# Patient Record
Sex: FEMALE | Race: WHITE | NOT HISPANIC OR LATINO | Employment: OTHER | URBAN - NONMETROPOLITAN AREA
[De-identification: names, ages, dates, MRNs, and addresses within clinical notes are randomized per-mention and may not be internally consistent; named-entity substitution may affect disease eponyms.]

---

## 2017-01-06 ENCOUNTER — TELEPHONE (OUTPATIENT)
Dept: GASTROENTEROLOGY | Facility: CLINIC | Age: 72
End: 2017-01-06

## 2017-01-06 NOTE — TELEPHONE ENCOUNTER
----- Message from Jose Raul Butt DO sent at 12/29/2016  6:08 PM CST -----  Let pt know bx were ok

## 2017-01-10 ENCOUNTER — OFFICE VISIT (OUTPATIENT)
Dept: NEUROLOGY | Age: 72
End: 2017-01-10
Payer: MEDICARE

## 2017-01-10 VITALS
HEIGHT: 66 IN | DIASTOLIC BLOOD PRESSURE: 69 MMHG | SYSTOLIC BLOOD PRESSURE: 129 MMHG | WEIGHT: 148 LBS | HEART RATE: 81 BPM | RESPIRATION RATE: 16 BRPM | BODY MASS INDEX: 23.78 KG/M2

## 2017-01-10 DIAGNOSIS — G61.81 CIDP (CHRONIC INFLAMMATORY DEMYELINATING POLYNEUROPATHY) (HCC): Primary | ICD-10-CM

## 2017-01-10 DIAGNOSIS — M47.26 OSTEOARTHRITIS OF SPINE WITH RADICULOPATHY, LUMBAR REGION: ICD-10-CM

## 2017-01-10 DIAGNOSIS — G25.81 RESTLESS LEGS SYNDROME (RLS): ICD-10-CM

## 2017-01-10 DIAGNOSIS — M05.79 RHEUMATOID ARTHRITIS INVOLVING MULTIPLE SITES WITH POSITIVE RHEUMATOID FACTOR (HCC): ICD-10-CM

## 2017-01-10 PROCEDURE — 99214 OFFICE O/P EST MOD 30 MIN: CPT | Performed by: PSYCHIATRY & NEUROLOGY

## 2017-01-10 RX ORDER — DIAZEPAM 5 MG/1
5 TABLET ORAL EVERY 8 HOURS PRN
Qty: 3 TABLET | Refills: 0 | Status: SHIPPED | OUTPATIENT
Start: 2017-01-10 | End: 2017-01-11

## 2017-01-10 RX ORDER — HYDROCODONE BITARTRATE AND ACETAMINOPHEN 10; 325 MG/1; MG/1
1 TABLET ORAL EVERY 8 HOURS PRN
Qty: 90 TABLET | Refills: 0 | Status: SHIPPED | OUTPATIENT
Start: 2017-01-10 | End: 2017-02-09

## 2017-01-12 ENCOUNTER — TELEPHONE (OUTPATIENT)
Dept: NEUROLOGY | Age: 72
End: 2017-01-12

## 2017-01-17 ENCOUNTER — TELEPHONE (OUTPATIENT)
Dept: NEUROLOGY | Age: 72
End: 2017-01-17

## 2017-01-19 ENCOUNTER — TELEPHONE (OUTPATIENT)
Dept: NEUROLOGY | Age: 72
End: 2017-01-19

## 2017-01-24 ENCOUNTER — HOSPITAL ENCOUNTER (OUTPATIENT)
Dept: INFUSION THERAPY | Age: 72
Setting detail: INFUSION SERIES
Discharge: HOME | End: 2017-01-24
Payer: MEDICARE

## 2017-01-24 VITALS
HEART RATE: 58 BPM | DIASTOLIC BLOOD PRESSURE: 60 MMHG | TEMPERATURE: 98.2 F | RESPIRATION RATE: 18 BRPM | SYSTOLIC BLOOD PRESSURE: 133 MMHG

## 2017-01-24 PROCEDURE — 6360000002 HC RX W HCPCS: Performed by: PSYCHIATRY & NEUROLOGY

## 2017-01-24 PROCEDURE — 96366 THER/PROPH/DIAG IV INF ADDON: CPT

## 2017-01-24 PROCEDURE — 96365 THER/PROPH/DIAG IV INF INIT: CPT

## 2017-01-24 RX ADMIN — IMMUNE GLOBULIN (HUMAN) 30 G: 10 INJECTION INTRAVENOUS; SUBCUTANEOUS at 09:29

## 2017-01-27 ENCOUNTER — HOSPITAL ENCOUNTER (OUTPATIENT)
Dept: MRI IMAGING | Age: 72
Discharge: HOME | End: 2017-01-27
Payer: MEDICARE

## 2017-01-27 DIAGNOSIS — M47.26 OSTEOARTHRITIS OF SPINE WITH RADICULOPATHY, LUMBAR REGION: ICD-10-CM

## 2017-01-27 LAB
GFR NON-AFRICAN AMERICAN: 44
PERFORMED ON: ABNORMAL
POC CREATININE: 1.2 MG/DL (ref 0.3–1.3)
POC SAMPLE TYPE: ABNORMAL

## 2017-01-27 PROCEDURE — 72148 MRI LUMBAR SPINE W/O DYE: CPT

## 2017-01-27 PROCEDURE — 82565 ASSAY OF CREATININE: CPT

## 2017-01-30 ENCOUNTER — TELEPHONE (OUTPATIENT)
Dept: NEUROLOGY | Age: 72
End: 2017-01-30

## 2017-02-22 ENCOUNTER — HOSPITAL ENCOUNTER (OUTPATIENT)
Dept: INFUSION THERAPY | Age: 72
Setting detail: INFUSION SERIES
Discharge: HOME | End: 2017-02-22
Payer: MEDICARE

## 2017-02-22 VITALS
RESPIRATION RATE: 63 BRPM | TEMPERATURE: 99 F | SYSTOLIC BLOOD PRESSURE: 116 MMHG | DIASTOLIC BLOOD PRESSURE: 62 MMHG | HEART RATE: 18 BPM

## 2017-02-22 PROCEDURE — 6360000002 HC RX W HCPCS: Performed by: PSYCHIATRY & NEUROLOGY

## 2017-02-22 PROCEDURE — 96366 THER/PROPH/DIAG IV INF ADDON: CPT

## 2017-02-22 PROCEDURE — 96365 THER/PROPH/DIAG IV INF INIT: CPT

## 2017-02-22 RX ADMIN — IMMUNE GLOBULIN INFUSION (HUMAN) 30 G: 100 INJECTION, SOLUTION INTRAVENOUS; SUBCUTANEOUS at 09:26

## 2017-02-23 RX ORDER — HYDROCODONE BITARTRATE AND ACETAMINOPHEN 10; 325 MG/1; MG/1
1 TABLET ORAL EVERY 8 HOURS PRN
Qty: 90 TABLET | Refills: 0 | Status: SHIPPED | OUTPATIENT
Start: 2017-02-23 | End: 2017-03-25

## 2017-03-20 ENCOUNTER — OFFICE VISIT (OUTPATIENT)
Dept: UROLOGY | Age: 72
End: 2017-03-20
Payer: MEDICARE

## 2017-03-20 VITALS
TEMPERATURE: 97.4 F | OXYGEN SATURATION: 95 % | HEART RATE: 70 BPM | WEIGHT: 154 LBS | HEIGHT: 66 IN | BODY MASS INDEX: 24.75 KG/M2

## 2017-03-20 DIAGNOSIS — N31.2 ATONIC BLADDER: ICD-10-CM

## 2017-03-20 DIAGNOSIS — N39.0 RECURRENT UTI: Primary | ICD-10-CM

## 2017-03-20 LAB
BACTERIA URINE, POC: ABNORMAL
BILIRUBIN URINE: 0 MG/DL
BLOOD, URINE: NEGATIVE
CASTS URINE, POC: ABNORMAL
CLARITY: ABNORMAL
COLOR: ABNORMAL
CRYSTALS URINE, POC: ABNORMAL
EPI CELLS URINE, POC: 3
GLUCOSE URINE: ABNORMAL
KETONES, URINE: NEGATIVE
LEUKOCYTE EST, POC: ABNORMAL
NITRITE, URINE: NEGATIVE
PH UA: 7 (ref 4.5–8)
PROTEIN UA: NEGATIVE
RBC URINE, POC: ABNORMAL
SPECIFIC GRAVITY UA: 1.01 (ref 1–1.03)
UROBILINOGEN, URINE: NORMAL
WBC URINE, POC: ABNORMAL
YEAST URINE, POC: ABNORMAL

## 2017-03-20 PROCEDURE — 81000 URINALYSIS NONAUTO W/SCOPE: CPT | Performed by: UROLOGY

## 2017-03-20 PROCEDURE — 99213 OFFICE O/P EST LOW 20 MIN: CPT | Performed by: UROLOGY

## 2017-03-20 RX ORDER — FENOFIBRATE 145 MG/1
145 TABLET, COATED ORAL DAILY
COMMUNITY
Start: 2017-02-27

## 2017-03-20 RX ORDER — OXYBUTYNIN CHLORIDE 10 MG/1
10 TABLET, EXTENDED RELEASE ORAL DAILY
Qty: 90 TABLET | Refills: 3 | Status: SHIPPED | OUTPATIENT
Start: 2017-03-20 | End: 2018-03-02 | Stop reason: SDUPTHER

## 2017-03-20 RX ORDER — NITROFURANTOIN MACROCRYSTALS 50 MG/1
50 CAPSULE ORAL NIGHTLY
Status: CANCELLED | OUTPATIENT
Start: 2017-03-20

## 2017-03-20 ASSESSMENT — ENCOUNTER SYMPTOMS
COUGH: 0
DOUBLE VISION: 0
HEMOPTYSIS: 0
NAUSEA: 0
BLURRED VISION: 0
HEARTBURN: 0

## 2017-03-22 ENCOUNTER — HOSPITAL ENCOUNTER (OUTPATIENT)
Dept: INFUSION THERAPY | Age: 72
Discharge: HOME | End: 2017-03-22

## 2017-03-29 ENCOUNTER — HOSPITAL ENCOUNTER (OUTPATIENT)
Dept: INFUSION THERAPY | Age: 72
Setting detail: INFUSION SERIES
Discharge: HOME | End: 2017-03-29
Payer: MEDICARE

## 2017-03-29 VITALS
SYSTOLIC BLOOD PRESSURE: 108 MMHG | HEART RATE: 64 BPM | WEIGHT: 150 LBS | HEIGHT: 66 IN | RESPIRATION RATE: 18 BRPM | BODY MASS INDEX: 24.11 KG/M2 | DIASTOLIC BLOOD PRESSURE: 62 MMHG

## 2017-03-29 PROCEDURE — 96365 THER/PROPH/DIAG IV INF INIT: CPT

## 2017-03-29 PROCEDURE — 96366 THER/PROPH/DIAG IV INF ADDON: CPT

## 2017-03-29 PROCEDURE — 6360000002 HC RX W HCPCS: Performed by: PSYCHIATRY & NEUROLOGY

## 2017-03-29 RX ADMIN — IMMUNE GLOBULIN INFUSION (HUMAN) 30 G: 100 INJECTION, SOLUTION INTRAVENOUS; SUBCUTANEOUS at 09:53

## 2017-04-10 RX ORDER — HYDROCODONE BITARTRATE AND ACETAMINOPHEN 10; 325 MG/1; MG/1
1 TABLET ORAL EVERY 8 HOURS PRN
Qty: 90 TABLET | Refills: 0 | Status: SHIPPED | OUTPATIENT
Start: 2017-04-10 | End: 2017-05-10

## 2017-04-26 ENCOUNTER — HOSPITAL ENCOUNTER (OUTPATIENT)
Dept: INFUSION THERAPY | Age: 72
Setting detail: INFUSION SERIES
Discharge: HOME | End: 2017-04-26
Payer: MEDICARE

## 2017-04-26 VITALS
DIASTOLIC BLOOD PRESSURE: 48 MMHG | BODY MASS INDEX: 24.11 KG/M2 | RESPIRATION RATE: 18 BRPM | HEIGHT: 66 IN | WEIGHT: 150 LBS | HEART RATE: 62 BPM | SYSTOLIC BLOOD PRESSURE: 103 MMHG

## 2017-04-26 PROCEDURE — 6360000002 HC RX W HCPCS: Performed by: PSYCHIATRY & NEUROLOGY

## 2017-04-26 PROCEDURE — 96365 THER/PROPH/DIAG IV INF INIT: CPT

## 2017-04-26 PROCEDURE — 96366 THER/PROPH/DIAG IV INF ADDON: CPT

## 2017-04-26 RX ADMIN — IMMUNE GLOBULIN INFUSION (HUMAN) 30 G: 100 INJECTION, SOLUTION INTRAVENOUS; SUBCUTANEOUS at 09:29

## 2017-05-18 ENCOUNTER — OFFICE VISIT (OUTPATIENT)
Dept: NEUROLOGY | Age: 72
End: 2017-05-18
Payer: MEDICARE

## 2017-05-18 VITALS
HEART RATE: 77 BPM | OXYGEN SATURATION: 91 % | HEIGHT: 62 IN | DIASTOLIC BLOOD PRESSURE: 65 MMHG | SYSTOLIC BLOOD PRESSURE: 131 MMHG | BODY MASS INDEX: 28.34 KG/M2 | WEIGHT: 154 LBS

## 2017-05-18 DIAGNOSIS — M05.79 RHEUMATOID ARTHRITIS INVOLVING MULTIPLE SITES WITH POSITIVE RHEUMATOID FACTOR (HCC): ICD-10-CM

## 2017-05-18 DIAGNOSIS — G61.81 CIDP (CHRONIC INFLAMMATORY DEMYELINATING POLYNEUROPATHY) (HCC): Primary | ICD-10-CM

## 2017-05-18 DIAGNOSIS — G25.81 RESTLESS LEGS SYNDROME (RLS): ICD-10-CM

## 2017-05-18 PROCEDURE — 99214 OFFICE O/P EST MOD 30 MIN: CPT | Performed by: PSYCHIATRY & NEUROLOGY

## 2017-05-22 RX ORDER — HYDROCODONE BITARTRATE AND ACETAMINOPHEN 10; 325 MG/1; MG/1
1 TABLET ORAL EVERY 6 HOURS PRN
Qty: 90 TABLET | Refills: 0 | Status: SHIPPED | OUTPATIENT
Start: 2017-05-22 | End: 2017-06-21

## 2017-05-24 ENCOUNTER — HOSPITAL ENCOUNTER (OUTPATIENT)
Dept: INFUSION THERAPY | Age: 72
Setting detail: INFUSION SERIES
Discharge: HOME | End: 2017-05-24
Payer: MEDICARE

## 2017-05-24 VITALS
DIASTOLIC BLOOD PRESSURE: 61 MMHG | SYSTOLIC BLOOD PRESSURE: 128 MMHG | HEART RATE: 69 BPM | RESPIRATION RATE: 18 BRPM | TEMPERATURE: 98.7 F

## 2017-05-24 PROCEDURE — 6360000002 HC RX W HCPCS: Performed by: PSYCHIATRY & NEUROLOGY

## 2017-05-24 PROCEDURE — 96366 THER/PROPH/DIAG IV INF ADDON: CPT

## 2017-05-24 PROCEDURE — 96365 THER/PROPH/DIAG IV INF INIT: CPT

## 2017-05-24 RX ADMIN — IMMUNE GLOBULIN INTRAVENOUS (HUMAN) 30 G: 5 INJECTION, SOLUTION INTRAVENOUS at 09:17

## 2017-06-01 ENCOUNTER — OFFICE VISIT (OUTPATIENT)
Dept: GASTROENTEROLOGY | Facility: CLINIC | Age: 72
End: 2017-06-01

## 2017-06-01 VITALS
TEMPERATURE: 98.3 F | WEIGHT: 152 LBS | BODY MASS INDEX: 24.43 KG/M2 | SYSTOLIC BLOOD PRESSURE: 120 MMHG | HEART RATE: 74 BPM | DIASTOLIC BLOOD PRESSURE: 64 MMHG | HEIGHT: 66 IN

## 2017-06-01 DIAGNOSIS — K50.80 CROHN'S DISEASE OF BOTH SMALL AND LARGE INTESTINE WITHOUT COMPLICATION (HCC): Primary | ICD-10-CM

## 2017-06-01 PROCEDURE — 99213 OFFICE O/P EST LOW 20 MIN: CPT | Performed by: INTERNAL MEDICINE

## 2017-06-01 RX ORDER — OXYBUTYNIN CHLORIDE 10 MG/1
10 TABLET, EXTENDED RELEASE ORAL DAILY
COMMUNITY
End: 2020-02-14

## 2017-06-01 NOTE — PROGRESS NOTES
Chief Complaint   Patient presents with   • Crohn's Disease     has crohn's was taken off imuran  see how she is doing doing good       Subjective     Crohn's Disease   This is a recurrent problem. The current episode started more than 1 year ago. The problem occurs rarely. The problem has been resolved. Pertinent negatives include no abdominal pain, change in bowel habit, chest pain, coughing, fatigue, fever, joint swelling, myalgias, nausea, rash, sore throat or vomiting. Associated symptoms comments: No bleeding  . Nothing aggravates the symptoms. Treatments tried: only po mesalmine at this time 4X/day. The treatment provided moderate relief.       Past Medical History:   Diagnosis Date   • Arthritis    • Cancer     endometrial cancer   • Crohn's disease    • GERD (gastroesophageal reflux disease)    • Glaucoma    • History of transfusion    • Hypertension    • Macular degeneration    • Osteopenia    • Peripheral neuropathy        Past Surgical History:   Procedure Laterality Date   • BACK SURGERY  2010   • CARPAL TUNNEL RELEASE Bilateral    • CATARACT EXTRACTION Bilateral    • COLONOSCOPY  10/09/2015   • COLONOSCOPY N/A 12/28/2016    Procedure: COLONOSCOPY WITH ANESTHESIA;  Surgeon: Jose Raul Butt DO;  Location: [facility] ENDOSCOPY;  Service:    • CYST REMOVAL      from chest x2   • HYSTERECTOMY     • UPPER GASTROINTESTINAL ENDOSCOPY  08/31/2010       Outpatient Prescriptions Marked as Taking for the 6/1/17 encounter (Office Visit) with Jose Raul Butt DO   Medication Sig Dispense Refill   • allopurinol (ZYLOPRIM) 100 MG tablet Take 100 mg by mouth 2 (Two) Times a Day.     • allopurinol (ZYLOPRIM) 300 MG tablet Take 300 mg by mouth Daily.     • amLODIPine (NORVASC) 10 MG tablet      • calcium carbonate (OS-LUKE) 600 MG tablet Take 600 mg by mouth Daily.     • carvedilol (COREG) 6.25 MG tablet Take 6.25 mg by mouth 2 (Two) Times a Day With Meals.     • estrogens, conjugated, (PREMARIN) 0.3 MG tablet Take 0.3 mg  by mouth 1 (One) Time Per Week. Twice weekly     • fenofibrate (TRICOR) 145 MG tablet      • folic acid (FOLVITE) 1 MG tablet Take 1 mg by mouth Daily.     • gabapentin (NEURONTIN) 300 MG capsule Take 300 mg by mouth 3 (Three) Times a Day. 2 pills tid     • Garlic Oil 1000 MG capsule Take 1,000 mg by mouth Daily.     • HYDROcodone-acetaminophen (NORCO) 7.5-325 MG per tablet      • indapamide (LOZOL) 2.5 MG tablet Take 2.5 mg by mouth Every Morning.     • lisinopril (PRINIVIL,ZESTRIL) 40 MG tablet Take 40 mg by mouth Daily.     • mesalamine (DELZICOL) 400 MG capsule delayed-release delayed release capsule Take 2 capsules by mouth 3 (Three) Times a Day. Two pills tid 180 capsule 11   • Multiple Vitamins-Minerals (MULTIVITAMIN ADULT PO) Take  by mouth.     • Omega-3 Fatty Acids (FISH OIL) 1000 MG capsule capsule Take 1,000 mg by mouth Daily With Breakfast.     • omeprazole (priLOSEC) 40 MG capsule Take 20 mg by mouth 2 (Two) Times a Day.     • oxybutynin XL (DITROPAN-XL) 10 MG 24 hr tablet Take 10 mg by mouth Daily.     • traMADol (ULTRAM) 50 MG tablet Take 50 mg by mouth Every 6 (Six) Hours As Needed for moderate pain (4-6).     • vitamin C (ASCORBIC ACID) 250 MG tablet Take 250 mg by mouth Daily.         No Known Allergies    Social History     Social History   • Marital status:      Spouse name: N/A   • Number of children: N/A   • Years of education: N/A     Occupational History   • Not on file.     Social History Main Topics   • Smoking status: Never Smoker   • Smokeless tobacco: Never Used   • Alcohol use No   • Drug use: No   • Sexual activity: Not on file     Other Topics Concern   • Not on file     Social History Narrative       Family History   Problem Relation Age of Onset   • Colon cancer Neg Hx    • Colon polyps Neg Hx    • Esophageal cancer Neg Hx    • Liver cancer Neg Hx    • Liver disease Neg Hx    • Rectal cancer Neg Hx    • Stomach cancer Neg Hx        Review of Systems   Constitutional:  "Negative for fatigue, fever and unexpected weight change.   HENT: Negative for hearing loss, sore throat and voice change.    Eyes: Negative for visual disturbance.   Respiratory: Negative for cough, shortness of breath and wheezing.    Cardiovascular: Negative for chest pain and palpitations.   Gastrointestinal: Negative for abdominal pain, blood in stool, change in bowel habit, nausea and vomiting.   Endocrine: Negative for polydipsia and polyuria.   Genitourinary: Negative for difficulty urinating, dysuria, hematuria and urgency.   Musculoskeletal: Negative for joint swelling and myalgias.   Skin: Negative for color change, rash and wound.   Neurological: Negative for dizziness, tremors, seizures and syncope.   Hematological: Does not bruise/bleed easily.   Psychiatric/Behavioral: Negative for agitation and confusion. The patient is not nervous/anxious.        Objective     Vitals:    06/01/17 1315   BP: 120/64   Pulse: 74   Temp: 98.3 °F (36.8 °C)   Weight: 152 lb (68.9 kg)   Height: 66\" (167.6 cm)     Body mass index is 24.53 kg/(m^2).    Physical Exam   Constitutional: She is oriented to person, place, and time. She appears well-developed and well-nourished.   HENT:   Head: Normocephalic and atraumatic.   Eyes: Conjunctivae are normal. Pupils are equal, round, and reactive to light. No scleral icterus.   Neck: No JVD present. No thyroid mass and no thyromegaly present.   Cardiovascular: Normal rate, regular rhythm and normal heart sounds.  Exam reveals no gallop and no friction rub.    No murmur heard.  Pulmonary/Chest: Effort normal and breath sounds normal. No accessory muscle usage. No respiratory distress. She has no wheezes. She has no rales.   Abdominal: Soft. Bowel sounds are normal. She exhibits no distension, no ascites and no mass. There is no splenomegaly or hepatomegaly. There is no tenderness. There is no rebound and no guarding.   Genitourinary:   Genitourinary Comments: Rectal-Did not examine "   Musculoskeletal: Normal range of motion. She exhibits no edema.   Neurological: She is alert and oriented to person, place, and time.   Deemed a reliable historian, able to converse without difficulty and able to move all extremities without difficulty   Skin: Skin is warm and dry.   Psychiatric: She has a normal mood and affect. Her behavior is normal.       Imaging Results (most recent)     None          Assessment/Plan     Deisi was seen today for crohn's disease.    Diagnoses and all orders for this visit:    Crohn's disease of both small and large intestine without complication      Continue current rx for now  Ov in 6 months or sooner if needed  * Surgery not found *    There are no Patient Instructions on file for this visit.

## 2017-07-05 ENCOUNTER — HOSPITAL ENCOUNTER (OUTPATIENT)
Dept: INFUSION THERAPY | Age: 72
Setting detail: INFUSION SERIES
Discharge: HOME | End: 2017-07-05
Payer: MEDICARE

## 2017-07-05 VITALS
SYSTOLIC BLOOD PRESSURE: 120 MMHG | HEART RATE: 65 BPM | TEMPERATURE: 98.6 F | DIASTOLIC BLOOD PRESSURE: 58 MMHG | RESPIRATION RATE: 16 BRPM | WEIGHT: 150 LBS | BODY MASS INDEX: 24.11 KG/M2 | OXYGEN SATURATION: 91 % | HEIGHT: 66 IN

## 2017-07-05 PROCEDURE — 96366 THER/PROPH/DIAG IV INF ADDON: CPT

## 2017-07-05 PROCEDURE — 6360000002 HC RX W HCPCS: Performed by: PSYCHIATRY & NEUROLOGY

## 2017-07-05 PROCEDURE — 96365 THER/PROPH/DIAG IV INF INIT: CPT

## 2017-07-05 RX ADMIN — IMMUNE GLOBULIN INTRAVENOUS (HUMAN) 30 G: 5 INJECTION, SOLUTION INTRAVENOUS at 10:01

## 2017-07-12 RX ORDER — HYDROCODONE BITARTRATE AND ACETAMINOPHEN 10; 325 MG/1; MG/1
1 TABLET ORAL EVERY 6 HOURS PRN
Qty: 90 TABLET | Refills: 0 | Status: SHIPPED | OUTPATIENT
Start: 2017-07-12 | End: 2017-08-11

## 2017-08-02 ENCOUNTER — HOSPITAL ENCOUNTER (OUTPATIENT)
Dept: INFUSION THERAPY | Age: 72
Setting detail: INFUSION SERIES
Discharge: HOME | End: 2017-08-02
Payer: MEDICARE

## 2017-08-02 VITALS
WEIGHT: 150 LBS | RESPIRATION RATE: 16 BRPM | SYSTOLIC BLOOD PRESSURE: 122 MMHG | BODY MASS INDEX: 24.21 KG/M2 | DIASTOLIC BLOOD PRESSURE: 64 MMHG | HEART RATE: 64 BPM | TEMPERATURE: 98 F

## 2017-08-02 PROCEDURE — 96365 THER/PROPH/DIAG IV INF INIT: CPT

## 2017-08-02 PROCEDURE — 6360000002 HC RX W HCPCS: Performed by: PSYCHIATRY & NEUROLOGY

## 2017-08-02 PROCEDURE — 96366 THER/PROPH/DIAG IV INF ADDON: CPT

## 2017-08-22 RX ORDER — HYDROCODONE BITARTRATE AND ACETAMINOPHEN 10; 325 MG/1; MG/1
1 TABLET ORAL EVERY 6 HOURS PRN
Qty: 90 TABLET | Refills: 0 | Status: SHIPPED | OUTPATIENT
Start: 2017-08-22 | End: 2017-09-18 | Stop reason: SDUPTHER

## 2017-09-06 ENCOUNTER — HOSPITAL ENCOUNTER (OUTPATIENT)
Dept: INFUSION THERAPY | Age: 72
Setting detail: INFUSION SERIES
Discharge: HOME | End: 2017-09-06
Payer: MEDICARE

## 2017-09-06 ENCOUNTER — TELEPHONE (OUTPATIENT)
Dept: NEUROLOGY | Age: 72
End: 2017-09-06

## 2017-09-12 ENCOUNTER — TELEPHONE (OUTPATIENT)
Dept: NEUROLOGY | Age: 72
End: 2017-09-12

## 2017-09-15 ENCOUNTER — HOSPITAL ENCOUNTER (OUTPATIENT)
Dept: INFUSION THERAPY | Age: 72
Setting detail: INFUSION SERIES
Discharge: HOME | End: 2017-09-15
Payer: MEDICARE

## 2017-09-15 VITALS
DIASTOLIC BLOOD PRESSURE: 53 MMHG | OXYGEN SATURATION: 95 % | TEMPERATURE: 98.3 F | HEART RATE: 66 BPM | SYSTOLIC BLOOD PRESSURE: 101 MMHG | RESPIRATION RATE: 17 BRPM | BODY MASS INDEX: 24.21 KG/M2 | WEIGHT: 150 LBS

## 2017-09-15 DIAGNOSIS — G61.81 CIDP (CHRONIC INFLAMMATORY DEMYELINATING POLYNEUROPATHY) (HCC): ICD-10-CM

## 2017-09-15 PROCEDURE — 6360000002 HC RX W HCPCS: Performed by: PSYCHIATRY & NEUROLOGY

## 2017-09-15 PROCEDURE — 96365 THER/PROPH/DIAG IV INF INIT: CPT

## 2017-09-15 PROCEDURE — 96366 THER/PROPH/DIAG IV INF ADDON: CPT

## 2017-09-15 RX ORDER — SULFAMETHOXAZOLE AND TRIMETHOPRIM 800; 160 MG/1; MG/1
1 TABLET ORAL 2 TIMES DAILY
COMMUNITY
End: 2017-11-17

## 2017-09-15 RX ADMIN — IMMUNE GLOBULIN INTRAVENOUS (HUMAN) 30 G: 5 INJECTION, SOLUTION INTRAVENOUS at 09:46

## 2017-09-18 ENCOUNTER — OFFICE VISIT (OUTPATIENT)
Dept: NEUROLOGY | Age: 72
End: 2017-09-18
Payer: MEDICARE

## 2017-09-18 VITALS
RESPIRATION RATE: 18 BRPM | DIASTOLIC BLOOD PRESSURE: 59 MMHG | SYSTOLIC BLOOD PRESSURE: 111 MMHG | HEIGHT: 66 IN | HEART RATE: 68 BPM | BODY MASS INDEX: 25.7 KG/M2 | WEIGHT: 159.9 LBS

## 2017-09-18 DIAGNOSIS — M48.061 LUMBAR SPINAL STENOSIS: ICD-10-CM

## 2017-09-18 DIAGNOSIS — M05.79 RHEUMATOID ARTHRITIS INVOLVING MULTIPLE SITES WITH POSITIVE RHEUMATOID FACTOR (HCC): ICD-10-CM

## 2017-09-18 DIAGNOSIS — G61.81 CIDP (CHRONIC INFLAMMATORY DEMYELINATING POLYNEUROPATHY) (HCC): Primary | ICD-10-CM

## 2017-09-18 DIAGNOSIS — G25.81 RESTLESS LEGS SYNDROME (RLS): ICD-10-CM

## 2017-09-18 PROCEDURE — 99214 OFFICE O/P EST MOD 30 MIN: CPT | Performed by: PSYCHIATRY & NEUROLOGY

## 2017-09-18 RX ORDER — HYDROCODONE BITARTRATE AND ACETAMINOPHEN 10; 325 MG/1; MG/1
1 TABLET ORAL EVERY 6 HOURS PRN
Qty: 90 TABLET | Refills: 0 | Status: SHIPPED | OUTPATIENT
Start: 2017-09-18 | End: 2017-10-18

## 2017-09-20 ENCOUNTER — OFFICE VISIT (OUTPATIENT)
Dept: UROLOGY | Age: 72
End: 2017-09-20
Payer: MEDICARE

## 2017-09-20 VITALS
SYSTOLIC BLOOD PRESSURE: 130 MMHG | DIASTOLIC BLOOD PRESSURE: 74 MMHG | WEIGHT: 169 LBS | OXYGEN SATURATION: 92 % | HEART RATE: 88 BPM | HEIGHT: 68 IN | BODY MASS INDEX: 25.61 KG/M2 | TEMPERATURE: 97.2 F

## 2017-09-20 DIAGNOSIS — N39.0 RECURRENT UTI: Primary | ICD-10-CM

## 2017-09-20 DIAGNOSIS — N31.2 ATONIC BLADDER: ICD-10-CM

## 2017-09-20 PROCEDURE — 51798 US URINE CAPACITY MEASURE: CPT | Performed by: PHYSICIAN ASSISTANT

## 2017-09-20 PROCEDURE — 99214 OFFICE O/P EST MOD 30 MIN: CPT | Performed by: PHYSICIAN ASSISTANT

## 2017-09-20 RX ORDER — NITROFURANTOIN MACROCRYSTALS 50 MG/1
50 CAPSULE ORAL NIGHTLY
Qty: 30 CAPSULE | Refills: 3 | Status: SHIPPED | OUTPATIENT
Start: 2017-09-20 | End: 2017-10-20

## 2017-09-20 ASSESSMENT — ENCOUNTER SYMPTOMS
NAUSEA: 0
WHEEZING: 0
EYE DISCHARGE: 0
HEARTBURN: 0
SHORTNESS OF BREATH: 0
EYE REDNESS: 0

## 2017-10-11 ENCOUNTER — HOSPITAL ENCOUNTER (OUTPATIENT)
Dept: INFUSION THERAPY | Age: 72
Setting detail: INFUSION SERIES
Discharge: HOME | End: 2017-10-11
Payer: MEDICARE

## 2017-10-11 VITALS
DIASTOLIC BLOOD PRESSURE: 64 MMHG | HEART RATE: 65 BPM | SYSTOLIC BLOOD PRESSURE: 123 MMHG | RESPIRATION RATE: 18 BRPM | TEMPERATURE: 98.3 F

## 2017-10-11 DIAGNOSIS — G61.81 CIDP (CHRONIC INFLAMMATORY DEMYELINATING POLYNEUROPATHY) (HCC): ICD-10-CM

## 2017-10-11 PROCEDURE — 96366 THER/PROPH/DIAG IV INF ADDON: CPT

## 2017-10-11 PROCEDURE — 96365 THER/PROPH/DIAG IV INF INIT: CPT

## 2017-10-11 PROCEDURE — 6360000002 HC RX W HCPCS: Performed by: PSYCHIATRY & NEUROLOGY

## 2017-10-11 RX ADMIN — IMMUNE GLOBULIN INTRAVENOUS (HUMAN) 35 G: 5 INJECTION, SOLUTION INTRAVENOUS at 09:28

## 2017-11-08 ENCOUNTER — HOSPITAL ENCOUNTER (OUTPATIENT)
Dept: INFUSION THERAPY | Age: 72
Setting detail: INFUSION SERIES
Discharge: HOME | End: 2017-11-08
Payer: MEDICARE

## 2017-11-08 VITALS
DIASTOLIC BLOOD PRESSURE: 56 MMHG | RESPIRATION RATE: 18 BRPM | HEART RATE: 63 BPM | TEMPERATURE: 98.6 F | BODY MASS INDEX: 23.11 KG/M2 | WEIGHT: 152 LBS | SYSTOLIC BLOOD PRESSURE: 107 MMHG

## 2017-11-08 DIAGNOSIS — G61.81 CIDP (CHRONIC INFLAMMATORY DEMYELINATING POLYNEUROPATHY) (HCC): ICD-10-CM

## 2017-11-08 PROCEDURE — 96366 THER/PROPH/DIAG IV INF ADDON: CPT

## 2017-11-08 PROCEDURE — 6360000002 HC RX W HCPCS: Performed by: PSYCHIATRY & NEUROLOGY

## 2017-11-08 PROCEDURE — 96365 THER/PROPH/DIAG IV INF INIT: CPT

## 2017-11-08 RX ADMIN — IMMUNE GLOBULIN INTRAVENOUS (HUMAN) 35 G: 5 INJECTION, SOLUTION INTRAVENOUS at 09:58

## 2017-11-17 ENCOUNTER — OFFICE VISIT (OUTPATIENT)
Dept: UROLOGY | Age: 72
End: 2017-11-17
Payer: MEDICARE

## 2017-11-17 VITALS
HEIGHT: 66 IN | SYSTOLIC BLOOD PRESSURE: 131 MMHG | WEIGHT: 154 LBS | TEMPERATURE: 96.9 F | BODY MASS INDEX: 24.75 KG/M2 | HEART RATE: 60 BPM | DIASTOLIC BLOOD PRESSURE: 62 MMHG

## 2017-11-17 DIAGNOSIS — N31.2 ATONIC BLADDER: ICD-10-CM

## 2017-11-17 DIAGNOSIS — N39.0 RECURRENT UTI: Primary | ICD-10-CM

## 2017-11-17 LAB
APPEARANCE FLUID: NORMAL
BILIRUBIN, POC: NORMAL
BLOOD URINE, POC: NORMAL
CLARITY, POC: CLEAR
COLOR, POC: YELLOW
GLUCOSE URINE, POC: NORMAL
KETONES, POC: NORMAL
LEUKOCYTE EST, POC: NORMAL
NITRITE, POC: NORMAL
PH, POC: 5
PROTEIN, POC: NORMAL
SPECIFIC GRAVITY, POC: 1.01
UROBILINOGEN, POC: 0.2

## 2017-11-17 PROCEDURE — 99213 OFFICE O/P EST LOW 20 MIN: CPT | Performed by: PHYSICIAN ASSISTANT

## 2017-11-17 PROCEDURE — 51798 US URINE CAPACITY MEASURE: CPT | Performed by: PHYSICIAN ASSISTANT

## 2017-11-17 PROCEDURE — 81003 URINALYSIS AUTO W/O SCOPE: CPT | Performed by: PHYSICIAN ASSISTANT

## 2017-11-17 ASSESSMENT — ENCOUNTER SYMPTOMS
HEARTBURN: 0
EYE REDNESS: 0
EYE DISCHARGE: 0
WHEEZING: 0
NAUSEA: 0
SHORTNESS OF BREATH: 0

## 2017-11-17 NOTE — PROGRESS NOTES
Jena Gutierrez is a 67 y.o. female who presents today   Chief Complaint   Patient presents with    Follow-up     I'm here because I thought I might of had a UTI. I was having frequency and my urine had an oder. I finished Bactrim wednesday     Follow up uti:  Patient we have seen with history of neurogenic bladder and recurrent urinary tract infections had been doing well since I saw her 09/20/17. She was started on daily antibiotic she also uses Premarin cream but takes oxybutynin. She recently went to [facility] urgent care and was diagnosed and treated for urinary tract infection. We have called for the urinalysis and culture results. She was called about her culture from [facility] and was put on Bactrim. She is nervous because she flow in her urine had a foul odor. She states that she completed her course of Bactrim.     Past Medical History:   Diagnosis Date    Arthritis     Cancer [facility])     endometrial cancer    Cataract     CIDP (chronic inflammatory demyelinating polyneuropathy) ([facility])     Hypertension     Radiation        Past Surgical History:   Procedure Laterality Date    BACK SURGERY      CARPAL TUNNEL RELEASE Bilateral     CATARACT REMOVAL Bilateral     HYSTERECTOMY      KNEE ARTHROSCOPY Right        Current Outpatient Prescriptions   Medication Sig Dispense Refill    conjugated estrogens (PREMARIN) 0.625 MG/GM vaginal cream Place vaginally twice a week 1 Tube 3    conjugated estrogens (PREMARIN) 0.625 MG/GM vaginal cream Place vaginally daily as directed Monday Wednesday and Friday weekly 1 Tube 3    Immune Globulin, Human, (GAMMAGARD) 10 GM/100ML SOLN solution Infuse 383.5 mLs intravenously every 28 days 0.5 gm/kg .5 mL 5    fenofibrate (TRICOR) 145 MG tablet       oxybutynin (DITROPAN XL) 10 MG extended release tablet Take 1 tablet by mouth daily 90 tablet 3    Omega-3 Fatty Acids (FISH OIL) 1000 MG CAPS Take 1,000 mg by mouth 2 times daily      Garlic 5157 MG CAPS Take Never Used    Alcohol use No    Drug use: No    Sexual activity: Not Asked     Other Topics Concern    None     Social History Narrative    None       Family History   Problem Relation Age of Onset    Cancer Mother     High Blood Pressure Father     Heart Disease Father        REVIEW OF SYSTEMS:  Review of Systems   Constitutional: Negative for chills and fever. HENT: Negative for hearing loss. Eyes: Negative for discharge and redness. Respiratory: Negative for shortness of breath and wheezing. Cardiovascular: Negative for leg swelling and PND. Gastrointestinal: Negative for heartburn and nausea. Genitourinary: Negative for dysuria, flank pain, frequency, hematuria and urgency. Musculoskeletal: Negative for myalgias and neck pain. Skin: Negative for itching and rash. Neurological: Negative for seizures, loss of consciousness and headaches. Endo/Heme/Allergies: Negative for environmental allergies and polydipsia. Psychiatric/Behavioral: Negative for depression and suicidal ideas. PHYSICAL EXAM:  /62   Pulse 60   Temp 96.9 °F (36.1 °C) (Temporal)   Ht 5' 6\" (1.676 m)   Wt 154 lb (69.9 kg)   BMI 24.86 kg/m²   Physical Exam   Constitutional: No distress. HENT:   Mouth/Throat: No oropharyngeal exudate. Eyes: Left eye exhibits no discharge. No scleral icterus. Neck: No JVD present. No tracheal deviation present. No thyromegaly present. Cardiovascular: Exam reveals no gallop and no friction rub. Pulmonary/Chest: No stridor. She has no rales. Abdominal: She exhibits no distension and no mass. There is no rebound and no guarding. Musculoskeletal: She exhibits no edema. Neurological: No cranial nerve deficit. She exhibits normal muscle tone. Coordination normal.   Skin: She is not diaphoretic. No pallor.            DATA:  U/A:    Lab Results   Component Value Date    NITRITE neg 11/17/2017    COLORU yellow 11/17/2017    PROTEINU neg 11/17/2017    PROTEINU Negative 03/20/2017    PHUR 5.0 11/17/2017    PHUR 7.0 03/20/2017    RBCUA trace 03/20/2017    CLARITYU clear 11/17/2017    SPECGRAV 1.010 11/17/2017    SPECGRAV 1.010 03/20/2017    LEUKOCYTESUR neg 11/17/2017    UROBILINOGEN Normal 03/20/2017    BILIRUBINUR neg 11/17/2017    BLOODU neg 11/17/2017    BLOODU Negative 03/20/2017    GLUCOSEU neg 11/17/2017    GLUCOSEU neg 03/20/2017           1. Recurrent UTI  Her urine today is clear there is no indication of urinary tract infection the malodorous urine could be related to antibiotic treatment.  - POCT Urinalysis no Micro  - conjugated estrogens (PREMARIN) 0.625 MG/GM vaginal cream; Place vaginally twice a week  Dispense: 1 Tube; Refill: 3    2. Atonic bladder  She will continue the 3 times a day self cath. - IA MEASUREMENT,POST-VOID RESIDUAL VOLUME BY US,NON-IMAGING      Orders Placed This Encounter   Procedures    POCT Urinalysis no Micro    IA MEASUREMENT,POST-VOID RESIDUAL VOLUME BY US,NON-IMAGING     Bladder scan 30ml     Orders Placed This Encounter   Medications    conjugated estrogens (PREMARIN) 0.625 MG/GM vaginal cream     Sig: Place vaginally twice a week     Dispense:  1 Tube     Refill:  3    conjugated estrogens (PREMARIN) 0.625 MG/GM vaginal cream     Sig: Place vaginally daily as directed Monday Wednesday and Friday weekly     Dispense:  1 Tube     Refill:  3       Plan:  Patient will follow-up in 3 months.

## 2017-11-21 RX ORDER — HYDROCODONE BITARTRATE AND ACETAMINOPHEN 10; 325 MG/1; MG/1
1 TABLET ORAL EVERY 6 HOURS PRN
Qty: 90 TABLET | Refills: 0 | Status: SHIPPED | OUTPATIENT
Start: 2017-11-21 | End: 2017-12-21

## 2017-12-06 ENCOUNTER — HOSPITAL ENCOUNTER (OUTPATIENT)
Dept: INFUSION THERAPY | Age: 72
Setting detail: INFUSION SERIES
Discharge: HOME | End: 2017-12-06
Payer: MEDICARE

## 2017-12-06 VITALS
RESPIRATION RATE: 18 BRPM | HEART RATE: 60 BPM | TEMPERATURE: 98.3 F | SYSTOLIC BLOOD PRESSURE: 89 MMHG | OXYGEN SATURATION: 96 % | DIASTOLIC BLOOD PRESSURE: 48 MMHG

## 2017-12-06 DIAGNOSIS — G61.81 CIDP (CHRONIC INFLAMMATORY DEMYELINATING POLYNEUROPATHY) (HCC): ICD-10-CM

## 2017-12-06 PROCEDURE — 6360000002 HC RX W HCPCS: Performed by: PSYCHIATRY & NEUROLOGY

## 2017-12-06 PROCEDURE — 96366 THER/PROPH/DIAG IV INF ADDON: CPT

## 2017-12-06 PROCEDURE — 96365 THER/PROPH/DIAG IV INF INIT: CPT

## 2017-12-06 RX ORDER — MESALAMINE 400 MG/1
CAPSULE, DELAYED RELEASE ORAL
Qty: 180 CAPSULE | OUTPATIENT
Start: 2017-12-06

## 2017-12-06 RX ADMIN — IMMUNE GLOBULIN INTRAVENOUS (HUMAN) 35 G: 5 INJECTION, SOLUTION INTRAVENOUS at 10:48

## 2017-12-07 ENCOUNTER — OFFICE VISIT (OUTPATIENT)
Dept: GASTROENTEROLOGY | Facility: CLINIC | Age: 72
End: 2017-12-07

## 2017-12-07 VITALS
DIASTOLIC BLOOD PRESSURE: 68 MMHG | TEMPERATURE: 97.1 F | HEIGHT: 66 IN | SYSTOLIC BLOOD PRESSURE: 124 MMHG | WEIGHT: 150 LBS | BODY MASS INDEX: 24.11 KG/M2 | HEART RATE: 62 BPM | OXYGEN SATURATION: 96 %

## 2017-12-07 DIAGNOSIS — K50.919 CROHN'S DISEASE WITH COMPLICATION, UNSPECIFIED GASTROINTESTINAL TRACT LOCATION (HCC): ICD-10-CM

## 2017-12-07 DIAGNOSIS — R19.7 DIARRHEA, UNSPECIFIED TYPE: Primary | ICD-10-CM

## 2017-12-07 PROCEDURE — 99213 OFFICE O/P EST LOW 20 MIN: CPT | Performed by: INTERNAL MEDICINE

## 2017-12-07 NOTE — PROGRESS NOTES
Chief Complaint   Patient presents with   • Crohn's Disease     was seen 6-1-17 here to discuss how shes doing     Subjective   HPI    Deisi Ponce is a 72 y.o. female who presents with a history of Crohn's Disease.   Status of disease is quiet and stable.  The severity is described as Mild.  She denies  abdominal cramping, bloody stool and anorexia. No weight loss.  Previous diagnostic test include  colonoscopy. Over past week she has experienced multiple loose BM on daily basis.  No diet changes.  Imodium helps.   Currently taking Delzicol 4 per day for several months.  Colonoscopy Dec 2016.    Past Medical History:   Diagnosis Date   • Arthritis    • Cancer     endometrial cancer   • Crohn's disease    • GERD (gastroesophageal reflux disease)    • Glaucoma    • History of transfusion    • Hypertension    • Macular degeneration    • Osteopenia    • Peripheral neuropathy      Outpatient Prescriptions Marked as Taking for the 12/7/17 encounter (Office Visit) with Jose Raul Butt, DO   Medication Sig Dispense Refill   • allopurinol (ZYLOPRIM) 100 MG tablet Take 100 mg by mouth 2 (Two) Times a Day.     • allopurinol (ZYLOPRIM) 300 MG tablet Take 300 mg by mouth Daily.     • amLODIPine (NORVASC) 10 MG tablet      • azaTHIOprine (IMURAN) 50 MG tablet Take 1 tablet by mouth Daily. Three times weekly 30 tablet 11   • calcium carbonate (OS-LUKE) 600 MG tablet Take 600 mg by mouth Daily.     • carvedilol (COREG) 6.25 MG tablet Take 6.25 mg by mouth 2 (Two) Times a Day With Meals.     • estrogens, conjugated, (PREMARIN) 0.3 MG tablet Take 0.3 mg by mouth 1 (One) Time Per Week. Twice weekly     • fenofibrate (TRICOR) 145 MG tablet      • folic acid (FOLVITE) 1 MG tablet Take 1 mg by mouth Daily.     • gabapentin (NEURONTIN) 300 MG capsule Take 300 mg by mouth 3 (Three) Times a Day. 2 pills tid     • Garlic Oil 1000 MG capsule Take 1,000 mg by mouth Daily.     • HYDROcodone-acetaminophen (NORCO) 7.5-325 MG per tablet       • indapamide (LOZOL) 2.5 MG tablet Take 2.5 mg by mouth Every Morning.     • lisinopril (PRINIVIL,ZESTRIL) 40 MG tablet Take 40 mg by mouth Daily.     • mesalamine (DELZICOL) 400 MG capsule delayed-release delayed release capsule Take 2 capsules by mouth 3 (Three) Times a Day. Two pills tid 180 capsule 11   • Multiple Vitamins-Minerals (MULTIVITAMIN ADULT PO) Take  by mouth.     • nitrofurantoin (MACRODANTIN) 50 MG capsule      • Omega-3 Fatty Acids (FISH OIL) 1000 MG capsule capsule Take 1,000 mg by mouth Daily With Breakfast.     • omeprazole (priLOSEC) 40 MG capsule Take 20 mg by mouth 2 (Two) Times a Day.     • oxybutynin XL (DITROPAN-XL) 10 MG 24 hr tablet Take 10 mg by mouth Daily.     • polyethylene glycol (MIRALAX) packet Take as directed 1 each 0   • rOPINIRole (REQUIP) 0.5 MG tablet Take 0.5 mg by mouth 2 (Two) Times a Day. Take 1 hour before bedtime.     • traMADol (ULTRAM) 50 MG tablet Take 50 mg by mouth Every 6 (Six) Hours As Needed for moderate pain (4-6).     • vitamin C (ASCORBIC ACID) 250 MG tablet Take 250 mg by mouth Daily.       No Known Allergies  Social History     Social History   • Marital status:      Spouse name: N/A   • Number of children: N/A   • Years of education: N/A     Occupational History   • Not on file.     Social History Main Topics   • Smoking status: Never Smoker   • Smokeless tobacco: Never Used   • Alcohol use No   • Drug use: No   • Sexual activity: Not on file     Other Topics Concern   • Not on file     Social History Narrative     Family History   Problem Relation Age of Onset   • Colon cancer Neg Hx    • Colon polyps Neg Hx    • Esophageal cancer Neg Hx    • Liver cancer Neg Hx    • Liver disease Neg Hx    • Rectal cancer Neg Hx    • Stomach cancer Neg Hx      Review of Systems   Constitutional: Negative for fatigue, fever and unexpected weight change.   HENT: Negative for hearing loss, sore throat and voice change.    Eyes: Negative for visual disturbance.    Respiratory: Negative for cough, shortness of breath and wheezing.    Cardiovascular: Negative for chest pain and palpitations.   Gastrointestinal: Positive for diarrhea. Negative for abdominal pain, blood in stool and vomiting.   Endocrine: Negative for polydipsia and polyuria.   Genitourinary: Negative for difficulty urinating, dysuria, hematuria and urgency.   Musculoskeletal: Negative for joint swelling and myalgias.   Skin: Negative for color change, rash and wound.   Neurological: Negative for dizziness, tremors, seizures and syncope.   Hematological: Does not bruise/bleed easily.   Psychiatric/Behavioral: Negative for agitation and confusion. The patient is not nervous/anxious.      Objective   Vitals:    12/07/17 1257   BP: 124/68   Pulse: 62   Temp: 97.1 °F (36.2 °C)   SpO2: 96%     Physical Exam   Constitutional: She is oriented to person, place, and time. She appears well-developed and well-nourished.   HENT:   Head: Normocephalic and atraumatic.   Eyes: Conjunctivae are normal. Pupils are equal, round, and reactive to light. No scleral icterus.   Neck: No JVD present. No thyroid mass and no thyromegaly present.   Cardiovascular: Normal rate, regular rhythm and normal heart sounds.  Exam reveals no gallop and no friction rub.    No murmur heard.  Pulmonary/Chest: Effort normal and breath sounds normal. No accessory muscle usage. No respiratory distress. She has no wheezes. She has no rales.   Abdominal: Soft. Bowel sounds are normal. She exhibits no distension, no ascites and no mass. There is no splenomegaly or hepatomegaly. There is no tenderness. There is no rebound and no guarding.   Genitourinary:   Genitourinary Comments: Rectal-Did not examine   Musculoskeletal: Normal range of motion. She exhibits no edema.   Neurological: She is alert and oriented to person, place, and time.   Deemed a reliable historian, able to converse without difficulty and able to move all extremities without difficulty    Skin: Skin is warm and dry.   Psychiatric: She has a normal mood and affect. Her behavior is normal.     Imaging Results (most recent)     None        Assessment/Plan   Deisi was seen today for crohn's disease.    Diagnoses and all orders for this visit:    Diarrhea, unspecified type  -     Calprotectin, Fecal - Stool, Per Rectum    Crohn's disease with complication, unspecified gastrointestinal tract location      * Surgery not found *  .   Discussion regarding sx r/t Crohn's v virus v IBS  Cautioned against Imodium if sx due to Crohn's  Further recommendation pending result of stool test

## 2017-12-11 ENCOUNTER — APPOINTMENT (OUTPATIENT)
Dept: LAB | Facility: HOSPITAL | Age: 72
End: 2017-12-11
Attending: INTERNAL MEDICINE

## 2017-12-11 PROCEDURE — 83993 ASSAY FOR CALPROTECTIN FECAL: CPT | Performed by: INTERNAL MEDICINE

## 2017-12-14 LAB — CALPROTECTIN STL-MCNT: 86 UG/G (ref 0–120)

## 2017-12-15 ENCOUNTER — TELEPHONE (OUTPATIENT)
Dept: GASTROENTEROLOGY | Facility: CLINIC | Age: 72
End: 2017-12-15

## 2017-12-15 RX ORDER — MESALAMINE 400 MG/1
800 CAPSULE, DELAYED RELEASE ORAL 3 TIMES DAILY
Qty: 180 CAPSULE | Refills: 11 | Status: SHIPPED | OUTPATIENT
Start: 2017-12-15 | End: 2019-01-03 | Stop reason: SDUPTHER

## 2017-12-15 RX ORDER — MONTELUKAST SODIUM 4 MG/1
1 TABLET, CHEWABLE ORAL 3 TIMES DAILY
Qty: 90 TABLET | Refills: 6 | Status: ON HOLD | OUTPATIENT
Start: 2017-12-15 | End: 2019-02-18

## 2017-12-15 NOTE — TELEPHONE ENCOUNTER
Spoke with pt regarding Colestid  She was agreeable  Explained to take 30 min prior to meal  Explained if she felt constipated to decrease med use    She verbalized understanding and will call office with any diffuclties

## 2017-12-27 NOTE — TELEPHONE ENCOUNTER
Patient requested script for [facility]. Last OV 11-17-17, next 1-18-18, and [facility] in chart.

## 2017-12-28 RX ORDER — HYDROCODONE BITARTRATE AND ACETAMINOPHEN 10; 325 MG/1; MG/1
1 TABLET ORAL EVERY 6 HOURS PRN
Qty: 90 TABLET | Refills: 0 | Status: SHIPPED | OUTPATIENT
Start: 2017-12-28 | End: 2018-01-27

## 2018-01-03 ENCOUNTER — HOSPITAL ENCOUNTER (OUTPATIENT)
Dept: INFUSION THERAPY | Age: 73
Setting detail: INFUSION SERIES
Discharge: HOME | End: 2018-01-03
Payer: MEDICARE

## 2018-01-03 VITALS
DIASTOLIC BLOOD PRESSURE: 59 MMHG | RESPIRATION RATE: 16 BRPM | BODY MASS INDEX: 24.21 KG/M2 | SYSTOLIC BLOOD PRESSURE: 115 MMHG | TEMPERATURE: 97.8 F | HEART RATE: 64 BPM | WEIGHT: 150 LBS

## 2018-01-03 DIAGNOSIS — G61.81 CIDP (CHRONIC INFLAMMATORY DEMYELINATING POLYNEUROPATHY) (HCC): ICD-10-CM

## 2018-01-03 PROCEDURE — 96365 THER/PROPH/DIAG IV INF INIT: CPT

## 2018-01-03 PROCEDURE — 6360000002 HC RX W HCPCS: Performed by: PSYCHIATRY & NEUROLOGY

## 2018-01-03 PROCEDURE — 96366 THER/PROPH/DIAG IV INF ADDON: CPT

## 2018-01-03 RX ADMIN — IMMUNE GLOBULIN INTRAVENOUS (HUMAN) 35 G: 5 INJECTION, SOLUTION INTRAVENOUS at 09:44

## 2018-02-07 RX ORDER — HYDROCODONE BITARTRATE AND ACETAMINOPHEN 10; 325 MG/1; MG/1
1 TABLET ORAL EVERY 6 HOURS PRN
Qty: 90 TABLET | Refills: 0 | Status: SHIPPED | OUTPATIENT
Start: 2018-02-07 | End: 2018-03-09

## 2018-02-07 NOTE — TELEPHONE ENCOUNTER
Patient called to request script for Frutoso Amble. Last office visit 9-18-17, next office visit 2-20-18, last fill 12-27-17 and Julian Patel in chart.

## 2018-02-08 ENCOUNTER — HOSPITAL ENCOUNTER (OUTPATIENT)
Dept: INFUSION THERAPY | Age: 73
Setting detail: INFUSION SERIES
Discharge: HOME | End: 2018-02-08
Payer: MEDICARE

## 2018-02-08 VITALS
DIASTOLIC BLOOD PRESSURE: 56 MMHG | RESPIRATION RATE: 16 BRPM | HEIGHT: 66 IN | SYSTOLIC BLOOD PRESSURE: 101 MMHG | TEMPERATURE: 98.5 F | HEART RATE: 60 BPM | WEIGHT: 155 LBS | BODY MASS INDEX: 24.91 KG/M2

## 2018-02-08 DIAGNOSIS — G61.81 CIDP (CHRONIC INFLAMMATORY DEMYELINATING POLYNEUROPATHY) (HCC): ICD-10-CM

## 2018-02-08 PROCEDURE — 6360000002 HC RX W HCPCS: Performed by: PSYCHIATRY & NEUROLOGY

## 2018-02-08 PROCEDURE — 96366 THER/PROPH/DIAG IV INF ADDON: CPT

## 2018-02-08 PROCEDURE — 96365 THER/PROPH/DIAG IV INF INIT: CPT

## 2018-02-08 RX ADMIN — IMMUNE GLOBULIN INTRAVENOUS (HUMAN) 35 G: 5 INJECTION, SOLUTION INTRAVENOUS at 09:49

## 2018-02-19 ENCOUNTER — OFFICE VISIT (OUTPATIENT)
Dept: UROLOGY | Age: 73
End: 2018-02-19
Payer: MEDICARE

## 2018-02-19 VITALS
SYSTOLIC BLOOD PRESSURE: 105 MMHG | WEIGHT: 155 LBS | DIASTOLIC BLOOD PRESSURE: 55 MMHG | HEART RATE: 63 BPM | BODY MASS INDEX: 24.91 KG/M2 | TEMPERATURE: 97 F | HEIGHT: 66 IN

## 2018-02-19 DIAGNOSIS — N39.0 RECURRENT UTI: Primary | ICD-10-CM

## 2018-02-19 DIAGNOSIS — N31.2 ATONIC BLADDER: ICD-10-CM

## 2018-02-19 PROCEDURE — 99213 OFFICE O/P EST LOW 20 MIN: CPT | Performed by: PHYSICIAN ASSISTANT

## 2018-02-19 PROCEDURE — 81003 URINALYSIS AUTO W/O SCOPE: CPT | Performed by: PHYSICIAN ASSISTANT

## 2018-02-19 RX ORDER — NITROFURANTOIN MACROCRYSTALS 50 MG/1
50 CAPSULE ORAL NIGHTLY
Qty: 30 CAPSULE | Refills: 11 | Status: SHIPPED | OUTPATIENT
Start: 2018-02-19 | End: 2019-06-18 | Stop reason: SDUPTHER

## 2018-02-19 ASSESSMENT — ENCOUNTER SYMPTOMS
BLURRED VISION: 0
SINUS PAIN: 0
ABDOMINAL PAIN: 0
WHEEZING: 0
EYE PAIN: 0
BACK PAIN: 0
VOMITING: 0
SHORTNESS OF BREATH: 0

## 2018-02-19 NOTE — PROGRESS NOTES
Cassie Jacobs is a 67 y.o. female who presents today   Chief Complaint   Patient presents with    Urinary Tract Infection     Follow-up on recurrent urinary tract infections     Follow up Recurrent urinary tract infections:  Patient has proximally to years of recurrent urinary tract infection was on daily suppressive antibiotics as well as Premarin cream is here for follow-up. She takes 50 mg Macrodantin nightly. She had cystoscopy in August 2016 which showed some mild cystitis at the bladder neck particularly. She's not had any infections since seen last. She still self caths. She is currently asymptomatic. Past Medical History:   Diagnosis Date    Arthritis     Cancer [facility])     endometrial cancer    Cataract     CIDP (chronic inflammatory demyelinating polyneuropathy) ([facility])     Hypertension     Radiation        Past Surgical History:   Procedure Laterality Date    BACK SURGERY      CARPAL TUNNEL RELEASE Bilateral     CATARACT REMOVAL Bilateral     HYSTERECTOMY      KNEE ARTHROSCOPY Right        Current Outpatient Prescriptions   Medication Sig Dispense Refill    nitrofurantoin (MACRODANTIN) 50 MG capsule Take 1 capsule by mouth nightly 30 capsule 11    HYDROcodone-acetaminophen (NORCO)  MG per tablet Take 1 tablet by mouth every 6 hours as needed for Pain for up to 30 days.  90 tablet 0    conjugated estrogens (PREMARIN) 0.625 MG/GM vaginal cream Place vaginally twice a week 1 Tube 3    conjugated estrogens (PREMARIN) 0.625 MG/GM vaginal cream Place vaginally daily as directed Monday Wednesday and Friday weekly 1 Tube 3    Immune Globulin, Human, (GAMMAGARD) 10 GM/100ML SOLN solution Infuse 383.5 mLs intravenously every 28 days 0.5 gm/kg .5 mL 5    fenofibrate (TRICOR) 145 MG tablet       oxybutynin (DITROPAN XL) 10 MG extended release tablet Take 1 tablet by mouth daily 90 tablet 3    Omega-3 Fatty Acids (FISH OIL) 1000 MG CAPS Take 1,000 mg by mouth 2 times daily      Garlic 9036 MG CAPS Take 1,000 mg by mouth 2 times daily      CRANBERRY SOFT PO Take 36 mg by mouth daily      lidocaine (XYLOCAINE) 5 % ointment Apply topically as needed for Pain Apply topically as needed.  allopurinol (ZYLOPRIM) 300 MG tablet 300 mg daily Indications: takes total 500mg a day       carvedilol (COREG) 6.25 MG tablet Take 6.25 mg by mouth 2 times daily      aspirin 81 MG tablet Take 81 mg by mouth daily      folic acid (FOLVITE) 1 MG tablet Take 1 mg by mouth daily      Multiple Vitamins-Minerals (THERAPEUTIC MULTIVITAMIN-MINERALS) tablet Take 1 tablet by mouth daily      calcium-vitamin D (OSCAL) 250-125 MG-UNIT per tablet Take 1 tablet by mouth daily      allopurinol (ZYLOPRIM) 100 MG tablet Take 200 mg by mouth daily Indications: takes total of 500 mg daily       bromfenac sodium (BROMDAY) 0.09 % SOLN ophthalmic solution Place 1 drop into both eyes daily      mesalamine (DELZICOL) 400 MG CPDR DR capsule Take 800 mg by mouth 3 times daily      indapamide (LOZOL) 2.5 MG tablet Take 2.5 mg by mouth every morning      lisinopril (PRINIVIL;ZESTRIL) 40 MG tablet Take 40 mg by mouth daily      gabapentin (NEURONTIN) 300 MG capsule Take 600 mg by mouth 3 times daily       amLODIPine (NORVASC) 10 MG tablet Take 10 mg by mouth daily      omeprazole (PRILOSEC) 20 MG capsule Take 20 mg by mouth 2 times daily       traMADol-acetaminophen (ULTRACET) 37.5-325 MG per tablet Take 2 tablets by mouth every 6 hours as needed for Pain      cyanocobalamin 1000 MCG tablet Inject 1,000 mcg into the skin every 30 days       rOPINIRole (REQUIP) 0.5 MG tablet Take 1 tablet by mouth 2 times daily 180 tablet 3     No current facility-administered medications for this visit.         No Known Allergies    Social History     Social History    Marital status:      Spouse name: N/A    Number of children: N/A    Years of education: N/A     Social History Main Topics    Smoking status: Never Smoker

## 2018-02-20 ENCOUNTER — OFFICE VISIT (OUTPATIENT)
Dept: NEUROLOGY | Age: 73
End: 2018-02-20
Payer: MEDICARE

## 2018-02-20 VITALS
SYSTOLIC BLOOD PRESSURE: 108 MMHG | WEIGHT: 158 LBS | HEIGHT: 66 IN | DIASTOLIC BLOOD PRESSURE: 63 MMHG | HEART RATE: 57 BPM | BODY MASS INDEX: 25.39 KG/M2

## 2018-02-20 DIAGNOSIS — G61.81 CIDP (CHRONIC INFLAMMATORY DEMYELINATING POLYNEUROPATHY) (HCC): Primary | ICD-10-CM

## 2018-02-20 DIAGNOSIS — M05.79 RHEUMATOID ARTHRITIS INVOLVING MULTIPLE SITES WITH POSITIVE RHEUMATOID FACTOR (HCC): ICD-10-CM

## 2018-02-20 DIAGNOSIS — M48.062 SPINAL STENOSIS OF LUMBAR REGION WITH NEUROGENIC CLAUDICATION: ICD-10-CM

## 2018-02-20 PROCEDURE — 99213 OFFICE O/P EST LOW 20 MIN: CPT | Performed by: PSYCHIATRY & NEUROLOGY

## 2018-02-20 RX ORDER — MESALAMINE 400 MG/1
800 CAPSULE, DELAYED RELEASE ORAL DAILY
COMMUNITY
Start: 2017-12-15 | End: 2018-02-20 | Stop reason: SDUPTHER

## 2018-02-20 NOTE — PROGRESS NOTES
0    conjugated estrogens (PREMARIN) 0.625 MG/GM vaginal cream Place vaginally twice a week 1 Tube 3    Immune Globulin, Human, (GAMMAGARD) 10 GM/100ML SOLN solution Infuse 383.5 mLs intravenously every 28 days 0.5 gm/kg .5 mL 5    fenofibrate (TRICOR) 145 MG tablet 145 mg daily       oxybutynin (DITROPAN XL) 10 MG extended release tablet Take 1 tablet by mouth daily 90 tablet 3    Omega-3 Fatty Acids (FISH OIL) 1000 MG CAPS Take 1,000 mg by mouth 2 times daily      Garlic 3437 MG CAPS Take 1,000 mg by mouth 2 times daily      CRANBERRY SOFT PO Take 36 mg by mouth daily      lidocaine (XYLOCAINE) 5 % ointment Apply topically as needed for Pain Apply topically as needed.       allopurinol (ZYLOPRIM) 300 MG tablet 300 mg daily Indications: takes total 500mg a day       carvedilol (COREG) 6.25 MG tablet Take 6.25 mg by mouth 2 times daily      aspirin 81 MG tablet Take 81 mg by mouth daily      folic acid (FOLVITE) 1 MG tablet Take 1 mg by mouth daily      Multiple Vitamins-Minerals (THERAPEUTIC MULTIVITAMIN-MINERALS) tablet Take 1 tablet by mouth daily      calcium-vitamin D (OSCAL) 250-125 MG-UNIT per tablet Take 1 tablet by mouth daily      allopurinol (ZYLOPRIM) 100 MG tablet Take 200 mg by mouth daily Indications: takes total of 500 mg daily       bromfenac sodium (BROMDAY) 0.09 % SOLN ophthalmic solution Place 1 drop into both eyes daily      mesalamine (DELZICOL) 400 MG CPDR DR capsule Take 800 mg by mouth 3 times daily      indapamide (LOZOL) 2.5 MG tablet Take 2.5 mg by mouth every morning      lisinopril (PRINIVIL;ZESTRIL) 40 MG tablet Take 40 mg by mouth daily      gabapentin (NEURONTIN) 300 MG capsule Take 600 mg by mouth 3 times daily       amLODIPine (NORVASC) 10 MG tablet Take 10 mg by mouth daily      omeprazole (PRILOSEC) 20 MG capsule Take 20 mg by mouth 2 times daily       traMADol-acetaminophen (ULTRACET) 37.5-325 MG per tablet Take 2 tablets by mouth every 6 hours as weakness. She has bilateral AFOs. Deep tendon reflexes are absent. Rapid alternating movements are unimpaired. Finger-to-nose testing is performed well, without dysmetria. Gait is impaired by LE weakness and she uses a cane. SLR is positive on the left. Assessment:       ICD-10-CM ICD-9-CM    1. CIDP (chronic inflammatory demyelinating polyneuropathy) ([facility]) G61.81 357.81    2. Spinal stenosis of lumbar region with neurogenic claudication M48.062 724.03    3. Rheumatoid arthritis involving multiple sites with positive rheumatoid factor ([facility]) M05.79 714.0    In addition to her CIDP that is stable, she has recurrent lumbar spinal stenosis with left L3-4 radiculopathies. She has seen Dr. Linda Kennedy. She is seeing Dr. Amira Pat. Plan:   1. Continue the same medications and IVIG every 4 weeks  2. Continue same medication care  3.         FU in 4 months. Electronically signed by Warden Uday MD on 2/20/2018     Controlled Substances Monitoring: The Prescription Monitoring Report for this patient was reviewed today. Warden Uday MD)    Possible medication side effects, risk of tolerance/dependence & alternative treatments discussed., No signs of potential drug abuse or diversion identified., Obtaining appropriate analgesic effect of treatment. Warden Uday MD)         Treatment objectives documented - patient is progressing appropriately. , Reviewed the patient's functional status and documentation. , Functional status reviewed - continues with improved or maintaining ADL's. Warden Uday MD)

## 2018-03-08 ENCOUNTER — HOSPITAL ENCOUNTER (OUTPATIENT)
Dept: INFUSION THERAPY | Age: 73
Setting detail: INFUSION SERIES
Discharge: HOME | End: 2018-03-08
Payer: MEDICARE

## 2018-03-08 VITALS
BODY MASS INDEX: 24.91 KG/M2 | DIASTOLIC BLOOD PRESSURE: 64 MMHG | HEIGHT: 66 IN | RESPIRATION RATE: 16 BRPM | SYSTOLIC BLOOD PRESSURE: 113 MMHG | WEIGHT: 155 LBS | TEMPERATURE: 98.1 F | HEART RATE: 65 BPM

## 2018-03-08 DIAGNOSIS — G61.81 CIDP (CHRONIC INFLAMMATORY DEMYELINATING POLYNEUROPATHY) (HCC): ICD-10-CM

## 2018-03-08 PROCEDURE — 96366 THER/PROPH/DIAG IV INF ADDON: CPT

## 2018-03-08 PROCEDURE — 96365 THER/PROPH/DIAG IV INF INIT: CPT

## 2018-03-08 PROCEDURE — 6360000002 HC RX W HCPCS: Performed by: PSYCHIATRY & NEUROLOGY

## 2018-03-08 RX ADMIN — IMMUNE GLOBULIN INTRAVENOUS (HUMAN) 35 G: 5 INJECTION, SOLUTION INTRAVENOUS at 09:24

## 2018-03-14 RX ORDER — HYDROCODONE BITARTRATE AND ACETAMINOPHEN 10; 325 MG/1; MG/1
1 TABLET ORAL EVERY 6 HOURS PRN
Qty: 90 TABLET | Refills: 0 | Status: SHIPPED | OUTPATIENT
Start: 2018-03-14 | End: 2018-05-25 | Stop reason: SDUPTHER

## 2018-03-14 NOTE — TELEPHONE ENCOUNTER
Patient called to request script for Norco. Last office visit 2-20-18, next office visit 6-21-18, last fill 2-7-18, Buzz Jones in chart.

## 2018-04-04 ENCOUNTER — HOSPITAL ENCOUNTER (OUTPATIENT)
Dept: INFUSION THERAPY | Age: 73
Setting detail: INFUSION SERIES
Discharge: HOME | End: 2018-04-04
Payer: MEDICARE

## 2018-04-04 VITALS
SYSTOLIC BLOOD PRESSURE: 124 MMHG | RESPIRATION RATE: 16 BRPM | BODY MASS INDEX: 24.86 KG/M2 | TEMPERATURE: 98.3 F | WEIGHT: 154 LBS | DIASTOLIC BLOOD PRESSURE: 60 MMHG | HEART RATE: 58 BPM

## 2018-04-04 DIAGNOSIS — G61.81 CIDP (CHRONIC INFLAMMATORY DEMYELINATING POLYNEUROPATHY) (HCC): ICD-10-CM

## 2018-04-04 PROCEDURE — 6360000002 HC RX W HCPCS: Performed by: PSYCHIATRY & NEUROLOGY

## 2018-04-04 PROCEDURE — 96366 THER/PROPH/DIAG IV INF ADDON: CPT

## 2018-04-04 PROCEDURE — 96365 THER/PROPH/DIAG IV INF INIT: CPT

## 2018-04-04 RX ADMIN — IMMUNE GLOBULIN INTRAVENOUS (HUMAN) 35 G: 5 INJECTION, SOLUTION INTRAVENOUS at 10:00

## 2018-04-17 RX ORDER — HYDROCODONE BITARTRATE AND ACETAMINOPHEN 10; 325 MG/1; MG/1
1 TABLET ORAL EVERY 6 HOURS PRN
Qty: 90 TABLET | Refills: 0 | Status: SHIPPED | OUTPATIENT
Start: 2018-04-17 | End: 2018-05-17

## 2018-04-25 ENCOUNTER — TELEPHONE (OUTPATIENT)
Dept: UROLOGY | Age: 73
End: 2018-04-25

## 2018-05-02 ENCOUNTER — HOSPITAL ENCOUNTER (OUTPATIENT)
Dept: INFUSION THERAPY | Age: 73
Setting detail: INFUSION SERIES
Discharge: HOME | End: 2018-05-02
Payer: MEDICARE

## 2018-05-02 VITALS
DIASTOLIC BLOOD PRESSURE: 58 MMHG | BODY MASS INDEX: 24.37 KG/M2 | SYSTOLIC BLOOD PRESSURE: 106 MMHG | TEMPERATURE: 97.2 F | RESPIRATION RATE: 16 BRPM | HEART RATE: 68 BPM | WEIGHT: 151 LBS

## 2018-05-02 DIAGNOSIS — G61.81 CIDP (CHRONIC INFLAMMATORY DEMYELINATING POLYNEUROPATHY) (HCC): ICD-10-CM

## 2018-05-02 PROCEDURE — 96366 THER/PROPH/DIAG IV INF ADDON: CPT

## 2018-05-02 PROCEDURE — 6360000002 HC RX W HCPCS: Performed by: PSYCHIATRY & NEUROLOGY

## 2018-05-02 PROCEDURE — 96365 THER/PROPH/DIAG IV INF INIT: CPT

## 2018-05-02 RX ADMIN — IMMUNE GLOBULIN INTRAVENOUS (HUMAN) 35 G: 5 INJECTION, SOLUTION INTRAVENOUS at 09:18

## 2018-05-25 RX ORDER — HYDROCODONE BITARTRATE AND ACETAMINOPHEN 10; 325 MG/1; MG/1
TABLET ORAL
Qty: 90 TABLET | Refills: 0 | Status: SHIPPED | OUTPATIENT
Start: 2018-05-25 | End: 2018-06-21 | Stop reason: SDUPTHER

## 2018-05-25 RX ORDER — HYDROCODONE BITARTRATE AND ACETAMINOPHEN 10; 325 MG/1; MG/1
1 TABLET ORAL EVERY 6 HOURS PRN
Qty: 90 TABLET | Refills: 0 | OUTPATIENT
Start: 2018-05-25 | End: 2018-06-24

## 2018-05-30 ENCOUNTER — HOSPITAL ENCOUNTER (OUTPATIENT)
Dept: INFUSION THERAPY | Age: 73
Setting detail: INFUSION SERIES
Discharge: HOME | End: 2018-05-30
Payer: MEDICARE

## 2018-05-30 VITALS
OXYGEN SATURATION: 95 % | TEMPERATURE: 97.8 F | HEART RATE: 64 BPM | RESPIRATION RATE: 18 BRPM | DIASTOLIC BLOOD PRESSURE: 57 MMHG | SYSTOLIC BLOOD PRESSURE: 107 MMHG

## 2018-05-30 DIAGNOSIS — G61.81 CIDP (CHRONIC INFLAMMATORY DEMYELINATING POLYNEUROPATHY) (HCC): ICD-10-CM

## 2018-05-30 PROCEDURE — 96365 THER/PROPH/DIAG IV INF INIT: CPT

## 2018-05-30 PROCEDURE — 6360000002 HC RX W HCPCS: Performed by: PSYCHIATRY & NEUROLOGY

## 2018-05-30 PROCEDURE — 96366 THER/PROPH/DIAG IV INF ADDON: CPT

## 2018-05-30 RX ADMIN — IMMUNE GLOBULIN INTRAVENOUS (HUMAN) 35 G: 5 INJECTION, SOLUTION INTRAVENOUS at 09:45

## 2018-06-21 ENCOUNTER — OFFICE VISIT (OUTPATIENT)
Dept: NEUROLOGY | Age: 73
End: 2018-06-21
Payer: MEDICARE

## 2018-06-21 VITALS
WEIGHT: 153 LBS | SYSTOLIC BLOOD PRESSURE: 107 MMHG | DIASTOLIC BLOOD PRESSURE: 55 MMHG | HEIGHT: 66 IN | HEART RATE: 62 BPM | BODY MASS INDEX: 24.59 KG/M2

## 2018-06-21 DIAGNOSIS — M05.79 RHEUMATOID ARTHRITIS INVOLVING MULTIPLE SITES WITH POSITIVE RHEUMATOID FACTOR (HCC): ICD-10-CM

## 2018-06-21 DIAGNOSIS — G61.81 CIDP (CHRONIC INFLAMMATORY DEMYELINATING POLYNEUROPATHY) (HCC): Primary | ICD-10-CM

## 2018-06-21 DIAGNOSIS — G25.81 RESTLESS LEGS SYNDROME (RLS): ICD-10-CM

## 2018-06-21 DIAGNOSIS — M48.062 SPINAL STENOSIS OF LUMBAR REGION WITH NEUROGENIC CLAUDICATION: ICD-10-CM

## 2018-06-21 PROCEDURE — 99213 OFFICE O/P EST LOW 20 MIN: CPT | Performed by: PSYCHIATRY & NEUROLOGY

## 2018-06-21 RX ORDER — HYDROCODONE BITARTRATE AND ACETAMINOPHEN 10; 325 MG/1; MG/1
TABLET ORAL
Qty: 90 TABLET | Refills: 0 | Status: SHIPPED | OUTPATIENT
Start: 2018-06-21 | End: 2018-07-20 | Stop reason: SDUPTHER

## 2018-06-27 ENCOUNTER — HOSPITAL ENCOUNTER (OUTPATIENT)
Dept: INFUSION THERAPY | Age: 73
Setting detail: INFUSION SERIES
Discharge: HOME | End: 2018-06-27
Payer: MEDICARE

## 2018-06-27 VITALS
DIASTOLIC BLOOD PRESSURE: 62 MMHG | HEART RATE: 32 BPM | OXYGEN SATURATION: 96 % | TEMPERATURE: 97.5 F | SYSTOLIC BLOOD PRESSURE: 116 MMHG | RESPIRATION RATE: 18 BRPM

## 2018-06-27 DIAGNOSIS — G61.81 CIDP (CHRONIC INFLAMMATORY DEMYELINATING POLYNEUROPATHY) (HCC): ICD-10-CM

## 2018-06-27 PROCEDURE — 96366 THER/PROPH/DIAG IV INF ADDON: CPT

## 2018-06-27 PROCEDURE — 6360000002 HC RX W HCPCS: Performed by: PSYCHIATRY & NEUROLOGY

## 2018-06-27 PROCEDURE — 96365 THER/PROPH/DIAG IV INF INIT: CPT

## 2018-06-27 RX ADMIN — IMMUNE GLOBULIN (HUMAN) 35 G: 10 INJECTION INTRAVENOUS; SUBCUTANEOUS at 09:21

## 2018-07-20 DIAGNOSIS — M48.062 SPINAL STENOSIS OF LUMBAR REGION WITH NEUROGENIC CLAUDICATION: ICD-10-CM

## 2018-07-20 DIAGNOSIS — M05.79 RHEUMATOID ARTHRITIS INVOLVING MULTIPLE SITES WITH POSITIVE RHEUMATOID FACTOR (HCC): ICD-10-CM

## 2018-07-20 RX ORDER — HYDROCODONE BITARTRATE AND ACETAMINOPHEN 10; 325 MG/1; MG/1
TABLET ORAL
Qty: 90 TABLET | Refills: 0 | Status: SHIPPED | OUTPATIENT
Start: 2018-07-20 | End: 2018-08-15 | Stop reason: SDUPTHER

## 2018-07-20 NOTE — TELEPHONE ENCOUNTER
Requested Prescriptions     Pending Prescriptions Disp Refills    HYDROcodone-acetaminophen (NORCO)  MG per tablet 90 tablet 0     Sig: TAKE ONE TABLET BY MOUTH EVERY 6 HOURS AS NEEDED. Last OV 6/21/18  Next OV 10/22/18  Last Rx 6/21/18  Pete Dubsarbjit Jacobson patient.

## 2018-07-25 ENCOUNTER — HOSPITAL ENCOUNTER (OUTPATIENT)
Dept: INFUSION THERAPY | Age: 73
Setting detail: INFUSION SERIES
Discharge: HOME | End: 2018-07-25
Payer: MEDICARE

## 2018-07-25 VITALS
SYSTOLIC BLOOD PRESSURE: 119 MMHG | TEMPERATURE: 97.6 F | DIASTOLIC BLOOD PRESSURE: 58 MMHG | RESPIRATION RATE: 17 BRPM | HEART RATE: 68 BPM

## 2018-07-25 DIAGNOSIS — G61.81 CIDP (CHRONIC INFLAMMATORY DEMYELINATING POLYNEUROPATHY) (HCC): ICD-10-CM

## 2018-07-25 PROCEDURE — 6360000002 HC RX W HCPCS: Performed by: PSYCHIATRY & NEUROLOGY

## 2018-07-25 PROCEDURE — 96366 THER/PROPH/DIAG IV INF ADDON: CPT

## 2018-07-25 PROCEDURE — 96365 THER/PROPH/DIAG IV INF INIT: CPT

## 2018-07-25 RX ADMIN — IMMUNE GLOBULIN (HUMAN) 35 G: 10 INJECTION INTRAVENOUS; SUBCUTANEOUS at 10:15

## 2018-08-15 DIAGNOSIS — M48.062 SPINAL STENOSIS OF LUMBAR REGION WITH NEUROGENIC CLAUDICATION: ICD-10-CM

## 2018-08-15 DIAGNOSIS — M05.79 RHEUMATOID ARTHRITIS INVOLVING MULTIPLE SITES WITH POSITIVE RHEUMATOID FACTOR (HCC): ICD-10-CM

## 2018-08-15 RX ORDER — HYDROCODONE BITARTRATE AND ACETAMINOPHEN 10; 325 MG/1; MG/1
TABLET ORAL
Qty: 90 TABLET | Refills: 0 | Status: SHIPPED | OUTPATIENT
Start: 2018-08-15 | End: 2018-09-14 | Stop reason: SDUPTHER

## 2018-08-22 ENCOUNTER — HOSPITAL ENCOUNTER (OUTPATIENT)
Dept: INFUSION THERAPY | Age: 73
Setting detail: INFUSION SERIES
Discharge: HOME | End: 2018-08-22
Payer: MEDICARE

## 2018-08-22 VITALS
SYSTOLIC BLOOD PRESSURE: 106 MMHG | HEART RATE: 61 BPM | DIASTOLIC BLOOD PRESSURE: 52 MMHG | RESPIRATION RATE: 17 BRPM | TEMPERATURE: 98.5 F

## 2018-08-22 DIAGNOSIS — G61.81 CIDP (CHRONIC INFLAMMATORY DEMYELINATING POLYNEUROPATHY) (HCC): ICD-10-CM

## 2018-08-22 PROCEDURE — 96365 THER/PROPH/DIAG IV INF INIT: CPT

## 2018-08-22 PROCEDURE — 6360000002 HC RX W HCPCS: Performed by: PSYCHIATRY & NEUROLOGY

## 2018-08-22 PROCEDURE — 96366 THER/PROPH/DIAG IV INF ADDON: CPT

## 2018-08-22 RX ADMIN — IMMUNE GLOBULIN INTRAVENOUS (HUMAN) 35 G: 5 INJECTION, SOLUTION INTRAVENOUS at 09:39

## 2018-09-14 DIAGNOSIS — M05.79 RHEUMATOID ARTHRITIS INVOLVING MULTIPLE SITES WITH POSITIVE RHEUMATOID FACTOR (HCC): ICD-10-CM

## 2018-09-14 DIAGNOSIS — M48.062 SPINAL STENOSIS OF LUMBAR REGION WITH NEUROGENIC CLAUDICATION: ICD-10-CM

## 2018-09-14 NOTE — TELEPHONE ENCOUNTER
Requested Prescriptions     Pending Prescriptions Disp Refills    HYDROcodone-acetaminophen (NORCO)  MG per tablet [Pharmacy Med Name: HYDROCODONE +APAP TB 10/325 10-325MG TABLET] 90 tablet      Sig: TAKE ONE TABLET BY MOUTH EVERY 6 HOURS AS NEEDED FOR PAIN . . .MAY CAUSE DROWSINESS!!      Last ov 6/21/2018  Next ov 10/22/2018  Last rx   8/15/2018  Josephine Rosales up to date

## 2018-09-17 RX ORDER — HYDROCODONE BITARTRATE AND ACETAMINOPHEN 10; 325 MG/1; MG/1
TABLET ORAL
Qty: 90 TABLET | Refills: 0 | Status: SHIPPED | OUTPATIENT
Start: 2018-09-17 | End: 2018-10-15 | Stop reason: SDUPTHER

## 2018-09-19 ENCOUNTER — HOSPITAL ENCOUNTER (OUTPATIENT)
Dept: INFUSION THERAPY | Age: 73
Setting detail: INFUSION SERIES
End: 2018-09-19
Payer: MEDICARE

## 2018-09-21 ENCOUNTER — TELEPHONE (OUTPATIENT)
Dept: NEUROLOGY | Age: 73
End: 2018-09-21

## 2018-09-27 ENCOUNTER — HOSPITAL ENCOUNTER (OUTPATIENT)
Dept: INFUSION THERAPY | Age: 73
Setting detail: INFUSION SERIES
Discharge: HOME | End: 2018-09-27
Payer: MEDICARE

## 2018-09-27 VITALS
RESPIRATION RATE: 17 BRPM | DIASTOLIC BLOOD PRESSURE: 56 MMHG | SYSTOLIC BLOOD PRESSURE: 100 MMHG | HEART RATE: 73 BPM | OXYGEN SATURATION: 93 % | TEMPERATURE: 97.8 F

## 2018-09-27 DIAGNOSIS — G61.81 CIDP (CHRONIC INFLAMMATORY DEMYELINATING POLYNEUROPATHY) (HCC): ICD-10-CM

## 2018-09-27 PROCEDURE — 96365 THER/PROPH/DIAG IV INF INIT: CPT

## 2018-09-27 PROCEDURE — 6360000002 HC RX W HCPCS: Performed by: PSYCHIATRY & NEUROLOGY

## 2018-09-27 PROCEDURE — 96366 THER/PROPH/DIAG IV INF ADDON: CPT

## 2018-09-27 RX ADMIN — IMMUNE GLOBULIN INTRAVENOUS (HUMAN) 35 G: 5 INJECTION, SOLUTION INTRAVENOUS at 10:00

## 2018-10-15 DIAGNOSIS — M05.79 RHEUMATOID ARTHRITIS INVOLVING MULTIPLE SITES WITH POSITIVE RHEUMATOID FACTOR (HCC): ICD-10-CM

## 2018-10-15 DIAGNOSIS — M48.062 SPINAL STENOSIS OF LUMBAR REGION WITH NEUROGENIC CLAUDICATION: ICD-10-CM

## 2018-10-16 RX ORDER — HYDROCODONE BITARTRATE AND ACETAMINOPHEN 10; 325 MG/1; MG/1
TABLET ORAL
Qty: 90 TABLET | Refills: 0 | Status: SHIPPED | OUTPATIENT
Start: 2018-10-16 | End: 2018-11-13 | Stop reason: SDUPTHER

## 2018-10-22 ENCOUNTER — OFFICE VISIT (OUTPATIENT)
Dept: NEUROLOGY | Age: 73
End: 2018-10-22
Payer: MEDICARE

## 2018-10-22 VITALS
DIASTOLIC BLOOD PRESSURE: 60 MMHG | HEIGHT: 66 IN | HEART RATE: 62 BPM | BODY MASS INDEX: 24.75 KG/M2 | WEIGHT: 154 LBS | SYSTOLIC BLOOD PRESSURE: 133 MMHG | RESPIRATION RATE: 16 BRPM

## 2018-10-22 DIAGNOSIS — M48.062 SPINAL STENOSIS OF LUMBAR REGION WITH NEUROGENIC CLAUDICATION: ICD-10-CM

## 2018-10-22 DIAGNOSIS — M05.79 RHEUMATOID ARTHRITIS INVOLVING MULTIPLE SITES WITH POSITIVE RHEUMATOID FACTOR (HCC): ICD-10-CM

## 2018-10-22 DIAGNOSIS — G61.81 CIDP (CHRONIC INFLAMMATORY DEMYELINATING POLYNEUROPATHY) (HCC): Primary | ICD-10-CM

## 2018-10-22 DIAGNOSIS — G25.81 RESTLESS LEGS SYNDROME (RLS): ICD-10-CM

## 2018-10-22 PROCEDURE — 99213 OFFICE O/P EST LOW 20 MIN: CPT | Performed by: PSYCHIATRY & NEUROLOGY

## 2018-10-24 ENCOUNTER — HOSPITAL ENCOUNTER (OUTPATIENT)
Dept: INFUSION THERAPY | Age: 73
Setting detail: SPECIMEN
Discharge: HOME | End: 2018-10-24
Payer: MEDICARE

## 2018-10-24 VITALS
SYSTOLIC BLOOD PRESSURE: 126 MMHG | DIASTOLIC BLOOD PRESSURE: 62 MMHG | BODY MASS INDEX: 24.05 KG/M2 | TEMPERATURE: 98.4 F | HEART RATE: 59 BPM | RESPIRATION RATE: 17 BRPM | WEIGHT: 149 LBS

## 2018-10-24 DIAGNOSIS — G61.81 CIDP (CHRONIC INFLAMMATORY DEMYELINATING POLYNEUROPATHY) (HCC): ICD-10-CM

## 2018-10-24 PROCEDURE — 6360000002 HC RX W HCPCS: Performed by: PSYCHIATRY & NEUROLOGY

## 2018-10-24 PROCEDURE — 96366 THER/PROPH/DIAG IV INF ADDON: CPT

## 2018-10-24 PROCEDURE — 96365 THER/PROPH/DIAG IV INF INIT: CPT

## 2018-10-24 RX ADMIN — IMMUNE GLOBULIN INTRAVENOUS (HUMAN) 35 G: 5 INJECTION, SOLUTION INTRAVENOUS at 09:31

## 2018-11-12 ENCOUNTER — TELEPHONE (OUTPATIENT)
Dept: NEUROLOGY | Age: 73
End: 2018-11-12

## 2018-11-12 DIAGNOSIS — M48.062 SPINAL STENOSIS OF LUMBAR REGION WITH NEUROGENIC CLAUDICATION: ICD-10-CM

## 2018-11-12 DIAGNOSIS — M05.79 RHEUMATOID ARTHRITIS INVOLVING MULTIPLE SITES WITH POSITIVE RHEUMATOID FACTOR (HCC): ICD-10-CM

## 2018-11-13 RX ORDER — HYDROCODONE BITARTRATE AND ACETAMINOPHEN 10; 325 MG/1; MG/1
TABLET ORAL
Qty: 90 TABLET | Refills: 0 | Status: SHIPPED | OUTPATIENT
Start: 2018-11-13 | End: 2018-12-12 | Stop reason: SDUPTHER

## 2018-11-21 ENCOUNTER — HOSPITAL ENCOUNTER (OUTPATIENT)
Dept: INFUSION THERAPY | Age: 73
Setting detail: INFUSION SERIES
Discharge: HOME | End: 2018-11-21
Payer: MEDICARE

## 2018-11-21 VITALS
TEMPERATURE: 97.8 F | DIASTOLIC BLOOD PRESSURE: 55 MMHG | SYSTOLIC BLOOD PRESSURE: 109 MMHG | WEIGHT: 148 LBS | HEART RATE: 62 BPM | RESPIRATION RATE: 18 BRPM | BODY MASS INDEX: 23.89 KG/M2

## 2018-11-21 DIAGNOSIS — G61.81 CIDP (CHRONIC INFLAMMATORY DEMYELINATING POLYNEUROPATHY) (HCC): ICD-10-CM

## 2018-11-21 PROCEDURE — 96366 THER/PROPH/DIAG IV INF ADDON: CPT

## 2018-11-21 PROCEDURE — 6360000002 HC RX W HCPCS: Performed by: PSYCHIATRY & NEUROLOGY

## 2018-11-21 PROCEDURE — 96365 THER/PROPH/DIAG IV INF INIT: CPT

## 2018-11-21 RX ADMIN — IMMUNE GLOBULIN INTRAVENOUS (HUMAN) 35 G: 5 INJECTION, SOLUTION INTRAVENOUS at 09:10

## 2018-12-06 ENCOUNTER — TELEPHONE (OUTPATIENT)
Dept: GASTROENTEROLOGY | Facility: CLINIC | Age: 73
End: 2018-12-06

## 2018-12-06 NOTE — TELEPHONE ENCOUNTER
shantel called and is out if her medicine. I called into [facility] [phone] delzicol 400mg 2 tid 1 month 1 refill. Is making office visit

## 2018-12-12 DIAGNOSIS — M48.062 SPINAL STENOSIS OF LUMBAR REGION WITH NEUROGENIC CLAUDICATION: ICD-10-CM

## 2018-12-12 DIAGNOSIS — M05.79 RHEUMATOID ARTHRITIS INVOLVING MULTIPLE SITES WITH POSITIVE RHEUMATOID FACTOR (HCC): ICD-10-CM

## 2018-12-12 RX ORDER — HYDROCODONE BITARTRATE AND ACETAMINOPHEN 10; 325 MG/1; MG/1
TABLET ORAL
Qty: 90 TABLET | Refills: 0 | Status: SHIPPED | OUTPATIENT
Start: 2018-12-12 | End: 2019-01-09 | Stop reason: SDUPTHER

## 2018-12-12 NOTE — TELEPHONE ENCOUNTER
Requested Prescriptions     Pending Prescriptions Disp Refills    HYDROcodone-acetaminophen (NORCO)  MG per tablet 90 tablet 0     Sig: TAKE ONE TABLET BY MOUTH EVERY 6 HOURS AS NEEDED FOR PAIN .  . .MAY CAUSE DROWSINESS!!.       Last OV 10/22/18  Next OV 2/25/19  Last Rx 11/13/18  Sabrina Faria up to date

## 2018-12-19 ENCOUNTER — HOSPITAL ENCOUNTER (OUTPATIENT)
Dept: INFUSION THERAPY | Age: 73
Setting detail: INFUSION SERIES
Discharge: HOME | End: 2018-12-19
Payer: MEDICARE

## 2018-12-19 VITALS
TEMPERATURE: 98.2 F | BODY MASS INDEX: 23.24 KG/M2 | WEIGHT: 144 LBS | HEART RATE: 60 BPM | SYSTOLIC BLOOD PRESSURE: 96 MMHG | DIASTOLIC BLOOD PRESSURE: 56 MMHG | RESPIRATION RATE: 18 BRPM

## 2018-12-19 DIAGNOSIS — G61.81 CIDP (CHRONIC INFLAMMATORY DEMYELINATING POLYNEUROPATHY) (HCC): ICD-10-CM

## 2018-12-19 PROCEDURE — 6360000002 HC RX W HCPCS: Performed by: PSYCHIATRY & NEUROLOGY

## 2018-12-19 PROCEDURE — 96366 THER/PROPH/DIAG IV INF ADDON: CPT

## 2018-12-19 PROCEDURE — 96365 THER/PROPH/DIAG IV INF INIT: CPT

## 2018-12-19 RX ADMIN — IMMUNE GLOBULIN INTRAVENOUS (HUMAN) 35 G: 5 INJECTION, SOLUTION INTRAVENOUS at 09:47

## 2019-01-03 ENCOUNTER — OFFICE VISIT (OUTPATIENT)
Dept: GASTROENTEROLOGY | Facility: CLINIC | Age: 74
End: 2019-01-03

## 2019-01-03 VITALS
BODY MASS INDEX: 23.3 KG/M2 | SYSTOLIC BLOOD PRESSURE: 124 MMHG | DIASTOLIC BLOOD PRESSURE: 80 MMHG | HEIGHT: 66 IN | TEMPERATURE: 97 F | OXYGEN SATURATION: 97 % | WEIGHT: 145 LBS | HEART RATE: 60 BPM

## 2019-01-03 DIAGNOSIS — K50.80 CROHN'S DISEASE OF BOTH SMALL AND LARGE INTESTINE WITHOUT COMPLICATION (HCC): Primary | ICD-10-CM

## 2019-01-03 PROCEDURE — 99213 OFFICE O/P EST LOW 20 MIN: CPT | Performed by: NURSE PRACTITIONER

## 2019-01-03 RX ORDER — MESALAMINE 400 MG/1
800 CAPSULE, DELAYED RELEASE ORAL 3 TIMES DAILY
Qty: 360 CAPSULE | Refills: 3 | Status: SHIPPED | OUTPATIENT
Start: 2019-01-03 | End: 2021-05-27 | Stop reason: SDUPTHER

## 2019-01-03 NOTE — PROGRESS NOTES
Chief Complaint   Patient presents with   • Heartburn     no problems needs delzicol reillled 3 month refills     Subjective   HPI    Deisi Ponce is a 73 y.o. female who presents with a history of Crohn's Disease, dx in 2007.   Status of disease is quiet and stable.  The severity is described as Mild.  She denies  abdominal cramping, diarrhea, bloody stool and anorexia. Bowels move once per day unless she eats dairy.  Previous diagnostic test include  colonoscopy.    CScope 12/28/2016 (repeat 3 yr).  Currently maintained on 6 Delzicol per day without difficulty.    Past Medical History:   Diagnosis Date   • Arthritis    • Cancer (CMS/HCC)     endometrial cancer   • Crohn's disease (CMS/HCC)    • GERD (gastroesophageal reflux disease)    • Glaucoma    • History of transfusion    • Hypertension    • Macular degeneration    • Osteopenia    • Peripheral neuropathy      Outpatient Medications Marked as Taking for the 1/3/19 encounter (Office Visit) with Luca Gomez APRN   Medication Sig Dispense Refill   • allopurinol (ZYLOPRIM) 100 MG tablet Take 100 mg by mouth 2 (Two) Times a Day.     • allopurinol (ZYLOPRIM) 300 MG tablet Take 300 mg by mouth Daily.     • amLODIPine (NORVASC) 10 MG tablet      • calcium carbonate (OS-LUKE) 600 MG tablet Take 600 mg by mouth Daily.     • carvedilol (COREG) 6.25 MG tablet Take 6.25 mg by mouth 2 (Two) Times a Day With Meals.     • fenofibrate (TRICOR) 145 MG tablet      • folic acid (FOLVITE) 1 MG tablet Take 1 mg by mouth Daily.     • gabapentin (NEURONTIN) 300 MG capsule Take 300 mg by mouth 3 (Three) Times a Day. 2 pills tid     • Garlic Oil 1000 MG capsule Take 1,000 mg by mouth Daily.     • HYDROcodone-acetaminophen (NORCO) 7.5-325 MG per tablet      • indapamide (LOZOL) 2.5 MG tablet Take 2.5 mg by mouth Every Morning.     • lisinopril (PRINIVIL,ZESTRIL) 40 MG tablet Take 40 mg by mouth Daily.     • mesalamine (DELZICOL) 400 MG capsule delayed-release delayed  release capsule Take 2 capsules by mouth 3 (Three) Times a Day. Two pills tid 360 capsule 3   • Multiple Vitamins-Minerals (MULTIVITAMIN ADULT PO) Take  by mouth.     • Omega-3 Fatty Acids (FISH OIL) 1000 MG capsule capsule Take 1,000 mg by mouth Daily With Breakfast.     • omeprazole (priLOSEC) 40 MG capsule Take 20 mg by mouth 2 (Two) Times a Day.     • traMADol (ULTRAM) 50 MG tablet Take 50 mg by mouth Every 6 (Six) Hours As Needed for moderate pain (4-6).     • VESICARE 10 MG tablet      • vitamin C (ASCORBIC ACID) 250 MG tablet Take 250 mg by mouth Daily.     • [DISCONTINUED] mesalamine (DELZICOL) 400 MG capsule delayed-release delayed release capsule Take 2 capsules by mouth 3 (Three) Times a Day. Two pills tid 180 capsule 11     No Known Allergies  Social History     Socioeconomic History   • Marital status:      Spouse name: Not on file   • Number of children: Not on file   • Years of education: Not on file   • Highest education level: Not on file   Social Needs   • Financial resource strain: Not on file   • Food insecurity - worry: Not on file   • Food insecurity - inability: Not on file   • Transportation needs - medical: Not on file   • Transportation needs - non-medical: Not on file   Occupational History   • Not on file   Tobacco Use   • Smoking status: Never Smoker   • Smokeless tobacco: Never Used   Substance and Sexual Activity   • Alcohol use: No   • Drug use: No   • Sexual activity: Not on file   Other Topics Concern   • Not on file   Social History Narrative   • Not on file     Family History   Problem Relation Age of Onset   • Colon cancer Neg Hx    • Colon polyps Neg Hx    • Esophageal cancer Neg Hx    • Liver cancer Neg Hx    • Liver disease Neg Hx    • Rectal cancer Neg Hx    • Stomach cancer Neg Hx      Review of Systems   Constitutional: Negative for fatigue, fever and unexpected weight change.   HENT: Negative for hearing loss, sore throat and voice change.    Eyes: Negative for  visual disturbance.   Respiratory: Negative for cough, shortness of breath and wheezing.    Cardiovascular: Negative for chest pain and palpitations.   Gastrointestinal: Negative for abdominal pain, blood in stool and vomiting.   Endocrine: Negative for polydipsia and polyuria.   Genitourinary: Negative for difficulty urinating, dysuria, hematuria and urgency.   Musculoskeletal: Negative for joint swelling and myalgias.   Skin: Negative for color change, rash and wound.   Neurological: Negative for dizziness, tremors, seizures and syncope.   Hematological: Does not bruise/bleed easily.   Psychiatric/Behavioral: Negative for agitation and confusion. The patient is not nervous/anxious.      Objective   Vitals:    01/03/19 1012   BP: 124/80   Pulse: 60   Temp: 97 °F (36.1 °C)   SpO2: 97%     Physical Exam   Constitutional: She is oriented to person, place, and time. She appears well-developed and well-nourished. She is cooperative.   HENT:   Head: Normocephalic and atraumatic.   Eyes: Conjunctivae are normal. Pupils are equal, round, and reactive to light. No scleral icterus.   Neck: Normal range of motion. Neck supple. No JVD present. No thyroid mass and no thyromegaly present.   Cardiovascular: Normal rate, regular rhythm and normal heart sounds. Exam reveals no gallop and no friction rub.   No murmur heard.  Pulmonary/Chest: Effort normal and breath sounds normal. No accessory muscle usage. No respiratory distress. She has no wheezes. She has no rales.   Abdominal: Soft. Normal appearance and bowel sounds are normal. She exhibits no distension, no ascites and no mass. There is no hepatosplenomegaly. There is no tenderness. There is no rebound and no guarding.   Musculoskeletal: Normal range of motion. She exhibits no edema or tenderness.     Vascular Status -  Her right foot exhibits normal foot vasculature  and no edema. Her left foot exhibits normal foot vasculature  and no edema.  Lymphadenopathy:     She has no  cervical adenopathy.   Neurological: She is alert and oriented to person, place, and time. She has normal strength. Gait normal.   Skin: Skin is warm, dry and intact. No rash noted.     Imaging Results (most recent)     None        Body mass index is 23.4 kg/m².  Assessment/Plan   Deisi was seen today for heartburn.    Diagnoses and all orders for this visit:    Crohn's disease of both small and large intestine without complication (CMS/HCC)    Other orders  -     mesalamine (DELZICOL) 400 MG capsule delayed-release delayed release capsule; Take 2 capsules by mouth 3 (Three) Times a Day. Two pills tid      * Surgery not found *    Colonoscopy due Dec 2019-pt will f/u ion office Nov 2019    Patient's Body mass index is 23.4 kg/m². BMI is above normal parameters. Recommendations include: no follow-up required.

## 2019-01-09 DIAGNOSIS — M48.062 SPINAL STENOSIS OF LUMBAR REGION WITH NEUROGENIC CLAUDICATION: ICD-10-CM

## 2019-01-09 DIAGNOSIS — M05.79 RHEUMATOID ARTHRITIS INVOLVING MULTIPLE SITES WITH POSITIVE RHEUMATOID FACTOR (HCC): ICD-10-CM

## 2019-01-09 RX ORDER — HYDROCODONE BITARTRATE AND ACETAMINOPHEN 10; 325 MG/1; MG/1
TABLET ORAL
Qty: 90 TABLET | Refills: 0 | Status: SHIPPED | OUTPATIENT
Start: 2019-01-09 | End: 2019-02-06 | Stop reason: SDUPTHER

## 2019-01-16 ENCOUNTER — HOSPITAL ENCOUNTER (OUTPATIENT)
Dept: INFUSION THERAPY | Age: 74
Setting detail: INFUSION SERIES
Discharge: HOME | End: 2019-01-16
Payer: MEDICARE

## 2019-01-16 VITALS
SYSTOLIC BLOOD PRESSURE: 120 MMHG | TEMPERATURE: 98.3 F | BODY MASS INDEX: 23.4 KG/M2 | WEIGHT: 145 LBS | HEART RATE: 62 BPM | RESPIRATION RATE: 17 BRPM | DIASTOLIC BLOOD PRESSURE: 57 MMHG

## 2019-01-16 DIAGNOSIS — G61.81 CIDP (CHRONIC INFLAMMATORY DEMYELINATING POLYNEUROPATHY) (HCC): ICD-10-CM

## 2019-01-16 PROCEDURE — 96366 THER/PROPH/DIAG IV INF ADDON: CPT

## 2019-01-16 PROCEDURE — 96365 THER/PROPH/DIAG IV INF INIT: CPT

## 2019-01-16 PROCEDURE — 6360000002 HC RX W HCPCS: Performed by: PSYCHIATRY & NEUROLOGY

## 2019-01-16 RX ADMIN — IMMUNE GLOBULIN INTRAVENOUS (HUMAN) 35 G: 5 INJECTION, SOLUTION INTRAVENOUS at 09:49

## 2019-02-02 NOTE — PROGRESS NOTES
"Chief Complaint   Patient presents with   • Diarrhea     has been having some spells of diarrhea and vomiting lower abd. pain       PCP: Derek Schmid MD  REFER: No ref. provider found    Subjective     HPI   Hx of Crohn's disease dx in 2007.  Maintained on Delzicol 6 pills per day.  She was seen in office  Robert 3 2019 without complaints.  Diarrhea has been \"really bad\" three times since dec 25, 2018.  Diarrhea worse at hs.  No rectal bleeding.  She has intermittent lower abdominal pain.  Pain described as cramping.  Cramping not associated with diarrhea.  She has been treated by antibiotic twice within past month.  Diarrhea not daily.  Days without diarrhea stool described as small with \"some shape but not same shape as usually is.\"  She decreased Delzicol after previous office visit to 3 per day.  She resumed 4 per day dosing when abdominal pain worsened.    Past Medical History:   Diagnosis Date   • Arthritis    • Cancer (CMS/HCC)     endometrial cancer   • Crohn's disease (CMS/HCC)    • GERD (gastroesophageal reflux disease)    • Glaucoma    • History of transfusion    • Hypertension    • Macular degeneration    • Osteopenia    • Peripheral neuropathy        Past Surgical History:   Procedure Laterality Date   • BACK SURGERY  2010   • CARPAL TUNNEL RELEASE Bilateral    • CATARACT EXTRACTION Bilateral    • COLONOSCOPY  10/09/2015   • COLONOSCOPY N/A 12/28/2016    Procedure: COLONOSCOPY WITH ANESTHESIA;  Surgeon: Jose Raul Butt DO;  Location: [facility] ENDOSCOPY;  Service:    • CYST REMOVAL      from chest x2   • HYSTERECTOMY     • UPPER GASTROINTESTINAL ENDOSCOPY  08/31/2010       Outpatient Medications Marked as Taking for the 2/4/19 encounter (Office Visit) with Luca Gomez APRN   Medication Sig Dispense Refill   • allopurinol (ZYLOPRIM) 100 MG tablet Take 100 mg by mouth 2 (Two) Times a Day.     • allopurinol (ZYLOPRIM) 300 MG tablet Take 300 mg by mouth Daily.     • amLODIPine (NORVASC) 10 MG tablet "      • calcium carbonate (OS-LUKE) 600 MG tablet Take 600 mg by mouth Daily.     • carvedilol (COREG) 6.25 MG tablet Take 6.25 mg by mouth 2 (Two) Times a Day With Meals.     • estrogens, conjugated, (PREMARIN) 0.3 MG tablet Take 0.3 mg by mouth 1 (One) Time Per Week. Twice weekly     • fenofibrate (TRICOR) 145 MG tablet      • folic acid (FOLVITE) 1 MG tablet Take 1 mg by mouth Daily.     • gabapentin (NEURONTIN) 300 MG capsule Take 300 mg by mouth 3 (Three) Times a Day. 2 pills tid     • Garlic Oil 1000 MG capsule Take 1,000 mg by mouth Daily.     • HYDROcodone-acetaminophen (NORCO) 7.5-325 MG per tablet      • indapamide (LOZOL) 2.5 MG tablet Take 2.5 mg by mouth Every Morning.     • lisinopril (PRINIVIL,ZESTRIL) 40 MG tablet Take 40 mg by mouth Daily.     • mesalamine (DELZICOL) 400 MG capsule delayed-release delayed release capsule Take 2 capsules by mouth 3 (Three) Times a Day. Two pills tid 360 capsule 3   • Multiple Vitamins-Minerals (MULTIVITAMIN ADULT PO) Take  by mouth.     • nitrofurantoin (MACRODANTIN) 50 MG capsule      • Omega-3 Fatty Acids (FISH OIL) 1000 MG capsule capsule Take 1,000 mg by mouth Daily With Breakfast.     • omeprazole (priLOSEC) 40 MG capsule Take 20 mg by mouth 2 (Two) Times a Day.     • oxybutynin XL (DITROPAN-XL) 10 MG 24 hr tablet Take 10 mg by mouth Daily.     • polyethylene glycol (MIRALAX) packet Take as directed 1 each 0   • rOPINIRole (REQUIP) 0.5 MG tablet Take 0.5 mg by mouth 2 (Two) Times a Day. Take 1 hour before bedtime.     • traMADol (ULTRAM) 50 MG tablet Take 50 mg by mouth Every 6 (Six) Hours As Needed for moderate pain (4-6).     • VESICARE 10 MG tablet      • vitamin C (ASCORBIC ACID) 250 MG tablet Take 250 mg by mouth Daily.         No Known Allergies    Social History     Socioeconomic History   • Marital status:      Spouse name: Not on file   • Number of children: Not on file   • Years of education: Not on file   • Highest education level: Not on file  "  Social Needs   • Financial resource strain: Not on file   • Food insecurity - worry: Not on file   • Food insecurity - inability: Not on file   • Transportation needs - medical: Not on file   • Transportation needs - non-medical: Not on file   Occupational History   • Not on file   Tobacco Use   • Smoking status: Never Smoker   • Smokeless tobacco: Never Used   Substance and Sexual Activity   • Alcohol use: No   • Drug use: No   • Sexual activity: Not on file   Other Topics Concern   • Not on file   Social History Narrative   • Not on file       Family History   Problem Relation Age of Onset   • Colon cancer Neg Hx    • Colon polyps Neg Hx    • Esophageal cancer Neg Hx    • Liver cancer Neg Hx    • Liver disease Neg Hx    • Rectal cancer Neg Hx    • Stomach cancer Neg Hx        Review of Systems   Constitutional: Negative for fatigue, fever and unexpected weight change.   HENT: Negative for hearing loss, sore throat and voice change.    Eyes: Negative for visual disturbance.   Respiratory: Negative for cough, shortness of breath and wheezing.    Cardiovascular: Negative for chest pain and palpitations.   Gastrointestinal: Positive for abdominal pain. Negative for blood in stool and vomiting.   Endocrine: Negative for polydipsia and polyuria.   Genitourinary: Negative for difficulty urinating, dysuria, hematuria and urgency.   Musculoskeletal: Negative for joint swelling and myalgias.   Skin: Negative for color change, rash and wound.   Neurological: Negative for dizziness, tremors, seizures and syncope.   Hematological: Does not bruise/bleed easily.   Psychiatric/Behavioral: Negative for agitation and confusion. The patient is not nervous/anxious.        Objective     Vitals:    02/04/19 1118   BP: 120/70   Pulse: 59   Temp: 98 °F (36.7 °C)   SpO2: 98%   Weight: 64.9 kg (143 lb)   Height: 167.6 cm (66\")     Body mass index is 23.08 kg/m².    Physical Exam   Constitutional: She is oriented to person, place, and " time. She appears well-developed and well-nourished. She is cooperative.   HENT:   Head: Normocephalic and atraumatic.   Eyes: Conjunctivae are normal. Pupils are equal, round, and reactive to light. No scleral icterus.   Neck: Normal range of motion. Neck supple. No JVD present. No thyroid mass and no thyromegaly present.   Cardiovascular: Normal rate, regular rhythm and normal heart sounds. Exam reveals no gallop and no friction rub.   No murmur heard.  Pulmonary/Chest: Effort normal and breath sounds normal. No accessory muscle usage. No respiratory distress. She has no wheezes. She has no rales.   Abdominal: Soft. Normal appearance and bowel sounds are normal. She exhibits no distension, no ascites and no mass. There is no hepatosplenomegaly. There is tenderness (lower, right worse than left). There is no rebound and no guarding.   Musculoskeletal: Normal range of motion. She exhibits no edema or tenderness.     Vascular Status -  Her right foot exhibits normal foot vasculature  and no edema. Her left foot exhibits normal foot vasculature  and no edema.  Lymphadenopathy:     She has no cervical adenopathy.   Neurological: She is alert and oriented to person, place, and time. She has normal strength. Gait normal.   Skin: Skin is warm, dry and intact. No rash noted.       Imaging Results (most recent)     None          Body mass index is 23.08 kg/m².    Assessment/Plan     Deisi was seen today for diarrhea.    Diagnoses and all orders for this visit:    Crohn's disease of both small and large intestine without complication (CMS/HCC)  -     Case Request; Standing  -     Implement Anesthesia Orders Day of Procedure; Standing  -     Obtain Informed Consent; Standing  -     Case Request  -     polyethylene glycol (GoLYTELY) 236 g solution; Take 3,785 mL by mouth 1 (One) Time for 1 dose. Take as directed        COLONOSCOPY WITH ANESTHESIA (N/A)    No stool study to be ordered due to diarrhea not being daily, pt asked  to notify office if stool was loose daily  Cont 4 Delzicol as previously prescribed  Avoid miralax with loose stool    Advised pt to stop ASA, use of NSAIDs, Fish Oil, and MV 5 days prior to procedure, per Dr Butt protocol.  Tylenol based products are ok to take.  Pt verbalized understanding.    Patient is to continue all blood pressure and cardiac medications prior to procedure and has been advised to take medications morning of procedure   Pt verbalized understanding      All risks, benefits, alternatives, and indications of colonoscopy procedure have been discussed with the patient. Risks to include perforation of the colon requiring possible surgery or colostomy, risk of bleeding from biopsies or removal of colon tissue, possibility of missing a colon polyp or cancer, or adverse drug reaction.  Benefits to include the diagnosis and management of disease of the colon and rectum. Alternatives to include barium enema, radiographic evaluation, lab testing or no intervention. Pt verbalizes understanding and agrees to proceed with procedure.          There are no Patient Instructions on file for this visit.

## 2019-02-02 NOTE — H&P (VIEW-ONLY)
"Chief Complaint   Patient presents with   • Diarrhea     has been having some spells of diarrhea and vomiting lower abd. pain       PCP: Derek Schmid MD  REFER: No ref. provider found    Subjective     HPI   Hx of Crohn's disease dx in 2007.  Maintained on Delzicol 6 pills per day.  She was seen in office  Robert 3 2019 without complaints.  Diarrhea has been \"really bad\" three times since dec 25, 2018.  Diarrhea worse at hs.  No rectal bleeding.  She has intermittent lower abdominal pain.  Pain described as cramping.  Cramping not associated with diarrhea.  She has been treated by antibiotic twice within past month.  Diarrhea not daily.  Days without diarrhea stool described as small with \"some shape but not same shape as usually is.\"  She decreased Delzicol after previous office visit to 3 per day.  She resumed 4 per day dosing when abdominal pain worsened.    Past Medical History:   Diagnosis Date   • Arthritis    • Cancer (CMS/HCC)     endometrial cancer   • Crohn's disease (CMS/HCC)    • GERD (gastroesophageal reflux disease)    • Glaucoma    • History of transfusion    • Hypertension    • Macular degeneration    • Osteopenia    • Peripheral neuropathy        Past Surgical History:   Procedure Laterality Date   • BACK SURGERY  2010   • CARPAL TUNNEL RELEASE Bilateral    • CATARACT EXTRACTION Bilateral    • COLONOSCOPY  10/09/2015   • COLONOSCOPY N/A 12/28/2016    Procedure: COLONOSCOPY WITH ANESTHESIA;  Surgeon: Jose Raul Butt DO;  Location: [facility] ENDOSCOPY;  Service:    • CYST REMOVAL      from chest x2   • HYSTERECTOMY     • UPPER GASTROINTESTINAL ENDOSCOPY  08/31/2010       Outpatient Medications Marked as Taking for the 2/4/19 encounter (Office Visit) with Luca Gomez APRN   Medication Sig Dispense Refill   • allopurinol (ZYLOPRIM) 100 MG tablet Take 100 mg by mouth 2 (Two) Times a Day.     • allopurinol (ZYLOPRIM) 300 MG tablet Take 300 mg by mouth Daily.     • amLODIPine (NORVASC) 10 MG tablet "      • calcium carbonate (OS-LUKE) 600 MG tablet Take 600 mg by mouth Daily.     • carvedilol (COREG) 6.25 MG tablet Take 6.25 mg by mouth 2 (Two) Times a Day With Meals.     • estrogens, conjugated, (PREMARIN) 0.3 MG tablet Take 0.3 mg by mouth 1 (One) Time Per Week. Twice weekly     • fenofibrate (TRICOR) 145 MG tablet      • folic acid (FOLVITE) 1 MG tablet Take 1 mg by mouth Daily.     • gabapentin (NEURONTIN) 300 MG capsule Take 300 mg by mouth 3 (Three) Times a Day. 2 pills tid     • Garlic Oil 1000 MG capsule Take 1,000 mg by mouth Daily.     • HYDROcodone-acetaminophen (NORCO) 7.5-325 MG per tablet      • indapamide (LOZOL) 2.5 MG tablet Take 2.5 mg by mouth Every Morning.     • lisinopril (PRINIVIL,ZESTRIL) 40 MG tablet Take 40 mg by mouth Daily.     • mesalamine (DELZICOL) 400 MG capsule delayed-release delayed release capsule Take 2 capsules by mouth 3 (Three) Times a Day. Two pills tid 360 capsule 3   • Multiple Vitamins-Minerals (MULTIVITAMIN ADULT PO) Take  by mouth.     • nitrofurantoin (MACRODANTIN) 50 MG capsule      • Omega-3 Fatty Acids (FISH OIL) 1000 MG capsule capsule Take 1,000 mg by mouth Daily With Breakfast.     • omeprazole (priLOSEC) 40 MG capsule Take 20 mg by mouth 2 (Two) Times a Day.     • oxybutynin XL (DITROPAN-XL) 10 MG 24 hr tablet Take 10 mg by mouth Daily.     • polyethylene glycol (MIRALAX) packet Take as directed 1 each 0   • rOPINIRole (REQUIP) 0.5 MG tablet Take 0.5 mg by mouth 2 (Two) Times a Day. Take 1 hour before bedtime.     • traMADol (ULTRAM) 50 MG tablet Take 50 mg by mouth Every 6 (Six) Hours As Needed for moderate pain (4-6).     • VESICARE 10 MG tablet      • vitamin C (ASCORBIC ACID) 250 MG tablet Take 250 mg by mouth Daily.         No Known Allergies    Social History     Socioeconomic History   • Marital status:      Spouse name: Not on file   • Number of children: Not on file   • Years of education: Not on file   • Highest education level: Not on file  "  Social Needs   • Financial resource strain: Not on file   • Food insecurity - worry: Not on file   • Food insecurity - inability: Not on file   • Transportation needs - medical: Not on file   • Transportation needs - non-medical: Not on file   Occupational History   • Not on file   Tobacco Use   • Smoking status: Never Smoker   • Smokeless tobacco: Never Used   Substance and Sexual Activity   • Alcohol use: No   • Drug use: No   • Sexual activity: Not on file   Other Topics Concern   • Not on file   Social History Narrative   • Not on file       Family History   Problem Relation Age of Onset   • Colon cancer Neg Hx    • Colon polyps Neg Hx    • Esophageal cancer Neg Hx    • Liver cancer Neg Hx    • Liver disease Neg Hx    • Rectal cancer Neg Hx    • Stomach cancer Neg Hx        Review of Systems   Constitutional: Negative for fatigue, fever and unexpected weight change.   HENT: Negative for hearing loss, sore throat and voice change.    Eyes: Negative for visual disturbance.   Respiratory: Negative for cough, shortness of breath and wheezing.    Cardiovascular: Negative for chest pain and palpitations.   Gastrointestinal: Positive for abdominal pain. Negative for blood in stool and vomiting.   Endocrine: Negative for polydipsia and polyuria.   Genitourinary: Negative for difficulty urinating, dysuria, hematuria and urgency.   Musculoskeletal: Negative for joint swelling and myalgias.   Skin: Negative for color change, rash and wound.   Neurological: Negative for dizziness, tremors, seizures and syncope.   Hematological: Does not bruise/bleed easily.   Psychiatric/Behavioral: Negative for agitation and confusion. The patient is not nervous/anxious.        Objective     Vitals:    02/04/19 1118   BP: 120/70   Pulse: 59   Temp: 98 °F (36.7 °C)   SpO2: 98%   Weight: 64.9 kg (143 lb)   Height: 167.6 cm (66\")     Body mass index is 23.08 kg/m².    Physical Exam   Constitutional: She is oriented to person, place, and " time. She appears well-developed and well-nourished. She is cooperative.   HENT:   Head: Normocephalic and atraumatic.   Eyes: Conjunctivae are normal. Pupils are equal, round, and reactive to light. No scleral icterus.   Neck: Normal range of motion. Neck supple. No JVD present. No thyroid mass and no thyromegaly present.   Cardiovascular: Normal rate, regular rhythm and normal heart sounds. Exam reveals no gallop and no friction rub.   No murmur heard.  Pulmonary/Chest: Effort normal and breath sounds normal. No accessory muscle usage. No respiratory distress. She has no wheezes. She has no rales.   Abdominal: Soft. Normal appearance and bowel sounds are normal. She exhibits no distension, no ascites and no mass. There is no hepatosplenomegaly. There is tenderness (lower, right worse than left). There is no rebound and no guarding.   Musculoskeletal: Normal range of motion. She exhibits no edema or tenderness.     Vascular Status -  Her right foot exhibits normal foot vasculature  and no edema. Her left foot exhibits normal foot vasculature  and no edema.  Lymphadenopathy:     She has no cervical adenopathy.   Neurological: She is alert and oriented to person, place, and time. She has normal strength. Gait normal.   Skin: Skin is warm, dry and intact. No rash noted.       Imaging Results (most recent)     None          Body mass index is 23.08 kg/m².    Assessment/Plan     Deisi was seen today for diarrhea.    Diagnoses and all orders for this visit:    Crohn's disease of both small and large intestine without complication (CMS/HCC)  -     Case Request; Standing  -     Implement Anesthesia Orders Day of Procedure; Standing  -     Obtain Informed Consent; Standing  -     Case Request  -     polyethylene glycol (GoLYTELY) 236 g solution; Take 3,785 mL by mouth 1 (One) Time for 1 dose. Take as directed        COLONOSCOPY WITH ANESTHESIA (N/A)    No stool study to be ordered due to diarrhea not being daily, pt asked  to notify office if stool was loose daily  Cont 4 Delzicol as previously prescribed  Avoid miralax with loose stool    Advised pt to stop ASA, use of NSAIDs, Fish Oil, and MV 5 days prior to procedure, per Dr Butt protocol.  Tylenol based products are ok to take.  Pt verbalized understanding.    Patient is to continue all blood pressure and cardiac medications prior to procedure and has been advised to take medications morning of procedure   Pt verbalized understanding      All risks, benefits, alternatives, and indications of colonoscopy procedure have been discussed with the patient. Risks to include perforation of the colon requiring possible surgery or colostomy, risk of bleeding from biopsies or removal of colon tissue, possibility of missing a colon polyp or cancer, or adverse drug reaction.  Benefits to include the diagnosis and management of disease of the colon and rectum. Alternatives to include barium enema, radiographic evaluation, lab testing or no intervention. Pt verbalizes understanding and agrees to proceed with procedure.          There are no Patient Instructions on file for this visit.

## 2019-02-04 ENCOUNTER — OFFICE VISIT (OUTPATIENT)
Dept: GASTROENTEROLOGY | Facility: CLINIC | Age: 74
End: 2019-02-04

## 2019-02-04 VITALS
HEIGHT: 66 IN | OXYGEN SATURATION: 98 % | DIASTOLIC BLOOD PRESSURE: 70 MMHG | WEIGHT: 143 LBS | BODY MASS INDEX: 22.98 KG/M2 | TEMPERATURE: 98 F | HEART RATE: 59 BPM | SYSTOLIC BLOOD PRESSURE: 120 MMHG

## 2019-02-04 DIAGNOSIS — K50.80 CROHN'S DISEASE OF BOTH SMALL AND LARGE INTESTINE WITHOUT COMPLICATION (HCC): Primary | ICD-10-CM

## 2019-02-04 PROCEDURE — 99214 OFFICE O/P EST MOD 30 MIN: CPT | Performed by: NURSE PRACTITIONER

## 2019-02-05 DIAGNOSIS — N39.0 RECURRENT UTI: ICD-10-CM

## 2019-02-06 DIAGNOSIS — M48.062 SPINAL STENOSIS OF LUMBAR REGION WITH NEUROGENIC CLAUDICATION: ICD-10-CM

## 2019-02-06 DIAGNOSIS — M05.79 RHEUMATOID ARTHRITIS INVOLVING MULTIPLE SITES WITH POSITIVE RHEUMATOID FACTOR (HCC): ICD-10-CM

## 2019-02-06 RX ORDER — HYDROCODONE BITARTRATE AND ACETAMINOPHEN 10; 325 MG/1; MG/1
TABLET ORAL
Qty: 90 TABLET | Refills: 0 | Status: SHIPPED | OUTPATIENT
Start: 2019-02-06 | End: 2019-03-06 | Stop reason: SDUPTHER

## 2019-02-07 ENCOUNTER — TELEPHONE (OUTPATIENT)
Dept: NEUROLOGY | Age: 74
End: 2019-02-07

## 2019-02-07 DIAGNOSIS — G61.81 CIDP (CHRONIC INFLAMMATORY DEMYELINATING POLYNEUROPATHY) (HCC): Primary | ICD-10-CM

## 2019-02-12 RX ORDER — OXYBUTYNIN CHLORIDE 10 MG/1
TABLET, EXTENDED RELEASE ORAL
Qty: 90 TABLET | Refills: 3 | Status: SHIPPED | OUTPATIENT
Start: 2019-02-12 | End: 2019-03-13

## 2019-02-13 ENCOUNTER — HOSPITAL ENCOUNTER (OUTPATIENT)
Dept: INFUSION THERAPY | Age: 74
Setting detail: INFUSION SERIES
Discharge: HOME | End: 2019-02-13
Payer: MEDICARE

## 2019-02-13 VITALS
BODY MASS INDEX: 23.4 KG/M2 | WEIGHT: 145 LBS | TEMPERATURE: 98.6 F | DIASTOLIC BLOOD PRESSURE: 62 MMHG | SYSTOLIC BLOOD PRESSURE: 122 MMHG | HEART RATE: 49 BPM | RESPIRATION RATE: 17 BRPM

## 2019-02-13 DIAGNOSIS — G61.81 CIDP (CHRONIC INFLAMMATORY DEMYELINATING POLYNEUROPATHY) (HCC): ICD-10-CM

## 2019-02-13 PROCEDURE — 96366 THER/PROPH/DIAG IV INF ADDON: CPT

## 2019-02-13 PROCEDURE — 96365 THER/PROPH/DIAG IV INF INIT: CPT

## 2019-02-13 PROCEDURE — 6360000002 HC RX W HCPCS: Performed by: PSYCHIATRY & NEUROLOGY

## 2019-02-13 RX ADMIN — IMMUNE GLOBULIN INFUSION (HUMAN) 35 G: 100 INJECTION, SOLUTION INTRAVENOUS; SUBCUTANEOUS at 09:25

## 2019-02-18 ENCOUNTER — ANESTHESIA (OUTPATIENT)
Dept: GASTROENTEROLOGY | Facility: HOSPITAL | Age: 74
End: 2019-02-18

## 2019-02-18 ENCOUNTER — HOSPITAL ENCOUNTER (OUTPATIENT)
Facility: HOSPITAL | Age: 74
Setting detail: HOSPITAL OUTPATIENT SURGERY
Discharge: HOME | End: 2019-02-18
Attending: INTERNAL MEDICINE | Admitting: INTERNAL MEDICINE

## 2019-02-18 ENCOUNTER — ANESTHESIA EVENT (OUTPATIENT)
Dept: GASTROENTEROLOGY | Facility: HOSPITAL | Age: 74
End: 2019-02-18

## 2019-02-18 VITALS
HEART RATE: 101 BPM | BODY MASS INDEX: 25.52 KG/M2 | SYSTOLIC BLOOD PRESSURE: 117 MMHG | DIASTOLIC BLOOD PRESSURE: 54 MMHG | WEIGHT: 144 LBS | TEMPERATURE: 97.8 F | HEIGHT: 63 IN | RESPIRATION RATE: 23 BRPM | OXYGEN SATURATION: 95 %

## 2019-02-18 DIAGNOSIS — K50.80 CROHN'S DISEASE OF BOTH SMALL AND LARGE INTESTINE WITHOUT COMPLICATION (HCC): ICD-10-CM

## 2019-02-18 PROCEDURE — 45380 COLONOSCOPY AND BIOPSY: CPT | Performed by: INTERNAL MEDICINE

## 2019-02-18 PROCEDURE — 25010000002 PROPOFOL 10 MG/ML EMULSION: Performed by: NURSE ANESTHETIST, CERTIFIED REGISTERED

## 2019-02-18 PROCEDURE — 88305 TISSUE EXAM BY PATHOLOGIST: CPT | Performed by: INTERNAL MEDICINE

## 2019-02-18 RX ORDER — SODIUM CHLORIDE 9 MG/ML
500 INJECTION, SOLUTION INTRAVENOUS CONTINUOUS PRN
Status: DISCONTINUED | OUTPATIENT
Start: 2019-02-18 | End: 2019-02-18 | Stop reason: HOSPADM

## 2019-02-18 RX ORDER — ASPIRIN 81 MG/1
81 TABLET ORAL DAILY
COMMUNITY

## 2019-02-18 RX ORDER — BROMFENAC 1.03 MG/ML
1 SOLUTION/ DROPS OPHTHALMIC DAILY
COMMUNITY
End: 2020-02-04

## 2019-02-18 RX ORDER — SODIUM CHLORIDE 0.9 % (FLUSH) 0.9 %
3 SYRINGE (ML) INJECTION AS NEEDED
Status: DISCONTINUED | OUTPATIENT
Start: 2019-02-18 | End: 2019-02-18 | Stop reason: HOSPADM

## 2019-02-18 RX ORDER — PROPOFOL 10 MG/ML
VIAL (ML) INTRAVENOUS AS NEEDED
Status: DISCONTINUED | OUTPATIENT
Start: 2019-02-18 | End: 2019-02-18 | Stop reason: SURG

## 2019-02-18 RX ADMIN — PROPOFOL 100 MG: 10 INJECTION, EMULSION INTRAVENOUS at 10:36

## 2019-02-18 RX ADMIN — PROPOFOL 50 MG: 10 INJECTION, EMULSION INTRAVENOUS at 10:45

## 2019-02-18 RX ADMIN — PROPOFOL 100 MG: 10 INJECTION, EMULSION INTRAVENOUS at 10:33

## 2019-02-18 RX ADMIN — SODIUM CHLORIDE 500 ML: 9 INJECTION, SOLUTION INTRAVENOUS at 10:04

## 2019-02-18 RX ADMIN — PROPOFOL 100 MG: 10 INJECTION, EMULSION INTRAVENOUS at 10:40

## 2019-02-18 NOTE — ANESTHESIA POSTPROCEDURE EVALUATION
Patient: Deisi Ponce    Procedure Summary     Date:  02/18/19 Room / Location:  [facility] ENDOSCOPY 5 / [facility] END[facility]    Anesthesia Start:  1030 Anesthesia Stop:  1052    Procedure:  COLONOSCOPY WITH ANESTHESIA (N/A ) Diagnosis:       Crohn's disease of both small and large intestine without complication (CMS/HCC)      (Crohn's disease of both small and large intestine without complication (CMS/HCC) [K50.80])    Surgeon:  Jose Raul Butt DO Provider:  Raghav Brown CRNA    Anesthesia Type:  MAC ASA Status:  3          Anesthesia Type: MAC  Last vitals  BP   131/75 (02/18/19 0939)   Temp   97.8 °F (36.6 °C) (02/18/19 0939)   Pulse   110 (02/18/19 0939)   Resp   20 (02/18/19 0939)     SpO2   93 % (02/18/19 0939)     Post Anesthesia Care and Evaluation    Patient location during evaluation: PHASE II  Patient participation: complete - patient participated  Level of consciousness: awake and alert  Pain management: adequate  Airway patency: patent  Anesthetic complications: No anesthetic complications    Cardiovascular status: acceptable  Respiratory status: acceptable  Hydration status: acceptable

## 2019-02-18 NOTE — ANESTHESIA PREPROCEDURE EVALUATION
Anesthesia Evaluation     Patient summary reviewed and Nursing notes reviewed   no history of anesthetic complications:  NPO Solid Status: > 8 hours  NPO Liquid Status: > 2 hours           Airway   Mallampati: I  TM distance: >3 FB  Neck ROM: full  No difficulty expected  Dental      Pulmonary    (-) sleep apnea, not a smoker  Cardiovascular   Exercise tolerance: poor (<4 METS)    Patient on routine beta blocker and Beta blocker given within 24 hours of surgery    (+) hypertension,       Neuro/Psych  (+) numbness (neuropathy),     GI/Hepatic/Renal/Endo    (+)  GERD,    (-) liver disease, no renal disease (bladder dysfunction, self cath's), diabetes    Musculoskeletal     Abdominal    Substance History      OB/GYN          Other   (+) arthritis   history of cancer (endometrial caner) remission      Other Comment: Macular degeneration                  Anesthesia Plan    ASA 3     MAC     intravenous induction   Anesthetic plan, all risks, benefits, and alternatives have been provided, discussed and informed consent has been obtained with: patient.

## 2019-02-22 LAB
CYTO UR: NORMAL
LAB AP CASE REPORT: NORMAL
PATH REPORT.FINAL DX SPEC: NORMAL
PATH REPORT.GROSS SPEC: NORMAL

## 2019-02-25 ENCOUNTER — OFFICE VISIT (OUTPATIENT)
Dept: NEUROLOGY | Age: 74
End: 2019-02-25
Payer: MEDICARE

## 2019-02-25 VITALS
WEIGHT: 151 LBS | RESPIRATION RATE: 18 BRPM | DIASTOLIC BLOOD PRESSURE: 70 MMHG | HEART RATE: 88 BPM | SYSTOLIC BLOOD PRESSURE: 122 MMHG | BODY MASS INDEX: 24.27 KG/M2 | HEIGHT: 66 IN

## 2019-02-25 DIAGNOSIS — M05.79 RHEUMATOID ARTHRITIS INVOLVING MULTIPLE SITES WITH POSITIVE RHEUMATOID FACTOR (HCC): ICD-10-CM

## 2019-02-25 DIAGNOSIS — M48.062 SPINAL STENOSIS OF LUMBAR REGION WITH NEUROGENIC CLAUDICATION: ICD-10-CM

## 2019-02-25 DIAGNOSIS — G61.81 CIDP (CHRONIC INFLAMMATORY DEMYELINATING POLYNEUROPATHY) (HCC): Primary | ICD-10-CM

## 2019-02-25 PROCEDURE — 99213 OFFICE O/P EST LOW 20 MIN: CPT | Performed by: PSYCHIATRY & NEUROLOGY

## 2019-02-25 RX ORDER — LIDOCAINE 50 MG/G
OINTMENT TOPICAL PRN
Qty: 30 G | Refills: 3 | Status: SHIPPED | OUTPATIENT
Start: 2019-02-25

## 2019-02-25 RX ORDER — NITROFURANTOIN MACROCRYSTALS 50 MG/1
CAPSULE ORAL
COMMUNITY
Start: 2016-11-02 | End: 2020-07-01

## 2019-02-25 RX ORDER — TIZANIDINE 4 MG/1
TABLET ORAL
COMMUNITY
Start: 2018-12-31 | End: 2021-06-28

## 2019-02-26 ENCOUNTER — TELEPHONE (OUTPATIENT)
Dept: GASTROENTEROLOGY | Facility: CLINIC | Age: 74
End: 2019-02-26

## 2019-02-26 NOTE — TELEPHONE ENCOUNTER
----- Message from Jose Raul Butt DO sent at 2/22/2019  5:16 PM CST -----  Regarding: random bx  Can u let her know her bx were neg      ----- Message -----  From: Lab, Background User  Sent: 2/22/2019   4:47 PM  To: Jose Raul Butt DO      Called patient gave results.

## 2019-03-06 DIAGNOSIS — M48.062 SPINAL STENOSIS OF LUMBAR REGION WITH NEUROGENIC CLAUDICATION: ICD-10-CM

## 2019-03-06 DIAGNOSIS — M05.79 RHEUMATOID ARTHRITIS INVOLVING MULTIPLE SITES WITH POSITIVE RHEUMATOID FACTOR (HCC): ICD-10-CM

## 2019-03-06 RX ORDER — HYDROCODONE BITARTRATE AND ACETAMINOPHEN 10; 325 MG/1; MG/1
TABLET ORAL
Qty: 90 TABLET | Refills: 0 | Status: SHIPPED | OUTPATIENT
Start: 2019-03-06 | End: 2019-04-02 | Stop reason: SDUPTHER

## 2019-03-13 ENCOUNTER — HOSPITAL ENCOUNTER (OUTPATIENT)
Dept: INFUSION THERAPY | Age: 74
Setting detail: INFUSION SERIES
Discharge: HOME | End: 2019-03-13
Payer: MEDICARE

## 2019-03-13 ENCOUNTER — OFFICE VISIT (OUTPATIENT)
Dept: UROLOGY | Age: 74
End: 2019-03-13
Payer: MEDICARE

## 2019-03-13 VITALS — HEIGHT: 66 IN | TEMPERATURE: 98.1 F | BODY MASS INDEX: 24.27 KG/M2 | WEIGHT: 151 LBS

## 2019-03-13 VITALS
TEMPERATURE: 98.5 F | HEIGHT: 66 IN | HEART RATE: 68 BPM | DIASTOLIC BLOOD PRESSURE: 68 MMHG | BODY MASS INDEX: 23.46 KG/M2 | SYSTOLIC BLOOD PRESSURE: 124 MMHG | RESPIRATION RATE: 17 BRPM | WEIGHT: 146 LBS

## 2019-03-13 DIAGNOSIS — N31.9 NEUROGENIC BLADDER: ICD-10-CM

## 2019-03-13 DIAGNOSIS — N39.0 RECURRENT UTI: Primary | ICD-10-CM

## 2019-03-13 DIAGNOSIS — G61.81 CIDP (CHRONIC INFLAMMATORY DEMYELINATING POLYNEUROPATHY) (HCC): Primary | ICD-10-CM

## 2019-03-13 LAB
BILIRUBIN, POC: NORMAL
BLOOD URINE, POC: NORMAL
CLARITY, POC: CLEAR
COLOR, POC: YELLOW
GLUCOSE URINE, POC: NORMAL
KETONES, POC: NORMAL
LEUKOCYTE EST, POC: NORMAL
NITRITE, POC: NORMAL
PH, POC: 6
PROTEIN, POC: NORMAL
SPECIFIC GRAVITY, POC: 1.01
UROBILINOGEN, POC: 0.2

## 2019-03-13 PROCEDURE — 96365 THER/PROPH/DIAG IV INF INIT: CPT

## 2019-03-13 PROCEDURE — 6360000002 HC RX W HCPCS: Performed by: PSYCHIATRY & NEUROLOGY

## 2019-03-13 PROCEDURE — 99214 OFFICE O/P EST MOD 30 MIN: CPT | Performed by: PHYSICIAN ASSISTANT

## 2019-03-13 PROCEDURE — 96366 THER/PROPH/DIAG IV INF ADDON: CPT

## 2019-03-13 PROCEDURE — 81003 URINALYSIS AUTO W/O SCOPE: CPT | Performed by: PHYSICIAN ASSISTANT

## 2019-03-13 RX ORDER — NITROFURANTOIN MACROCRYSTALS 50 MG/1
50 CAPSULE ORAL NIGHTLY
Qty: 30 CAPSULE | Refills: 11 | Status: SHIPPED | OUTPATIENT
Start: 2019-03-13 | End: 2019-04-12

## 2019-03-13 RX ORDER — ESTRADIOL 0.1 MG/G
CREAM VAGINAL
Qty: 1 TUBE | Refills: 11 | Status: SHIPPED | OUTPATIENT
Start: 2019-03-13 | End: 2020-08-03

## 2019-03-13 RX ADMIN — IMMUNE GLOBULIN INTRAVENOUS (HUMAN) 35 G: 5 INJECTION, SOLUTION INTRAVENOUS at 09:56

## 2019-03-13 ASSESSMENT — ENCOUNTER SYMPTOMS
VOMITING: 0
SINUS PAIN: 0
SHORTNESS OF BREATH: 0
WHEEZING: 0
BACK PAIN: 0
ABDOMINAL PAIN: 0
EYE PAIN: 0

## 2019-03-13 NOTE — DISCHARGE INSTR - COC
Continuity of Care Form    Patient Name: Iris Baker   :  [id number]  MRN:  [id number]    Admit date:  3/13/2019  Discharge date:  ***    Code Status Order: No Order   Advance Directives:     Admitting Physician:  No admitting provider for patient encounter. PCP: Kiarra Contreras    Discharging Nurse: [facility] Unit/Room#: No information available for this encounter. Discharging Unit Phone Number: ***    Emergency Contact:   Extended Emergency Contact Information  [facility] [address] [facility] Phone: [phone]  Relation: Child  Secondary Emergency Contact: Pretty Ordonez   [address] Phone: [phone]  Relation: Child    Past Surgical History:  Past Surgical History:   Procedure Laterality Date    BACK SURGERY      CARPAL TUNNEL RELEASE Bilateral     CATARACT REMOVAL Bilateral     HYSTERECTOMY      KNEE ARTHROSCOPY Right        Immunization History: There is no immunization history on file for this patient.     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 IV ACCESS:226440306}    Nursing Mobility/ADLs:  Walking   {[facility] DME ICZK:678187097}  Transfer  {[facility] DME NLYM:895394242}  Bathing  {P DME UYDR:551076543}  Dressing  {[facility] DME EAWD:686694085}  Toileting  {[facility] DME PASF:722391193}  Feeding  {[facility] DME LLLB:684760688}  Med Admin  {[facility] DME YFAE:493738679}  Med Delivery   { [facility] MED Delivery:587596632}    Wound Care Documentation and Therapy:        Elimination:  Continence:   · Bowel: {YES / DN:59667}  · Bladder: {YES / OX:63352}  Urinary Catheter: {Urinary Catheter:087886761}   Colostomy/Ileostomy/Ileal Conduit: {YES / AI:24505}       Date of Last BM: ***  No intake or output data in the 24 hours ending 19 1114  No intake/output data recorded.     Safety Concerns:     [address] [employer] Safety Concerns:255587373}    Impairments/Disabilities:      [address] [employer] Impairments/Disabilities:271579743}    Nutrition Therapy:  Current Nutrition Therapy:   [address] [employer] Diet List:150896340}    Routes of Feeding: {CHP DME Other Feedings:317609052}  Liquids: {Slp liquid thickness:81391}  Daily Fluid Restriction: {CHP DME Yes amt example:536274800}  Last Modified Barium Swallow with Video (Video Swallowing Test): {Done Not Done BBFE:016328820}    Treatments at the Time of Hospital Discharge:   Respiratory Treatments: ***  Oxygen Therapy:  {Therapy; copd oxygen:20884}  Ventilator:    { CC Vent ELXU:638543030}    Rehab Therapies: {THERAPEUTIC INTERVENTION:4994287370}  Weight Bearing Status/Restrictions: [address] [employer] Weight Bearin}  Other Medical Equipment (for information only, NOT a DME order):  {EQUIPMENT:453237594}  Other Treatments: ***    Patient's personal belongings (please select all that are sent with patient):  {[facility] DME Belongings:173573614}    RN SIGNATURE:  {Esignature:004800673}    CASE MANAGEMENT/SOCIAL WORK SECTION    Inpatient Status Date: ***    Readmission Risk Assessment Score:  Readmission Risk              Risk of Unplanned Readmission:        0           Discharging to Facility/ Agency   · Name:   · Address:  · Phone:  · Fax:    Dialysis Facility (if applicable)   · Name:  · Address:  · Dialysis Schedule:  · Phone:  · Fax:    / signature: {Esignature:198942352}    PHYSICIAN SECTION    Prognosis: {Prognosis:8867157020}    Condition at Discharge: Radha Odonnell Patient Condition:765785280}    Rehab Potential (if transferring to Rehab): {Prognosis:8623921742}    Recommended Labs or Other Treatments After Discharge: ***    Physician Certification: I certify the above information and transfer of Wendy Resendez  is necessary for the continuing treatment of the diagnosis listed and that she requires {Admit to Appropriate Level of Care:78950} for {GREATER/LESS:017085681} 30 days.      Update Admission H&P: {CHP DME Changes in EIRIO:481172583}    PHYSICIAN SIGNATURE:  {Esignature:454151038}

## 2019-04-02 DIAGNOSIS — M05.79 RHEUMATOID ARTHRITIS INVOLVING MULTIPLE SITES WITH POSITIVE RHEUMATOID FACTOR (HCC): ICD-10-CM

## 2019-04-02 DIAGNOSIS — M48.062 SPINAL STENOSIS OF LUMBAR REGION WITH NEUROGENIC CLAUDICATION: ICD-10-CM

## 2019-04-02 RX ORDER — HYDROCODONE BITARTRATE AND ACETAMINOPHEN 10; 325 MG/1; MG/1
TABLET ORAL
Qty: 90 TABLET | Refills: 0 | Status: SHIPPED | OUTPATIENT
Start: 2019-04-02 | End: 2019-04-30 | Stop reason: SDUPTHER

## 2019-04-02 NOTE — TELEPHONE ENCOUNTER
Patient requests script for Norco 10mg #90, last filled 3-6-19. Last office visit 2-25-19, next office visit 8-29-19, and Wil Grimaldo in chart.

## 2019-04-17 ENCOUNTER — HOSPITAL ENCOUNTER (OUTPATIENT)
Dept: INFUSION THERAPY | Age: 74
Setting detail: INFUSION SERIES
Discharge: HOME | End: 2019-04-17
Payer: MEDICARE

## 2019-04-17 VITALS
RESPIRATION RATE: 17 BRPM | BODY MASS INDEX: 23.4 KG/M2 | DIASTOLIC BLOOD PRESSURE: 55 MMHG | HEART RATE: 56 BPM | TEMPERATURE: 97.9 F | SYSTOLIC BLOOD PRESSURE: 104 MMHG | WEIGHT: 145 LBS

## 2019-04-17 DIAGNOSIS — G61.81 CIDP (CHRONIC INFLAMMATORY DEMYELINATING POLYNEUROPATHY) (HCC): Primary | ICD-10-CM

## 2019-04-17 PROCEDURE — 96365 THER/PROPH/DIAG IV INF INIT: CPT

## 2019-04-17 PROCEDURE — 96366 THER/PROPH/DIAG IV INF ADDON: CPT

## 2019-04-17 PROCEDURE — 6360000002 HC RX W HCPCS: Performed by: PSYCHIATRY & NEUROLOGY

## 2019-04-17 RX ADMIN — IMMUNE GLOBULIN INTRAVENOUS (HUMAN) 30 G: 5 INJECTION, SOLUTION INTRAVENOUS at 10:01

## 2019-04-30 DIAGNOSIS — M48.062 SPINAL STENOSIS OF LUMBAR REGION WITH NEUROGENIC CLAUDICATION: ICD-10-CM

## 2019-04-30 DIAGNOSIS — M05.79 RHEUMATOID ARTHRITIS INVOLVING MULTIPLE SITES WITH POSITIVE RHEUMATOID FACTOR (HCC): ICD-10-CM

## 2019-04-30 RX ORDER — HYDROCODONE BITARTRATE AND ACETAMINOPHEN 10; 325 MG/1; MG/1
TABLET ORAL
Qty: 90 TABLET | Refills: 0 | Status: SHIPPED | OUTPATIENT
Start: 2019-05-02 | End: 2019-05-29 | Stop reason: SDUPTHER

## 2019-04-30 NOTE — TELEPHONE ENCOUNTER
Requested Prescriptions     Pending Prescriptions Disp Refills    HYDROcodone-acetaminophen (NORCO)  MG per tablet 90 tablet 0     Sig: TAKE ONE TABLET BY MOUTH EVERY 6 HOURS AS NEEDED FOR PAIN . . .MAY CAUSE DROWSINESS!!      Last OV  2/25/19  Next OV  8/29/19  Last Rx  4/2/19  Alison Lewis   4/28/19

## 2019-05-15 ENCOUNTER — HOSPITAL ENCOUNTER (OUTPATIENT)
Dept: INFUSION THERAPY | Age: 74
Setting detail: INFUSION SERIES
Discharge: HOME | End: 2019-05-15
Payer: MEDICARE

## 2019-05-15 VITALS
HEART RATE: 65 BPM | SYSTOLIC BLOOD PRESSURE: 111 MMHG | TEMPERATURE: 98.2 F | DIASTOLIC BLOOD PRESSURE: 60 MMHG | RESPIRATION RATE: 18 BRPM

## 2019-05-15 DIAGNOSIS — G61.81 CIDP (CHRONIC INFLAMMATORY DEMYELINATING POLYNEUROPATHY) (HCC): Primary | ICD-10-CM

## 2019-05-15 PROCEDURE — 6360000002 HC RX W HCPCS: Performed by: PSYCHIATRY & NEUROLOGY

## 2019-05-15 PROCEDURE — 96366 THER/PROPH/DIAG IV INF ADDON: CPT

## 2019-05-15 PROCEDURE — 96365 THER/PROPH/DIAG IV INF INIT: CPT

## 2019-05-15 RX ADMIN — IMMUNE GLOBULIN INTRAVENOUS (HUMAN) 30 G: 5 INJECTION, SOLUTION INTRAVENOUS at 09:06

## 2019-05-29 DIAGNOSIS — M05.79 RHEUMATOID ARTHRITIS INVOLVING MULTIPLE SITES WITH POSITIVE RHEUMATOID FACTOR (HCC): ICD-10-CM

## 2019-05-29 DIAGNOSIS — M48.062 SPINAL STENOSIS OF LUMBAR REGION WITH NEUROGENIC CLAUDICATION: ICD-10-CM

## 2019-05-29 RX ORDER — HYDROCODONE BITARTRATE AND ACETAMINOPHEN 10; 325 MG/1; MG/1
TABLET ORAL
Qty: 90 TABLET | Refills: 0 | Status: SHIPPED | OUTPATIENT
Start: 2019-05-29 | End: 2019-06-27 | Stop reason: SDUPTHER

## 2019-05-29 NOTE — TELEPHONE ENCOUNTER
Patient requests script for Raenelle Riedel last filled 4-30-19. Last office visit 2-25-19, next office visit 8-29-19, Jennifer Argueta in chart.

## 2019-06-12 ENCOUNTER — HOSPITAL ENCOUNTER (OUTPATIENT)
Dept: INFUSION THERAPY | Age: 74
Setting detail: INFUSION SERIES
Discharge: HOME | End: 2019-06-12
Payer: MEDICARE

## 2019-06-12 VITALS
TEMPERATURE: 97.8 F | HEART RATE: 57 BPM | OXYGEN SATURATION: 94 % | WEIGHT: 140 LBS | RESPIRATION RATE: 20 BRPM | DIASTOLIC BLOOD PRESSURE: 54 MMHG | SYSTOLIC BLOOD PRESSURE: 115 MMHG | BODY MASS INDEX: 22.6 KG/M2

## 2019-06-12 DIAGNOSIS — G61.81 CIDP (CHRONIC INFLAMMATORY DEMYELINATING POLYNEUROPATHY) (HCC): Primary | ICD-10-CM

## 2019-06-12 PROCEDURE — 93005 ELECTROCARDIOGRAM TRACING: CPT

## 2019-06-12 PROCEDURE — 6360000002 HC RX W HCPCS: Performed by: PSYCHIATRY & NEUROLOGY

## 2019-06-12 PROCEDURE — 96366 THER/PROPH/DIAG IV INF ADDON: CPT

## 2019-06-12 PROCEDURE — 96365 THER/PROPH/DIAG IV INF INIT: CPT

## 2019-06-12 RX ADMIN — IMMUNE GLOBULIN INFUSION (HUMAN) 30 G: 100 INJECTION, SOLUTION INTRAVENOUS; SUBCUTANEOUS at 09:47

## 2019-06-20 RX ORDER — NITROFURANTOIN MACROCRYSTALS 50 MG/1
50 CAPSULE ORAL NIGHTLY
Qty: 30 CAPSULE | Refills: 11 | Status: SHIPPED | OUTPATIENT
Start: 2019-06-20 | End: 2019-07-20

## 2019-06-24 ENCOUNTER — TELEPHONE (OUTPATIENT)
Dept: GASTROENTEROLOGY | Facility: CLINIC | Age: 74
End: 2019-06-24

## 2019-06-27 DIAGNOSIS — M05.79 RHEUMATOID ARTHRITIS INVOLVING MULTIPLE SITES WITH POSITIVE RHEUMATOID FACTOR (HCC): ICD-10-CM

## 2019-06-27 DIAGNOSIS — M48.062 SPINAL STENOSIS OF LUMBAR REGION WITH NEUROGENIC CLAUDICATION: ICD-10-CM

## 2019-06-27 RX ORDER — HYDROCODONE BITARTRATE AND ACETAMINOPHEN 10; 325 MG/1; MG/1
TABLET ORAL
Qty: 90 TABLET | Refills: 0 | Status: SHIPPED | OUTPATIENT
Start: 2019-06-28 | End: 2019-07-30 | Stop reason: SDUPTHER

## 2019-06-27 NOTE — TELEPHONE ENCOUNTER
Requested Prescriptions     Pending Prescriptions Disp Refills    HYDROcodone-acetaminophen (NORCO)  MG per tablet 90 tablet 0     Sig: TAKE ONE TABLET BY MOUTH EVERY 6 HOURS AS NEEDED FOR PAIN . . .MAY CAUSE DROWSINESS!!        Last Office Visit: 2/25/2019  Next Office Visit: 8/29/2019  Last Medication Refill: 5/29/19  Jennifer Crooks up to date: 4/29/19    *RX updated to reflect 6/28/19 fill date*

## 2019-07-10 ENCOUNTER — HOSPITAL ENCOUNTER (OUTPATIENT)
Dept: INFUSION THERAPY | Age: 74
Setting detail: INFUSION SERIES
Discharge: HOME | End: 2019-07-10
Payer: MEDICARE

## 2019-07-10 VITALS
DIASTOLIC BLOOD PRESSURE: 58 MMHG | RESPIRATION RATE: 17 BRPM | TEMPERATURE: 98.6 F | HEART RATE: 58 BPM | SYSTOLIC BLOOD PRESSURE: 118 MMHG

## 2019-07-10 DIAGNOSIS — G61.81 CIDP (CHRONIC INFLAMMATORY DEMYELINATING POLYNEUROPATHY) (HCC): Primary | ICD-10-CM

## 2019-07-10 PROCEDURE — 96365 THER/PROPH/DIAG IV INF INIT: CPT

## 2019-07-10 PROCEDURE — 6360000002 HC RX W HCPCS: Performed by: PSYCHIATRY & NEUROLOGY

## 2019-07-10 PROCEDURE — 96366 THER/PROPH/DIAG IV INF ADDON: CPT

## 2019-07-10 RX ADMIN — IMMUNE GLOBULIN (HUMAN) 30 G: 10 INJECTION INTRAVENOUS; SUBCUTANEOUS at 09:42

## 2019-07-30 DIAGNOSIS — M05.79 RHEUMATOID ARTHRITIS INVOLVING MULTIPLE SITES WITH POSITIVE RHEUMATOID FACTOR (HCC): ICD-10-CM

## 2019-07-30 DIAGNOSIS — M48.062 SPINAL STENOSIS OF LUMBAR REGION WITH NEUROGENIC CLAUDICATION: ICD-10-CM

## 2019-07-30 NOTE — TELEPHONE ENCOUNTER
Requested Prescriptions     Pending Prescriptions Disp Refills    HYDROcodone-acetaminophen (NORCO)  MG per tablet 90 tablet 0     Sig: TAKE ONE TABLET BY MOUTH EVERY 6 HOURS AS NEEDED FOR PAIN . . .MAY CAUSE DROWSINESS!!        Last Office Visit:  2/25/2019  Next Office Visit:  8/29/2019  Last Medication Refill:  6/28/19  Johnathon Candelaria up to date:  7/28/19    *RX updated to reflect   7/30/19  fill date*

## 2019-07-31 RX ORDER — HYDROCODONE BITARTRATE AND ACETAMINOPHEN 10; 325 MG/1; MG/1
TABLET ORAL
Qty: 90 TABLET | Refills: 0 | Status: SHIPPED | OUTPATIENT
Start: 2019-07-31 | End: 2019-08-27

## 2019-08-19 ENCOUNTER — OFFICE VISIT (OUTPATIENT)
Dept: GASTROENTEROLOGY | Facility: CLINIC | Age: 74
End: 2019-08-19

## 2019-08-19 VITALS
BODY MASS INDEX: 25.87 KG/M2 | WEIGHT: 146 LBS | HEIGHT: 63 IN | DIASTOLIC BLOOD PRESSURE: 68 MMHG | OXYGEN SATURATION: 98 % | HEART RATE: 72 BPM | TEMPERATURE: 98 F | SYSTOLIC BLOOD PRESSURE: 120 MMHG

## 2019-08-19 DIAGNOSIS — K50.90 CROHN'S DISEASE WITHOUT COMPLICATION, UNSPECIFIED GASTROINTESTINAL TRACT LOCATION (HCC): Primary | ICD-10-CM

## 2019-08-19 PROCEDURE — 99213 OFFICE O/P EST LOW 20 MIN: CPT | Performed by: INTERNAL MEDICINE

## 2019-08-19 NOTE — PROGRESS NOTES
Chief Complaint   Patient presents with   • Colonoscopy     2-18-19 had colon was having diarrhea doing good       PCP: Derek Schmid MD  REFER: No ref. provider found    Subjective     Crohn's Disease   This is a chronic problem. The current episode started more than 1 year ago. The problem occurs rarely. The problem has been gradually improving. Pertinent negatives include no abdominal pain, anorexia, change in bowel habit, chest pain, coughing, fatigue, fever, joint swelling, myalgias, nausea, rash, sore throat, vomiting or weakness. Nothing aggravates the symptoms. Treatments tried: delzicol 3X/day. The treatment provided significant relief.       Past Medical History:   Diagnosis Date   • Arthritis    • Cancer (CMS/HCC)     endometrial cancer   • Crohn's disease (CMS/HCC)    • GERD (gastroesophageal reflux disease)    • History of transfusion    • Hypertension    • Macular degeneration    • Osteopenia    • Peripheral neuropathy        Past Surgical History:   Procedure Laterality Date   • BACK SURGERY  2010   • CARPAL TUNNEL RELEASE Bilateral    • CATARACT EXTRACTION Bilateral    • COLONOSCOPY  10/09/2015   • COLONOSCOPY N/A 12/28/2016    Procedure: COLONOSCOPY WITH ANESTHESIA;  Surgeon: Jose Raul Butt DO;  Location: [facility] ENDOSCOPY;  Service:    • COLONOSCOPY N/A 2/18/2019    Procedure: COLONOSCOPY WITH ANESTHESIA;  Surgeon: Jose Raul Butt DO;  Location: [facility] ENDOSCOPY;  Service: Gastroenterology   • CYST REMOVAL      from chest x2   • HYSTERECTOMY     • UPPER GASTROINTESTINAL ENDOSCOPY  08/31/2010       Outpatient Medications Marked as Taking for the 8/19/19 encounter (Office Visit) with Jose Raul Butt DO   Medication Sig Dispense Refill   • allopurinol (ZYLOPRIM) 100 MG tablet Take 100 mg by mouth 2 (Two) Times a Day.     • allopurinol (ZYLOPRIM) 300 MG tablet Take 300 mg by mouth Daily.     • amLODIPine (NORVASC) 10 MG tablet      • aspirin 81 MG EC tablet Take 81 mg by mouth Daily.     •  bromfenac sodium, Once-Daily, (BROMDAY) 0.09 % ophthalmic solution Administer 1 drop to both eyes Daily.     • calcium carbonate (OS-LUKE) 600 MG tablet Take 600 mg by mouth Daily.     • carvedilol (COREG) 6.25 MG tablet Take 6.25 mg by mouth 2 (Two) Times a Day With Meals.     • CRANBERRY PO Take 1 tablet by mouth Daily.     • Cyanocobalamin (VITAMIN B 12 PO) Take 1 tablet by mouth Daily.     • estrogens, conjugated, (PREMARIN) 0.3 MG tablet Take 0.3 mg by mouth 1 (One) Time Per Week. Twice weekly     • fenofibrate (TRICOR) 145 MG tablet      • folic acid (FOLVITE) 1 MG tablet Take 1 mg by mouth Daily.     • gabapentin (NEURONTIN) 300 MG capsule Take 300 mg by mouth 3 (Three) Times a Day. 2 pills tid     • Garlic Oil 1000 MG capsule Take 1,000 mg by mouth Daily.     • HYDROcodone-acetaminophen (NORCO) 7.5-325 MG per tablet      • indapamide (LOZOL) 2.5 MG tablet Take 2.5 mg by mouth Every Morning.     • lisinopril (PRINIVIL,ZESTRIL) 40 MG tablet Take 40 mg by mouth Daily.     • mesalamine (DELZICOL) 400 MG capsule delayed-release delayed release capsule Take 2 capsules by mouth 3 (Three) Times a Day. Two pills tid 360 capsule 3   • Multiple Vitamins-Minerals (MULTIVITAMIN ADULT PO) Take  by mouth.     • nitrofurantoin (MACRODANTIN) 50 MG capsule      • Omega-3 Fatty Acids (FISH OIL) 1000 MG capsule capsule Take 1,000 mg by mouth Daily With Breakfast.     • omeprazole (priLOSEC) 40 MG capsule Take 20 mg by mouth 2 (Two) Times a Day.     • oxybutynin XL (DITROPAN-XL) 10 MG 24 hr tablet Take 10 mg by mouth Daily.     • VESICARE 10 MG tablet      • vitamin C (ASCORBIC ACID) 250 MG tablet Take 250 mg by mouth Daily.         No Known Allergies    Social History     Socioeconomic History   • Marital status:      Spouse name: Not on file   • Number of children: Not on file   • Years of education: Not on file   • Highest education level: Not on file   Tobacco Use   • Smoking status: Never Smoker   • Smokeless tobacco:  "Never Used   Substance and Sexual Activity   • Alcohol use: No   • Drug use: No   • Sexual activity: Defer       Family History   Problem Relation Age of Onset   • Ovarian cancer Mother    • Heart attack Father    • Colon cancer Neg Hx    • Colon polyps Neg Hx    • Esophageal cancer Neg Hx    • Liver cancer Neg Hx    • Liver disease Neg Hx    • Rectal cancer Neg Hx    • Stomach cancer Neg Hx        Review of Systems   Constitutional: Negative for fatigue, fever and unexpected weight change.   HENT: Negative for hearing loss, sore throat and voice change.    Eyes: Negative for visual disturbance.   Respiratory: Negative for cough, shortness of breath and wheezing.    Cardiovascular: Negative for chest pain and palpitations.   Gastrointestinal: Negative for abdominal pain, anorexia, blood in stool, change in bowel habit, nausea and vomiting.   Endocrine: Negative for polydipsia and polyuria.   Genitourinary: Negative for difficulty urinating, dysuria, hematuria and urgency.   Musculoskeletal: Negative for joint swelling and myalgias.   Skin: Negative for color change, rash and wound.   Neurological: Negative for dizziness, tremors, seizures, syncope and weakness.   Hematological: Does not bruise/bleed easily.   Psychiatric/Behavioral: Negative for agitation and confusion. The patient is not nervous/anxious.        Objective     Vitals:    08/19/19 1333   BP: 120/68   Pulse: 72   Temp: 98 °F (36.7 °C)   SpO2: 98%   Weight: 66.2 kg (146 lb)   Height: 160 cm (63\")     Body mass index is 25.86 kg/m².    Physical Exam   Constitutional: She is oriented to person, place, and time. She appears well-developed and well-nourished.   HENT:   Head: Normocephalic and atraumatic.   Eyes: Conjunctivae are normal. Pupils are equal, round, and reactive to light. No scleral icterus.   Neck: No JVD present. No thyroid mass and no thyromegaly present.   Cardiovascular: Normal rate, regular rhythm and normal heart sounds. Exam reveals no " gallop and no friction rub.   No murmur heard.  Pulmonary/Chest: Effort normal and breath sounds normal. No accessory muscle usage. No respiratory distress. She has no wheezes. She has no rales.   Abdominal: Soft. Bowel sounds are normal. She exhibits no distension, no ascites and no mass. There is no splenomegaly or hepatomegaly. There is no tenderness. There is no rebound and no guarding.   Genitourinary:   Genitourinary Comments: Rectal-Did not examine   Musculoskeletal: Normal range of motion. She exhibits no edema.   Neurological: She is alert and oriented to person, place, and time.   Deemed a reliable historian, able to converse without difficulty and able to move all extremities without difficulty   Skin: Skin is warm and dry.   Psychiatric: She has a normal mood and affect. Her behavior is normal.       Imaging Results (most recent)     None          Body mass index is 25.86 kg/m².    Assessment/Plan     Deisi was seen today for colonoscopy.    Diagnoses and all orders for this visit:    Crohn's disease without complication, unspecified gastrointestinal tract location (CMS/HCC)    ov in 6 months  Continue current rx  Call if needed    * Surgery not found *    Patient's Body mass index is 25.86 kg/m². BMI is within normal parameters. No follow-up required..      There are no Patient Instructions on file for this visit.

## 2019-08-21 ENCOUNTER — HOSPITAL ENCOUNTER (OUTPATIENT)
Dept: INFUSION THERAPY | Age: 74
Setting detail: INFUSION SERIES
Discharge: HOME | End: 2019-08-21
Payer: MEDICARE

## 2019-08-21 VITALS
TEMPERATURE: 97.8 F | DIASTOLIC BLOOD PRESSURE: 70 MMHG | SYSTOLIC BLOOD PRESSURE: 149 MMHG | HEART RATE: 63 BPM | RESPIRATION RATE: 17 BRPM

## 2019-08-21 DIAGNOSIS — G61.81 CIDP (CHRONIC INFLAMMATORY DEMYELINATING POLYNEUROPATHY) (HCC): Primary | ICD-10-CM

## 2019-08-21 PROCEDURE — 96366 THER/PROPH/DIAG IV INF ADDON: CPT

## 2019-08-21 PROCEDURE — 6360000002 HC RX W HCPCS: Performed by: PSYCHIATRY & NEUROLOGY

## 2019-08-21 PROCEDURE — 96365 THER/PROPH/DIAG IV INF INIT: CPT

## 2019-08-21 RX ADMIN — IMMUNE GLOBULIN (HUMAN) 30 G: 10 INJECTION INTRAVENOUS; SUBCUTANEOUS at 09:40

## 2019-08-29 ENCOUNTER — OFFICE VISIT (OUTPATIENT)
Dept: NEUROLOGY | Age: 74
End: 2019-08-29
Payer: MEDICARE

## 2019-08-29 VITALS
BODY MASS INDEX: 23.95 KG/M2 | HEART RATE: 62 BPM | DIASTOLIC BLOOD PRESSURE: 64 MMHG | SYSTOLIC BLOOD PRESSURE: 117 MMHG | WEIGHT: 149 LBS | RESPIRATION RATE: 16 BRPM | HEIGHT: 66 IN

## 2019-08-29 DIAGNOSIS — G61.81 CIDP (CHRONIC INFLAMMATORY DEMYELINATING POLYNEUROPATHY) (HCC): Primary | ICD-10-CM

## 2019-08-29 DIAGNOSIS — G25.81 RESTLESS LEGS SYNDROME (RLS): ICD-10-CM

## 2019-08-29 DIAGNOSIS — M05.79 RHEUMATOID ARTHRITIS INVOLVING MULTIPLE SITES WITH POSITIVE RHEUMATOID FACTOR (HCC): ICD-10-CM

## 2019-08-29 DIAGNOSIS — M48.062 SPINAL STENOSIS OF LUMBAR REGION WITH NEUROGENIC CLAUDICATION: ICD-10-CM

## 2019-08-29 PROCEDURE — 99214 OFFICE O/P EST MOD 30 MIN: CPT | Performed by: PSYCHIATRY & NEUROLOGY

## 2019-08-29 RX ORDER — HYDROCODONE BITARTRATE AND ACETAMINOPHEN 10; 325 MG/1; MG/1
1 TABLET ORAL EVERY 8 HOURS PRN
COMMUNITY
End: 2019-08-29 | Stop reason: SDUPTHER

## 2019-08-29 RX ORDER — HYDROCODONE BITARTRATE AND ACETAMINOPHEN 10; 325 MG/1; MG/1
1 TABLET ORAL EVERY 8 HOURS PRN
Qty: 90 TABLET | Refills: 0 | Status: SHIPPED | OUTPATIENT
Start: 2019-08-29 | End: 2019-09-28 | Stop reason: SDUPTHER

## 2019-08-29 NOTE — PROGRESS NOTES
[facility]  [address] [place] [address] [address], [address] [facility] [address]  Manasa Burroughs  Phone [phone]  Fax [phone]     [facility] Follow Up Encounter  2019 11:50 AM    Information:   Patient Name: Rolan Mcnamara  :   [id number]  Age:   75 y.o. MRN:   [id number]  Account #:  [de-identified]  Today:  19    Provider: Juma Ramirez M.D. Chief Complaint:   Chief Complaint   Patient presents with    6 Month Follow-Up     CIDP       Subjective:   Rolan Mcnamara is a 75 y.o. woman with a history of CIDP, RA, and lumbar spinal stenosis s/p surgery but with chronic left L4 radiculopathy who is following up. She is getting epidural injections in her back and that helps her left leg burning pain.  Her legs are generally weak and she has to use a walker sometimes.  She has some numbness and tingling in her feel and lower legs and burning in the left thigh.  She has some numbness and tingling and weakness in the hands.  She is getting IVIG every 4 weeks.  She has been having cramps in her hands and the left thigh. One of her daughters recently  of cancer after a hard bonilla. Objective:     Past Medical History:  Past Medical History:   Diagnosis Date    Arthritis     Cancer ([address]     endometrial cancer    Cataract     CIDP (chronic inflammatory demyelinating polyneuropathy) (HCC)     Hypertension     Radiation        Past Surgical History:   Procedure Laterality Date    BACK SURGERY      CARPAL TUNNEL RELEASE Bilateral     CATARACT REMOVAL Bilateral     HYSTERECTOMY      KNEE ARTHROSCOPY Right        Recent Hospitalizations  · None    Significant Injuries  · None    Habits  Rolan Mcnamara reports that she has never smoked. She has never used smokeless tobacco. She reports that she does not drink alcohol or use drugs.     Family History   Problem Relation Age of Onset    Cancer Mother     High Blood Pressure Father     Heart Disease Father        Social History  Rolan Mcnamara every morning      lisinopril (PRINIVIL;ZESTRIL) 40 MG tablet Take 20 mg by mouth daily Indications: 1/2 PO QD       gabapentin (NEURONTIN) 300 MG capsule Take 600 mg by mouth 3 times daily       omeprazole (PRILOSEC) 20 MG capsule Take 20 mg by mouth 2 times daily       cyanocobalamin 1000 MCG tablet Inject 1,000 mcg into the skin every 30 days       amLODIPine (NORVASC) 10 MG tablet Take 10 mg by mouth daily       traMADol-acetaminophen (ULTRACET) 37.5-325 MG per tablet Take 2 tablets by mouth every 6 hours as needed for Pain       No current facility-administered medications for this visit. Allergies: Allergies as of 08/29/2019    (No Known Allergies)       Review of Systems:  General ROS: negative for - chills or fever  Psychological ROS: negative for - anxiety or depression  ENT ROS: negative for - headaches or sinus pain  Hematological and Lymphatic ROS: negative for - bleeding problems, bruising or swollen lymph nodes  Respiratory ROS: negative for - cough, hemoptysis or shortness of breath  Cardiovascular ROS: negative for - chest pain or palpitations  Gastrointestinal ROS: negative for - blood in stools, constipation, diarrhea or nausea/vomiting  Genito-Urinary ROS: negative for - hematuria or urinary frequency/urgency  Musculoskeletal ROS: positive for - joint pain, joint stiffness or joint swelling  Neurological ROS: positive for - numbness/tingling or weakness     Examination:  Vitals:  /64   Pulse 62   Resp 16   Ht 5' 6\" (1.676 m)   Wt 149 lb (67.6 kg)   BMI 24.05 kg/m²   General appearance: alert and cooperative with exam  HEENT: Sclera clear, anicteric, Oropharynx clear, no lesions, Neck supple with midline trachea, Thyroid without masses and Trachea midline. Atraumatic/normocephalic  Heart:: regular rate and rhythm, S1, S2 normal, no murmur, click, rub or gallop. No carotid bruits.   Lungs: clear to auscultation bilaterally  Extremities: extremities normal, atraumatic, no

## 2019-09-06 ENCOUNTER — OFFICE VISIT (OUTPATIENT)
Dept: UROLOGY | Age: 74
End: 2019-09-06
Payer: MEDICARE

## 2019-09-06 VITALS — BODY MASS INDEX: 24.11 KG/M2 | TEMPERATURE: 98.2 F | WEIGHT: 150 LBS | HEIGHT: 66 IN

## 2019-09-06 DIAGNOSIS — N30.01 ACUTE CYSTITIS WITH HEMATURIA: Primary | ICD-10-CM

## 2019-09-06 LAB
BACTERIA URINE, POC: ABNORMAL
BILIRUBIN URINE: 0 MG/DL
BLOOD, URINE: POSITIVE
CASTS URINE, POC: ABNORMAL
CLARITY: CLEAR
COLOR: YELLOW
CRYSTALS URINE, POC: ABNORMAL
EPI CELLS URINE, POC: ABNORMAL
GLUCOSE URINE: ABNORMAL
KETONES, URINE: NEGATIVE
LEUKOCYTE EST, POC: POSITIVE
NITRITE, URINE: NEGATIVE
PH UA: 5.5 (ref 4.5–8)
PROTEIN UA: NEGATIVE
RBC URINE, POC: ABNORMAL
SPECIFIC GRAVITY UA: 1.01 (ref 1–1.03)
UROBILINOGEN, URINE: NORMAL
WBC URINE, POC: ABNORMAL
YEAST URINE, POC: ABNORMAL

## 2019-09-06 PROCEDURE — 81001 URINALYSIS AUTO W/SCOPE: CPT | Performed by: NURSE PRACTITIONER

## 2019-09-06 PROCEDURE — 99213 OFFICE O/P EST LOW 20 MIN: CPT | Performed by: NURSE PRACTITIONER

## 2019-09-06 RX ORDER — SULFAMETHOXAZOLE AND TRIMETHOPRIM 800; 160 MG/1; MG/1
TABLET ORAL
Refills: 0 | COMMUNITY
Start: 2019-09-01 | End: 2020-08-03

## 2019-09-06 RX ORDER — SULFAMETHOXAZOLE AND TRIMETHOPRIM 800; 160 MG/1; MG/1
1 TABLET ORAL 2 TIMES DAILY
Qty: 8 TABLET | Refills: 0 | Status: SHIPPED | OUTPATIENT
Start: 2019-09-06 | End: 2019-09-10

## 2019-09-06 RX ORDER — CIPROFLOXACIN 500 MG/1
TABLET, FILM COATED ORAL
COMMUNITY
Start: 2019-08-23 | End: 2020-08-03

## 2019-09-06 RX ORDER — MONTELUKAST SODIUM 4 MG/1
1 TABLET, CHEWABLE ORAL 3 TIMES DAILY
COMMUNITY
Start: 2019-08-07

## 2019-09-18 ENCOUNTER — HOSPITAL ENCOUNTER (OUTPATIENT)
Dept: INFUSION THERAPY | Age: 74
Setting detail: INFUSION SERIES
Discharge: HOME | End: 2019-09-18
Payer: MEDICARE

## 2019-09-18 VITALS
RESPIRATION RATE: 18 BRPM | SYSTOLIC BLOOD PRESSURE: 120 MMHG | DIASTOLIC BLOOD PRESSURE: 69 MMHG | HEART RATE: 66 BPM | TEMPERATURE: 98 F

## 2019-09-18 DIAGNOSIS — G61.81 CIDP (CHRONIC INFLAMMATORY DEMYELINATING POLYNEUROPATHY) (HCC): Primary | ICD-10-CM

## 2019-09-18 PROCEDURE — 96365 THER/PROPH/DIAG IV INF INIT: CPT

## 2019-09-18 PROCEDURE — 6360000002 HC RX W HCPCS: Performed by: PSYCHIATRY & NEUROLOGY

## 2019-09-18 PROCEDURE — 96366 THER/PROPH/DIAG IV INF ADDON: CPT

## 2019-09-18 RX ADMIN — IMMUNE GLOBULIN (HUMAN) 30 G: 10 INJECTION INTRAVENOUS; SUBCUTANEOUS at 10:06

## 2019-09-27 DIAGNOSIS — G61.81 CIDP (CHRONIC INFLAMMATORY DEMYELINATING POLYNEUROPATHY) (HCC): ICD-10-CM

## 2019-09-27 DIAGNOSIS — M05.79 RHEUMATOID ARTHRITIS INVOLVING MULTIPLE SITES WITH POSITIVE RHEUMATOID FACTOR (HCC): ICD-10-CM

## 2019-09-27 DIAGNOSIS — M48.062 SPINAL STENOSIS OF LUMBAR REGION WITH NEUROGENIC CLAUDICATION: ICD-10-CM

## 2019-09-28 RX ORDER — HYDROCODONE BITARTRATE AND ACETAMINOPHEN 10; 325 MG/1; MG/1
1 TABLET ORAL EVERY 8 HOURS PRN
Qty: 90 TABLET | Refills: 0 | Status: SHIPPED | OUTPATIENT
Start: 2019-09-28 | End: 2019-10-01 | Stop reason: SDUPTHER

## 2019-09-30 DIAGNOSIS — G61.81 CIDP (CHRONIC INFLAMMATORY DEMYELINATING POLYNEUROPATHY) (HCC): ICD-10-CM

## 2019-09-30 DIAGNOSIS — M48.062 SPINAL STENOSIS OF LUMBAR REGION WITH NEUROGENIC CLAUDICATION: ICD-10-CM

## 2019-09-30 DIAGNOSIS — M05.79 RHEUMATOID ARTHRITIS INVOLVING MULTIPLE SITES WITH POSITIVE RHEUMATOID FACTOR (HCC): ICD-10-CM

## 2019-10-01 DIAGNOSIS — G61.81 CIDP (CHRONIC INFLAMMATORY DEMYELINATING POLYNEUROPATHY) (HCC): ICD-10-CM

## 2019-10-01 DIAGNOSIS — M48.062 SPINAL STENOSIS OF LUMBAR REGION WITH NEUROGENIC CLAUDICATION: ICD-10-CM

## 2019-10-01 DIAGNOSIS — M05.79 RHEUMATOID ARTHRITIS INVOLVING MULTIPLE SITES WITH POSITIVE RHEUMATOID FACTOR (HCC): ICD-10-CM

## 2019-10-01 RX ORDER — HYDROCODONE BITARTRATE AND ACETAMINOPHEN 10; 325 MG/1; MG/1
1 TABLET ORAL EVERY 8 HOURS PRN
Qty: 90 TABLET | Refills: 0 | Status: SHIPPED | OUTPATIENT
Start: 2019-10-01 | End: 2019-10-02 | Stop reason: SDUPTHER

## 2019-10-02 ENCOUNTER — TELEPHONE (OUTPATIENT)
Dept: NEUROLOGY | Age: 74
End: 2019-10-02

## 2019-10-02 RX ORDER — HYDROCODONE BITARTRATE AND ACETAMINOPHEN 10; 325 MG/1; MG/1
1 TABLET ORAL EVERY 8 HOURS PRN
Qty: 90 TABLET | Refills: 0 | Status: SHIPPED | OUTPATIENT
Start: 2019-10-02 | End: 2019-10-28 | Stop reason: SDUPTHER

## 2019-10-08 ENCOUNTER — OFFICE VISIT (OUTPATIENT)
Dept: UROLOGY | Age: 74
End: 2019-10-08
Payer: MEDICARE

## 2019-10-08 VITALS — WEIGHT: 154 LBS | TEMPERATURE: 97.9 F | HEIGHT: 66 IN | BODY MASS INDEX: 24.75 KG/M2

## 2019-10-08 DIAGNOSIS — N39.0 RECURRENT UTI: ICD-10-CM

## 2019-10-08 DIAGNOSIS — N30.01 ACUTE CYSTITIS WITH HEMATURIA: Primary | ICD-10-CM

## 2019-10-08 LAB
APPEARANCE FLUID: NORMAL
BILIRUBIN, POC: 0
BLOOD URINE, POC: NORMAL
CLARITY, POC: CLEAR
COLOR, POC: YELLOW
GLUCOSE URINE, POC: NORMAL
KETONES, POC: NORMAL
LEUKOCYTE EST, POC: NORMAL
NITRITE, POC: NORMAL
PH, POC: 5.5
PROTEIN, POC: NORMAL
SPECIFIC GRAVITY, POC: 1.01
UROBILINOGEN, POC: 0.2

## 2019-10-08 PROCEDURE — 99213 OFFICE O/P EST LOW 20 MIN: CPT | Performed by: NURSE PRACTITIONER

## 2019-10-08 PROCEDURE — 81002 URINALYSIS NONAUTO W/O SCOPE: CPT | Performed by: NURSE PRACTITIONER

## 2019-10-08 RX ORDER — SOLIFENACIN SUCCINATE 10 MG/1
TABLET, FILM COATED ORAL
COMMUNITY
Start: 2016-08-31 | End: 2020-08-03

## 2019-10-23 ENCOUNTER — HOSPITAL ENCOUNTER (OUTPATIENT)
Dept: INFUSION THERAPY | Age: 74
Setting detail: INFUSION SERIES
Discharge: HOME | End: 2019-10-23
Payer: MEDICARE

## 2019-10-23 VITALS
HEART RATE: 79 BPM | WEIGHT: 146 LBS | BODY MASS INDEX: 23.46 KG/M2 | RESPIRATION RATE: 18 BRPM | HEIGHT: 66 IN | SYSTOLIC BLOOD PRESSURE: 121 MMHG | TEMPERATURE: 97.7 F | DIASTOLIC BLOOD PRESSURE: 62 MMHG

## 2019-10-23 DIAGNOSIS — G61.81 CIDP (CHRONIC INFLAMMATORY DEMYELINATING POLYNEUROPATHY) (HCC): Primary | ICD-10-CM

## 2019-10-23 LAB
ALBUMIN SERPL-MCNC: 4.2 G/DL (ref 3.5–5.2)
ALP BLD-CCNC: 40 U/L (ref 35–104)
ALT SERPL-CCNC: 20 U/L (ref 5–33)
ANION GAP SERPL CALCULATED.3IONS-SCNC: 10 MMOL/L (ref 7–19)
AST SERPL-CCNC: 22 U/L (ref 5–32)
BASOPHILS ABSOLUTE: 0 K/UL (ref 0–0.2)
BASOPHILS RELATIVE PERCENT: 0.9 % (ref 0–1)
BILIRUB SERPL-MCNC: 0.3 MG/DL (ref 0.2–1.2)
BUN BLDV-MCNC: 33 MG/DL (ref 8–23)
CALCIUM SERPL-MCNC: 9.7 MG/DL (ref 8.8–10.2)
CHLORIDE BLD-SCNC: 104 MMOL/L (ref 98–111)
CO2: 28 MMOL/L (ref 22–29)
CREAT SERPL-MCNC: 0.8 MG/DL (ref 0.5–0.9)
EOSINOPHILS ABSOLUTE: 0.2 K/UL (ref 0–0.6)
EOSINOPHILS RELATIVE PERCENT: 3.3 % (ref 0–5)
GFR NON-AFRICAN AMERICAN: >60
GLUCOSE BLD-MCNC: 120 MG/DL (ref 74–109)
HCT VFR BLD CALC: 35.7 % (ref 37–47)
HEMOGLOBIN: 11.4 G/DL (ref 12–16)
IMMATURE GRANULOCYTES #: 0 K/UL
LYMPHOCYTES ABSOLUTE: 1.1 K/UL (ref 1.1–4.5)
LYMPHOCYTES RELATIVE PERCENT: 24.9 % (ref 20–40)
MCH RBC QN AUTO: 29.6 PG (ref 27–31)
MCHC RBC AUTO-ENTMCNC: 31.9 G/DL (ref 33–37)
MCV RBC AUTO: 92.7 FL (ref 81–99)
MONOCYTES ABSOLUTE: 0.6 K/UL (ref 0–0.9)
MONOCYTES RELATIVE PERCENT: 13.8 % (ref 0–10)
NEUTROPHILS ABSOLUTE: 2.6 K/UL (ref 1.5–7.5)
NEUTROPHILS RELATIVE PERCENT: 56.7 % (ref 50–65)
PDW BLD-RTO: 13.7 % (ref 11.5–14.5)
PLATELET # BLD: 221 K/UL (ref 130–400)
PMV BLD AUTO: 10.2 FL (ref 9.4–12.3)
POTASSIUM SERPL-SCNC: 4 MMOL/L (ref 3.5–5)
RBC # BLD: 3.85 M/UL (ref 4.2–5.4)
SODIUM BLD-SCNC: 142 MMOL/L (ref 136–145)
TOTAL PROTEIN: 7 G/DL (ref 6.6–8.7)
WBC # BLD: 4.6 K/UL (ref 4.8–10.8)

## 2019-10-23 PROCEDURE — 96366 THER/PROPH/DIAG IV INF ADDON: CPT

## 2019-10-23 PROCEDURE — 6360000002 HC RX W HCPCS: Performed by: PSYCHIATRY & NEUROLOGY

## 2019-10-23 PROCEDURE — 80053 COMPREHEN METABOLIC PANEL: CPT

## 2019-10-23 PROCEDURE — 96365 THER/PROPH/DIAG IV INF INIT: CPT

## 2019-10-23 PROCEDURE — 85025 COMPLETE CBC W/AUTO DIFF WBC: CPT

## 2019-10-23 RX ORDER — DIPHENHYDRAMINE HYDROCHLORIDE 50 MG/ML
50 INJECTION INTRAMUSCULAR; INTRAVENOUS ONCE
Status: DISCONTINUED | OUTPATIENT
Start: 2019-10-23 | End: 2019-10-25 | Stop reason: HOSPADM

## 2019-10-23 RX ORDER — ACETAMINOPHEN 325 MG/1
650 TABLET ORAL ONCE
Status: CANCELLED | OUTPATIENT
Start: 2019-11-20

## 2019-10-23 RX ORDER — DIPHENHYDRAMINE HYDROCHLORIDE 50 MG/ML
50 INJECTION INTRAMUSCULAR; INTRAVENOUS
Status: CANCELLED | OUTPATIENT
Start: 2019-11-20

## 2019-10-23 RX ORDER — ACETAMINOPHEN 325 MG/1
650 TABLET ORAL ONCE
Status: DISCONTINUED | OUTPATIENT
Start: 2019-10-23 | End: 2019-10-25 | Stop reason: HOSPADM

## 2019-10-23 RX ORDER — DIPHENHYDRAMINE HYDROCHLORIDE 50 MG/ML
50 INJECTION INTRAMUSCULAR; INTRAVENOUS
Status: ACTIVE | OUTPATIENT
Start: 2019-10-23 | End: 2019-10-23

## 2019-10-23 RX ORDER — DIPHENHYDRAMINE HYDROCHLORIDE 50 MG/ML
50 INJECTION INTRAMUSCULAR; INTRAVENOUS ONCE
Status: CANCELLED
Start: 2019-11-20

## 2019-10-23 RX ADMIN — IMMUNE GLOBULIN (HUMAN) 30 G: 10 INJECTION INTRAVENOUS; SUBCUTANEOUS at 10:43

## 2019-10-24 ENCOUNTER — TELEPHONE (OUTPATIENT)
Dept: NEUROLOGY | Age: 74
End: 2019-10-24

## 2019-10-28 DIAGNOSIS — M48.062 SPINAL STENOSIS OF LUMBAR REGION WITH NEUROGENIC CLAUDICATION: ICD-10-CM

## 2019-10-28 DIAGNOSIS — G61.81 CIDP (CHRONIC INFLAMMATORY DEMYELINATING POLYNEUROPATHY) (HCC): ICD-10-CM

## 2019-10-28 DIAGNOSIS — M05.79 RHEUMATOID ARTHRITIS INVOLVING MULTIPLE SITES WITH POSITIVE RHEUMATOID FACTOR (HCC): ICD-10-CM

## 2019-10-29 RX ORDER — HYDROCODONE BITARTRATE AND ACETAMINOPHEN 10; 325 MG/1; MG/1
1 TABLET ORAL EVERY 8 HOURS PRN
Qty: 90 TABLET | Refills: 0 | Status: SHIPPED | OUTPATIENT
Start: 2019-10-29 | End: 2019-11-23 | Stop reason: SDUPTHER

## 2019-10-31 ENCOUNTER — TRANSCRIBE ORDERS (OUTPATIENT)
Dept: ADMINISTRATIVE | Facility: HOSPITAL | Age: 74
End: 2019-10-31

## 2019-10-31 DIAGNOSIS — M54.50 LOW BACK PAIN, UNSPECIFIED BACK PAIN LATERALITY, UNSPECIFIED CHRONICITY, UNSPECIFIED WHETHER SCIATICA PRESENT: Primary | ICD-10-CM

## 2019-11-01 RX ORDER — MONTELUKAST SODIUM 4 MG/1
TABLET, CHEWABLE ORAL
Qty: 270 TABLET | Refills: 4 | Status: SHIPPED | OUTPATIENT
Start: 2019-11-01 | End: 2021-05-27 | Stop reason: SDUPTHER

## 2019-11-06 ENCOUNTER — TRANSCRIBE ORDERS (OUTPATIENT)
Dept: ADMINISTRATIVE | Facility: HOSPITAL | Age: 74
End: 2019-11-06

## 2019-11-06 DIAGNOSIS — M54.50 LOW BACK PAIN, UNSPECIFIED BACK PAIN LATERALITY, UNSPECIFIED CHRONICITY, UNSPECIFIED WHETHER SCIATICA PRESENT: Primary | ICD-10-CM

## 2019-11-11 ENCOUNTER — HOSPITAL ENCOUNTER (OUTPATIENT)
Dept: MRI IMAGING | Facility: HOSPITAL | Age: 74
Discharge: HOME | End: 2019-11-11

## 2019-11-11 ENCOUNTER — HOSPITAL ENCOUNTER (OUTPATIENT)
Dept: CT IMAGING | Facility: HOSPITAL | Age: 74
Discharge: HOME | End: 2019-11-11
Admitting: ORTHOPAEDIC SURGERY

## 2019-11-11 DIAGNOSIS — M54.50 LOW BACK PAIN, UNSPECIFIED BACK PAIN LATERALITY, UNSPECIFIED CHRONICITY, UNSPECIFIED WHETHER SCIATICA PRESENT: ICD-10-CM

## 2019-11-11 PROCEDURE — 72148 MRI LUMBAR SPINE W/O DYE: CPT

## 2019-11-11 PROCEDURE — 72131 CT LUMBAR SPINE W/O DYE: CPT

## 2019-11-21 ENCOUNTER — HOSPITAL ENCOUNTER (OUTPATIENT)
Dept: INFUSION THERAPY | Age: 74
End: 2019-11-21

## 2019-11-23 DIAGNOSIS — M05.79 RHEUMATOID ARTHRITIS INVOLVING MULTIPLE SITES WITH POSITIVE RHEUMATOID FACTOR (HCC): ICD-10-CM

## 2019-11-23 DIAGNOSIS — G61.81 CIDP (CHRONIC INFLAMMATORY DEMYELINATING POLYNEUROPATHY) (HCC): ICD-10-CM

## 2019-11-23 DIAGNOSIS — M48.062 SPINAL STENOSIS OF LUMBAR REGION WITH NEUROGENIC CLAUDICATION: ICD-10-CM

## 2019-11-25 RX ORDER — HYDROCODONE BITARTRATE AND ACETAMINOPHEN 10; 325 MG/1; MG/1
1 TABLET ORAL EVERY 8 HOURS PRN
Qty: 90 TABLET | Refills: 0 | Status: SHIPPED | OUTPATIENT
Start: 2019-11-28 | End: 2019-12-20 | Stop reason: SDUPTHER

## 2019-12-20 DIAGNOSIS — G61.81 CIDP (CHRONIC INFLAMMATORY DEMYELINATING POLYNEUROPATHY) (HCC): ICD-10-CM

## 2019-12-20 DIAGNOSIS — M05.79 RHEUMATOID ARTHRITIS INVOLVING MULTIPLE SITES WITH POSITIVE RHEUMATOID FACTOR (HCC): ICD-10-CM

## 2019-12-20 DIAGNOSIS — M48.062 SPINAL STENOSIS OF LUMBAR REGION WITH NEUROGENIC CLAUDICATION: ICD-10-CM

## 2019-12-20 RX ORDER — HYDROCODONE BITARTRATE AND ACETAMINOPHEN 10; 325 MG/1; MG/1
1 TABLET ORAL EVERY 8 HOURS PRN
Qty: 90 TABLET | Refills: 0 | Status: SHIPPED | OUTPATIENT
Start: 2019-12-28 | End: 2019-12-30 | Stop reason: SDUPTHER

## 2019-12-28 ENCOUNTER — PATIENT MESSAGE (OUTPATIENT)
Dept: NEUROLOGY | Age: 74
End: 2019-12-28

## 2019-12-28 DIAGNOSIS — M05.79 RHEUMATOID ARTHRITIS INVOLVING MULTIPLE SITES WITH POSITIVE RHEUMATOID FACTOR (HCC): ICD-10-CM

## 2019-12-28 DIAGNOSIS — M48.062 SPINAL STENOSIS OF LUMBAR REGION WITH NEUROGENIC CLAUDICATION: ICD-10-CM

## 2019-12-28 DIAGNOSIS — G61.81 CIDP (CHRONIC INFLAMMATORY DEMYELINATING POLYNEUROPATHY) (HCC): ICD-10-CM

## 2019-12-30 RX ORDER — HYDROCODONE BITARTRATE AND ACETAMINOPHEN 10; 325 MG/1; MG/1
1 TABLET ORAL EVERY 8 HOURS PRN
Qty: 90 TABLET | Refills: 0 | Status: SHIPPED | OUTPATIENT
Start: 2019-12-30 | End: 2020-06-27 | Stop reason: SDUPTHER

## 2020-01-10 ENCOUNTER — TELEPHONE (OUTPATIENT)
Dept: NEUROLOGY | Age: 75
End: 2020-01-10

## 2020-01-15 ENCOUNTER — HOSPITAL ENCOUNTER (OUTPATIENT)
Dept: INFUSION THERAPY | Age: 75
Setting detail: INFUSION SERIES
Discharge: HOME | End: 2020-01-15
Payer: MEDICARE

## 2020-01-15 VITALS
HEART RATE: 60 BPM | OXYGEN SATURATION: 92 % | SYSTOLIC BLOOD PRESSURE: 110 MMHG | BODY MASS INDEX: 23.3 KG/M2 | DIASTOLIC BLOOD PRESSURE: 59 MMHG | RESPIRATION RATE: 17 BRPM | TEMPERATURE: 97.6 F | WEIGHT: 145 LBS | HEIGHT: 66 IN

## 2020-01-15 DIAGNOSIS — G61.81 CIDP (CHRONIC INFLAMMATORY DEMYELINATING POLYNEUROPATHY) (HCC): Primary | ICD-10-CM

## 2020-01-15 PROCEDURE — 96366 THER/PROPH/DIAG IV INF ADDON: CPT

## 2020-01-15 PROCEDURE — 6360000002 HC RX W HCPCS: Performed by: PSYCHIATRY & NEUROLOGY

## 2020-01-15 PROCEDURE — 96365 THER/PROPH/DIAG IV INF INIT: CPT

## 2020-01-15 RX ORDER — DIPHENHYDRAMINE HYDROCHLORIDE 50 MG/ML
50 INJECTION INTRAMUSCULAR; INTRAVENOUS
Status: CANCELLED | OUTPATIENT
Start: 2020-02-12

## 2020-01-15 RX ORDER — DIPHENHYDRAMINE HYDROCHLORIDE 50 MG/ML
50 INJECTION INTRAMUSCULAR; INTRAVENOUS ONCE
Status: CANCELLED
Start: 2020-02-12

## 2020-01-15 RX ORDER — ACETAMINOPHEN 325 MG/1
650 TABLET ORAL ONCE
Status: CANCELLED | OUTPATIENT
Start: 2020-02-12

## 2020-01-15 RX ADMIN — IMMUNE GLOBULIN (HUMAN) 30 G: 10 INJECTION INTRAVENOUS; SUBCUTANEOUS at 09:00

## 2020-02-04 ENCOUNTER — HOSPITAL ENCOUNTER (OUTPATIENT)
Dept: GENERAL RADIOLOGY | Facility: HOSPITAL | Age: 75
Discharge: HOME | End: 2020-02-04
Admitting: ORTHOPAEDIC SURGERY

## 2020-02-04 ENCOUNTER — APPOINTMENT (OUTPATIENT)
Dept: PREADMISSION TESTING | Facility: HOSPITAL | Age: 75
End: 2020-02-04

## 2020-02-04 VITALS
DIASTOLIC BLOOD PRESSURE: 54 MMHG | HEART RATE: 79 BPM | HEIGHT: 64 IN | BODY MASS INDEX: 25.56 KG/M2 | WEIGHT: 149.69 LBS | OXYGEN SATURATION: 95 % | SYSTOLIC BLOOD PRESSURE: 145 MMHG | RESPIRATION RATE: 18 BRPM

## 2020-02-04 LAB
ALBUMIN SERPL-MCNC: 4.2 G/DL (ref 3.5–5.2)
ALBUMIN/GLOB SERPL: 1.5 G/DL
ALP SERPL-CCNC: 42 U/L (ref 39–117)
ALT SERPL W P-5'-P-CCNC: 23 U/L (ref 1–33)
ANION GAP SERPL CALCULATED.3IONS-SCNC: 11 MMOL/L (ref 5–15)
APTT PPP: 23.6 SECONDS (ref 24.1–35)
AST SERPL-CCNC: 20 U/L (ref 1–32)
BACTERIA UR QL AUTO: ABNORMAL /HPF
BASOPHILS # BLD AUTO: 0.04 10*3/MM3 (ref 0–0.2)
BASOPHILS NFR BLD AUTO: 0.6 % (ref 0–1.5)
BILIRUB SERPL-MCNC: 0.3 MG/DL (ref 0.2–1.2)
BILIRUB UR QL STRIP: NEGATIVE
BUN BLD-MCNC: 30 MG/DL (ref 8–23)
BUN/CREAT SERPL: 34.5 (ref 7–25)
CALCIUM SPEC-SCNC: 9.5 MG/DL (ref 8.6–10.5)
CHLORIDE SERPL-SCNC: 101 MMOL/L (ref 98–107)
CLARITY UR: ABNORMAL
CO2 SERPL-SCNC: 31 MMOL/L (ref 22–29)
COLOR UR: YELLOW
CREAT BLD-MCNC: 0.87 MG/DL (ref 0.57–1)
DEPRECATED RDW RBC AUTO: 44.4 FL (ref 37–54)
EOSINOPHIL # BLD AUTO: 0.13 10*3/MM3 (ref 0–0.4)
EOSINOPHIL NFR BLD AUTO: 1.8 % (ref 0.3–6.2)
ERYTHROCYTE [DISTWIDTH] IN BLOOD BY AUTOMATED COUNT: 14 % (ref 12.3–15.4)
GFR SERPL CREATININE-BSD FRML MDRD: 63 ML/MIN/1.73
GLOBULIN UR ELPH-MCNC: 2.8 GM/DL
GLUCOSE BLD-MCNC: 103 MG/DL (ref 65–99)
GLUCOSE UR STRIP-MCNC: NEGATIVE MG/DL
HCT VFR BLD AUTO: 35.9 % (ref 34–46.6)
HGB BLD-MCNC: 11.7 G/DL (ref 12–15.9)
HGB UR QL STRIP.AUTO: ABNORMAL
HYALINE CASTS UR QL AUTO: ABNORMAL /LPF
IMM GRANULOCYTES # BLD AUTO: 0.04 10*3/MM3 (ref 0–0.05)
IMM GRANULOCYTES NFR BLD AUTO: 0.6 % (ref 0–0.5)
INR PPP: 0.94 (ref 0.91–1.09)
KETONES UR QL STRIP: NEGATIVE
LEUKOCYTE ESTERASE UR QL STRIP.AUTO: ABNORMAL
LYMPHOCYTES # BLD AUTO: 1.54 10*3/MM3 (ref 0.7–3.1)
LYMPHOCYTES NFR BLD AUTO: 21.8 % (ref 19.6–45.3)
MCH RBC QN AUTO: 28.5 PG (ref 26.6–33)
MCHC RBC AUTO-ENTMCNC: 32.6 G/DL (ref 31.5–35.7)
MCV RBC AUTO: 87.6 FL (ref 79–97)
MONOCYTES # BLD AUTO: 1 10*3/MM3 (ref 0.1–0.9)
MONOCYTES NFR BLD AUTO: 14.2 % (ref 5–12)
NEUTROPHILS # BLD AUTO: 4.31 10*3/MM3 (ref 1.7–7)
NEUTROPHILS NFR BLD AUTO: 61 % (ref 42.7–76)
NITRITE UR QL STRIP: NEGATIVE
NRBC BLD AUTO-RTO: 0 /100 WBC (ref 0–0.2)
PH UR STRIP.AUTO: <=5 [PH] (ref 5–8)
PLATELET # BLD AUTO: 234 10*3/MM3 (ref 140–450)
PMV BLD AUTO: 10.6 FL (ref 6–12)
POTASSIUM BLD-SCNC: 4 MMOL/L (ref 3.5–5.2)
PROT SERPL-MCNC: 7 G/DL (ref 6–8.5)
PROT UR QL STRIP: ABNORMAL
PROTHROMBIN TIME: 12.8 SECONDS (ref 11.9–14.6)
RBC # BLD AUTO: 4.1 10*6/MM3 (ref 3.77–5.28)
RBC # UR: ABNORMAL /HPF
REF LAB TEST METHOD: ABNORMAL
SODIUM BLD-SCNC: 143 MMOL/L (ref 136–145)
SP GR UR STRIP: 1.01 (ref 1–1.03)
SQUAMOUS #/AREA URNS HPF: ABNORMAL /HPF
UROBILINOGEN UR QL STRIP: ABNORMAL
WBC NRBC COR # BLD: 7.06 10*3/MM3 (ref 3.4–10.8)
WBC UR QL AUTO: ABNORMAL /HPF
YEAST URNS QL MICRO: ABNORMAL /HPF

## 2020-02-04 PROCEDURE — 93005 ELECTROCARDIOGRAM TRACING: CPT

## 2020-02-04 PROCEDURE — 85025 COMPLETE CBC W/AUTO DIFF WBC: CPT | Performed by: ORTHOPAEDIC SURGERY

## 2020-02-04 PROCEDURE — 87086 URINE CULTURE/COLONY COUNT: CPT | Performed by: ORTHOPAEDIC SURGERY

## 2020-02-04 PROCEDURE — 93010 ELECTROCARDIOGRAM REPORT: CPT | Performed by: INTERNAL MEDICINE

## 2020-02-04 PROCEDURE — 85730 THROMBOPLASTIN TIME PARTIAL: CPT | Performed by: ORTHOPAEDIC SURGERY

## 2020-02-04 PROCEDURE — 85610 PROTHROMBIN TIME: CPT | Performed by: ORTHOPAEDIC SURGERY

## 2020-02-04 PROCEDURE — 36415 COLL VENOUS BLD VENIPUNCTURE: CPT

## 2020-02-04 PROCEDURE — 71046 X-RAY EXAM CHEST 2 VIEWS: CPT

## 2020-02-04 PROCEDURE — 81001 URINALYSIS AUTO W/SCOPE: CPT | Performed by: ORTHOPAEDIC SURGERY

## 2020-02-04 PROCEDURE — 80053 COMPREHEN METABOLIC PANEL: CPT | Performed by: ORTHOPAEDIC SURGERY

## 2020-02-04 RX ORDER — LIDOCAINE 50 MG/G
1 OINTMENT TOPICAL
COMMUNITY

## 2020-02-04 NOTE — DISCHARGE INSTRUCTIONS
DAY OF SURGERY INSTRUCTIONS        YOUR SURGEON: THONY ROSADO    PROCEDURE: LATERAL LUMBAR INTERBODY FUSION LEFT LUMBAR 1-2 WITH INSTRUMENTATION, RIGHT LUMBAR 2-3    DATE OF SURGERY: February 17, 2020    ARRIVAL TIME: AS DIRECTED BY OFFICE    YOU MAY TAKE THE FOLLOWING MEDICATION(S) THE MORNING OF SURGERY WITH A SIP OF WATER: CARVEDILOL, GABAPENTIN, NORCO    DO NOT TAKE LISINOPRIL FOR 24 HOURS PRIOR TO SURGERY      ALL OTHER HOME MEDICATION CHECK WITH YOUR PHYSICIAN                MANAGING PAIN AFTER SURGERY    We know you are probably wondering what your pain will be like after surgery.  Following surgery it is unrealistic to expect you will not have pain.   Pain is how our bodies let us know that something is wrong or cautions us to be careful.  That said, our goal is to make your pain tolerable.    Methods we may use to treat your pain include (oral or IV medications, PCAs, epidurals, nerve blocks, etc.)   While some procedures require IV pain medications for a short time after surgery, transitioning to pain medications by mouth allows for better management of pain.   Your nurse will encourage you to take oral pain medications whenever possible.  IV medications work almost immediately, but only last a short while.  Taking medications by mouth allows for a more constant level of medication in your blood stream for a longer period of time.      Once your pain is out of control it is harder to get back under control.  It is important you are aware when your next dose of pain medication is due.  If you are admitted, your nurse may write the time of your next dose on the white board in your room to help you remember.      We are interested in your pain and encourage you to inform us about aggravating factors during your visit.   Many times a simple repositioning every few hours can make a big difference.    If your physician says it is okay, do not let your pain prevent you from getting out of bed. Be sure to  call your nurse for assistance prior to getting up so you do not fall.      Before surgery, please decide your tolerable pain goal.  These faces help describe the pain ratings we use on a 0-10 scale.   Be prepared to tell us your goal and whether or not you take pain or anxiety medications at home.          BEFORE YOU COME TO THE HOSPITAL  (Pre-op instructions)  • Do not eat, drink, smoke or chew gum after midnight the night before surgery.  This also includes no mints.  • Morning of surgery take only the medicines you have been instructed with a sip of water unless otherwise instructed  by your physician.  • Do not shave, wear makeup or dark nail polish.  • Remove all jewelry including rings.  • Leave anything you consider valuable at home.  • Leave your suitcase in the car until after your surgery.  • Bring the following with you if applicable:  o Picture ID and insurance, Medicare or Medicaid cards  o Co-pay/deductible required by insurance (cash, check, credit card)  o Copy of advance directive, living will or power-of- documents if not brought to PAT  o CPAP or BIPAP mask and tubing  o Relaxation aids ( book, magazine), etc.  o Hearing aids                        ON THE DAY OF SURGERY  · On the day of surgery check in at registration located at the main entrance of the hospital.   ? You will be registered and given a beeper with instructions where to wait in the main lobby.  ? When your beeper lights up and vibrates a member of the Outpatient Surgery staff will meet you at the double doors under the stair steps and escort you to your preoperative room.   · You may have cloth compression devices placed on your legs. These help to prevent blood clots and reduce swelling in your legs.  · An IV may be inserted into one of your veins.  · In the operating room, you may be given one or more of the following:  ? A medicine to help you relax (sedative).  ? A medicine to numb the area (local anesthetic).  ? A  "medicine to make you fall asleep (general anesthetic).  ? A medicine that is injected into an area of your body to numb everything below the injection site (regional anesthetic).  · Your surgical site will be marked or identified.  · You may be given an antibiotic through your IV to help prevent infection.  Contact a health care provider if you:  · Develop a fever of more than 100.4°F (38°C) or other feelings of illness during the 48 hours before your surgery.  · Have symptoms that get worse.  Have questions or concerns about your surgery    General Anesthesia/Surgery, Adult  General anesthesia is the use of medicines to make a person \"go to sleep\" (unconscious) for a medical procedure. General anesthesia must be used for certain procedures, and is often recommended for procedures that:  · Last a long time.  · Require you to be still or in an unusual position.  · Are major and can cause blood loss.  The medicines used for general anesthesia are called general anesthetics. As well as making you unconscious for a certain amount of time, these medicines:  · Prevent pain.  · Control your blood pressure.  · Relax your muscles.  Tell a health care provider about:  · Any allergies you have.  · All medicines you are taking, including vitamins, herbs, eye drops, creams, and over-the-counter medicines.  · Any problems you or family members have had with anesthetic medicines.  · Types of anesthetics you have had in the past.  · Any blood disorders you have.  · Any surgeries you have had.  · Any medical conditions you have.  · Any recent upper respiratory, chest, or ear infections.  · Any history of:  ? Heart or lung conditions, such as heart failure, sleep apnea, asthma, or chronic obstructive pulmonary disease (COPD).  ?  service.  ? Depression or anxiety.  · Any tobacco or drug use, including marijuana or alcohol use.  · Whether you are pregnant or may be pregnant.  What are the risks?  Generally, this is a safe " procedure. However, problems may occur, including:  · Allergic reaction.  · Lung and heart problems.  · Inhaling food or liquid from the stomach into the lungs (aspiration).  · Nerve injury.  · Air in the bloodstream, which can lead to stroke.  · Extreme agitation or confusion (delirium) when you wake up from the anesthetic.  · Waking up during your procedure and being unable to move. This is rare.  These problems are more likely to develop if you are having a major surgery or if you have an advanced or serious medical condition. You can prevent some of these complications by answering all of your health care provider's questions thoroughly and by following all instructions before your procedure.  General anesthesia can cause side effects, including:  · Nausea or vomiting.  · A sore throat from the breathing tube.  · Hoarseness.  · Wheezing or coughing.  · Shaking chills.  · Tiredness.  · Body aches.  · Anxiety.  · Sleepiness or drowsiness.  · Confusion or agitation.  RISKS AND COMPLICATIONS OF SURGERY  Your health care provider will discuss possible risks and complications with you before surgery. Common risks and complications include:    · Problems due to the use of anesthetics.  · Blood loss and replacement (does not apply to minor surgical procedures).  · Temporary increase in pain due to surgery.  · Uncorrected pain or problems that the surgery was meant to correct.  · Infection.  · New damage.    What happens before the procedure?    Medicines  Ask your health care provider about:  · Changing or stopping your regular medicines. This is especially important if you are taking diabetes medicines or blood thinners.  · Taking medicines such as aspirin and ibuprofen. These medicines can thin your blood. Do not take these medicines unless your health care provider tells you to take them.  · Taking over-the-counter medicines, vitamins, herbs, and supplements. Do not take these during the week before your procedure  unless your health care provider approves them.  General instructions  · Starting 3-6 weeks before the procedure, do not use any products that contain nicotine or tobacco, such as cigarettes and e-cigarettes. If you need help quitting, ask your health care provider.  · If you brush your teeth on the morning of the procedure, make sure to spit out all of the toothpaste.  · Tell your health care provider if you become ill or develop a cold, cough, or fever.  · If instructed by your health care provider, bring your sleep apnea device with you on the day of your surgery (if applicable).  · Ask your health care provider if you will be going home the same day, the following day, or after a longer hospital stay.  ? Plan to have someone take you home from the hospital or clinic.  ? Plan to have a responsible adult care for you for at least 24 hours after you leave the hospital or clinic. This is important.  What happens during the procedure?  · You will be given anesthetics through both of the following:  ? A mask placed over your nose and mouth.  ? An IV in one of your veins.  · You may receive a medicine to help you relax (sedative).  · After you are unconscious, a breathing tube may be inserted down your throat to help you breathe. This will be removed before you wake up.  · An anesthesia specialist will stay with you throughout your procedure. He or she will:  ? Keep you comfortable and safe by continuing to give you medicines and adjusting the amount of medicine that you get.  ? Monitor your blood pressure, pulse, and oxygen levels to make sure that the anesthetics do not cause any problems.  The procedure may vary among health care providers and hospitals.  What happens after the procedure?  · Your blood pressure, temperature, heart rate, breathing rate, and blood oxygen level will be monitored until the medicines you were given have worn off.  · You will wake up in a recovery area. You may wake up slowly.  · If you  feel anxious or agitated, you may be given medicine to help you calm down.  · If you will be going home the same day, your health care provider may check to make sure you can walk, drink, and urinate.  · Your health care provider will treat any pain or side effects you have before you go home.  · Do not drive for 24 hours if you were given a sedative.  Summary  · General anesthesia is used to keep you still and prevent pain during a procedure.  · It is important to tell your healthcare provider about your medical history and any surgeries you have had, and previous experience with anesthesia.  · Follow your healthcare provider’s instructions about when to stop eating, drinking, or taking certain medicines before your procedure.  · Plan to have someone take you home from the hospital or clinic.  This information is not intended to replace advice given to you by your health care provider. Make sure you discuss any questions you have with your health care provider.  Document Released: 03/26/2009 Document Revised: 08/03/2018 Document Reviewed: 08/03/2018  [employer] Interactive Patient Education © 2019 [employer]       Fall Prevention in Hospitals, Adult  As a hospital patient, your condition and the treatments you receive can increase your risk for falls. Some additional risk factors for falls in a hospital include:  · Being in an unfamiliar environment.  · Being on bed rest.  · Your surgery.  · Taking certain medicines.  · Your tubing requirements, such as intravenous (IV) therapy or catheters.  It is important that you learn how to decrease fall risks while at the hospital. Below are important tips that can help prevent falls.  SAFETY TIPS FOR PREVENTING FALLS  Talk about your risk of falling.  · Ask your health care provider why you are at risk for falling. Is it your medicine, illness, tubing placement, or something else?  · Make a plan with your health care provider to keep you safe from falls.  · Ask your health  care provider or pharmacist about side effects of your medicines. Some medicines can make you dizzy or affect your coordination.  Ask for help.  · Ask for help before getting out of bed. You may need to press your call button.  · Ask for assistance in getting safely to the toilet.  · Ask for a walker or cane to be put at your bedside. Ask that most of the side rails on your bed be placed up before your health care provider leaves the room.  · Ask family or friends to sit with you.  · Ask for things that are out of your reach, such as your glasses, hearing aids, telephone, bedside table, or call button.  Follow these tips to avoid falling:  · Stay lying or seated, rather than standing, while waiting for help.  · Wear rubber-soled slippers or shoes whenever you walk in the hospital.  · Avoid quick, sudden movements.  ¨ Change positions slowly.  ¨ Sit on the side of your bed before standing.  ¨ Stand up slowly and wait before you start to walk.  · Let your health care provider know if there is a spill on the floor.  · Pay careful attention to the medical equipment, electrical cords, and tubes around you.  · When you need help, use your call button by your bed or in the bathroom. Wait for one of your health care providers to help you.  · If you feel dizzy or unsure of your footing, return to bed and wait for assistance.  · Avoid being distracted by the TV, telephone, or another person in your room.  · Do not lean or support yourself on rolling objects, such as IV poles or bedside tables.     This information is not intended to replace advice given to you by your health care provider. Make sure you discuss any questions you have with your health care provider.     Document Released: 12/15/2001 Document Revised: 01/08/2016 Document Reviewed: 08/25/2013  [employer] Interactive Patient Education ©2016 [employer]       [facility] 4% Patient Instruction Sheet    Chlorhexidine Before Surgery  Chlorhexidine  gluconate (CHG) is a germ-killing (antiseptic) solution that is used to clean the skin. It gets rid of the bacteria that normally live on the skin. Cleaning your skin with CHG before surgery helps lower the risk for infection after surgery.    How to use CHG solution  · You will take 2 showers, one shower the night before surgery, the second shower the morning of surgery before coming to the hospital.  · Use CHG only as told by your health care provider, and follow the instructions on the label.  · Use CHG solution while taking a shower. Follow these steps when using CHG solution (unless your health care provider gives you different instructions):  1. Start the shower.  2. Use your normal soap and shampoo to wash your face and hair.  3. Turn off the shower or move out of the shower stream.  4. Pour the CHG onto a clean washcloth. Do not use any type of brush or rough-edged sponge.  5. Starting at your neck, lather your body down to your toes. Make sure you:  6. Pay special attention to the part of your body where you will be having surgery. Scrub this area for at least 1 minute.  7. Use the full amount of CHG as directed. Usually, this is one half bottle for each shower.  8. Do not use CHG on your head or face. If the solution gets into your ears or eyes, rinse them well with water.  9. Avoid your genital area.  10. Avoid any areas of skin that have broken skin, cuts, or scrapes.  11. Scrub your back and under your arms. Make sure to wash skin folds.  12. Let the lather sit on your skin for 1-2 minutes or as long as told by your health care  provider.  13. Thoroughly rinse your entire body in the shower. Make sure that all body creases and crevices are rinsed well.  14. Dry off with a clean towel. Do not put any substances on your body afterward, such as powder, lotion, or perfume.  15. Put on clean clothes or pajamas.  16. If it is the night before your surgery, sleep in clean sheets.    What are the risks?  Risks  of using CHG include:  · A skin reaction.  · Hearing loss, if CHG gets in your ears.  · Eye injury, if CHG gets in your eyes and is not rinsed out.  · The CHG product catching fire.  Make sure that you avoid smoking and flames after applying CHG to your skin.  Do not use CHG:  · If you have a chlorhexidine allergy or have previously reacted to chlorhexidine.  · On babies younger than 2 months of age.      On the day of surgery, when you are taken to your room in Outpatient Surgery you will be given a CHG prepackaged cloth to wipe the site for your surgery.  How to use CHG prepackaged cloths  · Follow the instructions on the label.  · Use the CHG cloth on clean, dry skin. Follow these steps when using a CHG cloth (unless your health care provider gives you different instructions):  1. Using the CHG cloth, vigorously scrub the part of your body where you will be having surgery. Scrub using a back-and-forth motion for 3 minutes. The area on your body should be completely wet with CHG when you are finished scrubbing.  2. Do not rinse. Discard the cloth and let the area air-dry for 1 minute. Do not put any substances on your body afterward, such as powder, lotion, or perfume.  Contact a health care provider if:  · Your skin gets irritated after scrubbing.  · You have questions about using your solution or cloth.  Get help right away if:  · Your eyes become very red or swollen.  · Your eyes itch badly.  · Your skin itches badly and is red or swollen.  · Your hearing changes.  · You have trouble seeing.  · You have swelling or tingling in your mouth or throat.  · You have trouble breathing.  · You swallow any chlorhexidine.  Summary  · Chlorhexidine gluconate (CHG) is a germ-killing (antiseptic) solution that is used to clean the skin. Cleaning your skin with CHG before surgery helps lower the risk for infection after surgery.  · You may be given CHG to use at home. It may be in a bottle or in a prepackaged cloth to use on  your skin. Carefully follow your health care provider's instructions and the instructions on the product label.  · Do not use CHG if you have a chlorhexidine allergy.  · Contact your health care provider if your skin gets irritated after scrubbing.  This information is not intended to replace advice given to you by your health care provider. Make sure you discuss any questions you have with your health care provider.  Document Released: 09/11/2013 Document Revised: 11/15/2018 Document Reviewed: 11/15/2018  [employer] Interactive Patient Education © 2019 [employer]          PATIENT/FAMILY/RESPONSIBLE PARTY VERBALIZES UNDERSTANDING OF ABOVE EDUCATION.  COPY OF PAIN SCALE GIVEN AND REVIEWED WITH VERBALIZED UNDERSTANDING.

## 2020-02-05 LAB — BACTERIA SPEC AEROBE CULT: NORMAL

## 2020-02-12 ENCOUNTER — HOSPITAL ENCOUNTER (OUTPATIENT)
Dept: INFUSION THERAPY | Age: 75
Setting detail: INFUSION SERIES
Discharge: HOME | End: 2020-02-12
Payer: MEDICARE

## 2020-02-12 VITALS
TEMPERATURE: 97.6 F | WEIGHT: 145 LBS | SYSTOLIC BLOOD PRESSURE: 127 MMHG | HEART RATE: 64 BPM | BODY MASS INDEX: 23.4 KG/M2 | RESPIRATION RATE: 17 BRPM | DIASTOLIC BLOOD PRESSURE: 65 MMHG

## 2020-02-12 DIAGNOSIS — G61.81 CIDP (CHRONIC INFLAMMATORY DEMYELINATING POLYNEUROPATHY) (HCC): Primary | ICD-10-CM

## 2020-02-12 PROCEDURE — 96366 THER/PROPH/DIAG IV INF ADDON: CPT

## 2020-02-12 PROCEDURE — 6360000002 HC RX W HCPCS: Performed by: PSYCHIATRY & NEUROLOGY

## 2020-02-12 PROCEDURE — 96365 THER/PROPH/DIAG IV INF INIT: CPT

## 2020-02-12 RX ORDER — ACETAMINOPHEN 325 MG/1
650 TABLET ORAL ONCE
Status: CANCELLED | OUTPATIENT
Start: 2020-03-11

## 2020-02-12 RX ORDER — DIPHENHYDRAMINE HYDROCHLORIDE 50 MG/ML
50 INJECTION INTRAMUSCULAR; INTRAVENOUS ONCE
Status: CANCELLED
Start: 2020-03-11

## 2020-02-12 RX ORDER — DIPHENHYDRAMINE HYDROCHLORIDE 50 MG/ML
50 INJECTION INTRAMUSCULAR; INTRAVENOUS
Status: CANCELLED | OUTPATIENT
Start: 2020-03-11

## 2020-02-12 RX ADMIN — IMMUNE GLOBULIN (HUMAN) 35 G: 10 INJECTION INTRAVENOUS; SUBCUTANEOUS at 09:26

## 2020-02-17 ENCOUNTER — ANESTHESIA EVENT (OUTPATIENT)
Dept: PERIOP | Facility: HOSPITAL | Age: 75
End: 2020-02-17

## 2020-02-17 ENCOUNTER — ANESTHESIA (OUTPATIENT)
Dept: PERIOP | Facility: HOSPITAL | Age: 75
End: 2020-02-17

## 2020-02-17 ENCOUNTER — APPOINTMENT (OUTPATIENT)
Dept: GENERAL RADIOLOGY | Facility: HOSPITAL | Age: 75
End: 2020-02-17

## 2020-02-17 ENCOUNTER — HOSPITAL ENCOUNTER (INPATIENT)
Facility: HOSPITAL | Age: 75
LOS: 4 days | Discharge: HOME HEALTH | End: 2020-02-21
Attending: ORTHOPAEDIC SURGERY | Admitting: ORTHOPAEDIC SURGERY

## 2020-02-17 DIAGNOSIS — K50.80 CROHN'S DISEASE OF BOTH SMALL AND LARGE INTESTINE WITHOUT COMPLICATION (HCC): ICD-10-CM

## 2020-02-17 DIAGNOSIS — M48.062 SPINAL STENOSIS OF LUMBAR REGION WITH NEUROGENIC CLAUDICATION: Primary | ICD-10-CM

## 2020-02-17 DIAGNOSIS — Z78.9 DECREASED ACTIVITIES OF DAILY LIVING (ADL): ICD-10-CM

## 2020-02-17 DIAGNOSIS — I10 ESSENTIAL HYPERTENSION: Chronic | ICD-10-CM

## 2020-02-17 DIAGNOSIS — Z74.09 IMPAIRED FUNCTIONAL MOBILITY, BALANCE, GAIT, AND ENDURANCE: ICD-10-CM

## 2020-02-17 PROBLEM — M48.061 LUMBAR STENOSIS: Status: ACTIVE | Noted: 2020-02-17

## 2020-02-17 PROBLEM — K21.9 GERD WITHOUT ESOPHAGITIS: Chronic | Status: ACTIVE | Noted: 2020-02-17

## 2020-02-17 PROBLEM — M1A.9XX0 CHRONIC GOUT: Chronic | Status: ACTIVE | Noted: 2020-02-17

## 2020-02-17 LAB
ABO GROUP BLD: NORMAL
BLD GP AB SCN SERPL QL: NEGATIVE
RH BLD: POSITIVE
T&S EXPIRATION DATE: NORMAL

## 2020-02-17 PROCEDURE — 94799 UNLISTED PULMONARY SVC/PX: CPT

## 2020-02-17 PROCEDURE — 25010000002 FENTANYL CITRATE (PF) 100 MCG/2ML SOLUTION: Performed by: NURSE ANESTHETIST, CERTIFIED REGISTERED

## 2020-02-17 PROCEDURE — C1713 ANCHOR/SCREW BN/BN,TIS/BN: HCPCS | Performed by: ORTHOPAEDIC SURGERY

## 2020-02-17 PROCEDURE — 86901 BLOOD TYPING SEROLOGIC RH(D): CPT | Performed by: ORTHOPAEDIC SURGERY

## 2020-02-17 PROCEDURE — 25010000002 SUCCINYLCHOLINE PER 20 MG: Performed by: NURSE ANESTHETIST, CERTIFIED REGISTERED

## 2020-02-17 PROCEDURE — 25010000003 CEFAZOLIN 1-4 GM/50ML-% SOLUTION: Performed by: ORTHOPAEDIC SURGERY

## 2020-02-17 PROCEDURE — 86850 RBC ANTIBODY SCREEN: CPT | Performed by: ORTHOPAEDIC SURGERY

## 2020-02-17 PROCEDURE — 25010000002 HYDROMORPHONE PER 4 MG: Performed by: ANESTHESIOLOGY

## 2020-02-17 PROCEDURE — 25010000002 PROPOFOL 10 MG/ML EMULSION: Performed by: NURSE ANESTHETIST, CERTIFIED REGISTERED

## 2020-02-17 PROCEDURE — 25010000002 FENTANYL CITRATE (PF) 100 MCG/2ML SOLUTION: Performed by: ANESTHESIOLOGY

## 2020-02-17 PROCEDURE — 76000 FLUOROSCOPY <1 HR PHYS/QHP: CPT

## 2020-02-17 PROCEDURE — 0SG10A0 FUSION OF 2 OR MORE LUMBAR VERTEBRAL JOINTS WITH INTERBODY FUSION DEVICE, ANTERIOR APPROACH, ANTERIOR COLUMN, OPEN APPROACH: ICD-10-PCS | Performed by: ORTHOPAEDIC SURGERY

## 2020-02-17 PROCEDURE — 86900 BLOOD TYPING SEROLOGIC ABO: CPT | Performed by: ORTHOPAEDIC SURGERY

## 2020-02-17 PROCEDURE — 25010000002 FENTANYL CITRATE (PF) 250 MCG/5ML SOLUTION: Performed by: NURSE ANESTHETIST, CERTIFIED REGISTERED

## 2020-02-17 PROCEDURE — 25010000002 DEXAMETHASONE PER 1 MG: Performed by: ANESTHESIOLOGY

## 2020-02-17 PROCEDURE — 72100 X-RAY EXAM L-S SPINE 2/3 VWS: CPT

## 2020-02-17 PROCEDURE — 4A11X4G MONITORING OF PERIPHERAL NERVOUS ELECTRICAL ACTIVITY, INTRAOPERATIVE, EXTERNAL APPROACH: ICD-10-PCS | Performed by: ORTHOPAEDIC SURGERY

## 2020-02-17 PROCEDURE — 0SB20ZZ EXCISION OF LUMBAR VERTEBRAL DISC, OPEN APPROACH: ICD-10-PCS | Performed by: ORTHOPAEDIC SURGERY

## 2020-02-17 PROCEDURE — 25010000002 ONDANSETRON PER 1 MG: Performed by: NURSE ANESTHETIST, CERTIFIED REGISTERED

## 2020-02-17 DEVICE — PLT LAT ORO SYS 2/SCRW 15X18MM: Type: IMPLANTABLE DEVICE | Status: FUNCTIONAL

## 2020-02-17 DEVICE — ALLOGRFT FLD BIOBURST 1ML: Type: IMPLANTABLE DEVICE | Status: FUNCTIONAL

## 2020-02-17 DEVICE — CAGE LIF FOUNDATION F3D 8DEG 22X45X10MM: Type: IMPLANTABLE DEVICE | Status: FUNCTIONAL

## 2020-02-17 DEVICE — IMPLANTABLE DEVICE: Type: IMPLANTABLE DEVICE | Status: FUNCTIONAL

## 2020-02-17 DEVICE — SCRW ORO LAT PLT SYS 5.5X40MM: Type: IMPLANTABLE DEVICE | Status: FUNCTIONAL

## 2020-02-17 DEVICE — BONE FILLER VOID NANOSS ADV 3D 10CC: Type: IMPLANTABLE DEVICE | Status: FUNCTIONAL

## 2020-02-17 RX ORDER — LIDOCAINE HYDROCHLORIDE 10 MG/ML
0.5 INJECTION, SOLUTION EPIDURAL; INFILTRATION; INTRACAUDAL; PERINEURAL ONCE AS NEEDED
Status: DISCONTINUED | OUTPATIENT
Start: 2020-02-17 | End: 2020-02-17

## 2020-02-17 RX ORDER — UBIDECARENONE 75 MG
100 CAPSULE ORAL DAILY
Status: DISCONTINUED | OUTPATIENT
Start: 2020-02-17 | End: 2020-02-17

## 2020-02-17 RX ORDER — ALLOPURINOL 100 MG/1
100 TABLET ORAL 2 TIMES DAILY
Status: DISCONTINUED | OUTPATIENT
Start: 2020-02-17 | End: 2020-02-21 | Stop reason: HOSPADM

## 2020-02-17 RX ORDER — HYDROMORPHONE HYDROCHLORIDE 1 MG/ML
0.5 INJECTION, SOLUTION INTRAMUSCULAR; INTRAVENOUS; SUBCUTANEOUS
Status: DISCONTINUED | OUTPATIENT
Start: 2020-02-17 | End: 2020-02-17 | Stop reason: HOSPADM

## 2020-02-17 RX ORDER — NITROFURANTOIN MACROCRYSTALS 50 MG/1
50 CAPSULE ORAL NIGHTLY
Status: DISCONTINUED | OUTPATIENT
Start: 2020-02-17 | End: 2020-02-19

## 2020-02-17 RX ORDER — FENTANYL CITRATE 50 UG/ML
25 INJECTION, SOLUTION INTRAMUSCULAR; INTRAVENOUS AS NEEDED
Status: COMPLETED | OUTPATIENT
Start: 2020-02-17 | End: 2020-02-17

## 2020-02-17 RX ORDER — LISINOPRIL 20 MG/1
40 TABLET ORAL DAILY
Status: DISCONTINUED | OUTPATIENT
Start: 2020-02-17 | End: 2020-02-21 | Stop reason: HOSPADM

## 2020-02-17 RX ORDER — OXYCODONE AND ACETAMINOPHEN 7.5; 325 MG/1; MG/1
1 TABLET ORAL EVERY 4 HOURS PRN
Status: DISCONTINUED | OUTPATIENT
Start: 2020-02-17 | End: 2020-02-17 | Stop reason: HOSPADM

## 2020-02-17 RX ORDER — OXYCODONE HYDROCHLORIDE AND ACETAMINOPHEN 5; 325 MG/1; MG/1
1 TABLET ORAL ONCE AS NEEDED
Status: DISCONTINUED | OUTPATIENT
Start: 2020-02-17 | End: 2020-02-17 | Stop reason: HOSPADM

## 2020-02-17 RX ORDER — CARVEDILOL 6.25 MG/1
6.25 TABLET ORAL 2 TIMES DAILY WITH MEALS
Status: DISCONTINUED | OUTPATIENT
Start: 2020-02-17 | End: 2020-02-21 | Stop reason: HOSPADM

## 2020-02-17 RX ORDER — FAMOTIDINE 20 MG/1
20 TABLET, FILM COATED ORAL EVERY 12 HOURS SCHEDULED
Status: DISCONTINUED | OUTPATIENT
Start: 2020-02-17 | End: 2020-02-21 | Stop reason: HOSPADM

## 2020-02-17 RX ORDER — CEFAZOLIN SODIUM 1 G/50ML
1 INJECTION, SOLUTION INTRAVENOUS EVERY 8 HOURS
Status: COMPLETED | OUTPATIENT
Start: 2020-02-17 | End: 2020-02-18

## 2020-02-17 RX ORDER — GLYCOPYRROLATE 0.2 MG/ML
INJECTION INTRAMUSCULAR; INTRAVENOUS AS NEEDED
Status: DISCONTINUED | OUTPATIENT
Start: 2020-02-17 | End: 2020-02-17 | Stop reason: SURG

## 2020-02-17 RX ORDER — SODIUM CHLORIDE, SODIUM LACTATE, POTASSIUM CHLORIDE, CALCIUM CHLORIDE 600; 310; 30; 20 MG/100ML; MG/100ML; MG/100ML; MG/100ML
1000 INJECTION, SOLUTION INTRAVENOUS CONTINUOUS
Status: DISCONTINUED | OUTPATIENT
Start: 2020-02-17 | End: 2020-02-17

## 2020-02-17 RX ORDER — LIDOCAINE HYDROCHLORIDE 20 MG/ML
INJECTION, SOLUTION INFILTRATION; PERINEURAL AS NEEDED
Status: DISCONTINUED | OUTPATIENT
Start: 2020-02-17 | End: 2020-02-17 | Stop reason: SURG

## 2020-02-17 RX ORDER — SODIUM CHLORIDE 0.9 % (FLUSH) 0.9 %
3 SYRINGE (ML) INJECTION EVERY 12 HOURS SCHEDULED
Status: DISCONTINUED | OUTPATIENT
Start: 2020-02-17 | End: 2020-02-21 | Stop reason: HOSPADM

## 2020-02-17 RX ORDER — FENOFIBRATE 145 MG/1
145 TABLET, COATED ORAL DAILY
Status: DISCONTINUED | OUTPATIENT
Start: 2020-02-17 | End: 2020-02-21 | Stop reason: HOSPADM

## 2020-02-17 RX ORDER — DIPHENHYDRAMINE HCL 25 MG
25 CAPSULE ORAL NIGHTLY PRN
Status: DISCONTINUED | OUTPATIENT
Start: 2020-02-17 | End: 2020-02-21 | Stop reason: HOSPADM

## 2020-02-17 RX ORDER — PROPOFOL 10 MG/ML
VIAL (ML) INTRAVENOUS AS NEEDED
Status: DISCONTINUED | OUTPATIENT
Start: 2020-02-17 | End: 2020-02-17 | Stop reason: SURG

## 2020-02-17 RX ORDER — GABAPENTIN 300 MG/1
300 CAPSULE ORAL 3 TIMES DAILY
Status: DISCONTINUED | OUTPATIENT
Start: 2020-02-17 | End: 2020-02-21 | Stop reason: HOSPADM

## 2020-02-17 RX ORDER — FENTANYL CITRATE 50 UG/ML
INJECTION, SOLUTION INTRAMUSCULAR; INTRAVENOUS AS NEEDED
Status: DISCONTINUED | OUTPATIENT
Start: 2020-02-17 | End: 2020-02-17 | Stop reason: SURG

## 2020-02-17 RX ORDER — SODIUM CHLORIDE 9 MG/ML
INJECTION, SOLUTION INTRAVENOUS AS NEEDED
Status: DISCONTINUED | OUTPATIENT
Start: 2020-02-17 | End: 2020-02-17 | Stop reason: HOSPADM

## 2020-02-17 RX ORDER — SODIUM CHLORIDE 0.9 % (FLUSH) 0.9 %
3-10 SYRINGE (ML) INJECTION AS NEEDED
Status: DISCONTINUED | OUTPATIENT
Start: 2020-02-17 | End: 2020-02-17

## 2020-02-17 RX ORDER — ROCURONIUM BROMIDE 10 MG/ML
INJECTION, SOLUTION INTRAVENOUS AS NEEDED
Status: DISCONTINUED | OUTPATIENT
Start: 2020-02-17 | End: 2020-02-17 | Stop reason: SURG

## 2020-02-17 RX ORDER — OXYCODONE AND ACETAMINOPHEN 10; 325 MG/1; MG/1
2 TABLET ORAL EVERY 4 HOURS PRN
Status: DISCONTINUED | OUTPATIENT
Start: 2020-02-17 | End: 2020-02-21 | Stop reason: HOSPADM

## 2020-02-17 RX ORDER — SUCCINYLCHOLINE CHLORIDE 20 MG/ML
INJECTION INTRAMUSCULAR; INTRAVENOUS AS NEEDED
Status: DISCONTINUED | OUTPATIENT
Start: 2020-02-17 | End: 2020-02-17 | Stop reason: SURG

## 2020-02-17 RX ORDER — SODIUM CHLORIDE, SODIUM LACTATE, POTASSIUM CHLORIDE, CALCIUM CHLORIDE 600; 310; 30; 20 MG/100ML; MG/100ML; MG/100ML; MG/100ML
100 INJECTION, SOLUTION INTRAVENOUS CONTINUOUS PRN
Status: DISCONTINUED | OUTPATIENT
Start: 2020-02-17 | End: 2020-02-17

## 2020-02-17 RX ORDER — DEXAMETHASONE SODIUM PHOSPHATE 4 MG/ML
4 INJECTION, SOLUTION INTRA-ARTICULAR; INTRALESIONAL; INTRAMUSCULAR; INTRAVENOUS; SOFT TISSUE ONCE AS NEEDED
Status: COMPLETED | OUTPATIENT
Start: 2020-02-17 | End: 2020-02-17

## 2020-02-17 RX ORDER — INDAPAMIDE 2.5 MG/1
2.5 TABLET, FILM COATED ORAL EVERY MORNING
Status: DISCONTINUED | OUTPATIENT
Start: 2020-02-17 | End: 2020-02-21 | Stop reason: HOSPADM

## 2020-02-17 RX ORDER — ACETAMINOPHEN 500 MG
1000 TABLET ORAL ONCE
Status: COMPLETED | OUTPATIENT
Start: 2020-02-17 | End: 2020-02-17

## 2020-02-17 RX ORDER — OXYCODONE HCL 20 MG/1
20 TABLET, FILM COATED, EXTENDED RELEASE ORAL ONCE
Status: COMPLETED | OUTPATIENT
Start: 2020-02-17 | End: 2020-02-17

## 2020-02-17 RX ORDER — LABETALOL HYDROCHLORIDE 5 MG/ML
5 INJECTION, SOLUTION INTRAVENOUS
Status: DISCONTINUED | OUTPATIENT
Start: 2020-02-17 | End: 2020-02-17 | Stop reason: HOSPADM

## 2020-02-17 RX ORDER — NALOXONE HCL 0.4 MG/ML
0.4 VIAL (ML) INJECTION AS NEEDED
Status: DISCONTINUED | OUTPATIENT
Start: 2020-02-17 | End: 2020-02-17 | Stop reason: HOSPADM

## 2020-02-17 RX ORDER — SODIUM CHLORIDE 0.9 % (FLUSH) 0.9 %
10 SYRINGE (ML) INJECTION EVERY 12 HOURS SCHEDULED
Status: DISCONTINUED | OUTPATIENT
Start: 2020-02-17 | End: 2020-02-17

## 2020-02-17 RX ORDER — SODIUM CHLORIDE, SODIUM LACTATE, POTASSIUM CHLORIDE, CALCIUM CHLORIDE 600; 310; 30; 20 MG/100ML; MG/100ML; MG/100ML; MG/100ML
100 INJECTION, SOLUTION INTRAVENOUS CONTINUOUS
Status: DISCONTINUED | OUTPATIENT
Start: 2020-02-17 | End: 2020-02-17

## 2020-02-17 RX ORDER — IPRATROPIUM BROMIDE AND ALBUTEROL SULFATE 2.5; .5 MG/3ML; MG/3ML
3 SOLUTION RESPIRATORY (INHALATION) ONCE AS NEEDED
Status: DISCONTINUED | OUTPATIENT
Start: 2020-02-17 | End: 2020-02-17 | Stop reason: HOSPADM

## 2020-02-17 RX ORDER — MAGNESIUM HYDROXIDE 1200 MG/15ML
LIQUID ORAL AS NEEDED
Status: DISCONTINUED | OUTPATIENT
Start: 2020-02-17 | End: 2020-02-17 | Stop reason: HOSPADM

## 2020-02-17 RX ORDER — SODIUM CHLORIDE 9 MG/ML
75 INJECTION, SOLUTION INTRAVENOUS CONTINUOUS
Status: DISPENSED | OUTPATIENT
Start: 2020-02-17 | End: 2020-02-20

## 2020-02-17 RX ORDER — PHENYLEPHRINE HCL IN 0.9% NACL 0.8MG/10ML
SYRINGE (ML) INTRAVENOUS AS NEEDED
Status: DISCONTINUED | OUTPATIENT
Start: 2020-02-17 | End: 2020-02-17

## 2020-02-17 RX ORDER — ONDANSETRON 2 MG/ML
4 INJECTION INTRAMUSCULAR; INTRAVENOUS ONCE AS NEEDED
Status: DISCONTINUED | OUTPATIENT
Start: 2020-02-17 | End: 2020-02-17 | Stop reason: HOSPADM

## 2020-02-17 RX ORDER — SODIUM CHLORIDE 0.9 % (FLUSH) 0.9 %
3 SYRINGE (ML) INJECTION AS NEEDED
Status: DISCONTINUED | OUTPATIENT
Start: 2020-02-17 | End: 2020-02-17

## 2020-02-17 RX ORDER — SODIUM CHLORIDE 0.9 % (FLUSH) 0.9 %
10 SYRINGE (ML) INJECTION AS NEEDED
Status: DISCONTINUED | OUTPATIENT
Start: 2020-02-17 | End: 2020-02-17

## 2020-02-17 RX ORDER — ONDANSETRON 2 MG/ML
4 INJECTION INTRAMUSCULAR; INTRAVENOUS EVERY 6 HOURS PRN
Status: DISCONTINUED | OUTPATIENT
Start: 2020-02-17 | End: 2020-02-17 | Stop reason: SDUPTHER

## 2020-02-17 RX ORDER — ONDANSETRON 4 MG/1
4 TABLET, FILM COATED ORAL EVERY 6 HOURS PRN
Status: DISCONTINUED | OUTPATIENT
Start: 2020-02-17 | End: 2020-02-21 | Stop reason: HOSPADM

## 2020-02-17 RX ORDER — FOLIC ACID 1 MG/1
1 TABLET ORAL DAILY
Status: DISCONTINUED | OUTPATIENT
Start: 2020-02-17 | End: 2020-02-21 | Stop reason: HOSPADM

## 2020-02-17 RX ORDER — LIDOCAINE HYDROCHLORIDE 40 MG/ML
SOLUTION TOPICAL AS NEEDED
Status: DISCONTINUED | OUTPATIENT
Start: 2020-02-17 | End: 2020-02-17 | Stop reason: SURG

## 2020-02-17 RX ORDER — SODIUM CHLORIDE 0.9 % (FLUSH) 0.9 %
3 SYRINGE (ML) INJECTION EVERY 12 HOURS SCHEDULED
Status: DISCONTINUED | OUTPATIENT
Start: 2020-02-17 | End: 2020-02-17

## 2020-02-17 RX ORDER — SODIUM CHLORIDE 9 MG/ML
75 INJECTION, SOLUTION INTRAVENOUS CONTINUOUS
Status: DISPENSED | OUTPATIENT
Start: 2020-02-17 | End: 2020-02-18

## 2020-02-17 RX ORDER — TIZANIDINE 4 MG/1
4 TABLET ORAL EVERY 8 HOURS PRN
Status: DISCONTINUED | OUTPATIENT
Start: 2020-02-17 | End: 2020-02-21 | Stop reason: HOSPADM

## 2020-02-17 RX ORDER — CHOLESTYRAMINE LIGHT 4 G/5.7G
1 POWDER, FOR SUSPENSION ORAL DAILY
Status: DISCONTINUED | OUTPATIENT
Start: 2020-02-17 | End: 2020-02-17

## 2020-02-17 RX ORDER — SODIUM CHLORIDE 0.9 % (FLUSH) 0.9 %
10 SYRINGE (ML) INJECTION AS NEEDED
Status: DISCONTINUED | OUTPATIENT
Start: 2020-02-17 | End: 2020-02-21 | Stop reason: HOSPADM

## 2020-02-17 RX ORDER — ONDANSETRON 2 MG/ML
INJECTION INTRAMUSCULAR; INTRAVENOUS AS NEEDED
Status: DISCONTINUED | OUTPATIENT
Start: 2020-02-17 | End: 2020-02-17 | Stop reason: SURG

## 2020-02-17 RX ORDER — OXYCODONE AND ACETAMINOPHEN 10; 325 MG/1; MG/1
1 TABLET ORAL EVERY 4 HOURS PRN
Status: DISCONTINUED | OUTPATIENT
Start: 2020-02-17 | End: 2020-02-21 | Stop reason: HOSPADM

## 2020-02-17 RX ORDER — MESALAMINE 400 MG/1
800 CAPSULE, DELAYED RELEASE ORAL 3 TIMES DAILY
Status: DISCONTINUED | OUTPATIENT
Start: 2020-02-17 | End: 2020-02-21 | Stop reason: HOSPADM

## 2020-02-17 RX ORDER — ALLOPURINOL 300 MG/1
300 TABLET ORAL DAILY
Status: DISCONTINUED | OUTPATIENT
Start: 2020-02-17 | End: 2020-02-21 | Stop reason: HOSPADM

## 2020-02-17 RX ORDER — ONDANSETRON 2 MG/ML
4 INJECTION INTRAMUSCULAR; INTRAVENOUS EVERY 6 HOURS PRN
Status: DISCONTINUED | OUTPATIENT
Start: 2020-02-17 | End: 2020-02-21 | Stop reason: HOSPADM

## 2020-02-17 RX ORDER — PHENYLEPHRINE HCL IN 0.9% NACL 0.8MG/10ML
SYRINGE (ML) INTRAVENOUS AS NEEDED
Status: DISCONTINUED | OUTPATIENT
Start: 2020-02-17 | End: 2020-02-17 | Stop reason: SURG

## 2020-02-17 RX ORDER — FAMOTIDINE 10 MG/ML
20 INJECTION, SOLUTION INTRAVENOUS EVERY 12 HOURS SCHEDULED
Status: DISCONTINUED | OUTPATIENT
Start: 2020-02-17 | End: 2020-02-21 | Stop reason: HOSPADM

## 2020-02-17 RX ORDER — KETAMINE HYDROCHLORIDE 50 MG/ML
INJECTION, SOLUTION, CONCENTRATE INTRAMUSCULAR; INTRAVENOUS AS NEEDED
Status: DISCONTINUED | OUTPATIENT
Start: 2020-02-17 | End: 2020-02-17 | Stop reason: SURG

## 2020-02-17 RX ADMIN — PROPOFOL 100 MG: 10 INJECTION, EMULSION INTRAVENOUS at 09:33

## 2020-02-17 RX ADMIN — CARVEDILOL 6.25 MG: 6.25 TABLET, FILM COATED ORAL at 18:43

## 2020-02-17 RX ADMIN — FENTANYL CITRATE 50 MCG: 50 INJECTION INTRAMUSCULAR; INTRAVENOUS at 10:18

## 2020-02-17 RX ADMIN — FENTANYL CITRATE 50 MCG: 50 INJECTION INTRAMUSCULAR; INTRAVENOUS at 10:24

## 2020-02-17 RX ADMIN — FENTANYL CITRATE 100 MCG: 50 INJECTION, SOLUTION INTRAMUSCULAR; INTRAVENOUS at 10:54

## 2020-02-17 RX ADMIN — ACETAMINOPHEN 1000 MG: 500 TABLET, FILM COATED ORAL at 08:09

## 2020-02-17 RX ADMIN — LIDOCAINE HYDROCHLORIDE 80 MG: 20 INJECTION, SOLUTION INFILTRATION; PERINEURAL at 09:33

## 2020-02-17 RX ADMIN — SODIUM CHLORIDE, POTASSIUM CHLORIDE, SODIUM LACTATE AND CALCIUM CHLORIDE: 600; 310; 30; 20 INJECTION, SOLUTION INTRAVENOUS at 10:39

## 2020-02-17 RX ADMIN — OXYCODONE HYDROCHLORIDE AND ACETAMINOPHEN 2 TABLET: 10; 325 TABLET ORAL at 14:41

## 2020-02-17 RX ADMIN — HYDROMORPHONE HYDROCHLORIDE 0.5 MG: 1 INJECTION, SOLUTION INTRAMUSCULAR; INTRAVENOUS; SUBCUTANEOUS at 12:21

## 2020-02-17 RX ADMIN — MESALAMINE 800 MG: 400 CAPSULE, DELAYED RELEASE ORAL at 16:53

## 2020-02-17 RX ADMIN — OXYCODONE HYDROCHLORIDE 20 MG: 20 TABLET, FILM COATED, EXTENDED RELEASE ORAL at 07:14

## 2020-02-17 RX ADMIN — FOLIC ACID 1 MG: 1 TABLET ORAL at 14:48

## 2020-02-17 RX ADMIN — KETAMINE HYDROCHLORIDE 25 MG: 50 INJECTION, SOLUTION INTRAMUSCULAR; INTRAVENOUS at 10:01

## 2020-02-17 RX ADMIN — Medication 80 MCG: at 10:00

## 2020-02-17 RX ADMIN — FENOFIBRATE 145 MG: 145 TABLET ORAL at 14:49

## 2020-02-17 RX ADMIN — HYDROMORPHONE HYDROCHLORIDE 0.5 MG: 1 INJECTION, SOLUTION INTRAMUSCULAR; INTRAVENOUS; SUBCUTANEOUS at 12:16

## 2020-02-17 RX ADMIN — FENTANYL CITRATE 25 MCG: 50 INJECTION, SOLUTION INTRAMUSCULAR; INTRAVENOUS at 12:00

## 2020-02-17 RX ADMIN — CEFAZOLIN 1 G: 1 INJECTION, POWDER, FOR SOLUTION INTRAMUSCULAR; INTRAVENOUS; PARENTERAL at 09:46

## 2020-02-17 RX ADMIN — ONDANSETRON HYDROCHLORIDE 4 MG: 2 SOLUTION INTRAMUSCULAR; INTRAVENOUS at 11:08

## 2020-02-17 RX ADMIN — LISINOPRIL 40 MG: 20 TABLET ORAL at 14:48

## 2020-02-17 RX ADMIN — SUCCINYLCHOLINE CHLORIDE 100 MG: 20 INJECTION, SOLUTION INTRAMUSCULAR; INTRAVENOUS at 09:34

## 2020-02-17 RX ADMIN — KETAMINE HYDROCHLORIDE 25 MG: 50 INJECTION, SOLUTION INTRAMUSCULAR; INTRAVENOUS at 09:33

## 2020-02-17 RX ADMIN — OXYCODONE HYDROCHLORIDE AND ACETAMINOPHEN 2 TABLET: 10; 325 TABLET ORAL at 22:49

## 2020-02-17 RX ADMIN — Medication 1 TABLET: at 14:49

## 2020-02-17 RX ADMIN — FENTANYL CITRATE 25 MCG: 50 INJECTION, SOLUTION INTRAMUSCULAR; INTRAVENOUS at 11:50

## 2020-02-17 RX ADMIN — NITROFURANTOIN MACROCRYSTALS 50 MG: 50 CAPSULE ORAL at 21:13

## 2020-02-17 RX ADMIN — FAMOTIDINE 20 MG: 20 TABLET ORAL at 16:02

## 2020-02-17 RX ADMIN — GLYCOPYRROLATE 0.2 MG: 0.2 INJECTION, SOLUTION INTRAMUSCULAR; INTRAVENOUS at 10:14

## 2020-02-17 RX ADMIN — FENTANYL CITRATE 50 MCG: 50 INJECTION INTRAMUSCULAR; INTRAVENOUS at 09:33

## 2020-02-17 RX ADMIN — INDAPAMIDE 2.5 MG: 2.5 TABLET, FILM COATED ORAL at 14:49

## 2020-02-17 RX ADMIN — Medication 160 MCG: at 09:43

## 2020-02-17 RX ADMIN — Medication 250 MCG: at 15:53

## 2020-02-17 RX ADMIN — ROCURONIUM BROMIDE 5 MG: 10 INJECTION INTRAVENOUS at 09:34

## 2020-02-17 RX ADMIN — SODIUM CHLORIDE 75 ML/HR: 9 INJECTION, SOLUTION INTRAVENOUS at 13:37

## 2020-02-17 RX ADMIN — FENTANYL CITRATE 50 MCG: 50 INJECTION INTRAMUSCULAR; INTRAVENOUS at 10:14

## 2020-02-17 RX ADMIN — LIDOCAINE HYDROCHLORIDE 1 EACH: 40 SOLUTION TOPICAL at 09:35

## 2020-02-17 RX ADMIN — FENTANYL CITRATE 50 MCG: 50 INJECTION INTRAMUSCULAR; INTRAVENOUS at 10:01

## 2020-02-17 RX ADMIN — HYDROMORPHONE HYDROCHLORIDE 0.5 MG: 1 INJECTION, SOLUTION INTRAMUSCULAR; INTRAVENOUS; SUBCUTANEOUS at 12:26

## 2020-02-17 RX ADMIN — DEXAMETHASONE SODIUM PHOSPHATE 4 MG: 4 INJECTION, SOLUTION INTRAMUSCULAR; INTRAVENOUS at 08:09

## 2020-02-17 RX ADMIN — GABAPENTIN 300 MG: 300 CAPSULE ORAL at 21:13

## 2020-02-17 RX ADMIN — POLYETHYLENE GLYCOL (3350) 17 G: 17 POWDER, FOR SOLUTION ORAL at 21:20

## 2020-02-17 RX ADMIN — ALLOPURINOL 300 MG: 300 TABLET ORAL at 14:48

## 2020-02-17 RX ADMIN — SODIUM CHLORIDE, POTASSIUM CHLORIDE, SODIUM LACTATE AND CALCIUM CHLORIDE 1000 ML: 600; 310; 30; 20 INJECTION, SOLUTION INTRAVENOUS at 07:08

## 2020-02-17 RX ADMIN — SODIUM CHLORIDE, POTASSIUM CHLORIDE, SODIUM LACTATE AND CALCIUM CHLORIDE 1000 ML: 600; 310; 30; 20 INJECTION, SOLUTION INTRAVENOUS at 07:07

## 2020-02-17 RX ADMIN — OXYCODONE HYDROCHLORIDE AND ACETAMINOPHEN 2 TABLET: 10; 325 TABLET ORAL at 18:43

## 2020-02-17 RX ADMIN — CEFAZOLIN SODIUM 1 G: 1 INJECTION, SOLUTION INTRAVENOUS at 16:53

## 2020-02-17 RX ADMIN — Medication 160 MCG: at 09:51

## 2020-02-17 RX ADMIN — ALLOPURINOL 100 MG: 100 TABLET ORAL at 21:14

## 2020-02-17 RX ADMIN — MESALAMINE 800 MG: 400 CAPSULE, DELAYED RELEASE ORAL at 21:13

## 2020-02-17 RX ADMIN — GABAPENTIN 300 MG: 300 CAPSULE ORAL at 16:53

## 2020-02-17 RX ADMIN — FENTANYL CITRATE 25 MCG: 50 INJECTION, SOLUTION INTRAMUSCULAR; INTRAVENOUS at 12:04

## 2020-02-17 RX ADMIN — FAMOTIDINE 20 MG: 20 TABLET ORAL at 21:20

## 2020-02-17 RX ADMIN — FENTANYL CITRATE 25 MCG: 50 INJECTION, SOLUTION INTRAMUSCULAR; INTRAVENOUS at 11:55

## 2020-02-17 RX ADMIN — TIZANIDINE 4 MG: 4 TABLET ORAL at 15:53

## 2020-02-17 RX ADMIN — Medication 80 MCG: at 09:54

## 2020-02-17 RX ADMIN — KETAMINE HYDROCHLORIDE 25 MG: 50 INJECTION, SOLUTION INTRAMUSCULAR; INTRAVENOUS at 10:47

## 2020-02-17 NOTE — ANESTHESIA PREPROCEDURE EVALUATION
Anesthesia Evaluation     Patient summary reviewed   no history of anesthetic complications:  NPO Solid Status: > 8 hours  NPO Liquid Status: > 8 hours           Airway   Mallampati: II  TM distance: >3 FB  Neck ROM: full  Dental          Pulmonary - normal exam    breath sounds clear to auscultation  (-) asthma, recent URI, sleep apnea, not a smoker  Cardiovascular - normal exam  Exercise tolerance: good (4-7 METS)    ECG reviewed  Patient on routine beta blocker and Beta blocker given within 24 hours of surgery  Rhythm: regular  Rate: normal    (+) hypertension, hyperlipidemia,   (-) pacemaker, past MI, angina, cardiac stents, CABG      Neuro/Psych  (-) seizures, TIA, CVA  GI/Hepatic/Renal/Endo    (+)  GERD,    (-) liver disease, no renal disease, diabetes, no thyroid disorder    Musculoskeletal     Abdominal    Substance History      OB/GYN          Other      history of cancer (Endometrial 2010, hysterectomy) active                    Anesthesia Plan    ASA 3     general     intravenous induction     Anesthetic plan, all risks, benefits, and alternatives have been provided, discussed and informed consent has been obtained with: patient.  Use of blood products discussed with patient  Consented to blood products.

## 2020-02-17 NOTE — ANESTHESIA POSTPROCEDURE EVALUATION
Patient: Deisi Ponce    Procedure Summary     Date:  02/17/20 Room / Location:   [facility] OR  /  [facility]    Anesthesia Start:  0928 Anesthesia Stop:  1133    Procedure:  LATERAL LUMBAR INTERBODY FUSION LEFT L1-2 WITH INSTRUMENTATION, RIGHT L2-3 (Bilateral Spine Lumbar) Diagnosis:  (M54.16)    Surgeon:  EBONI Rader MD Provider:  Renetta Giang CRNA    Anesthesia Type:  general ASA Status:  3          Anesthesia Type: general    Vitals  Vitals Value Taken Time   /54 2/17/2020  1:00 PM   Temp 99.2 °F (37.3 °C) 2/17/2020  1:00 PM   Pulse 75 2/17/2020  1:03 PM   Resp 16 2/17/2020  1:00 PM   SpO2 97 % 2/17/2020  1:03 PM   Vitals shown include unvalidated device data.        Post Anesthesia Care and Evaluation    Patient location during evaluation: PACU  Patient participation: complete - patient participated  Level of consciousness: awake and alert  Pain management: adequate  Airway patency: patent  Anesthetic complications: No anesthetic complications    Cardiovascular status: acceptable  Respiratory status: acceptable  Hydration status: acceptable    Comments: Blood pressure 125/54, pulse 64, temperature 99.2 °F (37.3 °C), temperature source Temporal, resp. rate 16, SpO2 99 %, not currently breastfeeding.    Pt discharged from PACU based on caorline score >8

## 2020-02-17 NOTE — ANESTHESIA PROCEDURE NOTES
Airway  Urgency: elective    Date/Time: 2/17/2020 9:36 AM  Airway not difficult    General Information and Staff    Patient location during procedure: OR  CRNA: Renetta Giang CRNA    Indications and Patient Condition  Indications for airway management: airway protection    Preoxygenated: yes  Mask difficulty assessment: 1 - vent by mask    Final Airway Details  Final airway type: endotracheal airway      Successful airway: ETT  Cuffed: yes   Successful intubation technique: direct laryngoscopy  Facilitating devices/methods: intubating stylet  Endotracheal tube insertion site: oral  Blade: Kaelyn  Blade size: 3.5.  ETT size (mm): 7.0  Cormack-Lehane Classification: grade I - full view of glottis  Placement verified by: chest auscultation and capnometry   Cuff volume (mL): 5  Measured from: lips  ETT/EBT  to lips (cm): 22  Number of attempts at approach: 1  Assessment: lips, teeth, and gum same as pre-op and atraumatic intubation

## 2020-02-17 NOTE — OP NOTE
LUMBAR LATERAL INTERBODY FUSION  Procedure Note    Deisi Ponce  2/17/2020    Pre-op Diagnosis:     1.  Status post right LLIF L3 to L5, PSF with instrumentation L3 to L5, Dr. Justo Heath, 05/27/2010.    2.  Increasing chronic low back pain.   3.  Severe left buttock, anterior thigh radiculopathy.    4.  Chronic bilateral anterior tibialis weakness.   5.  Bilateral lower extremity peripheral neuropathy.   6.  Left hip flexor, quadricep weakness.   7.  Adjacent level degenerative disk disease, worse L1 to L3, L5-S1.    8.  Degenerative lumbar scoliosis L1to L3.    9.  Lateral listhesis, L1-2, L2-3.   10.  Facet arthropathy L1 to L3, L5-S1.    11.  Central and severe foraminal stenosis L1 to L3.   12.  Chronic foraminal stenosis right L5-S1.   13.  Limb length discrepancy, left shorter than right.    14.  Osteopenia, T score -2.0, 1/27/2020    Post-op Diagnosis:    same    Procedure/CPT® Codes:     1.  Left lateral lumbar interbody fusion L1-2, L2-3  2.  Anterior spinal instrumentation left L1-2 (CoreLink lateral plate and screws)  3.  Use of titanium interbody biomechanical device for fusion L1-2, L2-3 (CoreLink titanium spacers x 2)  4.  Use of allograft bone matrix for fusion  5.  Use of allograft bone liquid for fusion (BioBurst)  6.  Use of fluoroscopy for confirmation of surgical level, placement of titanium spacers, and instrumentation  7.  Intraoperative neural monitoring     Anesthesia: General     Surgeon: DAVEY Rader MD     Assistant:  Sachin Wilson PA-C, Patricia Whitfield CST     Estimated Blood Loss: 25 mL     Complications: None     Condition: Stable to PACU.     Indications:     The patient is a 74-year-old who sees Dr. Derek Schmid for medical issues.  She presented to the office with a history of a prior lateral and posterior fusion from L3-5 done by my partner Dr. Justo Heath on 5/27/2010.  She unfortunately had complaints of increasing chronic low back pain along with severe symptoms into  the left buttock and anterior thigh.  She was also noted to have some chronic bilateral anterior tibialis weakness and weakness in her left hip flexor and quadricep.  Imaging studies revealed adjacent level degenerative disc disease at L1-2, L2-3, and L5-S1.  She was noted to have a degenerative lumbar scoliosis from L1-3 with lateral listhesis at both levels as well as facet arthropathy causing central and severe foraminal stenosis.  While she did have facet arthropathy and chronic foraminal stenosis on the right side of L5-S1, we decided to try to minimize surgery to only L1-3 at this time, as it was felt to be contributing to the vast majority of her symptoms.  However, it is possible that she may require additional surgery at L5-S1 at a later date.      After failing conservative measures, it was mutually decided that surgery would be the best option. Risks, benefits, and complications of surgery were discussed with the patient. The patient appeared well informed and wished to proceed. We specifically discussed the risk of infection, blood loss, nerve root injury, CSF leak, and the possibility of incomplete resolution of symptoms. We also discussed the possible risk of a nonunion and the potential need for additional surgery in the event of a pseudoarthrosis or hardware failure.      We elected proceed with a staged operation. It is planned that we will be returning to surgery for a second posterior procedure at a later date.     Operative Procedure:    After obtaining informed consent and verifying the correct operative site, the patient was brought to the operating room and placed supine on an operating table.  A general anesthetic was provided by the anesthesia service with the assistance of an endotracheal tube.  Once this was appropriately positioned and secured, the patient was carefully rotated into the lateral decubitus position with the left side up.  All bony prominences were well-padded.  3 inch wide  cloth tape was used to maintain the patient's position.  It was also used to tip open the pelvis slightly allowing better access to the lumbar spine through a lateral approach.  Fluoroscopy was then used to identify the disc spaces of L1-2 and L2-3, and the skin was marked for our planned incisions.  The left flank was then prepped and draped in the usual sterile fashion.  A surgical timeout was taken to confirm this was the correct patient, we were working at the correct levels, and that preoperative antibiotics were given in a timely fashion.     An oblique incision was created of the left flank using a 10 blade scalpel directly centered over L1-2. Dissection was carried bluntly through subcutaneous tissues. Blunt dissection was also carried between the external oblique and internal oblique musculature. The transversalis fascia was pierced allowing access to the retro-peroneal space. At this point a series of neuro monitoring probes were used to safely access first the L1-2 level.  We used stimulated and free run EMG for this purpose. Once nerve roots were confirmed to be out of harm's way, self retaining retractors were placed for continuous exposure.     An annulotomy was then created at the L1-2 area using a 15 blade scalpel on a long handle. A Queen elevator was used to remove disc material off of the endplates. Disc material was removed using Kerrisons, pituitaries, and forward angled curettes. Once all disc material had been retrieved, I used the Queen elevator to divide the contralateral annulus. This assisted with mobilization of the vertebral bodies. We then used a series of endplate scrapers to prepare the endplates for interbody fusion. Trial spacers were malleted into position and for this disc space it was felt that a 10 mm x 45 mm implant would be the best fit to restore disc height. The disc space was then thoroughly irrigated with saline solution.     A titanium spacer from the CoreLink  instrumentation set measuring 10 mm x 45 mm x 22 mm was then packed with a combination of allograft bone matrix mixed with allograft liquid called BioBurst as tightly as possible. This titanium spacer was then malleted into the L1-2 disc space under fluoroscopic guidance. It was placed as a titanium interbody biomechanical device to assist with interbody fusion.  After confirming the spacer was properly positioned in the L1-2 disc space, I chose a 2 hole plate from the CoreLink instrumentation set to help augment the fusion.  This plate was held into position using 2 screws.  I placed a 40 mm screw across L1 and a smaller 25 mm screw into L2 to hopefully accommodate the placement of a pedicle screw on the contralateral side.  The area was thoroughly inspected to ensure that we had adequate hemostasis.  Bleeding at this point was controlled using thrombin gelfoam powder and bipolar cautery.  The self retaining retractors were then removed and attention was turned L2-3.     Due to the degenerative scoliosis present, I had originally planned on accessing the L2-3 level through the right side.  However with the patient relaxed, the L2-3 level appeared to be accessible through the left side through the same incision I used to access L1-2.  I used some hand-held retractors through the incision in the retroperitoneal space and thought that it would be quite possible to prepare in place a spacer into L2-3 through this left side without much difficulty.    Once again the neuro monitoring probes were used in the retroperitoneal space, this time to access L2-3.  We used stimulated and free run EMG for this purpose.  Once nerve roots were confirmed to be out of harm's way, self retaining retractors were placed for continuous exposure of L2-3.  The L2-3 disc space was prepared in identical fashion as L1-2.  We used the Queen elevators to remove disc material off of the endplates.  Disc material was retrieved using forward angled  curettes, pituitaries, and Kerrisons.  We then used a series of endplate scrapers to prepare the endplates for interbody fusion.  The Queen elevator was used to divide the contralateral annulus helping to mobilize the vertebral bodies.  Trial spacers were then malleted into position.  It was felt for this disc space again a 10 mm x 45 mm implant would be the best fit to restore disc height. The disc space was then thoroughly irrigated with saline solution.     A second titanium spacer from the CoreLink instrumentation set measuring 10 mm x 45 mm x 22 mm was then packed with the same combination of allograft bone matrix mixed with allograft liquid called BioBurst as tightly as possible. This titanium spacer was then malleted into the L2-3 disc space under fluoroscopic guidance. It was placed as a titanium interbody biomechanical device to assist with interbody fusion.       The wound was then irrigated thoroughly. A thorough inspection was then undertaken to ensure that we had adequate hemostasis. Bleeding at this point was controlled using thrombin gelfoam powder and bipolar cautery.      After ensuring that we had adequate hemostasis in the area, final fluoroscopy imaging was taken to confirm adequate position of both interbody spacers and the instrumentation. There was excellent restoration of disc height and creation of lordosis compared to preoperative films.  Both wounds were then copiously irrigated with saline solution.  Again we ensure that we had adequate hemostasis by using bipolar cautery and thrombin gelfoam powder.     Closure was then accomplished with a #1 Vicryl reapproximating the transversalis fascia as well as the internal and external oblique musculature. Immediate subcutaneous cutaneous tissues were closed with a 3-0 Vicryl and skin closure was accomplished using Mastisol and Steri-Strips. The wound was then sterilely dressed. The patient was then carefully rotated supine onto a [facility]  extubated, and sent to the recovery room in good stable condition.     The patient tolerated the procedure well. There were no complications. We estimated blood loss to be approximately 25 mL. The patient remained hemodynamically stable.     Sachin Wilson PA-C provided critical assistance during the procedure. His assistance was medically necessary in order to allow the procedure to occur in the most safe and efficient manner.  He assisted not only with patient positioning and wound closure, but more importantly, he also assisted with disc space preparation, as well as the placement of titanium spacers and instrumentation to obtain a fusion.    DAVEY Rader MD     Date: 2/17/2020  Time: 11:35 AM

## 2020-02-17 NOTE — CONSULTS
Referring Provider: Dr. Rader  Reason for Consultation: Medical management    Patient Care Team:  Derek Schmid MD as PCP - General  Jose Raul Butt DO as Consulting Physician (Gastroenterology)    Chief complaint The patient presents today for surgical correction after failing conservative management.  They have been through anti-inflammatories, muscle relaxers, and pain medication.  The pain has progressed to the point in time where it is affecting their activities of daily living and after being explained all their options, elected to undergo surgical correction.  Their primary care physician does not attend here at [facility]; therefore, I have been asked to take care of their primary medical needs in the perioperative period.  Postoperative pain is as expected.  There are no other precipitating or relieving factors.    Pleasant 74-year-old white female with multiple family members in the room.  Presents postop from for step of L IFF procedure.  History of Crohn's disease.  Reviewed meds today.    Subjective .     History of present illness: See above      REVIEW OF SYSTEMS:    CONSTITUTIONAL:  Negative for anorexia, chills, fevers, night sweats and weight loss  EYES:  negative for eye dryness, icterus and redness  HEENT:   negative for dental problems, epistaxis, facial trauma and thrush  RESPIRATORY:  negative for chest tightness, cough, dyspnea on exertion, pneumonia and sputum  CARDIOVASCULAR: negative for chest pain, dyspnea, exertional chest pressure/discomfort, irregular heart beat, palpitations, paroxysmal nocturnal dyspnea and syncope  GASTROINTESTINAL: Chronic loose stool with Crohn's disease  MUSCULOSKELETAL: Chronic changes of osteoarthritis   nEUROLOGICAL:   negative for dizziness, headaches, seizures, speech problems, tremors and vertigo, history of peripheral neuropathy  INTEGUMENT: negative for pruritus, rash, skin color change and skin lesion(s)         History    Past  Medical History:   Diagnosis Date   • Arthritis    • Cancer (CMS/HCC)     endometrial cancer   • Crohn disease (CMS/HCC)    • Crohn's disease (CMS/HCC)    • DDD (degenerative disc disease), lumbar    • Elevated cholesterol    • GERD (gastroesophageal reflux disease)    • Gout    • History of transfusion    • Hypertension    • Intermittent self-catheterization of bladder    • Macular degeneration    • Osteopenia    • Peripheral neuropathy    • Urinary retention      Past Surgical History:   Procedure Laterality Date   • BACK SURGERY  2010   • CARPAL TUNNEL RELEASE Bilateral    • CATARACT EXTRACTION Bilateral    • COLONOSCOPY  10/09/2015   • COLONOSCOPY N/A 12/28/2016    Procedure: COLONOSCOPY WITH ANESTHESIA;  Surgeon: Jose Raul Butt DO;  Location:  [facility];  Service:    • COLONOSCOPY N/A 2/18/2019    Procedure: COLONOSCOPY WITH ANESTHESIA;  Surgeon: Jose Raul Butt DO;  Location:  [facility];  Service: Gastroenterology   • CYST REMOVAL      from chest x2   • HYSTERECTOMY     • MENISCECTOMY Left    • UPPER GASTROINTESTINAL ENDOSCOPY  08/31/2010     Family History   Problem Relation Age of Onset   • Ovarian cancer Mother    • Heart attack Father    • Colon cancer Neg Hx    • Colon polyps Neg Hx    • Esophageal cancer Neg Hx    • Liver cancer Neg Hx    • Liver disease Neg Hx    • Rectal cancer Neg Hx    • Stomach cancer Neg Hx      Social History     Tobacco Use   • Smoking status: Never Smoker   • Smokeless tobacco: Never Used   Substance Use Topics   • Alcohol use: No   • Drug use: No     Medications Prior to Admission   Medication Sig Dispense Refill Last Dose   • allopurinol (ZYLOPRIM) 100 MG tablet Take 100 mg by mouth 2 (Two) Times a Day.   2/16/2020 at 0800   • allopurinol (ZYLOPRIM) 300 MG tablet Take 300 mg by mouth Daily.   2/16/2020 at 0800   • calcium carbonate (OS-LUKE) 600 MG tablet Take 600 mg by mouth Daily.   2/16/2020 at 1800   • carvedilol (COREG) 6.25 MG tablet Take 6.25 mg by  mouth 2 (Two) Times a Day With Meals.   2/17/2020 at 0400   • colestipol (COLESTID) 1 g tablet TAKE 1 TABLET THREE TIMES A  tablet 4 2/16/2020 at 1800   • CRANBERRY PO Take 1 tablet by mouth Daily.   2/16/2020 at 1800   • Cyanocobalamin (VITAMIN B 12 PO) Take 1 tablet by mouth Daily.   2/16/2020 at 0800   • fenofibrate (TRICOR) 145 MG tablet    2/16/2020 at 1800   • folic acid (FOLVITE) 1 MG tablet Take 1 mg by mouth Daily.   2/16/2020 at 1800   • gabapentin (NEURONTIN) 300 MG capsule Take 300 mg by mouth 3 (Three) Times a Day. 2 pills tid   2/16/2020 at 0400   • HYDROcodone-acetaminophen (NORCO)  MG per tablet Take 1 tablet by mouth Every 6 (Six) Hours As Needed for Moderate Pain .   2/17/2020 at 0400   • indapamide (LOZOL) 2.5 MG tablet Take 2.5 mg by mouth Every Morning.   2/16/2020 at 0800   • mesalamine (DELZICOL) 400 MG capsule delayed-release delayed release capsule Take 2 capsules by mouth 3 (Three) Times a Day. Two pills tid 360 capsule 3 2/16/2020 at 1800   • Multiple Vitamins-Minerals (MULTIVITAMIN ADULT PO) Take 1 tablet by mouth Daily.   2/16/2020 at 0800   • nitrofurantoin (MACRODANTIN) 50 MG capsule Take 50 mg by mouth Every Night.   2/16/2020 at 1800   • aspirin 81 MG EC tablet Take 81 mg by mouth Daily.   2/9/2020   • estrogens, conjugated, (PREMARIN) 0.3 MG tablet Take 0.3 mg by mouth 2 (Two) Times a Week. Twice weekly    2/15/2020   • Garlic Oil 1000 MG capsule Take 1,000 mg by mouth Daily.   2/9/2020   • immune globulin, human, (GAMMAGARD) 10 GM/100ML solution infusion Infuse 400 mg/kg into a venous catheter Every 30 (Thirty) Days.   2/12/2020   • lidocaine (XYLOCAINE) 5 % ointment Apply 1 application topically to the appropriate area as directed Every 2 (Two) Hours As Needed for Mild Pain .   More than a month at Unknown time   • lisinopril (PRINIVIL,ZESTRIL) 40 MG tablet Take 40 mg by mouth Daily.   2/15/2020   • Omega-3 Fatty Acids (FISH OIL) 1000 MG capsule capsule Take 1,000 mg  by mouth Daily With Breakfast.   2/9/2020   • omeprazole (priLOSEC) 40 MG capsule Take 20 mg by mouth 2 (Two) Times a Day.   2/9/2020       Allergies:  Patient has no known allergies.    Objective     Vital Signs   Temp:  [97.3 °F (36.3 °C)-99.2 °F (37.3 °C)] 98.2 °F (36.8 °C)  Heart Rate:  [] 106  Resp:  [11-18] 16  BP: (107-172)/(48-67) 121/48          Physical Exam:  Constitutional: oriented to person, place, and time. appears well-developed.   HEENT:   Head: Normocephalic and atraumatic.   Eyes: Pupils are equal, round, and reactive to light.   Neck: Neck supple.   Cardiovascular: Regular rhythm and normal heart sounds.    Pulmonary/Chest: Effort normal and breath sounds normal. CTAB  Abdominal: Soft. Bowel sounds are normal. He exhibits no distension. There is no tenderness. There is no rebound and no guarding.   Musculoskeletal: Normal range of motion. He exhibits no edema or tenderness.   Neurological: He is alert and oriented to person, place, and time. He has normal reflexes.   Skin: Skin is warm and dry.     Results Review:   I reviewed the patient's new imaging results and agree with the interpretation.      Assessment/Plan       Crohn's disease without complication (CMS/HCC)    Lumbar stenosis    GERD without esophagitis    Essential hypertension    Chronic gout      Anemia post-op expected-check iron, B12,and folate. Replenish as needed.Transfuse at acceptable levels depending on clinical judgement and comorbidities. Recheck in AM      Constipation-start with Miralax 1 capful BID until BM, then decrease to 1 x a day,then can step up to Amitiza 24 mcg po BID which can be used for opioid induced constipation,and ultimately end up with Relistor 12 mcg subq every 48 hours to block the effect of narcotics on the gut.  Judicious use of stool softeners with history of loose stools associated with her Crohn's disease    GERD-exacerbated by pain meds and anesthesia, will add PPI as needed and can step up  to Carafate 1 gm po q AC and nightly.      Hypertension-review pre and post BP's,will restart home BP meds when BP allows. Add clonidine 0.1 mg every 4 hours prn if bp> 140/90,monitor BP and adjust meds as necessary.      Peripheral neuropathy- check B12, folate and B12.    Explained to patient and staff that we were consulted for medical management during their acute care hospitalization. They need to f/u with their regular primary care provider concerning any further treatment and review of abnormalities found during their hospitalization at [facility] and they agree with that treatment plan.

## 2020-02-17 NOTE — PLAN OF CARE
Problem: Patient Care Overview  Goal: Plan of Care Review  Outcome: Ongoing (interventions implemented as appropriate)  Flowsheets  Taken 2/17/2020 8297  Progress: improving  Taken 2/17/2020 1330  Plan of Care Reviewed With: patient  Note:   Patient medicated prn for pain with pain score maintaining 6-7/10.  Up to bedside commode with 2 assist and LSO brace.  Dressing CDI, baseline neuropathy to BLE, LLE weak/baseline.  2nd part of surgery scheduled for Wednesday.

## 2020-02-18 LAB
ANION GAP SERPL CALCULATED.3IONS-SCNC: 8 MMOL/L (ref 5–15)
BASOPHILS # BLD AUTO: 0.02 10*3/MM3 (ref 0–0.2)
BASOPHILS NFR BLD AUTO: 0.3 % (ref 0–1.5)
BUN BLD-MCNC: 22 MG/DL (ref 8–23)
BUN/CREAT SERPL: 28.6 (ref 7–25)
CALCIUM SPEC-SCNC: 9 MG/DL (ref 8.6–10.5)
CHLORIDE SERPL-SCNC: 107 MMOL/L (ref 98–107)
CO2 SERPL-SCNC: 28 MMOL/L (ref 22–29)
CREAT BLD-MCNC: 0.77 MG/DL (ref 0.57–1)
DEPRECATED RDW RBC AUTO: 45.6 FL (ref 37–54)
EOSINOPHIL # BLD AUTO: 0.02 10*3/MM3 (ref 0–0.4)
EOSINOPHIL NFR BLD AUTO: 0.3 % (ref 0.3–6.2)
ERYTHROCYTE [DISTWIDTH] IN BLOOD BY AUTOMATED COUNT: 14.2 % (ref 12.3–15.4)
FOLATE SERPL-MCNC: >20 NG/ML (ref 4.78–24.2)
GFR SERPL CREATININE-BSD FRML MDRD: 73 ML/MIN/1.73
GLUCOSE BLD-MCNC: 103 MG/DL (ref 65–99)
HBA1C MFR BLD: 5.8 % (ref 4.8–5.6)
HCT VFR BLD AUTO: 28.1 % (ref 34–46.6)
HGB BLD-MCNC: 9.1 G/DL (ref 12–15.9)
IMM GRANULOCYTES # BLD AUTO: 0.04 10*3/MM3 (ref 0–0.05)
IMM GRANULOCYTES NFR BLD AUTO: 0.5 % (ref 0–0.5)
LYMPHOCYTES # BLD AUTO: 0.97 10*3/MM3 (ref 0.7–3.1)
LYMPHOCYTES NFR BLD AUTO: 13.2 % (ref 19.6–45.3)
MCH RBC QN AUTO: 28.9 PG (ref 26.6–33)
MCHC RBC AUTO-ENTMCNC: 32.4 G/DL (ref 31.5–35.7)
MCV RBC AUTO: 89.2 FL (ref 79–97)
MONOCYTES # BLD AUTO: 1.07 10*3/MM3 (ref 0.1–0.9)
MONOCYTES NFR BLD AUTO: 14.5 % (ref 5–12)
NEUTROPHILS # BLD AUTO: 5.25 10*3/MM3 (ref 1.7–7)
NEUTROPHILS NFR BLD AUTO: 71.2 % (ref 42.7–76)
NRBC BLD AUTO-RTO: 0 /100 WBC (ref 0–0.2)
PLATELET # BLD AUTO: 187 10*3/MM3 (ref 140–450)
PMV BLD AUTO: 10.6 FL (ref 6–12)
POTASSIUM BLD-SCNC: 3.4 MMOL/L (ref 3.5–5.2)
RBC # BLD AUTO: 3.15 10*6/MM3 (ref 3.77–5.28)
SODIUM BLD-SCNC: 143 MMOL/L (ref 136–145)
VIT B12 BLD-MCNC: 699 PG/ML (ref 211–946)
WBC NRBC COR # BLD: 7.37 10*3/MM3 (ref 3.4–10.8)

## 2020-02-18 PROCEDURE — 85025 COMPLETE CBC W/AUTO DIFF WBC: CPT | Performed by: ORTHOPAEDIC SURGERY

## 2020-02-18 PROCEDURE — 97535 SELF CARE MNGMENT TRAINING: CPT

## 2020-02-18 PROCEDURE — 82607 VITAMIN B-12: CPT | Performed by: FAMILY MEDICINE

## 2020-02-18 PROCEDURE — 25010000003 CEFAZOLIN 1-4 GM/50ML-% SOLUTION: Performed by: ORTHOPAEDIC SURGERY

## 2020-02-18 PROCEDURE — 82746 ASSAY OF FOLIC ACID SERUM: CPT | Performed by: FAMILY MEDICINE

## 2020-02-18 PROCEDURE — 80048 BASIC METABOLIC PNL TOTAL CA: CPT | Performed by: ORTHOPAEDIC SURGERY

## 2020-02-18 PROCEDURE — 63710000001 ONDANSETRON PER 8 MG: Performed by: ORTHOPAEDIC SURGERY

## 2020-02-18 PROCEDURE — P9612 CATHETERIZE FOR URINE SPEC: HCPCS

## 2020-02-18 PROCEDURE — 83036 HEMOGLOBIN GLYCOSYLATED A1C: CPT | Performed by: FAMILY MEDICINE

## 2020-02-18 PROCEDURE — 97162 PT EVAL MOD COMPLEX 30 MIN: CPT

## 2020-02-18 PROCEDURE — 97166 OT EVAL MOD COMPLEX 45 MIN: CPT

## 2020-02-18 RX ORDER — MONTELUKAST SODIUM 4 MG/1
1 TABLET, CHEWABLE ORAL
Status: DISCONTINUED | OUTPATIENT
Start: 2020-02-18 | End: 2020-02-21 | Stop reason: HOSPADM

## 2020-02-18 RX ADMIN — OXYCODONE HYDROCHLORIDE AND ACETAMINOPHEN 1 TABLET: 10; 325 TABLET ORAL at 16:56

## 2020-02-18 RX ADMIN — OXYCODONE HYDROCHLORIDE AND ACETAMINOPHEN 2 TABLET: 10; 325 TABLET ORAL at 02:55

## 2020-02-18 RX ADMIN — COLESTIPOL HYDROCHLORIDE 1 G: 1 TABLET, FILM COATED ORAL at 16:55

## 2020-02-18 RX ADMIN — MESALAMINE 800 MG: 400 CAPSULE, DELAYED RELEASE ORAL at 08:10

## 2020-02-18 RX ADMIN — NITROFURANTOIN MACROCRYSTALS 50 MG: 50 CAPSULE ORAL at 21:21

## 2020-02-18 RX ADMIN — FENOFIBRATE 145 MG: 145 TABLET ORAL at 08:08

## 2020-02-18 RX ADMIN — OXYCODONE HYDROCHLORIDE AND ACETAMINOPHEN 1 TABLET: 10; 325 TABLET ORAL at 21:22

## 2020-02-18 RX ADMIN — SODIUM CHLORIDE 75 ML/HR: 9 INJECTION, SOLUTION INTRAVENOUS at 18:16

## 2020-02-18 RX ADMIN — OXYCODONE HYDROCHLORIDE AND ACETAMINOPHEN 1 TABLET: 10; 325 TABLET ORAL at 12:28

## 2020-02-18 RX ADMIN — INDAPAMIDE 2.5 MG: 2.5 TABLET, FILM COATED ORAL at 08:08

## 2020-02-18 RX ADMIN — Medication 250 MCG: at 08:08

## 2020-02-18 RX ADMIN — GABAPENTIN 300 MG: 300 CAPSULE ORAL at 16:56

## 2020-02-18 RX ADMIN — SODIUM CHLORIDE, PRESERVATIVE FREE 3 ML: 5 INJECTION INTRAVENOUS at 08:11

## 2020-02-18 RX ADMIN — COLESTIPOL HYDROCHLORIDE 1 G: 1 TABLET, FILM COATED ORAL at 11:34

## 2020-02-18 RX ADMIN — CEFAZOLIN SODIUM 1 G: 1 INJECTION, SOLUTION INTRAVENOUS at 02:15

## 2020-02-18 RX ADMIN — GABAPENTIN 300 MG: 300 CAPSULE ORAL at 08:12

## 2020-02-18 RX ADMIN — LISINOPRIL 40 MG: 20 TABLET ORAL at 09:35

## 2020-02-18 RX ADMIN — TIZANIDINE 4 MG: 4 TABLET ORAL at 02:15

## 2020-02-18 RX ADMIN — ALLOPURINOL 100 MG: 100 TABLET ORAL at 08:10

## 2020-02-18 RX ADMIN — ONDANSETRON 4 MG: 4 TABLET, FILM COATED ORAL at 02:15

## 2020-02-18 RX ADMIN — GABAPENTIN 300 MG: 300 CAPSULE ORAL at 21:22

## 2020-02-18 RX ADMIN — OXYCODONE HYDROCHLORIDE AND ACETAMINOPHEN 1 TABLET: 10; 325 TABLET ORAL at 08:11

## 2020-02-18 RX ADMIN — ALLOPURINOL 100 MG: 100 TABLET ORAL at 21:22

## 2020-02-18 RX ADMIN — ALLOPURINOL 300 MG: 300 TABLET ORAL at 08:07

## 2020-02-18 RX ADMIN — CARVEDILOL 6.25 MG: 6.25 TABLET, FILM COATED ORAL at 08:08

## 2020-02-18 RX ADMIN — FAMOTIDINE 20 MG: 20 TABLET ORAL at 08:08

## 2020-02-18 RX ADMIN — TIZANIDINE 4 MG: 4 TABLET ORAL at 12:27

## 2020-02-18 RX ADMIN — FOLIC ACID 1 MG: 1 TABLET ORAL at 08:08

## 2020-02-18 RX ADMIN — CEFAZOLIN SODIUM 1 G: 1 INJECTION, SOLUTION INTRAVENOUS at 09:35

## 2020-02-18 RX ADMIN — Medication 1 TABLET: at 08:08

## 2020-02-18 RX ADMIN — MESALAMINE 800 MG: 400 CAPSULE, DELAYED RELEASE ORAL at 21:22

## 2020-02-18 RX ADMIN — MESALAMINE 800 MG: 400 CAPSULE, DELAYED RELEASE ORAL at 16:56

## 2020-02-18 RX ADMIN — CARVEDILOL 6.25 MG: 6.25 TABLET, FILM COATED ORAL at 16:55

## 2020-02-18 RX ADMIN — FAMOTIDINE 20 MG: 20 TABLET ORAL at 21:22

## 2020-02-18 NOTE — PROGRESS NOTES
Orthopedic Spine Progress Note    Deisi Ponce  74 y.o.  female  Subjective     Post-Operative Day # 1    Systemic or Specific Complaints:     Doing ok, pain controlled. Complaints of back pain last night.     Objective     Vital signs in last 24 hours:  Temp:  [98 °F (36.7 °C)-99 °F (37.2 °C)] 99 °F (37.2 °C)  Heart Rate:  [60-85] 62  Resp:  [16-18] 16  BP: ()/(35-58) 105/40    Physical Exam:    A&Ox3 NAD  Incision CDI  B. LE NVI    Diagnostic Data:  CBC:  Results from last 7 days   Lab Units 02/18/20  0528   WBC 10*3/mm3 7.37   RBC 10*6/mm3 3.15*   HEMOGLOBIN g/dL 9.1*   HEMATOCRIT % 28.1*   PLATELETS 10*3/mm3 187          Assessment/Plan     1. Status Post Left lateral lumbar interbody fusion L1-2, L2-3      Plan:  1. PT/OT   2. Brace, cont current pain control  3. Posterior surgery tomorrow       ANGELO Baron    Date: 2/18/2020  Time: 4:14 PM

## 2020-02-18 NOTE — THERAPY EVALUATION
Patient Name: Deisi Ponce  : 1945    MRN: [id number]                              Today's Date: 2020       Admit Date: 2020    Visit Dx:     ICD-10-CM ICD-9-CM   1. Impaired functional mobility, balance, gait, and endurance Z74.09 V49.89     Patient Active Problem List   Diagnosis   • Crohn's disease of small and large intestines with complication (CMS/HCC)   • Crohn's disease of both small and large intestine without complication (CMS/HCC)   • Crohn's disease without complication (CMS/HCC)   • Lumbar stenosis   • GERD without esophagitis   • Essential hypertension   • Chronic gout     Past Medical History:   Diagnosis Date   • Arthritis    • Cancer (CMS/HCC)     endometrial cancer   • Crohn disease (CMS/HCC)    • Crohn's disease (CMS/HCC)    • DDD (degenerative disc disease), lumbar    • Elevated cholesterol    • GERD (gastroesophageal reflux disease)    • Gout    • History of transfusion    • Hypertension    • Intermittent self-catheterization of bladder    • Macular degeneration    • Osteopenia    • Peripheral neuropathy    • Urinary retention      Past Surgical History:   Procedure Laterality Date   • BACK SURGERY     • CARPAL TUNNEL RELEASE Bilateral    • CATARACT EXTRACTION Bilateral    • COLONOSCOPY  10/09/2015   • COLONOSCOPY N/A 2016    Procedure: COLONOSCOPY WITH ANESTHESIA;  Surgeon: Jose Raul Butt DO;  Location:  [facility];  Service:    • COLONOSCOPY N/A 2019    Procedure: COLONOSCOPY WITH ANESTHESIA;  Surgeon: Jose Raul Butt DO;  Location: [facility];  Service: Gastroenterology   • CYST REMOVAL      from chest x2   • HYSTERECTOMY     • MENISCECTOMY Left    • UPPER GASTROINTESTINAL ENDOSCOPY  2010     General Information     Row Name 20 0840          PT Evaluation Time/Intention    Document Type  evaluation  (Pended)  lumbar stenosis; s/p Left LLIF L1-3, anterior spinal fusion L1-2; B anterior tib weakness; limb length discrepancy L  shorter than R; severe L buttocks and anterior thigh radiculopathy; chronic gout; s/p 2010 LLIF L3-5, PSF with intrumentation L3-5  -AN     Mode of Treatment  physical therapy;concurrent therapy  (Pended)   -AN     Row Name 02/18/20 0840          General Information    Patient Profile Reviewed?  yes  (Pended)   -AN     Prior Level of Function  independent:;all household mobility;ADL's;dressing;bathing;max assist:;driving;cleaning;cooking  (Pended)   -AN     Existing Precautions/Restrictions  brace worn when out of bed;fall;spinal  (Pended)   -AN     Barriers to Rehab  previous functional deficit;impaired sensation  (Pended)   -AN     Row Name 02/18/20 0840          Relationship/Environment    Lives With  child(adolfo), adult  (Pended)   -AN     Name(s) of Who Lives With Patient  Kasandra  (Pended)   -AN     Row Name 02/18/20 0840          Resource/Environmental Concerns    Current Living Arrangements  home/apartment/condo  (Pended)   -AN     Row Name 02/18/20 0840          Home Main Entrance    Number of Stairs, Main Entrance  three  (Pended)   -AN     Stair Railings, Main Entrance  none  (Pended)   -AN     Row Name 02/18/20 0840          Cognitive Assessment/Intervention- PT/OT    Orientation Status (Cognition)  oriented x 4  (Pended)   -AN     Personal Safety Interventions  fall prevention program maintained;gait belt;nonskid shoes/slippers when out of bed;supervised activity  (Pended)   -AN     Row Name 02/18/20 0840          Safety Issues, Functional Mobility    Impairments Affecting Function (Mobility)  balance;endurance/activity tolerance;shortness of breath;sensation/sensory awareness;pain  (Pended)   -AN       User Key  (r) = Recorded By, (t) = Taken By, (c) = Cosigned By    Initials Name Provider Type    AN Cheryle Acevedo, PT Student PT Student        Mobility     Row Name 02/18/20 0840          Bed Mobility Assessment/Treatment    Bed Mobility Assessment/Treatment  rolling left;sidelying-sit  (Pended)   -AN      Rolling Left [place] (Bed Mobility)  verbal cues;supervision  (Pended)   -AN     Sidelying-Sit [place] (Bed Mobility)  contact guard;verbal cues  (Pended)   -AN     Assistive Device (Bed Mobility)  bed rails  (Pended)   -AN     Comment (Bed Mobility)  pt will have hospital bed for approximately one week upon returning home  (Pended)   -AN     Row Name 02/18/20 0840          Sit-Stand Transfer    Sit-Stand [place] (Transfers)  supervision;verbal cues  (Pended)  verbal cues for safety with walker  -AN     Assistive Device (Sit-Stand Transfers)  walker, front-wheeled  (Pended)   -AN     Row Name 02/18/20 0840          Gait/Stairs Assessment/Training    Gait/Stairs Assessment/Training  gait/ambulation assistive device  (Pended)   -AN     Trevor Level (Gait)  supervision  (Pended)   -AN     Assistive Device (Gait)  walker, front-wheeled  (Pended)   -AN     Distance in Feet (Gait)  100ft  (Pended)   -AN     Deviations/Abnormal Patterns (Gait)  gait speed decreased  (Pended)   -AN     Left Sided Gait Deviations  heel strike decreased  (Pended)  with AFO  -AN       User Key  (r) = Recorded By, (t) = Taken By, (c) = Cosigned By    Initials Name Provider Type    AN Cheryle Acevedo, PT Student PT Student        Obj/Interventions     Row Name 02/18/20 0840          General ROM    GENERAL ROM COMMENTS  BUE AROM shoulders 25% impaired; B AROM DF 25% impaired, PROM WFL  (Pended)   -AN     Row Name 02/18/20 0840          MMT (Manual Muscle Testing)    General MMT Comments  globally 4-/5  (Pended)   -AN     Row Name 02/18/20 0840          Static Sitting Balance    Level of [place] (Unsupported Sitting, Static Balance)  supervision  (Pended)   -AN     Sitting Position (Unsupported Sitting, Static Balance)  sitting on edge of bed  (Pended)   -AN     Row Name 02/18/20 0840          Dynamic Sitting Balance    Level of Trevor, Reaches Outside Midline (Sitting, Dynamic Balance)  contact guard assist   (Pended)   -AN     Sitting Position, Reaches Outside Midline (Sitting, Dynamic Balance)  sitting on edge of bed  (Pended)   -AN     Row Name 02/18/20 0840          Static Standing Balance    Level of [place] (Supported Standing, Static Balance)  conditional independence  (Pended)   -AN     Assistive Device Utilized (Supported Standing, Static Balance)  walker, rolling  (Pended)   -AN     Row Name 02/18/20 0840          Dynamic Standing Balance    Level of [place], Reaches Outside Midline (Standing, Dynamic Balance)  supervision  (Pended)   -AN     Assistive Device Utilized (Supported Standing, Dynamic Balance)  walker, rolling  (Pended)   -AN     Row Name 02/18/20 0840          Sensory Assessment/Intervention    Sensory General Assessment  --  (Pended)  diminished sensation to BLE below knees  -AN       User Key  (r) = Recorded By, (t) = Taken By, (c) = Cosigned By    Initials Name Provider Type    AN Cheryle Acevedo, PT Student PT Student        Goals/Plan     Row Name 02/18/20 0840          Transfer Goal 1 (PT)    Activity/Assistive Device (Transfer Goal 1, PT)  sit-to-stand/stand-to-sit;bed-to-chair/chair-to-bed;walker, rolling  (Pended)  demonstrate safety with AD  -AN     [place] Level/Cues Needed (Transfer Goal 1, PT)  conditional independence  (Pended)   -AN     Time Frame (Transfer Goal 1, PT)  long term goal (LTG);by discharge  (Pended)   -AN     Progress/Outcome (Transfer Goal 1, PT)  goal ongoing  (Pended)   -AN     [facility] Name 02/18/20 0840          Gait Training Goal 1 (PT)    Activity/Assistive Device (Gait Training Goal 1, PT)  gait (walking locomotion);assistive device use;decrease fall risk;diminish gait deviation;improve balance and speed;increase endurance/gait distance;normalize weight shifts;walker, rolling  (Pended)   -AN     [place] Level (Gait Training Goal 1, PT)  conditional independence  (Pended)   -AN     Distance (Gait Goal 1, PT)  500ft  (Pended)   -AN     Time Frame  (Gait Training Goal 1, PT)  long term goal (LTG);by discharge  (Pended)   -AN     Progress/Outcome (Gait Training Goal 1, PT)  goal ongoing  (Pended)   -AN     Row Name 02/18/20 0840          Stairs Goal 1 (PT)    Activity/Assistive Device (Stairs Goal 1, PT)  ascending stairs;descending stairs;decrease fall risk;improve balance and speed;assistive device use;walker, rolling  (Pended)  no handrail  -AN     [place] Level/Cues Needed (Stairs Goal 1, PT)  supervision required  (Pended)   -AN     Number of Stairs (Stairs Goal 1, PT)  3  (Pended)   -AN     Time Frame (Stairs Goal 1, PT)  long term goal (LTG);by discharge  (Pended)   -AN     Progress/Outcome (Stairs Goal 1, PT)  goal ongoing  (Pended)   -AN     Row Name 02/18/20 0840          Patient Education Goal (PT)    Activity (Patient Education Goal, PT)  Pt will independently demonstrate how to jose/doff LSO brace and state spinal precautions.   (Pended)   -AN     [place]/Cues/Accuracy (Memory Goal 2, PT)  demonstrates adequately;independent;verbalizes understanding  (Pended)   -AN     Time Frame (Patient Education Goal, PT)  long term goal (LTG);by discharge  (Pended)   -AN     Progress/Outcome (Patient Education Goal, PT)  goal ongoing  (Pended)   -AN       User Key  (r) = Recorded By, (t) = Taken By, (c) = Cosigned By    Initials Name Provider Type    AN Cheryle Acevedo, PT Student PT Student        Clinical Impression     Row Name 02/18/20 0840          Pain Scale: Numbers Pre/Post-Treatment    Pain Scale: Numbers, Pretreatment  6/10  (Pended)   -AN     Pain Location - Orientation  lower  (Pended)   -AN     Pain Location  back  (Pended)   -AN     Pre/Post Treatment Pain Comment  radiating through LLE   (Pended)   -AN     Pain Intervention(s)  Medication (See MAR);Repositioned;Ambulation/increased activity  (Pended)   -AN     Row Name 02/18/20 0840          Plan of Care Review    Plan of Care Reviewed With  patient  (Pended)   -AN     Progress  no  change  (Pended)   -AN     Outcome Summary  PT evaluation completed. Pt demonstrated good understanding of LSO brace and spinal precautions. Pt followed directions well with log rolling for bed mobility. Pt demo's weak B anterior tibialis muscles and L hip flexors. Pt is able to jose R AFO with min A but requires max A for LLE. Pt ambulates with RW, B AFOs, foot flat contact with LLE, and slight forward flexed posture. Pt requires assitance for self-care with voiding in bathroom. Pt will benefit from skilled PT services for the noted deficits. Recommend pt discharge home with assist and HH services.   (Pended)   -AN     Row Name 02/18/20 0840          Physical Therapy Clinical Impression    Patient/Family Goals Statement (PT Clinical Impression)  To improve gait and activity tolerance.   (Pended)   -AN     Criteria for Skilled Interventions Met (PT Clinical Impression)  yes;treatment indicated  (Pended)   -AN     Rehab Potential (PT Clinical Summary)  good, to achieve stated therapy goals  (Pended)   -AN     Predicted Duration of Therapy (PT)  until discharge  (Pended)   -AN     Row Name 02/18/20 0840          Positioning and Restraints    Pre-Treatment Position  in bed  (Pended)   -AN     Post Treatment Position  chair  (Pended)   -AN     In Chair  sitting;call light within reach;encouraged to call for assist;with family/caregiver;with brace  (Pended)   -AN       User Key  (r) = Recorded By, (t) = Taken By, (c) = Cosigned By    Initials Name Provider Type    AN Cheryle Acevedo, PT Student PT Student        Outcome Measures     Row Name 02/18/20 0840          How much help from another person do you currently need...    Turning from your back to your side while in flat bed without using bedrails?  4  (Pended)   -AN     Moving from lying on back to sitting on the side of a flat bed without bedrails?  4  (Pended)   -AN     Moving to and from a bed to a chair (including a wheelchair)?  3  (Pended)   -AN     Standing up  from a chair using your arms (e.g., wheelchair, bedside chair)?  4  (Pended)   -AN     Climbing 3-5 steps with a railing?  3  (Pended)   -AN     To walk in hospital room?  4  (Pended)   -AN     AM-PAC 6 Clicks Score (PT)  22  (Pended)   -SF (r) AN (t)     Row Name 02/18/20 0840          Functional Assessment    Outcome Measure Options  AM-PAC 6 Clicks Basic Mobility (PT)  (Pended)   -AN       User Key  (r) = Recorded By, (t) = Taken By, (c) = Cosigned By    Initials Name Provider Type    Suze Granados, RN Registered Nurse    Cheryle Garner, PT Student PT Student          PT Recommendation and Plan  Planned Therapy Interventions (PT Eval): (P) gait training, patient/family education, lumbar stabilization, postural re-education, ROM (range of motion), stair training, strengthening, transfer training, balance training  Outcome Summary/Treatment Plan (PT)  Anticipated Discharge Disposition (PT): (P) home with assist, home with home health  Plan of Care Reviewed With: (P) patient  Progress: (P) improving  Outcome Summary: (P) PT evaluation completed. Pt demonstrated good understanding of LSO brace and spinal precautions. Pt followed directions well with log rolling for bed mobility. Pt demo's weak B anterior tibialis muscles and L hip flexors. Pt is able to jose R AFO with min A but requires max A for LLE. Pt ambulates with RW, B AFOs, foot flat contact with LLE, and slight forward flexed posture. Pt requires assitance for self-care with voiding in bathroom. Pt will benefit from skilled PT services for the noted deficits. Recommend pt discharge home with assist and HH services.     Time Calculation:   PT Charges     Row Name 02/18/20 0901             Time Calculation    Start Time  0734  (Pended)   -AN      Stop Time  0836  (Pended)   -AN      Time Calculation (min)  62 min  (Pended)   -AN      PT Received On  02/18/20  (Pended)   -AN      PT Goal Re-Cert Due Date  02/28/20  (Pended)   -AN        User Key  (r)  = Recorded By, (t) = Taken By, (c) = Cosigned By    Initials Name Provider Type    AN Cheryle Acevedo, PT Student PT Student        Therapy Charges for Today     Code Description Service Date Service Provider Modifiers Qty    53556499625 HC PT EVAL MOD COMPLEXITY 4 2/18/2020 Cheryle Acevedo, PT Student GP 1          PT G-Codes  Outcome Measure Options: (P) AM-PAC 6 Clicks Basic Mobility (PT)  AM-PAC 6 Clicks Score (PT): (P) 22  AM-PAC 6 Clicks Score (OT): 19    Cheryle Acevedo PT Student  2/18/2020

## 2020-02-18 NOTE — PLAN OF CARE
Problem: Patient Care Overview  Goal: Plan of Care Review  Outcome: Ongoing (interventions implemented as appropriate)  Flowsheets  Taken 2/18/2020 0947 by Laya Cui, OTR/L, CNT  Progress: improving  Outcome Summary: OT evaluation completed.  Pt demo need for skilled OT services.  Pt required CGA for bed mobility, functional transfers, and ambulation in the hallway and room.  Pt required min A for donning LSO brace appropriately.  Pt completed LB dressing of her shoes and AFO with min-mod A.  Pt completed toileting task with CGA for standing balance during hygiene and clothing management.  Pt is limited due to impaired balance, decreased AROM, arthritic deformity of hands, pain, im paired motor control of LLE, and impaired LE sensation.  OT will cont to follow and recommends pt to discharge home with assist with HH OT and PT.  Pt to have second sx tomorrow WED 2/19.  Taken 2/18/2020 0840 by Cheryle Acevedo, PT Student  Plan of Care Reviewed With: patient (Pended)

## 2020-02-18 NOTE — PLAN OF CARE
Problem: Patient Care Overview  Goal: Plan of Care Review  Outcome: Ongoing (interventions implemented as appropriate)  Flowsheets (Taken 2/18/2020 0512)  Progress: no change  Plan of Care Reviewed With: patient  Outcome Summary: Pt A&O, JESS MENDEZ slips when pt gets up, states this is baseline. Pt states she has to I&O cath herself at home, is able to void, but still retains 600. CO pain, tolerates meds as ordered.

## 2020-02-18 NOTE — PLAN OF CARE
Problem: Patient Care Overview  Goal: Plan of Care Review  Outcome: Ongoing (interventions implemented as appropriate)  Flowsheets (Taken 2/18/2020 1732)  Progress: improving  Plan of Care Reviewed With: patient; family  Outcome Summary: Will have part 2 of staged surgery tomorrow, NPO at midnight. Up with LSO brace, safety maintained. Bed mobility is good. Working with PT/OT. continue to monitor.

## 2020-02-18 NOTE — PROGRESS NOTES
Discharge Planning Assessment  [facility]     Patient Name: Deisi Ponce  MRN: [id number]  Today's Date: 2/18/2020    Admit Date: 2/17/2020    Discharge Needs Assessment     Row Name 02/18/20 1441       Living Environment    Lives With  child(adolfo), adult  (Pended)     Name(s) of Who Lives With Patient  Lives w/her daughter, Kasandra.   (Pended)     Current Living Arrangements  home/apartment/condo  (Pended)     Primary Care Provided by  self  (Pended)     Provides Primary Care For  no one  (Pended)     Family Caregiver if Needed  child(adolfo), adult  (Pended)     Quality of Family Relationships  helpful;involved;supportive  (Pended)        Resource/Environmental Concerns    Resource/Environmental Concerns  none  (Pended)        Transition Planning    Patient/Family Anticipates Transition to  home with help/services  (Pended)     Patient/Family Anticipated Services at Transition  home health care  (Pended)     Transportation Anticipated  family or friend will provide  (Pended)        Discharge Needs Assessment    Readmission Within the Last 30 Days  no previous admission in last 30 days  (Pended)     Concerns to be Addressed  discharge planning  (Pended)     Equipment Currently Used at Home  walker, rolling;cane, quad  (Pended)     Equipment Needed After Discharge  hospital bed  (Pended)     Outpatient/Agency/Support Group Needs  homecare agency  (Pended)     Discharge Coordination/Progress  PT HAS RX COVERAGE AND A PCP. PT LIVED AT HOME PRIOR TO ADMISSION WITH DAUGHTER AND PLANS TO RETURN HOME AT D/C WITH DAUGHTER. PT IS SCHEDULED FOR SURGERY 2/19/20. PT IS REQUESTING HH. PT IS REQUESTING A HOSPITAL BED.  WILL FOLLOW AND ASSIST WITH ANY D/C NEEDS THAT MAY ARISE.   (Pended)         Discharge Plan    No documentation.       Destination      Coordination has not been started for this encounter.      Durable Medical Equipment      Coordination has not been started for this encounter.      Dialysis/Infusion       Coordination has not been started for this encounter.      Home Medical Care      Coordination has not been started for this encounter.      Therapy      Coordination has not been started for this encounter.      Community Resources      Coordination has not been started for this encounter.          Demographic Summary    No documentation.       Functional Status    No documentation.       Psychosocial    No documentation.       Abuse/Neglect    No documentation.       Legal    No documentation.       Substance Abuse    No documentation.       Patient Forms    No documentation.           Aiyana Tran

## 2020-02-18 NOTE — THERAPY EVALUATION
Acute Care - Occupational Therapy Initial Evaluation  [facility]     Patient Name: Deisi Ponce  : 1945  MRN: [id number]  Today's Date: 2020  Onset of Illness/Injury or Date of Surgery: 20  Date of Referral to OT: 20  Referring Physician: Dr. Rader    Admit Date: 2020       ICD-10-CM ICD-9-CM   1. Impaired functional mobility, balance, gait, and endurance Z74.09 V49.89     Patient Active Problem List   Diagnosis   • Crohn's disease of small and large intestines with complication (CMS/HCC)   • Crohn's disease of both small and large intestine without complication (CMS/HCC)   • Crohn's disease without complication (CMS/HCC)   • Lumbar stenosis   • GERD without esophagitis   • Essential hypertension   • Chronic gout     Past Medical History:   Diagnosis Date   • Arthritis    • Cancer (CMS/HCC)     endometrial cancer   • Crohn disease (CMS/HCC)    • Crohn's disease (CMS/HCC)    • DDD (degenerative disc disease), lumbar    • Elevated cholesterol    • GERD (gastroesophageal reflux disease)    • Gout    • History of transfusion    • Hypertension    • Intermittent self-catheterization of bladder    • Macular degeneration    • Osteopenia    • Peripheral neuropathy    • Urinary retention      Past Surgical History:   Procedure Laterality Date   • BACK SURGERY     • CARPAL TUNNEL RELEASE Bilateral    • CATARACT EXTRACTION Bilateral    • COLONOSCOPY  10/09/2015   • COLONOSCOPY N/A 2016    Procedure: COLONOSCOPY WITH ANESTHESIA;  Surgeon: Jose Raul Butt DO;  Location: [facility] ENDOSCOPY;  Service:    • COLONOSCOPY N/A 2019    Procedure: COLONOSCOPY WITH ANESTHESIA;  Surgeon: Jose Raul Butt DO;  Location: [facility] ENDOSCOPY;  Service: Gastroenterology   • CYST REMOVAL      from chest x2   • HYSTERECTOMY     • MENISCECTOMY Left    • UPPER GASTROINTESTINAL ENDOSCOPY  2010          OT ASSESSMENT FLOWSHEET (last 12 hours)      Occupational Therapy Evaluation     Row Name  02/18/20 0732                   OT Evaluation Time/Intention    Subjective Information  complains of;pain  -CS        Document Type  evaluation  -CS        Mode of Treatment  occupational therapy;concurrent therapy  -CS           General Information    Patient Profile Reviewed?  yes  -CS        Onset of Illness/Injury or Date of Surgery  02/17/20  -CS        Referring Physician  Dr. Rader  -CS        Patient Observations  alert;cooperative;agree to therapy  -CS        Patient/Family Observations  daughter present in room  -CS        General Observations of Patient  pt fowlers in bed, O2 via NC, cont pulse ox, IV  -CS        Prior Level of Function  independent:;all household mobility;ADL's;dressing;bathing;max assist:;cooking;driving;cleaning  -CS        Equipment Currently Used at Home  cane, quad;raised toilet;rollator built in shower seat, reacher  -CS        Pertinent History of Current Functional Problem  Pt presents with increasing back pain.  Pt has history of LLE weakness and LLE shorter than RLE at baseline.  DX; s/p L LLIF L1-2, L2-3  -CS        Existing Precautions/Restrictions  brace worn when out of bed;spinal BLE AFO braces  -CS        Barriers to Rehab  previous functional deficit;medically complex  -CS           Relationship/Environment    Lives With  child(adolfo), adult  -CS        Name(s) of Who Lives With Patient  Kasandra  -CS           Resource/Environmental Concerns    Current Living Arrangements  home/apartment/condo  -CS           Home Main Entrance    Number of Stairs, Main Entrance  three  -CS        Stair Railings, Main Entrance  none  -CS           Cognitive Assessment/Interventions    Additional Documentation  Cognitive Assessment/Intervention (Group)  -CS           Cognitive Assessment/Intervention- PT/OT    Affect/Mental Status (Cognitive)  WNL  -CS        Orientation Status (Cognition)  oriented x 4  -CS        Follows Commands (Cognition)  WNL  -CS        Cognitive Function (Cognitive)   WNL  -CS        Personal Safety Interventions  fall prevention program maintained;gait belt;nonskid shoes/slippers when out of bed;supervised activity  -CS           Safety Issues, Functional Mobility    Impairments Affecting Function (Mobility)  balance;endurance/activity tolerance;pain;strength;shortness of breath;muscle tone abnormal;motor control  -CS           Bed Mobility Assessment/Treatment    Bed Mobility Assessment/Treatment  rolling right;sidelying-sit  -CS        Rolling Right [place] (Bed Mobility)  supervision;verbal cues  -CS        Sidelying-Sit [place] (Bed Mobility)  contact guard;verbal cues  -CS        Comment (Bed Mobility)  Pt reports she will have a hospital bed for a couple of weeks at home.  -CS           Functional Mobility    Functional Mobility- Ind. Level  contact guard assist;verbal cues required  -CS        Functional Mobility- Device  rolling walker  -        Functional Mobility- Comment  Pt walked in hallway, in [facility], and back to bedside chair  -CS           Transfer Assessment/Treatment    Transfer Assessment/Treatment  sit-stand transfer;stand-sit transfer;toilet transfer  -           Sit-Stand Transfer    Sit-Stand [place] (Transfers)  contact guard  -CS        Assistive Device (Sit-Stand Transfers)  walker, front-wheeled  -CS           Stand-Sit Transfer    Stand-Sit [place] (Transfers)  contact guard;verbal cues  -CS        Assistive Device (Stand-Sit Transfers)  walker, front-wheeled  -           Toilet Transfer    Type (Toilet Transfer)  stand-sit;sit-stand  -        [place] Level (Toilet Transfer)  contact guard;verbal cues  -CS        Assistive Device (Toilet Transfer)  commode;grab bars/safety frame;walker, front-wheeled  -           ADL Assessment/Intervention    BADL Assessment/Intervention  lower body dressing;toileting  -           Lower Body Dressing Assessment/Training    Lower Body Dressing [place] Level   don;shoes/slippers;minimum assist (75% patient effort) to include AFOs  -CS        Lower Body Dressing Position  edge of bed sitting  -CS        Comment (Lower Body Dressing)  OT assisted with tying laces of L shoe.  OT assisted donning R shoe and AFO due to pt's inability to maintain spinal precautions and jose shoe/AFO.  OT provided pt with handout regarding AE to assist with LB dressing and elastic shoe laces.  -CS           Toileting Assessment/Training    [place] Level (Toileting)  adjust/manage clothing;minimum assist (75% patient effort);perform perineal hygiene;contact guard assist  -CS        Assistive Devices (Toileting)  commode;grab bar/safety frame  -CS           BADL Safety/Performance    Impairments, BADL Safety/Performance  balance;endurance/activity tolerance;muscle tone abnormality;motor control;shortness of breath;strength  -CS           General ROM    GENERAL ROM COMMENTS  BUE shoulder AROM impaired 25% yet still functional, pt has some arthritic deformity of luis carlos hands, however it has not limited her thus far  -CS           MMT (Manual Muscle Testing)    General MMT Comments  BUE functional strength: 4/5  -CS           Motor Assessment/Interventions    Additional Documentation  Balance (Group)  -CS           Balance    Balance  static sitting balance;static standing balance;dynamic sitting balance;dynamic standing balance  -CS           Static Sitting Balance    Level of [place] (Unsupported Sitting, Static Balance)  supervision  -CS        Sitting Position (Unsupported Sitting, Static Balance)  sitting on edge of bed  -CS           Dynamic Sitting Balance    Level of [place], Reaches Outside Midline (Sitting, Dynamic Balance)  contact guard assist  -CS        Sitting Position, Reaches Outside Midline (Sitting, Dynamic Balance)  sitting on edge of bed  -CS           Static Standing Balance    Level of [place] (Supported Standing, Static Balance)  contact guard assist  -CS         Assistive Device Utilized (Supported Standing, Static Balance)  walker, rolling  -CS           Dynamic Standing Balance    Level of [place], Reaches Outside Midline (Standing, Dynamic Balance)  contact guard assist  -CS        Assistive Device Utilized (Supported Standing, Dynamic Balance)  walker, rolling  -CS           Sensory Assessment/Intervention    Sensory General Assessment  no sensation deficits identified no BUE sensation deficits, see PT note for LE information  -CS           Positioning and Restraints    Pre-Treatment Position  in bed  -CS        Post Treatment Position  chair  -CS        In Chair  sitting;call light within reach;encouraged to call for assist;with family/caregiver;with brace  -CS           Pain Assessment    Additional Documentation  Pain Scale: Numbers Pre/Post-Treatment (Group)  -CS           Pain Scale: Numbers Pre/Post-Treatment    Pain Scale: Numbers, Pretreatment  6/10  -CS        Pain Location - Orientation  lower  -CS        Pain Location  back  -CS        Pre/Post Treatment Pain Comment  radiating down through LLE  -CS        Pain Intervention(s)  Medication (See MAR);Repositioned;Ambulation/increased activity  -CS           Orthotics & Prosthetics Management    Orthosis Location  spinal orthosis  -CS        Additional Documentation  Orthosis Location (Row)  -CS           Spinal Orthosis Management    Type (Spinal Orthosis)  LSO (lumbar sacral orthosis)  -CS        Fabrication Comment (Spinal Orthosis)  Pt fit with LSO by [employer] rep  -CS        Functional Design (Spinal Orthosis)  static orthosis  -CS        Therapeutic Indications (Spinal Orthosis)  post-op positioning/protection;pain management;stabilization and support  -CS        Wearing Schedule (Spinal Orthosis)  wear when out of bed only;remove for hygiene/bathing  -CS        Orthosis Training (Spinal Orthosis)  patient and caregiver;all orthosis skills;able to verbalize training  -CS        Compliance/Wearing  Issues (Spinal Orthosis)  patient/caregiver comprehend strategies;patient/caregiver comprehend rationale for orthosis  -CS           Wound 02/17/20 1125 Left lateral flank Incision    Wound - Properties Group Date first assessed: 02/17/20  -LW Time first assessed: 1125  -LW Present on Hospital Admission: N  -LW Side: Left  -LW Orientation: lateral  -LW Location: flank  -LW Primary Wound Type: Incision  -LW Additional Comments: mastisol, steristrips, telfa, 4x4s, myplex  -LW       Plan of Care Review    Plan of Care Reviewed With  patient  -CS        Progress  improving  -CS        Outcome Summary  OT evaluation completed.  Pt demo need for skilled OT services.  Pt required CGA for bed mobility, functional transfers, and ambulation in the hallway and room.  Pt required min A for donning LSO brace appropriately.  Pt completed LB dressing of her shoes and AFO with min-mod A.  Pt completed toileting task with CGA for standing balance during hygiene and clothing management.  Pt is limited due to impaired balance, decreased AROM, arthritic deformity of hands, pain, im paired motor control of LLE, and impaired LE sensation.  OT will cont to follow and recommends pt to discharge home with assist with HH OT and PT.  Pt to have second sx tomorrow WED 2/19.  -CS           Clinical Impression (OT)    Date of Referral to OT  02/17/20  -CS        OT Diagnosis  Impaired ADLs and functional mobility  -CS        Patient/Family Goals Statement (OT Eval)  to go home  -CS        Criteria for Skilled Therapeutic Interventions Met (OT Eval)  yes;treatment indicated  -CS        Rehab Potential (OT Eval)  good, to achieve stated therapy goals  -CS        Therapy Frequency (OT Eval)  5 times/wk  -CS        Predicted Duration of Therapy Intervention (Therapy Eval)  until hospital discharge  -CS        Care Plan Review (OT)  evaluation/treatment results reviewed;care plan/treatment goals reviewed;risks/benefits reviewed;current/potential  barriers reviewed  -CS        Care Plan Review, Other Participant (OT Eval)  daughter  -CS        Anticipated Equipment Needs at Discharge (OT)  -- sock aid, LH bath sponge  -CS        Anticipated Discharge Disposition (OT)  home with assist;home with home health  -CS           Vital Signs    Pre SpO2 (%)  96  -CS        O2 Delivery Pre Treatment  supplemental O2  -CS        Intra SpO2 (%)  84  -CS        O2 Delivery Intra Treatment  room air  -CS        Post SpO2 (%)  94  -CS        O2 Delivery Post Treatment  supplemental O2  -CS           Planned OT Interventions    Planned Therapy Interventions (OT Eval)  adaptive equipment training;BADL retraining;activity tolerance training;functional balance retraining;occupation/activity based interventions;patient/caregiver education/training;strengthening exercise;transfer/mobility retraining  -CS           OT Goals    Transfer Goal Selection (OT)  transfer, OT goal 1  -CS        Dressing Goal Selection (OT)  dressing, OT goal 1  -CS        Toileting Goal Selection (OT)  toileting, OT goal 1  -CS           Transfer Goal 1 (OT)    Activity/Assistive Device (Transfer Goal 1, OT)  sit-to-stand/stand-to-sit;bed-to-chair/chair-to-bed;toilet;commode;walker, rolling  -CS        [place] Level/Cues Needed (Transfer Goal 1, OT)  conditional independence  -CS        Time Frame (Transfer Goal 1, OT)  10 days  -CS        Progress/Outcome (Transfer Goal 1, OT)  goal ongoing  -CS           Dressing Goal 1 (OT)    Activity/Assistive Device (Dressing Goal 1, OT)  lower body dressing;upper body dressing;reacher;sock-aid;long handled shoe horn;elastic laces to include LSO brace  -CS        [place]/Cues Needed (Dressing Goal 1, OT)  supervision required;verbal cues required  -CS        Time Frame (Dressing Goal 1, OT)  10 days  -CS        Progress/Outcome (Dressing Goal 1, OT)  goal ongoing  -CS           Toileting Goal 1 (OT)    Activity/Device (Toileting Goal 1, OT)  toileting  skills, all;commode;grab bar/safety frame  -CS        [place] Level/Cues Needed (Toileting Goal 1, OT)  supervision required;verbal cues required  -CS        Time Frame (Toileting Goal 1, OT)  10 days  -CS        Progress/Outcome (Toileting Goal 1, OT)  goal ongoing  -CS           Living Environment    Home Accessibility  stairs to enter home;other (see comments) walk in shower  -CS          User Key  (r) = Recorded By, (t) = Taken By, (c) = Cosigned By    Initials Name Effective Dates    CS Laya Cui, OTR/L, CNT 04/02/19 -     Matilda Rincon RN 09/25/19 -                OT Recommendation and Plan  Outcome Summary/Treatment Plan (OT)  Anticipated Equipment Needs at Discharge (OT): (sock aid, LH bath sponge)  Anticipated Discharge Disposition (OT): home with assist, home with home health  Planned Therapy Interventions (OT Eval): adaptive equipment training, BADL retraining, activity tolerance training, functional balance retraining, occupation/activity based interventions, patient/caregiver education/training, strengthening exercise, transfer/mobility retraining  Therapy Frequency (OT Eval): 5 times/wk  Plan of Care Review  Plan of Care Reviewed With: patient  Plan of Care Reviewed With: patient  Outcome Summary: OT evaluation completed.  Pt demo need for skilled OT services.  Pt required CGA for bed mobility, functional transfers, and ambulation in the hallway and room.  Pt required min A for donning LSO brace appropriately.  Pt completed LB dressing of her shoes and AFO with min-mod A.  Pt completed toileting task with CGA for standing balance during hygiene and clothing management.  Pt is limited due to impaired balance, decreased AROM, arthritic deformity of hands, pain, im paired motor control of LLE, and impaired LE sensation.  OT will cont to follow and recommends pt to discharge home with assist with HH OT and PT.  Pt to have second sx tomorrow WED 2/19.    Outcome Measures     Row Name  02/18/20 0732             How much help from another is currently needed...    Putting on and taking off regular lower body clothing?  2  -CS      Bathing (including washing, rinsing, and drying)  3  -CS      Toileting (which includes using toilet bed pan or urinal)  3  -CS      Putting on and taking off regular upper body clothing  3  -CS      Taking care of personal grooming (such as brushing teeth)  4  -CS      Eating meals  4  -CS      AM-PAC 6 Clicks Score (OT)  19  -CS         Functional Assessment    Outcome Measure Options  AM-PAC 6 Clicks Daily Activity (OT)  -CS        User Key  (r) = Recorded By, (t) = Taken By, (c) = Cosigned By    Initials Name Provider Type    Laya Avila OTR/L, SURESH Occupational Therapist          Time Calculation:   Time Calculation- OT     Row Name 02/18/20 0947             Time Calculation- OT    OT Start Time  0732  -CS      OT Stop Time  0846  -CS      OT Time Calculation (min)  74 min  -CS      Total Timed Code Minutes- OT  14 minute(s)  -CS      OT Received On  02/18/20  -CS      OT Goal Re-Cert Due Date  02/28/20  -CS         Timed Charges    14445 - OT Self Care/Mgmt Minutes  14  -CS        User Key  (r) = Recorded By, (t) = Taken By, (c) = Cosigned By    Initials Name Provider Type    Laya Avila OTR/L, SURESH Occupational Therapist        Therapy Charges for Today     Code Description Service Date Service Provider Modifiers Qty    49879686819 HC OT SELF CARE/MGMT/TRAIN EA 15 MIN 2/18/2020 Laya Cui OTR/L, SURESH GO 1    61354063246 HC OT EVAL MOD COMPLEXITY 4 2/18/2020 Laya Cui OTR/L, SURESH GO 1               SHANTHI Grier/L, CNT  2/18/2020

## 2020-02-18 NOTE — PAYOR COMM NOTE
"ADMIT INPT 2-17-20  [id number]   [phone]    Deisy Santiago (74 y.o. Female)     Date of Birth Social Security Number Address Home Phone MRN    [id number]  [id number]   [place] [address] [phone] [id number]    Taoism Marital Status          [facility]        Admission Date Admission Type Admitting Provider Attending Provider Department, Room/Bed    2/17/20 Elective EBONI Rader MD Strenge, K Brandon, MD [facility] 3A, 357/1    Discharge Date Discharge Disposition Discharge Destination                       Attending Provider:  EBONI Rader MD    Allergies:  No Known Allergies    Isolation:  None   Infection:  None   Code Status:  CPR    Ht:  162 cm (63.78\")   Wt:  67.9 kg (149 lb 11.1 oz)    Admission Cmt:  None   Principal Problem:  None                Active Insurance as of 2/17/2020     Primary Coverage     Payor Plan Insurance Group Employer/Plan Group    ANTH MEDICARE REPLACEMENT ANTH MEDICARE ADVANTAGE [id number]     Payor Plan Address Payor Plan Phone Number Payor Plan Fax Number Effective Dates    [address] [phone]  1/1/2015 - None Entered    [facility] [id number]       Subscriber Name Subscriber Birth Date Member ID       DEISY SANTIAGO 1945 [id number]                 Emergency Contacts      (Rel.) Home Phone Work Phone Mobile Phone    Kasandra Santiago (Daughter) [phone] -- [phone]            Vital Signs (last day)     Date/Time   Temp   Temp src   Pulse   Resp   BP   Patient Position   SpO2    02/18/20 0302   98.5 (36.9)   Oral   66   18   (!) 105/38   Lying   95    02/17/20 2243   98.8 (37.1)   Oral   61   16   138/51   Lying   99    02/17/20 1919   98 (36.7)   Oral   60   16   (!) 91/35   Lying   97    02/17/20 1445   --   --   106   16   --   --   97    02/17/20 1430   98.2 (36.8)   Oral   90   16   121/48   Lying   92    02/17/20 1400   98.1 (36.7)   Oral   84   16   128/62   Lying   " 94    02/17/20 1330   98.2 (36.8)   Oral   98   16   136/56   Lying   98    02/17/20 1300   99.2 (37.3)   Temporal   64   16   125/54   --   99    02/17/20 1245   --   --   65   16   131/56   --   97    02/17/20 1230   --   --   81   16   153/55   --   95    02/17/20 1225   --   --   80   --   --   --   91    02/17/20 1215   --   --   89   18   172/62   --   95    02/17/20 1140   --   --   92   13   135/66   --   97    02/17/20 1137   --   --   94   11   141/67   Lying   97    02/17/20 1132   97.8 (36.6)   Temporal   95   13   107/56   Lying   96    02/17/20 0743   --   --   55   --   --   --   97    02/17/20 0639   97.3 (36.3)   Temporal   60   16   137/52   Sitting   94              Oxygen Therapy (last day)     Date/Time   SpO2   Device (Oxygen Therapy)   Flow (L/min)   Oxygen Concentration (%)   ETCO2 (mmHg)    02/18/20 0302   95   nasal cannula   2   --   --    02/17/20 2243   99   nasal cannula   2   --   --    02/17/20 2000   --   nasal cannula   2   --   --    02/17/20 1919   97   nasal cannula   2   --   --    02/17/20 1445   97   nasal cannula   2   --   --    02/17/20 1430   92   nasal cannula   2   --   --    02/17/20 1400   94   nasal cannula   2   --   --    02/17/20 1330   98   nasal cannula   2   --   --    02/17/20 1300   99   nasal cannula   3   --   --    02/17/20 1245   97   nasal cannula   3   --   --    02/17/20 1230   95   --   --   --   --    02/17/20 1225   91   nasal cannula   2   --   --    02/17/20 1215   95   room air   --   --   --    02/17/20 1140   97   simple face mask   8   --   --    02/17/20 1137   97   simple face mask   8   --   --    02/17/20 1132   96   simple face mask   8   --   --    02/17/20 0743   97   room air   --   --   --    02/17/20 0639   94   room air   --   --   --              Intake & Output (last day)       02/17 0701 - 02/18 0700 02/18 0701 - 02/19 0700    I.V. 1600     Total Intake 1600     Urine 1000     Total Output 1000     Net +600           Urine  Unmeasured Occurrence 2 x         Lines, Drains & Airways    Active LDAs     Name:   Placement date:   Placement time:   Site:   Days:    Peripheral IV 02/17/20 0703 Posterior;Right Wrist   02/17/20    0703    Wrist   1    Peripheral IV 02/17/20 0704 Left;Posterior Hand   02/17/20    0704    Hand   1                  Current Facility-Administered Medications   Medication Dose Route Frequency Provider Last Rate Last Dose   • allopurinol (ZYLOPRIM) tablet 100 mg  100 mg Oral BID EBONI Rader MD   100 mg at 02/17/20 2114   • allopurinol (ZYLOPRIM) tablet 300 mg  300 mg Oral Daily EBONI Rader MD   300 mg at 02/17/20 1448   • calcium carb-cholecalciferol 600-800 MG-UNIT tablet 1 tablet  1 tablet Oral Daily EBONI Rader MD   1 tablet at 02/17/20 1449   • carvedilol (COREG) tablet 6.25 mg  6.25 mg Oral BID With Meals EBONI Rader MD   6.25 mg at 02/17/20 1843   • ceFAZolin (ANCEF) IVPB 1 g  1 g Intravenous Q8H EBONI Rader MD   1 g at 02/18/20 0215   • cyanocobalamin (VITAMIN B-12) tablet 250 mcg  250 mcg Oral Daily Sachin Wilson PA-C   250 mcg at 02/17/20 1553   • diphenhydrAMINE (BENADRYL) capsule 25 mg  25 mg Oral Nightly PRN BEONI Rader MD       • famotidine (PEPCID) injection 20 mg  20 mg Intravenous Q12H EBONI Rader MD        Or   • famotidine (PEPCID) tablet 20 mg  20 mg Oral Q12H EBONI Rader MD   20 mg at 02/17/20 2120   • fenofibrate (TRICOR) tablet 145 mg  145 mg Oral Daily EBONI Rader MD   145 mg at 02/17/20 1449   • folic acid (FOLVITE) tablet 1 mg  1 mg Oral Daily EBONI Rader MD   1 mg at 02/17/20 1448   • gabapentin (NEURONTIN) capsule 300 mg  300 mg Oral TID EBONI Rader MD   300 mg at 02/17/20 2113   • HYDROmorphone (DILAUDID) injection 1 mg  1 mg Intravenous Q3H PRN EBONI Rader MD       • indapamide (LOZOL) tablet 2.5 mg  2.5 mg Oral QAM EBONI Rader MD   2.5 mg at 02/17/20 1449   • lisinopril  (PRINIVIL,ZESTRIL) tablet 40 mg  40 mg Oral Daily EBONI Rader MD   40 mg at 02/17/20 1448   • mesalamine (DELZICOL) delayed release capsule 800 mg  800 mg Oral TID EBONI Rader MD   800 mg at 02/17/20 2113   • nitrofurantoin (MACRODANTIN) capsule 50 mg  50 mg Oral Nightly EBONI Rader MD   50 mg at 02/17/20 2113   • ondansetron (ZOFRAN) tablet 4 mg  4 mg Oral Q6H PRN EBONI Rader MD   4 mg at 02/18/20 0215    Or   • ondansetron (ZOFRAN) injection 4 mg  4 mg Intravenous Q6H PRN EBONI Rader MD       • oxyCODONE-acetaminophen (PERCOCET)  MG per tablet 1 tablet  1 tablet Oral Q4H PRN EBONI Rader MD       • oxyCODONE-acetaminophen (PERCOCET)  MG per tablet 2 tablet  2 tablet Oral Q4H PRN EBONI Rader MD   2 tablet at 02/18/20 0255   • polyethylene glycol 3350 powder (packet)  17 g Oral BID Hilario Guillen MD   17 g at 02/17/20 2120   • sodium chloride 0.9 % flush 10 mL  10 mL Intravenous PRN EBONI Rader MD       • sodium chloride 0.9 % flush 3 mL  3 mL Intravenous Q12H EBONI Rader MD       • sodium chloride 0.9 % infusion  75 mL/hr Intravenous Continuous EBONI Rader MD       • sodium chloride 0.9 % infusion  75 mL/hr Intravenous Continuous EBONI Rader MD 75 mL/hr at 02/17/20 1337 75 mL/hr at 02/17/20 1337   • tiZANidine (ZANAFLEX) tablet 4 mg  4 mg Oral Q8H PRN EBONI Rader MD   4 mg at 02/18/20 0215       Lab Results (last 24 hours)     Procedure Component Value Units Date/Time    Hemoglobin A1c [[id number]]  (Abnormal) Collected:  02/18/20 0528    Specimen:  Blood Updated:  02/18/20 0734     Hemoglobin A1C 5.80 %     Narrative:       Hemoglobin A1C Ranges:    Increased Risk for Diabetes  5.7% to 6.4%  Diabetes                     >= 6.5%  Diabetic Goal                < 7.0%    CBC & Differential [[id number]] Collected:  02/18/20 0528    Specimen:  Blood Updated:  02/18/20 0705    Narrative:       The following  orders were created for panel order CBC & Differential.  Procedure                               Abnormality         Status                     ---------                               -----------         ------                     CBC Auto Differential[[id number]]        Abnormal            Final result                 Please view results for these tests on the individual orders.    CBC Auto Differential [[id number]]  (Abnormal) Collected:  02/18/20 0528    Specimen:  Blood Updated:  02/18/20 0705     WBC 7.37 10*3/mm3      RBC 3.15 10*6/mm3      Hemoglobin 9.1 g/dL      Hematocrit 28.1 %      MCV 89.2 fL      MCH 28.9 pg      MCHC 32.4 g/dL      RDW 14.2 %      RDW-SD 45.6 fl      MPV 10.6 fL      Platelets 187 10*3/mm3      Neutrophil % 71.2 %      Lymphocyte % 13.2 %      Monocyte % 14.5 %      Eosinophil % 0.3 %      Basophil % 0.3 %      Immature Grans % 0.5 %      Neutrophils, Absolute 5.25 10*3/mm3      Lymphocytes, Absolute 0.97 10*3/mm3      Monocytes, Absolute 1.07 10*3/mm3      Eosinophils, Absolute 0.02 10*3/mm3      Basophils, Absolute 0.02 10*3/mm3      Immature Grans, Absolute 0.04 10*3/mm3      nRBC 0.0 /100 WBC     Basic Metabolic Panel [[id number]]  (Abnormal) Collected:  02/18/20 0527    Specimen:  Blood Updated:  02/18/20 0649     Glucose 103 mg/dL      BUN 22 mg/dL      Creatinine 0.77 mg/dL      Sodium 143 mmol/L      Potassium 3.4 mmol/L      Chloride 107 mmol/L      CO2 28.0 mmol/L      Calcium 9.0 mg/dL      eGFR Non African Amer 73 mL/min/1.73      BUN/Creatinine Ratio 28.6     Anion Gap 8.0 mmol/L     Narrative:       GFR Normal >60  Chronic Kidney Disease <60  Kidney Failure <15      Vitamin B12 [[id number]] Collected:  02/18/20 0527    Specimen:  Blood Updated:  02/18/20 0619    Folate [[id number]] Collected:  02/18/20 0527    Specimen:  Blood Updated:  02/18/20 0619        Imaging Results (Last 24 Hours)     Procedure Component Value Units Date/Time    XR Spine Lumbar AP & Lateral  [[id number]] Collected:  02/17/20 1145     Updated:  02/17/20 1451    Narrative:       XR SPINE LUMBAR AP AND LATERAL- 2/17/2020 9:29 AM CST     HISTORY: LLIF      COMPARISON: None       Impression:       4 intraoperative fluoroscopic spot images of the lumbar spine were  obtained during lateral fusion and disc spacer placement.     Please refer to the operative note for more details. Fluoroscopy time  was 52 seconds with a dose of 15.9 mGy.  This report was finalized on 02/17/2020 11:46 by Dr. Sandro Pastor MD.    FL C Arm During Surgery [[id number]] Collected:  02/17/20 1145     Updated:  02/17/20 1451    Narrative:       XR SPINE LUMBAR AP AND LATERAL- 2/17/2020 9:29 AM CST     HISTORY: LLIF      COMPARISON: None       Impression:       4 intraoperative fluoroscopic spot images of the lumbar spine were  obtained during lateral fusion and disc spacer placement.     Please refer to the operative note for more details. Fluoroscopy time  was 52 seconds with a dose of 15.9 mGy.  This report was finalized on 02/17/2020 11:46 by Dr. Sandro Pastor MD.        Orders (last 24 hrs)      Start     Ordered    02/19/20 0001  NPO Diet  Diet Effective Midnight      02/18/20 0744    02/18/20 0744  Obtain Informed Consent  Once      02/18/20 0744    02/18/20 0600  CBC & Differential  Morning Draw      02/17/20 1326    02/18/20 0600  Basic Metabolic Panel  Morning Draw      02/17/20 1326    02/18/20 0600  Vitamin B12  Morning Draw      02/17/20 1505    02/18/20 0600  Folate  Morning Draw      02/17/20 1505    02/18/20 0600  Hemoglobin A1c  Morning Draw      02/17/20 1505    02/18/20 0600  CBC Auto Differential  PROCEDURE ONCE      02/18/20 0002    02/17/20 2100  allopurinol (ZYLOPRIM) tablet 100 mg  2 Times Daily      02/17/20 1327    02/17/20 2100  nitrofurantoin (MACRODANTIN) capsule 50 mg  Nightly      02/17/20 1327    02/17/20 2100  polyethylene glycol 3350 powder (packet)  2 Times Daily      02/17/20 1505    02/17/20 1800   carvedilol (COREG) tablet 6.25 mg  2 Times Daily With Meals      02/17/20 1327    02/17/20 1730  ceFAZolin (ANCEF) IVPB 1 g  Every 8 Hours      02/17/20 1327    02/17/20 1600  gabapentin (NEURONTIN) capsule 300 mg  3 Times Daily      02/17/20 1327    02/17/20 1600  mesalamine (DELZICOL) delayed release capsule 800 mg  3 Times Daily      02/17/20 1327    02/17/20 1600  Neurovascular Checks  Every 4 Hours      02/17/20 1327    02/17/20 1515  cyanocobalamin (VITAMIN B-12) tablet 250 mcg  Daily      02/17/20 1429    02/17/20 1415  allopurinol (ZYLOPRIM) tablet 300 mg  Daily      02/17/20 1327    02/17/20 1415  calcium carb-cholecalciferol 600-800 MG-UNIT tablet 1 tablet  Daily      02/17/20 1327    02/17/20 1415  cholestyramine light packet 4 g  Daily,   Status:  Discontinued      02/17/20 1327    02/17/20 1415  vitamin B-12 (CYANOCOBALAMIN) tablet 100 mcg  Daily,   Status:  Discontinued      02/17/20 1327    02/17/20 1415  fenofibrate (TRICOR) tablet 145 mg  Daily      02/17/20 1327    02/17/20 1415  estrogens (conjugated) (PREMARIN) tablet 0.3 mg  Once per day on Mon Thu,   Status:  Discontinued      02/17/20 1327    02/17/20 1415  folic acid (FOLVITE) tablet 1 mg  Daily      02/17/20 1327    02/17/20 1415  lisinopril (PRINIVIL,ZESTRIL) tablet 40 mg  Daily      02/17/20 1327    02/17/20 1415  sodium chloride 0.9 % flush 3 mL  Every 12 Hours Scheduled      02/17/20 1326    02/17/20 1415  sodium chloride 0.9 % infusion  Continuous      02/17/20 1326    02/17/20 1415  famotidine (PEPCID) injection 20 mg  Every 12 Hours Scheduled      02/17/20 1326    02/17/20 1415  famotidine (PEPCID) tablet 20 mg  Every 12 Hours Scheduled      02/17/20 1326    02/17/20 1415  sodium chloride 0.9 % infusion  Continuous      02/17/20 1327    02/17/20 1400  Incentive Spirometry  Every 2 Hours While Awake      02/17/20 1327    02/17/20 1345  indapamide (LOZOL) tablet 2.5 mg  Every Morning      02/17/20 1327    02/17/20 1327  Code Status and  Medical Interventions:  Continuous      02/17/20 1326    02/17/20 1327  Vital Signs  Every Shift      02/17/20 1327    02/17/20 1327  Ambulate Patient  Every Shift      02/17/20 1326    02/17/20 1327  Empty drain  Every Shift      02/17/20 1326    02/17/20 1327  Oxygen Therapy- Nasal Cannula; Titrate for SPO2: equal to or greater than, 92%, per policy  Continuous      02/17/20 1327    02/17/20 1327  Continuous Pulse Oximetry  Continuous      02/17/20 1327    02/17/20 1327  Diet Regular  Diet Effective Now      02/17/20 1326    02/17/20 1327  Advance Diet as Tolerated  Until Discontinued      02/17/20 1326    02/17/20 1327  PT Consult: Eval & Treat  Once      02/17/20 1326    02/17/20 1327  OT Consult: Eval & Treat  Once      02/17/20 1326    02/17/20 1327  Insert Peripheral IV  Once      02/17/20 1326    02/17/20 1327  Saline Lock & Maintain IV Access  Continuous      02/17/20 1326    02/17/20 1327  Place Sequential Compression Device  Once      02/17/20 1326    02/17/20 1327  Maintain Sequential Compression Device  Continuous      02/17/20 1326    02/17/20 1327  Inpatient Family Practice Consult  Once     Comments:  Mindy   Specialty:  Family Medicine  Provider:  Hilario Guillen MD    02/17/20 1327    02/17/20 1327  Bracing Equipment Communication Spinal; Lumbo-Sacral (LSO); Not Applicable; When Out of Bed  Once      02/17/20 1327    02/17/20 1326  ondansetron (ZOFRAN) tablet 4 mg  Every 6 Hours PRN      02/17/20 1326    02/17/20 1326  ondansetron (ZOFRAN) injection 4 mg  Every 6 Hours PRN      02/17/20 1326    02/17/20 1326  oxyCODONE-acetaminophen (PERCOCET)  MG per tablet 2 tablet  Every 4 Hours PRN      02/17/20 1327    02/17/20 1326  oxyCODONE-acetaminophen (PERCOCET)  MG per tablet 1 tablet  Every 4 Hours PRN      02/17/20 1327    02/17/20 1326  tiZANidine (ZANAFLEX) tablet 4 mg  Every 8 Hours PRN      02/17/20 1327    02/17/20 1326  HYDROmorphone (DILAUDID) injection 1 mg  Every 3 Hours  PRN      02/17/20 1327    02/17/20 1326  sodium chloride 0.9 % flush 10 mL  As Needed      02/17/20 1326    02/17/20 1326  diphenhydrAMINE (BENADRYL) capsule 25 mg  Nightly PRN      02/17/20 1326    02/17/20 1326  ondansetron (ZOFRAN) injection 4 mg  Every 6 Hours PRN,   Status:  Discontinued      02/17/20 1326    02/17/20 1210  HYDROmorphone (DILAUDID) injection 0.5 mg  Every 5 Minutes PRN,   Status:  Discontinued      02/17/20 1211    02/17/20 1134  Inpatient Admission  Once      02/17/20 1133    02/17/20 1130  Vital Signs Every 5 Minutes for 15 Minutes, Every 15 Minutes Thereafter.  Continuous,   Status:  Canceled      02/17/20 1130    02/17/20 1130  Call Anesthesiologist For Additional IV Fluid Bolus For Hypotension / Tachycardia  Continuous,   Status:  Canceled      02/17/20 1130    02/17/20 1130  Notify Anesthesia of Any Acute Changes in Patient Condition  Until Discontinued,   Status:  Canceled      02/17/20 1130    02/17/20 1130  Notify Anesthesia for Unrelieved Pain  Until Discontinued,   Status:  Canceled      02/17/20 1130    02/17/20 1130  Once Discharge Criteria to Floor Met, Follow Surgeon Orders  Continuous,   Status:  Canceled      02/17/20 1130    02/17/20 1130  Discharge Patient From PACU When Discharge Criteria Met  Continuous,   Status:  Canceled      02/17/20 1130    02/17/20 1129  Apply Warming Davey  As Needed,   Status:  Canceled     Comments:  For a recorded temp of  <36.9 C, Recovery Only    02/17/20 1130    02/17/20 1129  oxyCODONE-acetaminophen (PERCOCET) 7.5-325 MG per tablet 1 tablet  Every 4 Hours PRN,   Status:  Discontinued      02/17/20 1130    02/17/20 1129  fentaNYL citrate (PF) (SUBLIMAZE) injection 25 mcg  As Needed      02/17/20 1130    02/17/20 1129  labetalol (NORMODYNE,TRANDATE) injection 5 mg  Every 5 Minutes PRN,   Status:  Discontinued      02/17/20 1130    02/17/20 1129  atropine sulfate injection 0.5 mg  Once As Needed,   Status:  Discontinued      02/17/20 1138     02/17/20 1129  ipratropium-albuterol (DUO-NEB) nebulizer solution 3 mL  Once As Needed,   Status:  Discontinued      02/17/20 1130    02/17/20 1129  naloxone (NARCAN) injection 0.4 mg  As Needed,   Status:  Discontinued      02/17/20 1130    02/17/20 1129  ondansetron (ZOFRAN) injection 4 mg  Once As Needed,   Status:  Discontinued      02/17/20 1130    02/17/20 1129  oxyCODONE-acetaminophen (PERCOCET) 5-325 MG per tablet 1 tablet  Once As Needed,   Status:  Discontinued      02/17/20 1130    02/17/20 1025  thrombin topical  As Needed,   Status:  Discontinued      02/17/20 1025    02/17/20 1025  sodium chloride 0.9 % solution  As Needed,   Status:  Discontinued      02/17/20 1025    02/17/20 1024  gelatin absorbable (GELFOAM) powder  As Needed,   Status:  Discontinued      02/17/20 1024    02/17/20 1024  sodium chloride (NS) irrigation solution  As Needed,   Status:  Discontinued      02/17/20 1025    02/17/20 0900  sodium chloride 0.9 % flush 10 mL  Every 12 Hours Scheduled,   Status:  Discontinued      02/17/20 0643    02/17/20 0900  sodium chloride 0.9 % flush 3 mL  Every 12 Hours Scheduled,   Status:  Discontinued      02/17/20 0805    02/17/20 0807  lactated ringers infusion  Continuous,   Status:  Discontinued      02/17/20 0805    02/17/20 0807  acetaminophen (TYLENOL) tablet 1,000 mg  Once      02/17/20 0805    02/17/20 0805  dexamethasone (DECADRON) injection 4 mg  Once As Needed      02/17/20 0805    02/17/20 0805  Vital Signs - Per Anesthesia Protocol  As Needed,   Status:  Canceled      02/17/20 0805    02/17/20 0805  sodium chloride 0.9 % flush 3-10 mL  As Needed,   Status:  Discontinued      02/17/20 0805    02/17/20 0805  lidocaine PF 1% (XYLOCAINE) injection 0.5 mL  Once As Needed,   Status:  Discontinued      02/17/20 0805    02/17/20 0647  lactated ringers infusion 1,000 mL  Continuous,   Status:  Discontinued      02/17/20 0645    02/17/20 0647  lactated ringers infusion 1,000 mL  Continuous,    Status:  Discontinued      02/17/20 0645    02/17/20 0644  sodium chloride 0.9 % flush 10 mL  As Needed,   Status:  Discontinued      02/17/20 0645    02/17/20 0644  lidocaine PF 1% (XYLOCAINE) injection 0.5 mL  Once As Needed,   Status:  Discontinued      02/17/20 0645    02/17/20 0644  sodium chloride 0.9 % flush 3 mL  As Needed,   Status:  Discontinued      02/17/20 0645    02/17/20 0644  ceFAZolin (ANCEF) 1 g/100 mL 0.9% NS IVPB (mbp)  Once      02/17/20 0643    02/17/20 0642  sodium chloride 0.9 % flush 10 mL  As Needed,   Status:  Discontinued      02/17/20 0643    02/17/20 0642  lidocaine PF 1% (XYLOCAINE) injection 0.5 mL  Once As Needed,   Status:  Discontinued      02/17/20 0643    02/17/20 0642  lactated ringers infusion  Continuous PRN,   Status:  Discontinued      02/17/20 0643    Unscheduled  Apply ice to affected area/incisions as needed for pain or swelling  As Needed      02/17/20 1326                Ventilator/Non-Invasive Ventilation Settings (From admission, onward)    None        Operative/Procedure Notes (last 24 hours) (Notes from 02/17/20 0806 through 02/18/20 0806)    No notes of this type exist for this encounter.            Physician Progress Notes (last 24 hours) (Notes from 02/17/20 0806 through 02/18/20 0806)      Hilario Guillen MD at 02/18/20 0727          Deisi Ponce is a 74 y.o. female patient.  Family at bedside.  Rough night due to poor pain control.  Medically stable.    Current Facility-Administered Medications   Medication Dose Route Frequency Provider Last Rate Last Dose   • allopurinol (ZYLOPRIM) tablet 100 mg  100 mg Oral BID EBONI Rader MD   100 mg at 02/17/20 2114   • allopurinol (ZYLOPRIM) tablet 300 mg  300 mg Oral Daily EBONI Rader MD   300 mg at 02/17/20 1448   • calcium carb-cholecalciferol 600-800 MG-UNIT tablet 1 tablet  1 tablet Oral Daily EBONI Rader MD   1 tablet at 02/17/20 1449   • carvedilol (COREG) tablet 6.25 mg  6.25 mg  Oral BID With Meals EBONI Rader MD   6.25 mg at 02/17/20 1843   • ceFAZolin (ANCEF) IVPB 1 g  1 g Intravenous Q8H EBONI Rader MD   1 g at 02/18/20 0215   • cyanocobalamin (VITAMIN B-12) tablet 250 mcg  250 mcg Oral Daily Sachin Wilson PA-C   250 mcg at 02/17/20 1553   • diphenhydrAMINE (BENADRYL) capsule 25 mg  25 mg Oral Nightly PRN EBONI Rader MD       • famotidine (PEPCID) injection 20 mg  20 mg Intravenous Q12H EBONI Rader MD        Or   • famotidine (PEPCID) tablet 20 mg  20 mg Oral Q12H EBONI Rader MD   20 mg at 02/17/20 2120   • fenofibrate (TRICOR) tablet 145 mg  145 mg Oral Daily EBONI Rader MD   145 mg at 02/17/20 1449   • folic acid (FOLVITE) tablet 1 mg  1 mg Oral Daily EBONI Rader MD   1 mg at 02/17/20 1448   • gabapentin (NEURONTIN) capsule 300 mg  300 mg Oral TID EBONI Rader MD   300 mg at 02/17/20 2113   • HYDROmorphone (DILAUDID) injection 1 mg  1 mg Intravenous Q3H PRN EBONI Rader MD       • indapamide (LOZOL) tablet 2.5 mg  2.5 mg Oral QAM EBONI Rader MD   2.5 mg at 02/17/20 1449   • lisinopril (PRINIVIL,ZESTRIL) tablet 40 mg  40 mg Oral Daily EBONI Rader MD   40 mg at 02/17/20 1448   • mesalamine (DELZICOL) delayed release capsule 800 mg  800 mg Oral TID EBONI Rader MD   800 mg at 02/17/20 2113   • nitrofurantoin (MACRODANTIN) capsule 50 mg  50 mg Oral Nightly EBONI Rader MD   50 mg at 02/17/20 2113   • ondansetron (ZOFRAN) tablet 4 mg  4 mg Oral Q6H PRN EBONI Rader MD   4 mg at 02/18/20 0215    Or   • ondansetron (ZOFRAN) injection 4 mg  4 mg Intravenous Q6H PRN EBONI Rader MD       • oxyCODONE-acetaminophen (PERCOCET)  MG per tablet 1 tablet  1 tablet Oral Q4H PRN EBONI Rader MD       • oxyCODONE-acetaminophen (PERCOCET)  MG per tablet 2 tablet  2 tablet Oral Q4H PRN EBONI Rader MD   2 tablet at 02/18/20 0255   • polyethylene glycol 3350 powder  (packet)  17 g Oral BID Hilario Guillen MD   17 g at 02/17/20 2120   • sodium chloride 0.9 % flush 10 mL  10 mL Intravenous PRN EBONI Rader MD       • sodium chloride 0.9 % flush 3 mL  3 mL Intravenous Q12H EBONI Rader MD       • sodium chloride 0.9 % infusion  75 mL/hr Intravenous Continuous EBONI Rader MD       • sodium chloride 0.9 % infusion  75 mL/hr Intravenous Continuous EBONI Rader MD 75 mL/hr at 02/17/20 1337 75 mL/hr at 02/17/20 1337   • tiZANidine (ZANAFLEX) tablet 4 mg  4 mg Oral Q8H PRN EBONI Rader MD   4 mg at 02/18/20 0215     ALLERGIES:  No Known Allergies    Crohn's disease without complication (CMS/HCC)    Lumbar stenosis    GERD without esophagitis    Essential hypertension    Chronic gout    Blood pressure (!) 105/38, pulse 66, temperature 98.5 °F (36.9 °C), temperature source Oral, resp. rate 18, SpO2 95 %, not currently breastfeeding.      Subjective:  Symptoms:  Stable.  She reports shortness of breath, cough and anxiety.    Diet:  Adequate intake.    Activity level: Impaired due to pain.    Pain:  She complains of pain that is moderate.  She reports pain is unchanged.  Pain is partially controlled.      Review of Systems   Respiratory: Positive for cough and shortness of breath.      Objective:  General Appearance:  Comfortable and well-appearing.    Vital signs: (most recent): Blood pressure (!) 105/38, pulse 66, temperature 98.5 °F (36.9 °C), temperature source Oral, resp. rate 18, SpO2 95 %, not currently breastfeeding.  Vital signs are normal.    Output: Producing urine and minimal stool output.    HEENT: Normal HEENT exam.    Lungs:  Normal effort and normal respiratory rate.    Heart: Normal rate.  Regular rhythm.    Abdomen: Abdomen is soft.  Hypoactive bowel sounds.   There is generalized tenderness.     Neurological: Patient is alert.    Pupils:  Pupils are equal, round, and reactive to light.    Skin:  Warm and dry.               Labs:  Lab Results (last 72 hours)     Procedure Component Value Units Date/Time    CBC & Differential [[id number]] Collected:  02/18/20 0528    Specimen:  Blood Updated:  02/18/20 0705    Narrative:       The following orders were created for panel order CBC & Differential.  Procedure                               Abnormality         Status                     ---------                               -----------         ------                     CBC Auto Differential[[id number]]        Abnormal            Final result                 Please view results for these tests on the individual orders.    CBC Auto Differential [[id number]]  (Abnormal) Collected:  02/18/20 0528    Specimen:  Blood Updated:  02/18/20 0705     WBC 7.37 10*3/mm3      RBC 3.15 10*6/mm3      Hemoglobin 9.1 g/dL      Hematocrit 28.1 %      MCV 89.2 fL      MCH 28.9 pg      MCHC 32.4 g/dL      RDW 14.2 %      RDW-SD 45.6 fl      MPV 10.6 fL      Platelets 187 10*3/mm3      Neutrophil % 71.2 %      Lymphocyte % 13.2 %      Monocyte % 14.5 %      Eosinophil % 0.3 %      Basophil % 0.3 %      Immature Grans % 0.5 %      Neutrophils, Absolute 5.25 10*3/mm3      Lymphocytes, Absolute 0.97 10*3/mm3      Monocytes, Absolute 1.07 10*3/mm3      Eosinophils, Absolute 0.02 10*3/mm3      Basophils, Absolute 0.02 10*3/mm3      Immature Grans, Absolute 0.04 10*3/mm3      nRBC 0.0 /100 WBC     Basic Metabolic Panel [[id number]]  (Abnormal) Collected:  02/18/20 0527    Specimen:  Blood Updated:  02/18/20 0649     Glucose 103 mg/dL      BUN 22 mg/dL      Creatinine 0.77 mg/dL      Sodium 143 mmol/L      Potassium 3.4 mmol/L      Chloride 107 mmol/L      CO2 28.0 mmol/L      Calcium 9.0 mg/dL      eGFR Non African Amer 73 mL/min/1.73      BUN/Creatinine Ratio 28.6     Anion Gap 8.0 mmol/L     Narrative:       GFR Normal >60  Chronic Kidney Disease <60  Kidney Failure <15      Hemoglobin A1c [[id number]] Collected:  02/18/20 0528    Specimen:  Blood Updated:   02/18/20 0619    Vitamin B12 [[id number]] Collected:  02/18/20 0527    Specimen:  Blood Updated:  02/18/20 0619    Folate [[id number]] Collected:  02/18/20 0527    Specimen:  Blood Updated:  02/18/20 0619          Imaging Results (Last 72 Hours)     Procedure Component Value Units Date/Time    XR Spine Lumbar AP & Lateral [[id number]] Collected:  02/17/20 1145     Updated:  02/17/20 1451    Narrative:       XR SPINE LUMBAR AP AND LATERAL- 2/17/2020 9:29 AM CST     HISTORY: LLIF      COMPARISON: None       Impression:       4 intraoperative fluoroscopic spot images of the lumbar spine were  obtained during lateral fusion and disc spacer placement.     Please refer to the operative note for more details. Fluoroscopy time  was 52 seconds with a dose of 15.9 mGy.  This report was finalized on 02/17/2020 11:46 by Dr. Sandro Pastor MD.    FL C Arm During Surgery [[id number]] Collected:  02/17/20 1145     Updated:  02/17/20 1451    Narrative:       XR SPINE LUMBAR AP AND LATERAL- 2/17/2020 9:29 AM CST     HISTORY: LLIF      COMPARISON: None       Impression:       4 intraoperative fluoroscopic spot images of the lumbar spine were  obtained during lateral fusion and disc spacer placement.     Please refer to the operative note for more details. Fluoroscopy time  was 52 seconds with a dose of 15.9 mGy.  This report was finalized on 02/17/2020 11:46 by Dr. Sandro Pastor MD.                Assessment:    Condition: In stable condition.  Improving.   (    Cough-aggressive use of incentive spirometer recommended today.  Will add duo nebs 4 times daily as needed.  Discussed with daughter at bedside.    Anemia post-op expected-check iron, B12,and folate pending. Replenish as needed.Transfuse at acceptable levels depending on clinical judgement and comorbidities. Recheck in AM    Constipation-history of loose bowels will hold Colestid due to risk of constipation and ultimately end up with Relistor 12 mcg subq every 48 hours to block  the effect of narcotics on the gut.    Hypertension-review pre and post BP's,will restart home BP meds when BP allows. Add clonidine 0.1 mg every 4 hours prn if bp> 140/90,monitor BP and adjust meds as necessary.    GERD-exacerbated by pain meds and anesthesia, will add PPI as needed and can step up to Carafate 1 gm po q AC and nightly.  ).     Patient Active Problem List   Diagnosis   • Crohn's disease of small and large intestines with complication (CMS/HCC)   • Crohn's disease of both small and large intestine without complication (CMS/HCC)   • Crohn's disease without complication (CMS/HCC)   • Lumbar stenosis   • GERD without esophagitis   • Essential hypertension   • Chronic gout     Hilario Guillen MD  2/18/2020                                                  Electronically signed by Hilario Guillen MD at 02/18/20 0789          Consult Notes (last 24 hours) (Notes from 02/17/20 0806 through 02/18/20 0806)      Hilario Guillen MD at 02/17/20 1501              Referring Provider: Dr. Rader  Reason for Consultation: Medical management    Patient Care Team:  Derek Schmid MD as PCP - General  Jose Raul Butt DO as Consulting Physician (Gastroenterology)    Chief complaint The patient presents today for surgical correction after failing conservative management.  They have been through anti-inflammatories, muscle relaxers, and pain medication.  The pain has progressed to the point in time where it is affecting their activities of daily living and after being explained all their options, elected to undergo surgical correction.  Their primary care physician does not attend here at [facility]; therefore, I have been asked to take care of their primary medical needs in the perioperative period.  Postoperative pain is as expected.  There are no other precipitating or relieving factors.    Pleasant 74-year-old white female with multiple family members in the room.  Presents postop from  for step of L IFF procedure.  History of Crohn's disease.  Reviewed meds today.    Subjective .     History of present illness: See above      REVIEW OF SYSTEMS:    CONSTITUTIONAL:  Negative for anorexia, chills, fevers, night sweats and weight loss  EYES:  negative for eye dryness, icterus and redness  HEENT:   negative for dental problems, epistaxis, facial trauma and thrush  RESPIRATORY:  negative for chest tightness, cough, dyspnea on exertion, pneumonia and sputum  CARDIOVASCULAR: negative for chest pain, dyspnea, exertional chest pressure/discomfort, irregular heart beat, palpitations, paroxysmal nocturnal dyspnea and syncope  GASTROINTESTINAL: Chronic loose stool with Crohn's disease  MUSCULOSKELETAL: Chronic changes of osteoarthritis   nEUROLOGICAL:   negative for dizziness, headaches, seizures, speech problems, tremors and vertigo, history of peripheral neuropathy  INTEGUMENT: negative for pruritus, rash, skin color change and skin lesion(s)         History    Past Medical History:   Diagnosis Date   • Arthritis    • Cancer (CMS/HCC)     endometrial cancer   • Crohn disease (CMS/HCC)    • Crohn's disease (CMS/HCC)    • DDD (degenerative disc disease), lumbar    • Elevated cholesterol    • GERD (gastroesophageal reflux disease)    • Gout    • History of transfusion    • Hypertension    • Intermittent self-catheterization of bladder    • Macular degeneration    • Osteopenia    • Peripheral neuropathy    • Urinary retention      Past Surgical History:   Procedure Laterality Date   • BACK SURGERY  2010   • CARPAL TUNNEL RELEASE Bilateral    • CATARACT EXTRACTION Bilateral    • COLONOSCOPY  10/09/2015   • COLONOSCOPY N/A 12/28/2016    Procedure: COLONOSCOPY WITH ANESTHESIA;  Surgeon: Jose Raul Butt DO;  Location: [facility] ENDOSCOPY;  Service:    • COLONOSCOPY N/A 2/18/2019    Procedure: COLONOSCOPY WITH ANESTHESIA;  Surgeon: Jose Raul Butt DO;  Location: [facility] ENDOSCOPY;  Service: Gastroenterology   • CYST  REMOVAL      from chest x2   • HYSTERECTOMY     • MENISCECTOMY Left    • UPPER GASTROINTESTINAL ENDOSCOPY  08/31/2010     Family History   Problem Relation Age of Onset   • Ovarian cancer Mother    • Heart attack Father    • Colon cancer Neg Hx    • Colon polyps Neg Hx    • Esophageal cancer Neg Hx    • Liver cancer Neg Hx    • Liver disease Neg Hx    • Rectal cancer Neg Hx    • Stomach cancer Neg Hx      Social History     Tobacco Use   • Smoking status: Never Smoker   • Smokeless tobacco: Never Used   Substance Use Topics   • Alcohol use: No   • Drug use: No     Medications Prior to Admission   Medication Sig Dispense Refill Last Dose   • allopurinol (ZYLOPRIM) 100 MG tablet Take 100 mg by mouth 2 (Two) Times a Day.   2/16/2020 at 0800   • allopurinol (ZYLOPRIM) 300 MG tablet Take 300 mg by mouth Daily.   2/16/2020 at 0800   • calcium carbonate (OS-LUKE) 600 MG tablet Take 600 mg by mouth Daily.   2/16/2020 at 1800   • carvedilol (COREG) 6.25 MG tablet Take 6.25 mg by mouth 2 (Two) Times a Day With Meals.   2/17/2020 at 0400   • colestipol (COLESTID) 1 g tablet TAKE 1 TABLET THREE TIMES A  tablet 4 2/16/2020 at 1800   • CRANBERRY PO Take 1 tablet by mouth Daily.   2/16/2020 at 1800   • Cyanocobalamin (VITAMIN B 12 PO) Take 1 tablet by mouth Daily.   2/16/2020 at 0800   • fenofibrate (TRICOR) 145 MG tablet    2/16/2020 at 1800   • folic acid (FOLVITE) 1 MG tablet Take 1 mg by mouth Daily.   2/16/2020 at 1800   • gabapentin (NEURONTIN) 300 MG capsule Take 300 mg by mouth 3 (Three) Times a Day. 2 pills tid   2/16/2020 at 0400   • HYDROcodone-acetaminophen (NORCO)  MG per tablet Take 1 tablet by mouth Every 6 (Six) Hours As Needed for Moderate Pain .   2/17/2020 at 0400   • indapamide (LOZOL) 2.5 MG tablet Take 2.5 mg by mouth Every Morning.   2/16/2020 at 0800   • mesalamine (DELZICOL) 400 MG capsule delayed-release delayed release capsule Take 2 capsules by mouth 3 (Three) Times a Day. Two pills tid 360  capsule 3 2/16/2020 at 1800   • Multiple Vitamins-Minerals (MULTIVITAMIN ADULT PO) Take 1 tablet by mouth Daily.   2/16/2020 at 0800   • nitrofurantoin (MACRODANTIN) 50 MG capsule Take 50 mg by mouth Every Night.   2/16/2020 at 1800   • aspirin 81 MG EC tablet Take 81 mg by mouth Daily.   2/9/2020   • estrogens, conjugated, (PREMARIN) 0.3 MG tablet Take 0.3 mg by mouth 2 (Two) Times a Week. Twice weekly    2/15/2020   • Garlic Oil 1000 MG capsule Take 1,000 mg by mouth Daily.   2/9/2020   • immune globulin, human, (GAMMAGARD) 10 GM/100ML solution infusion Infuse 400 mg/kg into a venous catheter Every 30 (Thirty) Days.   2/12/2020   • lidocaine (XYLOCAINE) 5 % ointment Apply 1 application topically to the appropriate area as directed Every 2 (Two) Hours As Needed for Mild Pain .   More than a month at Unknown time   • lisinopril (PRINIVIL,ZESTRIL) 40 MG tablet Take 40 mg by mouth Daily.   2/15/2020   • Omega-3 Fatty Acids (FISH OIL) 1000 MG capsule capsule Take 1,000 mg by mouth Daily With Breakfast.   2/9/2020   • omeprazole (priLOSEC) 40 MG capsule Take 20 mg by mouth 2 (Two) Times a Day.   2/9/2020       Allergies:  Patient has no known allergies.    Objective     Vital Signs   Temp:  [97.3 °F (36.3 °C)-99.2 °F (37.3 °C)] 98.2 °F (36.8 °C)  Heart Rate:  [] 106  Resp:  [11-18] 16  BP: (107-172)/(48-67) 121/48          Physical Exam:  Constitutional: oriented to person, place, and time. appears well-developed.   HEENT:   Head: Normocephalic and atraumatic.   Eyes: Pupils are equal, round, and reactive to light.   Neck: Neck supple.   Cardiovascular: Regular rhythm and normal heart sounds.    Pulmonary/Chest: Effort normal and breath sounds normal. CTAB  Abdominal: Soft. Bowel sounds are normal. He exhibits no distension. There is no tenderness. There is no rebound and no guarding.   Musculoskeletal: Normal range of motion. He exhibits no edema or tenderness.   Neurological: He is alert and oriented to person,  place, and time. He has normal reflexes.   Skin: Skin is warm and dry.     Results Review:   I reviewed the patient's new imaging results and agree with the interpretation.      Assessment/Plan       Crohn's disease without complication (CMS/HCC)    Lumbar stenosis    GERD without esophagitis    Essential hypertension    Chronic gout      Anemia post-op expected-check iron, B12,and folate. Replenish as needed.Transfuse at acceptable levels depending on clinical judgement and comorbidities. Recheck in AM      Constipation-start with Miralax 1 capful BID until BM, then decrease to 1 x a day,then can step up to Amitiza 24 mcg po BID which can be used for opioid induced constipation,and ultimately end up with Relistor 12 mcg subq every 48 hours to block the effect of narcotics on the gut.  Judicious use of stool softeners with history of loose stools associated with her Crohn's disease    GERD-exacerbated by pain meds and anesthesia, will add PPI as needed and can step up to Carafate 1 gm po q AC and nightly.      Hypertension-review pre and post BP's,will restart home BP meds when BP allows. Add clonidine 0.1 mg every 4 hours prn if bp> 140/90,monitor BP and adjust meds as necessary.      Peripheral neuropathy- check B12, folate and B12.    Explained to patient and staff that we were consulted for medical management during their acute care hospitalization. They need to f/u with their regular primary care provider concerning any further treatment and review of abnormalities found during their hospitalization at [facility] and they agree with that treatment plan.     Electronically signed by Hilario Guillen MD at 02/17/20 1507       Valentina Adamson RN   Registered Nurse      Plan of Care   Signed   Date of Service:  02/17/20 1740   Creation Time:  02/17/20 1740            Signed             Show:Clear all  []Manual[x]Template[]Copied    Added by:  [x]Valentina Adamson RN    []Cuca manuel  details     Problem: Patient Care Overview  Goal: Plan of Care Review  Outcome: Ongoing (interventions implemented as appropriate)  Flowsheets  Taken 2/17/2020 1737  Progress: improving  Taken 2/17/2020 1330  Plan of Care Reviewed With: patient  Note:   Patient medicated prn for pain with pain score maintaining 6-7/10.  Up to bedside commode with 2 assist and LSO brace.  Dressing CDI, baseline neuropathy to BLE, LLE weak/baseline.  2nd part of surgery scheduled for Wednesday.

## 2020-02-18 NOTE — PLAN OF CARE
Problem: Patient Care Overview  Goal: Plan of Care Review  2/18/2020 1202 by Cheryle Acevedo, PT Student  Outcome: Ongoing (interventions implemented as appropriate)  Flowsheets (Taken 2/18/2020 1202)  Progress: improving (Pended)  Plan of Care Reviewed With: patient (Pended)  Outcome Summary: PT evaluation completed. Pt demonstrated good understanding of LSO brace and spinal precautions. Pt followed directions well with log rolling for bed mobility. Pt demo's weak B anterior tibialis muscles and L hip flexors. Pt is able to jose R AFO with min A but requires max A for LLE. Pt ambulates with RW, B AFOs, foot flat contact with LLE, and slight forward flexed posture. Pt requires assitance for self-care with voiding in bathroom. Pt will benefit from skilled PT services for the noted deficits. Recommend pt discharge home with assist and HH services. (Pended)

## 2020-02-18 NOTE — PROGRESS NOTES
Deisi Ponce is a 74 y.o. female patient.  Family at bedside.  Rough night due to poor pain control.  Medically stable.    Current Facility-Administered Medications   Medication Dose Route Frequency Provider Last Rate Last Dose   • allopurinol (ZYLOPRIM) tablet 100 mg  100 mg Oral BID EBONI Rader MD   100 mg at 02/17/20 2114   • allopurinol (ZYLOPRIM) tablet 300 mg  300 mg Oral Daily EBONI Rader MD   300 mg at 02/17/20 1448   • calcium carb-cholecalciferol 600-800 MG-UNIT tablet 1 tablet  1 tablet Oral Daily EBONI Rader MD   1 tablet at 02/17/20 1449   • carvedilol (COREG) tablet 6.25 mg  6.25 mg Oral BID With Meals EBONI Rader MD   6.25 mg at 02/17/20 1843   • ceFAZolin (ANCEF) IVPB 1 g  1 g Intravenous Q8H EBONI Rader MD   1 g at 02/18/20 0215   • cyanocobalamin (VITAMIN B-12) tablet 250 mcg  250 mcg Oral Daily Sachin Wilson PA-C   250 mcg at 02/17/20 1553   • diphenhydrAMINE (BENADRYL) capsule 25 mg  25 mg Oral Nightly PRN EBONI Rader MD       • famotidine (PEPCID) injection 20 mg  20 mg Intravenous Q12H EBONI Rader MD        Or   • famotidine (PEPCID) tablet 20 mg  20 mg Oral Q12H EBONI Rader MD   20 mg at 02/17/20 2120   • fenofibrate (TRICOR) tablet 145 mg  145 mg Oral Daily EBONI Rader MD   145 mg at 02/17/20 1449   • folic acid (FOLVITE) tablet 1 mg  1 mg Oral Daily EBONI Rader MD   1 mg at 02/17/20 1448   • gabapentin (NEURONTIN) capsule 300 mg  300 mg Oral TID EBONI Rader MD   300 mg at 02/17/20 2113   • HYDROmorphone (DILAUDID) injection 1 mg  1 mg Intravenous Q3H PRN EBONI Rader MD       • indapamide (LOZOL) tablet 2.5 mg  2.5 mg Oral QAM EBONI Rader MD   2.5 mg at 02/17/20 1449   • lisinopril (PRINIVIL,ZESTRIL) tablet 40 mg  40 mg Oral Daily EBONI Rader MD   40 mg at 02/17/20 1448   • mesalamine (DELZICOL) delayed release capsule 800 mg  800 mg Oral TID EBONI Rader MD   800 mg  at 02/17/20 2113   • nitrofurantoin (MACRODANTIN) capsule 50 mg  50 mg Oral Nightly EBONI Rader MD   50 mg at 02/17/20 2113   • ondansetron (ZOFRAN) tablet 4 mg  4 mg Oral Q6H PRN EBONI Rader MD   4 mg at 02/18/20 0215    Or   • ondansetron (ZOFRAN) injection 4 mg  4 mg Intravenous Q6H PRN EBONI Rader MD       • oxyCODONE-acetaminophen (PERCOCET)  MG per tablet 1 tablet  1 tablet Oral Q4H PRN EBONI Rader MD       • oxyCODONE-acetaminophen (PERCOCET)  MG per tablet 2 tablet  2 tablet Oral Q4H PRN EBONI Rader MD   2 tablet at 02/18/20 0255   • polyethylene glycol 3350 powder (packet)  17 g Oral BID Hilario Guillen MD   17 g at 02/17/20 2120   • sodium chloride 0.9 % flush 10 mL  10 mL Intravenous PRN EBONI Rader MD       • sodium chloride 0.9 % flush 3 mL  3 mL Intravenous Q12H EBONI Rader MD       • sodium chloride 0.9 % infusion  75 mL/hr Intravenous Continuous EBONI Rader MD       • sodium chloride 0.9 % infusion  75 mL/hr Intravenous Continuous EBONI Rader MD 75 mL/hr at 02/17/20 1337 75 mL/hr at 02/17/20 1337   • tiZANidine (ZANAFLEX) tablet 4 mg  4 mg Oral Q8H PRN EBONI Rader MD   4 mg at 02/18/20 0215     ALLERGIES:  No Known Allergies    Crohn's disease without complication (CMS/HCC)    Lumbar stenosis    GERD without esophagitis    Essential hypertension    Chronic gout    Blood pressure (!) 105/38, pulse 66, temperature 98.5 °F (36.9 °C), temperature source Oral, resp. rate 18, SpO2 95 %, not currently breastfeeding.      Subjective:  Symptoms:  Stable.  She reports shortness of breath, cough and anxiety.    Diet:  Adequate intake.    Activity level: Impaired due to pain.    Pain:  She complains of pain that is moderate.  She reports pain is unchanged.  Pain is partially controlled.      Review of Systems   Respiratory: Positive for cough and shortness of breath.      Objective:  General Appearance:  Comfortable  and well-appearing.    Vital signs: (most recent): Blood pressure (!) 105/38, pulse 66, temperature 98.5 °F (36.9 °C), temperature source Oral, resp. rate 18, SpO2 95 %, not currently breastfeeding.  Vital signs are normal.    Output: Producing urine and minimal stool output.    HEENT: Normal HEENT exam.    Lungs:  Normal effort and normal respiratory rate.    Heart: Normal rate.  Regular rhythm.    Abdomen: Abdomen is soft.  Hypoactive bowel sounds.   There is generalized tenderness.     Neurological: Patient is alert.    Pupils:  Pupils are equal, round, and reactive to light.    Skin:  Warm and dry.              Labs:  Lab Results (last 72 hours)     Procedure Component Value Units Date/Time    CBC & Differential [[id number]] Collected:  02/18/20 0528    Specimen:  Blood Updated:  02/18/20 0705    Narrative:       The following orders were created for panel order CBC & Differential.  Procedure                               Abnormality         Status                     ---------                               -----------         ------                     CBC Auto Differential[[id number]]        Abnormal            Final result                 Please view results for these tests on the individual orders.    CBC Auto Differential [[id number]]  (Abnormal) Collected:  02/18/20 0528    Specimen:  Blood Updated:  02/18/20 0705     WBC 7.37 10*3/mm3      RBC 3.15 10*6/mm3      Hemoglobin 9.1 g/dL      Hematocrit 28.1 %      MCV 89.2 fL      MCH 28.9 pg      MCHC 32.4 g/dL      RDW 14.2 %      RDW-SD 45.6 fl      MPV 10.6 fL      Platelets 187 10*3/mm3      Neutrophil % 71.2 %      Lymphocyte % 13.2 %      Monocyte % 14.5 %      Eosinophil % 0.3 %      Basophil % 0.3 %      Immature Grans % 0.5 %      Neutrophils, Absolute 5.25 10*3/mm3      Lymphocytes, Absolute 0.97 10*3/mm3      Monocytes, Absolute 1.07 10*3/mm3      Eosinophils, Absolute 0.02 10*3/mm3      Basophils, Absolute 0.02 10*3/mm3      Immature Grans, Absolute  0.04 10*3/mm3      nRBC 0.0 /100 WBC     Basic Metabolic Panel [[id number]]  (Abnormal) Collected:  02/18/20 0527    Specimen:  Blood Updated:  02/18/20 0649     Glucose 103 mg/dL      BUN 22 mg/dL      Creatinine 0.77 mg/dL      Sodium 143 mmol/L      Potassium 3.4 mmol/L      Chloride 107 mmol/L      CO2 28.0 mmol/L      Calcium 9.0 mg/dL      eGFR Non African Amer 73 mL/min/1.73      BUN/Creatinine Ratio 28.6     Anion Gap 8.0 mmol/L     Narrative:       GFR Normal >60  Chronic Kidney Disease <60  Kidney Failure <15      Hemoglobin A1c [[id number]] Collected:  02/18/20 0528    Specimen:  Blood Updated:  02/18/20 0619    Vitamin B12 [[id number]] Collected:  02/18/20 0527    Specimen:  Blood Updated:  02/18/20 0619    Folate [[id number]] Collected:  02/18/20 0527    Specimen:  Blood Updated:  02/18/20 0619          Imaging Results (Last 72 Hours)     Procedure Component Value Units Date/Time    XR Spine Lumbar AP & Lateral [[id number]] Collected:  02/17/20 1145     Updated:  02/17/20 1451    Narrative:       XR SPINE LUMBAR AP AND LATERAL- 2/17/2020 9:29 AM CST     HISTORY: LLIF      COMPARISON: None       Impression:       4 intraoperative fluoroscopic spot images of the lumbar spine were  obtained during lateral fusion and disc spacer placement.     Please refer to the operative note for more details. Fluoroscopy time  was 52 seconds with a dose of 15.9 mGy.  This report was finalized on 02/17/2020 11:46 by Dr. Sandro Pastor MD.    FL C Arm During Surgery [[id number]] Collected:  02/17/20 1145     Updated:  02/17/20 1451    Narrative:       XR SPINE LUMBAR AP AND LATERAL- 2/17/2020 9:29 AM CST     HISTORY: LLIF      COMPARISON: None       Impression:       4 intraoperative fluoroscopic spot images of the lumbar spine were  obtained during lateral fusion and disc spacer placement.     Please refer to the operative note for more details. Fluoroscopy time  was 52 seconds with a dose of 15.9 mGy.  This report was  finalized on 02/17/2020 11:46 by Dr. Sandro Pastor MD.                Assessment:    Condition: In stable condition.  Improving.   (    Cough-aggressive use of incentive spirometer recommended today.  Will add duo nebs 4 times daily as needed.  Discussed with daughter at bedside.    Anemia post-op expected-check iron, B12,and folate pending. Replenish as needed.Transfuse at acceptable levels depending on clinical judgement and comorbidities. Recheck in AM    Constipation-history of loose bowels will hold Colestid due to risk of constipation and ultimately end up with Relistor 12 mcg subq every 48 hours to block the effect of narcotics on the gut.    Hypertension-review pre and post BP's,will restart home BP meds when BP allows. Add clonidine 0.1 mg every 4 hours prn if bp> 140/90,monitor BP and adjust meds as necessary.    GERD-exacerbated by pain meds and anesthesia, will add PPI as needed and can step up to Carafate 1 gm po q AC and nightly.  ).     Patient Active Problem List   Diagnosis   • Crohn's disease of small and large intestines with complication (CMS/HCC)   • Crohn's disease of both small and large intestine without complication (CMS/HCC)   • Crohn's disease without complication (CMS/HCC)   • Lumbar stenosis   • GERD without esophagitis   • Essential hypertension   • Chronic gout     Hilario Guillen MD  2/18/2020

## 2020-02-19 ENCOUNTER — ANESTHESIA EVENT (OUTPATIENT)
Dept: PERIOP | Facility: HOSPITAL | Age: 75
End: 2020-02-19

## 2020-02-19 ENCOUNTER — ANESTHESIA (OUTPATIENT)
Dept: PERIOP | Facility: HOSPITAL | Age: 75
End: 2020-02-19

## 2020-02-19 ENCOUNTER — APPOINTMENT (OUTPATIENT)
Dept: GENERAL RADIOLOGY | Facility: HOSPITAL | Age: 75
End: 2020-02-19

## 2020-02-19 PROCEDURE — 25010000002 ONDANSETRON PER 1 MG: Performed by: NURSE ANESTHETIST, CERTIFIED REGISTERED

## 2020-02-19 PROCEDURE — C1713 ANCHOR/SCREW BN/BN,TIS/BN: HCPCS | Performed by: ORTHOPAEDIC SURGERY

## 2020-02-19 PROCEDURE — 25010000002 DEXAMETHASONE PER 1 MG: Performed by: NURSE ANESTHETIST, CERTIFIED REGISTERED

## 2020-02-19 PROCEDURE — 25010000002 NEOSTIGMINE 10 MG/10ML SOLUTION 10 ML VIAL: Performed by: NURSE ANESTHETIST, CERTIFIED REGISTERED

## 2020-02-19 PROCEDURE — C9290 INJ, BUPIVACAINE LIPOSOME: HCPCS | Performed by: ORTHOPAEDIC SURGERY

## 2020-02-19 PROCEDURE — 72100 X-RAY EXAM L-S SPINE 2/3 VWS: CPT

## 2020-02-19 PROCEDURE — 25010000002 PROPOFOL 10 MG/ML EMULSION: Performed by: NURSE ANESTHETIST, CERTIFIED REGISTERED

## 2020-02-19 PROCEDURE — 25010000002 DEXAMETHASONE PER 1 MG: Performed by: ANESTHESIOLOGY

## 2020-02-19 PROCEDURE — 76000 FLUOROSCOPY <1 HR PHYS/QHP: CPT

## 2020-02-19 PROCEDURE — 25010000002 SUCCINYLCHOLINE PER 20 MG: Performed by: NURSE ANESTHETIST, CERTIFIED REGISTERED

## 2020-02-19 PROCEDURE — 25010000002 FENTANYL CITRATE (PF) 100 MCG/2ML SOLUTION: Performed by: ANESTHESIOLOGY

## 2020-02-19 PROCEDURE — C1769 GUIDE WIRE: HCPCS | Performed by: ORTHOPAEDIC SURGERY

## 2020-02-19 PROCEDURE — 25010000002 FENTANYL CITRATE (PF) 250 MCG/5ML SOLUTION: Performed by: NURSE ANESTHETIST, CERTIFIED REGISTERED

## 2020-02-19 PROCEDURE — 25010000002 MORPHINE SULFATE (PF) 2 MG/ML SOLUTION: Performed by: ANESTHESIOLOGY

## 2020-02-19 PROCEDURE — 25010000003 HYDROMORPHONE 1 MG/ML SOLUTION: Performed by: PHYSICIAN ASSISTANT

## 2020-02-19 PROCEDURE — 0SG1071 FUSION OF 2 OR MORE LUMBAR VERTEBRAL JOINTS WITH AUTOLOGOUS TISSUE SUBSTITUTE, POSTERIOR APPROACH, POSTERIOR COLUMN, OPEN APPROACH: ICD-10-PCS | Performed by: ORTHOPAEDIC SURGERY

## 2020-02-19 PROCEDURE — 25010000003 CEFAZOLIN 1-4 GM/50ML-% SOLUTION: Performed by: PHYSICIAN ASSISTANT

## 2020-02-19 PROCEDURE — 25010000003 BUPIVACAINE LIPOSOME 1.3 % SUSPENSION 20 ML VIAL: Performed by: ORTHOPAEDIC SURGERY

## 2020-02-19 PROCEDURE — 0SP004Z REMOVAL OF INTERNAL FIXATION DEVICE FROM LUMBAR VERTEBRAL JOINT, OPEN APPROACH: ICD-10-PCS | Performed by: ORTHOPAEDIC SURGERY

## 2020-02-19 PROCEDURE — 4A11X4G MONITORING OF PERIPHERAL NERVOUS ELECTRICAL ACTIVITY, INTRAOPERATIVE, EXTERNAL APPROACH: ICD-10-PCS | Performed by: ORTHOPAEDIC SURGERY

## 2020-02-19 PROCEDURE — 25010000003 POTASSIUM CHLORIDE 10 MEQ/100ML SOLUTION: Performed by: FAMILY MEDICINE

## 2020-02-19 PROCEDURE — P9612 CATHETERIZE FOR URINE SPEC: HCPCS

## 2020-02-19 DEVICE — CANNULATED POLYAXIAL SCREW, Ø6.5 X 50MM
Type: IMPLANTABLE DEVICE | Status: FUNCTIONAL
Brand: INVICTUS

## 2020-02-19 DEVICE — TI, MIS LORDOTIC ROD, VI, 5.5MM X 75MM
Type: IMPLANTABLE DEVICE | Status: FUNCTIONAL
Brand: INVICTUS

## 2020-02-19 DEVICE — SET SCREW
Type: IMPLANTABLE DEVICE | Status: FUNCTIONAL
Brand: INVICTUS

## 2020-02-19 DEVICE — CANNULATED POLYAXIAL SCREW, Ø6.5 X 45MM
Type: IMPLANTABLE DEVICE | Status: FUNCTIONAL
Brand: INVICTUS

## 2020-02-19 RX ORDER — POTASSIUM CHLORIDE 1.5 G/1.77G
40 POWDER, FOR SOLUTION ORAL AS NEEDED
Status: DISCONTINUED | OUTPATIENT
Start: 2020-02-19 | End: 2020-02-21 | Stop reason: HOSPADM

## 2020-02-19 RX ORDER — KETAMINE HYDROCHLORIDE 50 MG/ML
INJECTION, SOLUTION, CONCENTRATE INTRAMUSCULAR; INTRAVENOUS AS NEEDED
Status: DISCONTINUED | OUTPATIENT
Start: 2020-02-19 | End: 2020-02-19 | Stop reason: SURG

## 2020-02-19 RX ORDER — ACETAMINOPHEN 500 MG
1000 TABLET ORAL ONCE
Status: DISCONTINUED | OUTPATIENT
Start: 2020-02-19 | End: 2020-02-19

## 2020-02-19 RX ORDER — PROPOFOL 10 MG/ML
VIAL (ML) INTRAVENOUS AS NEEDED
Status: DISCONTINUED | OUTPATIENT
Start: 2020-02-19 | End: 2020-02-19 | Stop reason: SURG

## 2020-02-19 RX ORDER — ONDANSETRON 2 MG/ML
4 INJECTION INTRAMUSCULAR; INTRAVENOUS AS NEEDED
Status: DISCONTINUED | OUTPATIENT
Start: 2020-02-19 | End: 2020-02-19 | Stop reason: HOSPADM

## 2020-02-19 RX ORDER — POTASSIUM CHLORIDE 750 MG/1
40 CAPSULE, EXTENDED RELEASE ORAL AS NEEDED
Status: DISCONTINUED | OUTPATIENT
Start: 2020-02-19 | End: 2020-02-21 | Stop reason: HOSPADM

## 2020-02-19 RX ORDER — MAGNESIUM HYDROXIDE 1200 MG/15ML
LIQUID ORAL AS NEEDED
Status: DISCONTINUED | OUTPATIENT
Start: 2020-02-19 | End: 2020-02-19 | Stop reason: HOSPADM

## 2020-02-19 RX ORDER — SODIUM CHLORIDE, SODIUM LACTATE, POTASSIUM CHLORIDE, CALCIUM CHLORIDE 600; 310; 30; 20 MG/100ML; MG/100ML; MG/100ML; MG/100ML
100 INJECTION, SOLUTION INTRAVENOUS CONTINUOUS
Status: DISCONTINUED | OUTPATIENT
Start: 2020-02-19 | End: 2020-02-21 | Stop reason: HOSPADM

## 2020-02-19 RX ORDER — PHENYLEPHRINE HCL IN 0.9% NACL 0.8MG/10ML
SYRINGE (ML) INTRAVENOUS AS NEEDED
Status: DISCONTINUED | OUTPATIENT
Start: 2020-02-19 | End: 2020-02-19 | Stop reason: SURG

## 2020-02-19 RX ORDER — LIDOCAINE HYDROCHLORIDE 10 MG/ML
0.5 INJECTION, SOLUTION EPIDURAL; INFILTRATION; INTRACAUDAL; PERINEURAL ONCE AS NEEDED
Status: DISCONTINUED | OUTPATIENT
Start: 2020-02-19 | End: 2020-02-19 | Stop reason: HOSPADM

## 2020-02-19 RX ORDER — MORPHINE SULFATE 2 MG/ML
2 INJECTION, SOLUTION INTRAMUSCULAR; INTRAVENOUS
Status: DISCONTINUED | OUTPATIENT
Start: 2020-02-19 | End: 2020-02-19 | Stop reason: HOSPADM

## 2020-02-19 RX ORDER — DEXAMETHASONE SODIUM PHOSPHATE 4 MG/ML
4 INJECTION, SOLUTION INTRA-ARTICULAR; INTRALESIONAL; INTRAMUSCULAR; INTRAVENOUS; SOFT TISSUE ONCE AS NEEDED
Status: COMPLETED | OUTPATIENT
Start: 2020-02-19 | End: 2020-02-19

## 2020-02-19 RX ORDER — IPRATROPIUM BROMIDE AND ALBUTEROL SULFATE 2.5; .5 MG/3ML; MG/3ML
3 SOLUTION RESPIRATORY (INHALATION) ONCE AS NEEDED
Status: DISCONTINUED | OUTPATIENT
Start: 2020-02-19 | End: 2020-02-19 | Stop reason: HOSPADM

## 2020-02-19 RX ORDER — OXYCODONE HCL 20 MG/1
20 TABLET, FILM COATED, EXTENDED RELEASE ORAL ONCE
Status: DISCONTINUED | OUTPATIENT
Start: 2020-02-19 | End: 2020-02-19 | Stop reason: HOSPADM

## 2020-02-19 RX ORDER — ROCURONIUM BROMIDE 10 MG/ML
INJECTION, SOLUTION INTRAVENOUS AS NEEDED
Status: DISCONTINUED | OUTPATIENT
Start: 2020-02-19 | End: 2020-02-19 | Stop reason: SURG

## 2020-02-19 RX ORDER — DEXAMETHASONE SODIUM PHOSPHATE 4 MG/ML
INJECTION, SOLUTION INTRA-ARTICULAR; INTRALESIONAL; INTRAMUSCULAR; INTRAVENOUS; SOFT TISSUE AS NEEDED
Status: DISCONTINUED | OUTPATIENT
Start: 2020-02-19 | End: 2020-02-19 | Stop reason: SURG

## 2020-02-19 RX ORDER — SODIUM CHLORIDE 0.9 % (FLUSH) 0.9 %
3-10 SYRINGE (ML) INJECTION AS NEEDED
Status: DISCONTINUED | OUTPATIENT
Start: 2020-02-19 | End: 2020-02-19 | Stop reason: HOSPADM

## 2020-02-19 RX ORDER — SODIUM CHLORIDE 9 MG/ML
INJECTION, SOLUTION INTRAVENOUS AS NEEDED
Status: DISCONTINUED | OUTPATIENT
Start: 2020-02-19 | End: 2020-02-19 | Stop reason: HOSPADM

## 2020-02-19 RX ORDER — GLYCOPYRROLATE 0.2 MG/ML
INJECTION INTRAMUSCULAR; INTRAVENOUS AS NEEDED
Status: DISCONTINUED | OUTPATIENT
Start: 2020-02-19 | End: 2020-02-19 | Stop reason: SURG

## 2020-02-19 RX ORDER — NEOSTIGMINE METHYLSULFATE 1 MG/ML
INJECTION, SOLUTION INTRAVENOUS AS NEEDED
Status: DISCONTINUED | OUTPATIENT
Start: 2020-02-19 | End: 2020-02-19 | Stop reason: SURG

## 2020-02-19 RX ORDER — LABETALOL HYDROCHLORIDE 5 MG/ML
5 INJECTION, SOLUTION INTRAVENOUS
Status: DISCONTINUED | OUTPATIENT
Start: 2020-02-19 | End: 2020-02-19 | Stop reason: HOSPADM

## 2020-02-19 RX ORDER — SODIUM CHLORIDE 0.9 % (FLUSH) 0.9 %
10 SYRINGE (ML) INJECTION EVERY 12 HOURS SCHEDULED
Status: DISCONTINUED | OUTPATIENT
Start: 2020-02-19 | End: 2020-02-19 | Stop reason: HOSPADM

## 2020-02-19 RX ORDER — LIDOCAINE HYDROCHLORIDE 40 MG/ML
SOLUTION TOPICAL AS NEEDED
Status: DISCONTINUED | OUTPATIENT
Start: 2020-02-19 | End: 2020-02-19 | Stop reason: SURG

## 2020-02-19 RX ORDER — SODIUM CHLORIDE 0.9 % (FLUSH) 0.9 %
10 SYRINGE (ML) INJECTION AS NEEDED
Status: DISCONTINUED | OUTPATIENT
Start: 2020-02-19 | End: 2020-02-19 | Stop reason: HOSPADM

## 2020-02-19 RX ORDER — CEFAZOLIN SODIUM 1 G/50ML
1 INJECTION, SOLUTION INTRAVENOUS EVERY 8 HOURS
Status: COMPLETED | OUTPATIENT
Start: 2020-02-19 | End: 2020-02-20

## 2020-02-19 RX ORDER — NITROFURANTOIN MACROCRYSTALS 50 MG/1
50 CAPSULE ORAL NIGHTLY
Status: DISCONTINUED | OUTPATIENT
Start: 2020-02-19 | End: 2020-02-21 | Stop reason: HOSPADM

## 2020-02-19 RX ORDER — HYDRALAZINE HYDROCHLORIDE 20 MG/ML
5 INJECTION INTRAMUSCULAR; INTRAVENOUS
Status: DISCONTINUED | OUTPATIENT
Start: 2020-02-19 | End: 2020-02-19 | Stop reason: HOSPADM

## 2020-02-19 RX ORDER — FLUMAZENIL 0.1 MG/ML
0.2 INJECTION INTRAVENOUS AS NEEDED
Status: DISCONTINUED | OUTPATIENT
Start: 2020-02-19 | End: 2020-02-19 | Stop reason: HOSPADM

## 2020-02-19 RX ORDER — SUCCINYLCHOLINE CHLORIDE 20 MG/ML
INJECTION INTRAMUSCULAR; INTRAVENOUS AS NEEDED
Status: DISCONTINUED | OUTPATIENT
Start: 2020-02-19 | End: 2020-02-19 | Stop reason: SURG

## 2020-02-19 RX ORDER — ONDANSETRON 2 MG/ML
INJECTION INTRAMUSCULAR; INTRAVENOUS AS NEEDED
Status: DISCONTINUED | OUTPATIENT
Start: 2020-02-19 | End: 2020-02-19 | Stop reason: SURG

## 2020-02-19 RX ORDER — POTASSIUM CHLORIDE 7.45 MG/ML
10 INJECTION INTRAVENOUS
Status: DISCONTINUED | OUTPATIENT
Start: 2020-02-19 | End: 2020-02-21 | Stop reason: HOSPADM

## 2020-02-19 RX ORDER — LIDOCAINE HYDROCHLORIDE 20 MG/ML
INJECTION, SOLUTION INFILTRATION; PERINEURAL AS NEEDED
Status: DISCONTINUED | OUTPATIENT
Start: 2020-02-19 | End: 2020-02-19 | Stop reason: SURG

## 2020-02-19 RX ORDER — MIDAZOLAM HYDROCHLORIDE 1 MG/ML
2 INJECTION INTRAMUSCULAR; INTRAVENOUS
Status: DISCONTINUED | OUTPATIENT
Start: 2020-02-19 | End: 2020-02-19 | Stop reason: HOSPADM

## 2020-02-19 RX ORDER — FENTANYL CITRATE 50 UG/ML
25 INJECTION, SOLUTION INTRAMUSCULAR; INTRAVENOUS
Status: DISCONTINUED | OUTPATIENT
Start: 2020-02-19 | End: 2020-02-19 | Stop reason: HOSPADM

## 2020-02-19 RX ORDER — SODIUM CHLORIDE 0.9 % (FLUSH) 0.9 %
3 SYRINGE (ML) INJECTION EVERY 12 HOURS SCHEDULED
Status: DISCONTINUED | OUTPATIENT
Start: 2020-02-19 | End: 2020-02-19 | Stop reason: HOSPADM

## 2020-02-19 RX ORDER — POTASSIUM CHLORIDE 750 MG/1
40 CAPSULE, EXTENDED RELEASE ORAL EVERY 4 HOURS
Status: COMPLETED | OUTPATIENT
Start: 2020-02-19 | End: 2020-02-19

## 2020-02-19 RX ORDER — NALOXONE HCL 0.4 MG/ML
0.04 VIAL (ML) INJECTION AS NEEDED
Status: DISCONTINUED | OUTPATIENT
Start: 2020-02-19 | End: 2020-02-19 | Stop reason: HOSPADM

## 2020-02-19 RX ORDER — SODIUM CHLORIDE, SODIUM LACTATE, POTASSIUM CHLORIDE, CALCIUM CHLORIDE 600; 310; 30; 20 MG/100ML; MG/100ML; MG/100ML; MG/100ML
100 INJECTION, SOLUTION INTRAVENOUS CONTINUOUS PRN
Status: DISCONTINUED | OUTPATIENT
Start: 2020-02-19 | End: 2020-02-21 | Stop reason: HOSPADM

## 2020-02-19 RX ORDER — MIDAZOLAM HYDROCHLORIDE 1 MG/ML
1 INJECTION INTRAMUSCULAR; INTRAVENOUS
Status: DISCONTINUED | OUTPATIENT
Start: 2020-02-19 | End: 2020-02-19 | Stop reason: HOSPADM

## 2020-02-19 RX ORDER — SODIUM CHLORIDE 9 MG/ML
75 INJECTION, SOLUTION INTRAVENOUS CONTINUOUS
Status: DISCONTINUED | OUTPATIENT
Start: 2020-02-19 | End: 2020-02-19 | Stop reason: SDUPTHER

## 2020-02-19 RX ORDER — FENTANYL CITRATE 50 UG/ML
INJECTION, SOLUTION INTRAMUSCULAR; INTRAVENOUS AS NEEDED
Status: DISCONTINUED | OUTPATIENT
Start: 2020-02-19 | End: 2020-02-19 | Stop reason: SURG

## 2020-02-19 RX ADMIN — ALLOPURINOL 100 MG: 100 TABLET ORAL at 21:34

## 2020-02-19 RX ADMIN — Medication 1 TABLET: at 15:22

## 2020-02-19 RX ADMIN — CARVEDILOL 6.25 MG: 6.25 TABLET, FILM COATED ORAL at 08:07

## 2020-02-19 RX ADMIN — ALLOPURINOL 300 MG: 300 TABLET ORAL at 15:23

## 2020-02-19 RX ADMIN — CARVEDILOL 6.25 MG: 6.25 TABLET, FILM COATED ORAL at 17:33

## 2020-02-19 RX ADMIN — DEXAMETHASONE SODIUM PHOSPHATE 4 MG: 4 INJECTION, SOLUTION INTRAMUSCULAR; INTRAVENOUS at 11:08

## 2020-02-19 RX ADMIN — FAMOTIDINE 20 MG: 10 INJECTION, SOLUTION INTRAVENOUS at 08:08

## 2020-02-19 RX ADMIN — MESALAMINE 800 MG: 400 CAPSULE, DELAYED RELEASE ORAL at 15:26

## 2020-02-19 RX ADMIN — FAMOTIDINE 20 MG: 20 TABLET ORAL at 21:34

## 2020-02-19 RX ADMIN — SODIUM CHLORIDE, POTASSIUM CHLORIDE, SODIUM LACTATE AND CALCIUM CHLORIDE 100 ML/HR: 600; 310; 30; 20 INJECTION, SOLUTION INTRAVENOUS at 09:07

## 2020-02-19 RX ADMIN — GABAPENTIN 300 MG: 300 CAPSULE ORAL at 21:34

## 2020-02-19 RX ADMIN — FENTANYL CITRATE 25 MCG: 50 INJECTION, SOLUTION INTRAMUSCULAR; INTRAVENOUS at 13:55

## 2020-02-19 RX ADMIN — POTASSIUM CHLORIDE 40 MEQ: 750 CAPSULE, EXTENDED RELEASE ORAL at 21:39

## 2020-02-19 RX ADMIN — LIDOCAINE HYDROCHLORIDE 100 MG: 20 INJECTION, SOLUTION INFILTRATION; PERINEURAL at 10:45

## 2020-02-19 RX ADMIN — FENTANYL CITRATE 25 MCG: 50 INJECTION, SOLUTION INTRAMUSCULAR; INTRAVENOUS at 13:45

## 2020-02-19 RX ADMIN — OXYCODONE HYDROCHLORIDE AND ACETAMINOPHEN 1 TABLET: 10; 325 TABLET ORAL at 05:55

## 2020-02-19 RX ADMIN — POTASSIUM CHLORIDE 10 MEQ: 7.46 INJECTION, SOLUTION INTRAVENOUS at 09:20

## 2020-02-19 RX ADMIN — KETAMINE HYDROCHLORIDE 25 MG: 50 INJECTION, SOLUTION INTRAMUSCULAR; INTRAVENOUS at 10:44

## 2020-02-19 RX ADMIN — NEOSTIGMINE METHYLSULFATE 3 MG: 1 INJECTION INTRAVENOUS at 12:15

## 2020-02-19 RX ADMIN — SUCCINYLCHOLINE CHLORIDE 120 MG: 20 INJECTION, SOLUTION INTRAMUSCULAR; INTRAVENOUS at 10:45

## 2020-02-19 RX ADMIN — SODIUM CHLORIDE 75 ML/HR: 9 INJECTION, SOLUTION INTRAVENOUS at 15:45

## 2020-02-19 RX ADMIN — LIDOCAINE HYDROCHLORIDE 1 EACH: 40 SOLUTION TOPICAL at 10:45

## 2020-02-19 RX ADMIN — ROCURONIUM BROMIDE 10 MG: 10 INJECTION INTRAVENOUS at 10:45

## 2020-02-19 RX ADMIN — OXYCODONE HYDROCHLORIDE AND ACETAMINOPHEN 1 TABLET: 10; 325 TABLET ORAL at 21:34

## 2020-02-19 RX ADMIN — NITROFURANTOIN MACROCRYSTALS 50 MG: 50 CAPSULE ORAL at 21:34

## 2020-02-19 RX ADMIN — KETAMINE HYDROCHLORIDE 25 MG: 50 INJECTION, SOLUTION INTRAMUSCULAR; INTRAVENOUS at 11:00

## 2020-02-19 RX ADMIN — OXYCODONE HYDROCHLORIDE AND ACETAMINOPHEN 1 TABLET: 10; 325 TABLET ORAL at 17:39

## 2020-02-19 RX ADMIN — GABAPENTIN 300 MG: 300 CAPSULE ORAL at 15:37

## 2020-02-19 RX ADMIN — FENTANYL CITRATE 25 MCG: 50 INJECTION, SOLUTION INTRAMUSCULAR; INTRAVENOUS at 13:50

## 2020-02-19 RX ADMIN — POLYETHYLENE GLYCOL (3350) 17 G: 17 POWDER, FOR SOLUTION ORAL at 15:28

## 2020-02-19 RX ADMIN — POTASSIUM CHLORIDE 40 MEQ: 750 CAPSULE, EXTENDED RELEASE ORAL at 15:15

## 2020-02-19 RX ADMIN — FENTANYL CITRATE 100 MCG: 50 INJECTION INTRAMUSCULAR; INTRAVENOUS at 11:16

## 2020-02-19 RX ADMIN — VASOPRESSIN 0.5 ML: 20 INJECTION INTRAVENOUS at 11:28

## 2020-02-19 RX ADMIN — CEFAZOLIN SODIUM 1 G: 1 INJECTION, SOLUTION INTRAVENOUS at 17:33

## 2020-02-19 RX ADMIN — Medication 80 MCG: at 11:20

## 2020-02-19 RX ADMIN — ALLOPURINOL 100 MG: 100 TABLET ORAL at 15:30

## 2020-02-19 RX ADMIN — MORPHINE SULFATE 2 MG: 2 INJECTION, SOLUTION INTRAMUSCULAR; INTRAVENOUS at 14:06

## 2020-02-19 RX ADMIN — DEXAMETHASONE SODIUM PHOSPHATE 4 MG: 4 INJECTION, SOLUTION INTRAMUSCULAR; INTRAVENOUS at 09:11

## 2020-02-19 RX ADMIN — ONDANSETRON HYDROCHLORIDE 4 MG: 2 SOLUTION INTRAMUSCULAR; INTRAVENOUS at 12:31

## 2020-02-19 RX ADMIN — FENTANYL CITRATE 25 MCG: 50 INJECTION, SOLUTION INTRAMUSCULAR; INTRAVENOUS at 13:39

## 2020-02-19 RX ADMIN — OXYCODONE HYDROCHLORIDE AND ACETAMINOPHEN 1 TABLET: 10; 325 TABLET ORAL at 01:25

## 2020-02-19 RX ADMIN — FENOFIBRATE 145 MG: 145 TABLET ORAL at 15:26

## 2020-02-19 RX ADMIN — CEFAZOLIN 1 G: 1 INJECTION, POWDER, FOR SOLUTION INTRAMUSCULAR; INTRAVENOUS; PARENTERAL at 11:00

## 2020-02-19 RX ADMIN — PROPOFOL 100 MG: 10 INJECTION, EMULSION INTRAVENOUS at 10:45

## 2020-02-19 RX ADMIN — COLESTIPOL HYDROCHLORIDE 1 G: 1 TABLET, FILM COATED ORAL at 15:24

## 2020-02-19 RX ADMIN — MESALAMINE 800 MG: 400 CAPSULE, DELAYED RELEASE ORAL at 21:34

## 2020-02-19 RX ADMIN — FENTANYL CITRATE 50 MCG: 50 INJECTION INTRAMUSCULAR; INTRAVENOUS at 10:43

## 2020-02-19 RX ADMIN — Medication 250 MCG: at 15:29

## 2020-02-19 RX ADMIN — HYDROMORPHONE HYDROCHLORIDE 1 MG: 1 INJECTION, SOLUTION INTRAMUSCULAR; INTRAVENOUS; SUBCUTANEOUS at 15:39

## 2020-02-19 RX ADMIN — FOLIC ACID 1 MG: 1 TABLET ORAL at 15:25

## 2020-02-19 RX ADMIN — VASOPRESSIN 0.5 ML: 20 INJECTION INTRAVENOUS at 10:59

## 2020-02-19 RX ADMIN — FENTANYL CITRATE 50 MCG: 50 INJECTION INTRAMUSCULAR; INTRAVENOUS at 12:18

## 2020-02-19 RX ADMIN — GLYCOPYRROLATE 0.4 MG: 0.2 INJECTION, SOLUTION INTRAMUSCULAR; INTRAVENOUS at 12:15

## 2020-02-19 RX ADMIN — ROCURONIUM BROMIDE 30 MG: 10 INJECTION INTRAVENOUS at 11:12

## 2020-02-19 NOTE — PLAN OF CARE
Problem: Patient Care Overview  Goal: Plan of Care Review  Outcome: Ongoing (interventions implemented as appropriate)  Flowsheets  Taken 2/19/2020 0505 by Cydney East RN  Progress: no change  Taken 2/19/2020 1427 by Cat Barnett RN  Plan of Care Reviewed With: patient  Taken 2/19/2020 1650 by Eusebia Gomez RN  Outcome Summary: Pt back from part 2 back surgery this afternoon around 15:00. Pt drowsy, c/o pain 7/10 after dilaudid given for pain. mid back incision site dressings C/D/I. no drainage noted. Pt voided x 1 since back from OR.

## 2020-02-19 NOTE — PROGRESS NOTES
Deisi Ponce is a 74 y.o. female patient.  Family at bedside.    Medically stable.    Current Facility-Administered Medications   Medication Dose Route Frequency Provider Last Rate Last Dose   • allopurinol (ZYLOPRIM) tablet 100 mg  100 mg Oral BID EBONI Rader MD   100 mg at 02/18/20 2122   • allopurinol (ZYLOPRIM) tablet 300 mg  300 mg Oral Daily EBONI Rader MD   300 mg at 02/18/20 0807   • calcium carb-cholecalciferol 600-800 MG-UNIT tablet 1 tablet  1 tablet Oral Daily EBONI Rader MD   1 tablet at 02/18/20 0808   • carvedilol (COREG) tablet 6.25 mg  6.25 mg Oral BID With Meals EBONI Rader MD   6.25 mg at 02/18/20 1655   • colestipol (COLESTID) tablet 1 g  1 g Oral TID AC EBONI Rader MD   1 g at 02/18/20 1655   • cyanocobalamin (VITAMIN B-12) tablet 250 mcg  250 mcg Oral Daily Sachin Wilson PA-C   250 mcg at 02/18/20 0808   • diphenhydrAMINE (BENADRYL) capsule 25 mg  25 mg Oral Nightly PRN EBONI Rader MD       • famotidine (PEPCID) injection 20 mg  20 mg Intravenous Q12H EBONI Rader MD        Or   • famotidine (PEPCID) tablet 20 mg  20 mg Oral Q12H EBONI Rader MD   20 mg at 02/18/20 2122   • fenofibrate (TRICOR) tablet 145 mg  145 mg Oral Daily EBONI Rader MD   145 mg at 02/18/20 0808   • folic acid (FOLVITE) tablet 1 mg  1 mg Oral Daily EBONI Rader MD   1 mg at 02/18/20 0808   • gabapentin (NEURONTIN) capsule 300 mg  300 mg Oral TID EBONI Rader MD   300 mg at 02/18/20 2122   • HYDROmorphone (DILAUDID) injection 1 mg  1 mg Intravenous Q3H PRN EBONI Rader MD       • indapamide (LOZOL) tablet 2.5 mg  2.5 mg Oral QAM EBONI Rader MD   2.5 mg at 02/18/20 0808   • lisinopril (PRINIVIL,ZESTRIL) tablet 40 mg  40 mg Oral Daily EBONI Rader MD   40 mg at 02/18/20 0935   • mesalamine (DELZICOL) delayed release capsule 800 mg  800 mg Oral TID EBONI Rader MD   800 mg at 02/18/20 2122   •  "nitrofurantoin (MACRODANTIN) capsule 50 mg  50 mg Oral Nightly EBONI Rader MD   50 mg at 02/18/20 2121   • ondansetron (ZOFRAN) tablet 4 mg  4 mg Oral Q6H PRN EBONI Rader MD   4 mg at 02/18/20 0215    Or   • ondansetron (ZOFRAN) injection 4 mg  4 mg Intravenous Q6H PRN EBONI Rader MD       • oxyCODONE-acetaminophen (PERCOCET)  MG per tablet 1 tablet  1 tablet Oral Q4H PRN EBONI Rader MD   1 tablet at 02/19/20 0555   • oxyCODONE-acetaminophen (PERCOCET)  MG per tablet 2 tablet  2 tablet Oral Q4H PRN EBONI Rader MD   2 tablet at 02/18/20 0255   • polyethylene glycol 3350 powder (packet)  17 g Oral BID Hilario Guillen MD   17 g at 02/17/20 2120   • potassium chloride (MICRO-K) CR capsule 40 mEq  40 mEq Oral PRN Hilario Guillen MD        Or   • potassium chloride (KLOR-CON) packet 40 mEq  40 mEq Oral PRN Hilario Guillen MD        Or   • potassium chloride 10 mEq in 100 mL IVPB  10 mEq Intravenous Q1H PRN Hilario Guillen MD       • sodium chloride 0.9 % flush 10 mL  10 mL Intravenous PRN EBONI Rader MD       • sodium chloride 0.9 % flush 3 mL  3 mL Intravenous Q12H EBONI Rader MD   3 mL at 02/18/20 0811   • sodium chloride 0.9 % infusion  75 mL/hr Intravenous Continuous EBONI Rader MD 75 mL/hr at 02/18/20 1816 75 mL/hr at 02/18/20 1816   • tiZANidine (ZANAFLEX) tablet 4 mg  4 mg Oral Q8H PRN EBONI Rader MD   4 mg at 02/18/20 1227     ALLERGIES:  No Known Allergies    Crohn's disease without complication (CMS/HCC)    Lumbar stenosis    GERD without esophagitis    Essential hypertension    Chronic gout    Blood pressure 122/45, pulse 104, temperature 99.9 °F (37.7 °C), temperature source Oral, resp. rate 16, height 162 cm (63.78\"), weight 67.9 kg (149 lb 11.1 oz), SpO2 96 %, not currently breastfeeding.      Subjective:  Symptoms:  Stable.  She reports shortness of breath, cough and anxiety.    Diet:  Adequate " "intake.    Activity level: Impaired due to pain.    Pain:  She complains of pain that is moderate.  She reports pain is unchanged.  Pain is partially controlled.      Review of Systems   Respiratory: Positive for cough and shortness of breath.      Objective:  General Appearance:  Comfortable and well-appearing.    Vital signs: (most recent): Blood pressure 122/45, pulse 104, temperature 99.9 °F (37.7 °C), temperature source Oral, resp. rate 16, height 162 cm (63.78\"), weight 67.9 kg (149 lb 11.1 oz), SpO2 96 %, not currently breastfeeding.  Vital signs are normal.    Output: Producing urine and minimal stool output.    HEENT: Normal HEENT exam.    Lungs:  Normal effort and normal respiratory rate.    Heart: Normal rate.  Regular rhythm.    Abdomen: Abdomen is soft.  Hypoactive bowel sounds.   There is generalized tenderness.     Neurological: Patient is alert.    Pupils:  Pupils are equal, round, and reactive to light.    Skin:  Warm and dry.              Labs:  Lab Results (last 72 hours)     Procedure Component Value Units Date/Time    CBC & Differential [[id number]] Collected:  02/18/20 0528    Specimen:  Blood Updated:  02/18/20 0705    Narrative:       The following orders were created for panel order CBC & Differential.  Procedure                               Abnormality         Status                     ---------                               -----------         ------                     CBC Auto Differential[[id number]]        Abnormal            Final result                 Please view results for these tests on the individual orders.    CBC Auto Differential [[id number]]  (Abnormal) Collected:  02/18/20 0528    Specimen:  Blood Updated:  02/18/20 0705     WBC 7.37 10*3/mm3      RBC 3.15 10*6/mm3      Hemoglobin 9.1 g/dL      Hematocrit 28.1 %      MCV 89.2 fL      MCH 28.9 pg      MCHC 32.4 g/dL      RDW 14.2 %      RDW-SD 45.6 fl      MPV 10.6 fL      Platelets 187 10*3/mm3      Neutrophil % 71.2 %      " Lymphocyte % 13.2 %      Monocyte % 14.5 %      Eosinophil % 0.3 %      Basophil % 0.3 %      Immature Grans % 0.5 %      Neutrophils, Absolute 5.25 10*3/mm3      Lymphocytes, Absolute 0.97 10*3/mm3      Monocytes, Absolute 1.07 10*3/mm3      Eosinophils, Absolute 0.02 10*3/mm3      Basophils, Absolute 0.02 10*3/mm3      Immature Grans, Absolute 0.04 10*3/mm3      nRBC 0.0 /100 WBC     Basic Metabolic Panel [[id number]]  (Abnormal) Collected:  02/18/20 0527    Specimen:  Blood Updated:  02/18/20 0649     Glucose 103 mg/dL      BUN 22 mg/dL      Creatinine 0.77 mg/dL      Sodium 143 mmol/L      Potassium 3.4 mmol/L      Chloride 107 mmol/L      CO2 28.0 mmol/L      Calcium 9.0 mg/dL      eGFR Non African Amer 73 mL/min/1.73      BUN/Creatinine Ratio 28.6     Anion Gap 8.0 mmol/L     Narrative:       GFR Normal >60  Chronic Kidney Disease <60  Kidney Failure <15      Hemoglobin A1c [[id number]] Collected:  02/18/20 0528    Specimen:  Blood Updated:  02/18/20 0619    Vitamin B12 [[id number]] Collected:  02/18/20 0527    Specimen:  Blood Updated:  02/18/20 0619    Folate [[id number]] Collected:  02/18/20 0527    Specimen:  Blood Updated:  02/18/20 0619          Imaging Results (Last 72 Hours)     Procedure Component Value Units Date/Time    XR Spine Lumbar AP & Lateral [[id number]] Collected:  02/17/20 1145     Updated:  02/17/20 1451    Narrative:       XR SPINE LUMBAR AP AND LATERAL- 2/17/2020 9:29 AM CST     HISTORY: LLIF      COMPARISON: None       Impression:       4 intraoperative fluoroscopic spot images of the lumbar spine were  obtained during lateral fusion and disc spacer placement.     Please refer to the operative note for more details. Fluoroscopy time  was 52 seconds with a dose of 15.9 mGy.  This report was finalized on 02/17/2020 11:46 by Dr. Sandro Pastor MD.    FL C Arm During Surgery [[id number]] Collected:  02/17/20 1145     Updated:  02/17/20 1451    Narrative:       XR SPINE LUMBAR AP AND LATERAL-  2/17/2020 9:29 AM CST     HISTORY: LLIF      COMPARISON: None       Impression:       4 intraoperative fluoroscopic spot images of the lumbar spine were  obtained during lateral fusion and disc spacer placement.     Please refer to the operative note for more details. Fluoroscopy time  was 52 seconds with a dose of 15.9 mGy.  This report was finalized on 02/17/2020 11:46 by Dr. Sandro Pastor MD.                Assessment:    Condition: In stable condition.  Improving.   (    Cough-aggressive use of incentive spirometer recommended today.  Will add duo nebs 4 times daily as needed.  Discussed with daughter at bedside.    Anemia post-op expected-check iron, B12,and folate pending. Replenish as needed.Transfuse at acceptable levels depending on clinical judgement and comorbidities. Recheck in AM    Gout-check uric acid    Hypokalemia- IV/p.o. replacement orders placed today.    Medically stable for surgical intervention  today    Constipation-history of loose bowels will hold Colestid due to risk of constipation and ultimately end up with Relistor 12 mcg subq every 48 hours to block the effect of narcotics on the gut.    Hypertension-review pre and post BP's,will restart home BP meds when BP allows. Add clonidine 0.1 mg every 4 hours prn if bp> 140/90,monitor BP and adjust meds as necessary.    GERD-exacerbated by pain meds and anesthesia, will add PPI as needed and can step up to Carafate 1 gm po q AC and nightly.  ).     Patient Active Problem List   Diagnosis   • Crohn's disease of small and large intestines with complication (CMS/HCC)   • Crohn's disease of both small and large intestine without complication (CMS/HCC)   • Crohn's disease without complication (CMS/HCC)   • Lumbar stenosis   • GERD without esophagitis   • Essential hypertension   • Chronic gout     Hilairo Guillen MD  2/19/2020

## 2020-02-19 NOTE — OP NOTE
LUMBAR LAMINECTOMY POSTERIOR LUMBAR INTERBODY FUSION, SPINAL HARDWARE REMOVAL  Procedure Note    Deisi Ponce  2/19/2020    Pre-op Diagnosis:     1.  Status post right LLIF L3 to L5, PSF with instrumentation L3 to L5, Dr. Justo Heath, 05/27/2010.    2.  Increasing chronic low back pain.   3.  Severe left buttock, anterior thigh radiculopathy.    4.  Chronic bilateral anterior tibialis weakness.   5.  Bilateral lower extremity peripheral neuropathy.   6.  Left hip flexor, quadricep weakness.   7.  Adjacent level degenerative disk disease, worse L1 to L3, L5-S1.    8.  Degenerative lumbar scoliosis L1to L3.    9.  Lateral listhesis, L1-2, L2-3.   10.  Facet arthropathy L1 to L3, L5-S1.    11.  Central and severe foraminal stenosis L1 to L3.   12.  Chronic foraminal stenosis right L5-S1.   13.  Limb length discrepancy, left shorter than right.    14.  Osteopenia, T score -2.0, 1/27/2020  15.  Status post left LLIF L1-3 with instrumentation L1-2, 2/17/2020    Post-op Diagnosis:    same    Procedure/CPT® Codes:    1.  Removal of posterior instrumentation bilateral L3-5  2.  Exploration of fusion L3-5  3.  Posterior spinal fusion L1-2, L2-3  4.  Posterior spinal instrumentation right L1-3 (ATEC pedicle screws and rods)  5.  Use of locally obtained autograft bone for fusion  6.  Use of fluoroscopy for confirmation of surgical level and placement of instrumentation  7.  Intraoperative neural monitoring with pedicle screw stimulation     Anesthesia: General     Surgeon: DAVEY Rader MD     Assistant: Sachin Wilson PA-C, Patricia Whitfield, SCOTT     Estimated Blood Loss:  50 mL     Complications: None     Condition: Stable to PACU.     Indications:     The patient is a 74-year-old who sees Dr. Derek Schmid for medical issues.  She presented to the office with a history of a prior lateral and posterior fusion from L3-5 done by my partner Dr. Justo Heath on 5/27/2010.  She unfortunately had complaints of increasing chronic  low back pain along with severe symptoms into the left buttock and anterior thigh.  She was also noted to have some chronic bilateral anterior tibialis weakness and weakness in her left hip flexor and quadricep.  Imaging studies revealed adjacent level degenerative disc disease at L1-2, L2-3, and L5-S1.  She was noted to have a degenerative lumbar scoliosis from L1-3 with lateral listhesis at both levels as well as facet arthropathy causing central and severe foraminal stenosis.  While she did have facet arthropathy and chronic foraminal stenosis on the right side of L5-S1, we decided to try to minimize surgery to only L1-3 at this time, as it was felt to be contributing to the vast majority of her symptoms.  However, it is possible that she may require additional surgery at L5-S1 at a later date.     After failing all conservative measures, it was mutually decided that surgery would be the best option. Risks, benefits, and complications of surgery were discussed with the patient. The patient appeared well informed and wished to proceed. We specifically discussed the risk of infection, blood loss, nerve root injury, CSF leak, and the possibility of incomplete resolution of symptoms.  We also discussed the risk of a nonunion and the potential need for additional surgery in the event of a pseudoarthrosis or hardware failure.     We elected to proceed with surgery in a staged fashion.  Previously on 2/17/2020, the patient underwent a left lateral fusion from L1-L3 with instrumentation at L1-2.  Today we return to surgery for the second posterior stage of the procedure.     Operative Procedure:     After obtaining informed consent and verifying the correct operative site, the patient was brought to the operating room. A general anesthetic was provided by the anesthesia service with the assistance of an endotracheal tube. Once this was appropriately positioned and secured, the patient was carefully rotated prone onto a  Joel frame. All bony processes were well-padded. The lumbar region was prepped and draped in usual sterile fashion. A surgical timeout was taken to confirm this was the correct patient, we were working at the correct levels, and that preoperative antibiotics were given in a timely fashion.    Next, bilateral Wiltsey incisions spanning the previous instrumentation from L3-5 were created using a 10 blade scalpel.  Dissection was carried through subcutaneous tissues using Bovie cautery. The fascia was divided in line with the incision. Blunt dissection was carried between the multifidus and longissimus muscles down to the previously placed instrumentation spanning L3-5. There was some scar tissue as expected around the screws and between the musculature.  The previous instrumentation was completely exposed using Bovie cautery removing some fibrous scar tissue from around the screw heads themselves. The appropriate star  screwdriver and antitorque wrench were used to remove the setscrews. The rods were then taken out with a gallo gleason. The area was then thoroughly explored bilaterally.  All of the pedicle screws appeared to be relatively tight with no evidence of loosening.  There appeared to be adequate bone graft in the posterior lateral gutters consistent with a solid fusion from L3-5.     Next the appropriate screwdriver was used to remove the actual pedicle screws from L3-5 bilaterally.  Bleeding from the pedicles where screws were removed was controlled using thrombin with Gelfoam powder. The wounds were then copiously irrigated with saline solution.     The right sided Wiltsey incision was then extended using the 10 blade scalpel up to L1.  The same intramuscular plane was used to gain access to the facet joints of L1-2 and L2-3 and the transverse processes of L1, L2 and L3.  Self retaining retractors were placed for continuous exposure.  The cautery was used to expose as much bony surface area on the  transverse processes and to destroy the facet capsule at L1-2 and L2-3.  The wound was then thoroughly irrigated with saline solution.     Next under fluoroscopic guidance, I created starting holes for the placement of new pedicle screw instrumentation at L1 and L2.  A starting hole was created with a high-speed bur and the pedicle track was then created using a pedicle probe gaining access through the pedicle to the anterior vertebral body.  The pedicle tract was palpated with a ball-tipped feeler to ensure that the track was adequate.  Into the pedicles of L1 and L2 on the right, I placed new screws measuring 6.5 x 45 mm from the [facility] instrumentation set.  I then replaced a new screw into the prior pedicle screw track at L3 measuring 6.5 mm x 45 mm.  All screws had excellent purchase.       Fluoroscopy was used to confirm that all instrumentation was properly positioned in both the AP and lateral projections.  The wounds were then copiously irrigated with saline solution.  I used a neuro monitoring probe to stimulate the screws to ensure that there were adequately positioned.  All screws appeared well positioned.     The high-speed bur was used to decorticate the posterior elements of L1 and L2 as well as the previous fusion mass on the right side from L3-5.  I also decorticated the facet joints of L1-2 and L2-3.  The local bone was left in situ to assist with obtaining a fusion.  This constituted a posterior fusion of L1-2 and L2-3.    Next an appropriate gallo was chosen to span the pedicle screw instrumentation spanning L1-L3 on the right.  Set screws were inserted into the pedicle screw saddles fixing the rods into position.  The setscrews were then tightened using the appropriate antitorque wrench and torque limiting screwdriver provided by [facility].     A final inspection was then done to ensure that we had adequate hemostasis.  Bleeding was controlled using bipolar cautery and Gelfoam with thrombin.  After  insuring that we had adequate hemostasis, closure was then undertaken using a #1 Vicryl to reapproximate the fascia. Immediate subcutaneous tissues were closed with a 2-0 Vicryl and skin closure was then accomplished using Mastisol and Steri-Strips.  I did infuse the subcutaneous layer with half percent Marcaine to assist with postoperative pain control.  The wounds were then washed and sterilely dressed. The patient was then carefully rotated supine onto a hospital gurney, extubated, and sent to the recovery room in good stable condition. The patient tolerated the procedure well, there were no complications.  Estimated blood loss was approximately 50 mL.     Sahcin Wilson PA-C provided critical assistance during the procedure.  His assistance was medically necessary in order to allow the procedure to occur in the most safe and efficient manner.  He assisted not only with the removal of instrumentation and exploration of fusion from L3-L5, but also assisted with the placement of new instrumentation and bone graft spanning the revision fusion from L1-L3.    DAVEY Rader MD     Date: 2/19/2020  Time: 5:40 PM

## 2020-02-19 NOTE — PLAN OF CARE
Problem: Patient Care Overview  Goal: Plan of Care Review  Outcome: Ongoing (interventions implemented as appropriate)  Flowsheets (Taken 2/19/2020 4810)  Progress: no change  Plan of Care Reviewed With: patient  Outcome Summary: Pt A&O. Voids small amounts, I&O cath x2, normal for patient. Tolerating PO pain meds. Part two planned for this AM.

## 2020-02-19 NOTE — PAYOR COMM NOTE
"FROM: ELENA FIGUEROA  PHONE: [phone]  FAX: [phone]    AUTH: [id number]    Deisy Santiago (74 y.o. Female)     Date of Birth Social Security Number Address Home Phone MRN    1945  [id number]   [place] [address] [phone] [id number]    Quaker Marital Status          [employer] [facility]        Admission Date Admission Type Admitting Provider Attending Provider Department, Room/Bed    2/17/20 Elective EBONI Rader MD Strenge, K Brandon, MD [facility] OR, PAD OR/NONE    Discharge Date Discharge Disposition Discharge Destination                       Attending Provider:  EBONI Rader MD    Allergies:  No Known Allergies    Isolation:  None   Infection:  None   Code Status:  CPR    Ht:  162 cm (63.78\")   Wt:  67.9 kg (149 lb 11.1 oz)    Admission Cmt:  None   Principal Problem:  None                Active Insurance as of 2/17/2020     Primary Coverage     Payor Plan Insurance Group Employer/Plan Group    ANTHEM MEDICARE REPLACEMENT ANTHEM MEDICARE ADVANTAGE [id number]     Payor Plan Address Payor Plan Phone Number Payor Plan Fax Number Effective Dates    [address] [phone]  1/1/2015 - None Entered    [facility] [phone]       Subscriber Name Subscriber Birth Date Member ID       DEISY SANTIAGO 1945 [id number]                 Emergency Contacts      (Rel.) Home Phone Work Phone Mobile Phone    Kasandra Santiago (Daughter) [phone] -- [phone]            Vital Signs (last day)     Date/Time   Temp   Temp src   Pulse   Resp   BP   Patient Position   SpO2    02/19/20 0915   --   --   81   15   --   --   95    02/19/20 0843   98.2 (36.8)   Oral   83   16   110/52   Lying   91    02/19/20 0448   99.9 (37.7)   Oral   104   16   122/45   Lying   96    02/19/20 0058   99 (37.2)   Oral   81   16   135/47   Lying   93    02/18/20 1955   99.1 (37.3)   Oral   72   16   111/40   Lying   96    02/18/20 1542   99 (37.2)   Oral   62  "  16   105/40   Lying   97    02/18/20 1145   98.4 (36.9)   Oral   80   18   107/51   Lying   96    02/18/20 0818   --   --   85   18   121/58   Sitting   99    02/18/20 0302   98.5 (36.9)   Oral   66   18   (!) 105/38   Lying   95              Facility-Administered Medications as of 2/19/2020   Medication Dose Route Frequency Provider Last Rate Last Dose   • [COMPLETED] acetaminophen (TYLENOL) tablet 1,000 mg  1,000 mg Oral Once Natalio Vora MD   1,000 mg at 02/17/20 0809   • [MAR Hold] allopurinol (ZYLOPRIM) tablet 100 mg  100 mg Oral BID EBONI Rader MD   100 mg at 02/18/20 2122   • [MAR Hold] allopurinol (ZYLOPRIM) tablet 300 mg  300 mg Oral Daily EBONI Rader MD   300 mg at 02/18/20 0807   • [MAR Hold] calcium carb-cholecalciferol 600-800 MG-UNIT tablet 1 tablet  1 tablet Oral Daily EBONI Rader MD   1 tablet at 02/18/20 0808   • carvedilol (COREG) tablet 6.25 mg  6.25 mg Oral BID With Meals EBONI Rader MD   6.25 mg at 02/19/20 0807   • [COMPLETED] ceFAZolin (ANCEF) 1 g/100 mL 0.9% NS IVPB (mbp)  1 g Intravenous Once EBONI Rader MD   1 g at 02/17/20 0946   • ceFAZolin (ANCEF) 1 g/100 mL 0.9% NS IVPB (mbp)  1 g Intravenous Once EBONI Rader MD       • [COMPLETED] ceFAZolin (ANCEF) IVPB 1 g  1 g Intravenous Q8H EBONI Rader MD   1 g at 02/18/20 0935   • [MAR Hold] colestipol (COLESTID) tablet 1 g  1 g Oral TID AC EBONI Rader MD   1 g at 02/18/20 1655   • [MAR Hold] cyanocobalamin (VITAMIN B-12) tablet 250 mcg  250 mcg Oral Daily Sachin Wilson PA-C   250 mcg at 02/18/20 0808   • [COMPLETED] dexamethasone (DECADRON) injection 4 mg  4 mg Intravenous Once PRN Natalio Vora MD   4 mg at 02/17/20 0809   • [COMPLETED] dexamethasone (DECADRON) injection 4 mg  4 mg Intravenous Once PRN Yanet Alvarado MD   4 mg at 02/19/20 0911   • [MAR Hold] diphenhydrAMINE (BENADRYL) capsule 25 mg  25 mg Oral Nightly PRN EBONI Rader MD       •  famotidine (PEPCID) injection 20 mg  20 mg Intravenous Q12H EBONI Rader MD   20 mg at 02/19/20 0808    Or   • famotidine (PEPCID) tablet 20 mg  20 mg Oral Q12H EBONI Rader MD   20 mg at 02/18/20 2122   • [MAR Hold] fenofibrate (TRICOR) tablet 145 mg  145 mg Oral Daily EBONI Rader MD   145 mg at 02/18/20 0808   • [COMPLETED] fentaNYL citrate (PF) (SUBLIMAZE) injection 25 mcg  25 mcg Intravenous PRN Natalio Vora MD   25 mcg at 02/17/20 1204   • [MAR Hold] folic acid (FOLVITE) tablet 1 mg  1 mg Oral Daily EBONI Rader MD   1 mg at 02/18/20 0808   • gabapentin (NEURONTIN) capsule 300 mg  300 mg Oral TID EBONI Rader MD   300 mg at 02/18/20 2122   • [MAR Hold] HYDROmorphone (DILAUDID) injection 1 mg  1 mg Intravenous Q3H PRN EBONI Rader MD       • [MAR Hold] indapamide (LOZOL) tablet 2.5 mg  2.5 mg Oral QAM EBONI Rader MD   2.5 mg at 02/18/20 0808   • lactated ringers infusion  100 mL/hr Intravenous Continuous PRN EBONI Rader  mL/hr at 02/19/20 0907 100 mL/hr at 02/19/20 0907   • lactated ringers infusion  100 mL/hr Intravenous Continuous Yanet Alvarado  mL/hr at 02/19/20 0907 100 mL/hr at 02/19/20 0907   • lidocaine PF 1% (XYLOCAINE) injection 0.5 mL  0.5 mL Injection Once PRN EBONI Rader MD       • lidocaine PF 1% (XYLOCAINE) injection 0.5 mL  0.5 mL Injection Once PRN Yanet Alvarado MD       • lisinopril (PRINIVIL,ZESTRIL) tablet 40 mg  40 mg Oral Daily EBONI Rader MD   40 mg at 02/18/20 0935   • [MAR Hold] mesalamine (DELZICOL) delayed release capsule 800 mg  800 mg Oral TID EBONI Rader MD   800 mg at 02/18/20 2122   • midazolam (VERSED) injection 1 mg  1 mg Intravenous Q5 Min PRN Yanet Alvraado MD        Or   • midazolam (VERSED) injection 2 mg  2 mg Intravenous Q5 Min PRN Yanet Alvarado MD       • [MAR Hold] nitrofurantoin (MACRODANTIN) capsule 50 mg  50 mg Oral Nightly  EBONI Rader MD   50 mg at 02/18/20 2121   • [MAR Hold] ondansetron (ZOFRAN) tablet 4 mg  4 mg Oral Q6H PRN EBONI Rader MD   4 mg at 02/18/20 0215    Or   • [MAR Hold] ondansetron (ZOFRAN) injection 4 mg  4 mg Intravenous Q6H PRN EBONI Rader MD       • [COMPLETED] oxyCODONE ER (oxyCONTIN) 12 hr tablet 20 mg  20 mg Oral Once EBONI Rdaer MD   20 mg at 02/17/20 0714   • oxyCODONE ER (oxyCONTIN) 12 hr tablet 20 mg  20 mg Oral Once EBONI Rader MD       • [MAR Hold] oxyCODONE-acetaminophen (PERCOCET)  MG per tablet 1 tablet  1 tablet Oral Q4H PRN EBONI Rader MD   1 tablet at 02/19/20 0555   • [MAR Hold] oxyCODONE-acetaminophen (PERCOCET)  MG per tablet 2 tablet  2 tablet Oral Q4H PRN EBONI Rader MD   2 tablet at 02/18/20 0255   • [MAR Hold] polyethylene glycol 3350 powder (packet)  17 g Oral BID Hilario Guillen MD   17 g at 02/17/20 2120   • potassium chloride (MICRO-K) CR capsule 40 mEq  40 mEq Oral PRN Hilario Guillen MD        Or   • potassium chloride (KLOR-CON) packet 40 mEq  40 mEq Oral PRN Hilario Guillen MD        Or   • potassium chloride 10 mEq in 100 mL IVPB  10 mEq Intravenous Q1H PRN Hilario Guillen  mL/hr at 02/19/20 0920 10 mEq at 02/19/20 0920   • [MAR Hold] sodium chloride 0.9 % flush 10 mL  10 mL Intravenous PRN EBONI Rader MD       • sodium chloride 0.9 % flush 10 mL  10 mL Intravenous Q12H EBONI Rader MD       • sodium chloride 0.9 % flush 10 mL  10 mL Intravenous PRN EBONI Rader MD       • [MAR Hold] sodium chloride 0.9 % flush 3 mL  3 mL Intravenous Q12H EBONI Rader MD   3 mL at 02/18/20 0811   • sodium chloride 0.9 % flush 3 mL  3 mL Intravenous Q12H Yanet Alvarado MD       • sodium chloride 0.9 % flush 3-10 mL  3-10 mL Intravenous PRN Yanet Alvarado MD       • sodium chloride 0.9 % infusion  75 mL/hr Intravenous Continuous EBONI Rader MD 75 mL/hr  at 20 1816 75 mL/hr at 20 1816   • [] sodium chloride 0.9 % infusion  75 mL/hr Intravenous Continuous EBONI Rader MD 75 mL/hr at 20 1337 75 mL/hr at 20 1337   • [MAR Hold] tiZANidine (ZANAFLEX) tablet 4 mg  4 mg Oral Q8H PRN EBONI Rader MD   4 mg at 20 1227     Lab Results (last 24 hours)     Procedure Component Value Units Date/Time    Vitamin B12 [[id number]]  (Normal) Collected:  20    Specimen:  Blood Updated:  20 1259     Vitamin B-12 699 pg/mL     Narrative:       Results may be falsely increased if patient taking Biotin.      Folate [[id number]]  (Normal) Collected:  20    Specimen:  Blood Updated:  20 1259     Folate >20.00 ng/mL     Narrative:       Results may be falsely increased if patient taking Biotin.          Imaging Results (Last 24 Hours)     ** No results found for the last 24 hours. **        ECG/EMG Results (last 24 hours)     ** No results found for the last 24 hours. **        Orders (last 24 hrs)      Start     Ordered    20 0600  Uric Acid  Morning Draw      20 0709    20 0600  Basic Metabolic Panel  Morning Draw      20 0711    20 0901  sodium chloride 0.9 % flush 3 mL  Every 12 Hours Scheduled      20 0859    20 0901  lactated ringers infusion  Continuous      20 0859    20 0901  acetaminophen (TYLENOL) tablet 1,000 mg  Once,   Status:  Discontinued      20 0859    20 0900  sodium chloride 0.9 % flush 10 mL  Every 12 Hours Scheduled      20 0851    20 0859  dexamethasone (DECADRON) injection 4 mg  Once As Needed      20 0859    20 0859  Oxygen Therapy- Nasal Cannula; Titrate for SPO2: equal to or greater than, 90%  Continuous      20 0859    20 0859  Pulse Oximetry, Continuous  Continuous      20 0859    20 0859  Insert Peripheral IV  Once      20 0859    20 0859  Saline Lock &  Maintain IV Access  Continuous      02/19/20 0859    02/19/20 0858  sodium chloride 0.9 % flush 3-10 mL  As Needed      02/19/20 0859    02/19/20 0858  lidocaine PF 1% (XYLOCAINE) injection 0.5 mL  Once As Needed      02/19/20 0859    02/19/20 0858  midazolam (VERSED) injection 1 mg  Every 5 Minutes PRN      02/19/20 0859    02/19/20 0858  midazolam (VERSED) injection 2 mg  Every 5 Minutes PRN      02/19/20 0859    02/19/20 0853  ceFAZolin (ANCEF) 1 g/100 mL 0.9% NS IVPB (mbp)  Once      02/19/20 0851    02/19/20 0853  oxyCODONE ER (oxyCONTIN) 12 hr tablet 20 mg  Once      02/19/20 0851    02/19/20 0852  Insert Peripheral IV  Once      02/19/20 0851    02/19/20 0852  Saline Lock & Maintain IV Access  Continuous,   Status:  Canceled      02/19/20 0851    02/19/20 0851  lactated ringers infusion  Continuous PRN      02/19/20 0851    02/19/20 0851  lidocaine PF 1% (XYLOCAINE) injection 0.5 mL  Once As Needed      02/19/20 0851    02/19/20 0851  sodium chloride 0.9 % flush 10 mL  As Needed      02/19/20 0851    02/19/20 0712  FL C Arm During Surgery  1 Time Imaging      02/19/20 0711    02/19/20 0711  XR Spine Lumbar AP & Lateral  1 Time Imaging      02/19/20 0711    02/19/20 0710  Patient Currently On Electrolyte Replacement Protocol - Please Refer to MAR for Protocol Details  Misc Nursing Order (Specify)  Daily     Comments:  Patient Currently On Electrolyte Replacement Protocol - Please Refer to MAR for Protocol Details    02/19/20 0709    02/19/20 0709  potassium chloride (MICRO-K) CR capsule 40 mEq  As Needed      02/19/20 0709    02/19/20 0709  potassium chloride (KLOR-CON) packet 40 mEq  As Needed      02/19/20 0709    02/19/20 0709  potassium chloride 10 mEq in 100 mL IVPB  Every 1 Hour PRN      02/19/20 0709    02/19/20 0001  NPO Diet  Diet Effective Midnight      02/18/20 0744    02/18/20 1206  PT Plan of Care Cert / Re-Cert  Once     Comments:  Physical Therapy Plan of Care  Initial  Certification  Certification Period: 2020 - 2020    Patient Name: Deisi Ponce  : 1945    (Z74.09) Impaired functional mobility, balance, gait, and endurance                  Assessment  PT Assessment  Criteria for Skilled Interventions Met (PT Clinical Impression): yes, treatment indicated        Rehab Goal Summary     Row Name 20 0840 20 0732          Transfer Goal 1 (PT)    Activity/Assistive Device (Transfer Goal 1, PT)    sit-to-stand/stand-to-sit;bed-to-chair/chair-to-bed;walker, rolling   demonstrate safety with AD  -MS (r) AN (t) MS (c)  -     [place] Level/Cues Needed (Transfer Goal 1, PT)  conditional   independence  -MS (r) AN (t) MS (c)  -     Time Frame (Transfer Goal 1, PT)  long term goal (LTG);by discharge  -MS   (r) AN (t) MS (c)  -     Progress/Outcome (Transfer Goal 1, PT)  goal ongoing  -MS (r) AN (t) MS   (c)  -        Gait Training Goal 1 (PT)    Activity/Assistive Device (Gait Training Goal 1, PT)  gait (walking   locomotion);assistive device use;decrease fall risk;diminish gait   deviation;improve balance and speed;increase endurance/gait   distance;normalize weight shifts;walker, rolling  -MS (r) AN (t) MS (c)  -       [place] Level (Gait Training Goal 1, PT)  conditional independence    -MS (r) AN (t) MS (c)  -     Distance (Gait Goal 1, PT)  500ft  -MS (r) AN (t) MS (c)  -     Time Frame (Gait Training Goal 1, PT)  long term goal (LTG);by discharge    -MS (r) AN (t) MS (c)  -     Progress/Outcome (Gait Training Goal 1, PT)  goal ongoing  -MS (r) AN (t)   MS (c)  -        Stairs Goal 1 (PT)    Activity/Assistive Device (Stairs Goal 1, PT)  ascending   stairs;descending stairs;decrease fall risk;improve balance and   speed;assistive device use;walker, rolling no handrail  -MS (r) AN (t) MS   (c)  -     [place] Level/Cues Needed (Stairs Goal 1, PT)  supervision required    -MS (r) AN (t) MS (c)  -     Number of Stairs (Stairs Goal 1, PT)  3   -MS (r) AN (t) MS (c)  -     Time Frame (Stairs Goal 1, PT)  long term goal (LTG);by discharge  -MS   (r) AN (t) MS (c)  -     Progress/Outcome (Stairs Goal 1, PT)  goal ongoing  -MS (r) AN (t) MS (c)    -        Patient Education Goal (PT)    Activity (Patient Education Goal, PT)  Pt will independently demonstrate   how to jose/doff LSO brace and state spinal precautions.   -MS (r) AN (t)   MS (c)  -     [place]/Cues/Accuracy (Memory Goal 2, PT)  demonstrates   adequately;independent;verbalizes understanding  -MS (r) AN (t) MS (c)  -       Time Frame (Patient Education Goal, PT)  long term goal (LTG);by   discharge  -MS (r) AN (t) MS (c)  -     Progress/Outcome (Patient Education Goal, PT)  goal ongoing  -MS (r) AN   (t) MS (c)  -        Occupational Therapy Goals    Transfer Goal Selection (OT)  -  transfer, OT goal 1  -CS     Dressing Goal Selection (OT)  -  dressing, OT goal 1  -CS     Toileting Goal Selection (OT)  -  toileting, OT goal 1  -CS        Transfer Goal 1 (OT)    Activity/Assistive Device (Transfer Goal 1, OT)  -    sit-to-stand/stand-to-sit;bed-to-chair/chair-to-bed;toilet;commode;walker,   rolling  -CS     [place] Level/Cues Needed (Transfer Goal 1, OT)  -  conditional   independence  -CS     Time Frame (Transfer Goal 1, OT)  -  10 days  -CS     Progress/Outcome (Transfer Goal 1, OT)  -  goal ongoing  -CS        Dressing Goal 1 (OT)    Activity/Assistive Device (Dressing Goal 1, OT)  -  lower body   dressing;upper body dressing;reacher;sock-aid;long handled shoe   horn;elastic laces to include LSO brace  -CS     [place]/Cues Needed (Dressing Goal 1, OT)  -  supervision   required;verbal cues required  -CS     Time Frame (Dressing Goal 1, OT)  -  10 days  -CS     Progress/Outcome (Dressing Goal 1, OT)  -  goal ongoing  -CS        Toileting Goal 1 (OT)    Activity/Device (Toileting Goal 1, OT)  -  toileting skills,   all;commode;grab bar/safety frame  -CS     [place] Level/Cues  Needed (Toileting Goal 1, OT)  -  supervision   required;verbal cues required  -CS     Time Frame (Toileting Goal 1, OT)  -  10 days  -CS     Progress/Outcome (Toileting Goal 1, OT)  -  goal ongoing  -CS       User Key  (r) = Recorded By, (t) = Taken By, (c) = Cosigned By    Initials Name Provider Type Discipline    Chelo Muñoz R, PT, DPT, NCS Physical Therapist PT    CS Laya Cui S, OTR/L, CNT Occupational Therapist OT    Cheryle Garner, PT Student PT Student PT      PT OP Goals     Row Name 02/18/20 0901          Time Calculation    PT Goal Re-Cert Due Date  02/28/20  -MS (r) AN (t) MS (c)       User Key  (r) = Recorded By, (t) = Taken By, (c) = Cosigned By    Initials Name Provider Type    Chelo Muñoz R, PT, DPT, NCS Physical Therapist    Cheryle Garner, PT Student PT Student            Plan  PT Plan  Therapy Frequency (PT Clinical Impression): daily  Planned Therapy Interventions (PT Eval): gait training, patient/family education, lumbar stabilization, postural re-education, ROM (range of motion), stair training, strengthening, transfer training, balance training  Outcome Summary: PT evaluation completed. Pt demonstrated good understanding of LSO brace and spinal precautions. Pt followed directions well with log rolling for bed mobility. Pt demo's weak B anterior tibialis muscles and L hip flexors. Pt is able to jose R AFO with min A but requires max A for LLE. Pt ambulates with RW, B AFOs, foot flat contact with LLE, and slight forward flexed posture. Pt requires assitance for self-care with voiding in bathroom. Pt will benefit from skilled PT services for the noted deficits. Recommend pt discharge home with assist and HH services.        Chelo Umana, PT, DPT, NCS  2/18/2020            By cosigning this order, either electronically or physically, I certify that the therapy services are furnished while this patient is under my care, the services outline above are required by this  patient, and will be reviewed every 90 days.        M.D.:__________________________________________ Date: ______________    02/18/20 1206    02/18/20 1145  [MAR Hold]  colestipol (COLESTID) tablet 1 g  3 Times Daily Before Meals     (MAR Hold since Wed 2/19/2020 at 0849. Reason: Unreviewed Transfer Orders.)    02/18/20 1053    02/18/20 1130  PATIENT SUPPLIED MEDICATION  3 Times Daily Before Meals,   Status:  Discontinued      02/18/20 1041    02/17/20 2100  [MAR Hold]  allopurinol (ZYLOPRIM) tablet 100 mg  2 Times Daily     (MAR Hold since Wed 2/19/2020 at 0849. Reason: Unreviewed Transfer Orders.)    02/17/20 1327    02/17/20 2100  [MAR Hold]  nitrofurantoin (MACRODANTIN) capsule 50 mg  Nightly     (MAR Hold since Wed 2/19/2020 at 0849. Reason: Unreviewed Transfer Orders.)    02/17/20 1327    02/17/20 2100  [MAR Hold]  polyethylene glycol 3350 powder (packet)  2 Times Daily     (MAR Hold since Wed 2/19/2020 at 0849. Reason: Unreviewed Transfer Orders.)    02/17/20 1505    02/17/20 1800  carvedilol (COREG) tablet 6.25 mg  2 Times Daily With Meals      02/17/20 1327    02/17/20 1600  gabapentin (NEURONTIN) capsule 300 mg  3 Times Daily      02/17/20 1327    02/17/20 1600  [MAR Hold]  mesalamine (DELZICOL) delayed release capsule 800 mg  3 Times Daily     (MAR Hold since Wed 2/19/2020 at 0849. Reason: Unreviewed Transfer Orders.)    02/17/20 1327    02/17/20 1515  [MAR Hold]  cyanocobalamin (VITAMIN B-12) tablet 250 mcg  Daily     (MAR Hold since Wed 2/19/2020 at 0849. Reason: Unreviewed Transfer Orders.)    02/17/20 1429    02/17/20 1415  [MAR Hold]  allopurinol (ZYLOPRIM) tablet 300 mg  Daily     (MAR Hold since Wed 2/19/2020 at 0849. Reason: Unreviewed Transfer Orders.)    02/17/20 1327    02/17/20 1415  [MAR Hold]  calcium carb-cholecalciferol 600-800 MG-UNIT tablet 1 tablet  Daily     (MAR Hold since Wed 2/19/2020 at 0849. Reason: Unreviewed Transfer Orders.)    02/17/20 1327    02/17/20 1415  [MAR Hold]   fenofibrate (TRICOR) tablet 145 mg  Daily     (MAR Hold since Wed 2/19/2020 at 0849. Reason: Unreviewed Transfer Orders.)    02/17/20 1327    02/17/20 1415  [MAR Hold]  folic acid (FOLVITE) tablet 1 mg  Daily     (MAR Hold since Wed 2/19/2020 at 0849. Reason: Unreviewed Transfer Orders.)    02/17/20 1327    02/17/20 1415  lisinopril (PRINIVIL,ZESTRIL) tablet 40 mg  Daily      02/17/20 1327    02/17/20 1415  [MAR Hold]  sodium chloride 0.9 % flush 3 mL  Every 12 Hours Scheduled     (MAR Hold since Wed 2/19/2020 at 0849. Reason: Unreviewed Transfer Orders.)    02/17/20 1326    02/17/20 1415  sodium chloride 0.9 % infusion  Continuous      02/17/20 1326    02/17/20 1415  famotidine (PEPCID) injection 20 mg  Every 12 Hours Scheduled      02/17/20 1326    02/17/20 1415  famotidine (PEPCID) tablet 20 mg  Every 12 Hours Scheduled      02/17/20 1326    02/17/20 1415  sodium chloride 0.9 % infusion  Continuous      02/17/20 1327    02/17/20 1345  [MAR Hold]  indapamide (LOZOL) tablet 2.5 mg  Every Morning     (MAR Hold since Wed 2/19/2020 at 0849. Reason: Unreviewed Transfer Orders.)    02/17/20 1327    02/17/20 1326  [MAR Hold]  ondansetron (ZOFRAN) tablet 4 mg  Every 6 Hours PRN     (MAR Hold since Wed 2/19/2020 at 0849. Reason: Unreviewed Transfer Orders.)    02/17/20 1326    02/17/20 1326  [MAR Hold]  ondansetron (ZOFRAN) injection 4 mg  Every 6 Hours PRN     (MAR Hold since Wed 2/19/2020 at 0849. Reason: Unreviewed Transfer Orders.)    02/17/20 1326    02/17/20 1326  [MAR Hold]  oxyCODONE-acetaminophen (PERCOCET)  MG per tablet 2 tablet  Every 4 Hours PRN     (MAR Hold since Wed 2/19/2020 at 0849. Reason: Unreviewed Transfer Orders.)    02/17/20 1327    02/17/20 1326  [MAR Hold]  oxyCODONE-acetaminophen (PERCOCET)  MG per tablet 1 tablet  Every 4 Hours PRN     (MAR Hold since Wed 2/19/2020 at 0849. Reason: Unreviewed Transfer Orders.)    02/17/20 1327    02/17/20 1326  [MAR Hold]  tiZANidine (ZANAFLEX)  tablet 4 mg  Every 8 Hours PRN     (MAR Hold since Wed 2/19/2020 at 0849. Reason: Unreviewed Transfer Orders.)    02/17/20 1327    02/17/20 1326  [MAR Hold]  HYDROmorphone (DILAUDID) injection 1 mg  Every 3 Hours PRN     (MAR Hold since Wed 2/19/2020 at 0849. Reason: Unreviewed Transfer Orders.)    02/17/20 1327    02/17/20 1326  [MAR Hold]  sodium chloride 0.9 % flush 10 mL  As Needed     (MAR Hold since Wed 2/19/2020 at 0849. Reason: Unreviewed Transfer Orders.)    02/17/20 1326    02/17/20 1326  [MAR Hold]  diphenhydrAMINE (BENADRYL) capsule 25 mg  Nightly PRN     (MAR Hold since Wed 2/19/2020 at 0849. Reason: Unreviewed Transfer Orders.)    02/17/20 1326    Unscheduled  Apply ice to affected area/incisions as needed for pain or swelling  As Needed      02/17/20 1326    Unscheduled  Magnesium  As Needed      02/19/20 0709    Unscheduled  Potassium  As Needed      02/19/20 0709    Unscheduled  Vital Signs - Per Anesthesia Protocol  As Needed      02/19/20 0859    Signed and Held  Vital signs every 5 minutes for 15 minutes, every 15 minutes thereafter.  Once      Signed and Held    Signed and Held  Apply warming blanket  As Needed     Comments:  For a recorded temp of <36.9 C    Signed and Held    Signed and Held  Call Anesthesiologist for additional IV Fluid bolus for Hypotension/Tachycardia  Continuous      Signed and Held    Signed and Held  Notify Anesthesia of Any Acute Changes in Patient Condition  Until Discontinued      Signed and Held    Signed and Held  Notify Anesthesia for Unrelieved Pain  Until Discontinued      Signed and Held    Signed and Held  Morphine sulfate (PF) injection 2 mg  Every 10 Minutes PRN      Signed and Held    Signed and Held  fentaNYL citrate (PF) (SUBLIMAZE) injection 25 mcg  Every 5 Minutes PRN      Signed and Held    Signed and Held  labetalol (NORMODYNE,TRANDATE) injection 5 mg  Every 5 Minutes PRN      Signed and Held    Signed and Held  hydrALAZINE (APRESOLINE) injection 5 mg   Every 10 Minutes PRN      Signed and Held    Signed and Held  atropine sulfate injection 0.5 mg  Once As Needed      Signed and Held    Signed and Held  ipratropium-albuterol (DUO-NEB) nebulizer solution 3 mL  Once As Needed      Signed and Held    Signed and Held  naloxone (NARCAN) injection 0.04 mg  As Needed      Signed and Held    Signed and Held  flumazenil (ROMAZICON) injection 0.2 mg  As Needed      Signed and Held    Signed and Held  ondansetron (ZOFRAN) injection 4 mg  As Needed      Signed and Held    Signed and Held  Once DC criteria to floor met, follow surgeon's orders.  Until Discontinued      Signed and Held    Signed and Held  Discharge patient from PACU when discharge criteria is met.  Until Discontinued      Signed and Held                   Physician Progress Notes (last 24 hours) (Notes from 02/18/20 1054 through 02/19/20 1054)      Hilario Guillen MD at 02/19/20 0710          Deisi Ponce is a 74 y.o. female patient.  Family at bedside.    Medically stable.    Current Facility-Administered Medications   Medication Dose Route Frequency Provider Last Rate Last Dose   • allopurinol (ZYLOPRIM) tablet 100 mg  100 mg Oral BID EBONI Rader MD   100 mg at 02/18/20 2122   • allopurinol (ZYLOPRIM) tablet 300 mg  300 mg Oral Daily EBONI Rader MD   300 mg at 02/18/20 0807   • calcium carb-cholecalciferol 600-800 MG-UNIT tablet 1 tablet  1 tablet Oral Daily EBONI Rader MD   1 tablet at 02/18/20 0808   • carvedilol (COREG) tablet 6.25 mg  6.25 mg Oral BID With Meals EBONI Rader MD   6.25 mg at 02/18/20 1655   • colestipol (COLESTID) tablet 1 g  1 g Oral TID AC EBONI Rader MD   1 g at 02/18/20 1655   • cyanocobalamin (VITAMIN B-12) tablet 250 mcg  250 mcg Oral Daily Sachin Wilson PA-C   250 mcg at 02/18/20 0808   • diphenhydrAMINE (BENADRYL) capsule 25 mg  25 mg Oral Nightly PRN EBONI Rader MD       • famotidine (PEPCID) injection 20 mg  20 mg  Intravenous Q12H EBONI Rader MD        Or   • famotidine (PEPCID) tablet 20 mg  20 mg Oral Q12H EBONI Rader MD   20 mg at 02/18/20 2122   • fenofibrate (TRICOR) tablet 145 mg  145 mg Oral Daily EBONI Rader MD   145 mg at 02/18/20 0808   • folic acid (FOLVITE) tablet 1 mg  1 mg Oral Daily EBONI Rader MD   1 mg at 02/18/20 0808   • gabapentin (NEURONTIN) capsule 300 mg  300 mg Oral TID EBONI Rader MD   300 mg at 02/18/20 2122   • HYDROmorphone (DILAUDID) injection 1 mg  1 mg Intravenous Q3H PRN EBONI Rader MD       • indapamide (LOZOL) tablet 2.5 mg  2.5 mg Oral QAM EBONI Rader MD   2.5 mg at 02/18/20 0808   • lisinopril (PRINIVIL,ZESTRIL) tablet 40 mg  40 mg Oral Daily EBONI Rader MD   40 mg at 02/18/20 0935   • mesalamine (DELZICOL) delayed release capsule 800 mg  800 mg Oral TID EBONI Rader MD   800 mg at 02/18/20 2122   • nitrofurantoin (MACRODANTIN) capsule 50 mg  50 mg Oral Nightly EBONI Rader MD   50 mg at 02/18/20 2121   • ondansetron (ZOFRAN) tablet 4 mg  4 mg Oral Q6H PRN EBONI Rader MD   4 mg at 02/18/20 0215    Or   • ondansetron (ZOFRAN) injection 4 mg  4 mg Intravenous Q6H PRN EBONI Rader MD       • oxyCODONE-acetaminophen (PERCOCET)  MG per tablet 1 tablet  1 tablet Oral Q4H PRN EBONI Rader MD   1 tablet at 02/19/20 0555   • oxyCODONE-acetaminophen (PERCOCET)  MG per tablet 2 tablet  2 tablet Oral Q4H PRN EBONI Rader MD   2 tablet at 02/18/20 0255   • polyethylene glycol 3350 powder (packet)  17 g Oral BID Hilario Guillen MD   17 g at 02/17/20 2120   • potassium chloride (MICRO-K) CR capsule 40 mEq  40 mEq Oral PRN Hilario Guillen MD        Or   • potassium chloride (KLOR-CON) packet 40 mEq  40 mEq Oral PRN Hilario Guillen MD        Or   • potassium chloride 10 mEq in 100 mL IVPB  10 mEq Intravenous Q1H PRN Hilario Guillen MD       • sodium chloride 0.9 %  "flush 10 mL  10 mL Intravenous PRN EBONI Rader MD       • sodium chloride 0.9 % flush 3 mL  3 mL Intravenous Q12H EBONI Rader MD   3 mL at 02/18/20 0811   • sodium chloride 0.9 % infusion  75 mL/hr Intravenous Continuous EBONI Rader MD 75 mL/hr at 02/18/20 1816 75 mL/hr at 02/18/20 1816   • tiZANidine (ZANAFLEX) tablet 4 mg  4 mg Oral Q8H PRN EBONI Rader MD   4 mg at 02/18/20 1227     ALLERGIES:  No Known Allergies    Crohn's disease without complication (CMS/HCC)    Lumbar stenosis    GERD without esophagitis    Essential hypertension    Chronic gout    Blood pressure 122/45, pulse 104, temperature 99.9 °F (37.7 °C), temperature source Oral, resp. rate 16, height 162 cm (63.78\"), weight 67.9 kg (149 lb 11.1 oz), SpO2 96 %, not currently breastfeeding.      Subjective:  Symptoms:  Stable.  She reports shortness of breath, cough and anxiety.    Diet:  Adequate intake.    Activity level: Impaired due to pain.    Pain:  She complains of pain that is moderate.  She reports pain is unchanged.  Pain is partially controlled.      Review of Systems   Respiratory: Positive for cough and shortness of breath.      Objective:  General Appearance:  Comfortable and well-appearing.    Vital signs: (most recent): Blood pressure 122/45, pulse 104, temperature 99.9 °F (37.7 °C), temperature source Oral, resp. rate 16, height 162 cm (63.78\"), weight 67.9 kg (149 lb 11.1 oz), SpO2 96 %, not currently breastfeeding.  Vital signs are normal.    Output: Producing urine and minimal stool output.    HEENT: Normal HEENT exam.    Lungs:  Normal effort and normal respiratory rate.    Heart: Normal rate.  Regular rhythm.    Abdomen: Abdomen is soft.  Hypoactive bowel sounds.   There is generalized tenderness.     Neurological: Patient is alert.    Pupils:  Pupils are equal, round, and reactive to light.    Skin:  Warm and dry.              Labs:  Lab Results (last 72 hours)     Procedure Component Value Units " Date/Time    CBC & Differential [[id number]] Collected:  02/18/20 0528    Specimen:  Blood Updated:  02/18/20 0705    Narrative:       The following orders were created for panel order CBC & Differential.  Procedure                               Abnormality         Status                     ---------                               -----------         ------                     CBC Auto Differential[[id number]]        Abnormal            Final result                 Please view results for these tests on the individual orders.    CBC Auto Differential [[id number]]  (Abnormal) Collected:  02/18/20 0528    Specimen:  Blood Updated:  02/18/20 0705     WBC 7.37 10*3/mm3      RBC 3.15 10*6/mm3      Hemoglobin 9.1 g/dL      Hematocrit 28.1 %      MCV 89.2 fL      MCH 28.9 pg      MCHC 32.4 g/dL      RDW 14.2 %      RDW-SD 45.6 fl      MPV 10.6 fL      Platelets 187 10*3/mm3      Neutrophil % 71.2 %      Lymphocyte % 13.2 %      Monocyte % 14.5 %      Eosinophil % 0.3 %      Basophil % 0.3 %      Immature Grans % 0.5 %      Neutrophils, Absolute 5.25 10*3/mm3      Lymphocytes, Absolute 0.97 10*3/mm3      Monocytes, Absolute 1.07 10*3/mm3      Eosinophils, Absolute 0.02 10*3/mm3      Basophils, Absolute 0.02 10*3/mm3      Immature Grans, Absolute 0.04 10*3/mm3      nRBC 0.0 /100 WBC     Basic Metabolic Panel [[id number]]  (Abnormal) Collected:  02/18/20 0527    Specimen:  Blood Updated:  02/18/20 0649     Glucose 103 mg/dL      BUN 22 mg/dL      Creatinine 0.77 mg/dL      Sodium 143 mmol/L      Potassium 3.4 mmol/L      Chloride 107 mmol/L      CO2 28.0 mmol/L      Calcium 9.0 mg/dL      eGFR Non African Amer 73 mL/min/1.73      BUN/Creatinine Ratio 28.6     Anion Gap 8.0 mmol/L     Narrative:       GFR Normal >60  Chronic Kidney Disease <60  Kidney Failure <15      Hemoglobin A1c [[id number]] Collected:  02/18/20 0528    Specimen:  Blood Updated:  02/18/20 0619    Vitamin B12 [[id number]] Collected:  02/18/20 0527    Specimen:   Blood Updated:  02/18/20 0619    Folate [[id number]] Collected:  02/18/20 0527    Specimen:  Blood Updated:  02/18/20 0619          Imaging Results (Last 72 Hours)     Procedure Component Value Units Date/Time    XR Spine Lumbar AP & Lateral [[id number]] Collected:  02/17/20 1145     Updated:  02/17/20 1451    Narrative:       XR SPINE LUMBAR AP AND LATERAL- 2/17/2020 9:29 AM CST     HISTORY: LLIF      COMPARISON: None       Impression:       4 intraoperative fluoroscopic spot images of the lumbar spine were  obtained during lateral fusion and disc spacer placement.     Please refer to the operative note for more details. Fluoroscopy time  was 52 seconds with a dose of 15.9 mGy.  This report was finalized on 02/17/2020 11:46 by Dr. Sandro Pastor MD.    FL C Arm During Surgery [[id number]] Collected:  02/17/20 1145     Updated:  02/17/20 1451    Narrative:       XR SPINE LUMBAR AP AND LATERAL- 2/17/2020 9:29 AM CST     HISTORY: LLIF      COMPARISON: None       Impression:       4 intraoperative fluoroscopic spot images of the lumbar spine were  obtained during lateral fusion and disc spacer placement.     Please refer to the operative note for more details. Fluoroscopy time  was 52 seconds with a dose of 15.9 mGy.  This report was finalized on 02/17/2020 11:46 by Dr. Sandro Pastor MD.                Assessment:    Condition: In stable condition.  Improving.   (    Cough-aggressive use of incentive spirometer recommended today.  Will add duo nebs 4 times daily as needed.  Discussed with daughter at bedside.    Anemia post-op expected-check iron, B12,and folate pending. Replenish as needed.Transfuse at acceptable levels depending on clinical judgement and comorbidities. Recheck in AM    Gout-check uric acid    Hypokalemia- IV/p.o. replacement orders placed today.    Medically stable for surgical intervention  today    Constipation-history of loose bowels will hold Colestid due to risk of constipation and ultimately end  up with Relistor 12 mcg subq every 48 hours to block the effect of narcotics on the gut.    Hypertension-review pre and post BP's,will restart home BP meds when BP allows. Add clonidine 0.1 mg every 4 hours prn if bp> 140/90,monitor BP and adjust meds as necessary.    GERD-exacerbated by pain meds and anesthesia, will add PPI as needed and can step up to Carafate 1 gm po q AC and nightly.  ).     Patient Active Problem List   Diagnosis   • Crohn's disease of small and large intestines with complication (CMS/HCC)   • Crohn's disease of both small and large intestine without complication (CMS/HCC)   • Crohn's disease without complication (CMS/HCC)   • Lumbar stenosis   • GERD without esophagitis   • Essential hypertension   • Chronic gout     Hilario Guillen MD  2/19/2020                                                  Electronically signed by Hilario Guillen MD at 02/19/20 0711     Jermain Yo PA at 02/18/20 1613          Orthopedic Spine Progress Note    Deisi Ponce  74 y.o.  female  Subjective     Post-Operative Day # 1    Systemic or Specific Complaints:     Doing ok, pain controlled. Complaints of back pain last night.     Objective     Vital signs in last 24 hours:  Temp:  [98 °F (36.7 °C)-99 °F (37.2 °C)] 99 °F (37.2 °C)  Heart Rate:  [60-85] 62  Resp:  [16-18] 16  BP: ()/(35-58) 105/40    Physical Exam:    A&Ox3 NAD  Incision CDI  B. LE NVI    Diagnostic Data:  CBC:  Results from last 7 days   Lab Units 02/18/20  0528   WBC 10*3/mm3 7.37   RBC 10*6/mm3 3.15*   HEMOGLOBIN g/dL 9.1*   HEMATOCRIT % 28.1*   PLATELETS 10*3/mm3 187          Assessment/Plan     1. Status Post Left lateral lumbar interbody fusion L1-2, L2-3      Plan:  1. PT/OT   2. Brace, cont current pain control  3. Posterior surgery tomorrow       ANGELO Baron    Date: 2/18/2020  Time: 4:14 PM    Electronically signed by Jermain Yo PA at 02/18/20 1615       Consult Notes (last 24 hours)  (Notes from 02/18/20 1054 through 02/19/20 1054)    No notes of this type exist for this encounter.

## 2020-02-19 NOTE — ANESTHESIA POSTPROCEDURE EVALUATION
"Patient: Deisi Ponce    Procedure Summary     Date:  02/19/20 Room / Location:   [facility]  [facility]    Anesthesia Start:  1036 Anesthesia Stop:  1317    Procedures:       EXPLORATION OF FUSION L3-5, POSTERIOR SPINAL FUSION WITH INSTRUMENTATION L1-3 (N/A Spine Lumbar)      REMOVAL OF INSTRUMENTATION (N/A Spine Lumbar) Diagnosis:  (M54.16)    Surgeon:  EBONI Rader MD Provider:  Mohit Hector CRNA    Anesthesia Type:  general ASA Status:  3          Anesthesia Type: general    Vitals  Vitals Value Taken Time   /56 2/19/2020  2:45 PM   Temp 98.1 °F (36.7 °C) 2/19/2020  2:30 PM   Pulse 85 2/19/2020  2:45 PM   Resp 14 2/19/2020  2:45 PM   SpO2 96 % 2/19/2020  2:45 PM           Post Anesthesia Care and Evaluation    PONV Status: none  Comments: Patient d/c from PACU prior to anes eval based on Ariadna score.  Please see RN notes for details of d/c criteria.    Blood pressure 140/56, pulse 85, temperature 98.1 °F (36.7 °C), temperature source Temporal, resp. rate 14, height 162 cm (63.78\"), weight 67.9 kg (149 lb 11.1 oz), SpO2 96 %, not currently breastfeeding.          "

## 2020-02-19 NOTE — NURSING NOTE
Pt was NPO this am when protocol orders were initiated. Pre OP holding gave 10 MeQ IV.  Pt is now returned , no longer NPO and I spoke with Dr. Nielsen. He said it is ok to follow PO dosing protocol even though she had received 10MeQ already.

## 2020-02-19 NOTE — ANESTHESIA PROCEDURE NOTES
Airway  Urgency: elective    Date/Time: 2/19/2020 10:46 AM  Airway not difficult    General Information and Staff    Patient location during procedure: OR  CRNA: Mohit Hector CRNA    Indications and Patient Condition  Indications for airway management: airway protection    Preoxygenated: yes  Mask difficulty assessment: 1 - vent by mask    Final Airway Details  Final airway type: endotracheal airway      Successful airway: ETT    Successful intubation technique: direct laryngoscopy  Facilitating devices/methods: intubating stylet  Endotracheal tube insertion site: oral  Blade: Kaelyn  Blade size: 3 (3.5)  ETT size (mm): 7.5  Cormack-Lehane Classification: grade IIa - partial view of glottis  Placement verified by: chest auscultation and capnometry   Measured from: lips  ETT/EBT  to lips (cm): 20  Assessment: lips, teeth, and gum same as pre-op and atraumatic intubation    Additional Comments  Intubated by malasy vichathep srna

## 2020-02-20 LAB
ANION GAP SERPL CALCULATED.3IONS-SCNC: 10 MMOL/L (ref 5–15)
BUN BLD-MCNC: 15 MG/DL (ref 8–23)
BUN/CREAT SERPL: 22.1 (ref 7–25)
CALCIUM SPEC-SCNC: 8.5 MG/DL (ref 8.6–10.5)
CHLORIDE SERPL-SCNC: 104 MMOL/L (ref 98–107)
CO2 SERPL-SCNC: 29 MMOL/L (ref 22–29)
CREAT BLD-MCNC: 0.68 MG/DL (ref 0.57–1)
GFR SERPL CREATININE-BSD FRML MDRD: 84 ML/MIN/1.73
GLUCOSE BLD-MCNC: 113 MG/DL (ref 65–99)
POTASSIUM BLD-SCNC: 4.5 MMOL/L (ref 3.5–5.2)
SODIUM BLD-SCNC: 143 MMOL/L (ref 136–145)
URATE SERPL-MCNC: 2.8 MG/DL (ref 2.4–5.7)
WHOLE BLOOD HOLD SPECIMEN: NORMAL

## 2020-02-20 PROCEDURE — 97164 PT RE-EVAL EST PLAN CARE: CPT

## 2020-02-20 PROCEDURE — 25010000003 CEFAZOLIN 1-4 GM/50ML-% SOLUTION: Performed by: PHYSICIAN ASSISTANT

## 2020-02-20 PROCEDURE — 97535 SELF CARE MNGMENT TRAINING: CPT

## 2020-02-20 PROCEDURE — 97116 GAIT TRAINING THERAPY: CPT

## 2020-02-20 PROCEDURE — 84550 ASSAY OF BLOOD/URIC ACID: CPT | Performed by: PHYSICIAN ASSISTANT

## 2020-02-20 PROCEDURE — 80048 BASIC METABOLIC PNL TOTAL CA: CPT | Performed by: PHYSICIAN ASSISTANT

## 2020-02-20 RX ADMIN — GABAPENTIN 300 MG: 300 CAPSULE ORAL at 10:10

## 2020-02-20 RX ADMIN — OXYCODONE HYDROCHLORIDE AND ACETAMINOPHEN 2 TABLET: 10; 325 TABLET ORAL at 17:43

## 2020-02-20 RX ADMIN — Medication 250 MCG: at 10:02

## 2020-02-20 RX ADMIN — ALLOPURINOL 100 MG: 100 TABLET ORAL at 10:03

## 2020-02-20 RX ADMIN — LISINOPRIL 40 MG: 20 TABLET ORAL at 09:59

## 2020-02-20 RX ADMIN — OXYCODONE HYDROCHLORIDE AND ACETAMINOPHEN 1 TABLET: 10; 325 TABLET ORAL at 13:49

## 2020-02-20 RX ADMIN — Medication 1 TABLET: at 09:59

## 2020-02-20 RX ADMIN — ALLOPURINOL 300 MG: 300 TABLET ORAL at 10:01

## 2020-02-20 RX ADMIN — INDAPAMIDE 2.5 MG: 2.5 TABLET, FILM COATED ORAL at 09:59

## 2020-02-20 RX ADMIN — FAMOTIDINE 20 MG: 20 TABLET ORAL at 21:39

## 2020-02-20 RX ADMIN — GABAPENTIN 300 MG: 300 CAPSULE ORAL at 21:39

## 2020-02-20 RX ADMIN — SODIUM CHLORIDE, PRESERVATIVE FREE 3 ML: 5 INJECTION INTRAVENOUS at 21:40

## 2020-02-20 RX ADMIN — POLYETHYLENE GLYCOL (3350) 17 G: 17 POWDER, FOR SOLUTION ORAL at 10:02

## 2020-02-20 RX ADMIN — CARVEDILOL 6.25 MG: 6.25 TABLET, FILM COATED ORAL at 17:43

## 2020-02-20 RX ADMIN — CARVEDILOL 6.25 MG: 6.25 TABLET, FILM COATED ORAL at 09:59

## 2020-02-20 RX ADMIN — CEFAZOLIN SODIUM 1 G: 1 INJECTION, SOLUTION INTRAVENOUS at 02:53

## 2020-02-20 RX ADMIN — GABAPENTIN 300 MG: 300 CAPSULE ORAL at 17:43

## 2020-02-20 RX ADMIN — OXYCODONE HYDROCHLORIDE AND ACETAMINOPHEN 2 TABLET: 10; 325 TABLET ORAL at 08:01

## 2020-02-20 RX ADMIN — MESALAMINE 800 MG: 400 CAPSULE, DELAYED RELEASE ORAL at 17:44

## 2020-02-20 RX ADMIN — ALLOPURINOL 100 MG: 100 TABLET ORAL at 21:40

## 2020-02-20 RX ADMIN — CEFAZOLIN SODIUM 1 G: 1 INJECTION, SOLUTION INTRAVENOUS at 10:03

## 2020-02-20 RX ADMIN — MESALAMINE 800 MG: 400 CAPSULE, DELAYED RELEASE ORAL at 10:02

## 2020-02-20 RX ADMIN — MESALAMINE 800 MG: 400 CAPSULE, DELAYED RELEASE ORAL at 21:39

## 2020-02-20 RX ADMIN — OXYCODONE HYDROCHLORIDE AND ACETAMINOPHEN 1 TABLET: 10; 325 TABLET ORAL at 02:53

## 2020-02-20 RX ADMIN — FAMOTIDINE 20 MG: 20 TABLET ORAL at 09:59

## 2020-02-20 RX ADMIN — FENOFIBRATE 145 MG: 145 TABLET ORAL at 10:00

## 2020-02-20 RX ADMIN — OXYCODONE HYDROCHLORIDE AND ACETAMINOPHEN 2 TABLET: 10; 325 TABLET ORAL at 22:20

## 2020-02-20 RX ADMIN — FOLIC ACID 1 MG: 1 TABLET ORAL at 09:59

## 2020-02-20 RX ADMIN — NITROFURANTOIN MACROCRYSTALS 50 MG: 50 CAPSULE ORAL at 21:39

## 2020-02-20 NOTE — THERAPY RE-EVALUATION
Patient Name: Deisi Ponce  : 1945    MRN: [id number]                              Today's Date: 2020       Admit Date: 2020    Visit Dx:     ICD-10-CM ICD-9-CM   1. Impaired functional mobility, balance, gait, and endurance Z74.09 V49.89     Patient Active Problem List   Diagnosis   • Crohn's disease of small and large intestines with complication (CMS/HCC)   • Crohn's disease of both small and large intestine without complication (CMS/HCC)   • Crohn's disease without complication (CMS/HCC)   • Lumbar stenosis   • GERD without esophagitis   • Essential hypertension   • Chronic gout     Past Medical History:   Diagnosis Date   • Arthritis    • Cancer (CMS/HCC)     endometrial cancer   • Crohn disease (CMS/HCC)    • Crohn's disease (CMS/HCC)    • DDD (degenerative disc disease), lumbar    • Elevated cholesterol    • GERD (gastroesophageal reflux disease)    • Gout    • History of transfusion    • Hypertension    • Intermittent self-catheterization of bladder    • Macular degeneration    • Osteopenia    • Peripheral neuropathy    • Urinary retention      Past Surgical History:   Procedure Laterality Date   • BACK SURGERY     • CARPAL TUNNEL RELEASE Bilateral    • CATARACT EXTRACTION Bilateral    • COLONOSCOPY  10/09/2015   • COLONOSCOPY N/A 2016    Procedure: COLONOSCOPY WITH ANESTHESIA;  Surgeon: Jose Raul Butt DO;  Location:  [facility];  Service:    • COLONOSCOPY N/A 2019    Procedure: COLONOSCOPY WITH ANESTHESIA;  Surgeon: Jose Raul Butt DO;  Location: [facility];  Service: Gastroenterology   • CYST REMOVAL      from chest x2   • HYSTERECTOMY     • MENISCECTOMY Left    • UPPER GASTROINTESTINAL ENDOSCOPY  2010     General Information     Row Name 20 0905          PT Evaluation Time/Intention    Document Type  re-evaluation lumbar stenosis; s/p removal of posterior instrumentation B L3-5, exploration of L3-5, PSF L1-3 with right instrumentation;  2/17/20 s/p Left LLIF L1-3 with instrumentation L1-2  -MS (r) AN (t) MS (c)     Mode of Treatment  physical therapy;concurrent therapy  -MS (r) AN (t) MS (c)     Row Name 02/20/20 0905          General Information    Patient Profile Reviewed?  yes  -MS (r) AN (t) MS (c)     Prior Level of Function  independent:;all household mobility;ADL's;dressing;bathing;max assist:;driving;cleaning;cooking  -MS (r) AN (t) MS (c)     Existing Precautions/Restrictions  brace worn when out of bed;fall;spinal  -MS (r) AN (t) MS (c)     Barriers to Rehab  impaired sensation;previous functional deficit  -MS (r) AN (t) MS (c)     Row Name 02/20/20 0905          Relationship/Environment    Lives With  child(adolfo), adult  -MS (r) AN (t) MS (c)     Row Name 02/20/20 0905          Resource/Environmental Concerns    Current Living Arrangements  home/apartment/condo  -MS (r) AN (t) MS (c)     Row Name 02/20/20 0905          Home Main Entrance    Number of Stairs, Main Entrance  three  -MS (r) AN (t) MS (c)     Stair Railings, Main Entrance  none  -MS (r) AN (t) MS (c)     Row Name 02/20/20 0905          Cognitive Assessment/Intervention- PT/OT    Orientation Status (Cognition)  oriented x 4  -MS (r) AN (t) MS (c)     Personal Safety Interventions  fall prevention program maintained;gait belt;nonskid shoes/slippers when out of bed;supervised activity  -MS (r) AN (t) MS (c)     Row Name 02/20/20 0905          Safety Issues, Functional Mobility    Impairments Affecting Function (Mobility)  balance;endurance/activity tolerance;shortness of breath;sensation/sensory awareness;pain  (Pended)   -AN       User Key  (r) = Recorded By, (t) = Taken By, (c) = Cosigned By    Initials Name Provider Type    Chelo Muñoz R, PT, DPT, NCS Physical Therapist    AN Cheryle Acevedo, PT Student PT Student        Mobility     Row Name 02/20/20 0905          Bed Mobility Assessment/Treatment    Bed Mobility Assessment/Treatment  rolling right;sidelying-sit  -MS  (r) AN (t) MS (c)     Rolling Right [place] (Bed Mobility)  supervision;verbal cues  -MS (r) AN (t) MS (c)     Sidelying-Sit [place] (Bed Mobility)  supervision  -MS (r) AN (t) MS (c)     Assistive Device (Bed Mobility)  bed rails  -MS (r) AN (t) MS (c)     Comment (Bed Mobility)  pending hospital bed for home use  -MS (r) AN (t) MS (c)     Row Name 02/20/20 0905          Sit-Stand Transfer    Sit-Stand [place] (Transfers)  conditional independence x2 attempts prior to success  -MS (r) AN (t) MS (c)     Assistive Device (Sit-Stand Transfers)  walker, front-wheeled  -MS (r) AN (t) MS (c)     Row Name 02/20/20 0905          Gait/Stairs Assessment/Training    Gait/Stairs Assessment/Training  gait/ambulation assistive device  -MS (r) AN (t) MS (c)     [place] Level (Gait)  supervision  -MS (r) AN (t) MS (c)     Assistive Device (Gait)  walker, front-wheeled  -MS (r) AN (t) MS (c)     Distance in Feet (Gait)  30ft x4  -MS (r) AN (t) MS (c)     Deviations/Abnormal Patterns (Gait)  gait speed decreased  -MS (r) AN (t) MS (c)     Left Sided Gait Deviations  heel strike decreased  -MS (r) AN (t) MS (c)     Comment (Gait/Stairs)  ambulation without B AFOs results in decreased gait speed and pt confidence  -MS (r) AN (t) MS (c)       User Key  (r) = Recorded By, (t) = Taken By, (c) = Cosigned By    Initials Name Provider Type    Chelo Muñoz R, PT, DPT, NCS Physical Therapist    AN hCeryle Acevedo PT Student PT Student        Obj/Interventions     Row Name 02/20/20 0905          General ROM    GENERAL ROM COMMENTS  BUE AROM shoulders 25% impaired; B AROM DF 25% impaired, PROM WFL  -MS (r) AN (t) MS (c)     Row Name 02/20/20 0905          MMT (Manual Muscle Testing)    General MMT Comments  globally 4-/5  -MS (r) AN (t) MS (c)     Row Name 02/20/20 0964          Static Sitting Balance    Level of [place] (Unsupported Sitting, Static Balance)  independent  -MS (r) AN (t) MS (c)     Sitting Position  (Unsupported Sitting, Static Balance)  sitting on edge of bed  -MS (r) AN (t) MS (c)     Time Able to Maintain Position (Unsupported Sitting, Static Balance)  4 to 5 minutes  -MS (r) AN (t) MS (c)     Row Name 02/20/20 0905          Dynamic Sitting Balance    Level of [place], Reaches Outside Midline (Sitting, Dynamic Balance)  supervision  -MS (r) AN (t) MS (c)     Sitting Position, Reaches Outside Midline (Sitting, Dynamic Balance)  sitting on edge of bed  -MS (r) AN (t) MS (c)     Row Name 02/20/20 0905          Static Standing Balance    Level of [place] (Supported Standing, Static Balance)  conditional independence  -MS (r) AN (t) MS (c)     Assistive Device Utilized (Supported Standing, Static Balance)  walker, rolling  -MS (r) AN (t) MS (c)     Row Name 02/20/20 0905          Dynamic Standing Balance    Level of [place], Reaches Outside Midline (Standing, Dynamic Balance)  conditional independence  -MS (r) AN (t) MS (c)     Assistive Device Utilized (Supported Standing, Dynamic Balance)  walker, rolling  -MS (r) AN (t) MS (c)     Row Name 02/20/20 0905          Sensory Assessment/Intervention    Sensory General Assessment  -- diminished sensation to lower BLE; no sensation to B feet  -MS (r) AN (t) MS (c)       User Key  (r) = Recorded By, (t) = Taken By, (c) = Cosigned By    Initials Name Provider Type    Chelo Muñoz, PT, DPT, NCS Physical Therapist    AN Cheryle Acevedo, PT Student PT Student        Goals/Plan     Row Name 02/20/20 1025          Transfer Goal 1 (PT)    Activity/Assistive Device (Transfer Goal 1, PT)  sit-to-stand/stand-to-sit;bed-to-chair/chair-to-bed;walker, rolling  -MS (r) AN (t) MS (c)     [place] Level/Cues Needed (Transfer Goal 1, PT)  conditional independence  -MS (r) AN (t) MS (c)     Time Frame (Transfer Goal 1, PT)  long term goal (LTG);by discharge  -MS (r) AN (t) MS (c)     Progress/Outcome (Transfer Goal 1, PT)  goal met  -MS (r) AN (t) MS (c)      Row Name 02/20/20 1025          Gait Training Goal 1 (PT)    Activity/Assistive Device (Gait Training Goal 1, PT)  gait (walking locomotion);assistive device use;decrease fall risk;diminish gait deviation;improve balance and speed;increase endurance/gait distance;normalize weight shifts;walker, rolling;turning, right;turning, left;increase energy conservation  -MS (r) AN (t) MS (c)     [place] Level (Gait Training Goal 1, PT)  conditional independence  -MS (r) AN (t) MS (c)     Distance (Gait Goal 1, PT)  500ft with appropriate turning patterns for decreased energy expenditure  -MS (r) AN (t) MS (c)     Time Frame (Gait Training Goal 1, PT)  long term goal (LTG);by discharge  -MS (r) AN (t) MS (c)     Progress/Outcome (Gait Training Goal 1, PT)  good progress toward goal  -MS (r) AN (t) MS (c)     Row Name 02/20/20 1025          Stairs Goal 1 (PT)    Activity/Assistive Device (Stairs Goal 1, PT)  ascending stairs;descending stairs;decrease fall risk;improve balance and speed;assistive device use;walker, rolling  -MS (r) AN (t) MS (c)     [place] Level/Cues Needed (Stairs Goal 1, PT)  supervision required  -MS (r) AN (t) MS (c)     Number of Stairs (Stairs Goal 1, PT)  3  -MS (r) AN (t) MS (c)     Time Frame (Stairs Goal 1, PT)  long term goal (LTG);by discharge  -MS (r) AN (t) MS (c)     Progress/Outcome (Stairs Goal 1, PT)  good progress toward goal  -MS (r) AN (t) MS (c)     Row Name 02/20/20 1025          Patient Education Goal (PT)    Activity (Patient Education Goal, PT)  Pt will independently demonstrate how to jose/doff LSO brace and state spinal precautions.   -MS (r) AN (t) MS (c)     [place]/Cues/Accuracy (Memory Goal 2, PT)  demonstrates adequately;independent;verbalizes understanding  -MS (r) AN (t) MS (c)     Time Frame (Patient Education Goal, PT)  long term goal (LTG);by discharge  -MS (r) AN (t) MS (c)     Progress/Outcome (Patient Education Goal, PT)  good progress toward goal  -MS  (r) AN (t) MS (c)       User Key  (r) = Recorded By, (t) = Taken By, (c) = Cosigned By    Initials Name Provider Type    Chelo Muñoz TEODORO, PT, DPT, NCS Physical Therapist    Cheryle Garner, PT Student PT Student        Clinical Impression     Row Name 02/20/20 0905          Pain Scale: Numbers Pre/Post-Treatment    Pain Scale: Numbers, Pretreatment  5/10  -MS (r) AN (t) MS (c)     Pain Location - Orientation  lower  -MS (r) AN (t) MS (c)     Pain Location  back  -MS (r) AN (t) MS (c)     Pain Intervention(s)  Medication (See MAR);Repositioned;Ambulation/increased activity  -MS (r) AN (t) MS (c)     Row Name 02/20/20 0905          Plan of Care Review    Plan of Care Reviewed With  patient  -MS (r) AN (t) MS (c)     Progress  no change  -MS (r) AN (t) MS (c)     Outcome Summary  PT evaluation completed. Pt demo's good understanding of spinal precautions and LSO brace. Pt requires reminders to jose LSO brace upon sitting eob. Therapist requested ambulation without AFOs to analyze true gait, pt demo's fair B toe clearance often utilizing foot flat contact of LLE and decreased heel strike of RLE. Pt demo's caution with ambulation. Pt demo's difficutly with turns, utilizing sidestepping and shuffling to complete turns to L and R sides. Pt would benefit from continued skilled PT services to address deficits. Recommend pt discharge home with  services.   -MS (r) AN (t) MS (c)     Row Name 02/20/20 0905          Physical Therapy Clinical Impression    Patient/Family Goals Statement (PT Clinical Impression)  To improve gait and activity tolerance.   -MS (r) AN (t) MS (c)     Criteria for Skilled Interventions Met (PT Clinical Impression)  yes;treatment indicated  -MS (r) AN (t) MS (c)     Rehab Potential (PT Clinical Summary)  good, to achieve stated therapy goals  -MS (r) AN (t) MS (c)     Predicted Duration of Therapy (PT)  until discharge  -MS (r) AN (t) MS (c)     Row Name 02/20/20 0905          Vital Signs     Pre SpO2 (%)  93  -MS (r) AN (t) MS (c)     O2 Delivery Pre Treatment  supplemental O2 2L  -MS (r) AN (t) MS (c)     Post SpO2 (%)  91  -MS (r) AN (t) MS (c)     O2 Delivery Post Treatment  supplemental O2 2L  -MS (r) AN (t) MS (c)     Row Name 02/20/20 0905          Positioning and Restraints    Pre-Treatment Position  in bed  -MS (r) AN (t) MS (c)     Post Treatment Position  chair  -MS (r) AN (t) MS (c)     In Chair  sitting;call light within reach;encouraged to call for assist;with family/caregiver;with brace;notified nsg  -MS (r) AN (t) MS (c)       User Key  (r) = Recorded By, (t) = Taken By, (c) = Cosigned By    Initials Name Provider Type    Chelo Muñoz, PT, DPT, NCS Physical Therapist    Cheryle Garner, PT Student PT Student        Outcome Measures     Row Name 02/20/20 0905          How much help from another person do you currently need...    Turning from your back to your side while in flat bed without using bedrails?  4  -MS (r) AN (t) MS (c)     Moving from lying on back to sitting on the side of a flat bed without bedrails?  4  -MS (r) AN (t) MS (c)     Moving to and from a bed to a chair (including a wheelchair)?  4  -MS (r) AN (t) MS (c)     Standing up from a chair using your arms (e.g., wheelchair, bedside chair)?  4  -MS (r) AN (t) MS (c)     Climbing 3-5 steps with a railing?  3  -MS (r) AN (t) MS (c)     To walk in hospital room?  4  -MS (r) AN (t) MS (c)     AM-PAC 6 Clicks Score (PT)  23  -MS (r) AN (t)       User Key  (r) = Recorded By, (t) = Taken By, (c) = Cosigned By    Initials Name Provider Type    Chelo Muñoz, PT, DPT, NCS Physical Therapist    Cheryle Garner, PT Student PT Student          PT Recommendation and Plan  Planned Therapy Interventions (PT Eval): gait training, patient/family education, lumbar stabilization, postural re-education, ROM (range of motion), stair training, strengthening, transfer training, balance training  Outcome Summary/Treatment Plan  (PT)  Anticipated Discharge Disposition (PT): home with home health, home with assist  Plan of Care Reviewed With: patient  Progress: no change  Outcome Summary: PT evaluation completed. Pt demo's good understanding of spinal precautions and LSO brace. Pt requires reminders to jose LSO brace upon sitting eob. Therapist requested ambulation without AFOs to analyze true gait, pt demo's fair B toe clearance often utilizing foot flat contact of LLE and decreased heel strike of RLE. Pt demo's caution with ambulation. Pt demo's difficutly with turns, utilizing sidestepping and shuffling to complete turns to L and R sides. Pt would benefit from continued skilled PT services to address deficits. Recommend pt discharge home with  services.      Time Calculation:   PT Charges     Row Name 02/20/20 1028             Time Calculation    Start Time  0900  -MS (r) AN (t) MS (c)      Stop Time  0942  -MS (r) AN (t) MS (c)      Time Calculation (min)  42 min  -MS (r) AN (t)      PT Received On  02/20/20  -MS (r) AN (t) MS (c)      PT Goal Re-Cert Due Date  03/01/20  -MS (r) AN (t) MS (c)         Time Calculation- PT    Total Timed Code Minutes- PT  12 minute(s)  -MS (r) AN (t) MS (c)         Timed Charges    89276 - Gait Training Minutes   12  -MS (r) AN (t) MS (c)        User Key  (r) = Recorded By, (t) = Taken By, (c) = Cosigned By    Initials Name Provider Type    Chelo Muñoz TEODORO, PT, DPT, NCS Physical Therapist    Cheryle Garner, PT Student PT Student        Therapy Charges for Today     Code Description Service Date Service Provider Modifiers Qty    96914298724 HC PT RE-EVAL ESTABLISHED PLAN 2 2/20/2020 Cheryle Acevedo, PT Student GP 1    26119688318 HC GAIT TRAINING EA 15 MIN 2/20/2020 Cheryle Acevedo, PT Student GP 1          PT G-Codes  Outcome Measure Options: AM-PAC 6 Clicks Basic Mobility (PT)  AM-PAC 6 Clicks Score (PT): 23  AM-PAC 6 Clicks Score (OT): 19    Cheryle Acevedo PT Student  2/20/2020

## 2020-02-20 NOTE — PROGRESS NOTES
[facility]  Spine Surgery  ANGELO Howard   Progress Note        Subjective/Overnight Events:  Stable overnight, pain controlled, voiding    Vitals  Vitals:    02/19/20 1557 02/19/20 2027 02/20/20 0027 02/20/20 0456   BP: 107/42 108/42  Comment: notified nurse 114/42 121/47   BP Location: Right arm Left arm Left arm Left arm   Patient Position: Lying Lying Lying Lying   Pulse: 74 76 71 82   Resp: 16 16 18 18   Temp:  98.9 °F (37.2 °C) 98.1 °F (36.7 °C) 98.1 °F (36.7 °C)   TempSrc: Oral Oral Oral Oral   SpO2: 93% 96% 96% 96%   Weight:       Height:           Current Facility-Administered Medications   Medication Dose Route Frequency Provider Last Rate Last Dose   • allopurinol (ZYLOPRIM) tablet 100 mg  100 mg Oral BID Sachin Wilson PA-C   100 mg at 02/19/20 2134   • allopurinol (ZYLOPRIM) tablet 300 mg  300 mg Oral Daily Sachin Wilson PA-C   300 mg at 02/19/20 1523   • calcium carb-cholecalciferol 600-800 MG-UNIT tablet 1 tablet  1 tablet Oral Daily Sachin Wilson PA-C   1 tablet at 02/19/20 1522   • carvedilol (COREG) tablet 6.25 mg  6.25 mg Oral BID With Meals Sachin Wilson PA-C   6.25 mg at 02/19/20 1733   • ceFAZolin (ANCEF) IVPB 1 g  1 g Intravenous Q8H Sachin Wilson PA-C   1 g at 02/20/20 0253   • colestipol (COLESTID) tablet 1 g  1 g Oral TID AC Sachin Wilson PA-C   1 g at 02/19/20 1524   • cyanocobalamin (VITAMIN B-12) tablet 250 mcg  250 mcg Oral Daily Sachin Wilson PA-C   250 mcg at 02/19/20 1529   • diphenhydrAMINE (BENADRYL) capsule 25 mg  25 mg Oral Nightly PRN Sachin Wilson PA-C       • famotidine (PEPCID) injection 20 mg  20 mg Intravenous Q12H Sachin Wilson PA-C   20 mg at 02/19/20 0808    Or   • famotidine (PEPCID) tablet 20 mg  20 mg Oral Q12H Sachin Wilson PA-C   20 mg at 02/19/20 2134   • fenofibrate (TRICOR) tablet 145 mg  145 mg Oral Daily Sachin Wilson PA-C   145 mg at 02/19/20 1526   • folic acid (FOLVITE)  tablet 1 mg  1 mg Oral Daily Sachin Wilson PA-C   1 mg at 02/19/20 1525   • gabapentin (NEURONTIN) capsule 300 mg  300 mg Oral TID Sachin Wilson PA-C   300 mg at 02/19/20 2134   • HYDROmorphone (DILAUDID) injection 1 mg  1 mg Intravenous Q3H PRN Sachin Wilson PA-C   1 mg at 02/19/20 1539   • indapamide (LOZOL) tablet 2.5 mg  2.5 mg Oral QAM Sachin Wilson PA-C   2.5 mg at 02/18/20 0808   • lactated ringers infusion  100 mL/hr Intravenous Continuous PRN Sachin Wilson PA-C 100 mL/hr at 02/19/20 0907     • lactated ringers infusion  100 mL/hr Intravenous Continuous Sachin Wilson PA-C 100 mL/hr at 02/19/20 0907     • lisinopril (PRINIVIL,ZESTRIL) tablet 40 mg  40 mg Oral Daily Sachin Wilson PA-C   40 mg at 02/18/20 0935   • mesalamine (DELZICOL) delayed release capsule 800 mg  800 mg Oral TID Sachin Wilson PA-C   800 mg at 02/19/20 2134   • nitrofurantoin (MACRODANTIN) capsule 50 mg  50 mg Oral Nightly Sachin Wilson PA-C   50 mg at 02/19/20 2134   • ondansetron (ZOFRAN) tablet 4 mg  4 mg Oral Q6H PRN Sachin Wilson PA-C   4 mg at 02/18/20 0215    Or   • ondansetron (ZOFRAN) injection 4 mg  4 mg Intravenous Q6H PRN Sachin Wilson PA-C       • oxyCODONE-acetaminophen (PERCOCET)  MG per tablet 1 tablet  1 tablet Oral Q4H PRN Sachin Wilson PA-C   1 tablet at 02/20/20 0253   • oxyCODONE-acetaminophen (PERCOCET)  MG per tablet 2 tablet  2 tablet Oral Q4H PRN Sachin Wilson PA-C   2 tablet at 02/18/20 0255   • polyethylene glycol 3350 powder (packet)  17 g Oral BID Sachin Wilson PA-C   17 g at 02/19/20 1528   • potassium chloride (MICRO-K) CR capsule 40 mEq  40 mEq Oral PRN Sachin Wilson PA-C        Or   • potassium chloride (KLOR-CON) packet 40 mEq  40 mEq Oral PRN Sachin Wilson PA-C        Or   • potassium chloride 10 mEq in 100 mL IVPB  10 mEq Intravenous Q1H PRN Sachin Wilson PA-C 100 mL/hr at 02/19/20 0920 10 mEq at 02/19/20 0920   • sodium  chloride 0.9 % flush 10 mL  10 mL Intravenous PRN Sachin Wilson PA-C       • sodium chloride 0.9 % flush 3 mL  3 mL Intravenous Q12H Sachin Wilson PA-C   3 mL at 02/18/20 0811   • sodium chloride 0.9 % infusion  75 mL/hr Intravenous Continuous EBONI Rader MD 75 mL/hr at 02/19/20 1545 75 mL/hr at 02/19/20 1545   • tiZANidine (ZANAFLEX) tablet 4 mg  4 mg Oral Q8H PRN Sachin Wilson PA-C   4 mg at 02/18/20 1227       PHYSICAL EXAM:    Orientation:  alert and oriented to person, place, and time    Incision:  no significant drainage, no dehiscence    Upper Extremity Motor :  5/5 bilaterally    Upper Motor Neuron Signs:  none     Lower Extremity Motor :  equal bilaterally    Lower Extremity Sensory:  Tibial nerve: Intact, Superficial peroneal nerve: Intact and Deep peroneal nerve: Intact    Flatus:  flatus    ABNORMAL EXAM FINDINGS:  none    LABS:    Lab Results (last 24 hours)     Procedure Component Value Units Date/Time    Basic Metabolic Panel [[id number]]  (Abnormal) Collected:  02/20/20 0441    Specimen:  Blood Updated:  02/20/20 0603     Glucose 113 mg/dL      BUN 15 mg/dL      Creatinine 0.68 mg/dL      Sodium 143 mmol/L      Potassium 4.5 mmol/L      Chloride 104 mmol/L      CO2 29.0 mmol/L      Calcium 8.5 mg/dL      eGFR Non African Amer 84 mL/min/1.73      BUN/Creatinine Ratio 22.1     Anion Gap 10.0 mmol/L     Narrative:       GFR Normal >60  Chronic Kidney Disease <60  Kidney Failure <15      Uric Acid [[id number]]  (Normal) Collected:  02/20/20 0441    Specimen:  Blood Updated:  02/20/20 0600     Uric Acid 2.8 mg/dL           ASSESSMENT AND PLAN:    Post operative day 1 status post removal of instrumentation L3-L5, posterior spinal fusion with instrumentation L1-L3    1:  Activity Level:  Up with assist   2:  Pain Control:  Continue current   3:  Discharge Planning:  Pending progress with PT/OT, anticipate tomorrow   4:  Other:  Brace when up      Electronically signed by Kee Rich  ANGELO Lamb 2/20/2020 6:30 AM

## 2020-02-20 NOTE — PLAN OF CARE
Problem: Patient Care Overview  Goal: Plan of Care Review  Outcome: Ongoing (interventions implemented as appropriate)  Flowsheets  Taken 2/20/2020 1035  Progress: no change (Pended)  Plan of Care Reviewed With: patient (Pended)  Taken 2/20/2020 0905  Outcome Summary: PT evaluation completed. Pt demo's good understanding of spinal precautions and LSO brace. Pt requires reminders to jose LSO brace upon sitting eob. Therapist requested ambulation without AFOs to analyze true gait, pt demo's fair B toe clearance often utilizing foot flat contact of LLE and decreased heel strike of RLE. Pt demo's caution with ambulation. Pt demo's difficutly with turns, utilizing sidestepping and shuffling to complete turns to L and R sides. Pt would benefit from continued skilled PT services to address deficits. Recommend pt discharge home with  services.  (Pended)

## 2020-02-20 NOTE — PLAN OF CARE
Problem: Patient Care Overview  Goal: Plan of Care Review  Outcome: Ongoing (interventions implemented as appropriate)  Flowsheets (Taken 2/20/2020 0323)  Progress: no change  Plan of Care Reviewed With: patient  Outcome Summary: Pt A&O. VANESSA, no changes. Seema CDI. Tolerating 1 pain pill prn. I&O as needed per torsten's baseline. Resting well between care.

## 2020-02-20 NOTE — PLAN OF CARE
Problem: Patient Care Overview  Goal: Plan of Care Review  Outcome: Ongoing (interventions implemented as appropriate)  Flowsheets  Taken 2/20/2020 1035 by Cheryle Acevedo, ANN-MARIE Student  Progress: no change  Plan of Care Reviewed With: patient  Taken 2/20/2020 1153 by Laya Cui, OTR/L, CNT  Outcome Summary: OT evaluation completed.  Pt demo need for continued skilled OT services.  Pt required mod-max A for LB dressing, SBA and vcs for donning LSO brace, and mod A for toileting task.  Pt completed all functional mobility with CGA.  Pt is limited due to impaired balance, generalized weakness, and pain.  OT will cont to follow to maximize her I and safety with ADLs and functional mobility.  It is recommended for pt to discharge home with assist and HH OT and PT.

## 2020-02-20 NOTE — THERAPY RE-EVALUATION
Acute Care - Occupational Therapy Initial Evaluation  [facility]     Patient Name: Deisi Ponce  : 1945  MRN: [id number]  Today's Date: 2020  Onset of Illness/Injury or Date of Surgery: 20  Date of Referral to OT: 20  Referring Physician: Dr. Rader    Admit Date: 2020       ICD-10-CM ICD-9-CM   1. Impaired functional mobility, balance, gait, and endurance Z74.09 V49.89   2. Decreased activities of daily living (ADL) Z78.9 V49.89     Patient Active Problem List   Diagnosis   • Crohn's disease of small and large intestines with complication (CMS/HCC)   • Crohn's disease of both small and large intestine without complication (CMS/HCC)   • Crohn's disease without complication (CMS/HCC)   • Lumbar stenosis   • GERD without esophagitis   • Essential hypertension   • Chronic gout     Past Medical History:   Diagnosis Date   • Arthritis    • Cancer (CMS/HCC)     endometrial cancer   • Crohn disease (CMS/HCC)    • Crohn's disease (CMS/HCC)    • DDD (degenerative disc disease), lumbar    • Elevated cholesterol    • GERD (gastroesophageal reflux disease)    • Gout    • History of transfusion    • Hypertension    • Intermittent self-catheterization of bladder    • Macular degeneration    • Osteopenia    • Peripheral neuropathy    • Urinary retention      Past Surgical History:   Procedure Laterality Date   • BACK SURGERY     • CARPAL TUNNEL RELEASE Bilateral    • CATARACT EXTRACTION Bilateral    • COLONOSCOPY  10/09/2015   • COLONOSCOPY N/A 2016    Procedure: COLONOSCOPY WITH ANESTHESIA;  Surgeon: Jose Raul Butt DO;  Location: [facility] ENDOSCOPY;  Service:    • COLONOSCOPY N/A 2019    Procedure: COLONOSCOPY WITH ANESTHESIA;  Surgeon: Jose Raul Butt DO;  Location: [facility] ENDOSCOPY;  Service: Gastroenterology   • CYST REMOVAL      from chest x2   • HYSTERECTOMY     • MENISCECTOMY Left    • UPPER GASTROINTESTINAL ENDOSCOPY  2010          OT ASSESSMENT FLOWSHEET (last  12 hours)      Occupational Therapy Evaluation     Row Name 02/20/20 0856                   OT Evaluation Time/Intention    Subjective Information  complains of;pain  -CS        Document Type  re-evaluation  -CS        Mode of Treatment  occupational therapy;concurrent therapy  -CS           General Information    Patient Profile Reviewed?  yes  -CS        Onset of Illness/Injury or Date of Surgery  02/17/20  -CS        Referring Physician  Dr. Rader  -CS        Patient Observations  alert;cooperative;agree to therapy  -CS        Patient/Family Observations  son present in room  -CS        General Observations of Patient  pt fowlers in bed, O2 via NC, cont pulse ox, IV, LSO at bedside  -CS        Pertinent History of Current Functional Problem  s/p removal of posterior instrumentation L3-5, exploration of fusion L3-5, PSF L1-2, L2-3  -CS        Existing Precautions/Restrictions  brace worn when out of bed;fall;spinal  -CS        Risks Reviewed  patient:;LOB;nausea/vomiting;dizziness;increased discomfort  -CS        Benefits Reviewed  patient:;improve function;increase independence;increase balance;increase strength;decrease pain  -CS        Barriers to Rehab  previous functional deficit;medically complex  -CS           Relationship/Environment    Lives With  child(adolfo), adult  -CS           Cognitive Assessment/Intervention- PT/OT    Orientation Status (Cognition)  oriented x 4  -CS        Personal Safety Interventions  fall prevention program maintained;gait belt;nonskid shoes/slippers when out of bed;supervised activity  -CS           Safety Issues, Functional Mobility    Impairments Affecting Function (Mobility)  balance;endurance/activity tolerance;sensation/sensory awareness;pain  -CS           Bed Mobility Assessment/Treatment    Bed Mobility Assessment/Treatment  rolling left;sidelying-sit  -CS        Rolling Right [place] (Bed Mobility)  supervision;verbal cues  -CS        Sidelying-Sit [place]  (Bed Mobility)  contact guard;verbal cues  -CS        Assistive Device (Bed Mobility)  bed rails  -CS           Functional Mobility    Functional Mobility- Ind. Level  contact guard assist;verbal cues required  -CS        Functional Mobility- Device  rolling walker  -CS        Functional Mobility- Comment  Pt walked to bathroom, to hallway and back to bedside chair.  -CS           Transfer Assessment/Treatment    Transfer Assessment/Treatment  sit-stand transfer;stand-sit transfer;toilet transfer  -CS           Sit-Stand Transfer    Sit-Stand [place] (Transfers)  stand by assist;contact guard;verbal cues  -CS        Assistive Device (Sit-Stand Transfers)  walker, front-wheeled  -CS           Stand-Sit Transfer    Stand-Sit [place] (Transfers)  stand by assist;contact guard;verbal cues  -CS        Assistive Device (Stand-Sit Transfers)  walker, front-wheeled  -CS           Toilet Transfer    Type (Toilet Transfer)  stand-sit;sit-stand  -CS        [place] Level (Toilet Transfer)  stand by assist;contact guard;verbal cues  -CS        Assistive Device (Toilet Transfer)  walker, front-wheeled;commode;grab bars/safety frame  -CS           ADL Assessment/Intervention    BADL Assessment/Intervention  lower body dressing;upper body dressing  -CS           Upper Body Dressing Assessment/Training    Upper Body Dressing [place] Level  supervision;verbal cues LSO brace  -CS        Upper Body Dressing Position  edge of bed sitting  -CS           Lower Body Dressing Assessment/Training    Lower Body Dressing [place] Level  don;socks;undergarment;moderate assist (50% patient effort);maximum assist (25% patient effort);verbal cues  -CS        Lower Body Dressing Position  edge of bed sitting;unsupported sitting  -CS           Toileting Assessment/Training    [place] Level (Toileting)  adjust/manage clothing;minimum assist (75% patient effort);perform perineal hygiene;moderate assist (50% patient  effort);verbal cues  -CS        Assistive Devices (Toileting)  commode;grab bar/safety frame  -CS        Toileting Position  unsupported sitting;supported standing  -CS           BADL Safety/Performance    Impairments, BADL Safety/Performance  balance;endurance/activity tolerance;muscle tone abnormality;motor control;shortness of breath;strength  -CS           General ROM    GENERAL ROM COMMENTS  BUE AROM WFL  -CS           MMT (Manual Muscle Testing)    General MMT Comments  BUE functional strength: 4-/5  -CS           Motor Assessment/Interventions    Additional Documentation  Balance (Group)  -CS           Balance    Balance  static sitting balance;static standing balance;dynamic sitting balance;dynamic standing balance  -CS           Static Sitting Balance    Level of [place] (Unsupported Sitting, Static Balance)  supervision  -CS        Sitting Position (Unsupported Sitting, Static Balance)  sitting on edge of bed  -CS           Dynamic Sitting Balance    Level of [place], Reaches Outside Midline (Sitting, Dynamic Balance)  supervision  -CS        Sitting Position, Reaches Outside Midline (Sitting, Dynamic Balance)  sitting on edge of bed  -CS           Static Standing Balance    Level of [place] (Supported Standing, Static Balance)  supervision  -CS        Assistive Device Utilized (Supported Standing, Static Balance)  walker, rolling  -CS           Dynamic Standing Balance    Level of [place], Reaches Outside Midline (Standing, Dynamic Balance)  contact guard assist  -CS        Assistive Device Utilized (Supported Standing, Dynamic Balance)  walker, rolling  -CS           Sensory Assessment/Intervention    Sensory General Assessment  no sensation deficits identified Jameel, see PT note for LE information  -CS           Positioning and Restraints    Pre-Treatment Position  in bed  -CS        Post Treatment Position  chair  -CS        In Chair  sitting;call light within reach;encouraged to  call for assist;with family/caregiver;with brace  -CS           Pain Assessment    Additional Documentation  Pain Scale: Numbers Pre/Post-Treatment (Group)  -CS           Pain Scale: Numbers Pre/Post-Treatment    Pain Scale: Numbers, Pretreatment  5/10  -CS        Pain Location - Orientation  lower  -CS        Pain Location  back  -CS        Pain Intervention(s)  Medication (See MAR);Repositioned;Ambulation/increased activity  -CS           Wound 02/17/20 1125 Left lateral flank Incision    Wound - Properties Group Date first assessed: 02/17/20  -LW Time first assessed: 1125  -LW Present on Hospital Admission: N  -LW Side: Left  -LW Orientation: lateral  -LW Location: flank  -LW Primary Wound Type: Incision  -LW Additional Comments: mastisol, steristrips, telfa, 4x4s, myplex  -LW       Wound 02/19/20 1130 back Incision    Wound - Properties Group Date first assessed: 02/19/20  - Time first assessed: 1130  -JH Location: back  -JH Primary Wound Type: Incision  -JH       Plan of Care Review    Plan of Care Reviewed With  patient  -CS        Progress  no change  -CS        Outcome Summary  OT evaluation completed.  Pt demo need for continued skilled OT services.  Pt required mod-max A for LB dressing, SBA and vcs for donning LSO brace, and mod A for toileting task.  Pt completed all functional mobility with CGA.  Pt is limited due to impaired balance, generalized weakness, and pain.  OT will cont to follow to maximize her I and safety with ADLs and functional mobility.  It is recommended for pt to discharge home with assist and HH OT and PT.  -CS           Clinical Impression (OT)    Date of Referral to OT  02/19/20  -CS        OT Diagnosis  Impaired ADLs and functional mobility  -CS        Patient/Family Goals Statement (OT Eval)  to go home  -CS        Criteria for Skilled Therapeutic Interventions Met (OT Eval)  yes;treatment indicated  -CS        Rehab Potential (OT Eval)  good, to achieve stated therapy goals   -CS        Therapy Frequency (OT Eval)  5 times/wk  -CS        Predicted Duration of Therapy Intervention (Therapy Eval)  until hospital discharge  -CS        Care Plan Review (OT)  evaluation/treatment results reviewed;care plan/treatment goals reviewed;risks/benefits reviewed;current/potential barriers reviewed  -CS        Care Plan Review, Other Participant (OT Eval)  son  -CS        Anticipated Equipment Needs at Discharge (OT)  -- sock aid and LH bath sponge (handout provided to family)  -CS        Anticipated Discharge Disposition (OT)  home with /7 care;home with home health  -CS           Planned OT Interventions    Planned Therapy Interventions (OT Eval)  adaptive equipment training;BADL retraining;activity tolerance training;functional balance retraining;occupation/activity based interventions;patient/caregiver education/training;strengthening exercise;transfer/mobility retraining  -CS           Transfer Goal 1 (OT)    Activity/Assistive Device (Transfer Goal 1, OT)  sit-to-stand/stand-to-sit;bed-to-chair/chair-to-bed;toilet;commode;walker, rolling  -CS        [place] Level/Cues Needed (Transfer Goal 1, OT)  conditional independence  -CS        Time Frame (Transfer Goal 1, OT)  10 days  -CS        Progress/Outcome (Transfer Goal 1, OT)  goal ongoing  -CS           Dressing Goal 1 (OT)    Activity/Assistive Device (Dressing Goal 1, OT)  lower body dressing;upper body dressing;reacher;sock-aid;long handled shoe horn;elastic laces  -CS        [place]/Cues Needed (Dressing Goal 1, OT)  supervision required;verbal cues required  -CS        Time Frame (Dressing Goal 1, OT)  10 days  -CS        Progress/Outcome (Dressing Goal 1, OT)  goal ongoing  -CS           Toileting Goal 1 (OT)    Activity/Device (Toileting Goal 1, OT)  toileting skills, all;commode;grab bar/safety frame  -CS        [place] Level/Cues Needed (Toileting Goal 1, OT)  supervision required;verbal cues required  -CS        Time  Frame (Toileting Goal 1, OT)  10 days  -CS        Progress/Outcome (Toileting Goal 1, OT)  goal ongoing  -CS          User Key  (r) = Recorded By, (t) = Taken By, (c) = Cosigned By    Initials Name Effective Dates    CS Laya Cui S, OTR/L, CNT 04/02/19 -     Keira Wharton RN 06/07/17 -     Matilda Rincon RN 09/25/19 -                OT Recommendation and Plan  Outcome Summary/Treatment Plan (OT)  Anticipated Equipment Needs at Discharge (OT): (sock aid and LH bath sponge (handout provided to family))  Anticipated Discharge Disposition (OT): home with /7 care, home with home health  Planned Therapy Interventions (OT Eval): adaptive equipment training, BADL retraining, activity tolerance training, functional balance retraining, occupation/activity based interventions, patient/caregiver education/training, strengthening exercise, transfer/mobility retraining  Therapy Frequency (OT Eval): 5 times/wk  Plan of Care Review  Plan of Care Reviewed With: patient  Plan of Care Reviewed With: patient  Outcome Summary: OT evaluation completed.  Pt demo need for continued skilled OT services.  Pt required mod-max A for LB dressing, SBA and vcs for donning LSO brace, and mod A for toileting task.  Pt completed all functional mobility with CGA.  Pt is limited due to impaired balance, generalized weakness, and pain.  OT will cont to follow to maximize her I and safety with ADLs and functional mobility.  It is recommended for pt to discharge home with assist and HH OT and PT.    Outcome Measures     Row Name 02/20/20 0856 02/18/20 0732          How much help from another is currently needed...    Putting on and taking off regular lower body clothing?  2  -CS  2  -CS     Bathing (including washing, rinsing, and drying)  2  -CS  3  -CS     Toileting (which includes using toilet bed pan or urinal)  2  -CS  3  -CS     Putting on and taking off regular upper body clothing  3  -CS  3  -CS     Taking care of personal  grooming (such as brushing teeth)  4  -CS  4  -CS     Eating meals  4  -CS  4  -CS     AM-PAC 6 Clicks Score (OT)  17  -CS  19  -CS        Functional Assessment    Outcome Measure Options  AM-PAC 6 Clicks Daily Activity (OT)  -CS  AM-PAC 6 Clicks Daily Activity (OT)  -CS       User Key  (r) = Recorded By, (t) = Taken By, (c) = Cosigned By    Initials Name Provider Type    CS Laya Cui OTR/L, SURESH Occupational Therapist          Time Calculation:   Time Calculation- OT     Row Name 02/20/20 1153 02/20/20 1028          Time Calculation- OT    OT Start Time  0856  -CS  --     OT Stop Time  0939  -CS  --     OT Time Calculation (min)  43 min  -CS  --     OT Received On  02/20/20  -CS  --     OT Goal Re-Cert Due Date  03/01/20  -CS  --        Timed Charges    21456 - Gait Training Minutes   --  10  -MS     54121 - OT Self Care/Mgmt Minutes  43  -CS  --       User Key  (r) = Recorded By, (t) = Taken By, (c) = Cosigned By    Initials Name Provider Type    Chelo Muñoz R, PT, DPT, NCS Physical Therapist     Laya Cui OTR/L, SURESH Occupational Therapist        Therapy Charges for Today     Code Description Service Date Service Provider Modifiers Qty    [id number] HC OT SELF CARE/MGMT/TRAIN EA 15 MIN 2/20/2020 Laya Cui OTR/L, SURESH GO 3               SHANTHI Grier/L, CNT  2/20/2020

## 2020-02-20 NOTE — THERAPY TREATMENT NOTE
Acute Care - Physical Therapy Treatment Note  [facility]     Patient Name: Deisi Ponce  : 1945  MRN: [id number]  Today's Date: 2020  Onset of Illness/Injury or Date of Surgery: 20     Referring Physician: Dr. Rader    Admit Date: 2020    Visit Dx:    ICD-10-CM ICD-9-CM   1. Impaired functional mobility, balance, gait, and endurance Z74.09 V49.89   2. Decreased activities of daily living (ADL) Z78.9 V49.89     Patient Active Problem List   Diagnosis   • Crohn's disease of small and large intestines with complication (CMS/HCC)   • Crohn's disease of both small and large intestine without complication (CMS/HCC)   • Crohn's disease without complication (CMS/HCC)   • Lumbar stenosis   • GERD without esophagitis   • Essential hypertension   • Chronic gout       Therapy Treatment    Rehabilitation Treatment Summary     Row Name 20 1419             Treatment Time/Intention    Discipline  physical therapy assistant  -      Document Type  therapy note (daily note)  -AH2      Subjective Information  complains of;pain;weakness  -2      Existing Precautions/Restrictions  brace worn when out of bed;fall;spinal LSO, B AFO  -AH2      Recorded by [] Tiffany Vickers, PTA 20 1419  [2] Tiffany Vickers, PTA 20 1450      Row Name 20 1419             Vital Signs    Intra SpO2 (%)  88  -AH      O2 Delivery Intra Treatment  room air  -AH      Post SpO2 (%)  98  -AH      O2 Delivery Post Treatment  room air  -AH      Recorded by [] Tiffany Vickers, PTA 20 1450      Row Name 20 1419             Bed Mobility Assessment/Treatment    Bed Mobility Assessment/Treatment  sidelying-sit;sit-sidelying  -AH      Sidelying-Sit [place] (Bed Mobility)  minimum assist (75% patient effort);verbal cues  -AH      Sit-Sidelying [place] (Bed Mobility)  minimum assist (75% patient effort);verbal cues  -AH      Recorded by [] Tiffany Vickers, PTA 20 1450      Row Name  02/20/20 1419             Sit-Stand Transfer    Sit-Stand [place] (Transfers)  minimum assist (75% patient effort);verbal cues  -      Recorded by [] Tiffany Vickers, [facility] 02/20/20 1450      Row Name 02/20/20 1419             Stand-Sit Transfer    Stand-Sit [place] (Transfers)  contact guard;minimum assist (75% patient effort);verbal cues  -AH      Recorded by [] Tiffany Vickers, [facility] 02/20/20 1450      Row Name 02/20/20 1419             Toilet Transfer    [place] Level (Toilet Transfer)  contact guard;minimum assist (75% patient effort);verbal cues  -      Assistive Device (Toilet Transfer)  commode;grab bars/safety frame;walker, front-wheeled  -      Recorded by [] Tiffany Vickers, [facility] 02/20/20 1450      Row Name 02/20/20 1419             Gait/Stairs Assessment/Training    [place] Level (Gait)  contact guard;minimum assist (75% patient effort);verbal cues  -      Assistive Device (Gait)  walker, front-wheeled  -      Distance in Feet (Gait)  75  -AH      Recorded by [] Tiffany Vickers, [facility] 02/20/20 1450      Row Name 02/20/20 1419             Positioning and Restraints    Pre-Treatment Position  in bed  -      Post Treatment Position  bed  -AH      In Bed  fowlers;call light within reach;encouraged to call for assist;with family/caregiver;SCD pump applied  -      Recorded by [] Tiffany Vickers, [facility] 02/20/20 1450      Row Name 02/20/20 1419             Pain Assessment    Additional Documentation  Pain Scale: Numbers Pre/Post-Treatment (Group)  -      Recorded by [] Tiffany Vickers, [facility] 02/20/20 1450      Row Name 02/20/20 1419             Pain Scale: Numbers Pre/Post-Treatment    Pain Scale: Numbers, Pretreatment  5/10  -      Pain Scale: Numbers, Post-Treatment  6/10  -      Pain Location - Orientation  lower  -      Pain Location  back  -      Pain Intervention(s)  Medication (See MAR);Repositioned;Ambulation/increased activity  -      Recorded by []  Tiffany Vickers, PTA 02/20/20 1450      Row Name 02/20/20 1419             Orthotic/Prosthetic Management    Orthosis Location  AFO (ankle foot orthosis)  -      Recorded by [] Tiffany Vickers, PTA 02/20/20 1450      Row Name 02/20/20 1419             Spinal Orthosis Management    Type (Spinal Orthosis)  LSO (lumbar sacral orthosis)  -      Recorded by [] Tiffany Vickers, PTA 02/20/20 1450      Row Name 02/20/20 1419             Ankle Foot Orthosis Management    Type (Ankle/Foot Orthosis)  bilateral;AFO (ankle foot orthosis)  -      Recorded by [] Tiffany Vickers, PTA 02/20/20 1450      Row Name                Wound 02/17/20 1125 Left lateral flank Incision    Wound - Properties Group Date first assessed: 02/17/20 [LW] Time first assessed: 1125 [LW2] Present on Hospital Admission: N [LW] Side: Left [LW] Orientation: lateral [LW] Location: flank [LW] Primary Wound Type: Incision [LW] Additional Comments: mastisol, steristrips, telfa, 4x4s, myplex [LW] Recorded by:  [LW] Matilda Polanco, THERON 02/17/20 1102 [LW2] Matilda Polanco RN 02/17/20 1135    Row Name                Wound 02/19/20 1130 back Incision    Wound - Properties Group Date first assessed: 02/19/20 [JH] Time first assessed: 1130 [JH] Location: back [JH] Primary Wound Type: Incision [JH] Recorded by:  [JH] Keira Smith RN 02/19/20 1123      User Key  (r) = Recorded By, (t) = Taken By, (c) = Cosigned By    Initials Name Effective Dates Discipline     Tiffany Vickers, PTA 08/02/16 -  PT     Keira Smith, RN 06/07/17 -  Nurse    LW Matilda Polanco RN 09/25/19 -  Nurse          Wound 02/17/20 1125 Left lateral flank Incision (Active)   Dressing Appearance dry;intact;no drainage 2/20/2020  8:01 AM   Drainage Amount none 2/20/2020  8:01 AM   Dressing Care, Wound foam 2/20/2020  8:01 AM       Wound 02/19/20 1130 back Incision (Active)   Dressing Appearance dry;intact;no drainage 2/20/2020  8:01 AM   Drainage Amount none 2/20/2020  8:01 AM    Dressing Care, Wound gauze 2/20/2020  8:01 AM       Rehab Goal Summary     Row Name 02/20/20 1025 02/20/20 0856          Transfer Goal 1 (PT)    Activity/Assistive Device (Transfer Goal 1, PT)  sit-to-stand/stand-to-sit;bed-to-chair/chair-to-bed;walker, rolling  -MS (r) AN (t) MS (c)  --     [place] Level/Cues Needed (Transfer Goal 1, PT)  conditional independence  -MS (r) AN (t) MS (c)  --     Time Frame (Transfer Goal 1, PT)  long term goal (LTG);by discharge  -MS (r) AN (t) MS (c)  --     Progress/Outcome (Transfer Goal 1, PT)  goal met  -MS (r) AN (t) MS (c)  --        Gait Training Goal 1 (PT)    Activity/Assistive Device (Gait Training Goal 1, PT)  gait (walking locomotion);assistive device use;decrease fall risk;diminish gait deviation;improve balance and speed;increase endurance/gait distance;normalize weight shifts;walker, rolling;turning, right;turning, left;increase energy conservation  -MS (r) AN (t) MS (c)  --     [place] Level (Gait Training Goal 1, PT)  conditional independence  -MS (r) AN (t) MS (c)  --     Distance (Gait Goal 1, PT)  500ft with appropriate turning patterns for decreased energy expenditure  -MS (r) AN (t) MS (c)  --     Time Frame (Gait Training Goal 1, PT)  long term goal (LTG);by discharge  -MS (r) AN (t) MS (c)  --     Progress/Outcome (Gait Training Goal 1, PT)  good progress toward goal  -MS (r) AN (t) MS (c)  --        Stairs Goal 1 (PT)    Activity/Assistive Device (Stairs Goal 1, PT)  ascending stairs;descending stairs;decrease fall risk;improve balance and speed;assistive device use;walker, rolling  -MS (r) AN (t) MS (c)  --     [place] Level/Cues Needed (Stairs Goal 1, PT)  supervision required  -MS (r) AN (t) MS (c)  --     Number of Stairs (Stairs Goal 1, PT)  3  -MS (r) AN (t) MS (c)  --     Time Frame (Stairs Goal 1, PT)  long term goal (LTG);by discharge  -MS (r) AN (t) MS (c)  --     Progress/Outcome (Stairs Goal 1, PT)  good progress toward goal  -MS  (r) AN (t) MS (c)  --        Patient Education Goal (PT)    Activity (Patient Education Goal, PT)  Pt will independently demonstrate how to jose/doff LSO brace and state spinal precautions.   -MS (r) AN (t) MS (c)  --     [place]/Cues/Accuracy (Memory Goal 2, PT)  demonstrates adequately;independent;verbalizes understanding  -MS (r) AN (t) MS (c)  --     Time Frame (Patient Education Goal, PT)  long term goal (LTG);by discharge  -MS (r) AN (t) MS (c)  --     Progress/Outcome (Patient Education Goal, PT)  good progress toward goal  -MS (r) AN (t) MS (c)  --        Transfer Goal 1 (OT)    Activity/Assistive Device (Transfer Goal 1, OT)  --  sit-to-stand/stand-to-sit;bed-to-chair/chair-to-bed;toilet;commode;walker, rolling  -CS     [place] Level/Cues Needed (Transfer Goal 1, OT)  --  conditional independence  -CS     Time Frame (Transfer Goal 1, OT)  --  10 days  -CS     Progress/Outcome (Transfer Goal 1, OT)  --  goal ongoing  -CS        Dressing Goal 1 (OT)    Activity/Assistive Device (Dressing Goal 1, OT)  --  lower body dressing;upper body dressing;reacher;sock-aid;long handled shoe horn;elastic laces  -CS     [place]/Cues Needed (Dressing Goal 1, OT)  --  supervision required;verbal cues required  -CS     Time Frame (Dressing Goal 1, OT)  --  10 days  -CS     Progress/Outcome (Dressing Goal 1, OT)  --  goal ongoing  -CS        Toileting Goal 1 (OT)    Activity/Device (Toileting Goal 1, OT)  --  toileting skills, all;commode;grab bar/safety frame  -CS     [place] Level/Cues Needed (Toileting Goal 1, OT)  --  supervision required;verbal cues required  -CS     Time Frame (Toileting Goal 1, OT)  --  10 days  -CS     Progress/Outcome (Toileting Goal 1, OT)  --  goal ongoing  -CS       User Key  (r) = Recorded By, (t) = Taken By, (c) = Cosigned By    Initials Name Provider Type Discipline    Chelo Muñoz, PT, DPT, NCS Physical Therapist PT    CS Laya Cui, OTR/L, CNT Occupational  Therapist OT    Cheryle Garner, PT Student PT Student PT              PT Recommendation and Plan        Outcome Measures     Row Name 02/20/20 0856 02/18/20 0732          How much help from another is currently needed...    Putting on and taking off regular lower body clothing?  2  -CS  2  -CS     Bathing (including washing, rinsing, and drying)  2  -CS  3  -CS     Toileting (which includes using toilet bed pan or urinal)  2  -CS  3  -CS     Putting on and taking off regular upper body clothing  3  -CS  3  -CS     Taking care of personal grooming (such as brushing teeth)  4  -CS  4  -CS     Eating meals  4  -CS  4  -CS     AM-PAC 6 Clicks Score (OT)  17  -CS  19  -CS        Functional Assessment    Outcome Measure Options  AM-PAC 6 Clicks Daily Activity (OT)  -CS  AM-PAC 6 Clicks Daily Activity (OT)  -CS       User Key  (r) = Recorded By, (t) = Taken By, (c) = Cosigned By    Initials Name Provider Type    CS Laya Cui, OTR/L, CNT Occupational Therapist         Time Calculation:   PT Charges     Row Name 02/20/20 1451 02/20/20 1028          Time Calculation    Start Time  1419  -AH  0900  -MS (r) AN (t) MS (c)     Stop Time  1449  -AH  0942  -MS (r) AN (t) MS (c)     Time Calculation (min)  30 min  -AH  42 min  -MS (r) AN (t)     PT Received On  --  02/20/20  -MS (r) AN (t) MS (c)     PT Goal Re-Cert Due Date  --  03/01/20  -MS (r) AN (t) MS (c)        Time Calculation- PT    Total Timed Code Minutes- PT  30 minute(s)  -AH  10 minute(s)  -MS        Timed Charges    94873 - Gait Training Minutes   30  -AH  10  -MS       User Key  (r) = Recorded By, (t) = Taken By, (c) = Cosigned By    Initials Name Provider Type    Tiffany Bloom, PTA Physical Therapy Assistant    Chelo Muñoz, PT, DPT, NCS Physical Therapist    Cheryle Garner, PT Student PT Student        Therapy Charges for Today     Code Description Service Date Service Provider Modifiers Qty    16792200305 HC GAIT TRAINING EA 15 MIN  2/20/2020 Tiffany Vickers, PTA GP 2          PT G-Codes  Outcome Measure Options: AM-PAC 6 Clicks Daily Activity (OT)  AM-PAC 6 Clicks Score (PT): 23  AM-PAC 6 Clicks Score (OT): 17    Tiffany Vickers, GWEN  2/20/2020

## 2020-02-21 VITALS
DIASTOLIC BLOOD PRESSURE: 64 MMHG | TEMPERATURE: 98.5 F | BODY MASS INDEX: 25.56 KG/M2 | OXYGEN SATURATION: 95 % | RESPIRATION RATE: 18 BRPM | HEART RATE: 71 BPM | HEIGHT: 64 IN | WEIGHT: 149.69 LBS | SYSTOLIC BLOOD PRESSURE: 125 MMHG

## 2020-02-21 PROCEDURE — 97116 GAIT TRAINING THERAPY: CPT

## 2020-02-21 PROCEDURE — 97530 THERAPEUTIC ACTIVITIES: CPT

## 2020-02-21 PROCEDURE — 97535 SELF CARE MNGMENT TRAINING: CPT

## 2020-02-21 RX ORDER — OXYCODONE AND ACETAMINOPHEN 7.5; 325 MG/1; MG/1
1 TABLET ORAL EVERY 6 HOURS PRN
Qty: 50 TABLET | Refills: 0 | Status: SHIPPED | OUTPATIENT
Start: 2020-02-21 | End: 2021-05-27 | Stop reason: ALTCHOICE

## 2020-02-21 RX ADMIN — FENOFIBRATE 145 MG: 145 TABLET ORAL at 09:19

## 2020-02-21 RX ADMIN — CARVEDILOL 6.25 MG: 6.25 TABLET, FILM COATED ORAL at 09:18

## 2020-02-21 RX ADMIN — Medication 250 MCG: at 09:19

## 2020-02-21 RX ADMIN — OXYCODONE HYDROCHLORIDE AND ACETAMINOPHEN 2 TABLET: 10; 325 TABLET ORAL at 03:07

## 2020-02-21 RX ADMIN — GABAPENTIN 300 MG: 300 CAPSULE ORAL at 09:18

## 2020-02-21 RX ADMIN — LISINOPRIL 40 MG: 20 TABLET ORAL at 09:18

## 2020-02-21 RX ADMIN — OXYCODONE HYDROCHLORIDE AND ACETAMINOPHEN 1 TABLET: 10; 325 TABLET ORAL at 07:10

## 2020-02-21 RX ADMIN — MESALAMINE 800 MG: 400 CAPSULE, DELAYED RELEASE ORAL at 09:19

## 2020-02-21 RX ADMIN — OXYCODONE HYDROCHLORIDE AND ACETAMINOPHEN 2 TABLET: 10; 325 TABLET ORAL at 12:56

## 2020-02-21 RX ADMIN — Medication 1 TABLET: at 09:18

## 2020-02-21 RX ADMIN — SODIUM CHLORIDE, PRESERVATIVE FREE 3 ML: 5 INJECTION INTRAVENOUS at 09:19

## 2020-02-21 RX ADMIN — FOLIC ACID 1 MG: 1 TABLET ORAL at 09:18

## 2020-02-21 RX ADMIN — ALLOPURINOL 300 MG: 300 TABLET ORAL at 09:20

## 2020-02-21 RX ADMIN — FAMOTIDINE 20 MG: 20 TABLET ORAL at 09:18

## 2020-02-21 RX ADMIN — INDAPAMIDE 2.5 MG: 2.5 TABLET, FILM COATED ORAL at 06:27

## 2020-02-21 RX ADMIN — COLESTIPOL HYDROCHLORIDE 1 G: 1 TABLET, FILM COATED ORAL at 09:18

## 2020-02-21 RX ADMIN — ALLOPURINOL 100 MG: 100 TABLET ORAL at 09:18

## 2020-02-21 NOTE — PLAN OF CARE
Problem: Patient Care Overview  Goal: Plan of Care Review  Outcome: Ongoing (interventions implemented as appropriate)  Flowsheets (Taken 2/20/2020 4194)  Progress: improving  Plan of Care Reviewed With: patient; daughter  Outcome Summary: Pt A&Ox4, VSS safety maintained. Up with LSO brace to bsc, leg braces if patient going farther. C/o pain with relief from po prn pain medication. Will continue to monitor

## 2020-02-21 NOTE — PLAN OF CARE
Problem: Patient Care Overview  Goal: Plan of Care Review  Outcome: Ongoing (interventions implemented as appropriate)  Flowsheets  Taken 2/21/2020 1037 by Cyndi Gonsalves RN  Progress: improving  Outcome Summary: Pt A&Ox4, VSS safety maintained. Up with standby assist, dons and doffs brace correctly. Leg braces when up and ambulating. Plan to d/c home to d/c home today with daughter and home health  Taken 2/21/2020 0931 by Tiffany Vickers PTA  Plan of Care Reviewed With: patient

## 2020-02-21 NOTE — THERAPY TREATMENT NOTE
Acute Care - Occupational Therapy Treatment Note  [facility]     Patient Name: Deisi Ponce  : 1945  MRN: [id number]  Today's Date: 2020  Onset of Illness/Injury or Date of Surgery: 20  Date of Referral to OT: 20  Referring Physician: Dr. Rader    Admit Date: 2020       ICD-10-CM ICD-9-CM   1. Spinal stenosis of lumbar region with neurogenic claudication M48.062 724.03   2. Impaired functional mobility, balance, gait, and endurance Z74.09 V49.89   3. Decreased activities of daily living (ADL) Z78.9 V49.89   4. Essential hypertension I10 401.9   5. Crohn's disease of both small and large intestine without complication (CMS/HCC) K50.80 555.2     Patient Active Problem List   Diagnosis   • Crohn's disease of small and large intestines with complication (CMS/HCC)   • Crohn's disease of both small and large intestine without complication (CMS/HCC)   • Crohn's disease without complication (CMS/HCC)   • Lumbar stenosis   • GERD without esophagitis   • Essential hypertension   • Chronic gout     Past Medical History:   Diagnosis Date   • Arthritis    • Cancer (CMS/HCC)     endometrial cancer   • Crohn disease (CMS/HCC)    • Crohn's disease (CMS/HCC)    • DDD (degenerative disc disease), lumbar    • Elevated cholesterol    • GERD (gastroesophageal reflux disease)    • Gout    • History of transfusion    • Hypertension    • Intermittent self-catheterization of bladder    • Macular degeneration    • Osteopenia    • Peripheral neuropathy    • Urinary retention      Past Surgical History:   Procedure Laterality Date   • BACK SURGERY     • CARPAL TUNNEL RELEASE Bilateral    • CATARACT EXTRACTION Bilateral    • COLONOSCOPY  10/09/2015   • COLONOSCOPY N/A 2016    Procedure: COLONOSCOPY WITH ANESTHESIA;  Surgeon: Jose Raul Butt DO;  Location: [facility];  Service:    • COLONOSCOPY N/A 2019    Procedure: COLONOSCOPY WITH ANESTHESIA;  Surgeon: Jose Raul Butt DO;   Location: [facility] ENDOSCOPY;  Service: Gastroenterology   • CYST REMOVAL      from chest x2   • HYSTERECTOMY     • MENISCECTOMY Left    • UPPER GASTROINTESTINAL ENDOSCOPY  08/31/2010       Therapy Treatment    Rehabilitation Treatment Summary     Row Name 02/21/20 0949 02/21/20 0836          Treatment Time/Intention    Discipline  occupational therapy assistant  -TS  physical therapy assistant  -     Document Type  therapy note (daily note)  -  therapy note (daily note)  -[facility]     Subjective Information  complains of;pain  -TS2  complains of;pain;weakness  -2     Patient/Family Observations  daughter in room  -TS2  --     Existing Precautions/Restrictions  brace worn when out of bed;fall;spinal  -TS2  brace worn when out of bed;fall;spinal LSO, B AFO  -2     Treatment Considerations/Comments  B AFO  -TS2  --     Recorded by [TS] Hillary Bellamy RAJAN/L 02/21/20 0950  [TS2] Hillary Bellamy RAJAN/L 02/21/20 1337 [] Tiffany Vickers, PTA 02/21/20 0837  [2] Tiffany Vickers, PTA 02/21/20 0930     Row Name 02/21/20 0949             Cognitive Assessment/Intervention- PT/OT    Personal Safety Interventions  fall prevention program maintained;gait belt;nonskid shoes/slippers when out of bed  -TS      Recorded by [TS] Hillary Bellamy RAJAN/L 02/21/20 1337      Row Name 02/21/20 0949 02/21/20 0836          Bed Mobility Assessment/Treatment    Bed Mobility Assessment/Treatment  --  sidelying-sit;sit-sidelying  -     Sit-Sidelying [place] (Bed Mobility)  contact guard;minimum assist (75% patient effort)  -TS  --     Comment (Bed Mobility)  pt getting hospital bed for home use initially at discharge  -TS  on [facility]  -     Recorded by [TS] Hillary Bellamy COTA/MARCIA 02/21/20 1337 [] Tiffany Vickers, PTA 02/21/20 0930     Row Name 02/21/20 0949             Functional Mobility    Functional Mobility- Ind. Level  standby assist  -TS      Functional Mobility- Device  rolling walker  -TS       Functional Mobility- Comment  in BR, in room  -TS      Recorded by [TS] Hillary Bellamy RAJAN/L 02/21/20 1337      Row Name 02/21/20 0949             Transfer Assessment/Treatment    Transfer Assessment/Treatment  sit-stand transfer;stand-sit transfer  -TS      Recorded by [TS] Hillary Bellamy RAJAN/L 02/21/20 1337      Row Name 02/21/20 0949 02/21/20 0836          Sit-Stand Transfer    Sit-Stand [place] (Transfers)  contact guard  -TS  minimum assist (75% patient effort);verbal cues  -AH     Assistive Device (Sit-Stand Transfers)  walker, front-wheeled  -TS  --     Recorded by [TS] Hillary Bellamy RAJAN/L 02/21/20 1337 [] Tiffany Vickers, PTA 02/21/20 0930     Row Name 02/21/20 0949 02/21/20 0836          Stand-Sit Transfer    Stand-Sit [place] (Transfers)  stand by assist  -TS  contact guard;minimum assist (75% patient effort);verbal cues  -     Assistive Device (Stand-Sit Transfers)  walker, front-wheeled  -TS  --     Recorded by [TS] Hillary Bellamy COTA/MARCIA 02/21/20 1337 [] Tiffany Vickers, PTA 02/21/20 0930     Row Name 02/21/20 0949 02/21/20 0836          Toilet Transfer    Type (Toilet Transfer)  sit-stand;stand-sit  -TS  --     [place] Level (Toilet Transfer)  stand by assist;verbal cues  -TS  contact guard;minimum assist (75% patient effort);verbal cues  -AH     Assistive Device (Toilet Transfer)  commode;grab bars/safety frame;walker, front-wheeled  -TS  commode;grab bars/safety frame;walker, front-wheeled  -AH     Recorded by [TS] Hillary Bellamy RAJAN/L 02/21/20 1337 [] Tiffany Vickers, PTA 02/21/20 0930     Row Name 02/21/20 0836             Gait/Stairs Assessment/Training    [place] Level (Gait)  contact guard;minimum assist (75% patient effort);verbal cues  -AH      Assistive Device (Gait)  walker, front-wheeled  -      Distance in Feet (Gait)  75  -AH      [place] Level (Stairs)  contact guard;minimum assist (75% patient effort);verbal cues   -AH      Handrail Location (Stairs)  both sides  -AH      Number of Steps (Stairs)  2  -AH      Ascending Technique (Stairs)  step-to-step  -AH      Descending Technique (Stairs)  step-to-step  -AH      Recorded by [AH] Tiffany Vickers, PTA 02/21/20 0930      Row Name 02/21/20 0949             ADL Assessment/Intervention    BADL Assessment/Intervention  toileting;lower body dressing;upper body dressing  -TS      Recorded by [TS] Hillary Bellamy COTA/L 02/21/20 1337      Row Name 02/21/20 0949             Upper Body Dressing Assessment/Training    Upper Body Dressing [place] Level  doff;verbal cues  -TS      Upper Body Dressing Position  edge of bed sitting  -TS      Recorded by [TS] Hillary Bellamy COTA/L 02/21/20 1337      Row Name 02/21/20 0949             Lower Body Dressing Assessment/Training    Lower Body Dressing [place] Level  doff;minimum assist (75% patient effort)  -TS      Lower Body Dressing Position  edge of bed sitting  -TS      Comment (Lower Body Dressing)  AFO  -TS      Recorded by [TS] Hillary Bellamy COTA/L 02/21/20 1337      Row Name 02/21/20 0949             Toileting Assessment/Training    [place] Level (Toileting)  toileting skills;adjust/manage clothing;perform perineal hygiene;supervision;contact guard assist  -TS      Assistive Devices (Toileting)  commode;grab bar/safety frame  -TS      Toileting Position  unsupported sitting;supported standing  -TS      Recorded by [TS] Hillary Bellamy COTA/L 02/21/20 1337      Row Name 02/21/20 0949 02/21/20 0836          Positioning and Restraints    Pre-Treatment Position  bathroom  -TS  bedside commode  -AH     Post Treatment Position  bed  -TS  chair  -AH     In Bed  fowlers;call light within reach;encouraged to call for assist;with family/caregiver;side rails up x2  -TS  --     In Chair  --  sitting;call light within reach;encouraged to call for assist;with family/caregiver  -AH     Recorded by [TS] Mia  Hillary VIGIL RAJAN/L 02/21/20 1337 [AH] Tiffany Vickers, PTA 02/21/20 0930     Row Name 02/21/20 0949 02/21/20 0836          Pain Scale: Numbers Pre/Post-Treatment    Pain Scale: Numbers, Pretreatment  4/10  -TS  5/10  -AH     Pain Scale: Numbers, Post-Treatment  4/10  -TS  6/10  -AH     Pain Location - Orientation  lower  -TS  lower  -AH     Pain Location  back  -TS  back  -AH     Pain Intervention(s)  Repositioned  -TS  Medication (See MAR);Repositioned;Ambulation/increased activity  -     Recorded by [TS] Hillary Bellamy RAJAN/L 02/21/20 1337 [AH] Tiffany Vickers, PTA 02/21/20 0930     Row Name 02/21/20 0949 02/21/20 0836          Spinal Orthosis Management    Type (Spinal Orthosis)  --  LSO (lumbar sacral orthosis)  -     Orthosis Training (Spinal Orthosis)  patient and caregiver;activity limitations/precautions;donning/doffing orthosis;wearing schedule  -TS  --     Recorded by [TS] Hillary Bellamy RAJAN/L 02/21/20 1337 [AH] Tiffany Vickers, PTA 02/21/20 0930     Row Name 02/21/20 0836             Ankle Foot Orthosis Management    Type (Ankle/Foot Orthosis)  bilateral;AFO (ankle foot orthosis)  -AH      Fabrication Comment (Ankle Foot Orthosis)  pt required mod assist to jose B AFO's, states she will have family to assist  -      Recorded by [] Tiffany Vickers, PTA 02/21/20 0930      Row Name                Wound 02/17/20 1125 Left lateral flank Incision    Wound - Properties Group Date first assessed: 02/17/20 [LW] Time first assessed: 1125 [LW2] Present on Hospital Admission: N [LW] Side: Left [LW] Orientation: lateral [LW] Location: flank [LW] Primary Wound Type: Incision [LW] Additional Comments: mastisol, steristrips, telfa, 4x4s, myplex [LW] Recorded by:  [LW] Matilda Polanco RN 02/17/20 1102 [LW2] Matilda Polanco RN 02/17/20 1135    Row Name                Wound 02/19/20 1130 back Incision    Wound - Properties Group Date first assessed: 02/19/20 [] Time first assessed: 1130 []  Location: back [] Primary Wound Type: Incision [JH] Recorded by:  [JH] Keira Smith, RN 02/19/20 1123    Row Name 02/21/20 0949             Outcome Summary/Treatment Plan (OT)    Daily Summary of Progress (OT)  progress towards functional goals is fair  -TS      Recorded by [TS] Hillary Bellamy, RAJAN/L 02/21/20 1337        User Key  (r) = Recorded By, (t) = Taken By, (c) = Cosigned By    Initials Name Effective Dates Discipline     Tiffany Vickers, PTA 08/02/16 -  PT    TS Hillary Bellamy, RAJAN/L 08/02/16 -  OT     Keira Smith RN 06/07/17 -  Nurse    LW Matilda Polanco RN 09/25/19 -  Nurse        Wound 02/17/20 1125 Left lateral flank Incision (Active)   Dressing Appearance dry;intact;no drainage 2/21/2020  7:40 AM   Drainage Amount none 2/21/2020  7:40 AM   Dressing Care, Wound foam 2/21/2020  7:40 AM       Wound 02/19/20 1130 back Incision (Active)   Dressing Appearance dry;intact;no drainage 2/21/2020  7:40 AM   Drainage Amount none 2/21/2020  7:40 AM   Dressing Care, Wound gauze;low-adherent 2/21/2020  7:40 AM           OT Recommendation and Plan  Outcome Summary/Treatment Plan (OT)  Daily Summary of Progress (OT): progress towards functional goals is fair  Daily Summary of Progress (OT): progress towards functional goals is fair  Outcome Measures     Row Name 02/21/20 1300 02/20/20 0856          How much help from another is currently needed...    Putting on and taking off regular lower body clothing?  3  -TS  2  -CS     Bathing (including washing, rinsing, and drying)  3  -TS  2  -CS     Toileting (which includes using toilet bed pan or urinal)  3  -TS  2  -CS     Putting on and taking off regular upper body clothing  3  -TS  3  -CS     Taking care of personal grooming (such as brushing teeth)  4  -TS  4  -CS     Eating meals  4  -TS  4  -CS     AM-PAC 6 Clicks Score (OT)  20  -TS  17  -CS        Functional Assessment    Outcome Measure Options  AM-PAC 6 Clicks Daily Activity (OT)  -TS   AM-PAC 6 Clicks Daily Activity (OT)  -CS       User Key  (r) = Recorded By, (t) = Taken By, (c) = Cosigned By    Initials Name Provider Type     Hillary Bellamy COTA/L Occupational Therapy Assistant    Laya Avila, OTR/L, CNT Occupational Therapist           Time Calculation:   Time Calculation- OT     Row Name 02/21/20 1337 02/21/20 0933          Time Calculation- OT    OT Start Time  0949  -TS  --     OT Stop Time  1020  -TS  --     OT Time Calculation (min)  31 min  -TS  --     Total Timed Code Minutes- OT  31 minute(s)  -TS  --     OT Received On  02/21/20  -TS  --        Timed Charges    12294 - Gait Training Minutes   --  23 -     50919 - OT Self Care/Mgmt Minutes  31  -TS  --       User Key  (r) = Recorded By, (t) = Taken By, (c) = Cosigned By    Initials Name Provider Type     Tiffany Vickers, PTA Physical Therapy Assistant     Hillary Bellamy COTA/L Occupational Therapy Assistant        Therapy Charges for Today     Code Description Service Date Service Provider Modifiers Qty    [id number] HC OT SELF CARE/MGMT/TRAIN EA 15 MIN 2/21/2020 Hillary Bellamy COTA/L GO 2               Hillary VIGIL. MOHINI Bellamy/MARCIA  2/21/2020

## 2020-02-21 NOTE — DISCHARGE SUMMARY
[facility] [place] [facility]  SPINE SURGERY  ANGELO Howard  DISCHARGE SUMMARY  Patient ID:  Deisi Ponce  [id number]  74 y.o.  1945    Admit date: 2/17/2020    Discharge date and time: 2/21/2020    Admitting Physician: EBONI Rader MD     Indication for Admission: Planned surgical admission     Admission Diagnoses: Lumbar stenosis [M48.061]    Discharge Diagnoses: Lumbar stenosis [M48.061]    Procedures: removal of instrumentation L3-L5, posterior spinal fusion L1-L3    Problem List:   Crohn's disease without complication (CMS/HCC)    Lumbar stenosis    GERD without esophagitis    Essential hypertension    Chronic gout      Consults: Family Medicine     Admission Condition: stable    Discharged Condition: stable    Hospital Course: no immediate complication     Disposition: home    Patient Instructions:      Discharge Medications      New Medications      Instructions Start Date   oxyCODONE-acetaminophen 7.5-325 MG per tablet  Commonly known as:  PERCOCET   1 tablet, Oral, Every 6 Hours PRN         Continue These Medications      Instructions Start Date   allopurinol 300 MG tablet  Commonly known as:  ZYLOPRIM   300 mg, Oral, Daily      allopurinol 100 MG tablet  Commonly known as:  ZYLOPRIM   100 mg, Oral, 2 Times Daily      aspirin 81 MG EC tablet   81 mg, Oral, Daily      calcium carbonate 600 MG tablet  Commonly known as:  OS-LUKE   600 mg, Oral, Daily      carvedilol 6.25 MG tablet  Commonly known as:  COREG   6.25 mg, Oral, 2 Times Daily With Meals      colestipol 1 g tablet  Commonly known as:  COLESTID   TAKE 1 TABLET THREE TIMES A DAY      CRANBERRY PO   1 tablet, Oral, Daily      estrogens (conjugated) 0.3 MG tablet  Commonly known as:  PREMARIN   0.3 mg, Oral, 2 Times Weekly, Twice weekly      fenofibrate 145 MG tablet  Commonly known as:  TRICOR   No dose, route, or frequency recorded.      fish oil 1000 MG capsule capsule   1,000 mg, Oral, Daily With Breakfast       folic acid 1 MG tablet  Commonly known as:  FOLVITE   1 mg, Oral, Daily      gabapentin 300 MG capsule  Commonly known as:  NEURONTIN   300 mg, Oral, 3 Times Daily, 2 pills tid      Garlic Oil 1000 MG capsule   1,000 mg, Oral, Daily      immune globulin (human) 10 GM/100ML solution infusion  Commonly known as:  GAMMAGARD   400 mg/kg, Intravenous, Every 30 Days      indapamide 2.5 MG tablet  Commonly known as:  LOZOL   2.5 mg, Oral, Every Morning      lidocaine 5 % ointment  Commonly known as:  XYLOCAINE   1 application, Topical, Every 2 Hours PRN      lisinopril 40 MG tablet  Commonly known as:  PRINIVIL,ZESTRIL   40 mg, Oral, Daily      mesalamine 400 MG capsule delayed-release delayed release capsule  Commonly known as:  DELZICOL   800 mg, Oral, 3 Times Daily, Two pills tid      MULTIVITAMIN ADULT PO   1 tablet, Oral, Daily      nitrofurantoin 50 MG capsule  Commonly known as:  MACRODANTIN   50 mg, Oral, Nightly      omeprazole 40 MG capsule  Commonly known as:  priLOSEC   20 mg, Oral, 2 Times Daily      VITAMIN B 12 PO   1 tablet, Oral, Daily         Stop These Medications    HYDROcodone-acetaminophen  MG per tablet  Commonly known as:  NORCO          Activity: Avoid bending lifting or twisting, brace when up and out of bed, no driving while taking narcotic pain medication, walk 10-15 minutes 2-3 times daily  Diet: diabetic diet  Wound Care: keep wound clean and dry  Other: Contact [facility] [place] office with questions or concerns    Follow-up with Dr Rader or PA's in 2 weeks.    Electronically signed by ANGELO Howard 6:36 AM 2/21/2020

## 2020-02-21 NOTE — PAYOR COMM NOTE
"AUTH: [id number]    Deisy Santiago (74 y.o. Female)     Date of Birth Social Security Number Address Home Phone MRN    1945  [id number] [id number] [phone] [id number]    Hindu Marital Status          [employer] [facility]        Admission Date Admission Type Admitting Provider Attending Provider Department, Room/Bed    2/17/20 Elective EBONI Rader MD Strenge, K Brandon, MD [facility] 3A, 357/1    Discharge Date Discharge Disposition Discharge Destination         Home or Self Care              Attending Provider:  EBONI Rader MD    Allergies:  No Known Allergies    Isolation:  None   Infection:  None   Code Status:  CPR    Ht:  162 cm (63.78\")   Wt:  67.9 kg (149 lb 11.1 oz)    Admission Cmt:  None   Principal Problem:  None                Active Insurance as of 2/17/2020     Primary Coverage     Payor Plan Insurance Group Employer/Plan Group    ANTHEM MEDICARE REPLACEMENT ANTHEM MEDICARE ADVANTAGE [id number]     Payor Plan Address Payor Plan Phone Number Payor Plan Fax Number Effective Dates    [address] [phone]  1/1/2015 - None Entered    [facility] [id number]       Subscriber Name Subscriber Birth Date Member ID       DEISY SANTIAGO 1945 [id number]                 Emergency Contacts      (Rel.) Home Phone Work Phone Mobile Phone    Kasandra Santiago (Daughter) [phone] -- [phone]               Discharge Summary      Kee Lamb PA at 02/21/20 0635     Attestation signed by EBONI Rader MD at 02/21/20 0709    [facility]                  [facility]  SPINE SURGERY  ANGELO Howard  DISCHARGE SUMMARY  Patient ID:  Deisy Santiago  [id number]  74 y.o.  1945    Admit date: 2/17/2020    Discharge date and time: 2/21/2020    Admitting Physician: EBONI Rader MD     Indication for Admission: Planned surgical admission     Admission " Diagnoses: Lumbar stenosis [M48.061]    Discharge Diagnoses: Lumbar stenosis [M48.061]    Procedures: removal of instrumentation L3-L5, posterior spinal fusion L1-L3    Problem List:   Crohn's disease without complication (CMS/HCC)    Lumbar stenosis    GERD without esophagitis    Essential hypertension    Chronic gout      Consults: Family Medicine     Admission Condition: stable    Discharged Condition: stable    Hospital Course: no immediate complication     Disposition: home    Patient Instructions:      Discharge Medications      New Medications      Instructions Start Date   oxyCODONE-acetaminophen 7.5-325 MG per tablet  Commonly known as:  PERCOCET   1 tablet, Oral, Every 6 Hours PRN         Continue These Medications      Instructions Start Date   allopurinol 300 MG tablet  Commonly known as:  ZYLOPRIM   300 mg, Oral, Daily      allopurinol 100 MG tablet  Commonly known as:  ZYLOPRIM   100 mg, Oral, 2 Times Daily      aspirin 81 MG EC tablet   81 mg, Oral, Daily      calcium carbonate 600 MG tablet  Commonly known as:  OS-LUKE   600 mg, Oral, Daily      carvedilol 6.25 MG tablet  Commonly known as:  COREG   6.25 mg, Oral, 2 Times Daily With Meals      colestipol 1 g tablet  Commonly known as:  COLESTID   TAKE 1 TABLET THREE TIMES A DAY      CRANBERRY PO   1 tablet, Oral, Daily      estrogens (conjugated) 0.3 MG tablet  Commonly known as:  PREMARIN   0.3 mg, Oral, 2 Times Weekly, Twice weekly      fenofibrate 145 MG tablet  Commonly known as:  TRICOR   No dose, route, or frequency recorded.      fish oil 1000 MG capsule capsule   1,000 mg, Oral, Daily With Breakfast      folic acid 1 MG tablet  Commonly known as:  FOLVITE   1 mg, Oral, Daily      gabapentin 300 MG capsule  Commonly known as:  NEURONTIN   300 mg, Oral, 3 Times Daily, 2 pills tid      Garlic Oil 1000 MG capsule   1,000 mg, Oral, Daily      immune globulin (human) 10 GM/100ML solution infusion  Commonly known as:  GAMMAGARD   400 mg/kg,  Intravenous, Every 30 Days      indapamide 2.5 MG tablet  Commonly known as:  LOZOL   2.5 mg, Oral, Every Morning      lidocaine 5 % ointment  Commonly known as:  XYLOCAINE   1 application, Topical, Every 2 Hours PRN      lisinopril 40 MG tablet  Commonly known as:  PRINIVIL,ZESTRIL   40 mg, Oral, Daily      mesalamine 400 MG capsule delayed-release delayed release capsule  Commonly known as:  DELZICOL   800 mg, Oral, 3 Times Daily, Two pills tid      MULTIVITAMIN ADULT PO   1 tablet, Oral, Daily      nitrofurantoin 50 MG capsule  Commonly known as:  MACRODANTIN   50 mg, Oral, Nightly      omeprazole 40 MG capsule  Commonly known as:  priLOSEC   20 mg, Oral, 2 Times Daily      VITAMIN B 12 PO   1 tablet, Oral, Daily         Stop These Medications    HYDROcodone-acetaminophen  MG per tablet  Commonly known as:  NORCO          Activity: Avoid bending lifting or twisting, brace when up and out of bed, no driving while taking narcotic pain medication, walk 10-15 minutes 2-3 times daily  Diet: diabetic diet  Wound Care: keep wound clean and dry  Other: Contact [facility] [place] office with questions or concerns    Follow-up with Dr Rader or PA's in 2 weeks.    Electronically signed by ANGELO Howard 6:36 AM 2/21/2020      Electronically signed by EBONI Rader MD at 02/21/20 0709

## 2020-02-21 NOTE — PLAN OF CARE
Problem: Patient Care Overview  Goal: Plan of Care Review  Outcome: Ongoing (interventions implemented as appropriate)  Flowsheets (Taken 2/21/2020 3074)  Progress: improving  Plan of Care Reviewed With: patient; daughter  Note:   Pt c/o pain. Prn po pain med given as ordered with good results noted. Pt alert and oriented x4. Drsg to L flank and lower back CDI. Up x1 with LSO brace, walker, and leg braces. I&O cath x1 so far this shift. Family remaining at bedside. Safety maintained. Will continue to monitor.

## 2020-02-21 NOTE — ADDENDUM NOTE
Addendum  created 02/21/20 1523 by Mohit Hector, CRNA    Visit Navigator Flowsheet section accepted

## 2020-02-21 NOTE — PROGRESS NOTES
Continued Stay Note   [facility]     Patient Name: Deisi Ponce  MRN: [id number]  Today's Date: 2/21/2020    Admit Date: 2/17/2020    Discharge Plan     Row Name 02/21/20 1118       Plan    Final Discharge Disposition Code  06 - home with home health care    Final Note  PT IS BEING DCD HOME TODAY. ORDER FOR HH/HOSPITAL BED. PROVIDED MEDICARE COMPARE LIST AND PT CHOSE [facility]. NOTIFIED RAMA WITH [facility] ([phone]). ORDERED HOSPITAL BED THRU [facility] ([phone]) AND BED WILL BE DELIVERED TO PT HOME AT 3PM TODAY. PT/DTR AWARE AND AGREE.         Discharge Codes    No documentation.       Expected Discharge Date and Time     Expected Discharge Date Expected Discharge Time    Feb 21, 2020             LENA Kong

## 2020-02-21 NOTE — THERAPY TREATMENT NOTE
Acute Care - Physical Therapy Treatment Note  [facility]     Patient Name: Deisi Ponce  : 1945  MRN: [id number]  Today's Date: 2020  Onset of Illness/Injury or Date of Surgery: 20     Referring Physician: Dr. Rader    Admit Date: 2020    Visit Dx:    ICD-10-CM ICD-9-CM   1. Spinal stenosis of lumbar region with neurogenic claudication M48.062 724.03   2. Impaired functional mobility, balance, gait, and endurance Z74.09 V49.89   3. Decreased activities of daily living (ADL) Z78.9 V49.89     Patient Active Problem List   Diagnosis   • Crohn's disease of small and large intestines with complication (CMS/HCC)   • Crohn's disease of both small and large intestine without complication (CMS/HCC)   • Crohn's disease without complication (CMS/HCC)   • Lumbar stenosis   • GERD without esophagitis   • Essential hypertension   • Chronic gout       Therapy Treatment    Rehabilitation Treatment Summary     Row Name 20 0836             Treatment Time/Intention    Discipline  physical therapy assistant  -      Document Type  therapy note (daily note)  -2      Subjective Information  complains of;pain;weakness  -2      Existing Precautions/Restrictions  brace worn when out of bed;fall;spinal LSO, B AFO  -2      Recorded by [] Tiffany Vickers, [facility] 2037  [2] Tiffany Vickers, [facility] 20      Row Name 2036             Bed Mobility Assessment/Treatment    Bed Mobility Assessment/Treatment  sidelying-sit;sit-sidelying  -      Comment (Bed Mobility)  on BSC  -      Recorded by [] Tiffany Vickers, PTA 20      Row Name 2036             Sit-Stand Transfer    Sit-Stand [place] (Transfers)  minimum assist (75% patient effort);verbal cues  -      Recorded by [] Tiffany Vickers [facility] 20      Row Name 2036             Stand-Sit Transfer    Stand-Sit [place] (Transfers)  contact guard;minimum assist (75% patient  effort);verbal cues  -AH      Recorded by [] Tiffany Vickers, PTA 02/21/20 0930      Row Name 02/21/20 0836             Toilet Transfer    [place] Level (Toilet Transfer)  contact guard;minimum assist (75% patient effort);verbal cues  -      Assistive Device (Toilet Transfer)  commode;grab bars/safety frame;walker, front-wheeled  -AH      Recorded by [] Tiffany Vickers, PTA 02/21/20 0930      Row Name 02/21/20 0836             Gait/Stairs Assessment/Training    [place] Level (Gait)  contact guard;minimum assist (75% patient effort);verbal cues  -      Assistive Device (Gait)  walker, front-wheeled  -AH      Distance in Feet (Gait)  75  -AH      [place] Level (Stairs)  contact guard;minimum assist (75% patient effort);verbal cues  -      Handrail Location (Stairs)  both sides  -      Number of Steps (Stairs)  2  -AH      Ascending Technique (Stairs)  step-to-step  -AH      Descending Technique (Stairs)  step-to-step  -AH      Recorded by [] Tiffany Vickers, [facility] 02/21/20 0930      Row Name 02/21/20 0836             Positioning and Restraints    Pre-Treatment Position  bedside commode  -      Post Treatment Position  chair  -AH      In Chair  sitting;call light within reach;encouraged to call for assist;with family/caregiver  -AH      Recorded by [] Tiffany Vickers, PTA 02/21/20 0930      Row Name 02/21/20 0836             Pain Scale: Numbers Pre/Post-Treatment    Pain Scale: Numbers, Pretreatment  5/10  -      Pain Scale: Numbers, Post-Treatment  6/10  -      Pain Location - Orientation  lower  -AH      Pain Location  back  -      Pain Intervention(s)  Medication (See MAR);Repositioned;Ambulation/increased activity  -AH      Recorded by [] Tiffany Vickers, PTA 02/21/20 0930      Row Name 02/21/20 0836             Spinal Orthosis Management    Type (Spinal Orthosis)  LSO (lumbar sacral orthosis)  -AH      Recorded by [] Tiffany Vickers, PTA 02/21/20 0930      Row Name 02/21/20  0836             Ankle Foot Orthosis Management    Type (Ankle/Foot Orthosis)  bilateral;AFO (ankle foot orthosis)  -      Fabrication Comment (Ankle Foot Orthosis)  pt required mod assist to jose SHEA AFO's, states she will have family to assist  -      Recorded by [] Tiffany Vickers, PTA 02/21/20 0930      Row Name                Wound 02/17/20 1125 Left lateral flank Incision    Wound - Properties Group Date first assessed: 02/17/20 [LW] Time first assessed: 1125 [LW2] Present on Hospital Admission: N [LW] Side: Left [LW] Orientation: lateral [LW] Location: flank [LW] Primary Wound Type: Incision [LW] Additional Comments: mastisol, steristrips, telfa, 4x4s, myplex [LW] Recorded by:  [LW] Matilda Polanco RN 02/17/20 1102 [LW2] Matilda Polanco RN 02/17/20 1135    Row Name                Wound 02/19/20 1130 back Incision    Wound - Properties Group Date first assessed: 02/19/20 [JH] Time first assessed: 1130 [JH] Location: back [JH] Primary Wound Type: Incision [JH] Recorded by:  [] Keira Smith RN 02/19/20 1123      User Key  (r) = Recorded By, (t) = Taken By, (c) = Cosigned By    Initials Name Effective Dates Discipline     Tiffany Vickers, PTA 08/02/16 -  PT     Keira Smith RN 06/07/17 -  Nurse     Matilda Polanco RN 09/25/19 -  Nurse          Wound 02/17/20 1125 Left lateral flank Incision (Active)   Dressing Appearance dry;intact;no drainage 2/21/2020  7:40 AM   Drainage Amount none 2/21/2020  7:40 AM   Dressing Care, Wound foam 2/21/2020  7:40 AM       Wound 02/19/20 1130 back Incision (Active)   Dressing Appearance dry;intact;no drainage 2/21/2020  7:40 AM   Drainage Amount none 2/21/2020  7:40 AM   Dressing Care, Wound gauze;low-adherent 2/21/2020  7:40 AM               PT Recommendation and Plan     Plan of Care Reviewed With: patient  Progress: improving  Outcome Summary: pt on BSC with nsg, trans to EOB, jose VALDIVIA AFO's mod assist, sit-stand min assist, pt amb 75 feet with rwx,  performed stair training up/down 2 steps with 2 HR min assist, pt would benefit from   Outcome Measures     Row Name 02/20/20 0856             How much help from another is currently needed...    Putting on and taking off regular lower body clothing?  2  -CS      Bathing (including washing, rinsing, and drying)  2  -CS      Toileting (which includes using toilet bed pan or urinal)  2  -CS      Putting on and taking off regular upper body clothing  3  -CS      Taking care of personal grooming (such as brushing teeth)  4  -CS      Eating meals  4  -CS      AM-PAC 6 Clicks Score (OT)  17  -CS         Functional Assessment    Outcome Measure Options  AM-PAC 6 Clicks Daily Activity (OT)  -CS        User Key  (r) = Recorded By, (t) = Taken By, (c) = Cosigned By    Initials Name Provider Type    CS Laya Cui S, OTR/L, CNT Occupational Therapist         Time Calculation:   PT Charges     Row Name 02/21/20 0933             Time Calculation    Start Time  0836  -      Stop Time  0915  -      Time Calculation (min)  39 min  -      PT Received On  02/21/20  -         Time Calculation- PT    Total Timed Code Minutes- PT  39 minute(s)  -         Timed Charges    92217 - Gait Training Minutes   23  -AH      09940 - PT Therapeutic Activity Minutes  16  -AH        User Key  (r) = Recorded By, (t) = Taken By, (c) = Cosigned By    Initials Name Provider Type    Tiffany Bloom PTA Physical Therapy Assistant        Therapy Charges for Today     Code Description Service Date Service Provider Modifiers Qty    24101960990 HC GAIT TRAINING EA 15 MIN 2/20/2020 Tiffany Vickers PTA GP 2    07358406958 HC GAIT TRAINING EA 15 MIN 2/21/2020 Tiffany Vickers PTA GP 2    42202390820 HC PT THERAPEUTIC ACT EA 15 MIN 2/21/2020 Tiffany Vickers PTA GP 1          PT G-Codes  Outcome Measure Options: AM-PAC 6 Clicks Daily Activity (OT)  AM-PAC 6 Clicks Score (PT): 23  AM-PAC 6 Clicks Score (OT): 17    Tiffany Vickers  PTA  2/21/2020

## 2020-02-21 NOTE — PROGRESS NOTES
[facility]  Daily Progress Note  Deisi Ponce  MRN: [id number] LOS: 3    Admit Date: 2/17/2020 2/20/2020 7:01 PM    Subjective:          Chief Complaint:  Chronic back pain    Interval History:    Reviewed overnight events and nursing notes.   Status: Stable pain control  Pain:  some relief        ROS:  10 point ROS obtained:   Gen: No fevers  HEENT: No migraine or visual change  Lung: No cough, hemoptysis or wheezing  Cv: No chest pain or pressure  Abd: No nausea or vomiting, no change in bowel function, no gi bleeding  Ext: No increased pain or swelling  Neuro: No seizure or syncope  Skin: No new rashes  Endocrine: No polyuria, polyphagia or polydipsia  Psych: No increased anxiety or depression  MS: No increase in joint pain or swelling reported    DIET:  Diet Regular    Medications:     lactated ringers 100 mL/hr Last Rate: 100 mL/hr (02/19/20 0907)   lactated ringers 100 mL/hr Last Rate: 100 mL/hr (02/19/20 0907)       allopurinol 100 mg Oral BID   allopurinol 300 mg Oral Daily   calcium carb-cholecalciferol 1 tablet Oral Daily   carvedilol 6.25 mg Oral BID With Meals   colestipol 1 g Oral TID AC   vitamin B-12 250 mcg Oral Daily   famotidine 20 mg Intravenous Q12H   Or      famotidine 20 mg Oral Q12H   fenofibrate 145 mg Oral Daily   folic acid 1 mg Oral Daily   gabapentin 300 mg Oral TID   indapamide 2.5 mg Oral QAM   lisinopril 40 mg Oral Daily   mesalamine 800 mg Oral TID   nitrofurantoin 50 mg Oral Nightly   polyethylene glycol 17 g Oral BID   sodium chloride 3 mL Intravenous Q12H       Data:     Code Status:   Code Status and Medical Interventions:   Ordered at: 02/17/20 1327     Code Status:    CPR     Medical Interventions (Level of Support Prior to Arrest):    Full       Family History   Problem Relation Age of Onset   • Ovarian cancer Mother    • Heart attack Father    • Colon cancer Neg Hx    • Colon polyps Neg Hx    • Esophageal cancer Neg Hx    • Liver cancer Neg Hx    • Liver  disease Neg Hx    • Rectal cancer Neg Hx    • Stomach cancer Neg Hx      Social History     Socioeconomic History   • Marital status:      Spouse name: Not on file   • Number of children: Not on file   • Years of education: Not on file   • Highest education level: Not on file   Tobacco Use   • Smoking status: Never Smoker   • Smokeless tobacco: Never Used   Substance and Sexual Activity   • Alcohol use: No   • Drug use: No   • Sexual activity: Defer       Labs:    Lab Results (last 72 hours)     Procedure Component Value Units Date/Time    Extra Tubes [[id number]] Collected:  02/20/20 0443    Specimen:  Blood, Venous Line Updated:  02/20/20 0745    Narrative:       The following orders were created for panel order Extra Tubes.  Procedure                               Abnormality         Status                     ---------                               -----------         ------                     Lavender Top[[id number]]                                     Final result                 Please view results for these tests on the individual orders.    Lavender Top [[id number]] Collected:  02/20/20 0443    Specimen:  Blood Updated:  02/20/20 0745     Extra Tube hold for add-on     Comment: Auto resulted       Basic Metabolic Panel [[id number]]  (Abnormal) Collected:  02/20/20 0441    Specimen:  Blood Updated:  02/20/20 0603     Glucose 113 mg/dL      BUN 15 mg/dL      Creatinine 0.68 mg/dL      Sodium 143 mmol/L      Potassium 4.5 mmol/L      Chloride 104 mmol/L      CO2 29.0 mmol/L      Calcium 8.5 mg/dL      eGFR Non African Amer 84 mL/min/1.73      BUN/Creatinine Ratio 22.1     Anion Gap 10.0 mmol/L     Narrative:       GFR Normal >60  Chronic Kidney Disease <60  Kidney Failure <15      Uric Acid [[id number]]  (Normal) Collected:  02/20/20 0441    Specimen:  Blood Updated:  02/20/20 0600     Uric Acid 2.8 mg/dL     Vitamin B12 [[id number]]  (Normal) Collected:  02/18/20 0527    Specimen:  Blood Updated:  02/18/20  1259     Vitamin B-12 699 pg/mL     Narrative:       Results may be falsely increased if patient taking Biotin.      Folate [[id number]]  (Normal) Collected:  02/18/20 0527    Specimen:  Blood Updated:  02/18/20 1259     Folate >20.00 ng/mL     Narrative:       Results may be falsely increased if patient taking Biotin.      Hemoglobin A1c [[id number]]  (Abnormal) Collected:  02/18/20 0528    Specimen:  Blood Updated:  02/18/20 0734     Hemoglobin A1C 5.80 %     Narrative:       Hemoglobin A1C Ranges:    Increased Risk for Diabetes  5.7% to 6.4%  Diabetes                     >= 6.5%  Diabetic Goal                < 7.0%    CBC & Differential [[id number]] Collected:  02/18/20 0528    Specimen:  Blood Updated:  02/18/20 0705    Narrative:       The following orders were created for panel order CBC & Differential.  Procedure                               Abnormality         Status                     ---------                               -----------         ------                     CBC Auto Differential[[id number]]        Abnormal            Final result                 Please view results for these tests on the individual orders.    CBC Auto Differential [[id number]]  (Abnormal) Collected:  02/18/20 0528    Specimen:  Blood Updated:  02/18/20 0705     WBC 7.37 10*3/mm3      RBC 3.15 10*6/mm3      Hemoglobin 9.1 g/dL      Hematocrit 28.1 %      MCV 89.2 fL      MCH 28.9 pg      MCHC 32.4 g/dL      RDW 14.2 %      RDW-SD 45.6 fl      MPV 10.6 fL      Platelets 187 10*3/mm3      Neutrophil % 71.2 %      Lymphocyte % 13.2 %      Monocyte % 14.5 %      Eosinophil % 0.3 %      Basophil % 0.3 %      Immature Grans % 0.5 %      Neutrophils, Absolute 5.25 10*3/mm3      Lymphocytes, Absolute 0.97 10*3/mm3      Monocytes, Absolute 1.07 10*3/mm3      Eosinophils, Absolute 0.02 10*3/mm3      Basophils, Absolute 0.02 10*3/mm3      Immature Grans, Absolute 0.04 10*3/mm3      nRBC 0.0 /100 WBC     Basic Metabolic Panel [[id number]]   "(Abnormal) Collected:  2027    Specimen:  Blood Updated:  2049     Glucose 103 mg/dL      BUN 22 mg/dL      Creatinine 0.77 mg/dL      Sodium 143 mmol/L      Potassium 3.4 mmol/L      Chloride 107 mmol/L      CO2 28.0 mmol/L      Calcium 9.0 mg/dL      eGFR Non African Amer 73 mL/min/1.73      BUN/Creatinine Ratio 28.6     Anion Gap 8.0 mmol/L     Narrative:       GFR Normal >60  Chronic Kidney Disease <60  Kidney Failure <15              Objective:     Vitals: /59 (BP Location: Left arm, Patient Position: Lying)   Pulse 83   Temp 98.8 °F (37.1 °C) (Oral)   Resp 18   Ht 162 cm (63.78\")   Wt 67.9 kg (149 lb 11.1 oz)   SpO2 94%   Breastfeeding No   BMI 25.87 kg/m²    Intake/Output Summary (Last 24 hours) at 2020 1901  Last data filed at 2020 1700  Gross per 24 hour   Intake --   Output 1200 ml   Net -1200 ml    Temp (24hrs), Av.5 °F (36.9 °C), Min:98.1 °F (36.7 °C), Max:98.9 °F (37.2 °C)        Physical Examination:   General appearance - alert, well appearing, and in no distress and oriented to person, place, and time  Mental status - alert, oriented to person, place, and time, normal mood, behavior, speech, dress, motor activity, and thought processes  Eyes - pupils equal and reactive, extraocular eye movements intact  Ears - bilateral TM's and external ear canals normal, hearing grossly normal bilaterally  Mouth - mucous membranes moist, pharynx normal without lesions  Neck - supple, no significant adenopathy  Lymphatics - no palpable lymphadenopathy, no hepatosplenomegaly  Chest - clear to auscultation, no wheezes, rales or rhonchi, symmetric air entry, no tachypnea, retractions or cyanosis  Heart - normal rate, regular rhythm, normal S1, S2, no murmurs, rubs, clicks or gallops  Abdomen - soft, nontender, nondistended, no masses or organomegaly bowel sounds normal  Neurological - alert, oriented, normal speech, no focal findings or movement disorder " noted  Musculoskeletal - no joint tenderness, deformity or swelling  Extremities - peripheral pulses normal, no pedal edema, no clubbing or cyanosis  Skin - normal coloration and turgor, no rashes, no suspicious skin lesions noted      Assessment and Plan:     Primary Problem:  <principal problem not specified>    Hospital Problem list:    Crohn's disease without complication (CMS/HCC)    Lumbar stenosis    GERD without esophagitis    Essential hypertension    Chronic gout      PMH:  Past Medical History:   Diagnosis Date   • Arthritis    • Cancer (CMS/HCC)     endometrial cancer   • Crohn disease (CMS/HCC)    • Crohn's disease (CMS/HCC)    • DDD (degenerative disc disease), lumbar    • Elevated cholesterol    • GERD (gastroesophageal reflux disease)    • Gout    • History of transfusion    • Hypertension    • Intermittent self-catheterization of bladder    • Macular degeneration    • Osteopenia    • Peripheral neuropathy    • Urinary retention        Treatment Plan:  Incentive spirometry    Physical therapy for ambulation    Pain control while minimizing narcotic analgesia    Monitor H&H and vital signs.    Appears to be medically stable at this time  Discharge planning:   Home    Reviewed treatment plans with the patient and/or family.   20 minutes spent in face to face interaction and coordination of care.     Electronically signed by Hilario Block MD on 2/20/2020 at 7:01 PM

## 2020-02-21 NOTE — PLAN OF CARE
Problem: Patient Care Overview  Goal: Plan of Care Review  Outcome: Ongoing (interventions implemented as appropriate)  Flowsheets (Taken 2/21/2020 2926)  Progress: improving  Plan of Care Reviewed With: patient  Outcome Summary: pt on BSC with nsg, trans to EOB, jose VALDIVIA AFO's mod assist, sit-stand min assist, pt amb 75 feet with rwx, performed stair training up/down 2 steps with 2 HR min assist, pt would benefit from HH

## 2020-02-21 NOTE — THERAPY DISCHARGE NOTE
Acute Care - Occupational Therapy Discharge Summary  [facility]     Patient Name: Deisi Ponce  : 1945  MRN: [id number]    Today's Date: 2020  Onset of Illness/Injury or Date of Surgery: 20    Date of Referral to OT: 20  Referring Physician: Dr. Rader      Admit Date: 2020        OT Recommendation and Plan    Visit Dx:    ICD-10-CM ICD-9-CM   1. Spinal stenosis of lumbar region with neurogenic claudication M48.062 724.03   2. Impaired functional mobility, balance, gait, and endurance Z74.09 V49.89   3. Decreased activities of daily living (ADL) Z78.9 V49.89   4. Essential hypertension I10 401.9   5. Crohn's disease of both small and large intestine without complication (CMS/HCC) K50.80 555.2         Time Calculation- OT     Row Name 20 1337 20 0933          Time Calculation- OT    OT Start Time  0949  -TS  --     OT Stop Time  1020  -TS  --     OT Time Calculation (min)  31 min  -TS  --     Total Timed Code Minutes- OT  31 minute(s)  -TS  --     OT Received On  20  -TS  --        Timed Charges    05643 - Gait Training Minutes   --  23  -     89291 - OT Self Care/Mgmt Minutes  31  -TS  --       User Key  (r) = Recorded By, (t) = Taken By, (c) = Cosigned By    Initials Name Provider Type     Tiffany Vickers, GWEN Physical Therapy Assistant     Hillary Bellamy, MOHINI/L Occupational Therapy Assistant            Rehab Goal Summary     Row Name 20 1523             Transfer Goal 1 (OT)    Activity/Assistive Device (Transfer Goal 1, OT)  sit-to-stand/stand-to-sit;bed-to-chair/chair-to-bed;toilet;commode;walker, rolling  -MT      [place] Level/Cues Needed (Transfer Goal 1, OT)  conditional independence  -MT      Time Frame (Transfer Goal 1, OT)  10 days  -MT      Progress/Outcome (Transfer Goal 1, OT)  goal not met  -MT         Dressing Goal 1 (OT)    Activity/Assistive Device (Dressing Goal 1, OT)  lower body dressing;upper body  dressing;reacher;sock-aid;long handled shoe horn;elastic laces  -MT      [place]/Cues Needed (Dressing Goal 1, OT)  supervision required;verbal cues required  -MT      Time Frame (Dressing Goal 1, OT)  10 days  -MT      Progress/Outcome (Dressing Goal 1, OT)  goal not met  -MT         Toileting Goal 1 (OT)    Activity/Device (Toileting Goal 1, OT)  toileting skills, all;commode;grab bar/safety frame  -MT      [place] Level/Cues Needed (Toileting Goal 1, OT)  supervision required;verbal cues required  -MT      Time Frame (Toileting Goal 1, OT)  10 days  -MT      Progress/Outcome (Toileting Goal 1, OT)  goal not met  -MT        User Key  (r) = Recorded By, (t) = Taken By, (c) = Cosigned By    Initials Name Provider Type Discipline    MT Luly Platt COTA/L Occupational Therapy Assistant OT          Outcome Measures     Row Name 02/21/20 1300 02/20/20 0856          How much help from another is currently needed...    Putting on and taking off regular lower body clothing?  3  -TS  2  -CS     Bathing (including washing, rinsing, and drying)  3  -TS  2  -CS     Toileting (which includes using toilet bed pan or urinal)  3  -TS  2  -CS     Putting on and taking off regular upper body clothing  3  -TS  3  -CS     Taking care of personal grooming (such as brushing teeth)  4  -TS  4  -CS     Eating meals  4  -TS  4  -CS     AM-PAC 6 Clicks Score (OT)  20  -TS  17  -CS        Functional Assessment    Outcome Measure Options  AM-PAC 6 Clicks Daily Activity (OT)  -TS  AM-PAC 6 Clicks Daily Activity (OT)  -CS       User Key  (r) = Recorded By, (t) = Taken By, (c) = Cosigned By    Initials Name Provider Type    TS Hillary Bellamy RAJAN/L Occupational Therapy Assistant    Laya Avila, OTR/L, CNT Occupational Therapist          Therapy Suggested Charges     Code   Minutes Charges    85983 (CPT®) Hc Ot Neuromusc Re Education Ea 15 Min      77376 (CPT®) Hc Ot Ther Proc Ea 15 Min      23568 (CPT®) Hc Ot  Therapeutic Act Ea 15 Min      31791 (CPT®) Hc Ot Manual Therapy Ea 15 Min      57245 (CPT®) Hc Ot Iontophoresis Ea 15 Min      44269 (CPT®) Hc Ot Elec Stim Ea-Per 15 Min      78562 (CPT®) Hc Ot Ultrasound Ea 15 Min      69374 (CPT®) Hc Ot Self Care/Mgmt/Train Ea 15 Min 31 2    Total  31 2              OT Discharge Summary  Reason for Discharge: Discharge from facility  Outcomes Achieved: Refer to plan of care for updates on goals achieved  Discharge Destination: Home with home health      RAÚL Lopez  2/21/2020

## 2020-02-22 ENCOUNTER — READMISSION MANAGEMENT (OUTPATIENT)
Dept: CALL CENTER | Facility: HOSPITAL | Age: 75
End: 2020-02-22

## 2020-02-22 NOTE — OUTREACH NOTE
Prep Survey      Responses   Facility patient discharged from?  [place]   Is patient eligible?  Yes   Discharge diagnosis  removal of instrumentation L3-L5, posterior spinal fusion L1-L3   Does the patient have one of the following disease processes/diagnoses(primary or secondary)?  General Surgery   Does the patient have Home health ordered?  Yes   What is the Home health agency?   [facility]    Is there a DME ordered?  Yes   What DME was ordered?  Hospital bed - [facility]   Comments regarding appointments  see AVS   Prep survey completed?  Yes          Carisa Briones RN

## 2020-02-23 NOTE — THERAPY DISCHARGE NOTE
Acute Care - Physical Therapy Discharge Summary  [facility]       Patient Name: Deisi Ponce  : 1945  MRN: [id number]    Today's Date: 2020  Onset of Illness/Injury or Date of Surgery: 20       Referring Physician: Dr. Rader      Admit Date: 2020      PT Recommendation and Plan    Visit Dx:    ICD-10-CM ICD-9-CM   1. Spinal stenosis of lumbar region with neurogenic claudication M48.062 724.03   2. Impaired functional mobility, balance, gait, and endurance Z74.09 V49.89   3. Decreased activities of daily living (ADL) Z78.9 V49.89   4. Essential hypertension I10 401.9   5. Crohn's disease of both small and large intestine without complication (CMS/HCC) K50.80 555.2       Outcome Measures     Row Name 20 1300 20 0856          How much help from another is currently needed...    Putting on and taking off regular lower body clothing?  3  -TS  2  -CS     Bathing (including washing, rinsing, and drying)  3  -TS  2  -CS     Toileting (which includes using toilet bed pan or urinal)  3  -TS  2  -CS     Putting on and taking off regular upper body clothing  3  -TS  3  -CS     Taking care of personal grooming (such as brushing teeth)  4  -TS  4  -CS     Eating meals  4  -TS  4  -CS     AM-PAC 6 Clicks Score (OT)  20  -TS  17  -CS        Functional Assessment    Outcome Measure Options  AM-PAC 6 Clicks Daily Activity (OT)  -TS  AM-PAC 6 Clicks Daily Activity (OT)  -CS       User Key  (r) = Recorded By, (t) = Taken By, (c) = Cosigned By    Initials Name Provider Type    TS Hillary Bellamy, RAJAN/L Occupational Therapy Assistant    CS Laya Cui, OTR/L, CNT Occupational Therapist              Rehab Goal Summary     Row Name 20 0700             Transfer Goal 1 (PT)    Activity/Assistive Device (Transfer Goal 1, PT)  sit-to-stand/stand-to-sit;bed-to-chair/chair-to-bed;walker, rolling  -LESLI      [place] Level/Cues Needed (Transfer Goal 1, PT)  conditional independence   -LESLI      Time Frame (Transfer Goal 1, PT)  long term goal (LTG);by discharge  -LESLI      Progress/Outcome (Transfer Goal 1, PT)  goal met  -LESLI         Gait Training Goal 1 (PT)    Activity/Assistive Device (Gait Training Goal 1, PT)  gait (walking locomotion);assistive device use;decrease fall risk;diminish gait deviation;improve balance and speed;increase endurance/gait distance;normalize weight shifts;walker, rolling;turning, right;turning, left;increase energy conservation  -LESLI      [place] Level (Gait Training Goal 1, PT)  conditional independence  -LESLI      Distance (Gait Goal 1, PT)  500ft with appropriate turning patterns for decreased energy expenditure  -LESLI      Time Frame (Gait Training Goal 1, PT)  long term goal (LTG);by discharge  -LESLI      Progress/Outcome (Gait Training Goal 1, PT)  goal not met  -LESLI         Stairs Goal 1 (PT)    Activity/Assistive Device (Stairs Goal 1, PT)  ascending stairs;descending stairs;decrease fall risk;improve balance and speed;assistive device use;walker, rolling  -LESLI      [place] Level/Cues Needed (Stairs Goal 1, PT)  supervision required  -LESLI      Number of Stairs (Stairs Goal 1, PT)  3  -LESLI      Time Frame (Stairs Goal 1, PT)  long term goal (LTG);by discharge  -LESLI      Progress/Outcome (Stairs Goal 1, PT)  goal not met  -LESLI         Patient Education Goal (PT)    Activity (Patient Education Goal, PT)  Pt will independently demonstrate how to jose/doff LSO brace and state spinal precautions.   -LESLI      [place]/Cues/Accuracy (Memory Goal 2, PT)  demonstrates adequately;independent;verbalizes understanding  -LESLI      Time Frame (Patient Education Goal, PT)  long term goal (LTG);by discharge  -LESLI      Progress/Outcome (Patient Education Goal, PT)  goal not met  -LESLI        User Key  (r) = Recorded By, (t) = Taken By, (c) = Cosigned By    Initials Name Provider Type Discipline    Ben Herbert, PTA Physical Therapy Assistant PT              PT Discharge  Summary  Anticipated Discharge Disposition (PT): home  Reason for Discharge: Discharge from facility  Outcomes Achieved: Refer to plan of care for updates on goals achieved  Discharge Destination: Home      Ben Rivera, PTA   2/23/2020

## 2020-02-24 ENCOUNTER — READMISSION MANAGEMENT (OUTPATIENT)
Dept: CALL CENTER | Facility: HOSPITAL | Age: 75
End: 2020-02-24

## 2020-02-24 NOTE — OUTREACH NOTE
General Surgery Week 1 Survey      Responses   Facility patient discharged from?  [place]   Does the patient have one of the following disease processes/diagnoses(primary or secondary)?  General Surgery   Is there a successful TCM telephone encounter documented?  No   Week 1 attempt successful?  Yes   Call start time  1712   Rescheduled  Rescheduled-pt requested [Home health nurses are present at time of call.  ]   Call end time  1712          Katharina Douglas RN

## 2020-02-25 ENCOUNTER — READMISSION MANAGEMENT (OUTPATIENT)
Dept: CALL CENTER | Facility: HOSPITAL | Age: 75
End: 2020-02-25

## 2020-02-25 NOTE — OUTREACH NOTE
General Surgery Week 1 Survey      Responses   Facility patient discharged from?  [place]   Does the patient have one of the following disease processes/diagnoses(primary or secondary)?  General Surgery   Is there a successful TCM telephone encounter documented?  No   Week 1 attempt successful?  Yes   Call start time  0929   Call end time  0933   Discharge diagnosis  removal of instrumentation L3-L5, posterior spinal fusion L1-L3   Meds reviewed with patient/caregiver?  Yes   Is the patient having any side effects they believe may be caused by any medication additions or changes?  No   Does the patient have all medications related to this admission filled (includes all antibiotics, pain medications, etc.)  Yes   Is the patient taking all medications as directed (includes completed medication regime)?  Yes   Does the patient have a follow up appointment scheduled with their surgeon?  Yes [3/5/20]   Has the patient kept scheduled appointments due by today?  N/A   What is the Home health agency?   [facility]    Has home health visited the patient within 72 hours of discharge?  Yes   What DME was ordered?  Hospital bed - [facility]   Has all DME been delivered?  Yes   Psychosocial issues?  No   Did the patient receive a copy of their discharge instructions?  Yes   Nursing interventions  Reviewed instructions with patient   What is the patient's perception of their health status since discharge?  Improving   Nursing interventions  Nurse provided patient education   Is the patient /caregiver able to teach back basic post-op care?  Lifting as instructed by MD in discharge instructions, Keep incision areas clean,dry and protected, No tub bath, swimming, or hot tub until instructed by MD, Take showers only when approved by MD-sponge bathe until then, Drive as instructed by MD in discharge instructions   Is the patient/caregiver able to teach back signs and symptoms of incisional infection?  Increased drainage or bleeding, Fever    Is the patient/caregiver able to teach back steps to recovery at home?  Set small, achievable goals for return to baseline health, Rest and rebuild strength, gradually increase activity, Eat a well-balance diet   If the patient is a current smoker, are they able to teach back resources for cessation?  -- [Nonsmoker]   Is the patient/caregiver able to teach back the hierarchy of who to call/visit for symptoms/problems? PCP, Specialist, Home health nurse, Urgent Care, ED, 911  Yes   Week 1 call completed?  Yes          Kasandra Link RN

## 2020-03-03 ENCOUNTER — READMISSION MANAGEMENT (OUTPATIENT)
Dept: CALL CENTER | Facility: HOSPITAL | Age: 75
End: 2020-03-03

## 2020-03-03 NOTE — OUTREACH NOTE
General Surgery Week 2 Survey      Responses   [facility] patient discharged from?  [place]   Does the patient have one of the following disease processes/diagnoses(primary or secondary)?  General Surgery   Week 2 attempt successful?  No   Unsuccessful attempts  Attempt 1          Nerissa Shields RN

## 2020-03-04 ENCOUNTER — READMISSION MANAGEMENT (OUTPATIENT)
Dept: CALL CENTER | Facility: HOSPITAL | Age: 75
End: 2020-03-04

## 2020-03-10 ENCOUNTER — READMISSION MANAGEMENT (OUTPATIENT)
Dept: CALL CENTER | Facility: HOSPITAL | Age: 75
End: 2020-03-10

## 2020-03-10 NOTE — OUTREACH NOTE
General Surgery Week 3 Survey      Responses   [facility] patient discharged from?  [place]   Does the patient have one of the following disease processes/diagnoses(primary or secondary)?  General Surgery   Week 3 attempt successful?  Yes   Call start time  1644   Call end time  1646   Discharge diagnosis  removal of instrumentation L3-L5, posterior spinal fusion L1-L3   Meds reviewed with patient/caregiver?  Yes   Is the patient having any side effects they believe may be caused by any medication additions or changes?  No   Does the patient have all medications related to this admission filled (includes all antibiotics, pain medications, etc.)  Yes   Is the patient taking all medications as directed (includes completed medication regime)?  Yes   Does the patient have a follow up appointment scheduled with their surgeon?  Yes   Has the patient kept scheduled appointments due by today?  Yes   What is the Home health agency?   [facility]   Has home health visited the patient within 72 hours of discharge?  Yes   What DME was ordered?  Hospital bed - [facility]   Has all DME been delivered?  Yes   Psychosocial issues?  No   Did the patient receive a copy of their discharge instructions?  Yes   Nursing interventions  Reviewed instructions with patient   What is the patient's perception of their health status since discharge?  Improving   Nursing interventions  Nurse provided patient education   Is the patient /caregiver able to teach back basic post-op care?  Continue use of incentive spirometry at least 1 week post discharge, Practice 'cough and deep breath', Drive as instructed by MD in discharge instructions, Keep incision areas clean,dry and protected, Do not remove steri-strips, Lifting as instructed by MD in discharge instructions   Is the patient/caregiver able to teach back signs and symptoms of incisional infection?  Increased redness, swelling or pain at the incisonal site, Increased drainage or bleeding,  Incisional warmth, Pus or odor from incision, Fever   Is the patient/caregiver able to teach back steps to recovery at home?  Set small, achievable goals for return to baseline health, Rest and rebuild strength, gradually increase activity, Eat a well-balance diet, Make a list of questions for surgeon's appointment   Is the patient/caregiver able to teach back the hierarchy of who to call/visit for symptoms/problems? PCP, Specialist, Home health nurse, Urgent Care, ED, 911  Yes   Additional teach back comments  She is doing well she says, incision is healing well, no issues to report.  Eating/drinking/sleeping well.   Week 3 call completed?  Yes          Ning Mark RN

## 2020-03-12 ENCOUNTER — TELEPHONE (OUTPATIENT)
Dept: GASTROENTEROLOGY | Facility: CLINIC | Age: 75
End: 2020-03-12

## 2020-06-08 ENCOUNTER — TELEPHONE (OUTPATIENT)
Dept: NEUROLOGY | Age: 75
End: 2020-06-08

## 2020-06-08 NOTE — TELEPHONE ENCOUNTER
Lilia requests that office  return their call. The best time to reach her is Anytime. Patient needs get her follow up with Dr Oswaldo Penn she would like to do a vvmychart visit,please call the patient. Thank you.

## 2020-06-26 NOTE — TELEPHONE ENCOUNTER
The patient sent a mychart and stated she has been released from her Surgeon and is in need of a refill. She stated she was told to let us know once he released her. Tiff Beltran has requested a refill on her medication. Last office visit : 8/29/2019   Next office visit : 8/3/2020   Last medication refill : 5/25/20 by surgeon   Anita Board : up to date       Requested Prescriptions     Pending Prescriptions Disp Refills    HYDROcodone-acetaminophen (NORCO)  MG per tablet 90 tablet 0     Sig: Take 1 tablet by mouth every 8 hours as needed for Pain for up to 30 days.

## 2020-06-27 RX ORDER — HYDROCODONE BITARTRATE AND ACETAMINOPHEN 10; 325 MG/1; MG/1
1 TABLET ORAL EVERY 8 HOURS PRN
Qty: 90 TABLET | Refills: 0 | Status: SHIPPED | OUTPATIENT
Start: 2020-06-27 | End: 2020-07-27 | Stop reason: SDUPTHER

## 2020-07-28 RX ORDER — HYDROCODONE BITARTRATE AND ACETAMINOPHEN 10; 325 MG/1; MG/1
1 TABLET ORAL EVERY 8 HOURS PRN
Qty: 90 TABLET | Refills: 0 | Status: SHIPPED | OUTPATIENT
Start: 2020-07-28 | End: 2020-08-26 | Stop reason: SDUPTHER

## 2020-07-28 NOTE — TELEPHONE ENCOUNTER
Requested Prescriptions     Pending Prescriptions Disp Refills    HYDROcodone-acetaminophen (NORCO)  MG per tablet 90 tablet 0     Sig: Take 1 tablet by mouth every 8 hours as needed for Pain for up to 30 days.        Last Office Visit:  8/29/2019  Next Office Visit:  8/3/2020  Last Medication Refill:  6/27/20   Jose Alberto Dickerson up to date: 6/24/20     *RX updated to reflect   7/27/20  fill date*

## 2020-07-29 RX ORDER — NITROFURANTOIN MACROCRYSTALS 50 MG/1
CAPSULE ORAL
Qty: 30 CAPSULE | Refills: 11 | Status: SHIPPED | OUTPATIENT
Start: 2020-07-29 | End: 2021-03-31 | Stop reason: ALTCHOICE

## 2020-08-03 ENCOUNTER — TELEMEDICINE (OUTPATIENT)
Dept: NEUROLOGY | Age: 75
End: 2020-08-03
Payer: MEDICARE

## 2020-08-03 PROCEDURE — 99214 OFFICE O/P EST MOD 30 MIN: CPT | Performed by: PSYCHIATRY & NEUROLOGY

## 2020-08-03 NOTE — PROGRESS NOTES
[id number] [facility]  [address] [place] [address] Drive, [address] [facility] [address]  [facility] [place] [address]  Phone [phone]  Fax [phone] [address] [facility] Virtual Video Follow Up Encounter  8/3/20 1:39 PM CDT  The Following was conducted as a Telehealth Evaluation - Audio/Visual (during the [place] [address] public health emergency)    Information:   Patient Name: Kathie Wilson  :     Age:   75 y.o. MRN:   [id number]  Account #:  [de-identified]  Today:  8/3/20    Provider: Kevin Arizmendi M.D. Patient Location: Home  Provider Location: The provider is located at [facility] in [place] [address] [address]  Also Present:  No one    Chief Complaint:   Chief Complaint   Patient presents with    Follow-up     CIDP       Subjective:   Kathie Wilson is a 75 y.o. woman with a history of CIDP, RA, and lumbar spinal stenosis who is following up. She had lumbar spine surgery in February. It did not seem to help. She has been off IVIG since then. She feels that she has worsened. Her PCP retired and she needs me to refill her gabapentin when she needs it. She is able to walk some with a walker and remains independent with personal care. She has no bladder symptoms. Her legs are generally weak and she has to use a walker sometimes.  She has some numbness and tingling in her feel and lower legs and burning in the left thigh.  She has some numbness and tingling and weakness in the hands.  She was getting IVIG every 4 weeks.  She has been having cramps in her hands and the left thigh.       Objective:     Past Medical History:  Past Medical History:   Diagnosis Date    Arthritis     Cancer ([address]     endometrial cancer    Cataract     CIDP (chronic inflammatory demyelinating polyneuropathy) (HCC)     Hypertension     Radiation        Past Surgical History:   Procedure Laterality Date    BACK SURGERY      CARPAL TUNNEL RELEASE Bilateral     CATARACT REMOVAL Bilateral     HAMMER TOE SURGERY Left     HYSTERECTOMY      KNEE ARTHROSCOPY Right     LUMBAR FUSION         Recent Hospitalizations  · None    Significant Injuries  · None    Habits  Madelyn Callejas reports that she has never smoked. She has never used smokeless tobacco. She reports that she does not drink alcohol or use drugs. Family History   Problem Relation Age of Onset    Cancer Mother     High Blood Pressure Father     Heart Disease Father        Social History  Madelyn Callejas is , lives in [place], [place], and is retired. Medications:  Current Outpatient Medications   Medication Sig Dispense Refill    nitrofurantoin (MACRODANTIN) 50 MG capsule TAKE ONE CAPSULE BY MOUTH EVERY DAY AT BEDTIME SEE DR FOR REFILLS! 30 capsule 11    HYDROcodone-acetaminophen (NORCO)  MG per tablet Take 1 tablet by mouth every 8 hours as needed for Pain for up to 30 days. 90 tablet 0    colestipol (COLESTID) 1 g tablet       tiZANidine (ZANAFLEX) 4 MG tablet       Cyanocobalamin (VITAMIN B 12) 100 MCG LOZG Take by mouth      lidocaine (XYLOCAINE) 5 % ointment Apply topically as needed for Pain Apply topically as needed.  30 g 3    conjugated estrogens (PREMARIN) 0.625 MG/GM vaginal cream Place vaginally twice a week 1 Tube 3    fenofibrate (TRICOR) 145 MG tablet 145 mg daily       Omega-3 Fatty Acids (FISH OIL) 1000 MG CAPS Take 1,000 mg by mouth 2 times daily      Garlic 4610 MG CAPS Take 1,000 mg by mouth 2 times daily      CRANBERRY SOFT PO Take 36 mg by mouth daily      allopurinol (ZYLOPRIM) 300 MG tablet 300 mg daily Indications: takes total 500mg a day       carvedilol (COREG) 6.25 MG tablet Take 6.25 mg by mouth 2 times daily      aspirin 81 MG tablet Take 81 mg by mouth daily      folic acid (FOLVITE) 1 MG tablet Take 1 mg by mouth daily      Multiple Vitamins-Minerals (THERAPEUTIC MULTIVITAMIN-MINERALS) tablet Take 1 tablet by mouth daily      calcium-vitamin D (OSCAL) 250-125 MG-UNIT per tablet Take 1 tablet by mouth daily      allopurinol (ZYLOPRIM) 100 MG appearance:  Alert, well developed, well nourished, in no distress  HEENT:  normocephalic, atraumatic, sclera appear normal, no observable or audible rhinorrhea, Ears appear normal, oral mucous membranes are moist without erythema, trachea appears midline, no observable anterior neck masses  Cardiovascular:  No observable peripheral edema, No observable cyanosis or clubbing. Pulmonary:  Breathing appears normal, good expansion, normal effort without use of accessory muscles  Musculoskeletal:  Joints - appear arthritic. Integument:  No rash, erythema, or pallor on visible skin  Psychiatric:  Mood, affect, and behavior appear normal    Neurologic:  Mental Status:  alert, oriented to person, place, and time. Speech:  Clear without dysarthria or dysphonia  Language:  Fluent without aphasia  Cranial Nerves:   III,IV, VI Extraocular movements are full   VII Facial movements are symmetrical without weakness   VIII Hearing is intact   IX,X Shoulder shrug and head rotation strength are intact   XII No tongue atrophy or fasciculations. Normal tongue protrusion. No tongue weakness  Motor:  She has muscle atrophy in her hands. She is grossly weak in the left moreso than the right proximal legs. No tremor noted. Coordination:  Rapid alternating movements are normal in both upper extremities. Extension nose testing is unimpaired bilaterally. Gait:  Impaired by weakness    Pertinent Diagnostic Studies:  VERONIQUE is appropriate    Assessment:       ICD-10-CM    1. CIDP (chronic inflammatory demyelinating polyneuropathy) ([facility])  G61.81    2. Rheumatoid arthritis involving multiple sites with positive rheumatoid factor ([facility])  M05.79    3. Spinal stenosis of lumbar region with neurogenic claudication  M48.062    4. Restless legs syndrome (RLS)  G25.81    She has had some subjective worsening. Plan:   1. Will see if she can take the IVIG at home, the SQ form  2. Continue gabapentin  3.  Consider PT  4. FU in 6

## 2020-08-05 ENCOUNTER — PATIENT MESSAGE (OUTPATIENT)
Dept: NEUROLOGY | Age: 75
End: 2020-08-05

## 2020-08-05 NOTE — TELEPHONE ENCOUNTER
Devyn Gonzalez has requested a refill on her medication. She reports that she does [facility] Last office visit : 8/3/2020   Next office visit : Visit date not found   Last medication refill :  Ana Holguin :       Requested Prescriptions     Pending Prescriptions Disp Refills    gabapentin (NEURONTIN) 600 MG tablet 270 tablet 1     Sig: Take 1 tablet by mouth 3 times daily for 90 days.

## 2020-08-06 RX ORDER — GABAPENTIN 600 MG/1
600 TABLET ORAL 3 TIMES DAILY
Qty: 270 TABLET | Refills: 1 | Status: SHIPPED | OUTPATIENT
Start: 2020-08-06 | End: 2021-01-19

## 2020-08-28 RX ORDER — HYDROCODONE BITARTRATE AND ACETAMINOPHEN 10; 325 MG/1; MG/1
1 TABLET ORAL EVERY 8 HOURS PRN
Qty: 90 TABLET | Refills: 0 | Status: SHIPPED | OUTPATIENT
Start: 2020-08-28 | End: 2020-09-24 | Stop reason: SDUPTHER

## 2020-08-28 NOTE — TELEPHONE ENCOUNTER
Requested Prescriptions     Pending Prescriptions Disp Refills    HYDROcodone-acetaminophen (NORCO)  MG per tablet 90 tablet 0     Sig: Take 1 tablet by mouth every 8 hours as needed for Pain for up to 30 days.        Last Office Visit:  8/3/2020  Next Office Visit:  Visit date not found  Last Medication Refill:  7/28/20  Breanna Whitman up to date: 6/24/20      *RX updated to reflect 8/28/20  fill date*

## 2020-09-22 ENCOUNTER — TELEPHONE (OUTPATIENT)
Dept: NEUROLOGY | Age: 75
End: 2020-09-22

## 2020-09-23 NOTE — TELEPHONE ENCOUNTER
Returned call to [place] at [facility], and she reports that they need PA for Hizentra and she will fax info with phone number to start PA. I ask about co pay for the medication. She reports that Medicare D does not cover very well and cost is 6,000 per month.

## 2020-09-24 NOTE — TELEPHONE ENCOUNTER
Requested Prescriptions     Pending Prescriptions Disp Refills    HYDROcodone-acetaminophen (NORCO)  MG per tablet 90 tablet 0     Sig: Take 1 tablet by mouth every 8 hours as needed for Pain for up to 30 days.        Last Office Visit:  8/3/2020  Next Office Visit:  Visit date not found  Last Medication Refill:  8/28/20  Jose Alberto Dickerson up to date:  9/24/20    *RX updated to reflect   9/27/20  fill date*

## 2020-09-25 RX ORDER — HYDROCODONE BITARTRATE AND ACETAMINOPHEN 10; 325 MG/1; MG/1
1 TABLET ORAL EVERY 8 HOURS PRN
Qty: 90 TABLET | Refills: 0 | Status: SHIPPED | OUTPATIENT
Start: 2020-09-27 | End: 2020-10-25 | Stop reason: SDUPTHER

## 2020-09-30 RX ORDER — EPINEPHRINE 1 MG/ML
0.3 INJECTION, SOLUTION, CONCENTRATE INTRAVENOUS PRN
Status: CANCELLED | OUTPATIENT
Start: 2020-09-30

## 2020-09-30 RX ORDER — SODIUM CHLORIDE 9 MG/ML
INJECTION, SOLUTION INTRAVENOUS CONTINUOUS
Status: CANCELLED | OUTPATIENT
Start: 2020-09-30

## 2020-09-30 RX ORDER — DIPHENHYDRAMINE HYDROCHLORIDE 50 MG/ML
50 INJECTION INTRAMUSCULAR; INTRAVENOUS ONCE
Status: CANCELLED
Start: 2020-09-30

## 2020-09-30 RX ORDER — SODIUM CHLORIDE 0.9 % (FLUSH) 0.9 %
10 SYRINGE (ML) INJECTION PRN
Status: CANCELLED | OUTPATIENT
Start: 2020-09-30

## 2020-09-30 RX ORDER — DIPHENHYDRAMINE HYDROCHLORIDE 50 MG/ML
50 INJECTION INTRAMUSCULAR; INTRAVENOUS ONCE
Status: CANCELLED | OUTPATIENT
Start: 2020-09-30

## 2020-09-30 RX ORDER — METHYLPREDNISOLONE SODIUM SUCCINATE 125 MG/2ML
125 INJECTION, POWDER, LYOPHILIZED, FOR SOLUTION INTRAMUSCULAR; INTRAVENOUS ONCE
Status: CANCELLED | OUTPATIENT
Start: 2020-09-30

## 2020-09-30 RX ORDER — ACETAMINOPHEN 325 MG/1
650 TABLET ORAL ONCE
Status: CANCELLED | OUTPATIENT
Start: 2020-09-30

## 2020-10-13 ENCOUNTER — HOSPITAL ENCOUNTER (OUTPATIENT)
Dept: INFUSION THERAPY | Age: 75
Setting detail: INFUSION SERIES
Discharge: HOME | End: 2020-10-13
Payer: MEDICARE

## 2020-10-13 VITALS
SYSTOLIC BLOOD PRESSURE: 133 MMHG | RESPIRATION RATE: 17 BRPM | DIASTOLIC BLOOD PRESSURE: 62 MMHG | BODY MASS INDEX: 22.92 KG/M2 | OXYGEN SATURATION: 97 % | TEMPERATURE: 98.2 F | HEART RATE: 58 BPM | WEIGHT: 142 LBS

## 2020-10-13 DIAGNOSIS — G61.81 CIDP (CHRONIC INFLAMMATORY DEMYELINATING POLYNEUROPATHY) (HCC): Primary | ICD-10-CM

## 2020-10-13 LAB
ALBUMIN SERPL-MCNC: 3.9 G/DL (ref 3.5–5.2)
ALP BLD-CCNC: 37 U/L (ref 35–104)
ALT SERPL-CCNC: 28 U/L (ref 5–33)
ANION GAP SERPL CALCULATED.3IONS-SCNC: 9 MMOL/L (ref 7–19)
AST SERPL-CCNC: 20 U/L (ref 5–32)
BASOPHILS ABSOLUTE: 0 K/UL (ref 0–0.2)
BASOPHILS RELATIVE PERCENT: 0.5 % (ref 0–1)
BILIRUB SERPL-MCNC: <0.2 MG/DL (ref 0.2–1.2)
BUN BLDV-MCNC: 33 MG/DL (ref 8–23)
CALCIUM SERPL-MCNC: 9.3 MG/DL (ref 8.8–10.2)
CHLORIDE BLD-SCNC: 104 MMOL/L (ref 98–111)
CO2: 31 MMOL/L (ref 22–29)
CREAT SERPL-MCNC: 1 MG/DL (ref 0.5–0.9)
EOSINOPHILS ABSOLUTE: 0.1 K/UL (ref 0–0.6)
EOSINOPHILS RELATIVE PERCENT: 1.6 % (ref 0–5)
GFR AFRICAN AMERICAN: >59
GFR NON-AFRICAN AMERICAN: 54
GLUCOSE BLD-MCNC: 111 MG/DL (ref 74–109)
HCT VFR BLD CALC: 35.1 % (ref 37–47)
HEMOGLOBIN: 11.1 G/DL (ref 12–16)
IMMATURE GRANULOCYTES #: 0 K/UL
LYMPHOCYTES ABSOLUTE: 1.4 K/UL (ref 1.1–4.5)
LYMPHOCYTES RELATIVE PERCENT: 21.8 % (ref 20–40)
MCH RBC QN AUTO: 28.4 PG (ref 27–31)
MCHC RBC AUTO-ENTMCNC: 31.6 G/DL (ref 33–37)
MCV RBC AUTO: 89.8 FL (ref 81–99)
MONOCYTES ABSOLUTE: 1 K/UL (ref 0–0.9)
MONOCYTES RELATIVE PERCENT: 16.5 % (ref 0–10)
NEUTROPHILS ABSOLUTE: 3.7 K/UL (ref 1.5–7.5)
NEUTROPHILS RELATIVE PERCENT: 59.1 % (ref 50–65)
PDW BLD-RTO: 14.1 % (ref 11.5–14.5)
PLATELET # BLD: 206 K/UL (ref 130–400)
PMV BLD AUTO: 10.6 FL (ref 9.4–12.3)
POTASSIUM SERPL-SCNC: 4.1 MMOL/L (ref 3.5–5)
RBC # BLD: 3.91 M/UL (ref 4.2–5.4)
SODIUM BLD-SCNC: 144 MMOL/L (ref 136–145)
TOTAL PROTEIN: 6.3 G/DL (ref 6.6–8.7)
WBC # BLD: 6.3 K/UL (ref 4.8–10.8)

## 2020-10-13 PROCEDURE — 85025 COMPLETE CBC W/AUTO DIFF WBC: CPT

## 2020-10-13 PROCEDURE — 96366 THER/PROPH/DIAG IV INF ADDON: CPT

## 2020-10-13 PROCEDURE — 96365 THER/PROPH/DIAG IV INF INIT: CPT

## 2020-10-13 PROCEDURE — 6360000002 HC RX W HCPCS: Performed by: PSYCHIATRY & NEUROLOGY

## 2020-10-13 PROCEDURE — 80053 COMPREHEN METABOLIC PANEL: CPT

## 2020-10-13 RX ORDER — DIPHENHYDRAMINE HYDROCHLORIDE 50 MG/ML
50 INJECTION INTRAMUSCULAR; INTRAVENOUS ONCE
Status: CANCELLED
Start: 2020-10-13

## 2020-10-13 RX ORDER — METHYLPREDNISOLONE SODIUM SUCCINATE 125 MG/2ML
125 INJECTION, POWDER, LYOPHILIZED, FOR SOLUTION INTRAMUSCULAR; INTRAVENOUS ONCE
Status: CANCELLED | OUTPATIENT
Start: 2020-10-13

## 2020-10-13 RX ORDER — ACETAMINOPHEN 325 MG/1
650 TABLET ORAL ONCE
Status: CANCELLED | OUTPATIENT
Start: 2020-10-13

## 2020-10-13 RX ORDER — SODIUM CHLORIDE 9 MG/ML
INJECTION, SOLUTION INTRAVENOUS CONTINUOUS
Status: CANCELLED | OUTPATIENT
Start: 2020-10-13

## 2020-10-13 RX ORDER — SODIUM CHLORIDE 0.9 % (FLUSH) 0.9 %
10 SYRINGE (ML) INJECTION PRN
Status: CANCELLED | OUTPATIENT
Start: 2020-10-13

## 2020-10-13 RX ORDER — EPINEPHRINE 1 MG/ML
0.3 INJECTION, SOLUTION, CONCENTRATE INTRAVENOUS PRN
Status: CANCELLED | OUTPATIENT
Start: 2020-10-13

## 2020-10-13 RX ORDER — DIPHENHYDRAMINE HYDROCHLORIDE 50 MG/ML
50 INJECTION INTRAMUSCULAR; INTRAVENOUS ONCE
Status: CANCELLED | OUTPATIENT
Start: 2020-10-13

## 2020-10-13 RX ADMIN — IMMUNE GLOBULIN (HUMAN) 30 G: 10 INJECTION INTRAVENOUS; SUBCUTANEOUS at 10:20

## 2020-10-14 ENCOUNTER — TELEPHONE (OUTPATIENT)
Dept: NEUROLOGY | Age: 75
End: 2020-10-14

## 2020-10-27 ENCOUNTER — OFFICE VISIT (OUTPATIENT)
Dept: UROLOGY | Age: 75
End: 2020-10-27
Payer: MEDICARE

## 2020-10-27 VITALS — HEIGHT: 64 IN | BODY MASS INDEX: 25.78 KG/M2 | WEIGHT: 151 LBS | TEMPERATURE: 96.7 F

## 2020-10-27 LAB
BACTERIA URINE, POC: ABNORMAL
BACTERIA: ABNORMAL /HPF
BILIRUBIN URINE: 0 MG/DL
BLOOD, URINE: POSITIVE
CASTS URINE, POC: 0
CLARITY: CLEAR
COLOR: YELLOW
CRYSTALS URINE, POC: 0
CRYSTALS, UA: ABNORMAL /HPF
EPI CELLS URINE, POC: 0
EPITHELIAL CELLS, UA: 0 /HPF (ref 0–5)
GLUCOSE URINE: ABNORMAL
HYALINE CASTS: 2 /HPF (ref 0–8)
KETONES, URINE: NEGATIVE
LEUKOCYTE EST, POC: ABNORMAL
NITRITE, URINE: NEGATIVE
PH UA: 5.5 (ref 4.5–8)
PROTEIN UA: NEGATIVE
RBC UA: 1 /HPF (ref 0–4)
RBC URINE, POC: ABNORMAL
SPECIFIC GRAVITY UA: 1.03 (ref 1–1.03)
UROBILINOGEN, URINE: NORMAL
WBC UA: 33 /HPF (ref 0–5)
WBC URINE, POC: ABNORMAL
YEAST URINE, POC: 0

## 2020-10-27 PROCEDURE — 81000 URINALYSIS NONAUTO W/SCOPE: CPT | Performed by: NURSE PRACTITIONER

## 2020-10-27 PROCEDURE — 99214 OFFICE O/P EST MOD 30 MIN: CPT | Performed by: NURSE PRACTITIONER

## 2020-10-27 RX ORDER — HYDROCODONE BITARTRATE AND ACETAMINOPHEN 10; 325 MG/1; MG/1
1 TABLET ORAL EVERY 8 HOURS PRN
Qty: 90 TABLET | Refills: 0 | Status: SHIPPED | OUTPATIENT
Start: 2020-10-27 | End: 2020-11-19 | Stop reason: SDUPTHER

## 2020-10-27 ASSESSMENT — ENCOUNTER SYMPTOMS
COUGH: 0
ABDOMINAL PAIN: 0
BACK PAIN: 0
SORE THROAT: 0
WHEEZING: 0
EYE PAIN: 0
VOMITING: 0
NAUSEA: 0

## 2020-10-27 NOTE — PROGRESS NOTES
Alondra Jimenez is a 67 y.o. female who presents today   Chief Complaint   Patient presents with    Follow-up     i am here because i believe i have a UTI. I brought my urine sample in with me today. HPI   Patient presents with complaints of possible urinary tract infection. She does have a history of atonic bladder and performs in and out catheterization 3 times per day. She reports over the last several days she has been experiencing pelvic and suprapubic pain as well as an increase in cloudy urine. These are not typical symptoms for her. She denies any fevers, chills, nausea, vomiting, hematuria, flank pain. Her most recent infection was approximately 6 months ago. Past Medical History:   Diagnosis Date    Arthritis     Cancer [facility])     endometrial cancer    Cataract     CIDP (chronic inflammatory demyelinating polyneuropathy) ([facility])     Hypertension     Radiation        Past Surgical History:   Procedure Laterality Date    BACK SURGERY      CARPAL TUNNEL RELEASE Bilateral     CATARACT REMOVAL Bilateral     HAMMER TOE SURGERY Left     HYSTERECTOMY      KNEE ARTHROSCOPY Right     LUMBAR FUSION         Current Outpatient Medications   Medication Sig Dispense Refill    conjugated estrogens (PREMARIN) 0.625 MG/GM vaginal cream Place vaginally twice a week 1 Tube 3    HYDROcodone-acetaminophen (NORCO)  MG per tablet Take 1 tablet by mouth every 8 hours as needed for Pain for up to 30 days. 90 tablet 0    gabapentin (NEURONTIN) 600 MG tablet Take 1 tablet by mouth 3 times daily for 90 days. 270 tablet 1    nitrofurantoin (MACRODANTIN) 50 MG capsule TAKE ONE CAPSULE BY MOUTH EVERY DAY AT BEDTIME SEE DR FOR REFILLS! 30 capsule 11    colestipol (COLESTID) 1 g tablet       tiZANidine (ZANAFLEX) 4 MG tablet       Cyanocobalamin (VITAMIN B 12) 100 MCG LOZG Take by mouth      lidocaine (XYLOCAINE) 5 % ointment Apply topically as needed for Pain Apply topically as needed.  30 g 3  fenofibrate (TRICOR) 145 MG tablet 145 mg daily       Omega-3 Fatty Acids (FISH OIL) 1000 MG CAPS Take 1,000 mg by mouth 2 times daily      Garlic 4377 MG CAPS Take 1,000 mg by mouth 2 times daily      CRANBERRY SOFT PO Take 36 mg by mouth daily      allopurinol (ZYLOPRIM) 300 MG tablet 300 mg daily Indications: takes total 500mg a day       carvedilol (COREG) 6.25 MG tablet Take 6.25 mg by mouth 2 times daily      aspirin 81 MG tablet Take 81 mg by mouth daily      folic acid (FOLVITE) 1 MG tablet Take 1 mg by mouth daily      Multiple Vitamins-Minerals (THERAPEUTIC MULTIVITAMIN-MINERALS) tablet Take 1 tablet by mouth daily      calcium-vitamin D (OSCAL) 250-125 MG-UNIT per tablet Take 1 tablet by mouth daily      allopurinol (ZYLOPRIM) 100 MG tablet Take 200 mg by mouth daily Indications: takes total of 500 mg daily       bromfenac sodium (BROMDAY) 0.09 % SOLN ophthalmic solution Place 1 drop into both eyes daily      mesalamine (DELZICOL) 400 MG CPDR DR capsule Take 800 mg by mouth 3 times daily      indapamide (LOZOL) 2.5 MG tablet Take 2.5 mg by mouth every morning      lisinopril (PRINIVIL;ZESTRIL) 40 MG tablet Take 40 mg by mouth daily       omeprazole (PRILOSEC) 20 MG capsule Take 20 mg by mouth 2 times daily        No current facility-administered medications for this visit. No Known Allergies    Social History     Socioeconomic History    Marital status:       Spouse name: None    Number of children: None    Years of education: None    Highest education level: None   Occupational History    None   Social Needs    Financial resource strain: None    Food insecurity     Worry: None     Inability: None    Transportation needs     Medical: None     Non-medical: None   Tobacco Use    Smoking status: Never Smoker    Smokeless tobacco: Never Used   Substance and Sexual Activity    Alcohol use: No    Drug use: No    Sexual activity: None   Lifestyle    Physical activity Days per week: None     Minutes per session: None    Stress: None   Relationships    Social connections     Talks on phone: None     Gets together: None     Attends Hoahaoism service: None     Active member of club or organization: None     Attends meetings of clubs or organizations: None     Relationship status: None    Intimate partner violence     Fear of current or ex partner: None     Emotionally abused: None     Physically abused: None     Forced sexual activity: None   Other Topics Concern    None   Social History Narrative    None       Family History   Problem Relation Age of Onset    Cancer Mother     High Blood Pressure Father     Heart Disease Father        REVIEW OF SYSTEMS:  Review of Systems   Constitutional: Negative for chills and fever. HENT: Negative for congestion and sore throat. Eyes: Negative for pain and visual disturbance. Respiratory: Negative for cough and wheezing. Cardiovascular: Negative for chest pain and palpitations. Gastrointestinal: Negative for abdominal pain, nausea and vomiting. Endocrine: Negative for polyphagia and polyuria. Genitourinary: Positive for difficulty urinating (Chronic CIC) and pelvic pain. Negative for decreased urine volume, dyspareunia, dysuria, enuresis, flank pain, frequency, genital sores, hematuria, menstrual problem, urgency, vaginal bleeding, vaginal discharge and vaginal pain. Pt caths 3 times daily; states it has been cloudy. Musculoskeletal: Negative for back pain and neck pain. Skin: Negative for rash and wound. Allergic/Immunologic: Negative for environmental allergies and food allergies. Neurological: Positive for weakness. Negative for dizziness and headaches. Hematological: Negative for adenopathy. Does not bruise/bleed easily. Psychiatric/Behavioral: Negative for confusion and hallucinations. All other systems reviewed and are negative.       PHYSICAL EXAM:  Temp 96.7 °F (35.9 °C) (Temporal)   Ht 5' 4\" (1.626 m)   Wt 151 lb (68.5 kg)   Breastfeeding No   BMI 25.92 kg/m²   Physical Exam  Vitals signs and nursing note reviewed. Constitutional:       General: She is not in acute distress. Appearance: Normal appearance. She is not ill-appearing. HENT:      Head: Normocephalic and atraumatic. Nose: Nose normal.      Mouth/Throat:      Pharynx: Oropharynx is clear. Eyes:      General:         Right eye: No discharge. Left eye: No discharge. Extraocular Movements: Extraocular movements intact. Neck:      Musculoskeletal: Normal range of motion. Cardiovascular:      Pulses: Normal pulses. Pulmonary:      Effort: Pulmonary effort is normal. No respiratory distress. Abdominal:      General: There is no distension. Tenderness: There is no abdominal tenderness. There is no right CVA tenderness or left CVA tenderness. Musculoskeletal:         General: No swelling. Right lower leg: No edema. Left lower leg: No edema. Skin:     General: Skin is warm and dry. Neurological:      Mental Status: She is alert and oriented to person, place, and time. Mental status is at baseline. Gait: Gait abnormal (Utilizes walker). Psychiatric:         Mood and Affect: Mood normal.         Behavior: Behavior normal.       DATA:  Results for orders placed or performed in visit on 10/27/20   POC URINE with Microscopic   Result Value Ref Range    Color, UA Yellow     Clarity, UA Clear Clear    Glucose, Ur neg     Bilirubin Urine 0 mg/dL    Ketones, Urine Negative     Specific Gravity, UA 1.030 1.005 - 1.030    Blood, Urine Positive (A)     pH, UA 5.5 4.5 - 8.0    Protein, UA Negative Negative    Nitrite, Urine Negative     Leukocytes, UA postive (A)     Urobilinogen, Urine Normal     rbc urine, poc 0-1 (A)     wbc urine, poc 10-20 (A)     bacteria urine, poc 1-2 (A)     yeast urine, poc 0     casts urine, poc 0     epi cells urine, poc 0     crystals urine, poc 0        1. Recurrent UTI  Patient presents with complaints of possible urinary tract infection. We did discuss antibiotic stewardship and not treating asymptomatic bacteriuria, which she likely has related to chronic clean intermittent catheterization. However, given the development of new pelvic and suprapubic pain which the patient does not usually experience, I would like to send her urine for culture today and treat with antibiotics if necessary. Also requested a refill of her Premarin cream which I have sent today. - POC URINE with Microscopic  - Culture, Urine; Future  - conjugated estrogens (PREMARIN) 0.625 MG/GM vaginal cream; Place vaginally twice a week  Dispense: 1 Tube; Refill: 3  - POC Urine with Microscopic    2. Atonic bladder  Patient with well-known history of atonic bladder performing clean intermittent catheterization 3 times daily. She is doing well on this regimen no changes at this time. Follow-up in 6 months with a virtual visit unless symptoms are present in which she will need to come in and leave a urine sample. - POC Urine with Microscopic      Orders Placed This Encounter   Procedures    Culture, Urine     Standing Status:   Future     Standing Expiration Date:   10/27/2021     Order Specific Question:   Specify (ex-cath, midstream, cysto, etc)? Answer:   cath    POC URINE with Microscopic    POC Urine with Microscopic        Return in about 6 months (around 4/27/2021) for virtual visit in 6 months (patient preference). ENZO KAT - CNP    All information inputted into the note by the MA to include chief complaint, past medical history, past surgical history, medications, allergies, social and family history and review of systems has been reviewed and updated as needed by me. EMR Dragon/transcription disclaimer: Much of this document is electronic transcription/translation of spoken language to printed text.  The electronic translation of spoken language may be erroneous or, at times, nonsensical words or phrases may be inadvertently transcribed.  Although I have reviewed the document for such errors, some may still exist.

## 2020-10-29 ENCOUNTER — TELEPHONE (OUTPATIENT)
Dept: UROLOGY | Age: 75
End: 2020-10-29

## 2020-10-29 LAB
ORGANISM: ABNORMAL
URINE CULTURE, ROUTINE: ABNORMAL
URINE CULTURE, ROUTINE: ABNORMAL

## 2020-10-29 RX ORDER — CEFDINIR 300 MG/1
300 CAPSULE ORAL 2 TIMES DAILY
Qty: 20 CAPSULE | Refills: 0 | Status: SHIPPED | OUTPATIENT
Start: 2020-10-29 | End: 2020-11-08

## 2020-10-29 NOTE — TELEPHONE ENCOUNTER
----- Message from ENZO Jj CNP sent at 10/29/2020  1:45 PM CDT -----  Please let the patient know the culture did grow bacteria and I have sent in an antibiotic to their pharmacy.  Omnicef BID 10 days

## 2020-10-29 NOTE — TELEPHONE ENCOUNTER
Called and spoke with patient, gave her urine culture results. Let her know script was sent to pharmacy.

## 2020-11-10 ENCOUNTER — HOSPITAL ENCOUNTER (OUTPATIENT)
Dept: INFUSION THERAPY | Age: 75
Setting detail: INFUSION SERIES
Discharge: HOME | End: 2020-11-10
Payer: MEDICARE

## 2020-11-10 VITALS
HEART RATE: 58 BPM | TEMPERATURE: 98.1 F | RESPIRATION RATE: 18 BRPM | SYSTOLIC BLOOD PRESSURE: 138 MMHG | WEIGHT: 145 LBS | BODY MASS INDEX: 24.89 KG/M2 | OXYGEN SATURATION: 96 % | DIASTOLIC BLOOD PRESSURE: 56 MMHG

## 2020-11-10 DIAGNOSIS — G61.81 CIDP (CHRONIC INFLAMMATORY DEMYELINATING POLYNEUROPATHY) (HCC): Primary | ICD-10-CM

## 2020-11-10 PROCEDURE — 6360000002 HC RX W HCPCS: Performed by: PSYCHIATRY & NEUROLOGY

## 2020-11-10 PROCEDURE — 96365 THER/PROPH/DIAG IV INF INIT: CPT

## 2020-11-10 PROCEDURE — 96366 THER/PROPH/DIAG IV INF ADDON: CPT

## 2020-11-10 RX ORDER — METHYLPREDNISOLONE SODIUM SUCCINATE 125 MG/2ML
125 INJECTION, POWDER, LYOPHILIZED, FOR SOLUTION INTRAMUSCULAR; INTRAVENOUS ONCE
Status: CANCELLED | OUTPATIENT
Start: 2020-11-10

## 2020-11-10 RX ORDER — DIPHENHYDRAMINE HYDROCHLORIDE 50 MG/ML
50 INJECTION INTRAMUSCULAR; INTRAVENOUS ONCE
Status: CANCELLED
Start: 2020-11-10

## 2020-11-10 RX ORDER — DIPHENHYDRAMINE HYDROCHLORIDE 50 MG/ML
50 INJECTION INTRAMUSCULAR; INTRAVENOUS ONCE
Status: CANCELLED | OUTPATIENT
Start: 2020-11-10

## 2020-11-10 RX ORDER — EPINEPHRINE 1 MG/ML
0.3 INJECTION, SOLUTION, CONCENTRATE INTRAVENOUS PRN
Status: CANCELLED | OUTPATIENT
Start: 2020-11-10

## 2020-11-10 RX ORDER — SODIUM CHLORIDE 9 MG/ML
INJECTION, SOLUTION INTRAVENOUS CONTINUOUS
Status: ACTIVE | OUTPATIENT
Start: 2020-11-10 | End: 2020-11-10

## 2020-11-10 RX ORDER — ACETAMINOPHEN 325 MG/1
650 TABLET ORAL ONCE
Status: CANCELLED | OUTPATIENT
Start: 2020-11-10

## 2020-11-10 RX ORDER — SODIUM CHLORIDE 0.9 % (FLUSH) 0.9 %
10 SYRINGE (ML) INJECTION PRN
Status: CANCELLED | OUTPATIENT
Start: 2020-11-10

## 2020-11-10 RX ORDER — SODIUM CHLORIDE 9 MG/ML
INJECTION, SOLUTION INTRAVENOUS CONTINUOUS
Status: CANCELLED | OUTPATIENT
Start: 2020-11-10

## 2020-11-10 RX ORDER — DIPHENHYDRAMINE HYDROCHLORIDE 50 MG/ML
50 INJECTION INTRAMUSCULAR; INTRAVENOUS ONCE
Status: DISCONTINUED | OUTPATIENT
Start: 2020-11-10 | End: 2020-11-12 | Stop reason: HOSPADM

## 2020-11-10 RX ADMIN — IMMUNE GLOBULIN (HUMAN) 30 G: 10 INJECTION INTRAVENOUS; SUBCUTANEOUS at 09:51

## 2020-11-13 ENCOUNTER — OFFICE VISIT (OUTPATIENT)
Dept: UROLOGY | Age: 75
End: 2020-11-13
Payer: MEDICARE

## 2020-11-13 ENCOUNTER — TELEPHONE (OUTPATIENT)
Dept: UROLOGY | Age: 75
End: 2020-11-13

## 2020-11-13 VITALS — WEIGHT: 150 LBS | TEMPERATURE: 95.8 F | BODY MASS INDEX: 27.6 KG/M2 | HEIGHT: 62 IN

## 2020-11-13 PROBLEM — N39.0 URINARY TRACT INFECTION WITHOUT HEMATURIA: Status: ACTIVE | Noted: 2020-11-13

## 2020-11-13 LAB
BACTERIA URINE, POC: ABNORMAL
BILIRUBIN URINE: 0 MG/DL
BLOOD, URINE: POSITIVE
CASTS URINE, POC: ABNORMAL
CLARITY: ABNORMAL
COLOR: YELLOW
CRYSTALS URINE, POC: ABNORMAL
EPI CELLS URINE, POC: ABNORMAL
GLUCOSE URINE: ABNORMAL
KETONES, URINE: NEGATIVE
LEUKOCYTE EST, POC: ABNORMAL
NITRITE, URINE: NEGATIVE
PH UA: 6.5 (ref 4.5–8)
PROTEIN UA: POSITIVE
RBC URINE, POC: ABNORMAL
SPECIFIC GRAVITY UA: 1.02 (ref 1–1.03)
UROBILINOGEN, URINE: NORMAL
WBC URINE, POC: ABNORMAL
YEAST URINE, POC: ABNORMAL

## 2020-11-13 PROCEDURE — 81000 URINALYSIS NONAUTO W/SCOPE: CPT | Performed by: PHYSICIAN ASSISTANT

## 2020-11-13 PROCEDURE — 99213 OFFICE O/P EST LOW 20 MIN: CPT | Performed by: PHYSICIAN ASSISTANT

## 2020-11-13 RX ORDER — SULFAMETHOXAZOLE AND TRIMETHOPRIM 800; 160 MG/1; MG/1
1 TABLET ORAL 2 TIMES DAILY
Qty: 28 TABLET | Refills: 0 | Status: SHIPPED | OUTPATIENT
Start: 2020-11-13 | End: 2020-11-27

## 2020-11-13 ASSESSMENT — ENCOUNTER SYMPTOMS
GASTROINTESTINAL NEGATIVE: 1
EYES NEGATIVE: 1
RESPIRATORY NEGATIVE: 1
ALLERGIC/IMMUNOLOGIC NEGATIVE: 1

## 2020-11-13 NOTE — TELEPHONE ENCOUNTER
Levy Romero called this morning needing a same day appt for a UTI. [facility] is unable to accommodate. Please contact patient. Thank you.

## 2020-11-13 NOTE — PROGRESS NOTES
Дмитрий Tyler is a 67 y.o. female who presents today   Chief Complaint   Patient presents with    Follow-up     I am here today for frequency and cloudy urine. UTI symptoms:  Patient with previous history of recurrent urinary tract infections although she is not had an infection for several months she was recently treated for urine culture positive UTI on Omnicef she improved few days after stopping medication she noticed cloudy urine and now has increased frequency and urgency. She is not seen any gross hematuria denies any fever or chills. She does do self cath    Past Medical History:   Diagnosis Date    Arthritis     Cancer ([address]     endometrial cancer    Cataract     CID[facility]ch[facility] inflammatory [facility]inating poly[facility]uropathy) ([facility])     Hypertension     Radiation        Past Surgical History:   Procedure Laterality Date    BACK SURGERY      CARPAL TUNNEL RELEASE Bilateral     CATARACT REMOVAL Bilateral     HAMMER TOE SURGERY Left     HYSTERECTOMY      KNEE ARTHROSCOPY Right     LUMBAR FUSION         Current Outpatient Medications   Medication Sig Dispense Refill    sulfamethoxazole-trimethoprim (BACTRIM DS) 800-160 MG per tablet Take 1 tablet by mouth 2 times daily for 14 days 28 tablet 0    HYDROcodone-acetaminophen (NORCO)  MG per tablet Take 1 tablet by mouth every 8 hours as needed for Pain for up to 30 days. 90 tablet 0    conjugated estrogens (PREMARIN) 0.625 MG/GM vaginal cream Place vaginally twice a week 1 Tube 3    nitrofurantoin (MACRODANTIN) 50 MG capsule TAKE ONE CAPSULE BY MOUTH EVERY DAY AT BEDTIME SEE DR FOR REFILLS! 30 capsule 11    colestipol (COLESTID) 1 g tablet       tiZANidine (ZANAFLEX) 4 MG tablet       Cyanocobalamin (VITAMIN B 12) 100 MCG LOZG Take by mouth      lidocaine (XYLOCAINE) 5 % ointment Apply topically as needed for Pain Apply topically as needed.  30 g 3    fenofibrate (TRICOR) 145 MG tablet 145 mg daily       Omega-3 Fatty Acids (FISH  Physical activity     Days per week: None     Minutes per session: None    Stress: None   Relationships    Social connections     Talks on phone: None     Gets together: None     Attends Jehovah's witness service: None     Active member of club or organization: None     Attends meetings of clubs or organizations: None     Relationship status: None    Intimate partner violence     Fear of current or ex partner: None     Emotionally abused: None     Physically abused: None     Forced sexual activity: None   Other Topics Concern    None   Social History Narrative    None       Family History   Problem Relation Age of Onset    Cancer Mother     High Blood Pressure Father     Heart Disease Father        REVIEW OF SYSTEMS:  Review of Systems   Constitutional: Negative. HENT: Negative. Eyes: Negative. Respiratory: Negative. Cardiovascular: Negative. Gastrointestinal: Negative. Endocrine: Negative. Genitourinary: Positive for flank pain and frequency. Cloudy urine   Skin: Negative. Allergic/Immunologic: Negative. Neurological: Negative. Hematological: Negative. Psychiatric/Behavioral: Negative. PHYSICAL EXAM:  Temp 95.8 °F (35.4 °C) (Temporal)   Ht 5' 2\" (1.575 m)   Wt 150 lb (68 kg)   BMI 27.44 kg/m²   Physical Exam  Vitals signs reviewed. Constitutional:       Appearance: Normal appearance. HENT:      Head: Normocephalic and atraumatic. Right Ear: External ear normal.      Left Ear: External ear normal.      Nose: No congestion. Eyes:      Conjunctiva/sclera: Conjunctivae normal.   Neck:      Comments: I observed no obvious neck masses  Pulmonary:      Effort: Pulmonary effort is normal.   Skin:     Coloration: Skin is not pale. Neurological:      General: No focal deficit present. Mental Status: She is alert and oriented to person, place, and time.              DATA:  U/A:    Lab Results   Component Value Date    NITRITE neg 10/08/2019    COLORU Yellow 11/13/2020    PROTEINU Positive 11/13/2020    PHUR 6.5 11/13/2020    WBCUA 33 10/27/2020    RBCUA 1 10/27/2020    YEAST 0 10/27/2020    BACTERIA 4+ 10/27/2020    CLARITYU Cloudy 11/13/2020    SPECGRAV 1.020 11/13/2020    LEUKOCYTESUR large 11/13/2020    UROBILINOGEN Normal 11/13/2020    BILIRUBINUR 0 11/13/2020    BILIRUBINUR 0 10/08/2019    BLOODU Positive 11/13/2020    GLUCOSEU neg 11/13/2020           1. Urinary tract infection without hematuria, site unspecified  She recently completed course of Omnicef and a few days later she noticed urine was cloudy and she has had increased frequency. Does look infected today it is a catheterized sample because she self caths we will send this for culture and sensitivity will treat her empirically on Bactrim follow-up 3 weeks for urine recheck. 2. Recurrent UTI    - POC URINE with Microscopic  - Culture, Urine      Orders Placed This Encounter   Procedures    Culture, Urine     Order Specific Question:   Specify (ex-cath, midstream, cysto, etc)? Answer:   straight cath urine    POC URINE with Microscopic     Orders Placed This Encounter   Medications    sulfamethoxazole-trimethoprim (BACTRIM DS) 800-160 MG per tablet     Sig: Take 1 tablet by mouth 2 times daily for 14 days     Dispense:  28 tablet     Refill:  0     Plan:  Follow-up in 3 weeks.

## 2020-11-15 LAB
ORGANISM: ABNORMAL
URINE CULTURE, ROUTINE: ABNORMAL
URINE CULTURE, ROUTINE: ABNORMAL

## 2020-11-16 ENCOUNTER — TELEPHONE (OUTPATIENT)
Dept: GASTROENTEROLOGY | Facility: CLINIC | Age: 75
End: 2020-11-16

## 2020-11-16 NOTE — TELEPHONE ENCOUNTER
Patient called and stated she had a colon in february 2019 and was to have a 6 week follow up but with all the covid she would like to hold off on office visit.she said she is having no problems the Colestid is working great.

## 2020-11-20 RX ORDER — HYDROCODONE BITARTRATE AND ACETAMINOPHEN 10; 325 MG/1; MG/1
1 TABLET ORAL EVERY 8 HOURS PRN
Qty: 90 TABLET | Refills: 0 | Status: SHIPPED | OUTPATIENT
Start: 2020-11-26 | End: 2020-12-20 | Stop reason: SDUPTHER

## 2020-11-20 NOTE — TELEPHONE ENCOUNTER
Requested Prescriptions     Pending Prescriptions Disp Refills    HYDROcodone-acetaminophen (NORCO)  MG per tablet 90 tablet 0     Sig: Take 1 tablet by mouth every 8 hours as needed for Pain for up to 30 days.        Last Office Visit:  8/3/2020  Next Office Visit:  Visit date not found  Last Medication Refill: 10/27/20   Jinx File up to date: 9/24/20     *RX updated to reflect   11/26/20  fill date*

## 2020-12-07 ENCOUNTER — OFFICE VISIT (OUTPATIENT)
Dept: UROLOGY | Age: 75
End: 2020-12-07
Payer: MEDICARE

## 2020-12-07 VITALS
HEIGHT: 64 IN | BODY MASS INDEX: 25.27 KG/M2 | HEART RATE: 75 BPM | WEIGHT: 148 LBS | SYSTOLIC BLOOD PRESSURE: 130 MMHG | DIASTOLIC BLOOD PRESSURE: 72 MMHG | OXYGEN SATURATION: 96 %

## 2020-12-07 LAB
BILIRUBIN, POC: NORMAL
BLOOD URINE, POC: NORMAL
CLARITY, POC: CLEAR
COLOR, POC: YELLOW
GLUCOSE URINE, POC: NORMAL
KETONES, POC: NORMAL
LEUKOCYTE EST, POC: NORMAL
NITRITE, POC: NORMAL
PH, POC: 5
PROTEIN, POC: NORMAL
SPECIFIC GRAVITY, POC: 1.02
UROBILINOGEN, POC: 0.2

## 2020-12-07 PROCEDURE — 81003 URINALYSIS AUTO W/O SCOPE: CPT | Performed by: NURSE PRACTITIONER

## 2020-12-07 PROCEDURE — 99212 OFFICE O/P EST SF 10 MIN: CPT | Performed by: NURSE PRACTITIONER

## 2020-12-07 ASSESSMENT — ENCOUNTER SYMPTOMS
EYE REDNESS: 0
CONSTIPATION: 0
BACK PAIN: 1
VOMITING: 0
DIARRHEA: 0
TROUBLE SWALLOWING: 0
COUGH: 0
EYE DISCHARGE: 0
NAUSEA: 0
ABDOMINAL PAIN: 0
ABDOMINAL DISTENTION: 0
SHORTNESS OF BREATH: 0

## 2020-12-07 NOTE — PROGRESS NOTES
Julia Olmedo is a 67 y.o. female who presents today   Chief Complaint   Patient presents with    Follow-up     uti       HPI   Patient presents for follow-up of urinary tract infection. She has finished antibiotic therapy with both Bactrim and Omnicef recently. She denies any significant urinary symptoms. She had noticed an increase in frequency and urgency, but reports that she believes this was related to a recent steroid dose pack for orthopedic pain. She does perform in and out catheterization. She denies any fevers, chills, nausea, vomiting, hematuria, dysuria, flank pain, abdominal pain. Past Medical History:   Diagnosis Date    Arthritis     Cancer ([facility]     endometrial cancer    Cataract     C[facility]P [facility]ch[facility]uropathy[facility])     Hypertension     Radiation        Past Surgical History:   Procedure Laterality Date    BACK SURGERY      CARPAL TUNNEL RELEASE Bilateral     CATARACT REMOVAL Bilateral     HAMMER TOE SURGERY Left     HYSTERECTOMY      KNEE ARTHROSCOPY Right     LUMBAR FUSION         Current Outpatient Medications   Medication Sig Dispense Refill    HYDROcodone-acetaminophen (NORCO)  MG per tablet Take 1 tablet by mouth every 8 hours as needed for Pain for up to 30 days. 90 tablet 0    conjugated estrogens (PREMARIN) 0.625 MG/GM vaginal cream Place vaginally twice a week 1 Tube 3    nitrofurantoin (MACRODANTIN) 50 MG capsule TAKE ONE CAPSULE BY MOUTH EVERY DAY AT BEDTIME SEE DR FOR REFILLS! 30 capsule 11    colestipol (COLESTID) 1 g tablet       tiZANidine (ZANAFLEX) 4 MG tablet       Cyanocobalamin (VITAMIN B 12) 100 MCG LOZG Take by mouth      lidocaine (XYLOCAINE) 5 % ointment Apply topically as needed for Pain Apply topically as needed.  30 g 3    fenofibrate (TRICOR) 145 MG tablet 145 mg daily       Omega-3 Fatty Acids (FISH OIL) 1000 MG CAPS Take 1,000 mg by mouth 2 times daily      Garlic 4711 MG CAPS Take 1,000 mg by mouth 2 times daily      CRANBERRY SOFT PO Take 36 mg by mouth daily      allopurinol (ZYLOPRIM) 300 MG tablet 300 mg daily Indications: takes total 500mg a day       carvedilol (COREG) 6.25 MG tablet Take 6.25 mg by mouth 2 times daily      aspirin 81 MG tablet Take 81 mg by mouth daily      folic acid (FOLVITE) 1 MG tablet Take 1 mg by mouth daily      Multiple Vitamins-Minerals (THERAPEUTIC MULTIVITAMIN-MINERALS) tablet Take 1 tablet by mouth daily      calcium-vitamin D (OSCAL) 250-125 MG-UNIT per tablet Take 1 tablet by mouth daily      allopurinol (ZYLOPRIM) 100 MG tablet Take 200 mg by mouth daily Indications: takes total of 500 mg daily       bromfenac sodium (BROMDAY) 0.09 % SOLN ophthalmic solution Place 1 drop into both eyes daily      mesalamine (DELZICOL) 400 MG CPDR DR capsule Take 800 mg by mouth 3 times daily      indapamide (LOZOL) 2.5 MG tablet Take 2.5 mg by mouth every morning      lisinopril (PRINIVIL;ZESTRIL) 40 MG tablet Take 40 mg by mouth daily       omeprazole (PRILOSEC) 20 MG capsule Take 20 mg by mouth 2 times daily       gabapentin (NEURONTIN) 600 MG tablet Take 1 tablet by mouth 3 times daily for 90 days. 270 tablet 1     No current facility-administered medications for this visit. No Known Allergies    Social History     Socioeconomic History    Marital status:       Spouse name: None    Number of children: None    Years of education: None    Highest education level: None   Occupational History    None   Social Needs    Financial resource strain: None    Food insecurity     Worry: None     Inability: None    Transportation needs     Medical: None     Non-medical: None   Tobacco Use    Smoking status: Never Smoker    Smokeless tobacco: Never Used   Substance and Sexual Activity    Alcohol use: No    Drug use: No    Sexual activity: None   Lifestyle    Physical activity     Days per week: None     Minutes per session: None    Stress: None Relationships    Social connections     Talks on phone: None     Gets together: None     Attends Denominational service: None     Active member of club or organization: None     Attends meetings of clubs or organizations: None     Relationship status: None    Intimate partner violence     Fear of current or ex partner: None     Emotionally abused: None     Physically abused: None     Forced sexual activity: None   Other Topics Concern    None   Social History Narrative    None       Family History   Problem Relation Age of Onset    Cancer Mother     High Blood Pressure Father     Heart Disease Father        REVIEW OF SYSTEMS:  Review of Systems   Constitutional: Negative for chills, fatigue and fever. HENT: Negative for congestion, ear pain and trouble swallowing. Eyes: Negative for discharge and redness. Respiratory: Negative for cough and shortness of breath. Cardiovascular: Negative for chest pain. Gastrointestinal: Negative for abdominal distention, abdominal pain, constipation, diarrhea, nausea and vomiting. Genitourinary: Positive for difficulty urinating, frequency and urgency. Negative for dysuria, flank pain and hematuria. Musculoskeletal: Positive for arthralgias, back pain, gait problem and neck pain. Skin: Negative for pallor and wound. Neurological: Positive for weakness. Negative for dizziness. Psychiatric/Behavioral: Negative for agitation and confusion. PHYSICAL EXAM:  /72   Pulse 75   Ht 5' 4\" (1.626 m)   Wt 148 lb (67.1 kg)   SpO2 96%   BMI 25.40 kg/m²   Physical Exam  Vitals signs and nursing note reviewed. Constitutional:       General: She is not in acute distress. Appearance: Normal appearance. She is not ill-appearing. HENT:      Head: Normocephalic and atraumatic. Nose: Nose normal.      Mouth/Throat:      Pharynx: Oropharynx is clear. Eyes:      General:         Right eye: No discharge. Left eye: No discharge.       Extraocular

## 2020-12-16 ENCOUNTER — HOSPITAL ENCOUNTER (OUTPATIENT)
Dept: INFUSION THERAPY | Age: 75
Setting detail: INFUSION SERIES
Discharge: HOME | End: 2020-12-16
Payer: MEDICARE

## 2020-12-16 VITALS
HEART RATE: 57 BPM | TEMPERATURE: 98.2 F | RESPIRATION RATE: 18 BRPM | DIASTOLIC BLOOD PRESSURE: 70 MMHG | SYSTOLIC BLOOD PRESSURE: 170 MMHG

## 2020-12-16 DIAGNOSIS — G61.81 CIDP (CHRONIC INFLAMMATORY DEMYELINATING POLYNEUROPATHY) (HCC): Primary | ICD-10-CM

## 2020-12-16 PROCEDURE — 96365 THER/PROPH/DIAG IV INF INIT: CPT

## 2020-12-16 PROCEDURE — 6360000002 HC RX W HCPCS: Performed by: PSYCHIATRY & NEUROLOGY

## 2020-12-16 PROCEDURE — 96366 THER/PROPH/DIAG IV INF ADDON: CPT

## 2020-12-16 RX ORDER — DIPHENHYDRAMINE HYDROCHLORIDE 50 MG/ML
50 INJECTION INTRAMUSCULAR; INTRAVENOUS ONCE
Status: CANCELLED | OUTPATIENT
Start: 2020-12-16 | End: 2020-12-16

## 2020-12-16 RX ORDER — METHYLPREDNISOLONE SODIUM SUCCINATE 125 MG/2ML
125 INJECTION, POWDER, LYOPHILIZED, FOR SOLUTION INTRAMUSCULAR; INTRAVENOUS ONCE
Status: CANCELLED | OUTPATIENT
Start: 2020-12-16 | End: 2020-12-16

## 2020-12-16 RX ORDER — ACETAMINOPHEN 325 MG/1
650 TABLET ORAL ONCE
Status: DISCONTINUED | OUTPATIENT
Start: 2020-12-16 | End: 2020-12-18 | Stop reason: HOSPADM

## 2020-12-16 RX ORDER — SODIUM CHLORIDE 9 MG/ML
INJECTION, SOLUTION INTRAVENOUS CONTINUOUS
Status: CANCELLED | OUTPATIENT
Start: 2020-12-16

## 2020-12-16 RX ORDER — DIPHENHYDRAMINE HYDROCHLORIDE 50 MG/ML
50 INJECTION INTRAMUSCULAR; INTRAVENOUS ONCE
Status: DISCONTINUED | OUTPATIENT
Start: 2020-12-16 | End: 2020-12-18 | Stop reason: HOSPADM

## 2020-12-16 RX ORDER — SODIUM CHLORIDE 0.9 % (FLUSH) 0.9 %
10 SYRINGE (ML) INJECTION PRN
Status: CANCELLED | OUTPATIENT
Start: 2020-12-16

## 2020-12-16 RX ORDER — DIPHENHYDRAMINE HYDROCHLORIDE 50 MG/ML
50 INJECTION INTRAMUSCULAR; INTRAVENOUS ONCE
Status: CANCELLED
Start: 2020-12-16 | End: 2020-12-16

## 2020-12-16 RX ORDER — ACETAMINOPHEN 325 MG/1
650 TABLET ORAL ONCE
Status: CANCELLED | OUTPATIENT
Start: 2020-12-16 | End: 2020-12-16

## 2020-12-16 RX ORDER — EPINEPHRINE 1 MG/ML
0.3 INJECTION, SOLUTION, CONCENTRATE INTRAVENOUS PRN
Status: CANCELLED | OUTPATIENT
Start: 2020-12-16

## 2020-12-16 RX ADMIN — IMMUNE GLOBULIN (HUMAN) 30 G: 10 INJECTION INTRAVENOUS; SUBCUTANEOUS at 10:33

## 2020-12-21 RX ORDER — HYDROCODONE BITARTRATE AND ACETAMINOPHEN 10; 325 MG/1; MG/1
1 TABLET ORAL EVERY 8 HOURS PRN
Qty: 90 TABLET | Refills: 0 | Status: SHIPPED | OUTPATIENT
Start: 2020-12-26 | End: 2021-01-20 | Stop reason: SDUPTHER

## 2021-01-19 DIAGNOSIS — G61.81 CIDP (CHRONIC INFLAMMATORY DEMYELINATING POLYNEUROPATHY) (HCC): ICD-10-CM

## 2021-01-19 NOTE — TELEPHONE ENCOUNTER
Requested Prescriptions     Pending Prescriptions Disp Refills    gabapentin (NEURONTIN) 600 MG tablet [Pharmacy Med Name: GABAPENTIN TABS 600MG] 270 tablet 1     Sig: TAKE 1 TABLET THREE TIMES A DAY       Last Office Visit:  8/3/2020  Next Office Visit:  Visit date not found  Last Medication Refill:  8/6/2020 1 refill   Titi Mejia up to date:  12/21/2020    *RX updated to reflect   1/19/2021  fill date*

## 2021-01-20 DIAGNOSIS — M05.79 RHEUMATOID ARTHRITIS INVOLVING MULTIPLE SITES WITH POSITIVE RHEUMATOID FACTOR (HCC): ICD-10-CM

## 2021-01-20 DIAGNOSIS — M48.062 SPINAL STENOSIS OF LUMBAR REGION WITH NEUROGENIC CLAUDICATION: ICD-10-CM

## 2021-01-20 DIAGNOSIS — G61.81 CIDP (CHRONIC INFLAMMATORY DEMYELINATING POLYNEUROPATHY) (HCC): ICD-10-CM

## 2021-01-20 RX ORDER — GABAPENTIN 600 MG/1
TABLET ORAL
Qty: 270 TABLET | Refills: 1 | Status: SHIPPED | OUTPATIENT
Start: 2021-01-20 | End: 2021-07-16 | Stop reason: SDUPTHER

## 2021-01-20 NOTE — TELEPHONE ENCOUNTER
Requested Prescriptions     Pending Prescriptions Disp Refills    HYDROcodone-acetaminophen (NORCO)  MG per tablet 90 tablet 0     Sig: Take 1 tablet by mouth every 8 hours as needed for Pain for up to 30 days.        Last Office Visit:  8/3/2020  Next Office Visit:  Visit date not found  Last Medication Refill: 12/26/20   Shai Doss up to date:12/21/20      *RX updated to reflect   1/25/21  fill date*

## 2021-01-21 RX ORDER — HYDROCODONE BITARTRATE AND ACETAMINOPHEN 10; 325 MG/1; MG/1
1 TABLET ORAL EVERY 8 HOURS PRN
Qty: 90 TABLET | Refills: 0 | Status: SHIPPED | OUTPATIENT
Start: 2021-01-25 | End: 2021-02-27 | Stop reason: SDUPTHER

## 2021-01-29 RX ORDER — MONTELUKAST SODIUM 4 MG/1
TABLET, CHEWABLE ORAL
Qty: 270 TABLET | Refills: 3 | OUTPATIENT
Start: 2021-01-29

## 2021-02-01 ENCOUNTER — OFFICE VISIT (OUTPATIENT)
Dept: UROLOGY | Age: 76
End: 2021-02-01
Payer: MEDICARE

## 2021-02-01 VITALS
HEIGHT: 64 IN | OXYGEN SATURATION: 96 % | DIASTOLIC BLOOD PRESSURE: 72 MMHG | BODY MASS INDEX: 25.44 KG/M2 | WEIGHT: 149 LBS | HEART RATE: 61 BPM | SYSTOLIC BLOOD PRESSURE: 132 MMHG

## 2021-02-01 DIAGNOSIS — N39.0 RECURRENT UTI: Primary | ICD-10-CM

## 2021-02-01 LAB
BACTERIA URINE, POC: ABNORMAL
BILIRUBIN URINE: 0 MG/DL
BLOOD, URINE: POSITIVE
CASTS URINE, POC: ABNORMAL
CLARITY: ABNORMAL
COLOR: YELLOW
CRYSTALS URINE, POC: ABNORMAL
EPI CELLS URINE, POC: ABNORMAL
GLUCOSE URINE: ABNORMAL
KETONES, URINE: NEGATIVE
LEUKOCYTE EST, POC: POSITIVE
NITRITE, URINE: NEGATIVE
PH UA: 5.5 (ref 4.5–8)
PROTEIN UA: POSITIVE
RBC URINE, POC: ABNORMAL
SPECIFIC GRAVITY UA: 1.02 (ref 1–1.03)
UROBILINOGEN, URINE: NORMAL
WBC URINE, POC: ABNORMAL
YEAST URINE, POC: ABNORMAL

## 2021-02-01 PROCEDURE — 81000 URINALYSIS NONAUTO W/SCOPE: CPT | Performed by: NURSE PRACTITIONER

## 2021-02-01 PROCEDURE — 99214 OFFICE O/P EST MOD 30 MIN: CPT | Performed by: NURSE PRACTITIONER

## 2021-02-01 ASSESSMENT — ENCOUNTER SYMPTOMS
TROUBLE SWALLOWING: 0
BACK PAIN: 1
NAUSEA: 0
SHORTNESS OF BREATH: 0
CONSTIPATION: 0
COUGH: 0
ABDOMINAL DISTENTION: 0
VOMITING: 0
DIARRHEA: 0
EYE REDNESS: 0
EYE DISCHARGE: 0
ABDOMINAL PAIN: 1

## 2021-02-01 NOTE — PROGRESS NOTES
Rick Rios is a 67 y.o., female, Established patient who presents today   Chief Complaint   Patient presents with    Follow-up     UTI       HPI   Patient presents with complaints of possible urinary tract infection. She utilizes in and out catheterization 3 times per day, but is able to void between catheterizations. She reports 2 to 3 days ago experiencing increased pain in her lower back as well as her left lower abdomen and groin. She denies any issues with constipation. She denies any fevers, chills, nausea, vomiting, hematuria, dysuria. She was recently diagnosed with Covid on January 5, but is feeling much better now. REVIEW OF SYSTEMS:  Review of Systems   Constitutional: Negative for chills, fatigue and fever. HENT: Negative for congestion, ear pain and trouble swallowing. Eyes: Negative for discharge and redness. Respiratory: Negative for cough and shortness of breath. Cardiovascular: Negative for chest pain. Gastrointestinal: Positive for abdominal pain. Negative for abdominal distention, constipation, diarrhea, nausea and vomiting. Genitourinary: Positive for difficulty urinating. Negative for dysuria, flank pain, frequency, hematuria and urgency. Musculoskeletal: Positive for back pain and gait problem (rolling walker). Skin: Negative for pallor and wound. Neurological: Negative for dizziness and weakness. Psychiatric/Behavioral: Negative for agitation and confusion. PHYSICAL EXAM:  /72   Pulse 61   Ht 5' 4\" (1.626 m)   Wt 149 lb (67.6 kg)   SpO2 96%   BMI 25.58 kg/m²   Physical Exam  Vitals signs and nursing note reviewed. Constitutional:       General: She is not in acute distress. Appearance: Normal appearance. She is not ill-appearing. HENT:      Head: Normocephalic and atraumatic. Nose: Nose normal.      Mouth/Throat:      Pharynx: Oropharynx is clear. Eyes:      General:         Right eye: No discharge.          Left eye: No discharge. Extraocular Movements: Extraocular movements intact. Neck:      Musculoskeletal: Normal range of motion. Cardiovascular:      Pulses: Normal pulses. Pulmonary:      Effort: Pulmonary effort is normal. No respiratory distress. Abdominal:      General: There is no distension. Tenderness: There is no abdominal tenderness. There is no right CVA tenderness or left CVA tenderness. Musculoskeletal:         General: No swelling. Right lower leg: No edema. Left lower leg: No edema. Skin:     General: Skin is warm and dry. Neurological:      Mental Status: She is alert and oriented to person, place, and time. Mental status is at baseline. Motor: Weakness present. Gait: Gait abnormal.   Psychiatric:         Mood and Affect: Mood normal.         Behavior: Behavior normal.         DATA:  Results for orders placed or performed in visit on 02/01/21   POC URINE with Microscopic   Result Value Ref Range    Color, UA Yellow     Clarity, UA Cloudy (A) Clear    Glucose, Ur neg     Bilirubin Urine 0 mg/dL    Ketones, Urine Negative     Specific Gravity, UA 1.020 1.005 - 1.030    Blood, Urine Positive (A)     pH, UA 5.5 4.5 - 8.0    Protein, UA Positive (A) Negative    Nitrite, Urine Negative     Leukocytes, UA positive (A)     Urobilinogen, Urine Normal     rbc urine, poc 7-10 (A)     wbc urine, poc tntc (A)     bacteria urine, poc 1+ (A)     yeast urine, poc      casts urine, poc      epi cells urine, poc      crystals urine, poc       ASSESSMENT/PLAN  1. Recurrent UTI  Patient presents with complaints of possible urinary tract infection. She does perform in and out catheterization. I reminded her that we should be cognizant of utilizing antibiotic therapy without significant systemic symptoms.   As the patient is experiencing symptoms she does not typically experience, I am going to send her urine for culture today and treat with antibiotics if necessary.  - Culture, Urine  - POC

## 2021-02-03 DIAGNOSIS — N39.0 RECURRENT UTI: Primary | ICD-10-CM

## 2021-02-03 LAB
ORGANISM: ABNORMAL
URINE CULTURE, ROUTINE: ABNORMAL
URINE CULTURE, ROUTINE: ABNORMAL

## 2021-02-03 RX ORDER — CEFDINIR 300 MG/1
300 CAPSULE ORAL 2 TIMES DAILY
Qty: 28 CAPSULE | Refills: 0 | Status: SHIPPED | OUTPATIENT
Start: 2021-02-03 | End: 2021-02-17

## 2021-02-04 ENCOUNTER — TELEPHONE (OUTPATIENT)
Dept: UROLOGY | Age: 76
End: 2021-02-04

## 2021-02-04 NOTE — TELEPHONE ENCOUNTER
----- Message from Savi Hernandez MA sent at 2/3/2021  3:39 PM CST -----  Will you please call patient  ----- Message -----  From: ENZO Bowie - CNP  Sent: 2/3/2021  11:49 AM CST  To: Deneen Valentin MA, Savi Hernandez MA    Please let the patient know the culture did grow bacteria and I have sent in an antibiotic to their pharmacy. Omnicef 300mg BID for 14 days. Please let her know this is a multidrug-resistant organism and she is encouraged to use a different bathroom with other members of the household while she is on antibiotics. She is also encouraged to perform hand hygiene after any contact with her urine. If anyone else has contact with her urine, they are also encouraged to perform hand hygiene.

## 2021-02-04 NOTE — TELEPHONE ENCOUNTER
Spoke to patient and told her the results of her urine culture. Let her know she has an antibiotic to be picked up at her pharmacy. Advised her to use a separate bathroom if possible and encouraged hand washing often. She voiced understanding. Told her if she was still having problems after 14 days of antibiotic she could call our office.

## 2021-02-17 ENCOUNTER — TELEPHONE (OUTPATIENT)
Dept: GASTROENTEROLOGY | Facility: CLINIC | Age: 76
End: 2021-02-17

## 2021-02-17 NOTE — TELEPHONE ENCOUNTER
[facility] [phone] colestipol 1g 1 tid #270 0 refills. Will make office visit

## 2021-02-18 ENCOUNTER — PATIENT MESSAGE (OUTPATIENT)
Dept: NEUROLOGY | Age: 76
End: 2021-02-18

## 2021-02-18 DIAGNOSIS — M05.79 RHEUMATOID ARTHRITIS INVOLVING MULTIPLE SITES WITH POSITIVE RHEUMATOID FACTOR (HCC): ICD-10-CM

## 2021-02-18 DIAGNOSIS — M48.062 SPINAL STENOSIS OF LUMBAR REGION WITH NEUROGENIC CLAUDICATION: ICD-10-CM

## 2021-02-18 DIAGNOSIS — G61.81 CIDP (CHRONIC INFLAMMATORY DEMYELINATING POLYNEUROPATHY) (HCC): ICD-10-CM

## 2021-02-24 ENCOUNTER — HOSPITAL ENCOUNTER (OUTPATIENT)
Dept: INFUSION THERAPY | Age: 76
Setting detail: INFUSION SERIES
Discharge: HOME | End: 2021-02-24
Payer: MEDICARE

## 2021-02-24 VITALS
SYSTOLIC BLOOD PRESSURE: 130 MMHG | RESPIRATION RATE: 18 BRPM | OXYGEN SATURATION: 95 % | DIASTOLIC BLOOD PRESSURE: 56 MMHG | TEMPERATURE: 97.8 F | HEART RATE: 62 BPM

## 2021-02-24 DIAGNOSIS — G61.81 CIDP (CHRONIC INFLAMMATORY DEMYELINATING POLYNEUROPATHY) (HCC): Primary | ICD-10-CM

## 2021-02-24 LAB
ALBUMIN SERPL-MCNC: 4 G/DL (ref 3.5–5.2)
ALP BLD-CCNC: 46 U/L (ref 35–104)
ALT SERPL-CCNC: 14 U/L (ref 5–33)
ANION GAP SERPL CALCULATED.3IONS-SCNC: 8 MMOL/L (ref 7–19)
AST SERPL-CCNC: 16 U/L (ref 5–32)
BASOPHILS ABSOLUTE: 0 K/UL (ref 0–0.2)
BASOPHILS RELATIVE PERCENT: 0.8 % (ref 0–1)
BILIRUB SERPL-MCNC: 0.3 MG/DL (ref 0.2–1.2)
BUN BLDV-MCNC: 36 MG/DL (ref 8–23)
CALCIUM SERPL-MCNC: 9.3 MG/DL (ref 8.8–10.2)
CHLORIDE BLD-SCNC: 103 MMOL/L (ref 98–111)
CO2: 31 MMOL/L (ref 22–29)
CREAT SERPL-MCNC: 0.8 MG/DL (ref 0.5–0.9)
EOSINOPHILS ABSOLUTE: 0.2 K/UL (ref 0–0.6)
EOSINOPHILS RELATIVE PERCENT: 4.1 % (ref 0–5)
GFR AFRICAN AMERICAN: >59
GFR NON-AFRICAN AMERICAN: >60
GLUCOSE BLD-MCNC: 118 MG/DL (ref 74–109)
HCT VFR BLD CALC: 35.3 % (ref 37–47)
HEMOGLOBIN: 10.8 G/DL (ref 12–16)
IMMATURE GRANULOCYTES #: 0 K/UL
LYMPHOCYTES ABSOLUTE: 1.4 K/UL (ref 1.1–4.5)
LYMPHOCYTES RELATIVE PERCENT: 26.9 % (ref 20–40)
MCH RBC QN AUTO: 27.9 PG (ref 27–31)
MCHC RBC AUTO-ENTMCNC: 30.6 G/DL (ref 33–37)
MCV RBC AUTO: 91.2 FL (ref 81–99)
MONOCYTES ABSOLUTE: 1 K/UL (ref 0–0.9)
MONOCYTES RELATIVE PERCENT: 19 % (ref 0–10)
NEUTROPHILS ABSOLUTE: 2.6 K/UL (ref 1.5–7.5)
NEUTROPHILS RELATIVE PERCENT: 49 % (ref 50–65)
PDW BLD-RTO: 15.2 % (ref 11.5–14.5)
PLATELET # BLD: 221 K/UL (ref 130–400)
PMV BLD AUTO: 10.4 FL (ref 9.4–12.3)
POTASSIUM SERPL-SCNC: 4.3 MMOL/L (ref 3.5–5)
RBC # BLD: 3.87 M/UL (ref 4.2–5.4)
SODIUM BLD-SCNC: 142 MMOL/L (ref 136–145)
TOTAL PROTEIN: 6.4 G/DL (ref 6.6–8.7)
WBC # BLD: 5.3 K/UL (ref 4.8–10.8)

## 2021-02-24 PROCEDURE — 80053 COMPREHEN METABOLIC PANEL: CPT

## 2021-02-24 PROCEDURE — 96365 THER/PROPH/DIAG IV INF INIT: CPT

## 2021-02-24 PROCEDURE — 96366 THER/PROPH/DIAG IV INF ADDON: CPT

## 2021-02-24 PROCEDURE — 6360000002 HC RX W HCPCS: Performed by: PSYCHIATRY & NEUROLOGY

## 2021-02-24 PROCEDURE — 85025 COMPLETE CBC W/AUTO DIFF WBC: CPT

## 2021-02-24 RX ORDER — SODIUM CHLORIDE 9 MG/ML
INJECTION, SOLUTION INTRAVENOUS CONTINUOUS
Status: CANCELLED | OUTPATIENT
Start: 2021-02-24

## 2021-02-24 RX ORDER — ACETAMINOPHEN 325 MG/1
650 TABLET ORAL ONCE
Status: CANCELLED | OUTPATIENT
Start: 2021-02-24 | End: 2021-02-24

## 2021-02-24 RX ORDER — SODIUM CHLORIDE 9 MG/ML
INJECTION, SOLUTION INTRAVENOUS CONTINUOUS
Status: ACTIVE | OUTPATIENT
Start: 2021-02-24 | End: 2021-02-24

## 2021-02-24 RX ORDER — DIPHENHYDRAMINE HYDROCHLORIDE 50 MG/ML
50 INJECTION INTRAMUSCULAR; INTRAVENOUS ONCE
Status: CANCELLED
Start: 2021-02-24 | End: 2021-02-24

## 2021-02-24 RX ORDER — DIPHENHYDRAMINE HYDROCHLORIDE 50 MG/ML
50 INJECTION INTRAMUSCULAR; INTRAVENOUS ONCE
Status: CANCELLED | OUTPATIENT
Start: 2021-02-24 | End: 2021-02-24

## 2021-02-24 RX ORDER — SODIUM CHLORIDE 0.9 % (FLUSH) 0.9 %
10 SYRINGE (ML) INJECTION PRN
Status: CANCELLED | OUTPATIENT
Start: 2021-02-24

## 2021-02-24 RX ORDER — EPINEPHRINE 1 MG/ML
0.3 INJECTION, SOLUTION, CONCENTRATE INTRAVENOUS PRN
Status: CANCELLED | OUTPATIENT
Start: 2021-02-24

## 2021-02-24 RX ORDER — METHYLPREDNISOLONE SODIUM SUCCINATE 125 MG/2ML
125 INJECTION, POWDER, LYOPHILIZED, FOR SOLUTION INTRAMUSCULAR; INTRAVENOUS ONCE
Status: CANCELLED | OUTPATIENT
Start: 2021-02-24 | End: 2021-02-24

## 2021-02-24 RX ADMIN — IMMUNE GLOBULIN (HUMAN) 30 G: 10 INJECTION INTRAVENOUS; SUBCUTANEOUS at 10:27

## 2021-02-26 NOTE — TELEPHONE ENCOUNTER
Vijay Love has requested a refill on her medication.       Last office visit : Visit date not found   Next office visit : Visit date not found   Last medication refill :1-25-21  Rik Luong : 12-21-20      Requested Prescriptions      No prescriptions requested or ordered in this encounter

## 2021-02-27 RX ORDER — HYDROCODONE BITARTRATE AND ACETAMINOPHEN 10; 325 MG/1; MG/1
1 TABLET ORAL EVERY 8 HOURS PRN
Qty: 90 TABLET | Refills: 0 | Status: SHIPPED | OUTPATIENT
Start: 2021-02-27 | End: 2021-03-22 | Stop reason: SDUPTHER

## 2021-03-01 ENCOUNTER — TELEPHONE (OUTPATIENT)
Dept: NEUROLOGY | Age: 76
End: 2021-03-01

## 2021-03-01 NOTE — TELEPHONE ENCOUNTER
----- Message from Rajat Briseno MD sent at 2/24/2021 10:26 PM CST -----  Labs were fine but she is a little anemic like previously, hgb of 10.8.

## 2021-03-01 NOTE — TELEPHONE ENCOUNTER
----- Message from Irena Ahuja MD sent at 2/24/2021 10:26 PM CST -----  Labs were fine but she is a little anemic like previously, hgb of 10.8.

## 2021-03-22 DIAGNOSIS — M48.062 SPINAL STENOSIS OF LUMBAR REGION WITH NEUROGENIC CLAUDICATION: ICD-10-CM

## 2021-03-22 DIAGNOSIS — G61.81 CIDP (CHRONIC INFLAMMATORY DEMYELINATING POLYNEUROPATHY) (HCC): ICD-10-CM

## 2021-03-22 DIAGNOSIS — M05.79 RHEUMATOID ARTHRITIS INVOLVING MULTIPLE SITES WITH POSITIVE RHEUMATOID FACTOR (HCC): ICD-10-CM

## 2021-03-22 NOTE — TELEPHONE ENCOUNTER
Requested Prescriptions     Pending Prescriptions Disp Refills    HYDROcodone-acetaminophen (NORCO)  MG per tablet 90 tablet 0     Sig: Take 1 tablet by mouth every 8 hours as needed for Pain for up to 30 days.        Last Office Visit:  8/3/2020  Next Office Visit:  Visit date not found  Last Medication Refill:  2/27/2021  Mihai Delay up to date:  3/22/2021    *RX updated to reflect   3/29/2021  fill date*

## 2021-03-23 RX ORDER — HYDROCODONE BITARTRATE AND ACETAMINOPHEN 10; 325 MG/1; MG/1
1 TABLET ORAL EVERY 8 HOURS PRN
Qty: 90 TABLET | Refills: 0 | Status: SHIPPED | OUTPATIENT
Start: 2021-03-29 | End: 2021-04-26 | Stop reason: SDUPTHER

## 2021-03-31 ENCOUNTER — OFFICE VISIT (OUTPATIENT)
Dept: UROLOGY | Age: 76
End: 2021-03-31
Payer: MEDICARE

## 2021-03-31 VITALS
HEART RATE: 78 BPM | DIASTOLIC BLOOD PRESSURE: 75 MMHG | BODY MASS INDEX: 25.27 KG/M2 | TEMPERATURE: 98.6 F | HEIGHT: 64 IN | SYSTOLIC BLOOD PRESSURE: 163 MMHG | WEIGHT: 148 LBS

## 2021-03-31 DIAGNOSIS — N39.0 RECURRENT UTI: Primary | ICD-10-CM

## 2021-03-31 LAB
BACTERIA URINE, POC: 5
BILIRUBIN URINE: 0 MG/DL
BLOOD, URINE: POSITIVE
CASTS URINE, POC: 0
CLARITY: ABNORMAL
COLOR: YELLOW
CRYSTALS URINE, POC: 0
EPI CELLS URINE, POC: 0
GLUCOSE URINE: ABNORMAL
KETONES, URINE: NEGATIVE
LEUKOCYTE EST, POC: ABNORMAL
NITRITE, URINE: NEGATIVE
PH UA: 7 (ref 4.5–8)
PROTEIN UA: POSITIVE
RBC URINE, POC: 5
SPECIFIC GRAVITY UA: 1.02 (ref 1–1.03)
UROBILINOGEN, URINE: NORMAL
WBC URINE, POC: 50
YEAST URINE, POC: 0

## 2021-03-31 PROCEDURE — 99214 OFFICE O/P EST MOD 30 MIN: CPT | Performed by: NURSE PRACTITIONER

## 2021-03-31 PROCEDURE — 81000 URINALYSIS NONAUTO W/SCOPE: CPT | Performed by: NURSE PRACTITIONER

## 2021-03-31 RX ORDER — MELOXICAM 15 MG/1
15 TABLET ORAL DAILY
COMMUNITY
End: 2021-09-03

## 2021-03-31 NOTE — PROGRESS NOTES
person, place, and time. Mental status is at baseline. Gait: Gait abnormal.   Psychiatric:         Mood and Affect: Mood normal.         Behavior: Behavior normal.         DATA:  Results for orders placed or performed in visit on 03/31/21   Culture, Urine    Specimen: Urine, clean catch   Result Value Ref Range    Urine Culture, Routine No growth    POC Urine with Microscopic   Result Value Ref Range    Color, UA Yellow     Clarity, UA Cloudy (A) Clear    Glucose, Ur NEG     Bilirubin Urine 0 mg/dL    Ketones, Urine Negative     Specific Gravity, UA 1.020 1.005 - 1.030    Blood, Urine Positive     pH, UA 7.0 4.5 - 8.0    Protein, UA Positive (A) Negative    Nitrite, Urine Negative     Leukocytes, UA POS     Urobilinogen, Urine Normal     rbc urine, poc 5     wbc urine, poc 50     bacteria urine, poc 5     yeast urine, poc 0     casts urine, poc 0     epi cells urine, poc 0     crystals urine, poc 0        ASSESSMENT/PLAN  1. Recurrent UTI  Patient with complaints of possible urinary tract infection today. She does utilize intermittent catheterization. We did discuss the importance of treating asymptomatic bacteriuria. Patient does understand, but remains concerned about her symptoms. As the symptoms are out of her norm, we will going to send her urine for culture today and treat if necessary. If it appears she is colonized, we will hold off on antibacterial therapy. - Culture, Urine  - POC Urine with Microscopic      Orders Placed This Encounter   Procedures    Culture, Urine     Order Specific Question:   Specify (ex-cath, midstream, cysto, etc)? Answer:   clean catch    POC Urine with Microscopic        No follow-ups on file. An electronic signature was used to authenticate this note.     TRINY MO, APRN - CNP    All information inputted into the note by the MA to include chief complaint, past medical history, past surgical history, medications, allergies, social and family history and review of systems has been reviewed and updated as needed by me. EMR Dragon/transcription disclaimer: Much of this document is electronic transcription/translation of spoken language to printed text. The electronic translation of spoken language may be erroneous or, at times, nonsensical words or phrases may be inadvertently transcribed.  Although I have reviewed the document for such errors, some may still exist.

## 2021-04-01 ASSESSMENT — ENCOUNTER SYMPTOMS
BACK PAIN: 1
ABDOMINAL PAIN: 0
NAUSEA: 0
ABDOMINAL DISTENTION: 0
VOMITING: 0

## 2021-04-02 LAB — URINE CULTURE, ROUTINE: NORMAL

## 2021-04-12 ENCOUNTER — HOSPITAL ENCOUNTER (OUTPATIENT)
Dept: INFUSION THERAPY | Age: 76
Setting detail: INFUSION SERIES
Discharge: HOME | End: 2021-04-12
Payer: MEDICARE

## 2021-04-12 VITALS
OXYGEN SATURATION: 95 % | TEMPERATURE: 97.9 F | HEART RATE: 63 BPM | RESPIRATION RATE: 17 BRPM | SYSTOLIC BLOOD PRESSURE: 117 MMHG | DIASTOLIC BLOOD PRESSURE: 55 MMHG

## 2021-04-12 DIAGNOSIS — G61.81 CIDP (CHRONIC INFLAMMATORY DEMYELINATING POLYNEUROPATHY) (HCC): Primary | ICD-10-CM

## 2021-04-12 LAB
ALBUMIN SERPL-MCNC: 4 G/DL (ref 3.5–5.2)
ALP BLD-CCNC: 49 U/L (ref 35–104)
ALT SERPL-CCNC: 10 U/L (ref 5–33)
ANION GAP SERPL CALCULATED.3IONS-SCNC: 7 MMOL/L (ref 7–19)
AST SERPL-CCNC: 12 U/L (ref 5–32)
BASOPHILS ABSOLUTE: 0 K/UL (ref 0–0.2)
BASOPHILS RELATIVE PERCENT: 0.5 % (ref 0–1)
BILIRUB SERPL-MCNC: <0.2 MG/DL (ref 0.2–1.2)
BUN BLDV-MCNC: 35 MG/DL (ref 8–23)
CALCIUM SERPL-MCNC: 9.7 MG/DL (ref 8.8–10.2)
CHLORIDE BLD-SCNC: 99 MMOL/L (ref 98–111)
CO2: 31 MMOL/L (ref 22–29)
CREAT SERPL-MCNC: 0.9 MG/DL (ref 0.5–0.9)
EOSINOPHILS ABSOLUTE: 0.3 K/UL (ref 0–0.6)
EOSINOPHILS RELATIVE PERCENT: 4 % (ref 0–5)
GFR AFRICAN AMERICAN: >59
GFR NON-AFRICAN AMERICAN: >60
GLUCOSE BLD-MCNC: 136 MG/DL (ref 74–109)
HCT VFR BLD CALC: 37 % (ref 37–47)
HEMOGLOBIN: 11.2 G/DL (ref 12–16)
IMMATURE GRANULOCYTES #: 0 K/UL
LYMPHOCYTES ABSOLUTE: 1.3 K/UL (ref 1.1–4.5)
LYMPHOCYTES RELATIVE PERCENT: 17.4 % (ref 20–40)
MCH RBC QN AUTO: 26.9 PG (ref 27–31)
MCHC RBC AUTO-ENTMCNC: 30.3 G/DL (ref 33–37)
MCV RBC AUTO: 88.7 FL (ref 81–99)
MONOCYTES ABSOLUTE: 1.4 K/UL (ref 0–0.9)
MONOCYTES RELATIVE PERCENT: 18.6 % (ref 0–10)
NEUTROPHILS ABSOLUTE: 4.3 K/UL (ref 1.5–7.5)
NEUTROPHILS RELATIVE PERCENT: 59.2 % (ref 50–65)
PDW BLD-RTO: 14.5 % (ref 11.5–14.5)
PLATELET # BLD: 238 K/UL (ref 130–400)
PMV BLD AUTO: 10.2 FL (ref 9.4–12.3)
POTASSIUM SERPL-SCNC: 4.5 MMOL/L (ref 3.5–5)
RBC # BLD: 4.17 M/UL (ref 4.2–5.4)
SODIUM BLD-SCNC: 137 MMOL/L (ref 136–145)
TOTAL PROTEIN: 6.7 G/DL (ref 6.6–8.7)
WBC # BLD: 7.3 K/UL (ref 4.8–10.8)

## 2021-04-12 PROCEDURE — 85025 COMPLETE CBC W/AUTO DIFF WBC: CPT

## 2021-04-12 PROCEDURE — 96366 THER/PROPH/DIAG IV INF ADDON: CPT

## 2021-04-12 PROCEDURE — 96365 THER/PROPH/DIAG IV INF INIT: CPT

## 2021-04-12 PROCEDURE — 80053 COMPREHEN METABOLIC PANEL: CPT

## 2021-04-12 PROCEDURE — 6360000002 HC RX W HCPCS: Performed by: PSYCHIATRY & NEUROLOGY

## 2021-04-12 RX ORDER — DIPHENHYDRAMINE HYDROCHLORIDE 50 MG/ML
50 INJECTION INTRAMUSCULAR; INTRAVENOUS ONCE
Status: CANCELLED
Start: 2021-04-12 | End: 2021-04-12

## 2021-04-12 RX ORDER — METHYLPREDNISOLONE SODIUM SUCCINATE 125 MG/2ML
125 INJECTION, POWDER, LYOPHILIZED, FOR SOLUTION INTRAMUSCULAR; INTRAVENOUS ONCE
Status: CANCELLED | OUTPATIENT
Start: 2021-04-12 | End: 2021-04-12

## 2021-04-12 RX ORDER — SODIUM CHLORIDE 9 MG/ML
INJECTION, SOLUTION INTRAVENOUS CONTINUOUS
Status: DISCONTINUED | OUTPATIENT
Start: 2021-04-12 | End: 2021-04-12

## 2021-04-12 RX ORDER — SODIUM CHLORIDE 9 MG/ML
INJECTION, SOLUTION INTRAVENOUS CONTINUOUS
Status: CANCELLED | OUTPATIENT
Start: 2021-04-12

## 2021-04-12 RX ORDER — SODIUM CHLORIDE 0.9 % (FLUSH) 0.9 %
10 SYRINGE (ML) INJECTION PRN
Status: CANCELLED | OUTPATIENT
Start: 2021-04-12

## 2021-04-12 RX ORDER — DIPHENHYDRAMINE HYDROCHLORIDE 50 MG/ML
50 INJECTION INTRAMUSCULAR; INTRAVENOUS ONCE
Status: CANCELLED | OUTPATIENT
Start: 2021-04-12 | End: 2021-04-12

## 2021-04-12 RX ORDER — ACETAMINOPHEN 325 MG/1
650 TABLET ORAL ONCE
Status: CANCELLED | OUTPATIENT
Start: 2021-04-12 | End: 2021-04-12

## 2021-04-12 RX ORDER — EPINEPHRINE 1 MG/ML
0.3 INJECTION, SOLUTION, CONCENTRATE INTRAVENOUS PRN
Status: CANCELLED | OUTPATIENT
Start: 2021-04-12

## 2021-04-12 RX ADMIN — IMMUNE GLOBULIN (HUMAN) 30 G: 10 INJECTION INTRAVENOUS; SUBCUTANEOUS at 10:07

## 2021-04-13 ENCOUNTER — TELEPHONE (OUTPATIENT)
Dept: NEUROLOGY | Age: 76
End: 2021-04-13

## 2021-04-13 NOTE — TELEPHONE ENCOUNTER
Called and spoke with patient and provided her with the information that her lab work was normal. Patient voiced understanding of this information.

## 2021-04-20 RX ORDER — MESALAMINE 400 MG/1
CAPSULE, DELAYED RELEASE ORAL
Qty: 540 CAPSULE | Refills: 3 | OUTPATIENT
Start: 2021-04-20

## 2021-04-26 DIAGNOSIS — G61.81 CIDP (CHRONIC INFLAMMATORY DEMYELINATING POLYNEUROPATHY) (HCC): ICD-10-CM

## 2021-04-26 DIAGNOSIS — M48.062 SPINAL STENOSIS OF LUMBAR REGION WITH NEUROGENIC CLAUDICATION: ICD-10-CM

## 2021-04-26 DIAGNOSIS — M05.79 RHEUMATOID ARTHRITIS INVOLVING MULTIPLE SITES WITH POSITIVE RHEUMATOID FACTOR (HCC): ICD-10-CM

## 2021-04-26 RX ORDER — HYDROCODONE BITARTRATE AND ACETAMINOPHEN 10; 325 MG/1; MG/1
1 TABLET ORAL EVERY 8 HOURS PRN
Qty: 90 TABLET | Refills: 0 | Status: SHIPPED | OUTPATIENT
Start: 2021-04-28 | End: 2021-05-25 | Stop reason: SDUPTHER

## 2021-04-27 ENCOUNTER — TELEMEDICINE (OUTPATIENT)
Dept: UROLOGY | Age: 76
End: 2021-04-27
Payer: MEDICARE

## 2021-04-27 DIAGNOSIS — N39.0 RECURRENT UTI: Primary | ICD-10-CM

## 2021-04-27 PROCEDURE — 99213 OFFICE O/P EST LOW 20 MIN: CPT | Performed by: NURSE PRACTITIONER

## 2021-04-27 ASSESSMENT — ENCOUNTER SYMPTOMS
VOMITING: 0
ABDOMINAL DISTENTION: 0
ABDOMINAL PAIN: 0
BACK PAIN: 0
NAUSEA: 0

## 2021-04-27 NOTE — PROGRESS NOTES
Alexander Vargas (:  1945) is a 67 y.o. female,Established patient, here for evaluation of the following chief complaint(s): Follow-up (Recurrent UTI)      ASSESSMENT/PLAN:  1. Recurrent UTI  Patient presents for follow-up of recurrent urinary tract infection. She has no complaints today. She is doing well with her in and out catheterization. She has been off her daily Macrobid for about 2 to 3 weeks now. Her last several cultures revealed bacteria resistant to Macrobid. We discussed if she continued to have recurrence of urinary tract infections while off her daily prophylactic antibiotic, we could consider a different antibiotic such as trimethoprim, however, we would like to avoid that if at all possible. Patient is in agreement with the plan. She will follow-up in about 6 months. This can be a virtual visit if she wishes. Return in about 6 months (around 10/27/2021) for virtual visit if patient prefers. SUBJECTIVE/OBJECTIVE:  HPI   Patient presents for follow-up of recurrent urinary tract infection. She utilizes in and out catheterization 3 times per day, but is able to void on her own in between catheterizations. She is currently asymptomatic and denies any fevers, chills, nausea, vomiting, hematuria, dysuria, abdominal pain, flank pain. She denies any significant leakage in between catheterizations. I saw her at the end of March for suspected urinary tract infection, however, the culture was negative at that point. She is currently maintained on Premarin cream as well as Ellura. She has been taking daily Macrobid, however, her insurance does not cover this any longer.       Review of Systems   Constitutional: Negative for chills and fever. Gastrointestinal: Negative for abdominal distention, abdominal pain, nausea and vomiting. Genitourinary: Negative for difficulty urinating, dysuria, flank pain, frequency, hematuria and urgency.    Musculoskeletal: Negative for back pain and gait problem. Psychiatric/Behavioral: Negative for agitation and confusion. Patient-Reported Vitals 4/27/2021   Patient-Reported Weight 144   Patient-Reported Height 5'4\"   Patient-Reported Systolic 447   Patient-Reported Diastolic 65   Patient-Reported Pulse 63   Patient-Reported Temperature 98   Patient-Reported SpO2 98        Physical Exam    Constitutional: [x] Appears well-developed and well-nourished [x] No apparent distress      [] Abnormal -     Mental status: [x] Alert and awake  [x] Oriented to person/place/time [x] Able to follow commands    [] Abnormal -     Eyes:   EOM    [x]  Normal    [] Abnormal -   Sclera  [x]  Normal    [] Abnormal -          Discharge [x]  None visible   [] Abnormal -     HENT: [x] Normocephalic, atraumatic  [] Abnormal -   [x] Mouth/Throat: Mucous membranes are moist    External Ears [x] Normal  [] Abnormal -    Neck: [x] No visualized mass [] Abnormal -     Pulmonary/Chest: [x] Respiratory effort normal   [x] No visualized signs of difficulty breathing or respiratory distress        [] Abnormal -      Musculoskeletal:   [x] Normal gait with no signs of ataxia         [x] Normal range of motion of neck        [] Abnormal -     Neurological:        [x] No Facial Asymmetry (Cranial nerve 7 motor function) (limited exam due to video visit)          [x] No gaze palsy        [] Abnormal -          Skin:        [x] No significant exanthematous lesions or discoloration noted on facial skin         [] Abnormal -            Psychiatric:       [x] Normal Affect [] Abnormal -        [x] No Hallucinations        Nolon Raring, was evaluated through a synchronous (real-time) audio-video encounter. The patient (or guardian if applicable) is aware that this is a billable service. Verbal consent to proceed has been obtained within the past 12 months.  The visit was conducted pursuant to the emergency declaration under the [facility], [address]5 waiver authority and the [employer] and [employer] General Act. Patient identification was verified, and a caregiver was present when appropriate. The patient was located in a state where the provider was credentialed to provide care.       An electronic signature was used to authenticate this note.    --ENZO Alicia - CNP

## 2021-05-12 ENCOUNTER — HOSPITAL ENCOUNTER (OUTPATIENT)
Dept: INFUSION THERAPY | Age: 76
Setting detail: INFUSION SERIES
Discharge: HOME | End: 2021-05-12
Payer: MEDICARE

## 2021-05-12 VITALS
RESPIRATION RATE: 18 BRPM | DIASTOLIC BLOOD PRESSURE: 69 MMHG | HEART RATE: 63 BPM | TEMPERATURE: 97.7 F | SYSTOLIC BLOOD PRESSURE: 144 MMHG | OXYGEN SATURATION: 99 %

## 2021-05-12 DIAGNOSIS — G61.81 CIDP (CHRONIC INFLAMMATORY DEMYELINATING POLYNEUROPATHY) (HCC): Primary | ICD-10-CM

## 2021-05-12 LAB
ALBUMIN SERPL-MCNC: 3.8 G/DL (ref 3.5–5.2)
ALP BLD-CCNC: 48 U/L (ref 35–104)
ALT SERPL-CCNC: 12 U/L (ref 5–33)
ANION GAP SERPL CALCULATED.3IONS-SCNC: 10 MMOL/L (ref 7–19)
AST SERPL-CCNC: 14 U/L (ref 5–32)
BASOPHILS ABSOLUTE: 0 K/UL (ref 0–0.2)
BASOPHILS RELATIVE PERCENT: 0.5 % (ref 0–1)
BILIRUB SERPL-MCNC: 0.3 MG/DL (ref 0.2–1.2)
BUN BLDV-MCNC: 35 MG/DL (ref 8–23)
CALCIUM SERPL-MCNC: 9.1 MG/DL (ref 8.8–10.2)
CHLORIDE BLD-SCNC: 102 MMOL/L (ref 98–111)
CO2: 30 MMOL/L (ref 22–29)
CREAT SERPL-MCNC: 1.2 MG/DL (ref 0.5–0.9)
EOSINOPHILS ABSOLUTE: 0.2 K/UL (ref 0–0.6)
EOSINOPHILS RELATIVE PERCENT: 3.1 % (ref 0–5)
GFR AFRICAN AMERICAN: 53
GFR NON-AFRICAN AMERICAN: 44
GLUCOSE BLD-MCNC: 117 MG/DL (ref 74–109)
HCT VFR BLD CALC: 36.7 % (ref 37–47)
HEMOGLOBIN: 11.4 G/DL (ref 12–16)
IMMATURE GRANULOCYTES #: 0 K/UL
LYMPHOCYTES ABSOLUTE: 1.7 K/UL (ref 1.1–4.5)
LYMPHOCYTES RELATIVE PERCENT: 22.4 % (ref 20–40)
MCH RBC QN AUTO: 27.1 PG (ref 27–31)
MCHC RBC AUTO-ENTMCNC: 31.1 G/DL (ref 33–37)
MCV RBC AUTO: 87.4 FL (ref 81–99)
MONOCYTES ABSOLUTE: 1.2 K/UL (ref 0–0.9)
MONOCYTES RELATIVE PERCENT: 16.4 % (ref 0–10)
NEUTROPHILS ABSOLUTE: 4.3 K/UL (ref 1.5–7.5)
NEUTROPHILS RELATIVE PERCENT: 57.3 % (ref 50–65)
PDW BLD-RTO: 14.8 % (ref 11.5–14.5)
PLATELET # BLD: 247 K/UL (ref 130–400)
PMV BLD AUTO: 10.4 FL (ref 9.4–12.3)
POTASSIUM SERPL-SCNC: 3.9 MMOL/L (ref 3.5–5)
RBC # BLD: 4.2 M/UL (ref 4.2–5.4)
SODIUM BLD-SCNC: 142 MMOL/L (ref 136–145)
TOTAL PROTEIN: 6.5 G/DL (ref 6.6–8.7)
WBC # BLD: 7.5 K/UL (ref 4.8–10.8)

## 2021-05-12 PROCEDURE — 96365 THER/PROPH/DIAG IV INF INIT: CPT

## 2021-05-12 PROCEDURE — 6360000002 HC RX W HCPCS: Performed by: PSYCHIATRY & NEUROLOGY

## 2021-05-12 PROCEDURE — 85025 COMPLETE CBC W/AUTO DIFF WBC: CPT

## 2021-05-12 PROCEDURE — 96366 THER/PROPH/DIAG IV INF ADDON: CPT

## 2021-05-12 PROCEDURE — 80053 COMPREHEN METABOLIC PANEL: CPT

## 2021-05-12 RX ORDER — DIPHENHYDRAMINE HYDROCHLORIDE 50 MG/ML
50 INJECTION INTRAMUSCULAR; INTRAVENOUS ONCE
Status: CANCELLED
Start: 2021-05-12 | End: 2021-05-12

## 2021-05-12 RX ORDER — AMLODIPINE BESYLATE 5 MG/1
5 TABLET ORAL DAILY
COMMUNITY

## 2021-05-12 RX ORDER — ACETAMINOPHEN 325 MG/1
650 TABLET ORAL ONCE
Status: CANCELLED | OUTPATIENT
Start: 2021-05-12 | End: 2021-05-12

## 2021-05-12 RX ORDER — SODIUM CHLORIDE 9 MG/ML
INJECTION, SOLUTION INTRAVENOUS CONTINUOUS
Status: ACTIVE | OUTPATIENT
Start: 2021-05-12 | End: 2021-05-12

## 2021-05-12 RX ORDER — EPINEPHRINE 1 MG/ML
0.3 INJECTION, SOLUTION, CONCENTRATE INTRAVENOUS PRN
Status: CANCELLED | OUTPATIENT
Start: 2021-05-12

## 2021-05-12 RX ORDER — SODIUM CHLORIDE 9 MG/ML
INJECTION, SOLUTION INTRAVENOUS CONTINUOUS
Status: CANCELLED | OUTPATIENT
Start: 2021-05-12

## 2021-05-12 RX ORDER — SODIUM CHLORIDE 0.9 % (FLUSH) 0.9 %
10 SYRINGE (ML) INJECTION PRN
Status: CANCELLED | OUTPATIENT
Start: 2021-05-12

## 2021-05-12 RX ORDER — METHYLPREDNISOLONE SODIUM SUCCINATE 125 MG/2ML
125 INJECTION, POWDER, LYOPHILIZED, FOR SOLUTION INTRAMUSCULAR; INTRAVENOUS ONCE
Status: CANCELLED | OUTPATIENT
Start: 2021-05-12 | End: 2021-05-12

## 2021-05-12 RX ORDER — DIPHENHYDRAMINE HYDROCHLORIDE 50 MG/ML
50 INJECTION INTRAMUSCULAR; INTRAVENOUS ONCE
Status: CANCELLED | OUTPATIENT
Start: 2021-05-12 | End: 2021-05-12

## 2021-05-12 RX ADMIN — IMMUNE GLOBULIN (HUMAN) 30 G: 10 INJECTION INTRAVENOUS; SUBCUTANEOUS at 09:54

## 2021-05-14 ENCOUNTER — TELEPHONE (OUTPATIENT)
Dept: NEUROLOGY | Age: 76
End: 2021-05-14

## 2021-05-14 NOTE — TELEPHONE ENCOUNTER
----- Message from David Wilson MD sent at 5/13/2021  4:28 PM CDT -----  Labs were OK but the kidney function was a little impaired, same as in the past

## 2021-05-25 DIAGNOSIS — G61.81 CIDP (CHRONIC INFLAMMATORY DEMYELINATING POLYNEUROPATHY) (HCC): ICD-10-CM

## 2021-05-25 DIAGNOSIS — M48.062 SPINAL STENOSIS OF LUMBAR REGION WITH NEUROGENIC CLAUDICATION: ICD-10-CM

## 2021-05-25 DIAGNOSIS — M05.79 RHEUMATOID ARTHRITIS INVOLVING MULTIPLE SITES WITH POSITIVE RHEUMATOID FACTOR (HCC): ICD-10-CM

## 2021-05-25 NOTE — TELEPHONE ENCOUNTER
Requested Prescriptions     Pending Prescriptions Disp Refills    HYDROcodone-acetaminophen (NORCO)  MG per tablet 90 tablet 0     Sig: Take 1 tablet by mouth every 8 hours as needed for Pain for up to 30 days.        Last Office Visit:  8/3/2020  Next Office Visit:  Visit date not found  Last Medication Refill:  4/28/2021  Ismael Rodrigues up to date:  3/22/2021    *RX updated to reflect   5/28/2021  fill date*'

## 2021-05-26 RX ORDER — HYDROCODONE BITARTRATE AND ACETAMINOPHEN 10; 325 MG/1; MG/1
1 TABLET ORAL EVERY 8 HOURS PRN
Qty: 90 TABLET | Refills: 0 | Status: SHIPPED | OUTPATIENT
Start: 2021-05-28 | End: 2021-06-24 | Stop reason: SDUPTHER

## 2021-05-27 ENCOUNTER — OFFICE VISIT (OUTPATIENT)
Dept: GASTROENTEROLOGY | Facility: CLINIC | Age: 76
End: 2021-05-27

## 2021-05-27 VITALS
HEIGHT: 64 IN | HEART RATE: 63 BPM | OXYGEN SATURATION: 98 % | DIASTOLIC BLOOD PRESSURE: 60 MMHG | TEMPERATURE: 96.6 F | WEIGHT: 144 LBS | SYSTOLIC BLOOD PRESSURE: 148 MMHG | BODY MASS INDEX: 24.59 KG/M2

## 2021-05-27 DIAGNOSIS — K50.90 CROHN'S DISEASE WITHOUT COMPLICATION, UNSPECIFIED GASTROINTESTINAL TRACT LOCATION (HCC): Primary | ICD-10-CM

## 2021-05-27 PROCEDURE — 99214 OFFICE O/P EST MOD 30 MIN: CPT | Performed by: INTERNAL MEDICINE

## 2021-05-27 RX ORDER — AMLODIPINE BESYLATE 5 MG/1
5 TABLET ORAL DAILY
COMMUNITY

## 2021-05-27 RX ORDER — MONTELUKAST SODIUM 4 MG/1
1 TABLET, CHEWABLE ORAL 3 TIMES DAILY
Qty: 270 TABLET | Refills: 4 | Status: SHIPPED | OUTPATIENT
Start: 2021-05-27

## 2021-05-27 RX ORDER — MELOXICAM 15 MG/1
15 TABLET ORAL DAILY
COMMUNITY
End: 2022-05-13 | Stop reason: HOSPADM

## 2021-05-27 RX ORDER — MESALAMINE 400 MG/1
800 CAPSULE, DELAYED RELEASE ORAL 3 TIMES DAILY
Qty: 360 CAPSULE | Refills: 3 | Status: SHIPPED | OUTPATIENT
Start: 2021-05-27

## 2021-05-27 RX ORDER — HYDROCODONE BITARTRATE AND ACETAMINOPHEN 10; 325 MG/1; MG/1
1 TABLET ORAL EVERY 6 HOURS PRN
COMMUNITY
End: 2022-05-13 | Stop reason: HOSPADM

## 2021-05-27 NOTE — PROGRESS NOTES
Chief Complaint   Patient presents with   • Crohn's Disease       PCP: Cipriano Padilla DO  REFER: No ref. provider found    Subjective     HPI       Overall doing well from the crohn's dz perspective  Denies abdominal pain/gi bleeding  Denies weight loss   Taking po delzicol daily      Past Medical History:   Diagnosis Date   • Arthritis    • Cancer (CMS/HCC)     endometrial cancer   • Crohn disease (CMS/HCC)    • Crohn's disease (CMS/HCC)    • DDD (degenerative disc disease), lumbar    • Elevated cholesterol    • GERD (gastroesophageal reflux disease)    • Gout    • History of transfusion    • Hypertension    • Intermittent self-catheterization of bladder    • Macular degeneration    • Osteopenia    • Peripheral neuropathy    • Urinary retention        Past Surgical History:   Procedure Laterality Date   • BACK SURGERY  2010   • CARPAL TUNNEL RELEASE Bilateral    • CATARACT EXTRACTION Bilateral    • COLONOSCOPY  10/09/2015   • COLONOSCOPY N/A 12/28/2016    Procedure: COLONOSCOPY WITH ANESTHESIA;  Surgeon: oJse Raul Butt DO;  Location:  [facility];  Service:    • COLONOSCOPY N/A 2/18/2019    Procedure: COLONOSCOPY WITH ANESTHESIA;  Surgeon: Jose Raul Butt DO;  Location:  [facility];  Service: Gastroenterology   • CYST REMOVAL      from chest x2   • HARDWARE REMOVAL N/A 2/19/2020    Procedure: REMOVAL OF INSTRUMENTATION;  Surgeon: EBONI Rader MD;  Location:  [facility];  Service: Orthopedic Spine;  Laterality: N/A;   • HYSTERECTOMY     • LUMBAR FUSION Bilateral 2/17/2020    Procedure: LATERAL LUMBAR INTERBODY FUSION LEFT L1-2 WITH INSTRUMENTATION, RIGHT L2-3;  Surgeon: EBONI Rader MD;  Location:  [facility];  Service: Orthopedic Spine;  Laterality: Bilateral;   • LUMBAR LAMINECTOMY WITH FUSION N/A 2/19/2020    Procedure: EXPLORATION OF FUSION L3-5, POSTERIOR SPINAL FUSION WITH INSTRUMENTATION L1-3;  Surgeon: EBONI Rader MD;  Location:  [facility];  Service: Orthopedic Spine;   Laterality: N/A;   • MENISCECTOMY Left    • UPPER GASTROINTESTINAL ENDOSCOPY  08/31/2010       Outpatient Medications Marked as Taking for the 5/27/21 encounter (Office Visit) with Jose Raul Butt, DO   Medication Sig Dispense Refill   • allopurinol (ZYLOPRIM) 100 MG tablet Take 100 mg by mouth 2 (Two) Times a Day.     • allopurinol (ZYLOPRIM) 300 MG tablet Take 300 mg by mouth Daily.     • amLODIPine (NORVASC) 5 MG tablet Take 5 mg by mouth Daily.     • aspirin 81 MG EC tablet Take 81 mg by mouth Daily.     • calcium carbonate (OS-LUKE) 600 MG tablet Take 600 mg by mouth Daily.     • carvedilol (COREG) 6.25 MG tablet Take 6.25 mg by mouth 2 (Two) Times a Day With Meals.     • colestipol (COLESTID) 1 g tablet Take 1 tablet by mouth 3 (Three) Times a Day. 270 tablet 4   • CRANBERRY PO Take 1 tablet by mouth Daily.     • Cyanocobalamin (VITAMIN B 12 PO) Take 1 tablet by mouth Daily.     • estrogens, conjugated, (PREMARIN) 0.3 MG tablet Take 0.3 mg by mouth 2 (Two) Times a Week. Twice weekly      • fenofibrate (TRICOR) 145 MG tablet      • folic acid (FOLVITE) 1 MG tablet Take 1 mg by mouth Daily.     • gabapentin (NEURONTIN) 300 MG capsule Take 300 mg by mouth 3 (Three) Times a Day. 2 pills tid     • Garlic Oil 1000 MG capsule Take 1,000 mg by mouth Daily.     • HYDROcodone-acetaminophen (NORCO)  MG per tablet Take 1 tablet by mouth Every 6 (Six) Hours As Needed for Moderate Pain .     • immune globulin, human, (GAMMAGARD) 10 GM/100ML solution infusion Infuse 400 mg/kg into a venous catheter Every 30 (Thirty) Days.     • indapamide (LOZOL) 2.5 MG tablet Take 2.5 mg by mouth Every Morning.     • lidocaine (XYLOCAINE) 5 % ointment Apply 1 application topically to the appropriate area as directed Every 2 (Two) Hours As Needed for Mild Pain .     • lisinopril (PRINIVIL,ZESTRIL) 40 MG tablet Take 40 mg by mouth Daily.     • meloxicam (MOBIC) 15 MG tablet Take 15 mg by mouth Daily.     • mesalamine (DELZICOL) 400 MG  "capsule delayed-release delayed release capsule Take 2 capsules by mouth 3 (Three) Times a Day. Two pills tid 360 capsule 3   • Multiple Vitamins-Minerals (MULTIVITAMIN ADULT PO) Take 1 tablet by mouth Daily.     • nitrofurantoin (MACRODANTIN) 50 MG capsule Take 50 mg by mouth Every Night.     • Omega-3 Fatty Acids (FISH OIL) 1000 MG capsule capsule Take 1,000 mg by mouth Daily With Breakfast.     • omeprazole (priLOSEC) 40 MG capsule Take 20 mg by mouth 2 (Two) Times a Day.     • polyethyl glycol-propyl glycol (SYSTANE) 0.4-0.3 % solution ophthalmic solution Every 1 (One) Hour As Needed.     • [DISCONTINUED] colestipol (COLESTID) 1 g tablet TAKE 1 TABLET THREE TIMES A  tablet 4   • [DISCONTINUED] mesalamine (DELZICOL) 400 MG capsule delayed-release delayed release capsule Take 2 capsules by mouth 3 (Three) Times a Day. Two pills tid 360 capsule 3       No Known Allergies    Social History     Socioeconomic History   • Marital status:      Spouse name: Not on file   • Number of children: Not on file   • Years of education: Not on file   • Highest education level: Not on file   Tobacco Use   • Smoking status: Never Smoker   • Smokeless tobacco: Never Used   Substance and Sexual Activity   • Alcohol use: No   • Drug use: No   • Sexual activity: Defer       Review of Systems   Constitutional: Negative for unexpected weight change.   Respiratory: Negative for shortness of breath.    Cardiovascular: Negative for chest pain.   Gastrointestinal: Negative for abdominal pain and anal bleeding.       Objective     Vitals:    05/27/21 1434   BP: 148/60   Pulse: 63   Temp: 96.6 °F (35.9 °C)   SpO2: 98%   Weight: 65.3 kg (144 lb)   Height: 162.6 cm (64\")     Body mass index is 24.72 kg/m².    Physical Exam  Constitutional:       Appearance: Normal appearance. She is well-developed.   Eyes:      General: No scleral icterus.  Neck:      Thyroid: No thyroid mass or thyromegaly.      Vascular: No JVD.   Pulmonary:      " Effort: Pulmonary effort is normal. No accessory muscle usage.   Abdominal:      General: There is no distension.      Tenderness: There is no abdominal tenderness. There is no guarding.   Skin:     Coloration: Skin is not jaundiced.   Neurological:      Mental Status: She is alert.   Psychiatric:         Behavior: Behavior is cooperative.         Judgment: Judgment normal.         Imaging Results (Most Recent)     None          Body mass index is 24.72 kg/m².    Assessment/Plan     Diagnoses and all orders for this visit:    1. Crohn's disease without complication, unspecified gastrointestinal tract location (CMS/HCC) (Primary)    Other orders  -     mesalamine (DELZICOL) 400 MG capsule delayed-release delayed release capsule; Take 2 capsules by mouth 3 (Three) Times a Day. Two pills tid  Dispense: 360 capsule; Refill: 3  -     colestipol (COLESTID) 1 g tablet; Take 1 tablet by mouth 3 (Three) Times a Day.  Dispense: 270 tablet; Refill: 4      We refill both her del/colestid  Ov in 1 yr  * Surgery not found *        Advised pt to stop use of NSAIDs, Fish Oil, and MV 5 days prior to procedure, per Dr Butt protocol.  Tylenol based products are ok to take.  Pt verbalized understanding.    Precautions are currently being put in place due to COVID-19.  I have explained to Deisi Ponce they will be required to undergo COVID testing prior to their procedure.  Deisi Ponce verbalized understanding and was willing to proceed.        Jose Raul Butt, DO  05/27/21          There are no Patient Instructions on file for this visit.

## 2021-05-28 ENCOUNTER — TELEPHONE (OUTPATIENT)
Dept: GASTROENTEROLOGY | Facility: CLINIC | Age: 76
End: 2021-05-28

## 2021-06-03 ENCOUNTER — OFFICE VISIT (OUTPATIENT)
Dept: NEUROLOGY | Age: 76
End: 2021-06-03
Payer: MEDICARE

## 2021-06-03 VITALS
WEIGHT: 141 LBS | SYSTOLIC BLOOD PRESSURE: 139 MMHG | DIASTOLIC BLOOD PRESSURE: 68 MMHG | HEIGHT: 64 IN | BODY MASS INDEX: 24.07 KG/M2 | HEART RATE: 68 BPM | RESPIRATION RATE: 16 BRPM

## 2021-06-03 DIAGNOSIS — G25.81 RESTLESS LEGS SYNDROME (RLS): ICD-10-CM

## 2021-06-03 DIAGNOSIS — M48.062 SPINAL STENOSIS OF LUMBAR REGION WITH NEUROGENIC CLAUDICATION: ICD-10-CM

## 2021-06-03 DIAGNOSIS — M05.79 RHEUMATOID ARTHRITIS INVOLVING MULTIPLE SITES WITH POSITIVE RHEUMATOID FACTOR (HCC): ICD-10-CM

## 2021-06-03 DIAGNOSIS — G61.81 CIDP (CHRONIC INFLAMMATORY DEMYELINATING POLYNEUROPATHY) (HCC): Primary | ICD-10-CM

## 2021-06-03 PROCEDURE — 99214 OFFICE O/P EST MOD 30 MIN: CPT | Performed by: PSYCHIATRY & NEUROLOGY

## 2021-06-03 RX ORDER — PREDNISONE 1 MG/1
TABLET ORAL
Qty: 70 TABLET | Refills: 0 | Status: SHIPPED | OUTPATIENT
Start: 2021-06-03 | End: 2021-07-09 | Stop reason: ALTCHOICE

## 2021-06-03 NOTE — PATIENT INSTRUCTIONS
INSTRUCTIONS:  1. Start Prednisone 20 mg every am for a week, then 15 mg daily for a week, then 10 mg daily for a week, then 5 mg daily for a week then stop it.

## 2021-06-03 NOTE — PROGRESS NOTES
[facility]  [address] [place] [address] Drive, [address] [facility] [address]  Manasa Burroughs  Phone [phone]  Fax [phone]     [facility] Follow Up Encounter  6/3/21 10:37 AM CDT    Information:   Patient Name: Ava Potter  :   [id number]  Age:   67 y.o. MRN:   [id number]  Account #:  [de-identified]  Today:  6/3/21    Provider: Charlotte Blunt M.D. Chief Complaint:   Chief Complaint   Patient presents with    Follow-up     Neuropathy is worse       Subjective:   Ava Potter is a 67 y.o. woman with a history of CIDP, RA, lumbar spinal stenosis who is following up. I have not seen her since 2020 at which point she had been off her IVIG for 6 months. She did restart the IVIG in October and has been getting 0.5g/kg every 4 weeks. For a few months, she has had worsened numbness and tingling in her legs. It extends above her knees. She has had some abdominal pain when she is physically active. She describes it as a tightness. She has associated back pain. She has had some pain and muscle spasms in her hands. She does have numbness in her finger tips and says it is worse than it was a year ago. Objective:     Past Medical History:  Past Medical History:   Diagnosis Date    Arthritis     Cancer ([address]     endometrial cancer    Cataract     CIDP (chronic inflammatory demyelinating polyneuropathy) (HCC)     Hypertension     Radiation        Past Surgical History:   Procedure Laterality Date    BACK SURGERY      CARPAL TUNNEL RELEASE Bilateral     CATARACT REMOVAL Bilateral     HAMMER TOE SURGERY Left     HYSTERECTOMY      KNEE ARTHROSCOPY Right     LUMBAR FUSION         Recent Hospitalizations  · None    Significant Injuries  · None    Habits  Ava Potter reports that she has never smoked. She has never used smokeless tobacco. She reports that she does not drink alcohol and does not use drugs.     Family History   Problem Relation Age of Onset    Cancer Mother     High Blood Pressure Father     Heart Disease Father        Social History  Joo Herrera is , lives in [place], [place], and is retired. Medications:  Current Outpatient Medications   Medication Sig Dispense Refill    HYDROcodone-acetaminophen (NORCO)  MG per tablet Take 1 tablet by mouth every 8 hours as needed for Pain for up to 30 days. 90 tablet 0    amLODIPine (NORVASC) 5 MG tablet Take 5 mg by mouth daily      meloxicam (MOBIC) 15 MG tablet Take 15 mg by mouth daily      gabapentin (NEURONTIN) 600 MG tablet TAKE 1 TABLET THREE TIMES A  tablet 1    conjugated estrogens (PREMARIN) 0.625 MG/GM vaginal cream Place vaginally twice a week 1 Tube 3    colestipol (COLESTID) 1 g tablet       tiZANidine (ZANAFLEX) 4 MG tablet       Cyanocobalamin (VITAMIN B 12) 100 MCG LOZG Take by mouth      lidocaine (XYLOCAINE) 5 % ointment Apply topically as needed for Pain Apply topically as needed.  30 g 3    fenofibrate (TRICOR) 145 MG tablet 145 mg daily       Omega-3 Fatty Acids (FISH OIL) 1000 MG CAPS Take 1,000 mg by mouth 2 times daily      Garlic 7876 MG CAPS Take 1,000 mg by mouth 2 times daily      CRANBERRY SOFT PO Take 36 mg by mouth daily      allopurinol (ZYLOPRIM) 300 MG tablet 300 mg daily Indications: takes total 500mg a day       carvedilol (COREG) 6.25 MG tablet Take 6.25 mg by mouth 2 times daily      aspirin 81 MG tablet Take 81 mg by mouth daily      folic acid (FOLVITE) 1 MG tablet Take 1 mg by mouth daily      Multiple Vitamins-Minerals (THERAPEUTIC MULTIVITAMIN-MINERALS) tablet Take 1 tablet by mouth daily      calcium-vitamin D (OSCAL) 250-125 MG-UNIT per tablet Take 1 tablet by mouth daily      allopurinol (ZYLOPRIM) 100 MG tablet Take 200 mg by mouth daily Indications: takes total of 500 mg daily       bromfenac sodium (BROMDAY) 0.09 % SOLN ophthalmic solution Place 1 drop into both eyes daily      mesalamine (DELZICOL) 400 MG CPDR DR capsule Take 800 mg by mouth 3 times daily peripheral edema, No cyanosis or clubbing. No carotid bruits. Pulmonary:  Lungs are clear to auscultation. Breathing appears normal, good expansion, normal effort without use of accessory muscles  Musculoskeletal:  Joints are arthritic. Integument:  No rash, erythema, or pallor  Psychiatric:  Mood, affect, and behavior appear normal      NEUROLOGIC EXAMINATION:  Mental Status:  alert, oriented to person, place, and time. Speech:  Clear without dysarthria or dysphonia  Language:  Fluent without aphasia  Cranial Nerves:   II Visual fields are full to confrontation   III,IV, VI Extraocular movements are full   VII Facial movements are symmetrical without weakness   VIII Hearing is intact   IX,X Shoulder shrug and head rotation strength are intact   XII No tongue atrophy or fasciculations. Normal tongue protrusion. No tongue weakness  Motor:  She has muscle atrophy in her hands, her feet, and her lower legs. Muscle tone is reduced. Strength testing shows upper extremities: D -1/-1, Tri -2/-2, Bi -1/-1, WE -1/-1, WF -1/-1, FE -2/-2, FF -1/-1, IO -3/-3. Lower extremities:  IP -2/-3, HE -1/-1, Q -2/-3, AT -3/-4. Deep tendon reflexes are absent. Tello's signs are absent bilaterally. There is no ankle clonus on either side. Sensation:  Sensation is reduced to light touch, temperature, and vibration in all distal extremities. Coordination:  Rapid alternating movements are normal in both upper extremities. Finger to nose testing is unimpaired bilaterally. Gait:  Unsteady with walker. Pertinent Diagnostic Studies:  VERONIQUE is appropriate    Assessment:       ICD-10-CM    1. CIDP (chronic inflammatory demyelinating polyneuropathy) ([facility])  G61.81    2. Rheumatoid arthritis involving multiple sites with positive rheumatoid factor ([facility])  M05.79    3. Spinal stenosis of lumbar region with neurogenic claudication  M48.062    4.  Restless legs syndrome (RLS)  G25.81    I discussed the diagnosis, pathophysiology, symptoms, risks, prognosis, and treatment options of CIDP with Jada Guillen. It is possible that her lumbar radiculopathies contribute. She has had two lumbar spine surgeries with the last 2/2020. Plan:   1. Start Prednisone 20 mg every am for a week, then 15 mg daily for a week, then 10 mg daily for a week, then 5 mg daily for a week then stop it.     2. Continue the IVIG  3. FU in 3 months    Electronically signed by Roseann Suggs MD on 6/3/21

## 2021-06-09 ENCOUNTER — HOSPITAL ENCOUNTER (OUTPATIENT)
Dept: INFUSION THERAPY | Age: 76
Setting detail: INFUSION SERIES
Discharge: HOME | End: 2021-06-09
Payer: MEDICARE

## 2021-06-09 VITALS
SYSTOLIC BLOOD PRESSURE: 114 MMHG | OXYGEN SATURATION: 94 % | RESPIRATION RATE: 20 BRPM | DIASTOLIC BLOOD PRESSURE: 57 MMHG | HEART RATE: 57 BPM | TEMPERATURE: 97 F

## 2021-06-09 DIAGNOSIS — G61.81 CIDP (CHRONIC INFLAMMATORY DEMYELINATING POLYNEUROPATHY) (HCC): Primary | ICD-10-CM

## 2021-06-09 LAB
ALBUMIN SERPL-MCNC: 4.2 G/DL (ref 3.5–5.2)
ALP BLD-CCNC: 40 U/L (ref 35–104)
ALT SERPL-CCNC: 12 U/L (ref 5–33)
ANION GAP SERPL CALCULATED.3IONS-SCNC: 8 MMOL/L (ref 7–19)
AST SERPL-CCNC: 11 U/L (ref 5–32)
BASOPHILS ABSOLUTE: 0 K/UL (ref 0–0.2)
BASOPHILS RELATIVE PERCENT: 0.3 % (ref 0–1)
BILIRUB SERPL-MCNC: 0.3 MG/DL (ref 0.2–1.2)
BUN BLDV-MCNC: 31 MG/DL (ref 8–23)
CALCIUM SERPL-MCNC: 9.5 MG/DL (ref 8.8–10.2)
CHLORIDE BLD-SCNC: 104 MMOL/L (ref 98–111)
CO2: 30 MMOL/L (ref 22–29)
CREAT SERPL-MCNC: 0.9 MG/DL (ref 0.5–0.9)
EOSINOPHILS ABSOLUTE: 0.1 K/UL (ref 0–0.6)
EOSINOPHILS RELATIVE PERCENT: 1.3 % (ref 0–5)
GFR AFRICAN AMERICAN: >59
GFR NON-AFRICAN AMERICAN: >60
GLUCOSE BLD-MCNC: 137 MG/DL (ref 74–109)
HCT VFR BLD CALC: 35.6 % (ref 37–47)
HEMOGLOBIN: 11.3 G/DL (ref 12–16)
IMMATURE GRANULOCYTES #: 0.1 K/UL
LYMPHOCYTES ABSOLUTE: 1.5 K/UL (ref 1.1–4.5)
LYMPHOCYTES RELATIVE PERCENT: 16 % (ref 20–40)
MCH RBC QN AUTO: 27.6 PG (ref 27–31)
MCHC RBC AUTO-ENTMCNC: 31.7 G/DL (ref 33–37)
MCV RBC AUTO: 86.8 FL (ref 81–99)
MONOCYTES ABSOLUTE: 1.4 K/UL (ref 0–0.9)
MONOCYTES RELATIVE PERCENT: 14.3 % (ref 0–10)
NEUTROPHILS ABSOLUTE: 6.4 K/UL (ref 1.5–7.5)
NEUTROPHILS RELATIVE PERCENT: 67.6 % (ref 50–65)
PDW BLD-RTO: 15.2 % (ref 11.5–14.5)
PLATELET # BLD: 260 K/UL (ref 130–400)
PMV BLD AUTO: 10 FL (ref 9.4–12.3)
POTASSIUM SERPL-SCNC: 4.3 MMOL/L (ref 3.5–5)
RBC # BLD: 4.1 M/UL (ref 4.2–5.4)
SODIUM BLD-SCNC: 142 MMOL/L (ref 136–145)
TOTAL PROTEIN: 6.6 G/DL (ref 6.6–8.7)
WBC # BLD: 9.5 K/UL (ref 4.8–10.8)

## 2021-06-09 PROCEDURE — 85025 COMPLETE CBC W/AUTO DIFF WBC: CPT

## 2021-06-09 PROCEDURE — 80053 COMPREHEN METABOLIC PANEL: CPT

## 2021-06-09 PROCEDURE — 96366 THER/PROPH/DIAG IV INF ADDON: CPT

## 2021-06-09 PROCEDURE — 6360000002 HC RX W HCPCS: Performed by: PSYCHIATRY & NEUROLOGY

## 2021-06-09 PROCEDURE — 96365 THER/PROPH/DIAG IV INF INIT: CPT

## 2021-06-09 RX ORDER — DIPHENHYDRAMINE HYDROCHLORIDE 50 MG/ML
50 INJECTION INTRAMUSCULAR; INTRAVENOUS ONCE
Status: DISCONTINUED | OUTPATIENT
Start: 2021-06-09 | End: 2021-06-11 | Stop reason: HOSPADM

## 2021-06-09 RX ORDER — ACETAMINOPHEN 325 MG/1
650 TABLET ORAL ONCE
Status: CANCELLED | OUTPATIENT
Start: 2021-06-09 | End: 2021-06-09

## 2021-06-09 RX ORDER — METHYLPREDNISOLONE SODIUM SUCCINATE 125 MG/2ML
125 INJECTION, POWDER, LYOPHILIZED, FOR SOLUTION INTRAMUSCULAR; INTRAVENOUS ONCE
Status: CANCELLED | OUTPATIENT
Start: 2021-06-09 | End: 2021-06-09

## 2021-06-09 RX ORDER — DIPHENHYDRAMINE HYDROCHLORIDE 50 MG/ML
50 INJECTION INTRAMUSCULAR; INTRAVENOUS ONCE
Status: CANCELLED | OUTPATIENT
Start: 2021-06-09 | End: 2021-06-09

## 2021-06-09 RX ORDER — SODIUM CHLORIDE 0.9 % (FLUSH) 0.9 %
10 SYRINGE (ML) INJECTION PRN
Status: CANCELLED | OUTPATIENT
Start: 2021-06-09

## 2021-06-09 RX ORDER — DIPHENHYDRAMINE HYDROCHLORIDE 50 MG/ML
50 INJECTION INTRAMUSCULAR; INTRAVENOUS ONCE
Status: CANCELLED
Start: 2021-06-09 | End: 2021-06-09

## 2021-06-09 RX ORDER — SODIUM CHLORIDE 9 MG/ML
INJECTION, SOLUTION INTRAVENOUS CONTINUOUS
Status: CANCELLED | OUTPATIENT
Start: 2021-06-09

## 2021-06-09 RX ORDER — SODIUM CHLORIDE 9 MG/ML
INJECTION, SOLUTION INTRAVENOUS CONTINUOUS
Status: DISCONTINUED | OUTPATIENT
Start: 2021-06-09 | End: 2021-06-09 | Stop reason: CLARIF

## 2021-06-09 RX ORDER — EPINEPHRINE 1 MG/ML
0.3 INJECTION, SOLUTION, CONCENTRATE INTRAVENOUS PRN
Status: CANCELLED | OUTPATIENT
Start: 2021-06-09

## 2021-06-09 RX ORDER — ACETAMINOPHEN 325 MG/1
650 TABLET ORAL ONCE
Status: DISCONTINUED | OUTPATIENT
Start: 2021-06-09 | End: 2021-06-11 | Stop reason: HOSPADM

## 2021-06-09 RX ADMIN — IMMUNE GLOBULIN (HUMAN) 30 G: 10 INJECTION INTRAVENOUS; SUBCUTANEOUS at 09:29

## 2021-06-11 ENCOUNTER — TELEPHONE (OUTPATIENT)
Dept: NEUROLOGY | Age: 76
End: 2021-06-11

## 2021-06-24 ENCOUNTER — PATIENT MESSAGE (OUTPATIENT)
Dept: NEUROLOGY | Age: 76
End: 2021-06-24

## 2021-06-24 DIAGNOSIS — G61.81 CIDP (CHRONIC INFLAMMATORY DEMYELINATING POLYNEUROPATHY) (HCC): ICD-10-CM

## 2021-06-24 DIAGNOSIS — M48.062 SPINAL STENOSIS OF LUMBAR REGION WITH NEUROGENIC CLAUDICATION: ICD-10-CM

## 2021-06-24 DIAGNOSIS — M05.79 RHEUMATOID ARTHRITIS INVOLVING MULTIPLE SITES WITH POSITIVE RHEUMATOID FACTOR (HCC): ICD-10-CM

## 2021-06-25 ENCOUNTER — TELEPHONE (OUTPATIENT)
Dept: NEUROLOGY | Age: 76
End: 2021-06-25

## 2021-06-25 RX ORDER — HYDROCODONE BITARTRATE AND ACETAMINOPHEN 10; 325 MG/1; MG/1
1 TABLET ORAL EVERY 8 HOURS PRN
Qty: 90 TABLET | Refills: 0 | Status: SHIPPED | OUTPATIENT
Start: 2021-06-25 | End: 2021-07-28 | Stop reason: SDUPTHER

## 2021-06-25 NOTE — TELEPHONE ENCOUNTER
Requested Prescriptions     Pending Prescriptions Disp Refills    HYDROcodone-acetaminophen (NORCO)  MG per tablet 90 tablet 0     Sig: Take 1 tablet by mouth every 8 hours as needed for Pain for up to 30 days.        Last Office Visit:  6/3/2021  Next Office Visit:  9/16/2021  Last Medication Refill:  5/28/21  Lizette Specking up to date:  6/25/21    *RX updated to reflect   6/27/21  fill date*

## 2021-06-25 NOTE — TELEPHONE ENCOUNTER
From: Della Chatterjee  To: Berhane Salazar MD  Sent: 6/24/2021 3:41 PM CDT  Subject: Prescription Question    Robert Stoddard is needing a prescription sent in for tiZANidine 4 MG tablet to [facility] in [address]. Armen Stephen used to prescribe it for her, but he's retired now. He normally prescribed 30, and she takes it as needed. If you have any questions, please let us know. I hope you are having a good day!     Thanks,  Jey Bradley

## 2021-06-28 RX ORDER — TIZANIDINE 4 MG/1
4 TABLET ORAL 2 TIMES DAILY PRN
Qty: 60 TABLET | Refills: 5 | Status: SHIPPED | OUTPATIENT
Start: 2021-06-28 | End: 2021-07-28

## 2021-07-08 ENCOUNTER — TRANSCRIBE ORDERS (OUTPATIENT)
Dept: ADMINISTRATIVE | Facility: HOSPITAL | Age: 76
End: 2021-07-08

## 2021-07-08 DIAGNOSIS — Z11.59 SCREENING FOR VIRAL DISEASE: Primary | ICD-10-CM

## 2021-07-09 ENCOUNTER — HOSPITAL ENCOUNTER (OUTPATIENT)
Dept: GENERAL RADIOLOGY | Facility: HOSPITAL | Age: 76
Discharge: HOME | End: 2021-07-09

## 2021-07-09 ENCOUNTER — PRE-ADMISSION TESTING (OUTPATIENT)
Dept: PREADMISSION TESTING | Facility: HOSPITAL | Age: 76
End: 2021-07-09

## 2021-07-09 ENCOUNTER — OFFICE VISIT (OUTPATIENT)
Dept: UROLOGY | Age: 76
End: 2021-07-09
Payer: MEDICARE

## 2021-07-09 VITALS
HEIGHT: 64 IN | HEART RATE: 88 BPM | RESPIRATION RATE: 18 BRPM | BODY MASS INDEX: 24.88 KG/M2 | WEIGHT: 145.72 LBS | SYSTOLIC BLOOD PRESSURE: 128 MMHG | DIASTOLIC BLOOD PRESSURE: 47 MMHG | OXYGEN SATURATION: 94 %

## 2021-07-09 VITALS — BODY MASS INDEX: 24.75 KG/M2 | TEMPERATURE: 98.1 F | HEIGHT: 64 IN | WEIGHT: 145 LBS

## 2021-07-09 DIAGNOSIS — N39.0 RECURRENT UTI: Primary | ICD-10-CM

## 2021-07-09 LAB
ALBUMIN SERPL-MCNC: 4.2 G/DL (ref 3.5–5.2)
ALBUMIN/GLOB SERPL: 1.6 G/DL
ALP SERPL-CCNC: 44 U/L (ref 39–117)
ALT SERPL W P-5'-P-CCNC: 14 U/L (ref 1–33)
ANION GAP SERPL CALCULATED.3IONS-SCNC: 10 MMOL/L (ref 5–15)
AST SERPL-CCNC: 19 U/L (ref 1–32)
BACTERIA URINE, POC: ABNORMAL
BASOPHILS # BLD AUTO: 0.04 10*3/MM3 (ref 0–0.2)
BASOPHILS NFR BLD AUTO: 0.7 % (ref 0–1.5)
BILIRUB SERPL-MCNC: 0.3 MG/DL (ref 0–1.2)
BILIRUBIN URINE: 0 MG/DL
BLOOD, URINE: POSITIVE
BUN SERPL-MCNC: 29 MG/DL (ref 8–23)
BUN/CREAT SERPL: 30.9 (ref 7–25)
CALCIUM SPEC-SCNC: 9.6 MG/DL (ref 8.6–10.5)
CASTS URINE, POC: 0
CHLORIDE SERPL-SCNC: 102 MMOL/L (ref 98–107)
CLARITY: CLEAR
CO2 SERPL-SCNC: 30 MMOL/L (ref 22–29)
COLOR: YELLOW
CREAT SERPL-MCNC: 0.94 MG/DL (ref 0.57–1)
CRYSTALS URINE, POC: 0
DEPRECATED RDW RBC AUTO: 49.8 FL (ref 37–54)
EOSINOPHIL # BLD AUTO: 0.17 10*3/MM3 (ref 0–0.4)
EOSINOPHIL NFR BLD AUTO: 3.2 % (ref 0.3–6.2)
EPI CELLS URINE, POC: ABNORMAL
ERYTHROCYTE [DISTWIDTH] IN BLOOD BY AUTOMATED COUNT: 15 % (ref 12.3–15.4)
GFR SERPL CREATININE-BSD FRML MDRD: 58 ML/MIN/1.73
GLOBULIN UR ELPH-MCNC: 2.6 GM/DL
GLUCOSE SERPL-MCNC: 110 MG/DL (ref 65–99)
GLUCOSE URINE: ABNORMAL
HCT VFR BLD AUTO: 33 % (ref 34–46.6)
HGB BLD-MCNC: 10.4 G/DL (ref 12–15.9)
IMM GRANULOCYTES # BLD AUTO: 0.02 10*3/MM3 (ref 0–0.05)
IMM GRANULOCYTES NFR BLD AUTO: 0.4 % (ref 0–0.5)
KETONES, URINE: NEGATIVE
LEUKOCYTE EST, POC: ABNORMAL
LYMPHOCYTES # BLD AUTO: 1.44 10*3/MM3 (ref 0.7–3.1)
LYMPHOCYTES NFR BLD AUTO: 26.9 % (ref 19.6–45.3)
MCH RBC QN AUTO: 28 PG (ref 26.6–33)
MCHC RBC AUTO-ENTMCNC: 31.5 G/DL (ref 31.5–35.7)
MCV RBC AUTO: 88.9 FL (ref 79–97)
MONOCYTES # BLD AUTO: 1.05 10*3/MM3 (ref 0.1–0.9)
MONOCYTES NFR BLD AUTO: 19.6 % (ref 5–12)
NEUTROPHILS NFR BLD AUTO: 2.63 10*3/MM3 (ref 1.7–7)
NEUTROPHILS NFR BLD AUTO: 49.2 % (ref 42.7–76)
NITRITE, URINE: POSITIVE
NRBC BLD AUTO-RTO: 0 /100 WBC (ref 0–0.2)
PH UA: 5 (ref 4.5–8)
PLATELET # BLD AUTO: 240 10*3/MM3 (ref 140–450)
PMV BLD AUTO: 11 FL (ref 6–12)
POTASSIUM SERPL-SCNC: 4.1 MMOL/L (ref 3.5–5.2)
PROT SERPL-MCNC: 6.8 G/DL (ref 6–8.5)
PROTEIN UA: POSITIVE
RBC # BLD AUTO: 3.71 10*6/MM3 (ref 3.77–5.28)
RBC URINE, POC: ABNORMAL
SODIUM SERPL-SCNC: 142 MMOL/L (ref 136–145)
SPECIFIC GRAVITY UA: 1.02 (ref 1–1.03)
UROBILINOGEN, URINE: NORMAL
WBC # BLD AUTO: 5.35 10*3/MM3 (ref 3.4–10.8)
WBC URINE, POC: ABNORMAL
YEAST URINE, POC: 0

## 2021-07-09 PROCEDURE — 99214 OFFICE O/P EST MOD 30 MIN: CPT | Performed by: NURSE PRACTITIONER

## 2021-07-09 PROCEDURE — 93010 ELECTROCARDIOGRAM REPORT: CPT | Performed by: INTERNAL MEDICINE

## 2021-07-09 PROCEDURE — 80053 COMPREHEN METABOLIC PANEL: CPT

## 2021-07-09 PROCEDURE — 85025 COMPLETE CBC W/AUTO DIFF WBC: CPT

## 2021-07-09 PROCEDURE — 36415 COLL VENOUS BLD VENIPUNCTURE: CPT

## 2021-07-09 PROCEDURE — 71045 X-RAY EXAM CHEST 1 VIEW: CPT

## 2021-07-09 PROCEDURE — 81001 URINALYSIS AUTO W/SCOPE: CPT | Performed by: NURSE PRACTITIONER

## 2021-07-09 PROCEDURE — 93005 ELECTROCARDIOGRAM TRACING: CPT

## 2021-07-09 RX ORDER — CEFDINIR 300 MG/1
300 CAPSULE ORAL 2 TIMES DAILY
Qty: 10 CAPSULE | Refills: 0 | Status: SHIPPED | OUTPATIENT
Start: 2021-07-09 | End: 2021-07-14

## 2021-07-09 NOTE — DISCHARGE INSTRUCTIONS
DAY OF SURGERY INSTRUCTIONS        YOUR SURGEON: Dr. Agustina Mc    PROCEDURE: Laparoscopic cholecystectomy with possible cholangiogram    DATE OF SURGERY: July 13, 2021    ARRIVAL TIME: AS DIRECTED BY OFFICE    YOU MAY TAKE THE FOLLOWING MEDICATION(S) THE MORNING OF SURGERY WITH A SIP OF WATER: Carvedilol (COREG), Gabapentin (NEURONTIN), Hydrocodone/Acetaminophen (NORCO) if needed for pain    Do NOT take your Lisinopril within 24 hours of surgery as directed by anesthesia      ALL OTHER HOME MEDICATION CHECK WITH YOUR PHYSICIAN              MANAGING PAIN AFTER SURGERY    We know you are probably wondering what your pain will be like after surgery.  Following surgery it is unrealistic to expect you will not have pain.   Pain is how our bodies let us know that something is wrong or cautions us to be careful.  That said, our goal is to make your pain tolerable.    Methods we may use to treat your pain include (oral or IV medications, PCAs, epidurals, nerve blocks, etc.)   While some procedures require IV pain medications for a short time after surgery, transitioning to pain medications by mouth allows for better management of pain.   Your nurse will encourage you to take oral pain medications whenever possible.  IV medications work almost immediately, but only last a short while.  Taking medications by mouth allows for a more constant level of medication in your blood stream for a longer period of time.      Once your pain is out of control it is harder to get back under control.  It is important you are aware when your next dose of pain medication is due.  If you are admitted, your nurse may write the time of your next dose on the white board in your room to help you remember.      We are interested in your pain and encourage you to inform us about aggravating factors during your visit.   Many times a simple repositioning every few hours can make a big difference.    If your physician says it is okay, do not  let your pain prevent you from getting out of bed. Be sure to call your nurse for assistance prior to getting up so you do not fall.      Before surgery, please decide your tolerable pain goal.  These faces help describe the pain ratings we use on a 0-10 scale.   Be prepared to tell us your goal and whether or not you take pain or anxiety medications at home.          BEFORE YOU COME TO THE HOSPITAL  (Pre-op instructions)  • Do not eat, drink, smoke or chew gum after midnight the night before surgery.  This also includes no mints.  • Morning of surgery take only the medicines you have been instructed with a sip of water unless otherwise instructed  by your physician.  • Do not shave, wear makeup or dark nail polish.  • Remove all jewelry including rings.  • Leave anything you consider valuable at home.  • Leave your suitcase in the car until after your surgery.  • Bring the following with you if applicable:  o Picture ID and insurance, Medicare or Medicaid cards  o Co-pay/deductible required by insurance (cash, check, credit card)  o Copy of advance directive, living will or power-of- documents if not brought to PAT  o CPAP or BIPAP mask and tubing  o Relaxation aids ( book, magazine), etc.  o Hearing aids                        ON THE DAY OF SURGERY  · On the day of surgery check in at registration located at the main entrance of the hospital.   ? You will be registered and given a beeper with instructions where to wait in the main lobby.  ? When your beeper lights up and vibrates a member of the Outpatient Surgery staff will meet you at the double doors under the stair steps and escort you to your preoperative room.   · You may have cloth compression devices placed on your legs. These help to prevent blood clots and reduce swelling in your legs.  · An IV may be inserted into one of your veins.  · In the operating room, you may be given one or more of the following:  ? A medicine to help you relax  "(sedative).  ? A medicine to numb the area (local anesthetic).  ? A medicine to make you fall asleep (general anesthetic).  ? A medicine that is injected into an area of your body to numb everything below the injection site (regional anesthetic).  · Your surgical site will be marked or identified.  · You may be given an antibiotic through your IV to help prevent infection.  Contact a health care provider if you:  · Develop a fever of more than 100.4°F (38°C) or other feelings of illness during the 48 hours before your surgery.  · Have symptoms that get worse.  Have questions or concerns about your surgery    General Anesthesia/Surgery, Adult  General anesthesia is the use of medicines to make a person \"go to sleep\" (unconscious) for a medical procedure. General anesthesia must be used for certain procedures, and is often recommended for procedures that:  · Last a long time.  · Require you to be still or in an unusual position.  · Are major and can cause blood loss.  The medicines used for general anesthesia are called general anesthetics. As well as making you unconscious for a certain amount of time, these medicines:  · Prevent pain.  · Control your blood pressure.  · Relax your muscles.  Tell a health care provider about:  · Any allergies you have.  · All medicines you are taking, including vitamins, herbs, eye drops, creams, and over-the-counter medicines.  · Any problems you or family members have had with anesthetic medicines.  · Types of anesthetics you have had in the past.  · Any blood disorders you have.  · Any surgeries you have had.  · Any medical conditions you have.  · Any recent upper respiratory, chest, or ear infections.  · Any history of:  ? Heart or lung conditions, such as heart failure, sleep apnea, asthma, or chronic obstructive pulmonary disease (COPD).  ?  service.  ? Depression or anxiety.  · Any tobacco or drug use, including marijuana or alcohol use.  · Whether you are pregnant or " may be pregnant.  What are the risks?  Generally, this is a safe procedure. However, problems may occur, including:  · Allergic reaction.  · Lung and heart problems.  · Inhaling food or liquid from the stomach into the lungs (aspiration).  · Nerve injury.  · Air in the bloodstream, which can lead to stroke.  · Extreme agitation or confusion (delirium) when you wake up from the anesthetic.  · Waking up during your procedure and being unable to move. This is rare.  These problems are more likely to develop if you are having a major surgery or if you have an advanced or serious medical condition. You can prevent some of these complications by answering all of your health care provider's questions thoroughly and by following all instructions before your procedure.  General anesthesia can cause side effects, including:  · Nausea or vomiting.  · A sore throat from the breathing tube.  · Hoarseness.  · Wheezing or coughing.  · Shaking chills.  · Tiredness.  · Body aches.  · Anxiety.  · Sleepiness or drowsiness.  · Confusion or agitation.  RISKS AND COMPLICATIONS OF SURGERY  Your health care provider will discuss possible risks and complications with you before surgery. Common risks and complications include:    · Problems due to the use of anesthetics.  · Blood loss and replacement (does not apply to minor surgical procedures).  · Temporary increase in pain due to surgery.  · Uncorrected pain or problems that the surgery was meant to correct.  · Infection.  · New damage.    What happens before the procedure?    Medicines  Ask your health care provider about:  · Changing or stopping your regular medicines. This is especially important if you are taking diabetes medicines or blood thinners.  · Taking medicines such as aspirin and ibuprofen. These medicines can thin your blood. Do not take these medicines unless your health care provider tells you to take them.  · Taking over-the-counter medicines, vitamins, herbs, and  supplements. Do not take these during the week before your procedure unless your health care provider approves them.  General instructions  · Starting 3-6 weeks before the procedure, do not use any products that contain nicotine or tobacco, such as cigarettes and e-cigarettes. If you need help quitting, ask your health care provider.  · If you brush your teeth on the morning of the procedure, make sure to spit out all of the toothpaste.  · Tell your health care provider if you become ill or develop a cold, cough, or fever.  · If instructed by your health care provider, bring your sleep apnea device with you on the day of your surgery (if applicable).  · Ask your health care provider if you will be going home the same day, the following day, or after a longer hospital stay.  ? Plan to have someone take you home from the hospital or clinic.  ? Plan to have a responsible adult care for you for at least 24 hours after you leave the hospital or clinic. This is important.  What happens during the procedure?  · You will be given anesthetics through both of the following:  ? A mask placed over your nose and mouth.  ? An IV in one of your veins.  · You may receive a medicine to help you relax (sedative).  · After you are unconscious, a breathing tube may be inserted down your throat to help you breathe. This will be removed before you wake up.  · An anesthesia specialist will stay with you throughout your procedure. He or she will:  ? Keep you comfortable and safe by continuing to give you medicines and adjusting the amount of medicine that you get.  ? Monitor your blood pressure, pulse, and oxygen levels to make sure that the anesthetics do not cause any problems.  The procedure may vary among health care providers and hospitals.  What happens after the procedure?  · Your blood pressure, temperature, heart rate, breathing rate, and blood oxygen level will be monitored until the medicines you were given have worn off.  · You  will wake up in a recovery area. You may wake up slowly.  · If you feel anxious or agitated, you may be given medicine to help you calm down.  · If you will be going home the same day, your health care provider may check to make sure you can walk, drink, and urinate.  · Your health care provider will treat any pain or side effects you have before you go home.  · Do not drive for 24 hours if you were given a sedative.  Summary  · General anesthesia is used to keep you still and prevent pain during a procedure.  · It is important to tell your healthcare provider about your medical history and any surgeries you have had, and previous experience with anesthesia.  · Follow your healthcare provider’s instructions about when to stop eating, drinking, or taking certain medicines before your procedure.  · Plan to have someone take you home from the hospital or clinic.  This information is not intended to replace advice given to you by your health care provider. Make sure you discuss any questions you have with your health care provider.  Document Released: 03/26/2009 Document Revised: 08/03/2018 Document Reviewed: 08/03/2018  [employer] Interactive Patient Education © 2019 [employer]       Fall Prevention in Hospitals, Adult  As a hospital patient, your condition and the treatments you receive can increase your risk for falls. Some additional risk factors for falls in a hospital include:  · Being in an unfamiliar environment.  · Being on bed rest.  · Your surgery.  · Taking certain medicines.  · Your tubing requirements, such as intravenous (IV) therapy or catheters.  It is important that you learn how to decrease fall risks while at the hospital. Below are important tips that can help prevent falls.  SAFETY TIPS FOR PREVENTING FALLS  Talk about your risk of falling.  · Ask your health care provider why you are at risk for falling. Is it your medicine, illness, tubing placement, or something else?  · Make a plan with your  health care provider to keep you safe from falls.  · Ask your health care provider or pharmacist about side effects of your medicines. Some medicines can make you dizzy or affect your coordination.  Ask for help.  · Ask for help before getting out of bed. You may need to press your call button.  · Ask for assistance in getting safely to the toilet.  · Ask for a walker or cane to be put at your bedside. Ask that most of the side rails on your bed be placed up before your health care provider leaves the room.  · Ask family or friends to sit with you.  · Ask for things that are out of your reach, such as your glasses, hearing aids, telephone, bedside table, or call button.  Follow these tips to avoid falling:  · Stay lying or seated, rather than standing, while waiting for help.  · Wear rubber-soled slippers or shoes whenever you walk in the hospital.  · Avoid quick, sudden movements.  ¨ Change positions slowly.  ¨ Sit on the side of your bed before standing.  ¨ Stand up slowly and wait before you start to walk.  · Let your health care provider know if there is a spill on the floor.  · Pay careful attention to the medical equipment, electrical cords, and tubes around you.  · When you need help, use your call button by your bed or in the bathroom. Wait for one of your health care providers to help you.  · If you feel dizzy or unsure of your footing, return to bed and wait for assistance.  · Avoid being distracted by the TV, telephone, or another person in your room.  · Do not lean or support yourself on rolling objects, such as IV poles or bedside tables.     This information is not intended to replace advice given to you by your health care provider. Make sure you discuss any questions you have with your health care provider.     Document Released: 12/15/2001 Document Revised: 01/08/2016 Document Reviewed: 08/25/2013  [employer] Interactive Patient Education ©2016 [employer]       [facility]  Patient Instruction Sheet    Chlorhexidine Before Surgery  Chlorhexidine gluconate (CHG) is a germ-killing (antiseptic) solution that is used to clean the skin. It gets rid of the bacteria that normally live on the skin. Cleaning your skin with CHG before surgery helps lower the risk for infection after surgery.    How to use CHG solution  · You will take 2 showers, one shower the night before surgery, the second shower the morning of surgery before coming to the hospital.  · Use CHG only as told by your health care provider, and follow the instructions on the label.  · Use CHG solution while taking a shower. Follow these steps when using CHG solution (unless your health care provider gives you different instructions):  1. Start the shower.  2. Use your normal soap and shampoo to wash your face and hair.  3. Turn off the shower or move out of the shower stream.  4. Pour the CHG onto a clean washcloth. Do not use any type of brush or rough-edged sponge.  5. Starting at your neck, lather your body down to your toes. Make sure you:  6. Pay special attention to the part of your body where you will be having surgery. Scrub this area for at least 1 minute.  7. Use the full amount of CHG as directed. Usually, this is one half bottle for each shower.  8. Do not use CHG on your head or face. If the solution gets into your ears or eyes, rinse them well with water.  9. Avoid your genital area.  10. Avoid any areas of skin that have broken skin, cuts, or scrapes.  11. Scrub your back and under your arms. Make sure to wash skin folds.  12. Let the lather sit on your skin for 1-2 minutes or as long as told by your health care  provider.  13. Thoroughly rinse your entire body in the shower. Make sure that all body creases and crevices are rinsed well.  14. Dry off with a clean towel. Do not put any substances on your body afterward, such as powder, lotion, or perfume.  15. Put on clean clothes or pajamas.  16. If it is the night  before your surgery, sleep in clean sheets.    What are the risks?  Risks of using CHG include:  · A skin reaction.  · Hearing loss, if CHG gets in your ears.  · Eye injury, if CHG gets in your eyes and is not rinsed out.  · The CHG product catching fire.  Make sure that you avoid smoking and flames after applying CHG to your skin.  Do not use CHG:  · If you have a chlorhexidine allergy or have previously reacted to chlorhexidine.  · On babies younger than 2 months of age.      On the day of surgery, when you are taken to your room in Outpatient Surgery you will be given a CHG prepackaged cloth to wipe the site for your surgery.  How to use CHG prepackaged cloths  · Follow the instructions on the label.  · Use the CHG cloth on clean, dry skin. Follow these steps when using a CHG cloth (unless your health care provider gives you different instructions):  1. Using the CHG cloth, vigorously scrub the part of your body where you will be having surgery. Scrub using a back-and-forth motion for 3 minutes. The area on your body should be completely wet with CHG when you are finished scrubbing.  2. Do not rinse. Discard the cloth and let the area air-dry for 1 minute. Do not put any substances on your body afterward, such as powder, lotion, or perfume.  Contact a health care provider if:  · Your skin gets irritated after scrubbing.  · You have questions about using your solution or cloth.  Get help right away if:  · Your eyes become very red or swollen.  · Your eyes itch badly.  · Your skin itches badly and is red or swollen.  · Your hearing changes.  · You have trouble seeing.  · You have swelling or tingling in your mouth or throat.  · You have trouble breathing.  · You swallow any chlorhexidine.  Summary  · Chlorhexidine gluconate (CHG) is a germ-killing (antiseptic) solution that is used to clean the skin. Cleaning your skin with CHG before surgery helps lower the risk for infection after surgery.  · You may be given CHG  to use at home. It may be in a bottle or in a prepackaged cloth to use on your skin. Carefully follow your health care provider's instructions and the instructions on the product label.  · Do not use CHG if you have a chlorhexidine allergy.  · Contact your health care provider if your skin gets irritated after scrubbing.  This information is not intended to replace advice given to you by your health care provider. Make sure you discuss any questions you have with your health care provider.  Document Released: 09/11/2013 Document Revised: 11/15/2018 Document Reviewed: 11/15/2018  [employer] Interactive Patient Education © 2019 [employer]          PATIENT/FAMILY/RESPONSIBLE PARTY VERBALIZES UNDERSTANDING OF ABOVE EDUCATION.  COPY OF PAIN SCALE GIVEN AND REVIEWED WITH VERBALIZED UNDERSTANDING.

## 2021-07-09 NOTE — PROGRESS NOTES
Rony Marie is a 67 y.o., female, Established patient who presents today   Chief Complaint   Patient presents with    Follow-up     I am here today for blood when i self cath. HPI   Patient presents with complaints of possible urinary tract infection. She does have a history of in and out catheterization at least 3 times per day and is also able to void some on her own. She reports over the last couple days she has been noting more blood in the catheter as well in the toilet when she urinates. She typically has a small amount of blood secondary to catheter trauma, however, this is a significant amount more. She is also complaining of some suprapubic pressure as well as more urgency and frequency. She denies any fevers, chills, nausea, vomiting. REVIEW OF SYSTEMS:  Review of Systems   Constitutional: Negative for chills and fever. Gastrointestinal: Positive for abdominal pain. Negative for abdominal distention, nausea and vomiting. Genitourinary: Positive for difficulty urinating, frequency, hematuria (gross ) and urgency. Negative for dysuria and flank pain. Musculoskeletal: Negative for back pain and gait problem. Psychiatric/Behavioral: Negative for agitation and confusion. PHYSICAL EXAM:  Temp 98.1 °F (36.7 °C) (Temporal)   Ht 5' 4\" (1.626 m)   Wt 145 lb (65.8 kg)   BMI 24.89 kg/m²   Physical Exam  Vitals and nursing note reviewed. Constitutional:       General: She is not in acute distress. Appearance: Normal appearance. She is not ill-appearing. Pulmonary:      Effort: Pulmonary effort is normal. No respiratory distress. Abdominal:      General: There is no distension. Tenderness: There is no abdominal tenderness. There is no right CVA tenderness or left CVA tenderness. Neurological:      Mental Status: She is alert and oriented to person, place, and time. Mental status is at baseline. Motor: Weakness present.       Gait: Gait abnormal.   Psychiatric: Mood and Affect: Mood normal.         Behavior: Behavior normal.         DATA:  Results for orders placed or performed in visit on 07/09/21   Culture, Urine    Specimen: Urine, straight catheter   Result Value Ref Range    Urine Culture, Routine >100,000 CFU/ml (A)     Organism Escherichia coli (A)     Urine Culture, Routine Heavy growth        Susceptibility    Escherichia coli - BACTERIAL SUSCEPTIBILITY PANEL BY MAITE     ampicillin 8 Sensitive mcg/mL     aztreonam <=1 Sensitive mcg/mL     ceFAZolin <=4 Sensitive mcg/mL     cefepime <=1 Sensitive mcg/mL     cefTRIAXone <=1 Sensitive mcg/mL     ertapenem <=0.5 Sensitive mcg/mL     gentamicin >=16 Resistant mcg/mL     levofloxacin >=8 Resistant mcg/mL     meropenem <=0.25 Sensitive mcg/mL     nitrofurantoin <=16 Sensitive mcg/mL     piperacillin-tazobactam <=4 Sensitive mcg/mL     tobramycin 8 Intermediate mcg/mL     trimethoprim-sulfamethoxazole >=320 Resistant mcg/mL   POCT Urinalysis Dipstick w/ Micro (Auto)   Result Value Ref Range    Color, UA Yellow     Clarity, UA Clear Clear    Glucose, Ur neg     Bilirubin Urine 0 mg/dL    Ketones, Urine Negative     Specific Gravity, UA 1.020 1.005 - 1.030    Blood, Urine Positive     pH, UA 5.0 4.5 - 8.0    Protein, UA Positive (A) Negative    Nitrite, Urine Positive     Leukocytes, UA moderate     Urobilinogen, Urine Normal     rbc urine, poc 70+     wbc urine, poc 100+     bacteria urine, poc 2+     yeast urine, poc 0     casts urine, poc 0     epi cells urine, poc +     crystals urine, poc 0      ASSESSMENT/PLAN  1. Recurrent UTI  Patient with history of recurrent UTI and performs in and out catheterizations at home. We will send her urine for culture today and treat empirically with antibiotics as she is scheduled to undergo cholecystectomy next week. - Culture, Urine  - cefdinir (OMNICEF) 300 MG capsule; Take 1 capsule by mouth 2 times daily for 5 days  Dispense: 10 capsule;  Refill: 0  - POCT Urinalysis Dipstick

## 2021-07-10 ENCOUNTER — LAB (OUTPATIENT)
Dept: LAB | Facility: HOSPITAL | Age: 76
End: 2021-07-10

## 2021-07-10 LAB — SARS-COV-2 ORF1AB RESP QL NAA+PROBE: NOT DETECTED

## 2021-07-10 PROCEDURE — C9803 HOPD COVID-19 SPEC COLLECT: HCPCS | Performed by: STUDENT IN AN ORGANIZED HEALTH CARE EDUCATION/TRAINING PROGRAM

## 2021-07-10 PROCEDURE — U0004 COV-19 TEST NON-CDC HGH THRU: HCPCS | Performed by: STUDENT IN AN ORGANIZED HEALTH CARE EDUCATION/TRAINING PROGRAM

## 2021-07-11 LAB
QT INTERVAL: 390 MS
QTC INTERVAL: 438 MS

## 2021-07-11 ASSESSMENT — ENCOUNTER SYMPTOMS
ABDOMINAL DISTENTION: 0
VOMITING: 0
NAUSEA: 0
ABDOMINAL PAIN: 1
BACK PAIN: 0

## 2021-07-12 LAB
ORGANISM: ABNORMAL
URINE CULTURE, ROUTINE: ABNORMAL
URINE CULTURE, ROUTINE: ABNORMAL

## 2021-07-13 ENCOUNTER — HOSPITAL ENCOUNTER (OUTPATIENT)
Facility: HOSPITAL | Age: 76
Setting detail: HOSPITAL OUTPATIENT SURGERY
Discharge: HOME | End: 2021-07-13
Attending: STUDENT IN AN ORGANIZED HEALTH CARE EDUCATION/TRAINING PROGRAM | Admitting: STUDENT IN AN ORGANIZED HEALTH CARE EDUCATION/TRAINING PROGRAM

## 2021-07-13 ENCOUNTER — ANESTHESIA EVENT (OUTPATIENT)
Dept: PERIOP | Facility: HOSPITAL | Age: 76
End: 2021-07-13

## 2021-07-13 ENCOUNTER — ANESTHESIA (OUTPATIENT)
Dept: PERIOP | Facility: HOSPITAL | Age: 76
End: 2021-07-13

## 2021-07-13 VITALS
RESPIRATION RATE: 18 BRPM | HEART RATE: 66 BPM | SYSTOLIC BLOOD PRESSURE: 132 MMHG | TEMPERATURE: 98.1 F | OXYGEN SATURATION: 100 % | DIASTOLIC BLOOD PRESSURE: 60 MMHG

## 2021-07-13 DIAGNOSIS — K80.20 GALLBLADDER CALCULUS WITHOUT CHOLECYSTITIS AND NO OBSTRUCTION: ICD-10-CM

## 2021-07-13 DIAGNOSIS — K80.20 CALCULUS OF GALLBLADDER WITHOUT CHOLECYSTITIS WITHOUT OBSTRUCTION: Primary | ICD-10-CM

## 2021-07-13 PROCEDURE — 25010000002 DEXAMETHASONE PER 1 MG: Performed by: STUDENT IN AN ORGANIZED HEALTH CARE EDUCATION/TRAINING PROGRAM

## 2021-07-13 PROCEDURE — 25010000002 ONDANSETRON PER 1 MG: Performed by: NURSE ANESTHETIST, CERTIFIED REGISTERED

## 2021-07-13 PROCEDURE — 25010000002 MORPHINE SULFATE (PF) 2 MG/ML SOLUTION 1 ML CARTRIDGE: Performed by: STUDENT IN AN ORGANIZED HEALTH CARE EDUCATION/TRAINING PROGRAM

## 2021-07-13 PROCEDURE — C1889 IMPLANT/INSERT DEVICE, NOC: HCPCS | Performed by: STUDENT IN AN ORGANIZED HEALTH CARE EDUCATION/TRAINING PROGRAM

## 2021-07-13 PROCEDURE — 25010000002 DEXAMETHASONE PER 1 MG: Performed by: NURSE ANESTHETIST, CERTIFIED REGISTERED

## 2021-07-13 PROCEDURE — 25010000002 FENTANYL CITRATE (PF) 250 MCG/5ML SOLUTION: Performed by: NURSE ANESTHETIST, CERTIFIED REGISTERED

## 2021-07-13 PROCEDURE — 25010000002 PROPOFOL 10 MG/ML EMULSION: Performed by: NURSE ANESTHETIST, CERTIFIED REGISTERED

## 2021-07-13 PROCEDURE — 25010000002 PHENYLEPHRINE HCL 0.8 MG/10ML SOLUTION PREFILLED SYRINGE: Performed by: NURSE ANESTHETIST, CERTIFIED REGISTERED

## 2021-07-13 PROCEDURE — 88304 TISSUE EXAM BY PATHOLOGIST: CPT | Performed by: STUDENT IN AN ORGANIZED HEALTH CARE EDUCATION/TRAINING PROGRAM

## 2021-07-13 PROCEDURE — 25010000002 CEFAZOLIN PER 500 MG: Performed by: STUDENT IN AN ORGANIZED HEALTH CARE EDUCATION/TRAINING PROGRAM

## 2021-07-13 PROCEDURE — 25010000002 HEPARIN (PORCINE) PER 1000 UNITS: Performed by: STUDENT IN AN ORGANIZED HEALTH CARE EDUCATION/TRAINING PROGRAM

## 2021-07-13 DEVICE — LIGACLIP 10-M/L, 10MM ENDOSCOPIC ROTATING MULTIPLE CLIP APPLIERS
Type: IMPLANTABLE DEVICE | Site: ABDOMEN | Status: FUNCTIONAL
Brand: LIGACLIP

## 2021-07-13 RX ORDER — SODIUM CHLORIDE 9 MG/ML
INJECTION, SOLUTION INTRAVENOUS AS NEEDED
Status: DISCONTINUED | OUTPATIENT
Start: 2021-07-13 | End: 2021-07-13 | Stop reason: HOSPADM

## 2021-07-13 RX ORDER — BUPIVACAINE HCL/0.9 % NACL/PF 0.1 %
2 PLASTIC BAG, INJECTION (ML) EPIDURAL ONCE
Status: COMPLETED | OUTPATIENT
Start: 2021-07-13 | End: 2021-07-13

## 2021-07-13 RX ORDER — IBUPROFEN 600 MG/1
600 TABLET ORAL ONCE AS NEEDED
Status: DISCONTINUED | OUTPATIENT
Start: 2021-07-13 | End: 2021-07-13 | Stop reason: HOSPADM

## 2021-07-13 RX ORDER — NEOSTIGMINE METHYLSULFATE 5 MG/5 ML
SYRINGE (ML) INTRAVENOUS AS NEEDED
Status: DISCONTINUED | OUTPATIENT
Start: 2021-07-13 | End: 2021-07-13 | Stop reason: SURG

## 2021-07-13 RX ORDER — BUPIVACAINE HYDROCHLORIDE 5 MG/ML
INJECTION, SOLUTION PERINEURAL AS NEEDED
Status: DISCONTINUED | OUTPATIENT
Start: 2021-07-13 | End: 2021-07-13 | Stop reason: HOSPADM

## 2021-07-13 RX ORDER — HYDROMORPHONE HYDROCHLORIDE 1 MG/ML
0.5 INJECTION, SOLUTION INTRAMUSCULAR; INTRAVENOUS; SUBCUTANEOUS
Status: DISCONTINUED | OUTPATIENT
Start: 2021-07-13 | End: 2021-07-13 | Stop reason: HOSPADM

## 2021-07-13 RX ORDER — FLUMAZENIL 0.1 MG/ML
0.2 INJECTION INTRAVENOUS AS NEEDED
Status: DISCONTINUED | OUTPATIENT
Start: 2021-07-13 | End: 2021-07-13 | Stop reason: HOSPADM

## 2021-07-13 RX ORDER — ACETAMINOPHEN 325 MG/1
975 TABLET ORAL EVERY 8 HOURS
Qty: 100 TABLET | Refills: 2 | Status: ON HOLD
Start: 2021-07-13 | End: 2021-10-14

## 2021-07-13 RX ORDER — DEXTROSE MONOHYDRATE 25 G/50ML
12.5 INJECTION, SOLUTION INTRAVENOUS AS NEEDED
Status: DISCONTINUED | OUTPATIENT
Start: 2021-07-13 | End: 2021-07-13 | Stop reason: HOSPADM

## 2021-07-13 RX ORDER — NALOXONE HCL 0.4 MG/ML
0.04 VIAL (ML) INJECTION AS NEEDED
Status: DISCONTINUED | OUTPATIENT
Start: 2021-07-13 | End: 2021-07-13 | Stop reason: HOSPADM

## 2021-07-13 RX ORDER — MAGNESIUM HYDROXIDE 1200 MG/15ML
LIQUID ORAL AS NEEDED
Status: DISCONTINUED | OUTPATIENT
Start: 2021-07-13 | End: 2021-07-13 | Stop reason: HOSPADM

## 2021-07-13 RX ORDER — FENTANYL CITRATE 50 UG/ML
25 INJECTION, SOLUTION INTRAMUSCULAR; INTRAVENOUS
Status: DISCONTINUED | OUTPATIENT
Start: 2021-07-13 | End: 2021-07-13 | Stop reason: HOSPADM

## 2021-07-13 RX ORDER — PHENYLEPHRINE HCL IN 0.9% NACL 0.8MG/10ML
SYRINGE (ML) INTRAVENOUS AS NEEDED
Status: DISCONTINUED | OUTPATIENT
Start: 2021-07-13 | End: 2021-07-13 | Stop reason: SURG

## 2021-07-13 RX ORDER — FENTANYL CITRATE 50 UG/ML
INJECTION, SOLUTION INTRAMUSCULAR; INTRAVENOUS AS NEEDED
Status: DISCONTINUED | OUTPATIENT
Start: 2021-07-13 | End: 2021-07-13 | Stop reason: SURG

## 2021-07-13 RX ORDER — PROPOFOL 10 MG/ML
VIAL (ML) INTRAVENOUS AS NEEDED
Status: DISCONTINUED | OUTPATIENT
Start: 2021-07-13 | End: 2021-07-13 | Stop reason: SURG

## 2021-07-13 RX ORDER — ONDANSETRON 2 MG/ML
INJECTION INTRAMUSCULAR; INTRAVENOUS AS NEEDED
Status: DISCONTINUED | OUTPATIENT
Start: 2021-07-13 | End: 2021-07-13 | Stop reason: SURG

## 2021-07-13 RX ORDER — OXYCODONE HYDROCHLORIDE 5 MG/1
5 TABLET ORAL EVERY 8 HOURS PRN
Qty: 10 TABLET | Refills: 0 | Status: ON HOLD | OUTPATIENT
Start: 2021-07-13 | End: 2021-10-14

## 2021-07-13 RX ORDER — DEXAMETHASONE SODIUM PHOSPHATE 4 MG/ML
INJECTION, SOLUTION INTRA-ARTICULAR; INTRALESIONAL; INTRAMUSCULAR; INTRAVENOUS; SOFT TISSUE AS NEEDED
Status: DISCONTINUED | OUTPATIENT
Start: 2021-07-13 | End: 2021-07-13 | Stop reason: SURG

## 2021-07-13 RX ORDER — LIDOCAINE HYDROCHLORIDE 10 MG/ML
0.5 INJECTION, SOLUTION EPIDURAL; INFILTRATION; INTRACAUDAL; PERINEURAL ONCE AS NEEDED
Status: DISCONTINUED | OUTPATIENT
Start: 2021-07-13 | End: 2021-07-13 | Stop reason: HOSPADM

## 2021-07-13 RX ORDER — SODIUM CHLORIDE, SODIUM LACTATE, POTASSIUM CHLORIDE, CALCIUM CHLORIDE 600; 310; 30; 20 MG/100ML; MG/100ML; MG/100ML; MG/100ML
9 INJECTION, SOLUTION INTRAVENOUS CONTINUOUS
Status: DISCONTINUED | OUTPATIENT
Start: 2021-07-13 | End: 2021-07-13 | Stop reason: HOSPADM

## 2021-07-13 RX ORDER — SODIUM CHLORIDE, SODIUM LACTATE, POTASSIUM CHLORIDE, CALCIUM CHLORIDE 600; 310; 30; 20 MG/100ML; MG/100ML; MG/100ML; MG/100ML
1000 INJECTION, SOLUTION INTRAVENOUS CONTINUOUS
Status: DISCONTINUED | OUTPATIENT
Start: 2021-07-13 | End: 2021-07-13 | Stop reason: HOSPADM

## 2021-07-13 RX ORDER — SODIUM CHLORIDE 0.9 % (FLUSH) 0.9 %
3 SYRINGE (ML) INJECTION AS NEEDED
Status: DISCONTINUED | OUTPATIENT
Start: 2021-07-13 | End: 2021-07-13 | Stop reason: HOSPADM

## 2021-07-13 RX ORDER — ONDANSETRON 2 MG/ML
4 INJECTION INTRAMUSCULAR; INTRAVENOUS AS NEEDED
Status: DISCONTINUED | OUTPATIENT
Start: 2021-07-13 | End: 2021-07-13 | Stop reason: HOSPADM

## 2021-07-13 RX ORDER — SODIUM CHLORIDE 0.9 % (FLUSH) 0.9 %
10 SYRINGE (ML) INJECTION EVERY 12 HOURS SCHEDULED
Status: DISCONTINUED | OUTPATIENT
Start: 2021-07-13 | End: 2021-07-13 | Stop reason: HOSPADM

## 2021-07-13 RX ORDER — HEPARIN SODIUM 5000 [USP'U]/ML
5000 INJECTION, SOLUTION INTRAVENOUS; SUBCUTANEOUS ONCE
Status: COMPLETED | OUTPATIENT
Start: 2021-07-13 | End: 2021-07-13

## 2021-07-13 RX ORDER — ROCURONIUM BROMIDE 10 MG/ML
INJECTION, SOLUTION INTRAVENOUS AS NEEDED
Status: DISCONTINUED | OUTPATIENT
Start: 2021-07-13 | End: 2021-07-13 | Stop reason: SURG

## 2021-07-13 RX ORDER — LIDOCAINE HYDROCHLORIDE AND EPINEPHRINE 10; 10 MG/ML; UG/ML
INJECTION, SOLUTION INFILTRATION; PERINEURAL AS NEEDED
Status: DISCONTINUED | OUTPATIENT
Start: 2021-07-13 | End: 2021-07-13 | Stop reason: HOSPADM

## 2021-07-13 RX ORDER — ONDANSETRON 4 MG/1
4 TABLET, FILM COATED ORAL EVERY 8 HOURS PRN
Qty: 15 TABLET | Refills: 0 | Status: SHIPPED | OUTPATIENT
Start: 2021-07-13 | End: 2022-07-13

## 2021-07-13 RX ORDER — OXYCODONE AND ACETAMINOPHEN 10; 325 MG/1; MG/1
1 TABLET ORAL ONCE AS NEEDED
Status: DISCONTINUED | OUTPATIENT
Start: 2021-07-13 | End: 2021-07-13 | Stop reason: HOSPADM

## 2021-07-13 RX ORDER — LABETALOL HYDROCHLORIDE 5 MG/ML
5 INJECTION, SOLUTION INTRAVENOUS
Status: DISCONTINUED | OUTPATIENT
Start: 2021-07-13 | End: 2021-07-13 | Stop reason: HOSPADM

## 2021-07-13 RX ORDER — SODIUM CHLORIDE 0.9 % (FLUSH) 0.9 %
10 SYRINGE (ML) INJECTION AS NEEDED
Status: DISCONTINUED | OUTPATIENT
Start: 2021-07-13 | End: 2021-07-13 | Stop reason: HOSPADM

## 2021-07-13 RX ADMIN — Medication 2 G: at 08:14

## 2021-07-13 RX ADMIN — DEXAMETHASONE SODIUM PHOSPHATE 4 MG: 4 INJECTION, SOLUTION INTRA-ARTICULAR; INTRALESIONAL; INTRAMUSCULAR; INTRAVENOUS; SOFT TISSUE at 09:10

## 2021-07-13 RX ADMIN — Medication 160 MCG: at 08:20

## 2021-07-13 RX ADMIN — HEPARIN SODIUM 5000 UNITS: 5000 INJECTION INTRAVENOUS; SUBCUTANEOUS at 07:05

## 2021-07-13 RX ADMIN — FENTANYL CITRATE 150 MCG: 50 INJECTION, SOLUTION INTRAMUSCULAR; INTRAVENOUS at 08:09

## 2021-07-13 RX ADMIN — Medication 100 MCG: at 08:16

## 2021-07-13 RX ADMIN — ROCURONIUM BROMIDE 15 MG: 10 INJECTION INTRAVENOUS at 08:35

## 2021-07-13 RX ADMIN — SODIUM CHLORIDE, POTASSIUM CHLORIDE, SODIUM LACTATE AND CALCIUM CHLORIDE 1000 ML: 600; 310; 30; 20 INJECTION, SOLUTION INTRAVENOUS at 06:13

## 2021-07-13 RX ADMIN — Medication 3 MG: at 09:10

## 2021-07-13 RX ADMIN — SODIUM CHLORIDE, POTASSIUM CHLORIDE, SODIUM LACTATE AND CALCIUM CHLORIDE 1000 ML: 600; 310; 30; 20 INJECTION, SOLUTION INTRAVENOUS at 09:46

## 2021-07-13 RX ADMIN — PROPOFOL 100 MG: 10 INJECTION, EMULSION INTRAVENOUS at 08:09

## 2021-07-13 RX ADMIN — GLYCOPYRROLATE 0.4 MG: 0.2 INJECTION, SOLUTION INTRAMUSCULAR; INTRAVENOUS at 09:10

## 2021-07-13 RX ADMIN — FENTANYL CITRATE 100 MCG: 50 INJECTION, SOLUTION INTRAMUSCULAR; INTRAVENOUS at 08:47

## 2021-07-13 RX ADMIN — ROCURONIUM BROMIDE 35 MG: 10 INJECTION INTRAVENOUS at 08:09

## 2021-07-13 RX ADMIN — ONDANSETRON 4 MG: 2 INJECTION INTRAMUSCULAR; INTRAVENOUS at 09:10

## 2021-07-13 NOTE — DISCHARGE INSTRUCTIONS

## 2021-07-13 NOTE — ANESTHESIA PROCEDURE NOTES
Airway  Date/Time: 7/13/2021 8:11 AM  Airway not difficult    General Information and Staff    Patient location during procedure: OR  CRNA: Jermain Pena CRNA    Indications and Patient Condition  Indications for airway management: airway protection    Preoxygenated: yes  Mask difficulty assessment: 0 - not attempted    Final Airway Details  Final airway type: endotracheal airway      Successful airway: ETT  Cuffed: yes   Successful intubation technique: direct laryngoscopy  Facilitating devices/methods: intubating stylet  Blade: Kaelyn  Blade size: 3  ETT size (mm): 7.0  Cormack-Lehane Classification: grade I - full view of glottis  Placement verified by: chest auscultation and capnometry   Cuff volume (mL): 6  Measured from: lips  ETT/EBT  to lips (cm): 21  Number of attempts at approach: 1  Assessment: lips, teeth, and gum same as pre-op and atraumatic intubation    Additional Comments  ATRAUMATIC INTUBATION

## 2021-07-13 NOTE — OP NOTE
Laparoscopic Cholecystectomy Operative Report:     Patient: Deisi Ponce MRN: [id number]    YOB: 1945  Age: 75 y.o.  Sex: female   Unit: [facility] OR Room/Bed: [facility] OR/MAIN OR Location: [facility]    Admitting Physician: AGUSTINA ASHLEY    Primary Care Physician: Cipriano Padilla DO             INDICATIONS: This is a 75 y.o. year old female who presents with cholelithiasis with symptomatic biliary colic as indication for laparoscopic cholecystectomy. After a discussion of risks and benefits (see H&P) consent was obtained.      DATE OF OPERATION: 7/13/2021     Surgeon(s) and Role:     * Agustina Ashley MD - Primary    ANESTHESIA: General     PREOPERATIVE DIAGNOSIS: GALLSTONES    POSTOPERATIVE DIAGNOSIS: Same    PROCEDURES PERFORMED:  Laparoscopic Cholecystectomy without cholangiogram     PROCEDURE DETAILS:        The patient was brought into the OR and placed in the supine position.  General anesthesia was induced.  The patient's abdomen was prepped and draped in the usual sterile fashion.  A time out was performed verifying the patient and the procedure. Due to her previous midline incision, I elected to begin with an epigastric cutdown. An incision was made and dissection was carried down to the fascia where two stay sutures were placed. The abdomen was entered sharply, and the Moses inserted. The abdomen was inflated to 15mmhg with CO2 gas.  The camera was inserted and there were noted to be significant intra-abdominal adhesions. A 5mm trocar was placed in the right upper quadrant, and I was able to lyse the adhesions sharply. There was bowel and omentum adhered to the abdominal wall. After I completed the lysis, the bowel was inspected and no enterotomies were identified. Two additional 5mm trocars were placed, one above the umbilicus and one on the lateral right abdominal wall.  A laparoscopic TAP block was performed with my deep local.  The patient was placed in slight reverse  trendelenburg position.  The head of the gallbladder was grasped and retracted over the dome of the liver. As soon as the gallbladder was grasped, it tore as it was very inflamed. The infundibulum was also grasped and retracted to the patient's right side, opening up the triangle of calot.  I was unable to get a good visualization of the cystic artery and duct due to anatomy and inflammation. I therefore elected to proceed with a top down dissection. Using the hook, the gallbladder was removed from the liver bed starting at distally. Once the gallbladder was dissected free, the critical view was obtained with two structures going into the gallbladder. The cystic artery was double clipped proximally, single clipped distally and divided. The cystic duct was milked from proximal to distal in an attempt to move any stones into the gallbladder. An 0-PDS endoloop was placed on the cystic duct. Two clips were placed distal to the endoloop and the cystic duct was divided.  The gallbladder was placed in an endocatch bag and retrieved through the 11mm trocar site.  The gallbladder fossa demonstrated no signs of bleeding or leakage of bile.  The abdomen was desufflated and trocars removed. The 11mm trocar site fascia was closed with an 0 vicryl on a UR6 with a figure-of-eight.  The skin was closed with 4-0 monocryl followed by skin glue.  The patient tolerated the procedure well, was extubated in the OR and transferred to the PACU in stable condition.    Findings: inflamed gallbladder, significant adhesions from prior surgery   Estimated Blood Loss: 25mL  Complications: none apparent            Specimens: Gallbladder and contents     Disposition: PACU - hemodynamically stable.           Condition: stable    Agustina Mc MD  07/13/21

## 2021-07-13 NOTE — ANESTHESIA PREPROCEDURE EVALUATION
Anesthesia Evaluation     Patient summary reviewed   no history of anesthetic complications:  NPO Solid Status: > 8 hours             Airway   Mallampati: II  TM distance: >3 FB  Neck ROM: full  Dental          Pulmonary    (-) COPD, asthma, sleep apnea, not a smoker  Cardiovascular   Exercise tolerance: good (4-7 METS)    (+) hypertension,   (-) pacemaker, past MI, angina, cardiac stents      Neuro/Psych  (-) seizures, TIA, CVA  GI/Hepatic/Renal/Endo    (+)  GERD,    (-) liver disease, no renal disease, diabetes    Musculoskeletal     Abdominal    Substance History      OB/GYN          Other                        Anesthesia Plan    ASA 2     general     intravenous induction     Anesthetic plan, all risks, benefits, and alternatives have been provided, discussed and informed consent has been obtained with: patient.

## 2021-07-13 NOTE — ANESTHESIA POSTPROCEDURE EVALUATION
Patient: Deisi Ponce    Procedure Summary     Date: 07/13/21 Room / Location:  [facility] OR  /  [facility] OR    Anesthesia Start: 0806 Anesthesia Stop: 0920    Procedure: LAPAROSCOPIC CHOLECYSTECTOMY (N/A Abdomen) Diagnosis: (GALLSTONES)    Surgeons: Agustina Mc MD Provider: Jermain Pena CRNA    Anesthesia Type: general ASA Status: 2          Anesthesia Type: general    Vitals  Vitals Value Taken Time   /35 07/13/21 1115   Temp 98.1 °F (36.7 °C) 07/13/21 1115   Pulse 59 07/13/21 1120   Resp 16 07/13/21 1115   SpO2 96 % 07/13/21 1120   Vitals shown include unvalidated device data.        Post Anesthesia Care and Evaluation    Patient location during evaluation: PACU  Patient participation: complete - patient participated  Level of consciousness: awake and alert  Pain management: adequate  Airway patency: patent  Anesthetic complications: No anesthetic complications  PONV Status: none  Cardiovascular status: acceptable and hemodynamically stable  Respiratory status: acceptable  Hydration status: acceptable    Comments: Blood pressure 132/60, pulse 66, temperature 98.1 °F (36.7 °C), temperature source Temporal, resp. rate 18, SpO2 100 %, not currently breastfeeding.    Patient discharged from PACU based upon Ariadna score. Please see RN notes for further details

## 2021-07-16 DIAGNOSIS — G61.81 CIDP (CHRONIC INFLAMMATORY DEMYELINATING POLYNEUROPATHY) (HCC): ICD-10-CM

## 2021-07-16 RX ORDER — GABAPENTIN 600 MG/1
TABLET ORAL
Qty: 90 TABLET | Refills: 1 | Status: SHIPPED | OUTPATIENT
Start: 2021-07-16 | End: 2021-07-27 | Stop reason: SDUPTHER

## 2021-07-16 NOTE — TELEPHONE ENCOUNTER
Requested Prescriptions     Pending Prescriptions Disp Refills    gabapentin (NEURONTIN) 600 MG tablet 270 tablet 1     Sig: TAKE 1 TABLET THREE TIMES A DAY       Last Office Visit:  6/3/2021  Next Office Visit:  9/16/2021  Last Medication Refill:  1/20/2021 1 refill (patient was given 90 day supply)  Kristofer Terrell up to date:  6/25/2021    *RX updated to reflect   7/16/2021  fill date*    *Dr. Zayda Serna patient

## 2021-07-22 ENCOUNTER — OFFICE VISIT (OUTPATIENT)
Dept: UROLOGY | Age: 76
End: 2021-07-22
Payer: MEDICARE

## 2021-07-22 VITALS — WEIGHT: 140.9 LBS | HEIGHT: 64 IN | BODY MASS INDEX: 24.05 KG/M2 | TEMPERATURE: 97.9 F

## 2021-07-22 DIAGNOSIS — N39.0 RECURRENT UTI: Primary | ICD-10-CM

## 2021-07-22 LAB
BACTERIA URINE, POC: ABNORMAL
BILIRUBIN URINE: 0 MG/DL
BLOOD, URINE: POSITIVE
CASTS URINE, POC: ABNORMAL
CLARITY: ABNORMAL
COLOR: YELLOW
CRYSTALS URINE, POC: ABNORMAL
EPI CELLS URINE, POC: ABNORMAL
GLUCOSE URINE: ABNORMAL
KETONES, URINE: NEGATIVE
LEUKOCYTE EST, POC: ABNORMAL
NITRITE, URINE: POSITIVE
PH UA: 5.5 (ref 4.5–8)
PROTEIN UA: POSITIVE
RBC URINE, POC: 8
SPECIFIC GRAVITY UA: 1.01 (ref 1–1.03)
UROBILINOGEN, URINE: NORMAL
WBC URINE, POC: ABNORMAL
YEAST URINE, POC: ABNORMAL

## 2021-07-22 PROCEDURE — 99214 OFFICE O/P EST MOD 30 MIN: CPT | Performed by: NURSE PRACTITIONER

## 2021-07-22 PROCEDURE — 81000 URINALYSIS NONAUTO W/SCOPE: CPT | Performed by: NURSE PRACTITIONER

## 2021-07-22 ASSESSMENT — ENCOUNTER SYMPTOMS
NAUSEA: 0
ABDOMINAL PAIN: 0
ABDOMINAL DISTENTION: 0
VOMITING: 0
BACK PAIN: 0

## 2021-07-22 NOTE — PROGRESS NOTES
Felipe Strange is a 67 y.o., female, Established patient who presents today   Chief Complaint   Patient presents with    Follow-up     I am here today for possible UTI       HPI   Patient presents for evaluation of possible urinary tract infection. She was seen and treated in this office on 7/9 for UTI as well. Since that time, she has had her gallbladder removed on 7/13. Patient reports about 3 to 4 days after finishing the antibiotic she began to notice her urine having cloudy and having more sediment. She also began to experience more suprapubic pressure and dysuria. She did perform in and out catheterization at least 3 times per day in addition to voiding on her own. Reports she has been seeing some purulent drainage on catheters after insertion. REVIEW OF SYSTEMS:  Review of Systems   Constitutional: Negative for chills and fever. Gastrointestinal: Negative for abdominal distention, abdominal pain, nausea and vomiting. Genitourinary: Positive for dysuria and hematuria. Negative for difficulty urinating, flank pain, frequency and urgency. Musculoskeletal: Negative for back pain and gait problem. Psychiatric/Behavioral: Negative for agitation and confusion. PHYSICAL EXAM:  Temp 97.9 °F (36.6 °C) (Temporal)   Ht 5' 4\" (1.626 m)   Wt 140 lb 14.4 oz (63.9 kg)   BMI 24.19 kg/m²   Physical Exam  Vitals and nursing note reviewed. Constitutional:       General: She is not in acute distress. Appearance: Normal appearance. She is not ill-appearing. Pulmonary:      Effort: Pulmonary effort is normal. No respiratory distress. Abdominal:      General: There is no distension. Tenderness: There is no abdominal tenderness. There is no right CVA tenderness or left CVA tenderness. Neurological:      Mental Status: She is alert and oriented to person, place, and time. Mental status is at baseline.    Psychiatric:         Mood and Affect: Mood normal.         Behavior: Behavior normal. DATA:  Results for orders placed or performed in visit on 07/22/21   POC URINE with Microscopic   Result Value Ref Range    Color, UA Yellow     Clarity, UA Cloudy (A) Clear    Glucose, Ur neg     Bilirubin Urine 0 mg/dL    Ketones, Urine Negative     Specific Gravity, UA 1.010 1.005 - 1.030    Blood, Urine Positive (A)     pH, UA 5.5 4.5 - 8.0    Protein, UA Positive (A) Negative    Nitrite, Urine Positive (A)     Leukocytes, UA moderate (A)     Urobilinogen, Urine Normal     rbc urine, poc 8 (A)     wbc urine, poc 200+ (A)     bacteria urine, poc 3+ (A)     yeast urine, poc      casts urine, poc      epi cells urine, poc ++ (A)     crystals urine, poc       ASSESSMENT/PLAN  1. Recurrent UTI  Patient presents with complaints of recurrent urinary tract infection. Her urine does appear somewhat suspicious for infection however culture did perform any catheterization. Given her symptoms, I will go ahead and send this for culture and treat with a longer course of antibiotics should the culture come back positive. - POC URINE with Microscopic  - Culture, Urine      Orders Placed This Encounter   Procedures    Culture, Urine     Order Specific Question:   Specify (ex-cath, midstream, cysto, etc)? Answer:   cath- patient collected sample    POC URINE with Microscopic        Return for keep fu as scheduled. An electronic signature was used to authenticate this note. ENZO KAT - CNP    All information inputted into the note by the MA to include chief complaint, past medical history, past surgical history, medications, allergies, social and family history and review of systems has been reviewed and updated as needed by me. EMR Dragon/transcription disclaimer: Much of this document is electronic transcription/translation of spoken language to printed text.  The electronic translation of spoken language may be erroneous or, at times, nonsensical words or phrases may be inadvertently transcribed.  Although I have reviewed the document for such errors, some may still exist.

## 2021-07-23 RX ORDER — CEFDINIR 300 MG/1
300 CAPSULE ORAL 2 TIMES DAILY
Qty: 28 CAPSULE | Refills: 0 | Status: SHIPPED | OUTPATIENT
Start: 2021-07-23 | End: 2021-08-06

## 2021-07-24 LAB
ORGANISM: ABNORMAL
URINE CULTURE, ROUTINE: ABNORMAL
URINE CULTURE, ROUTINE: ABNORMAL

## 2021-07-27 DIAGNOSIS — G61.81 CIDP (CHRONIC INFLAMMATORY DEMYELINATING POLYNEUROPATHY) (HCC): ICD-10-CM

## 2021-07-28 DIAGNOSIS — M48.062 SPINAL STENOSIS OF LUMBAR REGION WITH NEUROGENIC CLAUDICATION: ICD-10-CM

## 2021-07-28 DIAGNOSIS — G61.81 CIDP (CHRONIC INFLAMMATORY DEMYELINATING POLYNEUROPATHY) (HCC): ICD-10-CM

## 2021-07-28 DIAGNOSIS — M05.79 RHEUMATOID ARTHRITIS INVOLVING MULTIPLE SITES WITH POSITIVE RHEUMATOID FACTOR (HCC): ICD-10-CM

## 2021-07-28 RX ORDER — HYDROCODONE BITARTRATE AND ACETAMINOPHEN 10; 325 MG/1; MG/1
1 TABLET ORAL EVERY 8 HOURS PRN
Qty: 90 TABLET | Refills: 0 | Status: SHIPPED | OUTPATIENT
Start: 2021-07-28 | End: 2021-08-25 | Stop reason: SDUPTHER

## 2021-07-28 RX ORDER — GABAPENTIN 600 MG/1
TABLET ORAL
Qty: 270 TABLET | Refills: 1 | Status: SHIPPED | OUTPATIENT
Start: 2021-07-28 | End: 2022-04-09 | Stop reason: SDUPTHER

## 2021-07-28 NOTE — TELEPHONE ENCOUNTER
Meeta Solis has requested a refill on her medication. Last office visit : 6/3/2021   Next office visit : 9/16/2021   Last medication refill :6-25-21  Roberta Lowery : 6-25-21      Requested Prescriptions     Pending Prescriptions Disp Refills    HYDROcodone-acetaminophen (NORCO)  MG per tablet 90 tablet 0     Sig: Take 1 tablet by mouth every 8 hours as needed for Pain for up to 30 days.

## 2021-08-02 ENCOUNTER — HOSPITAL ENCOUNTER (OUTPATIENT)
Dept: INFUSION THERAPY | Age: 76
Setting detail: INFUSION SERIES
Discharge: HOME | End: 2021-08-02
Payer: MEDICARE

## 2021-08-02 VITALS
SYSTOLIC BLOOD PRESSURE: 104 MMHG | OXYGEN SATURATION: 98 % | DIASTOLIC BLOOD PRESSURE: 59 MMHG | HEART RATE: 71 BPM | TEMPERATURE: 98 F | RESPIRATION RATE: 17 BRPM

## 2021-08-02 DIAGNOSIS — G61.81 CIDP (CHRONIC INFLAMMATORY DEMYELINATING POLYNEUROPATHY) (HCC): Primary | ICD-10-CM

## 2021-08-02 LAB
ALBUMIN SERPL-MCNC: 4 G/DL (ref 3.5–5.2)
ALP BLD-CCNC: 46 U/L (ref 35–104)
ALT SERPL-CCNC: 14 U/L (ref 5–33)
ANION GAP SERPL CALCULATED.3IONS-SCNC: 14 MMOL/L (ref 7–19)
AST SERPL-CCNC: 17 U/L (ref 5–32)
BASOPHILS ABSOLUTE: 0 K/UL (ref 0–0.2)
BASOPHILS RELATIVE PERCENT: 0.5 % (ref 0–1)
BILIRUB SERPL-MCNC: <0.2 MG/DL (ref 0.2–1.2)
BUN BLDV-MCNC: 45 MG/DL (ref 8–23)
CALCIUM SERPL-MCNC: 10 MG/DL (ref 8.8–10.2)
CHLORIDE BLD-SCNC: 102 MMOL/L (ref 98–111)
CO2: 27 MMOL/L (ref 22–29)
CREAT SERPL-MCNC: 1 MG/DL (ref 0.5–0.9)
EOSINOPHILS ABSOLUTE: 0.2 K/UL (ref 0–0.6)
EOSINOPHILS RELATIVE PERCENT: 2.7 % (ref 0–5)
GFR AFRICAN AMERICAN: >59
GFR NON-AFRICAN AMERICAN: 54
GLUCOSE BLD-MCNC: 125 MG/DL (ref 74–109)
HCT VFR BLD CALC: 35.3 % (ref 37–47)
HEMOGLOBIN: 11.2 G/DL (ref 12–16)
IMMATURE GRANULOCYTES #: 0 K/UL
LYMPHOCYTES ABSOLUTE: 1.4 K/UL (ref 1.1–4.5)
LYMPHOCYTES RELATIVE PERCENT: 23 % (ref 20–40)
MCH RBC QN AUTO: 28.8 PG (ref 27–31)
MCHC RBC AUTO-ENTMCNC: 31.7 G/DL (ref 33–37)
MCV RBC AUTO: 90.7 FL (ref 81–99)
MONOCYTES ABSOLUTE: 1.2 K/UL (ref 0–0.9)
MONOCYTES RELATIVE PERCENT: 20.3 % (ref 0–10)
NEUTROPHILS ABSOLUTE: 3.1 K/UL (ref 1.5–7.5)
NEUTROPHILS RELATIVE PERCENT: 53.3 % (ref 50–65)
PDW BLD-RTO: 14.6 % (ref 11.5–14.5)
PLATELET # BLD: 232 K/UL (ref 130–400)
PMV BLD AUTO: 10.8 FL (ref 9.4–12.3)
POTASSIUM SERPL-SCNC: 4 MMOL/L (ref 3.5–5)
RBC # BLD: 3.89 M/UL (ref 4.2–5.4)
SODIUM BLD-SCNC: 143 MMOL/L (ref 136–145)
TOTAL PROTEIN: 6.7 G/DL (ref 6.6–8.7)
WBC # BLD: 5.9 K/UL (ref 4.8–10.8)

## 2021-08-02 PROCEDURE — 6360000002 HC RX W HCPCS: Performed by: PSYCHIATRY & NEUROLOGY

## 2021-08-02 PROCEDURE — 85025 COMPLETE CBC W/AUTO DIFF WBC: CPT

## 2021-08-02 PROCEDURE — 80053 COMPREHEN METABOLIC PANEL: CPT

## 2021-08-02 PROCEDURE — 96365 THER/PROPH/DIAG IV INF INIT: CPT

## 2021-08-02 PROCEDURE — 96366 THER/PROPH/DIAG IV INF ADDON: CPT

## 2021-08-02 RX ORDER — DIPHENHYDRAMINE HYDROCHLORIDE 50 MG/ML
50 INJECTION INTRAMUSCULAR; INTRAVENOUS ONCE
Status: CANCELLED | OUTPATIENT
Start: 2021-08-02 | End: 2021-08-02

## 2021-08-02 RX ORDER — ACETAMINOPHEN 325 MG/1
650 TABLET ORAL ONCE
Status: CANCELLED | OUTPATIENT
Start: 2021-08-02 | End: 2021-08-02

## 2021-08-02 RX ORDER — METHYLPREDNISOLONE SODIUM SUCCINATE 125 MG/2ML
125 INJECTION, POWDER, LYOPHILIZED, FOR SOLUTION INTRAMUSCULAR; INTRAVENOUS ONCE
Status: CANCELLED | OUTPATIENT
Start: 2021-08-02 | End: 2021-08-02

## 2021-08-02 RX ORDER — SODIUM CHLORIDE 9 MG/ML
INJECTION, SOLUTION INTRAVENOUS CONTINUOUS
Status: CANCELLED | OUTPATIENT
Start: 2021-08-02

## 2021-08-02 RX ORDER — DIPHENHYDRAMINE HYDROCHLORIDE 50 MG/ML
50 INJECTION INTRAMUSCULAR; INTRAVENOUS ONCE
Status: DISCONTINUED | OUTPATIENT
Start: 2021-08-02 | End: 2021-08-04 | Stop reason: HOSPADM

## 2021-08-02 RX ORDER — EPINEPHRINE 1 MG/ML
0.3 INJECTION, SOLUTION, CONCENTRATE INTRAVENOUS PRN
Status: CANCELLED | OUTPATIENT
Start: 2021-08-02

## 2021-08-02 RX ORDER — SODIUM CHLORIDE 0.9 % (FLUSH) 0.9 %
10 SYRINGE (ML) INJECTION PRN
Status: CANCELLED | OUTPATIENT
Start: 2021-08-02

## 2021-08-02 RX ORDER — DIPHENHYDRAMINE HYDROCHLORIDE 50 MG/ML
50 INJECTION INTRAMUSCULAR; INTRAVENOUS ONCE
Status: CANCELLED
Start: 2021-08-02 | End: 2021-08-02

## 2021-08-02 RX ORDER — SODIUM CHLORIDE 0.9 % (FLUSH) 0.9 %
10 SYRINGE (ML) INJECTION PRN
Status: DISCONTINUED | OUTPATIENT
Start: 2021-08-02 | End: 2021-08-03 | Stop reason: HOSPADM

## 2021-08-02 RX ADMIN — IMMUNE GLOBULIN (HUMAN) 30 G: 10 INJECTION INTRAVENOUS; SUBCUTANEOUS at 10:15

## 2021-08-04 ENCOUNTER — TELEPHONE (OUTPATIENT)
Dept: NEUROLOGY | Age: 76
End: 2021-08-04

## 2021-08-16 RX ORDER — NITROFURANTOIN MACROCRYSTALS 50 MG/1
50 CAPSULE ORAL 4 TIMES DAILY
COMMUNITY
End: 2021-09-03

## 2021-08-25 DIAGNOSIS — M05.79 RHEUMATOID ARTHRITIS INVOLVING MULTIPLE SITES WITH POSITIVE RHEUMATOID FACTOR (HCC): ICD-10-CM

## 2021-08-25 DIAGNOSIS — G61.81 CIDP (CHRONIC INFLAMMATORY DEMYELINATING POLYNEUROPATHY) (HCC): ICD-10-CM

## 2021-08-25 DIAGNOSIS — M48.062 SPINAL STENOSIS OF LUMBAR REGION WITH NEUROGENIC CLAUDICATION: ICD-10-CM

## 2021-08-26 RX ORDER — HYDROCODONE BITARTRATE AND ACETAMINOPHEN 10; 325 MG/1; MG/1
1 TABLET ORAL EVERY 8 HOURS PRN
Qty: 90 TABLET | Refills: 0 | Status: SHIPPED | OUTPATIENT
Start: 2021-08-27 | End: 2021-09-28 | Stop reason: SDUPTHER

## 2021-08-26 NOTE — TELEPHONE ENCOUNTER
Requested Prescriptions     Pending Prescriptions Disp Refills    HYDROcodone-acetaminophen (NORCO)  MG per tablet 90 tablet 0     Sig: Take 1 tablet by mouth every 8 hours as needed for Pain for up to 30 days.        Last Office Visit:  6/3/2021  Next Office Visit:  9/16/2021  Last Medication Refill: 7/28/21   Darleen Campos up to date:  8/26/21    *RX updated to reflect   8/27/21  fill date*

## 2021-08-30 ENCOUNTER — HOSPITAL ENCOUNTER (OUTPATIENT)
Dept: GENERAL RADIOLOGY | Age: 76
Discharge: HOME | End: 2021-08-30
Payer: MEDICARE

## 2021-08-30 ENCOUNTER — OFFICE VISIT (OUTPATIENT)
Dept: VASCULAR SURGERY | Age: 76
End: 2021-08-30
Payer: MEDICARE

## 2021-08-30 VITALS
RESPIRATION RATE: 16 BRPM | TEMPERATURE: 97.6 F | OXYGEN SATURATION: 97 % | BODY MASS INDEX: 23.05 KG/M2 | DIASTOLIC BLOOD PRESSURE: 55 MMHG | HEIGHT: 64 IN | HEART RATE: 63 BPM | SYSTOLIC BLOOD PRESSURE: 116 MMHG | WEIGHT: 135 LBS

## 2021-08-30 DIAGNOSIS — T14.8XXA WOUND INFECTION: ICD-10-CM

## 2021-08-30 DIAGNOSIS — L08.9 WOUND INFECTION: ICD-10-CM

## 2021-08-30 DIAGNOSIS — Z01.818 PRE-OP TESTING: Primary | ICD-10-CM

## 2021-08-30 DIAGNOSIS — I70.245 ATHSCL NATIVE ARTERIES OF LEFT LEG W ULCERATION OTH PRT FOOT (HCC): ICD-10-CM

## 2021-08-30 PROCEDURE — 73630 X-RAY EXAM OF FOOT: CPT

## 2021-08-30 PROCEDURE — 99204 OFFICE O/P NEW MOD 45 MIN: CPT | Performed by: PHYSICIAN ASSISTANT

## 2021-08-30 NOTE — LETTER
Rj Lewis    Arteriogram Instructions    1. Report to the [facility] at [facility] (go in the front door and to the left) on Friday 9/3/2021, at 7:30 am. Please be sure to bring a copy of your covid vaccination card. 2. Nothing to eat or drink after midnight the night before the procedure. 3. Please take all medications as normally scheduled to take, including heart and blood pressure medicines with a sip of water. Except Lisinopril, do not take this medication the morning of your procedure. 4. Do not take Lasix, insulin, or any diabetic medicine the morning of the procedure. If you take insulin, you may only take 1/2 of any scheduled nightly dose, and none the morning of the procedure. You may take all regularly scheduled heart, cholesterol, and blood pressure medicines with a sip of water. 5. If you take Glucophage, Metformin, Actos Plus Met, or Glucovance,you can not take this the day of your procedure and two days after the procedure. 6. Hold Plavix/Coumadin for 0 days prior to surgery. 7. Do not hold Aspirin. 8. If you have sleep apnea and require C-PAP, please bring this with you to the hospital.  9. Bring a list of all of your allergies and medications with you to the hospital.  10. Please let our nurse know if you have had an allergy to iodine, shellfish, or x-ray dye. 6. Let the nurse know if you take any of the followin. Over the counter herbal supplements  2. Diclofenec, indomethacin, ketoprofen, Caridopa/levadopa, naproxen, sulindac, piroxicam, glucosamine, Chondrotin, cocchine, or methotrexate. 12. Plan to stay at the hospital for 4 - 6 hours before being released  by the physician. You will need someone to drive you home after the procedure. 13. Medications instructed to hold: See Above  14. Please stop at your local [employer] or pharmacy and buy a bottle of Hibiclens.  Wash thoroughly with this the night before and the morning of the procedure, paying special attention to the area that will be operated on. Make sure you rinse very well. The Hibiclens should only be used prior to surgery. 15. Please stop by our Out Patient lab today for pre-procedure labs. 12. New policy requires that anyone who comes into the hospital will be required to wear a face mask. A cloth mask is acceptable. 17. To ensure the health and safety of our patients and staff, [facility] has implemented visitor restrictions. Only one person will be allowed to accompany you for your procedure. If you or your visitor are exhibiting signs & symptoms of illness such as fever, cough, sore throat or body aches, we ask that you reschedule your procedure to a later date after your symptoms have been resolved. 18. Other Directions: If you have any questions or concerns please contact our office at [phone] and ask for J Luis Ortiz. Unless instructed otherwise by your physician, cleanse incision/puncture site twice daily with soap and water. Apply dry gauze. Do not get in tub. Okay to shower. Do not apply any salve, cream, peroxide or alcohol to the incision. Call with any increasing redness or drainage. PLEASE NOTE:  If the patient is unable to sign his/her own paperwork, the appointed caregiver (POA, child, sibling, etc) must be present at the time of registration for all testing and procedures. Transportation to and from all testing and procedure appointments is the sole responsibility of the patient, caregiver, and/or nursing facility in which they reside. Please remember you will not be able to drive after you are discharged. Please call the office at [phone] with any questions or concerns. Please allow 48-72 hours notice for cancellations or rescheduling. We will attempt to accommodate your needs to the best of our capabilities, however, strict policies with procedure room availability does not allow much flexibility.

## 2021-08-30 NOTE — PROGRESS NOTES
Patient Care Team:  Nohemi De Santiago DO as PCP - General (Family Medicine)  Wil Cardenas MD as Neurologist (Neurology)  Oscar Montalvo DO as Consulting Physician (Vascular Surgery)      History and Physical  Mrs. Jose Godinez is a 77-year-old female who has a history that includes hypertension, hammertoe deformity status post surgery, chronic inflammatory demyelinating polyneuropathy and PVD Today she has been referred to us from Dr. Mauricio Suggs for evaluation of left foot wound. She reports the wound on the bottom of her left foot has been ongoing for a couple of years. She reports that this will develop is a callus and it opened up and then typically heals. However recently this does not seem to want to heal. No fever. She is on aspirin daily. Old records have been obtained, reviewed, and summarized. Mariam Hale is a 67 y.o. 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  90 tablet 0    nitrofurantoin (MACRODANTIN) 50 MG capsule Take 50 mg by mouth 4 times daily      estrogens, conjugated, (PREMARIN) 0.3 MG tablet Take 0.3 mg by mouth daily      amLODIPine (NORVASC) 5 MG tablet Take 5 mg by mouth daily      conjugated estrogens (PREMARIN) 0.625 MG/GM vaginal cream Place vaginally twice a week 1 Tube 3    colestipol (COLESTID) 1 g tablet       Cyanocobalamin (VITAMIN B 12) 100 MCG LOZG Take by mouth      lidocaine (XYLOCAINE) 5 % ointment Apply topically as needed for Pain Apply topically as needed. 30 g 3    fenofibrate (TRICOR) 145 MG tablet 145 mg daily       Omega-3 Fatty Acids (FISH OIL) 1000 MG CAPS Take 1,000 mg by mouth 2 times daily      Garlic 4843 MG CAPS Take 1,000 mg by mouth 2 times daily      CRANBERRY SOFT PO Take 36 mg by mouth daily      allopurinol (ZYLOPRIM) 300 MG tablet 300 mg daily Indications: takes total 500mg a day       carvedilol (COREG) 6.25 MG tablet Take 6.25 mg by mouth 2 times daily      aspirin 81 MG tablet Take 81 mg by mouth daily      folic acid (FOLVITE) 1 MG tablet Take 1 mg by mouth daily      Multiple Vitamins-Minerals (THERAPEUTIC MULTIVITAMIN-MINERALS) tablet Take 1 tablet by mouth daily      calcium-vitamin D (OSCAL) 250-125 MG-UNIT per tablet Take 1 tablet by mouth daily      allopurinol (ZYLOPRIM) 100 MG tablet Take 200 mg by mouth daily Indications: takes total of 500 mg daily       mesalamine (DELZICOL) 400 MG CPDR DR capsule Take 800 mg by mouth 3 times daily      indapamide (LOZOL) 2.5 MG tablet Take 2.5 mg by mouth every morning      lisinopril (PRINIVIL;ZESTRIL) 40 MG tablet Take 20 mg by mouth daily       omeprazole (PRILOSEC) 20 MG capsule Take 20 mg by mouth 2 times daily       gabapentin (NEURONTIN) 600 MG tablet TAKE 1 TABLET THREE TIMES A DAY (Patient taking differently: 300 mg 3 times daily. TAKE 1 TABLET THREE TIMES A DAY) 270 tablet 1    meloxicam (MOBIC) 15 MG tablet Take 15 mg by mouth daily      bromfenac sodium (BROMDAY) 0.09 % SOLN ophthalmic solution Place 1 drop into both eyes daily (Patient not taking: Reported on 8/16/2021)       No current facility-administered medications for this visit. Allergies: Patient has no known allergies.   Past Medical History:   Diagnosis Date    Arthritis     Cancer [facility])     endometrial cancer    Cataract     CIDP (chronic inflammatory demyelinating polyneuropathy) ([facility])     Hypertension     Radiation      Past Surgical History:   Procedure Laterality Date    BACK SURGERY      CARPAL TUNNEL RELEASE Bilateral     CATARACT REMOVAL Bilateral     HAMMER TOE SURGERY Left     HYSTERECTOMY      KNEE ARTHROSCOPY Right     LUMBAR FUSION       Family History   Problem Relation Age of Onset    Cancer Mother     High Blood Pressure Father     Heart Disease Father      Social History     Tobacco Use    Smoking status: Never Smoker    Smokeless tobacco: Never Used   Substance Use Topics    Alcohol use: No       Review of Systems    Constitutional - no significant activity change, appetite change, or unexpected weight change. No fever or chills. No diaphoresis or significant fatigue. HENT - no significant rhinorrhea or epistaxis. No tinnitus or significant hearing loss. Eyes - no sudden vision change or amaurosis. Respiratory - no significant shortness of breath, wheezing, or stridor. No apnea, cough, or chest tightness associated with shortness of breath. Cardiovascular - no chest pain, syncope, or significant dizziness. No palpitations or significant leg swelling. (see HPI)  Gastrointestinal - no abdominal swelling or pain. No blood in stool. No severe constipation, diarrhea, nausea, or vomiting. Genitourinary - No difficulty urinating, dysuria, frequency, or urgency. No flank pain or hematuria. Musculoskeletal - no back pain, gait disturbance, or myalgia. Skin - no color change, rash, pallor. Wounds left 4th toe (dorsal aspect)and plantar aspect left foot. Neurologic - no dizziness, facial asymmetry, or light headedness. No seizures. No speech difficulty or lateralizing weakness. Hematologic - no easy bruising or excessive bleeding. Psychiatric - no severe anxiety or nervousness. No confusion. All other review of systems are negative.       Physical Exam    BP (!) 116/55 (Site: Left Upper Arm)   Pulse 63 Temp 97.6 °F (36.4 °C) (Temporal)   Resp 16   Ht 5' 4\" (1.626 m)   Wt 135 lb (61.2 kg)   SpO2 97%   BMI 23.17 kg/m²     Constitutional - well developed, well nourished. No diaphoresis or acute distress. HENT - head normocephalic. Right external ear canal appears normal.  Left external ear canal appears normal.  Septum appears midline. Eyes - conjunctiva normal.  EOMS normal.  No exudate. No icterus. Neck- ROM appears normal, no tracheal deviation. Cardiovascular - Regular rate and rhythm. Heart sounds are normal.  No murmur, rub, or gallop. Carotid pulses are 2+ to palpation bilaterally without bruit. Extremities - Radial and ulnar pulses are 2+ to palpation bilaterally. Femoral pulses 2+. DP pulses are palpable. No cyanosis, clubbing, or significant edema. No signs atheroembolic event. Wound dorsal aspect left fourth toe and plantar aspect of the left foot over the fifth metatarsal head. There is drainage noted from the wound on plantar aspect of foot. No foul odor noted. Pulmonary - effort appears normal.  No respiratory distress. Lungs - Breath sounds normal. No wheezes or rales. GI - Abdomen - soft, non tender, bowel sounds X 4 quadrants. No guarding or rebound tenderness. No distension or palpable mass. Genitourinary - deferred. Musculoskeletal - ROM appears normal.  No significant edema. Neurologic - alert and oriented X 3. Physiologic. Skin - warm, dry, and intact. No rash, erythema, or pallor. Psychiatric - mood, affect, and behavior appear normal.  Judgment and thought processes appear normal.    Risk factors for atherosclerosis of all vascular beds have been reviewed with the patient including:  Family history, tobacco abuse in all forms, elevated cholesterol, hyperlipidemia, and diabetes. Lower extremity arterial study: Done 7/30/2021 at [facility]  Right GUANAKITO 0.94, Left GUANAKITO 03.78. Individual films reviewed: Yes.   Test results were reviewed with the patient. Assessment      1. Pre-op testing    2. 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 XR FOOT LEFT (MIN 3 VIEWS) 8/30/2021 2:59 PM   History: Left foot wound. The frontal oblique and lateral views of the left foot are obtained. The comparison is made with the previous study dated 10/3/2011. There is no evidence of an acute fracture or dislocation. There is moderate diffuse osteopenia. There is hallux valgus deformity   of the great toe which is moderately more pronounced since the   previous study. There is bony erosion involving the medial aspect of the head of the   first metatarsal bone which may represent previous procedure are a   prior inflammatory process/trauma? .   There is deformity of the proximal phalanx of the fifth toe which may   represent previous healed fracture. There is soft tissue swelling opposite and lateral to the fifth   metatarsophalangeal joint. No underlying bony abnormality.       Impression   No acute bony abnormality. Moderate diffuse osteopenia. Other findings as above.    Signed by Dr Jj Rosenberg

## 2021-09-01 ENCOUNTER — PREP FOR PROCEDURE (OUTPATIENT)
Dept: VASCULAR SURGERY | Age: 76
End: 2021-09-01

## 2021-09-01 ENCOUNTER — HOSPITAL ENCOUNTER (OUTPATIENT)
Dept: INFUSION THERAPY | Age: 76
Setting detail: INFUSION SERIES
Discharge: HOME | End: 2021-09-01
Payer: MEDICARE

## 2021-09-01 VITALS
RESPIRATION RATE: 18 BRPM | TEMPERATURE: 97.8 F | HEART RATE: 63 BPM | OXYGEN SATURATION: 98 % | DIASTOLIC BLOOD PRESSURE: 59 MMHG | SYSTOLIC BLOOD PRESSURE: 104 MMHG

## 2021-09-01 DIAGNOSIS — I70.245 ATHSCL NATIVE ARTERIES OF LEFT LEG W ULCERATION OTH PRT FOOT (HCC): Primary | ICD-10-CM

## 2021-09-01 DIAGNOSIS — G61.81 CIDP (CHRONIC INFLAMMATORY DEMYELINATING POLYNEUROPATHY) (HCC): Primary | ICD-10-CM

## 2021-09-01 PROCEDURE — 96366 THER/PROPH/DIAG IV INF ADDON: CPT

## 2021-09-01 PROCEDURE — 96365 THER/PROPH/DIAG IV INF INIT: CPT

## 2021-09-01 PROCEDURE — 6360000002 HC RX W HCPCS: Performed by: PSYCHIATRY & NEUROLOGY

## 2021-09-01 RX ORDER — SODIUM CHLORIDE 9 MG/ML
INJECTION, SOLUTION INTRAVENOUS CONTINUOUS
Status: CANCELLED | OUTPATIENT
Start: 2021-09-01

## 2021-09-01 RX ORDER — SODIUM CHLORIDE 9 MG/ML
25 INJECTION, SOLUTION INTRAVENOUS PRN
Status: CANCELLED | OUTPATIENT
Start: 2021-09-01

## 2021-09-01 RX ORDER — SODIUM CHLORIDE 0.9 % (FLUSH) 0.9 %
10 SYRINGE (ML) INJECTION PRN
Status: CANCELLED | OUTPATIENT
Start: 2021-09-01

## 2021-09-01 RX ORDER — ACETAMINOPHEN 325 MG/1
650 TABLET ORAL ONCE
Status: CANCELLED | OUTPATIENT
Start: 2021-09-01 | End: 2021-09-01

## 2021-09-01 RX ORDER — CIPROFLOXACIN 500 MG/1
500 TABLET, FILM COATED ORAL 2 TIMES DAILY
COMMUNITY
End: 2021-09-03

## 2021-09-01 RX ORDER — METHYLPREDNISOLONE SODIUM SUCCINATE 125 MG/2ML
125 INJECTION, POWDER, LYOPHILIZED, FOR SOLUTION INTRAMUSCULAR; INTRAVENOUS ONCE
Status: CANCELLED | OUTPATIENT
Start: 2021-09-01 | End: 2021-09-01

## 2021-09-01 RX ORDER — DIPHENHYDRAMINE HYDROCHLORIDE 50 MG/ML
50 INJECTION INTRAMUSCULAR; INTRAVENOUS ONCE
Status: DISCONTINUED | OUTPATIENT
Start: 2021-09-01 | End: 2021-09-03 | Stop reason: HOSPADM

## 2021-09-01 RX ORDER — ASPIRIN 81 MG/1
81 TABLET ORAL ONCE
Status: CANCELLED | OUTPATIENT
Start: 2021-09-01 | End: 2021-09-01

## 2021-09-01 RX ORDER — EPINEPHRINE 1 MG/ML
0.3 INJECTION, SOLUTION, CONCENTRATE INTRAVENOUS PRN
Status: CANCELLED | OUTPATIENT
Start: 2021-09-01

## 2021-09-01 RX ORDER — DIPHENHYDRAMINE HYDROCHLORIDE 50 MG/ML
50 INJECTION INTRAMUSCULAR; INTRAVENOUS ONCE
Status: CANCELLED
Start: 2021-09-01 | End: 2021-09-01

## 2021-09-01 RX ORDER — DIPHENHYDRAMINE HYDROCHLORIDE 50 MG/ML
50 INJECTION INTRAMUSCULAR; INTRAVENOUS ONCE
Status: CANCELLED | OUTPATIENT
Start: 2021-09-01 | End: 2021-09-01

## 2021-09-01 RX ADMIN — IMMUNE GLOBULIN (HUMAN) 30 G: 10 INJECTION INTRAVENOUS; SUBCUTANEOUS at 10:09

## 2021-09-01 NOTE — H&P (VIEW-ONLY)
Patient Care Team:  Aurora Quigley DO as PCP - General (Family Medicine)  Kishore Han MD as Neurologist (Neurology)  Madelaine Guerrero DO as Consulting Physician (Vascular Surgery)      History and Physical  Mrs. Kavita Richards is a 48-year-old female who has a history that includes hypertension, hammertoe deformity status post surgery, chronic inflammatory demyelinating polyneuropathy and PVD Today she has been referred to us from Dr. Catracho Nicholas for evaluation of left foot wound. She reports the wound on the bottom of her left foot has been ongoing for a couple of years. She reports that this will develop is a callus and it opened up and then typically heals. However recently this does not seem to want to heal. No fever. She is on aspirin daily. Old records have been obtained, reviewed, and summarized. Edin Monroe is a 67 y.o. 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 90 tablet 0    nitrofurantoin (MACRODANTIN) 50 MG capsule Take 50 mg by mouth 4 times daily      estrogens, conjugated, (PREMARIN) 0.3 MG tablet Take 0.3 mg by mouth daily      gabapentin (NEURONTIN) 600 MG tablet TAKE 1 TABLET THREE TIMES A DAY (Patient taking differently: 300 mg 3 times daily.  TAKE 1 TABLET THREE TIMES A DAY) 270 tablet 1    amLODIPine (NORVASC) 5 MG tablet Take 5 mg by mouth daily      meloxicam (MOBIC) 15 MG tablet Take 15 mg by mouth daily      conjugated estrogens (PREMARIN) 0.625 MG/GM vaginal cream Place vaginally twice a week 1 Tube 3    colestipol (COLESTID) 1 g tablet       Cyanocobalamin (VITAMIN B 12) 100 MCG LOZG Take by mouth      lidocaine (XYLOCAINE) 5 % ointment Apply topically as needed for Pain Apply topically as needed. 30 g 3    fenofibrate (TRICOR) 145 MG tablet 145 mg daily       Omega-3 Fatty Acids (FISH OIL) 1000 MG CAPS Take 1,000 mg by mouth 2 times daily      Garlic 2796 MG CAPS Take 1,000 mg by mouth 2 times daily      CRANBERRY SOFT PO Take 36 mg by mouth daily      allopurinol (ZYLOPRIM) 300 MG tablet 300 mg daily Indications: takes total 500mg a day       carvedilol (COREG) 6.25 MG tablet Take 6.25 mg by mouth 2 times daily      aspirin 81 MG tablet Take 81 mg by mouth daily      folic acid (FOLVITE) 1 MG tablet Take 1 mg by mouth daily      Multiple Vitamins-Minerals (THERAPEUTIC MULTIVITAMIN-MINERALS) tablet Take 1 tablet by mouth daily      calcium-vitamin D (OSCAL) 250-125 MG-UNIT per tablet Take 1 tablet by mouth daily      allopurinol (ZYLOPRIM) 100 MG tablet Take 200 mg by mouth daily Indications: takes total of 500 mg daily       bromfenac sodium (BROMDAY) 0.09 % SOLN ophthalmic solution Place 1 drop into both eyes daily (Patient not taking: Reported on 8/16/2021)      mesalamine (DELZICOL) 400 MG CPDR DR capsule Take 800 mg by mouth 3 times daily      indapamide (LOZOL) 2.5 MG tablet Take 2.5 mg by mouth every morning      lisinopril (PRINIVIL;ZESTRIL) 40 MG tablet Take 20 mg by mouth daily       omeprazole (PRILOSEC) 20 MG capsule Take 20 mg by mouth 2 times daily        No current facility-administered medications for this visit. Allergies: Patient has no known allergies.   Past Medical History:   Diagnosis Date    Arthritis     Cancer [facility])     endometrial cancer    Cataract     CIDP (chronic inflammatory demyelinating polyneuropathy) ([facility]     Hypertension     Radiation      Past Surgical History:   Procedure Laterality Date    BACK SURGERY      CARPAL TUNNEL RELEASE Bilateral     CATARACT REMOVAL Bilateral     HAMMER TOE SURGERY Left     HYSTERECTOMY      KNEE ARTHROSCOPY Right     LUMBAR FUSION       Family History   Problem Relation Age of Onset    Cancer Mother     High Blood Pressure Father     Heart Disease Father      Social History     Tobacco Use    Smoking status: Never Smoker    Smokeless tobacco: Never Used   Substance Use Topics    Alcohol use: No       Review of Systems    Constitutional  no significant activity change, appetite change, or unexpected weight change. No fever or chills. No diaphoresis or significant fatigue. HENT  no significant rhinorrhea or epistaxis. No tinnitus or significant hearing loss. Eyes  no sudden vision change or amaurosis. Respiratory  no significant shortness of breath, wheezing, or stridor. No apnea, cough, or chest tightness associated with shortness of breath. Cardiovascular  no chest pain, syncope, or significant dizziness. No palpitations or significant leg swelling. (see HPI)  Gastrointestinal  no abdominal swelling or pain. No blood in stool. No severe constipation, diarrhea, nausea, or vomiting. Genitourinary  No difficulty urinating, dysuria, frequency, or urgency. No flank pain or hematuria. Musculoskeletal  no back pain, gait disturbance, or myalgia. Skin  no color change, rash, pallor. Wounds left 4th toe (dorsal aspect)and plantar aspect left foot. Neurologic  no dizziness, facial asymmetry, or light headedness. No seizures. No speech difficulty or lateralizing weakness. Hematologic  no easy bruising or excessive bleeding. Psychiatric  no severe anxiety or nervousness. No confusion. All other review of systems are negative.       Physical Exam    There were no vitals taken for this visit. Constitutional  well developed, well nourished. No diaphoresis or acute distress. HENT  head normocephalic. Right external ear canal appears normal.  Left external ear canal appears normal.  Septum appears midline. Eyes  conjunctiva normal.  EOMS normal.  No exudate. No icterus. Neck- ROM appears normal, no tracheal deviation. Cardiovascular  Regular rate and rhythm. Heart sounds are normal.  No murmur, rub, or gallop. Carotid pulses are 2+ to palpation bilaterally without bruit. Extremities - Radial and ulnar pulses are 2+ to palpation bilaterally. Femoral pulses 2+. DP pulses are palpable. No cyanosis, clubbing, or significant edema. No signs atheroembolic event. Wound dorsal aspect left fourth toe and plantar aspect of the left foot over the fifth metatarsal head. There is drainage noted from the wound on plantar aspect of foot. No foul odor noted. Pulmonary  effort appears normal.  No respiratory distress. Lungs - Breath sounds normal. No wheezes or rales. GI - Abdomen  soft, non tender, bowel sounds X 4 quadrants. No guarding or rebound tenderness. No distension or palpable mass. Genitourinary  deferred. Musculoskeletal  ROM appears normal.  No significant edema. Neurologic  alert and oriented X 3. Physiologic. Skin  warm, dry, and intact. No rash, erythema, or pallor. Psychiatric  mood, affect, and behavior appear normal.  Judgment and thought processes appear normal.    Risk factors for atherosclerosis of all vascular beds have been reviewed with the patient including:  Family history, tobacco abuse in all forms, elevated cholesterol, hyperlipidemia, and diabetes. Lower extremity arterial study: Done 7/30/[facility]21 at [facility]  Right GUANAKITO 0.94, Left GUANAKITO 03.78. Individual films reviewed: Yes. Test results were reviewed with the patient. Assessment      1.  PVD with wound      Plan      Recommend she continue aspirin 81 mg daily  Left foot x-ray  Proceed with Angiogram with bilateral runoff possible angioplasty stent atherectomy left leg      Addendum:  Left foot X-ray -  Examination. XR FOOT LEFT (MIN 3 VIEWS) 8/30/2021 2:59 PM   History: Left foot wound. The frontal oblique and lateral views of the left foot are obtained. The comparison is made with the previous study dated 10/3/2011. There is no evidence of an acute fracture or dislocation. There is moderate diffuse osteopenia. There is hallux valgus deformity   of the great toe which is moderately more pronounced since the   previous study. There is bony erosion involving the medial aspect of the head of the   first metatarsal bone which may represent previous procedure are a   prior inflammatory process/trauma? .   There is deformity of the proximal phalanx of the fifth toe which may   represent previous healed fracture. There is soft tissue swelling opposite and lateral to the fifth   metatarsophalangeal joint. No underlying bony abnormality.       Impression   No acute bony abnormality. Moderate diffuse osteopenia. Other findings as above.    Signed by Dr Keily Vega

## 2021-09-01 NOTE — H&P
Patient Care Team:  Stephanie Villagomez DO as PCP - General (Family Medicine)  Daniel Mccloud MD as Neurologist (Neurology)  Bety Mosher DO as Consulting Physician (Vascular Surgery)      History and Physical  Mrs. Talia Melara is a 25-year-old female who has a history that includes hypertension, hammertoe deformity status post surgery, chronic inflammatory demyelinating polyneuropathy and PVD Today she has been referred to us from Dr. Mccabe [facility] for evaluation of left foot wound. She reports the wound on the bottom of her left foot has been ongoing for a couple of years. She reports that this will develop is a callus and it opened up and then typically heals. However recently this does not seem to want to heal. No fever. She is on aspirin daily. Old records have been obtained, reviewed, and summarized. Gus Carbajal is a 67 y.o. female with the following history reviewed and recorded in [facility]:  Patient Active Problem List    Diagnosis Date Noted    Urinary tract infection without hematuria 11/13/2020    Lumbar spinal stenosis 09/18/2017    Rheumatoid arthritis involving multiple sites with positive rheumatoid factor ([facility] [address] 09/29/2016    Recurrent UTI 08/11/2016    Atonic bladder 08/11/2016    CIDP (chronic inflammatory demyelinating polyneuropathy) (HCC) 08/01/2016    Restless legs syndrome (RLS) 08/01/2016     Current Outpatient Medications   Medication Sig Dispense Refill    HYDROcodone-acetaminophen (NORCO)  MG per tablet Take 1 tablet by mouth every 8 hours as needed for Pain for up to 30 days. 90 tablet 0    nitrofurantoin (MACRODANTIN) 50 MG capsule Take 50 mg by mouth 4 times daily      estrogens, conjugated, (PREMARIN) 0.3 MG tablet Take 0.3 mg by mouth daily      gabapentin (NEURONTIN) 600 MG tablet TAKE 1 TABLET THREE TIMES A DAY (Patient taking differently: 300 mg 3 times daily.  TAKE 1 TABLET THREE TIMES A DAY) 270 tablet 1    amLODIPine (NORVASC) 5 MG tablet Take 5 mg by mouth daily      meloxicam (MOBIC) 15 MG tablet Take 15 mg by mouth daily      conjugated estrogens (PREMARIN) 0.625 MG/GM vaginal cream Place vaginally twice a week 1 Tube 3    colestipol (COLESTID) 1 g tablet       Cyanocobalamin (VITAMIN B 12) 100 MCG LOZG Take by mouth      lidocaine (XYLOCAINE) 5 % ointment Apply topically as needed for Pain Apply topically as needed. 30 g 3    fenofibrate (TRICOR) 145 MG tablet 145 mg daily       Omega-3 Fatty Acids (FISH OIL) 1000 MG CAPS Take 1,000 mg by mouth 2 times daily      Garlic 3092 MG CAPS Take 1,000 mg by mouth 2 times daily      CRANBERRY SOFT PO Take 36 mg by mouth daily      allopurinol (ZYLOPRIM) 300 MG tablet 300 mg daily Indications: takes total 500mg a day       carvedilol (COREG) 6.25 MG tablet Take 6.25 mg by mouth 2 times daily      aspirin 81 MG tablet Take 81 mg by mouth daily      folic acid (FOLVITE) 1 MG tablet Take 1 mg by mouth daily      Multiple Vitamins-Minerals (THERAPEUTIC MULTIVITAMIN-MINERALS) tablet Take 1 tablet by mouth daily      calcium-vitamin D (OSCAL) 250-125 MG-UNIT per tablet Take 1 tablet by mouth daily      allopurinol (ZYLOPRIM) 100 MG tablet Take 200 mg by mouth daily Indications: takes total of 500 mg daily       bromfenac sodium (BROMDAY) 0.09 % SOLN ophthalmic solution Place 1 drop into both eyes daily (Patient not taking: Reported on 8/16/2021)      mesalamine (DELZICOL) 400 MG CPDR DR capsule Take 800 mg by mouth 3 times daily      indapamide (LOZOL) 2.5 MG tablet Take 2.5 mg by mouth every morning      lisinopril (PRINIVIL;ZESTRIL) 40 MG tablet Take 20 mg by mouth daily       omeprazole (PRILOSEC) 20 MG capsule Take 20 mg by mouth 2 times daily        No current facility-administered medications for this visit. Allergies: Patient has no known allergies.   Past Medical History:   Diagnosis Date    Arthritis     Cancer [facility])     endometrial cancer    Cataract     CIDP (chronic visit. Constitutional - well developed, well nourished. No diaphoresis or acute distress. HENT - head normocephalic. Right external ear canal appears normal.  Left external ear canal appears normal.  Septum appears midline. Eyes - conjunctiva normal.  EOMS normal.  No exudate. No icterus. Neck- ROM appears normal, no tracheal deviation. Cardiovascular - Regular rate and rhythm. Heart sounds are normal.  No murmur, rub, or gallop. Carotid pulses are 2+ to palpation bilaterally without bruit. Extremities - Radial and ulnar pulses are 2+ to palpation bilaterally. Femoral pulses 2+. DP pulses are palpable. No cyanosis, clubbing, or significant edema. No signs atheroembolic event. Wound dorsal aspect left fourth toe and plantar aspect of the left foot over the fifth metatarsal head. There is drainage noted from the wound on plantar aspect of foot. No foul odor noted. Pulmonary - effort appears normal.  No respiratory distress. Lungs - Breath sounds normal. No wheezes or rales. GI - Abdomen - soft, non tender, bowel sounds X 4 quadrants. No guarding or rebound tenderness. No distension or palpable mass. Genitourinary - deferred. Musculoskeletal - ROM appears normal.  No significant edema. Neurologic - alert and oriented X 3. Physiologic. Skin - warm, dry, and intact. No rash, erythema, or pallor. Psychiatric - mood, affect, and behavior appear normal.  Judgment and thought processes appear normal.    Risk factors for atherosclerosis of all vascular beds have been reviewed with the patient including:  Family history, tobacco abuse in all forms, elevated cholesterol, hyperlipidemia, and diabetes. Lower extremity arterial study: Done 7/30/2021 at [facility]  Right GUANAKITO 0.94, Left GUANAKITO 03.78. Individual films reviewed: Yes. Test results were reviewed with the patient. Assessment      1.  PVD with wound      Plan      Recommend she continue aspirin 81 mg daily  Left foot x-ray  Proceed with Angiogram with bilateral runoff possible angioplasty stent atherectomy left leg      Addendum:  Left foot X-ray -  Examination. XR FOOT LEFT (MIN 3 VIEWS) 8/30/2021 2:59 PM   History: Left foot wound. The frontal oblique and lateral views of the left foot are obtained. The comparison is made with the previous study dated 10/3/2011. There is no evidence of an acute fracture or dislocation. There is moderate diffuse osteopenia. There is hallux valgus deformity   of the great toe which is moderately more pronounced since the   previous study. There is bony erosion involving the medial aspect of the head of the   first metatarsal bone which may represent previous procedure are a   prior inflammatory process/trauma? .   There is deformity of the proximal phalanx of the fifth toe which may   represent previous healed fracture. There is soft tissue swelling opposite and lateral to the fifth   metatarsophalangeal joint. No underlying bony abnormality.       Impression   No acute bony abnormality. Moderate diffuse osteopenia. Other findings as above.    Signed by Dr Salina Schilder

## 2021-09-02 ENCOUNTER — TELEPHONE (OUTPATIENT)
Dept: VASCULAR SURGERY | Age: 76
End: 2021-09-02

## 2021-09-03 ENCOUNTER — HOSPITAL ENCOUNTER (OUTPATIENT)
Dept: INTERVENTIONAL RADIOLOGY/VASCULAR | Age: 76
Discharge: HOME | End: 2021-09-03
Payer: MEDICARE

## 2021-09-03 VITALS
TEMPERATURE: 97.5 F | SYSTOLIC BLOOD PRESSURE: 132 MMHG | HEIGHT: 64 IN | DIASTOLIC BLOOD PRESSURE: 50 MMHG | WEIGHT: 135 LBS | BODY MASS INDEX: 23.05 KG/M2 | RESPIRATION RATE: 12 BRPM | HEART RATE: 64 BPM | OXYGEN SATURATION: 98 %

## 2021-09-03 DIAGNOSIS — I73.9 PVD (PERIPHERAL VASCULAR DISEASE) (HCC): ICD-10-CM

## 2021-09-03 PROCEDURE — 2500000003 HC RX 250 WO HCPCS: Performed by: SURGERY

## 2021-09-03 PROCEDURE — 75625 CONTRAST EXAM ABDOMINL AORTA: CPT

## 2021-09-03 PROCEDURE — 2580000003 HC RX 258: Performed by: PHYSICIAN ASSISTANT

## 2021-09-03 PROCEDURE — 6360000002 HC RX W HCPCS: Performed by: PHYSICIAN ASSISTANT

## 2021-09-03 PROCEDURE — 6370000000 HC RX 637 (ALT 250 FOR IP): Performed by: PHYSICIAN ASSISTANT

## 2021-09-03 PROCEDURE — 37228 PR REVSC OPN/PRQ TIB/PERO W/ANGIOPLASTY UNI: CPT | Performed by: SURGERY

## 2021-09-03 PROCEDURE — 75625 CONTRAST EXAM ABDOMINL AORTA: CPT | Performed by: SURGERY

## 2021-09-03 PROCEDURE — 75716 ARTERY X-RAYS ARMS/LEGS: CPT

## 2021-09-03 PROCEDURE — 37228 HC PLASTY UNI TIB/PER INITIAL: CPT

## 2021-09-03 PROCEDURE — 6360000002 HC RX W HCPCS: Performed by: SURGERY

## 2021-09-03 PROCEDURE — 75716 ARTERY X-RAYS ARMS/LEGS: CPT | Performed by: SURGERY

## 2021-09-03 PROCEDURE — 6360000004 HC RX CONTRAST MEDICATION: Performed by: SURGERY

## 2021-09-03 PROCEDURE — C1887 CATHETER, GUIDING: HCPCS

## 2021-09-03 RX ORDER — MIDAZOLAM HYDROCHLORIDE 1 MG/ML
INJECTION INTRAMUSCULAR; INTRAVENOUS
Status: COMPLETED | OUTPATIENT
Start: 2021-09-03 | End: 2021-09-03

## 2021-09-03 RX ORDER — SODIUM CHLORIDE 9 MG/ML
INJECTION, SOLUTION INTRAVENOUS CONTINUOUS
Status: ACTIVE | OUTPATIENT
Start: 2021-09-03 | End: 2021-09-03

## 2021-09-03 RX ORDER — HEPARIN SODIUM 5000 [USP'U]/ML
INJECTION, SOLUTION INTRAVENOUS; SUBCUTANEOUS
Status: COMPLETED | OUTPATIENT
Start: 2021-09-03 | End: 2021-09-03

## 2021-09-03 RX ORDER — PROPYLENE GLYCOL 0.06 MG/ML
1 SOLUTION/ DROPS OPHTHALMIC 3 TIMES DAILY
COMMUNITY

## 2021-09-03 RX ORDER — SODIUM CHLORIDE 0.9 % (FLUSH) 0.9 %
5-40 SYRINGE (ML) INJECTION PRN
Status: DISCONTINUED | OUTPATIENT
Start: 2021-09-03 | End: 2021-09-05 | Stop reason: HOSPADM

## 2021-09-03 RX ORDER — IODIXANOL 320 MG/ML
INJECTION, SOLUTION INTRAVASCULAR
Status: COMPLETED | OUTPATIENT
Start: 2021-09-03 | End: 2021-09-03

## 2021-09-03 RX ORDER — LIDOCAINE HYDROCHLORIDE 20 MG/ML
INJECTION, SOLUTION INFILTRATION; PERINEURAL
Status: COMPLETED | OUTPATIENT
Start: 2021-09-03 | End: 2021-09-03

## 2021-09-03 RX ORDER — SULFAMETHOXAZOLE AND TRIMETHOPRIM 800; 160 MG/1; MG/1
TABLET ORAL
COMMUNITY
Start: 2021-09-01 | End: 2021-11-29 | Stop reason: ALTCHOICE

## 2021-09-03 RX ORDER — SODIUM CHLORIDE 0.9 % (FLUSH) 0.9 %
5-40 SYRINGE (ML) INJECTION EVERY 12 HOURS SCHEDULED
Status: DISCONTINUED | OUTPATIENT
Start: 2021-09-03 | End: 2021-09-05 | Stop reason: HOSPADM

## 2021-09-03 RX ORDER — FENTANYL CITRATE 50 UG/ML
INJECTION, SOLUTION INTRAMUSCULAR; INTRAVENOUS
Status: COMPLETED | OUTPATIENT
Start: 2021-09-03 | End: 2021-09-03

## 2021-09-03 RX ORDER — SODIUM CHLORIDE 9 MG/ML
25 INJECTION, SOLUTION INTRAVENOUS PRN
Status: DISCONTINUED | OUTPATIENT
Start: 2021-09-03 | End: 2021-09-05 | Stop reason: HOSPADM

## 2021-09-03 RX ORDER — SODIUM CHLORIDE 0.9 % (FLUSH) 0.9 %
10 SYRINGE (ML) INJECTION PRN
Status: DISCONTINUED | OUTPATIENT
Start: 2021-09-03 | End: 2021-09-05 | Stop reason: HOSPADM

## 2021-09-03 RX ORDER — ASPIRIN 81 MG/1
81 TABLET ORAL ONCE
Status: COMPLETED | OUTPATIENT
Start: 2021-09-03 | End: 2021-09-03

## 2021-09-03 RX ORDER — SODIUM CHLORIDE 9 MG/ML
INJECTION, SOLUTION INTRAVENOUS CONTINUOUS
Status: DISCONTINUED | OUTPATIENT
Start: 2021-09-03 | End: 2021-09-05 | Stop reason: HOSPADM

## 2021-09-03 RX ADMIN — SODIUM CHLORIDE: 9 INJECTION, SOLUTION INTRAVENOUS at 07:07

## 2021-09-03 RX ADMIN — LIDOCAINE HYDROCHLORIDE 10 ML: 20 INJECTION, SOLUTION INFILTRATION; PERINEURAL at 08:24

## 2021-09-03 RX ADMIN — FENTANYL CITRATE 25 MCG: 50 INJECTION, SOLUTION INTRAMUSCULAR; INTRAVENOUS at 08:24

## 2021-09-03 RX ADMIN — Medication 2000 MG: at 08:04

## 2021-09-03 RX ADMIN — MIDAZOLAM 0.5 MG: 1 INJECTION INTRAMUSCULAR; INTRAVENOUS at 08:24

## 2021-09-03 RX ADMIN — ASPIRIN 81 MG: 81 TABLET, COATED ORAL at 07:14

## 2021-09-03 RX ADMIN — HEPARIN SODIUM 5000 UNITS: 5000 INJECTION, SOLUTION INTRAVENOUS; SUBCUTANEOUS at 08:41

## 2021-09-03 RX ADMIN — HEPARIN SODIUM 5000 UNITS: 5000 INJECTION, SOLUTION INTRAVENOUS; SUBCUTANEOUS at 08:24

## 2021-09-03 RX ADMIN — IODIXANOL 150 ML: 320 INJECTION, SOLUTION INTRAVASCULAR at 09:35

## 2021-09-03 NOTE — OP NOTE
Patient Name: Chary Chairez   YOB: 1945   MRN: [id number]     Procedure Date: 9/3/2021     Pre Op Diagnosis: Peripheral vascular disease with left foot wound    Post Op Diagnosis: same    Procedure: Aortogram with bilateral lower extremity runoff. Balloon angioplasty of left posterior tibial artery using 2.5 mm coyote balloon    Anesthesia: Local with Moderate Sedation    Estimated Blood Loss: Less than 50 ml    Complications: None    Implants: Minx 5 Fr    Procedure Details: Patient brought to the angiogram suite and placed supine position. Her bilateral groins were prepped and draped in sterile fashion. Timeout performed. Right common femoral artery was accessed under continuous ultrasound guidance using standard micropuncture technique. 5 Telugu glide sheath was inserted. OSIX wire was advanced proximally followed by Omni Flush catheter positioned in juxtarenal aorta. Using power injection aortogram with bilateral extremity runoff were performed as mentioned below findings. I then maneuvered my glide advantage wire into the contralateral side. Patient was given 5000 is heparin. 5 x 70 destination sheath was placed in position in the distal superficial femoral artery. Then using advantage wire and the Nick cross catheter I maneuvered those into the posterior tibial artery at the takeoff. Then I perform angiogram defining the stenosis sites. The use command wire and advanced through posterior tibial artery. Then performed balloon angioplasty using 2.5 x 220 coyote balloon. Then residual stenosis was the treated using shorter 2.5 x 120 cm type balloon. There was good resolution of the stenosis. The wire was removed and the access site was sealed using 5 Western Cecelia minx. Patient tolerated procedure well was transferred to PACU stable condition. Findings:  There is a spine hardware, however it seems normal size infrarenal aorta with opacified renal arteries beyond hardware, however cannot see the proximal portions. Inferior mesenteric artery patent, common iliac, internal iliac and external iliac arteries are patent. Bilateral common femoral profunda femoris and superficial femoral arteries are patent with minimal disease at the and proximal popliteal artery on the left side, popliteal artery is patent bilaterally. Right TPT trunk patent, anterior tibial patent proximally with main runoff by peroneal artery that reconstitutes the posterior tibial artery. Left TPT trunk patent, anterior tibial artery patent, peroneal artery patent with high-grade stenosis in the few portions of the posterior tibial artery.     Disposition:aroused from sedation, and taken to the recovery room in a stable condition    Kvng Mazariegos DO  9/3/2021 9:33 AM

## 2021-09-03 NOTE — PROGRESS NOTES
Returned post arteriogram.  Awake and alert. Puncture site to right groin clear with gauze and tegaderm dressing in place. Denies any c/o pain. Son at bedside.

## 2021-09-03 NOTE — PROGRESS NOTES
Patient and son instructed on care of right groin site post arteriogram.  Given written instructions. Both stated understanding.

## 2021-09-12 NOTE — INTERVAL H&P NOTE
Update History & Physical    The patient's History and Physical of September 1, 2021 was reviewed with the patient and I examined the patient. There was no change. The surgical site was confirmed by the patient and me. Plan: The risks, benefits, expected outcome, and alternative to the recommended procedure have been discussed with the patient. Patient understands and wants to proceed with the procedure.      Electronically signed by Adriana Ortiz DO on 9/3/2021 at 12:39 PM

## 2021-09-16 ENCOUNTER — OFFICE VISIT (OUTPATIENT)
Dept: VASCULAR SURGERY | Age: 76
End: 2021-09-16
Payer: MEDICARE

## 2021-09-16 ENCOUNTER — OFFICE VISIT (OUTPATIENT)
Dept: NEUROLOGY | Age: 76
End: 2021-09-16
Payer: MEDICARE

## 2021-09-16 VITALS
DIASTOLIC BLOOD PRESSURE: 60 MMHG | WEIGHT: 135 LBS | BODY MASS INDEX: 23.05 KG/M2 | SYSTOLIC BLOOD PRESSURE: 126 MMHG | HEIGHT: 64 IN | RESPIRATION RATE: 16 BRPM | HEART RATE: 62 BPM

## 2021-09-16 VITALS
HEART RATE: 65 BPM | DIASTOLIC BLOOD PRESSURE: 63 MMHG | SYSTOLIC BLOOD PRESSURE: 122 MMHG | TEMPERATURE: 97.1 F | OXYGEN SATURATION: 96 % | RESPIRATION RATE: 18 BRPM

## 2021-09-16 DIAGNOSIS — M05.79 RHEUMATOID ARTHRITIS INVOLVING MULTIPLE SITES WITH POSITIVE RHEUMATOID FACTOR (HCC): ICD-10-CM

## 2021-09-16 DIAGNOSIS — G61.81 CIDP (CHRONIC INFLAMMATORY DEMYELINATING POLYNEUROPATHY) (HCC): Primary | ICD-10-CM

## 2021-09-16 DIAGNOSIS — I70.245 ATHSCL NATIVE ARTERIES OF LEFT LEG W ULCERATION OTH PRT FOOT (HCC): Primary | ICD-10-CM

## 2021-09-16 DIAGNOSIS — G25.81 RESTLESS LEGS SYNDROME (RLS): ICD-10-CM

## 2021-09-16 DIAGNOSIS — M48.062 SPINAL STENOSIS OF LUMBAR REGION WITH NEUROGENIC CLAUDICATION: ICD-10-CM

## 2021-09-16 PROCEDURE — 99214 OFFICE O/P EST MOD 30 MIN: CPT | Performed by: PSYCHIATRY & NEUROLOGY

## 2021-09-16 PROCEDURE — 99212 OFFICE O/P EST SF 10 MIN: CPT | Performed by: PHYSICIAN ASSISTANT

## 2021-09-16 NOTE — PROGRESS NOTES
[facility]  [address] [place] [address] [address]  [place], [place] [address]  Phone [phone]  Fax [phone]     [facility] Neurology Follow Up Encounter  21 1:04 PM CDT    Information:   Patient Name: Yao Richard  :   [birth year]  Age:   67 y.o. MRN:   [id number]  Account #:  [de-identified]  Today:  21    Provider: Freddy Olivas M.D. Chief Complaint:   Chief Complaint   Patient presents with    Follow-up       Subjective:   Yao Richard is a 67 y.o. woman with a history of CIDP, lumbar spinal stenosis, rheumatoid arthritis, and restless legs syndrome who is following up. Last visit, she was complaining of worsened lower extremity weakness and numbness. She was given a steroid taper and that helped a little. She had abdominal problems and had a gall bladder taken out. She was found to have PVD and had angioplasty to her LLE. She uses AFOs and ambulates with a wheeled walker. She takes IVIG in October and has been getting 0.5g/kg every 4 weeks. The neuropathy is doing about the same as last time. It is worse than a year ago. She does have back problems with radiculopathy but is not inclined to reevaluation or consideration of further surgery. She has some pain in her hands. She has had the Harle Distel Vaccine in April and May, 2021.         Objective:     Past Medical History:  Past Medical History:   Diagnosis Date    Arthritis     Cancer ([facility] [address]     endometrial cancer    Cataract     CIDP (chronic inflammatory demyelinating polyneuropathy) (HCC)     Crohn's disease ([facility] [address]     GERD (gastroesophageal reflux disease)     History of blood transfusion     Hypertension     Macular degeneration     Radiation        Past Surgical History:   Procedure Laterality Date    BACK SURGERY      CARPAL TUNNEL RELEASE Bilateral     CATARACT REMOVAL Bilateral     CHOLECYSTECTOMY      HAMMER TOE SURGERY Left     HYSTERECTOMY      KNEE ARTHROSCOPY Right     LUMBAR FUSION         Recent Hospitalizations  · None    Significant Injuries  · None    Habits  Kellie Crawley reports that she has never smoked. She has never used smokeless tobacco. She reports that she does not drink alcohol and does not use drugs. Family History   Problem Relation Age of Onset    Cancer Mother     High Blood Pressure Father     Heart Disease Father        Social History  Jordyn Multani is , lives in [place], [place], and is retired. Medications:  Current Outpatient Medications   Medication Sig Dispense Refill    Propylene Glycol (SYSTANE COMPLETE) 0.6 % SOLN Apply 1 drop to eye 3 times daily Each eye      HYDROcodone-acetaminophen (NORCO)  MG per tablet Take 1 tablet by mouth every 8 hours as needed for Pain for up to 30 days. 90 tablet 0    estrogens, conjugated, (PREMARIN) 0.3 MG tablet Take 0.3 mg by mouth Twice a Week Takes sun, and thur      gabapentin (NEURONTIN) 600 MG tablet TAKE 1 TABLET THREE TIMES A DAY (Patient taking differently: 600 mg 3 times daily. TAKE 1 TABLET THREE TIMES A DAY) 270 tablet 1    amLODIPine (NORVASC) 5 MG tablet Take 5 mg by mouth daily      conjugated estrogens (PREMARIN) 0.625 MG/GM vaginal cream Place vaginally twice a week 1 Tube 3    colestipol (COLESTID) 1 g tablet Take 1 g by mouth 3 times daily       Cyanocobalamin (VITAMIN B 12) 100 MCG LOZG Take by mouth daily       lidocaine (XYLOCAINE) 5 % ointment Apply topically as needed for Pain Apply topically as needed.  30 g 3    fenofibrate (TRICOR) 145 MG tablet 145 mg daily       Omega-3 Fatty Acids (FISH OIL) 1000 MG CAPS Take 1,000 mg by mouth 2 times daily      Garlic 2354 MG CAPS Take 1,000 mg by mouth 2 times daily      CRANBERRY SOFT PO Take 36 mg by mouth daily      allopurinol (ZYLOPRIM) 300 MG tablet 300 mg daily Indications: takes total 500mg a day       carvedilol (COREG) 6.25 MG tablet Take 6.25 mg by mouth 2 times daily      aspirin 81 MG tablet Take 81 mg by mouth daily      folic acid (FOLVITE) 1 MG tablet Take 1 mg by mouth daily      Multiple Vitamins-Minerals (THERAPEUTIC MULTIVITAMIN-MINERALS) tablet Take 1 tablet by mouth daily      calcium-vitamin D (OSCAL) 250-125 MG-UNIT per tablet Take 1 tablet by mouth daily      allopurinol (ZYLOPRIM) 100 MG tablet Take 200 mg by mouth daily Indications: takes total of 500 mg daily       mesalamine (DELZICOL) 400 MG CPDR DR capsule Take 800 mg by mouth 3 times daily      indapamide (LOZOL) 2.5 MG tablet Take 2.5 mg by mouth every morning      lisinopril (PRINIVIL;ZESTRIL) 40 MG tablet Take 40 mg by mouth daily       omeprazole (PRILOSEC) 20 MG capsule Take 20 mg by mouth Daily       sulfamethoxazole-trimethoprim (BACTRIM DS;SEPTRA DS) 800-160 MG per tablet  (Patient not taking: Reported on 9/16/2021)       No current facility-administered medications for this visit. Allergies:   Allergies as of 09/16/2021    (No Known Allergies)       Review of Systems:  Constitutional: negative for - chills and fever  Eyes:  negative for - visual disturbance and photophobia  HENMT: negative for - headaches and sinus pain  Respiratory: negative for - cough, hemoptysis, and shortness of breath  Cardiovascular: negative for - chest pain and palpitations  Gastrointestinal: negative for - blood in stools, constipation, diarrhea, nausea, and vomiting  Genito-Urinary: negative for - hematuria, urinary frequency, urinary urgency, and urinary retention  Musculoskeletal: positive for - joint pain, joint stiffness, and joint swelling  Hematological and Lymphatic: negative for - bleeding problems, abnormal bruising, and swollen lymph nodes  Endocrine:  negative for - polydipsia and polyphagia  Allergy/Immunology:  negative for - rhinorrhea, sinus congestion, hives  Integument:  negative for - negative for - rash, change in moles, new or changing lesions  Psychological: negative for - anxiety and depression  Neurological: negative for - memory loss  Positive for - numbness/tingling, and weakness     PHYSICAL EXAMINATION:  Vitals:  /60   Pulse 62   Resp 16   Ht 5' 4\" (1.626 m)   Wt 135 lb (61.2 kg)   BMI 23.17 kg/m²   General appearance:  Alert, well developed, well nourished, in no distress  HEENT:  normocephalic, atraumatic, sclera appear normal, no nasal abnormalities, no rhinorrhea, Ears appear normal, oral mucous membranes are moist without erythema, trachea midline, thyroid is normal, no lymphadenopathy or neck mass. Cardiovascular:  Regular rate and rhythm without murmer. No peripheral edema, No cyanosis or clubbing. No carotid bruits. Pulmonary:  Lungs are clear to auscultation. Breathing appears normal, good expansion, normal effort without use of accessory muscles  Musculoskeletal:  Joints are arthritic. Integument:  No rash, erythema, or pallor  Psychiatric:  Mood, affect, and behavior appear normal      NEUROLOGIC EXAMINATION:  Mental Status:  alert, oriented to person, place, and time. Speech:  Clear without dysarthria or dysphonia  Language:  Fluent without aphasia  Cranial Nerves:              II          Visual fields are full to confrontation              III,IV, VI           Extraocular movements are full              VII        Facial movements are symmetrical without weakness              VIII       Hearing is intact              IX,X     Shoulder shrug and head rotation strength are intact              XII        No tongue atrophy or fasciculations. Normal tongue protrusion. No tongue weakness  Motor:  She has muscle atrophy in her hands, her feet, and her lower legs. Muscle tone is reduced. Strength testing shows upper extremities: D -1/-1, Tri -2/-2, Bi -1/-1, WE -1/-1, WF -1/-1, FE -2/-2, FF -1/-1, IO -3/-3. Lower extremities:  IP -2/-3, HE -1/-1, Q -2/-3, AT -3/-4. Deep tendon reflexes are absent. Tello's signs are absent bilaterally. There is no ankle clonus on either side.    Sensation:  Sensation is reduced to light touch, temperature, and vibration in all distal extremities. Coordination:  Rapid alternating movements are normal in both upper extremities. Finger to nose testing is unimpaired bilaterally. Gait:  Unsteady with walker. VERONIQUE is appropriate    Assessment:       ICD-10-CM    1. CIDP (chronic inflammatory demyelinating polyneuropathy) ([facility])  G61.81    2. Spinal stenosis of lumbar region with neurogenic claudication  M48.062    3. Rheumatoid arthritis involving multiple sites with positive rheumatoid factor ([facility])  M05.79    4. Restless legs syndrome (RLS)  G25.81    I discussed the diagnosis, pathophysiology, symptoms, risks, prognosis, and treatment options of CIDP with Maryann Rivero. It is possible that her lumbar radiculopathies contribute. She has had two lumbar spine surgeries with the last 2/2020. Plan:   1.  Continue the same medications including the IVIG  2. FU in 6 months    Electronically signed by Bam Perez MD on 9/16/21

## 2021-09-16 NOTE — PROGRESS NOTES
Patient Care Team:  Debra Fernandez DO as PCP - General (Family Medicine)  David Porras MD as Neurologist (Neurology)  Albino Rachel DO as Consulting Physician (Vascular Surgery)      Ms. Abisai Underwood is a 70-year-old female who has a history of peripheral vascular disease with wound. She underwent a angiogram with balloon angioplasty of the left posterior tibial artery on 9/3/2021 by Dr. Sharron Rivera. Today she is following up. She reports that she is still seeing Dr. Yao Oliveira at the wound care center. She thinks Dr. Yao Oliveira believes this is improving. She is on aspirin 81 mg daily. Staci Gallegos is a 67 y.o. 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 90 tablet 0    estrogens, conjugated, (PREMARIN) 0.3 MG tablet Take 0.3 mg by mouth Twice a Week Takes sun, and thur      gabapentin (NEURONTIN) 600 MG tablet TAKE 1 TABLET THREE TIMES A DAY (Patient taking differently: 600 mg 3 times daily.  TAKE 1 TABLET THREE TIMES A DAY) 270 tablet 1    amLODIPine (NORVASC) 5 MG tablet Take 5 mg by mouth daily      conjugated estrogens (PREMARIN) 0.625 MG/GM vaginal cream Place vaginally twice a week 1 Tube 3    colestipol (COLESTID) 1 g tablet Take 1 g by mouth 3 times daily       Cyanocobalamin (VITAMIN B 12) 100 MCG LOZG Take by mouth daily       lidocaine (XYLOCAINE) 5 % ointment Apply topically as needed for Pain Apply topically as needed. 30 g 3    fenofibrate (TRICOR) 145 MG tablet 145 mg daily       Omega-3 Fatty Acids (FISH OIL) 1000 MG CAPS Take 1,000 mg by mouth 2 times daily      Garlic 4982 MG CAPS Take 1,000 mg by mouth 2 times daily      CRANBERRY SOFT PO Take 36 mg by mouth daily      allopurinol (ZYLOPRIM) 300 MG tablet 300 mg daily Indications: takes total 500mg a day       carvedilol (COREG) 6.25 MG tablet Take 6.25 mg by mouth 2 times daily      aspirin 81 MG tablet Take 81 mg by mouth daily      folic acid (FOLVITE) 1 MG tablet Take 1 mg by mouth daily      Multiple Vitamins-Minerals (THERAPEUTIC MULTIVITAMIN-MINERALS) tablet Take 1 tablet by mouth daily      calcium-vitamin D (OSCAL) 250-125 MG-UNIT per tablet Take 1 tablet by mouth daily      allopurinol (ZYLOPRIM) 100 MG tablet Take 200 mg by mouth daily Indications: takes total of 500 mg daily       mesalamine (DELZICOL) 400 MG CPDR DR capsule Take 800 mg by mouth 3 times daily      indapamide (LOZOL) 2.5 MG tablet Take 2.5 mg by mouth every morning      lisinopril (PRINIVIL;ZESTRIL) 40 MG tablet Take 40 mg by mouth daily       omeprazole (PRILOSEC) 20 MG capsule Take 20 mg by mouth Daily        No current facility-administered medications for this visit. Allergies: Patient has no known allergies.   Past Medical History:   Diagnosis Date    Arthritis     Cancer [facility])     endometrial cancer    Cataract     [facility]P [facility]chronic inflammatory [facility]yelinating polyneuropathy) ([facility]     Crohn's disease ([facility] [address]     GERD (gastroesophageal reflux disease)     History of blood transfusion     Hypertension     Macular degeneration     Radiation      Past Surgical History:   Procedure Laterality Date    BACK SURGERY      CARPAL TUNNEL RELEASE Bilateral     CATARACT REMOVAL Bilateral     CHOLECYSTECTOMY      HAMMER TOE SURGERY Left     HYSTERECTOMY      KNEE ARTHROSCOPY Right     LUMBAR FUSION       Family History   Problem Relation Age of Onset    Cancer Mother     High Blood Pressure Father     Heart Disease Father      Social History     Tobacco Use    Smoking status: Never Smoker    Smokeless tobacco: Never Used   Substance Use Topics    Alcohol use: No       Review of Systems    Constitutional - no significant activity change, appetite change, or unexpected weight change. No fever or chills. No diaphoresis or significant fatigue. HENT - no significant rhinorrhea or epistaxis. No tinnitus or significant hearing loss. Eyes - no sudden vision change or amaurosis. Respiratory - no significant shortness of breath, wheezing, or stridor. No apnea, cough, or chest tightness associated with shortness of breath. Cardiovascular - no chest pain, syncope, or significant dizziness. No palpitations or significant leg swelling. Gastrointestinal - no abdominal swelling or pain. No blood in stool. No severe constipation, diarrhea, nausea, or vomiting. Genitourinary - No difficulty urinating, dysuria, frequency, or urgency. No flank pain or hematuria. Musculoskeletal - no back pain, gait disturbance, or myalgia. Skin - no color change, rash, pallor. Wound plantar aspect left foot  Neurologic - no dizziness, facial asymmetry, or light headedness. No seizures. No speech difficulty or lateralizing weakness. Hematologic - no easy bruising or excessive bleeding. Psychiatric - no severe anxiety or nervousness. No confusion. All other review of systems are negative. Physical Exam    /63 Comment: right  Pulse 65   Temp 97.1 °F (36.2 °C)   Resp 18   SpO2 96%     Constitutional - well developed, well nourished. No diaphoresis or acute distress.   HENT - head normocephalic. Right external ear canal appears normal.  Left external ear canal appears normal.  Septum appears midline. Eyes - conjunctiva normal.  EOMS normal.  No exudate. No icterus. Neck- ROM appears normal, no tracheal deviation. Cardiovascular - Regular rate and rhythm. Heart sounds are normal.  No murmur, rub, or gallop. Carotid pulses are 2+ to palpation bilaterally without bruit. Extremities - Radial and brachial pulses are 2+ to palpation bilaterally. Femoral pulses are palpable. DP and PT bilaterally with 2+ doppler signals present. No cyanosis, clubbing, or significant edema. No signs atheroembolic event. Groin without significant ecchymosis or pulsatile mass to suggest pseudoaneurysm, AV fistula, or significant hematoma formation. Wound plantar aspect of the left foot without foul odor or drainage or erythema noted. Pulmonary - effort appears normal.  Breath sounds normal.  No respiratory distress. No wheezes or rales. Abdomen - soft, non tender, bowel sounds X 4 quadrants. No guarding or rebound tenderness. No distension or palpable mass. Genitourinary - deferred. Musculoskeletal - ROM appears normal.  No significant edema. Neurologic - alert and oriented X 3. Physiologic. Skin - warm, dry, and intact. No rash, erythema, or pallor. Psychiatric - mood, affect, and behavior appear normal.  Judgment and thought processes appear normal.    Risk factors for atherosclerosis of all vascular beds have been reviewed with the patient including:  Family history, tobacco abuse in all forms, elevated cholesterol, hyperlipidemia, and diabetes. Assessment      1.  Athscl native arteries of left leg w ulceration oth prt foot ([address]          Plan      Recommend she continue ASA, Tricor and fish oil  Recommend no smoking  Recommend good BP control  She is to continue local wound care per Dr. Howard Level  We will obtain a lower extremity arterial study in 3 months or sooner if wound deteriorates        Cc - Bala Page, DO

## 2021-09-21 NOTE — PROGRESS NOTES
Chief Complaint   Patient presents with   • Abdominal Pain     lower abd pain bloating       PCP: Cipriano Padilla DO  REFER: No ref. provider found    Subjective     HPI    Diagnosis of Crohn's disease 2007.  She is currently maintained on Delzicol 6 pills per day.  Today she presents with complain of bilateral lower abdominal pain.    She has experienced weight loss over past several months in which she attributes to dietary changes.  She has a history of diarrhea and is maintained on Colestid 3 times per day.  Deisi Ponce complain of generalized abdominal bloating.  She is complain of change in stool.  Stool described as formed then repeated liquid BM.  No bright red blood per rectum, no melena.      She has never had to seek treatment due to crohn's flare, she does not feel current abdominal pain is similar to pain she experienced prior to diagnosis.      CScope (Dr Butt) 2019-diverticulosis (3 years)         Past Medical History:   Diagnosis Date   • Arthritis    • Cancer (CMS/HCC)     endometrial cancer   • Crohn disease (CMS/HCC)    • Crohn's disease (CMS/HCC)    • DDD (degenerative disc disease), lumbar    • Elevated cholesterol    • GERD (gastroesophageal reflux disease)    • Gout    • History of transfusion    • Hypertension    • Intermittent self-catheterization of bladder    • Macular degeneration    • Osteopenia    • Peripheral neuropathy    • Urinary retention        Past Surgical History:   Procedure Laterality Date   • BACK SURGERY  2010   • CARPAL TUNNEL RELEASE Bilateral    • CATARACT EXTRACTION Bilateral    • CHOLECYSTECTOMY N/A 7/13/2021    Procedure: LAPAROSCOPIC CHOLECYSTECTOMY;  Surgeon: Agustina Mc MD;  Location: [facility] OR;  Service: General;  Laterality: N/A;   • COLONOSCOPY  10/09/2015   • COLONOSCOPY N/A 12/28/2016    Procedure: COLONOSCOPY WITH ANESTHESIA;  Surgeon: Jose Raul Butt DO;  Location: [facility] ENDOSCOPY;  Service:    • COLONOSCOPY N/A 2/18/2019     Procedure: COLONOSCOPY WITH ANESTHESIA;  Surgeon: Jose Raul Butt DO;  Location:  [facility] ENDOSCOPY;  Service: Gastroenterology   • CYST REMOVAL      from chest x2   • HARDWARE REMOVAL N/A 2/19/2020    Procedure: REMOVAL OF INSTRUMENTATION;  Surgeon: EBONI Rader MD;  Location: [facility] OR;  Service: Orthopedic Spine;  Laterality: N/A;   • HYSTERECTOMY     • LUMBAR FUSION Bilateral 2/17/2020    Procedure: LATERAL LUMBAR INTERBODY FUSION LEFT L1-2 WITH INSTRUMENTATION, RIGHT L2-3;  Surgeon: EBONI Rader MD;  Location:  [facility] OR;  Service: Orthopedic Spine;  Laterality: Bilateral;   • LUMBAR LAMINECTOMY WITH FUSION N/A 2/19/2020    Procedure: EXPLORATION OF FUSION L3-5, POSTERIOR SPINAL FUSION WITH INSTRUMENTATION L1-3;  Surgeon: EBONI Rader MD;  Location: [facility] OR;  Service: Orthopedic Spine;  Laterality: N/A;   • MENISCECTOMY Left    • UPPER GASTROINTESTINAL ENDOSCOPY  08/31/2010       Outpatient Medications Marked as Taking for the 9/22/21 encounter (Office Visit) with Luca Gomez APRN   Medication Sig Dispense Refill   • allopurinol (ZYLOPRIM) 100 MG tablet Take 500 mg by mouth Daily.     • amLODIPine (NORVASC) 5 MG tablet Take 5 mg by mouth Daily.     • aspirin 81 MG EC tablet Take 81 mg by mouth Daily.     • calcium carbonate (OS-LUKE) 600 MG tablet Take 600 mg by mouth Daily.     • carvedilol (COREG) 6.25 MG tablet Take 6.25 mg by mouth 2 (Two) Times a Day With Meals.     • colestipol (COLESTID) 1 g tablet Take 1 tablet by mouth 3 (Three) Times a Day. 270 tablet 4   • CRANBERRY PO Take 1 tablet by mouth Daily.     • Cyanocobalamin (VITAMIN B 12 PO) Take 1 tablet by mouth Daily.     • estrogens, conjugated, (PREMARIN) 0.3 MG tablet Take 0.3 mg by mouth 2 (Two) Times a Week. Twice weekly      • fenofibrate (TRICOR) 145 MG tablet Take 145 mg by mouth Daily.     • folic acid (FOLVITE) 1 MG tablet Take 1 mg by mouth Daily.     • gabapentin (NEURONTIN) 300 MG capsule Take 600 mg by  mouth 3 (Three) Times a Day. 2 pills tid     • Garlic Oil 1000 MG capsule Take 1,000 mg by mouth Daily.     • HYDROcodone-acetaminophen (NORCO)  MG per tablet Take 1 tablet by mouth Every 6 (Six) Hours As Needed for Moderate Pain .     • indapamide (LOZOL) 2.5 MG tablet Take 2.5 mg by mouth Every Morning.     • lidocaine (XYLOCAINE) 5 % ointment Apply 1 application topically to the appropriate area as directed Every 2 (Two) Hours As Needed for Mild Pain .     • lisinopril (PRINIVIL,ZESTRIL) 40 MG tablet Take 40 mg by mouth Daily.     • mesalamine (DELZICOL) 400 MG capsule delayed-release delayed release capsule Take 2 capsules by mouth 3 (Three) Times a Day. Two pills tid 360 capsule 3   • Multiple Vitamins-Minerals (MULTIVITAMIN ADULT PO) Take 1 tablet by mouth Daily.     • Omega-3 Fatty Acids (FISH OIL) 1000 MG capsule capsule Take 1,000 mg by mouth Daily With Breakfast.     • omeprazole (priLOSEC) 40 MG capsule Take 20 mg by mouth 2 (Two) Times a Day.     • oxyCODONE (Roxicodone) 5 MG immediate release tablet Take 1 tablet by mouth Every 8 (Eight) Hours As Needed for Severe Pain . 10 tablet 0   • polyethyl glycol-propyl glycol (SYSTANE) 0.4-0.3 % solution ophthalmic solution Administer 2 drops to both eyes Every 3 (Three) Hours As Needed.         No Known Allergies    Social History     Socioeconomic History   • Marital status:      Spouse name: Not on file   • Number of children: Not on file   • Years of education: Not on file   • Highest education level: Not on file   Tobacco Use   • Smoking status: Never Smoker   • Smokeless tobacco: Never Used   Substance and Sexual Activity   • Alcohol use: No   • Drug use: No   • Sexual activity: Defer       Review of Systems   Constitutional: Negative for unexpected weight change.   Respiratory: Negative for shortness of breath.    Cardiovascular: Negative for chest pain.   Gastrointestinal: Positive for abdominal pain. Negative for anal bleeding.  "      Objective     Vitals:    09/22/21 1024   BP: 130/58   Pulse: 70   Temp: 97.8 °F (36.6 °C)   SpO2: 97%   Weight: 61.2 kg (135 lb)   Height: 162.6 cm (64\")     Body mass index is 23.17 kg/m².    Physical Exam  Constitutional:       Appearance: Normal appearance. She is well-developed.   Eyes:      General: No scleral icterus.  Cardiovascular:      Rate and Rhythm: Regular rhythm.      Heart sounds: Normal heart sounds. No murmur heard.     Pulmonary:      Effort: Pulmonary effort is normal. No accessory muscle usage.      Breath sounds: Normal breath sounds.   Abdominal:      General: Bowel sounds are normal. There is no distension.      Palpations: Abdomen is soft. There is no mass.      Tenderness: There is no abdominal tenderness. There is no guarding or rebound.   Skin:     General: Skin is warm and dry.      Coloration: Skin is not jaundiced.   Neurological:      Mental Status: She is alert.   Psychiatric:         Behavior: Behavior is cooperative.         Imaging Results (Most Recent)     None          Body mass index is 23.17 kg/m².    Assessment/Plan     Diagnoses and all orders for this visit:    1. Crohn's disease with complication, unspecified gastrointestinal tract location (HCC) (Primary)  -     Case Request; Standing  -     Implement Anesthesia Orders Day of Procedure; Standing  -     Obtain Informed Consent; Standing  -     Case Request        COLONOSCOPY WITH ANESTHESIA (N/A)    Complication from Crohn's vs improvement in IBS due to dietary changes resulting in constipation.  Recommend to hold colestid at this time.     Advised to hold Aspirin x 3 days prior to procedure     Patient is to continue all blood pressure and cardiac medications prior to procedure and has been advised to take medications morning of procedure   Pt verbalized understanding       Advised pt to stop use of NSAIDs, Fish Oil, and MV 5 days prior to procedure, per Dr Butt protocol.  Tylenol based products are ok to take.  " Pt verbalized understanding.      All risks, benefits, alternatives, and indications of colonoscopy procedure have been discussed with the patient. Risks to include perforation of the colon requiring possible surgery or colostomy, risk of bleeding from biopsies or removal of colon tissue, possibility of missing a colon polyp or cancer, or adverse drug reaction.  Benefits to include the diagnosis and management of disease of the colon and rectum. Alternatives to include barium enema, radiographic evaluation, lab testing or no intervention. Pt verbalizes understanding and agrees to proceed with procedure.          Luca Gomez, APRN  09/23/21          There are no Patient Instructions on file for this visit.

## 2021-09-22 ENCOUNTER — OFFICE VISIT (OUTPATIENT)
Dept: GASTROENTEROLOGY | Facility: CLINIC | Age: 76
End: 2021-09-22

## 2021-09-22 VITALS
HEIGHT: 64 IN | SYSTOLIC BLOOD PRESSURE: 130 MMHG | TEMPERATURE: 97.8 F | BODY MASS INDEX: 23.05 KG/M2 | HEART RATE: 70 BPM | WEIGHT: 135 LBS | DIASTOLIC BLOOD PRESSURE: 58 MMHG | OXYGEN SATURATION: 97 %

## 2021-09-22 DIAGNOSIS — K50.919 CROHN'S DISEASE WITH COMPLICATION, UNSPECIFIED GASTROINTESTINAL TRACT LOCATION (HCC): Primary | ICD-10-CM

## 2021-09-22 PROCEDURE — 99214 OFFICE O/P EST MOD 30 MIN: CPT | Performed by: NURSE PRACTITIONER

## 2021-09-28 ENCOUNTER — OFFICE VISIT (OUTPATIENT)
Dept: UROLOGY | Age: 76
End: 2021-09-28
Payer: MEDICARE

## 2021-09-28 VITALS — WEIGHT: 137 LBS | BODY MASS INDEX: 23.39 KG/M2 | TEMPERATURE: 96.3 F | HEIGHT: 64 IN

## 2021-09-28 DIAGNOSIS — M48.062 SPINAL STENOSIS OF LUMBAR REGION WITH NEUROGENIC CLAUDICATION: ICD-10-CM

## 2021-09-28 DIAGNOSIS — G61.81 CIDP (CHRONIC INFLAMMATORY DEMYELINATING POLYNEUROPATHY) (HCC): ICD-10-CM

## 2021-09-28 DIAGNOSIS — M05.79 RHEUMATOID ARTHRITIS INVOLVING MULTIPLE SITES WITH POSITIVE RHEUMATOID FACTOR (HCC): ICD-10-CM

## 2021-09-28 DIAGNOSIS — N39.0 RECURRENT UTI: ICD-10-CM

## 2021-09-28 DIAGNOSIS — R30.0 DYSURIA: Primary | ICD-10-CM

## 2021-09-28 LAB
BACTERIA URINE, POC: ABNORMAL
BILIRUBIN URINE: 0 MG/DL
BLOOD, URINE: POSITIVE
CASTS URINE, POC: 0
CLARITY: ABNORMAL
COLOR: YELLOW
CRYSTALS URINE, POC: 0
EPI CELLS URINE, POC: ABNORMAL
GLUCOSE URINE: ABNORMAL
KETONES, URINE: NEGATIVE
LEUKOCYTE EST, POC: ABNORMAL
NITRITE, URINE: POSITIVE
PH UA: 5 (ref 4.5–8)
PROTEIN UA: POSITIVE
RBC URINE, POC: ABNORMAL
SPECIFIC GRAVITY UA: 1.02 (ref 1–1.03)
UROBILINOGEN, URINE: NORMAL
WBC URINE, POC: ABNORMAL
YEAST URINE, POC: 0

## 2021-09-28 PROCEDURE — 99213 OFFICE O/P EST LOW 20 MIN: CPT | Performed by: NURSE PRACTITIONER

## 2021-09-28 PROCEDURE — 81001 URINALYSIS AUTO W/SCOPE: CPT | Performed by: NURSE PRACTITIONER

## 2021-09-28 RX ORDER — NITROFURANTOIN 25; 75 MG/1; MG/1
100 CAPSULE ORAL 2 TIMES DAILY
Qty: 28 CAPSULE | Refills: 0 | Status: SHIPPED | OUTPATIENT
Start: 2021-09-28 | End: 2021-10-12

## 2021-09-28 RX ORDER — HYDROCODONE BITARTRATE AND ACETAMINOPHEN 10; 325 MG/1; MG/1
1 TABLET ORAL EVERY 8 HOURS PRN
Qty: 90 TABLET | Refills: 0 | Status: SHIPPED | OUTPATIENT
Start: 2021-09-28 | End: 2021-10-24 | Stop reason: SDUPTHER

## 2021-09-28 NOTE — PROGRESS NOTES
Rony Marie is a 67 y.o. female who presents today   Chief Complaint   Patient presents with    Follow-up     I am here today for a possible UTI. Patient is a 48-year-old female who presents to clinic today for evaluation of possible urinary tract infection. She does have a history of recurrent UTIs and is maintained on in and out catheterization 3 times per day for incomplete emptying as well as voiding on her own. She started experiencing frequency, urgency, dysuria approximately 4 days ago. She was taken off her medication in preparation for colonoscopy so she has been having frequent diarrhea and is about to undergo prep for colonoscopy. Denies any fevers, flank pain, nausea, vomiting, chills.   Previous urine culture:  7/22/2021 revealed E. coli resistant to gentamicin, Levaquin, tobramycin  7/9/2021 revealed E. coli resistant to gentamicin, Levaquin, tobramycin  3/31/2021 - urine culture    Past Medical History:   Diagnosis Date    Arthritis     [facility]     endometrial cancer    Cataract     [facility]neuropathy[facility]     Cro[facility]'s disease ([facility]     GERD (gastroesophageal reflux disease)     History of blood transfusion     Hypertension     Macular degeneration     Radiation        Past Surgical History:   Procedure Laterality Date    BACK SURGERY      CARPAL TUNNEL RELEASE Bilateral     CATARACT REMOVAL Bilateral     CHOLECYSTECTOMY      HAMMER TOE SURGERY Left     HYSTERECTOMY      KNEE ARTHROSCOPY Right     LUMBAR FUSION         Current Outpatient Medications   Medication Sig Dispense Refill    nitrofurantoin, macrocrystal-monohydrate, (MACROBID) 100 MG capsule Take 1 capsule by mouth 2 times daily for 14 days 28 capsule 0    sulfamethoxazole-trimethoprim (BACTRIM DS;SEPTRA DS) 800-160 MG per tablet       Propylene Glycol (SYSTANE COMPLETE) 0.6 % SOLN Apply 1 drop to eye 3 times daily Each eye      estrogens, conjugated, (PREMARIN) 0.3 MG tablet Take 0.3 mg by mouth Twice a Week Takes sun, and thur      amLODIPine (NORVASC) 5 MG tablet Take 5 mg by mouth daily      conjugated estrogens (PREMARIN) 0.625 MG/GM vaginal cream Place vaginally twice a week 1 Tube 3    colestipol (COLESTID) 1 g tablet Take 1 g by mouth 3 times daily       Cyanocobalamin (VITAMIN B 12) 100 MCG LOZG Take by mouth daily       lidocaine (XYLOCAINE) 5 % ointment Apply topically as needed for Pain Apply topically as needed. 30 g 3    fenofibrate (TRICOR) 145 MG tablet 145 mg daily       Omega-3 Fatty Acids (FISH OIL) 1000 MG CAPS Take 1,000 mg by mouth 2 times daily      Garlic 1070 MG CAPS Take 1,000 mg by mouth 2 times daily      CRANBERRY SOFT PO Take 36 mg by mouth daily      allopurinol (ZYLOPRIM) 300 MG tablet 300 mg daily Indications: takes total 500mg a day       carvedilol (COREG) 6.25 MG tablet Take 6.25 mg by mouth 2 times daily      aspirin 81 MG tablet Take 81 mg by mouth daily      folic acid (FOLVITE) 1 MG tablet Take 1 mg by mouth daily      Multiple Vitamins-Minerals (THERAPEUTIC MULTIVITAMIN-MINERALS) tablet Take 1 tablet by mouth daily      calcium-vitamin D (OSCAL) 250-125 MG-UNIT per tablet Take 1 tablet by mouth daily      allopurinol (ZYLOPRIM) 100 MG tablet Take 200 mg by mouth daily Indications: takes total of 500 mg daily       mesalamine (DELZICOL) 400 MG CPDR DR capsule Take 800 mg by mouth 3 times daily      indapamide (LOZOL) 2.5 MG tablet Take 2.5 mg by mouth every morning      lisinopril (PRINIVIL;ZESTRIL) 40 MG tablet Take 10 mg by mouth daily       omeprazole (PRILOSEC) 20 MG capsule Take 20 mg by mouth Daily       HYDROcodone-acetaminophen (NORCO)  MG per tablet Take 1 tablet by mouth every 8 hours as needed for Pain for up to 30 days. 90 tablet 0    gabapentin (NEURONTIN) 600 MG tablet TAKE 1 TABLET THREE TIMES A DAY (Patient taking differently: 600 mg 3 times daily.  TAKE 1 TABLET THREE TIMES A DAY) 270 tablet 1     No current facility-administered medications for this visit. No Known Allergies    Social History     Socioeconomic History    Marital status:      Spouse name: Not on file    Number of children: Not on file    Years of education: Not on file    Highest education level: Not on file   Occupational History    Not on file   Tobacco Use    Smoking status: Never Smoker    Smokeless tobacco: Never Used   Vaping Use    Vaping Use: Never used   Substance and Sexual Activity    Alcohol use: No    Drug use: No    Sexual activity: Not on file   Other Topics Concern    Not on file   Social History Narrative    Not on file     Social Determinants of Health     Financial Resource Strain:     Difficulty of Paying Living Expenses:    Food Insecurity:     Worried About Running Out of Food in the Last Year:     [address] in the Last Year:    Transportation Needs:     Lack of Transportation (Medical):  Lack of Transportation (Non-Medical):    Physical Activity:     Days of Exercise per Week:     Minutes of Exercise per Session:    Stress:     Feeling of Stress :    Social Connections:     Frequency of Communication with Friends and Family:     Frequency of Social Gatherings with Friends and Family:     Attends [employer] Services:     Active Member of Clubs or Organizations:     Attends Club or Organization Meetings:     Marital Status:    Intimate Partner Violence:     Fear of Current or Ex-Partner:     Emotionally Abused:     Physically Abused:     Sexually Abused:        Family History   Problem Relation Age of Onset    Cancer Mother     High Blood Pressure Father     Heart Disease Father        REVIEW OF SYSTEMS:  Review of Systems   Constitutional: Negative for chills and fever. Gastrointestinal: Negative for abdominal distention, abdominal pain, nausea and vomiting. Genitourinary: Positive for dysuria, frequency and urgency.  Negative for difficulty urinating, flank pain and hematuria. Musculoskeletal: Negative for back pain and gait problem. Psychiatric/Behavioral: Negative for agitation and confusion. PHYSICAL EXAM:  Temp 96.3 °F (35.7 °C) (Temporal)   Ht 5' 4\" (1.626 m)   Wt 137 lb (62.1 kg)   BMI 23.52 kg/m²   Physical Exam  Vitals and nursing note reviewed. Constitutional:       General: She is not in acute distress. Appearance: Normal appearance. She is not ill-appearing. Pulmonary:      Effort: Pulmonary effort is normal. No respiratory distress. Abdominal:      General: There is no distension. Tenderness: There is no abdominal tenderness. There is no right CVA tenderness or left CVA tenderness. Neurological:      Mental Status: She is alert and oriented to person, place, and time. Mental status is at baseline. Motor: Weakness present. Gait: Gait abnormal.   Psychiatric:         Mood and Affect: Mood normal.         Behavior: Behavior normal.         DATA:    Results for orders placed or performed in visit on 09/28/21   POCT Urinalysis Dipstick w/ Micro (Auto)   Result Value Ref Range    Color, UA Yellow     Clarity, UA Cloudy (A) Clear    Glucose, Ur NEG     Bilirubin Urine 0 mg/dL    Ketones, Urine Negative     Specific Gravity, UA 1.020 1.005 - 1.030    Blood, Urine Positive     pH, UA 5 4.5 - 8.0    Protein, UA Positive (A) Negative    Nitrite, Urine Positive     Leukocytes, UA LARGE     Urobilinogen, Urine Normal     rbc urine, poc TNTC     wbc urine, poc TNTC     bacteria urine, poc 3+     yeast urine, poc 0     casts urine, poc 0     epi cells urine, poc +     crystals urine, poc 0        1. Dysuria  Patient presents with complaints of dysuria as well as increased frequency and urgency. Urine is suspicious for infection. We will go ahead and send a cath urine specimen and empirically treat with Macrobid. She will be undergoing colonoscopy in 2 days. - Culture, Urine  - POCT Urinalysis Dipstick w/ Micro (Auto)    2. Recurrent UTI  Likely secondary to increase in diarrhea due to discontinuation of medication in preparation for colonoscopy. See above. Orders Placed This Encounter   Procedures    Culture, Urine     Order Specific Question:   Specify (ex-cath, midstream, cysto, etc)? Answer:   cath urine    POCT Urinalysis Dipstick w/ Micro (Auto)        Return if symptoms worsen or fail to improve. All information inputted into the note by the MA to include chief complaint, past medical history, past surgical history, medications, allergies, social and family history and review of systems has been reviewed and updated as needed by me. EMR Dragon/transcription disclaimer: Much of this documentt is electronic  transcription/translation of spoken language to printed text. The  electronic translation of spoken language may be erroneous, or at times,  nonsensical words or phrases may be inadvertently transcribed.  Although I  have reviewed the document for such errors, some may still exist.

## 2021-09-28 NOTE — TELEPHONE ENCOUNTER
Dennis Cortes has requested a refill on her medication. Last office visit : 9/16/2021   Next office visit : 3/17/2022   Last medication refill : 8/27/21  Johnathon Rust : up to date       Requested Prescriptions     Pending Prescriptions Disp Refills    HYDROcodone-acetaminophen (NORCO)  MG per tablet 90 tablet 0     Sig: Take 1 tablet by mouth every 8 hours as needed for Pain for up to 30 days.

## 2021-09-29 ENCOUNTER — APPOINTMENT (OUTPATIENT)
Dept: INFUSION THERAPY | Age: 76
End: 2021-09-29
Payer: MEDICARE

## 2021-09-29 ASSESSMENT — ENCOUNTER SYMPTOMS
BACK PAIN: 0
ABDOMINAL DISTENTION: 0
VOMITING: 0
ABDOMINAL PAIN: 0
NAUSEA: 0

## 2021-09-30 ENCOUNTER — TELEPHONE (OUTPATIENT)
Dept: GASTROENTEROLOGY | Facility: CLINIC | Age: 76
End: 2021-09-30

## 2021-09-30 LAB
ORGANISM: ABNORMAL
URINE CULTURE, ROUTINE: ABNORMAL
URINE CULTURE, ROUTINE: ABNORMAL

## 2021-09-30 NOTE — TELEPHONE ENCOUNTER
Patient called and cancel procedure for today due to waking up sick - she will call back to rescheduled.     Sent letter to Cipriano Padilla, DO

## 2021-10-05 ENCOUNTER — HOSPITAL ENCOUNTER (OUTPATIENT)
Dept: INFUSION THERAPY | Age: 76
Setting detail: INFUSION SERIES
Discharge: HOME | End: 2021-10-05
Payer: MEDICARE

## 2021-10-05 VITALS
DIASTOLIC BLOOD PRESSURE: 57 MMHG | TEMPERATURE: 97.2 F | HEART RATE: 64 BPM | SYSTOLIC BLOOD PRESSURE: 121 MMHG | RESPIRATION RATE: 18 BRPM

## 2021-10-05 DIAGNOSIS — G61.81 CIDP (CHRONIC INFLAMMATORY DEMYELINATING POLYNEUROPATHY) (HCC): Primary | ICD-10-CM

## 2021-10-05 PROCEDURE — 96366 THER/PROPH/DIAG IV INF ADDON: CPT

## 2021-10-05 PROCEDURE — 96365 THER/PROPH/DIAG IV INF INIT: CPT

## 2021-10-05 PROCEDURE — 6360000002 HC RX W HCPCS: Performed by: PSYCHIATRY & NEUROLOGY

## 2021-10-05 RX ORDER — SODIUM CHLORIDE 9 MG/ML
INJECTION, SOLUTION INTRAVENOUS CONTINUOUS
Status: CANCELLED | OUTPATIENT
Start: 2021-10-05

## 2021-10-05 RX ORDER — DIPHENHYDRAMINE HYDROCHLORIDE 50 MG/ML
50 INJECTION INTRAMUSCULAR; INTRAVENOUS ONCE
Status: CANCELLED
Start: 2021-10-05 | End: 2021-10-05

## 2021-10-05 RX ORDER — HEPARIN SODIUM (PORCINE) LOCK FLUSH IV SOLN 100 UNIT/ML 100 UNIT/ML
500 SOLUTION INTRAVENOUS PRN
Status: CANCELLED | OUTPATIENT
Start: 2021-10-05

## 2021-10-05 RX ORDER — DIPHENHYDRAMINE HYDROCHLORIDE 50 MG/ML
50 INJECTION INTRAMUSCULAR; INTRAVENOUS ONCE
Status: CANCELLED | OUTPATIENT
Start: 2021-10-05 | End: 2021-10-05

## 2021-10-05 RX ORDER — SODIUM CHLORIDE 9 MG/ML
25 INJECTION, SOLUTION INTRAVENOUS PRN
Status: CANCELLED | OUTPATIENT
Start: 2021-10-05

## 2021-10-05 RX ORDER — EPINEPHRINE 1 MG/ML
0.3 INJECTION, SOLUTION, CONCENTRATE INTRAVENOUS PRN
Status: CANCELLED | OUTPATIENT
Start: 2021-10-05

## 2021-10-05 RX ORDER — DIPHENHYDRAMINE HYDROCHLORIDE 50 MG/ML
50 INJECTION INTRAMUSCULAR; INTRAVENOUS ONCE
Status: DISCONTINUED | OUTPATIENT
Start: 2021-10-05 | End: 2021-10-05

## 2021-10-05 RX ORDER — SODIUM CHLORIDE 0.9 % (FLUSH) 0.9 %
5-40 SYRINGE (ML) INJECTION PRN
Status: CANCELLED | OUTPATIENT
Start: 2021-10-05

## 2021-10-05 RX ORDER — ACETAMINOPHEN 325 MG/1
650 TABLET ORAL ONCE
Status: CANCELLED | OUTPATIENT
Start: 2021-10-05 | End: 2021-10-05

## 2021-10-05 RX ORDER — METHYLPREDNISOLONE SODIUM SUCCINATE 125 MG/2ML
125 INJECTION, POWDER, LYOPHILIZED, FOR SOLUTION INTRAMUSCULAR; INTRAVENOUS ONCE
Status: CANCELLED | OUTPATIENT
Start: 2021-10-05 | End: 2021-10-05

## 2021-10-05 RX ORDER — ACETAMINOPHEN 325 MG/1
650 TABLET ORAL ONCE
Status: DISCONTINUED | OUTPATIENT
Start: 2021-10-05 | End: 2021-10-05

## 2021-10-05 RX ADMIN — IMMUNE GLOBULIN (HUMAN) 30 G: 10 INJECTION INTRAVENOUS; SUBCUTANEOUS at 11:41

## 2021-10-05 ASSESSMENT — PAIN SCALES - GENERAL: PAINLEVEL_OUTOF10: 0

## 2021-10-14 ENCOUNTER — ANESTHESIA (OUTPATIENT)
Dept: GASTROENTEROLOGY | Facility: HOSPITAL | Age: 76
End: 2021-10-14

## 2021-10-14 ENCOUNTER — ANESTHESIA EVENT (OUTPATIENT)
Dept: GASTROENTEROLOGY | Facility: HOSPITAL | Age: 76
End: 2021-10-14

## 2021-10-14 ENCOUNTER — HOSPITAL ENCOUNTER (OUTPATIENT)
Facility: HOSPITAL | Age: 76
Setting detail: HOSPITAL OUTPATIENT SURGERY
Discharge: HOME | End: 2021-10-14
Attending: INTERNAL MEDICINE | Admitting: INTERNAL MEDICINE

## 2021-10-14 VITALS
OXYGEN SATURATION: 100 % | SYSTOLIC BLOOD PRESSURE: 107 MMHG | HEIGHT: 64 IN | RESPIRATION RATE: 20 BRPM | TEMPERATURE: 97.1 F | WEIGHT: 133 LBS | DIASTOLIC BLOOD PRESSURE: 46 MMHG | HEART RATE: 57 BPM | BODY MASS INDEX: 22.71 KG/M2

## 2021-10-14 DIAGNOSIS — K50.919 CROHN'S DISEASE WITH COMPLICATION, UNSPECIFIED GASTROINTESTINAL TRACT LOCATION (HCC): ICD-10-CM

## 2021-10-14 PROCEDURE — 45378 DIAGNOSTIC COLONOSCOPY: CPT | Performed by: INTERNAL MEDICINE

## 2021-10-14 PROCEDURE — 25010000002 PROPOFOL 10 MG/ML EMULSION: Performed by: NURSE ANESTHETIST, CERTIFIED REGISTERED

## 2021-10-14 PROCEDURE — 88305 TISSUE EXAM BY PATHOLOGIST: CPT | Performed by: INTERNAL MEDICINE

## 2021-10-14 RX ORDER — LIDOCAINE HYDROCHLORIDE 10 MG/ML
0.5 INJECTION, SOLUTION EPIDURAL; INFILTRATION; INTRACAUDAL; PERINEURAL ONCE AS NEEDED
Status: DISCONTINUED | OUTPATIENT
Start: 2021-10-14 | End: 2021-10-14 | Stop reason: HOSPADM

## 2021-10-14 RX ORDER — SODIUM CHLORIDE 9 MG/ML
500 INJECTION, SOLUTION INTRAVENOUS CONTINUOUS PRN
Status: DISCONTINUED | OUTPATIENT
Start: 2021-10-14 | End: 2021-10-14 | Stop reason: HOSPADM

## 2021-10-14 RX ORDER — SODIUM CHLORIDE 0.9 % (FLUSH) 0.9 %
10 SYRINGE (ML) INJECTION AS NEEDED
Status: DISCONTINUED | OUTPATIENT
Start: 2021-10-14 | End: 2021-10-14 | Stop reason: HOSPADM

## 2021-10-14 RX ORDER — PROPOFOL 10 MG/ML
VIAL (ML) INTRAVENOUS AS NEEDED
Status: DISCONTINUED | OUTPATIENT
Start: 2021-10-14 | End: 2021-10-14 | Stop reason: SURG

## 2021-10-14 RX ORDER — LIDOCAINE HYDROCHLORIDE 20 MG/ML
INJECTION, SOLUTION EPIDURAL; INFILTRATION; INTRACAUDAL; PERINEURAL AS NEEDED
Status: DISCONTINUED | OUTPATIENT
Start: 2021-10-14 | End: 2021-10-14 | Stop reason: SURG

## 2021-10-14 RX ADMIN — PROPOFOL 50 MG: 10 INJECTION, EMULSION INTRAVENOUS at 12:02

## 2021-10-14 RX ADMIN — LIDOCAINE HYDROCHLORIDE 100 MG: 20 INJECTION, SOLUTION EPIDURAL; INFILTRATION; INTRACAUDAL; PERINEURAL at 11:55

## 2021-10-14 RX ADMIN — PROPOFOL 50 MG: 10 INJECTION, EMULSION INTRAVENOUS at 12:05

## 2021-10-14 RX ADMIN — PROPOFOL 100 MG: 10 INJECTION, EMULSION INTRAVENOUS at 11:55

## 2021-10-14 RX ADMIN — PROPOFOL 50 MG: 10 INJECTION, EMULSION INTRAVENOUS at 11:59

## 2021-10-14 RX ADMIN — SODIUM CHLORIDE 500 ML: 9 INJECTION, SOLUTION INTRAVENOUS at 11:35

## 2021-10-14 NOTE — ANESTHESIA POSTPROCEDURE EVALUATION
Patient: Deisi Ponce    Procedure Summary     Date: 10/14/21 Room / Location: [facility] ENDOSCOPY 5 / [facility]    Anesthesia Start: 1153 Anesthesia Stop: 1209    Procedure: COLONOSCOPY WITH ANESTHESIA (N/A ) Diagnosis:       Crohn's disease with complication, unspecified gastrointestinal tract location (HCC)      (Crohn's disease with complication, unspecified gastrointestinal tract location (HCC) [K50.919])    Surgeons: Jose Raul Butt DO Provider: ABEL Singh CRNA    Anesthesia Type: MAC ASA Status: 3          Anesthesia Type: MAC    Vitals  No vitals data found for the desired time range.          Post Anesthesia Care and Evaluation    Patient location during evaluation: PACU  Patient participation: complete - patient participated  Level of consciousness: awake and alert  Pain score: 0  Pain management: adequate  Airway patency: patent  Anesthetic complications: No anesthetic complications    Cardiovascular status: acceptable and stable  Respiratory status: acceptable and unassisted  Hydration status: acceptable

## 2021-10-14 NOTE — ANESTHESIA PREPROCEDURE EVALUATION
Anesthesia Evaluation     no history of anesthetic complications:  NPO Solid Status: > 8 hours  NPO Liquid Status: > 8 hours           Airway   Mallampati: I  TM distance: >3 FB  Neck ROM: full  No difficulty expected  Dental      Pulmonary    (-) sleep apnea, not a smoker  Cardiovascular   Exercise tolerance: poor (<4 METS)    (+) hypertension, hyperlipidemia,       Neuro/Psych  (+) numbness (severe neuropathy),     GI/Hepatic/Renal/Endo    (+)  GERD,    (-) liver disease, no renal disease, diabetes    ROS Comment: crohns disease    Musculoskeletal     Abdominal    Substance History      OB/GYN          Other   arthritis (rheumatoid arhtritis),    history of cancer (endometrial cancer) remission                    Anesthesia Plan    ASA 3     MAC     intravenous induction     Anesthetic plan, all risks, benefits, and alternatives have been provided, discussed and informed consent has been obtained with: patient.

## 2021-10-18 ENCOUNTER — TELEPHONE (OUTPATIENT)
Dept: GASTROENTEROLOGY | Facility: CLINIC | Age: 76
End: 2021-10-18

## 2021-10-18 NOTE — TELEPHONE ENCOUNTER
----- Message from Jose Raul Butt DO sent at 10/18/2021  2:03 PM CDT -----  Please let patient know random biopsy of colon were negative     Thank you      ----- Message -----  From: Lab, Background User  Sent: 10/15/2021   1:05 PM CDT  To: Jose Raul Butt DO

## 2021-10-24 DIAGNOSIS — G61.81 CIDP (CHRONIC INFLAMMATORY DEMYELINATING POLYNEUROPATHY) (HCC): ICD-10-CM

## 2021-10-24 DIAGNOSIS — M05.79 RHEUMATOID ARTHRITIS INVOLVING MULTIPLE SITES WITH POSITIVE RHEUMATOID FACTOR (HCC): ICD-10-CM

## 2021-10-24 DIAGNOSIS — M48.062 SPINAL STENOSIS OF LUMBAR REGION WITH NEUROGENIC CLAUDICATION: ICD-10-CM

## 2021-10-25 RX ORDER — HYDROCODONE BITARTRATE AND ACETAMINOPHEN 10; 325 MG/1; MG/1
1 TABLET ORAL EVERY 8 HOURS PRN
Qty: 90 TABLET | Refills: 0 | Status: SHIPPED | OUTPATIENT
Start: 2021-10-28 | End: 2021-11-23 | Stop reason: SDUPTHER

## 2021-10-25 NOTE — TELEPHONE ENCOUNTER
Patricia Corona has requested a refill on her medication. Last office visit : 9/16/2021   Next office visit : 3/17/2022   Last medication refill : 9/28/21  Deitra Bound : up to date       Requested Prescriptions     Pending Prescriptions Disp Refills    HYDROcodone-acetaminophen (NORCO)  MG per tablet 90 tablet 0     Sig: Take 1 tablet by mouth every 8 hours as needed for Pain for up to 30 days.

## 2021-11-03 ENCOUNTER — HOSPITAL ENCOUNTER (OUTPATIENT)
Dept: INFUSION THERAPY | Age: 76
Setting detail: INFUSION SERIES
Discharge: HOME | End: 2021-11-03
Payer: MEDICARE

## 2021-11-03 VITALS
TEMPERATURE: 97.9 F | DIASTOLIC BLOOD PRESSURE: 60 MMHG | RESPIRATION RATE: 22 BRPM | SYSTOLIC BLOOD PRESSURE: 141 MMHG | OXYGEN SATURATION: 99 % | HEART RATE: 60 BPM

## 2021-11-03 DIAGNOSIS — G61.81 CIDP (CHRONIC INFLAMMATORY DEMYELINATING POLYNEUROPATHY) (HCC): Primary | ICD-10-CM

## 2021-11-03 LAB
ALBUMIN SERPL-MCNC: 4.3 G/DL (ref 3.5–5.2)
ALP BLD-CCNC: 49 U/L (ref 35–104)
ALT SERPL-CCNC: 12 U/L (ref 5–33)
ANION GAP SERPL CALCULATED.3IONS-SCNC: 9 MMOL/L (ref 7–19)
AST SERPL-CCNC: 16 U/L (ref 5–32)
BASOPHILS ABSOLUTE: 0 K/UL (ref 0–0.2)
BASOPHILS RELATIVE PERCENT: 0.4 % (ref 0–1)
BILIRUB SERPL-MCNC: <0.2 MG/DL (ref 0.2–1.2)
BUN BLDV-MCNC: 23 MG/DL (ref 8–23)
CALCIUM SERPL-MCNC: 10.1 MG/DL (ref 8.8–10.2)
CHLORIDE BLD-SCNC: 100 MMOL/L (ref 98–111)
CO2: 31 MMOL/L (ref 22–29)
CREAT SERPL-MCNC: 0.7 MG/DL (ref 0.5–0.9)
EOSINOPHILS ABSOLUTE: 0.2 K/UL (ref 0–0.6)
EOSINOPHILS RELATIVE PERCENT: 3.2 % (ref 0–5)
GFR AFRICAN AMERICAN: >59
GFR NON-AFRICAN AMERICAN: >60
GLUCOSE BLD-MCNC: 109 MG/DL (ref 74–109)
HCT VFR BLD CALC: 37.5 % (ref 37–47)
HEMOGLOBIN: 11.4 G/DL (ref 12–16)
IMMATURE GRANULOCYTES #: 0 K/UL
LYMPHOCYTES ABSOLUTE: 1.4 K/UL (ref 1.1–4.5)
LYMPHOCYTES RELATIVE PERCENT: 19.9 % (ref 20–40)
MCH RBC QN AUTO: 26.8 PG (ref 27–31)
MCHC RBC AUTO-ENTMCNC: 30.4 G/DL (ref 33–37)
MCV RBC AUTO: 88.2 FL (ref 81–99)
MONOCYTES ABSOLUTE: 1.2 K/UL (ref 0–0.9)
MONOCYTES RELATIVE PERCENT: 17.7 % (ref 0–10)
NEUTROPHILS ABSOLUTE: 4 K/UL (ref 1.5–7.5)
NEUTROPHILS RELATIVE PERCENT: 58.7 % (ref 50–65)
PDW BLD-RTO: 14.2 % (ref 11.5–14.5)
PLATELET # BLD: 261 K/UL (ref 130–400)
PMV BLD AUTO: 9.9 FL (ref 9.4–12.3)
POTASSIUM SERPL-SCNC: 4 MMOL/L (ref 3.5–5)
RBC # BLD: 4.25 M/UL (ref 4.2–5.4)
SODIUM BLD-SCNC: 140 MMOL/L (ref 136–145)
TOTAL PROTEIN: 7 G/DL (ref 6.6–8.7)
WBC # BLD: 6.8 K/UL (ref 4.8–10.8)

## 2021-11-03 PROCEDURE — 80053 COMPREHEN METABOLIC PANEL: CPT

## 2021-11-03 PROCEDURE — 85025 COMPLETE CBC W/AUTO DIFF WBC: CPT

## 2021-11-03 PROCEDURE — 2580000003 HC RX 258: Performed by: PSYCHIATRY & NEUROLOGY

## 2021-11-03 PROCEDURE — 6370000000 HC RX 637 (ALT 250 FOR IP): Performed by: PSYCHIATRY & NEUROLOGY

## 2021-11-03 PROCEDURE — 96413 CHEMO IV INFUSION 1 HR: CPT

## 2021-11-03 PROCEDURE — 6360000002 HC RX W HCPCS: Performed by: PSYCHIATRY & NEUROLOGY

## 2021-11-03 PROCEDURE — 96415 CHEMO IV INFUSION ADDL HR: CPT

## 2021-11-03 RX ORDER — ACETAMINOPHEN 325 MG/1
650 TABLET ORAL ONCE
Status: COMPLETED | OUTPATIENT
Start: 2021-11-03 | End: 2021-11-03

## 2021-11-03 RX ORDER — ACETAMINOPHEN 325 MG/1
650 TABLET ORAL ONCE
Status: CANCELLED | OUTPATIENT
Start: 2021-11-03 | End: 2021-11-03

## 2021-11-03 RX ORDER — SODIUM CHLORIDE 9 MG/ML
INJECTION, SOLUTION INTRAVENOUS CONTINUOUS
Status: CANCELLED | OUTPATIENT
Start: 2021-11-03

## 2021-11-03 RX ORDER — DIPHENHYDRAMINE HYDROCHLORIDE 50 MG/ML
50 INJECTION INTRAMUSCULAR; INTRAVENOUS ONCE
Status: CANCELLED
Start: 2021-11-03 | End: 2021-11-03

## 2021-11-03 RX ORDER — HEPARIN SODIUM (PORCINE) LOCK FLUSH IV SOLN 100 UNIT/ML 100 UNIT/ML
500 SOLUTION INTRAVENOUS PRN
Status: CANCELLED | OUTPATIENT
Start: 2021-11-03

## 2021-11-03 RX ORDER — DIPHENHYDRAMINE HYDROCHLORIDE 50 MG/ML
50 INJECTION INTRAMUSCULAR; INTRAVENOUS ONCE
Status: CANCELLED | OUTPATIENT
Start: 2021-11-03 | End: 2021-11-03

## 2021-11-03 RX ORDER — DIPHENHYDRAMINE HYDROCHLORIDE 50 MG/ML
50 INJECTION INTRAMUSCULAR; INTRAVENOUS ONCE
Status: DISCONTINUED | OUTPATIENT
Start: 2021-11-03 | End: 2021-11-05 | Stop reason: HOSPADM

## 2021-11-03 RX ORDER — SODIUM CHLORIDE 9 MG/ML
25 INJECTION, SOLUTION INTRAVENOUS PRN
Status: CANCELLED | OUTPATIENT
Start: 2021-11-03

## 2021-11-03 RX ORDER — METHYLPREDNISOLONE SODIUM SUCCINATE 125 MG/2ML
125 INJECTION, POWDER, LYOPHILIZED, FOR SOLUTION INTRAMUSCULAR; INTRAVENOUS ONCE
Status: CANCELLED | OUTPATIENT
Start: 2021-11-03 | End: 2021-11-03

## 2021-11-03 RX ORDER — SODIUM CHLORIDE 9 MG/ML
INJECTION, SOLUTION INTRAVENOUS CONTINUOUS
Status: ACTIVE | OUTPATIENT
Start: 2021-11-03 | End: 2021-11-03

## 2021-11-03 RX ORDER — EPINEPHRINE 1 MG/ML
0.3 INJECTION, SOLUTION, CONCENTRATE INTRAVENOUS PRN
Status: CANCELLED | OUTPATIENT
Start: 2021-11-03

## 2021-11-03 RX ORDER — SODIUM CHLORIDE 0.9 % (FLUSH) 0.9 %
5-40 SYRINGE (ML) INJECTION PRN
Status: CANCELLED | OUTPATIENT
Start: 2021-11-03

## 2021-11-03 RX ADMIN — IMMUNE GLOBULIN (HUMAN) 30 G: 10 INJECTION INTRAVENOUS; SUBCUTANEOUS at 10:19

## 2021-11-03 RX ADMIN — ACETAMINOPHEN 650 MG: 325 TABLET ORAL at 10:17

## 2021-11-03 ASSESSMENT — PAIN SCALES - GENERAL: PAINLEVEL_OUTOF10: 0

## 2021-11-04 ENCOUNTER — TELEPHONE (OUTPATIENT)
Dept: NEUROLOGY | Age: 76
End: 2021-11-04

## 2021-11-23 ENCOUNTER — TELEPHONE (OUTPATIENT)
Dept: UROLOGY | Age: 76
End: 2021-11-23

## 2021-11-23 DIAGNOSIS — M48.062 SPINAL STENOSIS OF LUMBAR REGION WITH NEUROGENIC CLAUDICATION: ICD-10-CM

## 2021-11-23 DIAGNOSIS — G61.81 CIDP (CHRONIC INFLAMMATORY DEMYELINATING POLYNEUROPATHY) (HCC): ICD-10-CM

## 2021-11-23 DIAGNOSIS — M05.79 RHEUMATOID ARTHRITIS INVOLVING MULTIPLE SITES WITH POSITIVE RHEUMATOID FACTOR (HCC): ICD-10-CM

## 2021-11-23 NOTE — TELEPHONE ENCOUNTER
Patient is scheduled for 11/29, she asked if she needed to go to the ER for urinary incontinence, I advised her no she could follow up with her PCP and then see us on Monday.

## 2021-11-23 NOTE — TELEPHONE ENCOUNTER
Analisa Palacio has requested a refill on her medication. Last office visit : 9/16/2021   Next office visit : 3/3/2022   Last medication refill :10/28/2021  Kadeem He :Up to date        Requested Prescriptions     Pending Prescriptions Disp Refills    HYDROcodone-acetaminophen (NORCO)  MG per tablet 90 tablet 0     Sig: Take 1 tablet by mouth every 8 hours as needed for Pain for up to 30 days.        **Script updated to reflect fill date of 11/27/2021

## 2021-11-23 NOTE — TELEPHONE ENCOUNTER
La Nena Ha called to schedule a appointment to come in and be seen for severe urinary incontinence. Patient was wanting to come in Wedneday 11.24.21. If able to accomodate please contact patient to advise, otherwise patient still request a call back for next available appointment please. Miguel Lagos Please be advised that the best time to call her to accommodate their needs is Anytime. Thank you.

## 2021-11-24 RX ORDER — HYDROCODONE BITARTRATE AND ACETAMINOPHEN 10; 325 MG/1; MG/1
1 TABLET ORAL EVERY 8 HOURS PRN
Qty: 90 TABLET | Refills: 0 | Status: SHIPPED | OUTPATIENT
Start: 2021-11-27 | End: 2021-12-22 | Stop reason: SDUPTHER

## 2021-11-28 NOTE — PROGRESS NOTES
Nicko Cristobal is a 67 y.o. female who presents today   Chief Complaint   Patient presents with    Follow-up     I am here today for urinary incontinence       Patient is 76-year-old female presents clinic today with complaints of bladder spasms. She does have a history of recurrent UTIs and incomplete bladder emptying is maintained on in and out catheterization 3 times per day. She does void some on her own although does not empty her bladder. She has a history of bilateral severe neuropathy and does wear braces on both legs. Starting approximately 1 week ago she and out catheterize and since then has had severe incontinence soaking 5-6 pads per day and having to wear depends at night. She has no previous history of incontinence. Denies any dysuria, flank pain, fever, chills. She has not cath since Monday prior to incontinence episode since she had severe bladder spasms and pain with catheterization.     Previous urine cultures:  9/28/2021 revealed E. coli resistant to gentamicin, levofloxacin, tobramycin, Bactrim  7/22/2021 revealed E. coli resistant to gentamicin, Levaquin, tobramycin  7/9/2021 revealed E. coli resistant to gentamicin, Levaquin, tobramycin  3/31/2021 - urine culture negative    Past Medical History:   Diagnosis Date    Arthritis     Cancer ([facility]     endometrial cancer    Cataract     CIDP (chronic inflammatory demyelinating polyneuropathy) ([facility])     Crohn's disease ([facility]     GERD (gastroesophageal reflux disease)     History of blood transfusion     Hypertension     Macular degeneration     Radiation        Past Surgical History:   Procedure Laterality Date    BACK SURGERY      CARPAL TUNNEL RELEASE Bilateral     CATARACT REMOVAL Bilateral     CHOLECYSTECTOMY      HAMMER TOE SURGERY Left     HYSTERECTOMY      KNEE ARTHROSCOPY Right     LUMBAR FUSION         Current Outpatient Medications   Medication Sig Dispense Refill    cephALEXin (KEFLEX) 500 MG capsule Take 1 capsule by mouth 2 times daily for 14 days 28 capsule 0    tiZANidine (ZANAFLEX) 4 MG tablet       HYDROcodone-acetaminophen (NORCO)  MG per tablet Take 1 tablet by mouth every 8 hours as needed for Pain for up to 30 days. 90 tablet 0    Propylene Glycol (SYSTANE COMPLETE) 0.6 % SOLN Apply 1 drop to eye 3 times daily Each eye      estrogens, conjugated, (PREMARIN) 0.3 MG tablet Take 0.3 mg by mouth Twice a Week Takes sun, and thur      gabapentin (NEURONTIN) 600 MG tablet TAKE 1 TABLET THREE TIMES A DAY (Patient taking differently: 600 mg 3 times daily. TAKE 1 TABLET THREE TIMES A DAY) 270 tablet 1    amLODIPine (NORVASC) 5 MG tablet Take 5 mg by mouth daily      conjugated estrogens (PREMARIN) 0.625 MG/GM vaginal cream Place vaginally twice a week 1 Tube 3    colestipol (COLESTID) 1 g tablet Take 1 g by mouth 3 times daily       Cyanocobalamin (VITAMIN B 12) 100 MCG LOZG Take by mouth daily       lidocaine (XYLOCAINE) 5 % ointment Apply topically as needed for Pain Apply topically as needed.  30 g 3    fenofibrate (TRICOR) 145 MG tablet 145 mg daily       Omega-3 Fatty Acids (FISH OIL) 1000 MG CAPS Take 1,000 mg by mouth 2 times daily      Garlic 7314 MG CAPS Take 1,000 mg by mouth 2 times daily      CRANBERRY SOFT PO Take 36 mg by mouth daily      carvedilol (COREG) 6.25 MG tablet Take 6.25 mg by mouth 2 times daily      aspirin 81 MG tablet Take 81 mg by mouth daily      folic acid (FOLVITE) 1 MG tablet Take 1 mg by mouth daily      Multiple Vitamins-Minerals (THERAPEUTIC MULTIVITAMIN-MINERALS) tablet Take 1 tablet by mouth daily      calcium-vitamin D (OSCAL) 250-125 MG-UNIT per tablet Take 1 tablet by mouth daily      allopurinol (ZYLOPRIM) 100 MG tablet Take 200 mg by mouth daily Indications: takes total of 500 mg daily       mesalamine (DELZICOL) 400 MG CPDR DR capsule Take 800 mg by mouth 3 times daily      indapamide (LOZOL) 2.5 MG tablet Take 2.5 mg by mouth every morning      lisinopril (PRINIVIL;ZESTRIL) 40 MG tablet Take 10 mg by mouth daily       omeprazole (PRILOSEC) 20 MG capsule Take 20 mg by mouth Daily        No current facility-administered medications for this visit. No Known Allergies    Social History     Socioeconomic History    Marital status:      Spouse name: None    Number of children: None    Years of education: None    Highest education level: None   Occupational History    None   Tobacco Use    Smoking status: Never Smoker    Smokeless tobacco: Never Used   Vaping Use    Vaping Use: Never used   Substance and Sexual Activity    Alcohol use: No    Drug use: No    Sexual activity: None   Other Topics Concern    None   Social History Narrative    None     Social Determinants of Health     Financial Resource Strain:     Difficulty of Paying Living Expenses: Not on file   Food Insecurity:     Worried About Running Out of Food in the Last Year: Not on file    Giancarlo of Food in the Last Year: Not on file   Transportation Needs:     Lack of Transportation (Medical): Not on file    Lack of Transportation (Non-Medical):  Not on file   Physical Activity:     Days of Exercise per Week: Not on file    Minutes of Exercise per Session: Not on file   Stress:     Feeling of Stress : Not on file   Social Connections:     Frequency of Communication with Friends and Family: Not on file    Frequency of Social Gatherings with Friends and Family: Not on file    Attends Synagogue Services: Not on file    Active Member of Clubs or Organizations: Not on file    Attends Club or Organization Meetings: Not on file    Marital Status: Not on file   Intimate Partner Violence:     Fear of Current or Ex-Partner: Not on file    Emotionally Abused: Not on file    Physically Abused: Not on file    Sexually Abused: Not on file   Housing Stability:     Unable to Pay for Housing in the Last Year: Not on file    Number of [place] in the Last Year: Not on file    Unstable Housing in the Last Year: Not on file       Family History   Problem Relation Age of Onset    Cancer Mother     High Blood Pressure Father     Heart Disease Father        REVIEW OF SYSTEMS:  Review of Systems   Constitutional: Negative for chills and fever. Gastrointestinal: Negative for abdominal distention, abdominal pain, nausea and vomiting. Genitourinary: Positive for frequency and urgency. Negative for difficulty urinating, dysuria, flank pain and hematuria. Incontinence, bladder spasms   Musculoskeletal: Positive for arthralgias and gait problem. Negative for back pain. Neurological: Positive for weakness and numbness. Psychiatric/Behavioral: Negative for agitation and confusion. PHYSICAL EXAM:  Temp 97.9 °F (36.6 °C) (Temporal)   Ht 5' 4\" (1.626 m)   Wt 132 lb 12.8 oz (60.2 kg)   BMI 22.80 kg/m²   Physical Exam  Vitals and nursing note reviewed. Constitutional:       General: She is not in acute distress. Appearance: Normal appearance. She is not ill-appearing. Pulmonary:      Effort: Pulmonary effort is normal. No respiratory distress. Abdominal:      General: There is no distension. Tenderness: There is no abdominal tenderness. There is no right CVA tenderness or left CVA tenderness. Musculoskeletal:      Comments: Bilateral braces in place for neuropathy   Neurological:      Mental Status: She is alert and oriented to person, place, and time. Mental status is at baseline. Sensory: Sensory deficit present. Motor: Weakness present.       Gait: Gait abnormal.   Psychiatric:         Mood and Affect: Mood normal.         Behavior: Behavior normal.       DATA:    Results for orders placed or performed in visit on 11/29/21   POCT Urinalysis Dipstick w/ Micro (Auto)   Result Value Ref Range    Color, UA Yellow     Clarity, UA Cloudy (A) Clear    Glucose, Ur neg     Bilirubin Urine 0 mg/dL    Ketones, Urine Negative     Specific Gravity, UA 1.020 1.005 - 1.030    Blood, Urine Positive     pH, UA 5 4.5 - 8.0    Protein, UA Positive (A) Negative    Nitrite, Urine Positive     Leukocytes, UA MODERATE     Urobilinogen, Urine 0.2 mg/dL     rbc urine, poc 1     wbc urine, poc tntc     bacteria urine, poc 4+     yeast urine, poc 0     casts urine, poc 0     Epi Cells Urine, POC 0     crystals urine, poc 0        IMAGING:  Bladder Scan interpretation  Estimation of residual urine via abdominal ultrasound  Residual Urine: 156 ml  Indication: Frequency/bladder spasms  Position: Supine  Examination: Incremental scanning of the suprapubic area using 3 MHz transducer using copious amounts of acoustic gel. Findings: An anechoic area was demonstrated which represented the bladder, with measurement of residual urine as noted. 1. Incomplete bladder emptying  Currently utilizes and out catheterization 3 times per day. Patient was unable to leave urine specimen today with bladder scan of 156 mL. She has not used a catheter in approximately 6 to 7 days due to severe pain and bladder spasms with catheterization. She usually has no trouble catheterizing, this is new. - MS Measure, post-void residual, US, non-imaging  - Culture, Urine  - POCT Urinalysis Dipstick w/ Micro (Auto)    2. Bladder spasms  Complaints of severe bladder spasms. Urine does appear infected today we will send urine culture and treat empirically with Keflex. This could be secondary to urinary tract infection. We also discussed starting an anticholinergic or Myrbetriq if incontinence continues. She does feel the urge to void although she is already incontinent before making it to the bathroom. If antibiotics are not effective in decreasing bladder spasms and incontinence we did discuss starting Myrbetriq although she may need to catheterize more often. At this time we will hold off for the time being and treat infection. - cephALEXin (KEFLEX) 500 MG capsule;  Take 1 capsule by mouth 2 times daily for 14 days  Dispense: 28 capsule; Refill: 0    3. Recurrent UTI  History of recurrent UTI. She does have a history of diarrhea as well as and out catheterization that contributes. Is currently on Premarin. Has been on daily antibiotics in the past is no longer on these.    - cephALEXin (KEFLEX) 500 MG capsule; Take 1 capsule by mouth 2 times daily for 14 days  Dispense: 28 capsule; Refill: 0      Orders Placed This Encounter   Procedures    Culture, Urine     Order Specific Question:   Specify (ex-cath, midstream, cysto, etc)? Answer:   cath urine    POCT Urinalysis Dipstick w/ Micro (Auto)    [place] Measure, post-void residual, US, non-imaging        Return in about 3 months (around 2/28/2022) for with ENZO Almanzar. At least 35 minutes were spent on the day of the visit reviewing the patient's past medical records/imaging, speaking face to face with the patient, and charting in the post visit period. All information inputted into the note by the MA to include chief complaint, past medical history, past surgical history, medications, allergies, social and family history and review of systems has been reviewed and updated as needed by me. EMR Dragon/transcription disclaimer: Much of this documentt is electronic  transcription/translation of spoken language to printed text. The  electronic translation of spoken language may be erroneous, or at times,  nonsensical words or phrases may be inadvertently transcribed.  Although I  have reviewed the document for such errors, some may still exist.

## 2021-11-29 ENCOUNTER — OFFICE VISIT (OUTPATIENT)
Dept: UROLOGY | Age: 76
End: 2021-11-29
Payer: MEDICARE

## 2021-11-29 VITALS — WEIGHT: 132.8 LBS | TEMPERATURE: 97.9 F | HEIGHT: 64 IN | BODY MASS INDEX: 22.67 KG/M2

## 2021-11-29 DIAGNOSIS — R33.9 INCOMPLETE BLADDER EMPTYING: Primary | ICD-10-CM

## 2021-11-29 DIAGNOSIS — N32.89 BLADDER SPASMS: ICD-10-CM

## 2021-11-29 DIAGNOSIS — N39.0 RECURRENT UTI: ICD-10-CM

## 2021-11-29 LAB
BACTERIA URINE, POC: ABNORMAL
BILIRUBIN URINE: 0 MG/DL
BLOOD, URINE: POSITIVE
CASTS URINE, POC: 0
CLARITY: ABNORMAL
COLOR: YELLOW
CRYSTALS URINE, POC: 0
EPI CELLS URINE, POC: 0
GLUCOSE URINE: ABNORMAL
KETONES, URINE: NEGATIVE
LEUKOCYTE EST, POC: ABNORMAL
NITRITE, URINE: POSITIVE
PH UA: 5 (ref 4.5–8)
PROTEIN UA: POSITIVE
RBC URINE, POC: 1
SPECIFIC GRAVITY UA: 1.02 (ref 1–1.03)
UROBILINOGEN, URINE: ABNORMAL
WBC URINE, POC: ABNORMAL
YEAST URINE, POC: 0

## 2021-11-29 PROCEDURE — 99214 OFFICE O/P EST MOD 30 MIN: CPT | Performed by: NURSE PRACTITIONER

## 2021-11-29 PROCEDURE — 81001 URINALYSIS AUTO W/SCOPE: CPT | Performed by: NURSE PRACTITIONER

## 2021-11-29 PROCEDURE — 51798 US URINE CAPACITY MEASURE: CPT | Performed by: NURSE PRACTITIONER

## 2021-11-29 PROCEDURE — 51701 INSERT BLADDER CATHETER: CPT | Performed by: NURSE PRACTITIONER

## 2021-11-29 RX ORDER — TIZANIDINE 4 MG/1
4 TABLET ORAL EVERY 6 HOURS PRN
COMMUNITY
Start: 2021-10-25 | End: 2022-01-21

## 2021-11-29 RX ORDER — CEPHALEXIN 500 MG/1
500 CAPSULE ORAL 2 TIMES DAILY
Qty: 28 CAPSULE | Refills: 0 | Status: SHIPPED | OUTPATIENT
Start: 2021-11-29 | End: 2021-12-13

## 2021-11-29 ASSESSMENT — ENCOUNTER SYMPTOMS
BACK PAIN: 0
ABDOMINAL PAIN: 0
VOMITING: 0
NAUSEA: 0
ABDOMINAL DISTENTION: 0

## 2021-12-01 ENCOUNTER — HOSPITAL ENCOUNTER (OUTPATIENT)
Dept: INFUSION THERAPY | Age: 76
Setting detail: INFUSION SERIES
End: 2021-12-01

## 2021-12-01 ENCOUNTER — HOSPITAL ENCOUNTER (OUTPATIENT)
Dept: INFUSION THERAPY | Age: 76
End: 2021-12-01

## 2021-12-01 LAB
ORGANISM: ABNORMAL
URINE CULTURE, ROUTINE: ABNORMAL
URINE CULTURE, ROUTINE: ABNORMAL

## 2021-12-09 ENCOUNTER — TELEPHONE (OUTPATIENT)
Dept: INFUSION THERAPY | Age: 76
End: 2021-12-09

## 2021-12-09 NOTE — TELEPHONE ENCOUNTER
Patient called to reschedule her infusion. States she got her COVID booster and is not feeling well.  Appt changed to next week

## 2021-12-10 ENCOUNTER — TELEPHONE (OUTPATIENT)
Dept: UROLOGY | Age: 76
End: 2021-12-10

## 2021-12-10 NOTE — TELEPHONE ENCOUNTER
Gloria Oviedo called to schedule a office visit for Urinary Incontience follow up . Laychristianne Tirado does not have an available appointment until January 3, 2022. Pt wants to be be seen prior to that date. Please be advised that the best time to call her to accommodate their needs is Anytime. Thank you.

## 2021-12-13 ENCOUNTER — TELEPHONE (OUTPATIENT)
Dept: UROLOGY | Age: 76
End: 2021-12-13

## 2021-12-13 NOTE — TELEPHONE ENCOUNTER
Patient called today saying she was still having trouble with incontinence and took all the meds DAWN Limon prescribed her at her last visit. Patient said Basilia Huddleston told her to call us back if she was still having trouble and she would call in a different kind of med for her.

## 2021-12-15 ENCOUNTER — HOSPITAL ENCOUNTER (OUTPATIENT)
Dept: INFUSION THERAPY | Age: 76
Setting detail: INFUSION SERIES
Discharge: HOME | End: 2021-12-15
Payer: MEDICARE

## 2021-12-15 VITALS
HEART RATE: 59 BPM | RESPIRATION RATE: 18 BRPM | DIASTOLIC BLOOD PRESSURE: 52 MMHG | OXYGEN SATURATION: 97 % | TEMPERATURE: 98.5 F | SYSTOLIC BLOOD PRESSURE: 101 MMHG

## 2021-12-15 DIAGNOSIS — G61.81 CIDP (CHRONIC INFLAMMATORY DEMYELINATING POLYNEUROPATHY) (HCC): Primary | ICD-10-CM

## 2021-12-15 LAB
ALBUMIN SERPL-MCNC: 3.9 G/DL (ref 3.5–5.2)
ALP BLD-CCNC: 40 U/L (ref 35–104)
ALT SERPL-CCNC: 12 U/L (ref 5–33)
ANION GAP SERPL CALCULATED.3IONS-SCNC: 9 MMOL/L (ref 7–19)
AST SERPL-CCNC: 15 U/L (ref 5–32)
BASOPHILS ABSOLUTE: 0 K/UL (ref 0–0.2)
BASOPHILS RELATIVE PERCENT: 0.7 % (ref 0–1)
BILIRUB SERPL-MCNC: 0.3 MG/DL (ref 0.2–1.2)
BUN BLDV-MCNC: 31 MG/DL (ref 8–23)
CALCIUM SERPL-MCNC: 9.9 MG/DL (ref 8.8–10.2)
CHLORIDE BLD-SCNC: 103 MMOL/L (ref 98–111)
CO2: 31 MMOL/L (ref 22–29)
CREAT SERPL-MCNC: 0.8 MG/DL (ref 0.5–0.9)
EOSINOPHILS ABSOLUTE: 0.2 K/UL (ref 0–0.6)
EOSINOPHILS RELATIVE PERCENT: 4 % (ref 0–5)
GFR AFRICAN AMERICAN: >59
GFR NON-AFRICAN AMERICAN: >60
GLUCOSE BLD-MCNC: 116 MG/DL (ref 74–109)
HCT VFR BLD CALC: 37.5 % (ref 37–47)
HEMOGLOBIN: 11.6 G/DL (ref 12–16)
IMMATURE GRANULOCYTES #: 0 K/UL
LYMPHOCYTES ABSOLUTE: 1.5 K/UL (ref 1.1–4.5)
LYMPHOCYTES RELATIVE PERCENT: 27.7 % (ref 20–40)
MCH RBC QN AUTO: 27.4 PG (ref 27–31)
MCHC RBC AUTO-ENTMCNC: 30.9 G/DL (ref 33–37)
MCV RBC AUTO: 88.4 FL (ref 81–99)
MONOCYTES ABSOLUTE: 0.9 K/UL (ref 0–0.9)
MONOCYTES RELATIVE PERCENT: 15.6 % (ref 0–10)
NEUTROPHILS ABSOLUTE: 2.9 K/UL (ref 1.5–7.5)
NEUTROPHILS RELATIVE PERCENT: 51.8 % (ref 50–65)
PDW BLD-RTO: 13.7 % (ref 11.5–14.5)
PLATELET # BLD: 232 K/UL (ref 130–400)
PMV BLD AUTO: 10.2 FL (ref 9.4–12.3)
POTASSIUM SERPL-SCNC: 4 MMOL/L (ref 3.5–5)
RBC # BLD: 4.24 M/UL (ref 4.2–5.4)
SODIUM BLD-SCNC: 143 MMOL/L (ref 136–145)
TOTAL PROTEIN: 6.5 G/DL (ref 6.6–8.7)
WBC # BLD: 5.5 K/UL (ref 4.8–10.8)

## 2021-12-15 PROCEDURE — 96366 THER/PROPH/DIAG IV INF ADDON: CPT

## 2021-12-15 PROCEDURE — 6360000002 HC RX W HCPCS: Performed by: PSYCHIATRY & NEUROLOGY

## 2021-12-15 PROCEDURE — 85025 COMPLETE CBC W/AUTO DIFF WBC: CPT

## 2021-12-15 PROCEDURE — 80053 COMPREHEN METABOLIC PANEL: CPT

## 2021-12-15 PROCEDURE — 96365 THER/PROPH/DIAG IV INF INIT: CPT

## 2021-12-15 RX ORDER — EPINEPHRINE 1 MG/ML
0.3 INJECTION, SOLUTION, CONCENTRATE INTRAVENOUS PRN
Status: CANCELLED | OUTPATIENT
Start: 2021-12-15

## 2021-12-15 RX ORDER — METHYLPREDNISOLONE SODIUM SUCCINATE 125 MG/2ML
125 INJECTION, POWDER, LYOPHILIZED, FOR SOLUTION INTRAMUSCULAR; INTRAVENOUS ONCE
Status: CANCELLED | OUTPATIENT
Start: 2021-12-15 | End: 2021-12-15

## 2021-12-15 RX ORDER — SODIUM CHLORIDE 9 MG/ML
INJECTION, SOLUTION INTRAVENOUS CONTINUOUS
Status: CANCELLED | OUTPATIENT
Start: 2021-12-15

## 2021-12-15 RX ORDER — DIPHENHYDRAMINE HYDROCHLORIDE 50 MG/ML
50 INJECTION INTRAMUSCULAR; INTRAVENOUS ONCE
Status: DISCONTINUED | OUTPATIENT
Start: 2021-12-15 | End: 2021-12-17 | Stop reason: HOSPADM

## 2021-12-15 RX ORDER — SODIUM CHLORIDE 9 MG/ML
25 INJECTION, SOLUTION INTRAVENOUS PRN
Status: CANCELLED | OUTPATIENT
Start: 2021-12-15

## 2021-12-15 RX ORDER — DIPHENHYDRAMINE HYDROCHLORIDE 50 MG/ML
50 INJECTION INTRAMUSCULAR; INTRAVENOUS ONCE
Status: CANCELLED | OUTPATIENT
Start: 2021-12-15 | End: 2021-12-15

## 2021-12-15 RX ORDER — DIPHENHYDRAMINE HYDROCHLORIDE 50 MG/ML
50 INJECTION INTRAMUSCULAR; INTRAVENOUS ONCE
Status: CANCELLED
Start: 2021-12-15 | End: 2021-12-15

## 2021-12-15 RX ORDER — ACETAMINOPHEN 325 MG/1
650 TABLET ORAL ONCE
Status: CANCELLED | OUTPATIENT
Start: 2021-12-15 | End: 2021-12-15

## 2021-12-15 RX ORDER — HEPARIN SODIUM (PORCINE) LOCK FLUSH IV SOLN 100 UNIT/ML 100 UNIT/ML
500 SOLUTION INTRAVENOUS PRN
Status: CANCELLED | OUTPATIENT
Start: 2021-12-15

## 2021-12-15 RX ORDER — SODIUM CHLORIDE 0.9 % (FLUSH) 0.9 %
5-40 SYRINGE (ML) INJECTION PRN
Status: CANCELLED | OUTPATIENT
Start: 2021-12-15

## 2021-12-15 RX ADMIN — IMMUNE GLOBULIN (HUMAN) 30 G: 10 INJECTION INTRAVENOUS; SUBCUTANEOUS at 09:29

## 2021-12-20 ENCOUNTER — OFFICE VISIT (OUTPATIENT)
Dept: VASCULAR SURGERY | Age: 76
End: 2021-12-20
Payer: MEDICARE

## 2021-12-20 ENCOUNTER — HOSPITAL ENCOUNTER (OUTPATIENT)
Dept: VASCULAR LAB | Age: 76
Discharge: HOME | End: 2021-12-20
Payer: MEDICARE

## 2021-12-20 VITALS
SYSTOLIC BLOOD PRESSURE: 107 MMHG | HEIGHT: 64 IN | TEMPERATURE: 95.2 F | BODY MASS INDEX: 22.53 KG/M2 | HEART RATE: 74 BPM | OXYGEN SATURATION: 98 % | WEIGHT: 132 LBS | DIASTOLIC BLOOD PRESSURE: 44 MMHG | RESPIRATION RATE: 20 BRPM

## 2021-12-20 DIAGNOSIS — I70.245 ATHEROSCLEROSIS OF NATIVE ARTERY OF LEFT LOWER EXTREMITY WITH ULCERATION OF OTHER PART OF FOOT (HCC): ICD-10-CM

## 2021-12-20 DIAGNOSIS — I70.245 ATHSCL NATIVE ARTERIES OF LEFT LEG W ULCERATION OTH PRT FOOT (HCC): ICD-10-CM

## 2021-12-20 DIAGNOSIS — I70.213 ATHEROSCLER OF NATIVE ARTERY OF BOTH LEGS WITH INTERMIT CLAUDICATION (HCC): Primary | ICD-10-CM

## 2021-12-20 PROCEDURE — 93923 UPR/LXTR ART STDY 3+ LVLS: CPT

## 2021-12-20 PROCEDURE — 99214 OFFICE O/P EST MOD 30 MIN: CPT | Performed by: NURSE PRACTITIONER

## 2021-12-20 NOTE — Clinical Note
Juan Pablo Godoy please call patient and let her know dr. Phan Joyner thinks we need to repeat angiogram prior to foot surgery.   Let her know Mendy Engel will call to [phone] [employer] - vi as soon as she is back she has osteo

## 2021-12-20 NOTE — PROGRESS NOTES
Yogesh Coyne (:  1945) is a 67 y.o. female,Established patient, here for evaluation of the following chief complaint(s):  Follow-up (f/u dejuan)            SUBJECTIVE/OBJECTIVE:  Herbert Tejada has a history of peripheral vascular disease of the lower extremities. She has had this for < 1 year. Current treatment includes ASA EC daily. Herbert Tejada has new wounds. She reports her wound healed after the procedure, then came back. She needs bone shaved. Recently, she reports no claudication. However, she is not walking much. Yogesh Coyne is a 67 y.o. female with the following history as recorded in [facility]:  Patient Active Problem List    Diagnosis Date Noted    [facility]     Urinary tract infection without hematuria 2020    Lumbar spinal stenosis 2017    Rheumatoid arthritis involving multiple sites with positive rheumatoid factor ([facility] 2016    Recurrent UTI 2016    Atonic bladder 2016    CIDP (chronic inflammatory demyelinating polyneuropathy) ([facility] 2016    Restless legs syndrome (RLS) 2016     Current Outpatient Medications   Medication Sig Dispense Refill    [START ON 2021] HYDROcodone-acetaminophen (NORCO)  MG per tablet Take 1 tablet by mouth every 8 hours as needed for Pain for up to 30 days. 90 tablet 0    mirabegron (MYRBETRIQ) 25 MG TB24 Take 1 tablet by mouth daily 30 tablet 11    tiZANidine (ZANAFLEX) 4 MG tablet       Propylene Glycol (SYSTANE COMPLETE) 0.6 % SOLN Apply 1 drop to eye 3 times daily Each eye      estrogens, conjugated, (PREMARIN) 0.3 MG tablet Take 0.3 mg by mouth Twice a Week Takes sun, and thur      gabapentin (NEURONTIN) 600 MG tablet TAKE 1 TABLET THREE TIMES A DAY (Patient taking differently: 600 mg 3 times daily.  TAKE 1 TABLET THREE TIMES A DAY) 270 tablet 1    amLODIPine (NORVASC) 5 MG tablet Take 5 mg by mouth daily      conjugated estrogens (PREMARIN) 0.625 MG/GM vaginal cream Place vaginally twice a week 1 Tube 3    colestipol (COLESTID) 1 g tablet Take 1 g by mouth 3 times daily       Cyanocobalamin (VITAMIN B 12) 100 MCG LOZG Take by mouth daily       lidocaine (XYLOCAINE) 5 % ointment Apply topically as needed for Pain Apply topically as needed. 30 g 3    fenofibrate (TRICOR) 145 MG tablet 145 mg daily       Omega-3 Fatty Acids (FISH OIL) 1000 MG CAPS Take 1,000 mg by mouth 2 times daily      Garlic 9383 MG CAPS Take 1,000 mg by mouth 2 times daily      CRANBERRY SOFT PO Take 36 mg by mouth daily      carvedilol (COREG) 6.25 MG tablet Take 6.25 mg by mouth 2 times daily      aspirin 81 MG tablet Take 81 mg by mouth daily      folic acid (FOLVITE) 1 MG tablet Take 1 mg by mouth daily      Multiple Vitamins-Minerals (THERAPEUTIC MULTIVITAMIN-MINERALS) tablet Take 1 tablet by mouth daily      calcium-vitamin D (OSCAL) 250-125 MG-UNIT per tablet Take 1 tablet by mouth daily      allopurinol (ZYLOPRIM) 100 MG tablet Take 200 mg by mouth daily Indications: takes total of 500 mg daily       mesalamine (DELZICOL) 400 MG CPDR DR capsule Take 800 mg by mouth 3 times daily      indapamide (LOZOL) 2.5 MG tablet Take 2.5 mg by mouth every morning      lisinopril (PRINIVIL;ZESTRIL) 40 MG tablet Take 10 mg by mouth daily       omeprazole (PRILOSEC) 20 MG capsule Take 20 mg by mouth Daily        No current facility-administered medications for this visit. Allergies: Patient has no known allergies.   Past Medical History:   Diagnosis Date    Arthritis     Cancer [facility])     endometrial cancer    Cataract     CID[facility] inflammatory [facility]inating poly[facility]uropathy) [facility]     Crohn's disease ([facility] [address]     GERD (gastroesophageal reflux disease)     History of blood transfusion     Hypertension     Macular degeneration     Radiation      Past Surgical History:   Procedure Laterality Date    BACK SURGERY      CARPAL TUNNEL RELEASE Bilateral     CATARACT REMOVAL Bilateral     CHOLECYSTECTOMY      HAMMER TOE SURGERY Left     HYSTERECTOMY      KNEE ARTHROSCOPY Right     LUMBAR FUSION       Family History   Problem Relation Age of Onset    Cancer Mother     High Blood Pressure Father     Heart Disease Father      Social History     Tobacco Use    Smoking status: Never Smoker    Smokeless tobacco: Never Used   Substance Use Topics    Alcohol use: No       ROS  Eyes - no sudden vision change or amaurosis. Respiratory - no significant shortness of breath,  Cardiovascular - no chest pain or syncope. No  significant leg swelling. no claudication. Musculoskeletal - no gait disturbance  Skin - has new wound. Neurologic -  No speech difficulty or lateralizing weakness. All other review of systems are negative. Physical Exam    BP (!) 107/44   Pulse 74   Temp 95.2 °F (35.1 °C)   Resp 20   Ht 5' 4\" (1.626 m)   Wt 132 lb (59.9 kg)   SpO2 98%   BMI 22.66 kg/m²       Neck- carotid pulses 2+ to palpation with no bruit  Cardiovascular - Regular rate and rhythm. Pulmonary - effort appears normal.  No respiratory distress. Lungs - Breath sounds normal. No wheezes or rales. Extremities -  - Radial and brachial pulses are 2+ to palpation bilaterally. Right femoral pulse: present 2+; Right popliteal pulse: absent Right DP: absent; Right PT absent; Left femoral pulse: present 2+; Left popliteal pulse: absent; Left DP: present 2+; Left PT: absent No cyanosis, clubbing, or significant edema. No signs atheroembolic event. Neurologic - alert and oriented X 3. Physiologic. Face symmetric. Skin - warm, dry. .  has wound lateral foot  Psychiatric - mood, affect, and behavior appear normal.  Judgment and thought processes appear normal.    Risk factors for atherosclerosis of all vascular beds have been reviewed with the patient including:  Family history, tobacco abuse in all forms, elevated cholesterol, hyperlipidemia, and diabetes.     Lower extremity arterial study: Right GUANAKITO 1.71, Left GUANAKITO 1.02. evidence of recurrent tibial disease  Individual films reviewed: Yes. These results were reviewed with the patient. Disease process is chronic illness with exacerbation or progression      Reviewed on this visit: speciality care notes from [address] [place] [address],[address]    Reviewed previous studies including: dejuan  Individual images were reviewed. I agree with the findings  Results were discussed with the patient. Options have been discussed with the patient including continued medical management vs. proceeding with angiogram with runoff and possible angioplasty/atherectomy/stent. Patient has opted to proceed with this. Risks have been discussed with the patient including but not limited to MI, death, CVA, bleed, contrast nephropathy, nerve injury, and infection. ASSESSMENT/PLAN:  1. Atheroscler of native artery of both legs with intermit claudication ([address]  2. 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 Atheroscler of native artery of both legs with intermit claudication ([address]    2. 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 Call our office with any progressive pain in leg(s) or hip(s) with walking. Take good care of your feet. Let us know right away if you develop a wound on your foot. An electronic signature was used to authenticate this note.     --ENZO Leon

## 2021-12-22 ENCOUNTER — TELEPHONE (OUTPATIENT)
Dept: VASCULAR SURGERY | Age: 76
End: 2021-12-22

## 2021-12-22 DIAGNOSIS — M48.062 SPINAL STENOSIS OF LUMBAR REGION WITH NEUROGENIC CLAUDICATION: ICD-10-CM

## 2021-12-22 DIAGNOSIS — G61.81 CIDP (CHRONIC INFLAMMATORY DEMYELINATING POLYNEUROPATHY) (HCC): ICD-10-CM

## 2021-12-22 DIAGNOSIS — M05.79 RHEUMATOID ARTHRITIS INVOLVING MULTIPLE SITES WITH POSITIVE RHEUMATOID FACTOR (HCC): ICD-10-CM

## 2021-12-22 RX ORDER — HYDROCODONE BITARTRATE AND ACETAMINOPHEN 10; 325 MG/1; MG/1
1 TABLET ORAL EVERY 8 HOURS PRN
Qty: 90 TABLET | Refills: 0 | Status: SHIPPED | OUTPATIENT
Start: 2021-12-27 | End: 2022-01-24 | Stop reason: SDUPTHER

## 2021-12-22 NOTE — TELEPHONE ENCOUNTER
Lawrence Pimentel has requested a refill on her medication. Last office visit : 9/16/2021   Next office visit : 3/3/2022   Last medication refill :11/27/2021  Carlin Patrick : Updated today       Requested Prescriptions     Pending Prescriptions Disp Refills    HYDROcodone-acetaminophen (NORCO)  MG per tablet 90 tablet 0     Sig: Take 1 tablet by mouth every 8 hours as needed for Pain for up to 30 days.        *Script updated to reflect fill date of 12/27/2021

## 2021-12-22 NOTE — TELEPHONE ENCOUNTER
Called pt to  let her know dr. Shanthi Benson thinks we need to repeat angiogram prior to foot surgery.  Let her know Ema Martinez will call to mihaela   Pt voiced she understood

## 2021-12-23 ENCOUNTER — PREP FOR PROCEDURE (OUTPATIENT)
Dept: VASCULAR SURGERY | Age: 76
End: 2021-12-23

## 2021-12-23 RX ORDER — ASPIRIN 81 MG/1
81 TABLET ORAL ONCE
Status: CANCELLED | OUTPATIENT
Start: 2021-12-23 | End: 2021-12-23

## 2021-12-23 RX ORDER — SODIUM CHLORIDE 9 MG/ML
INJECTION, SOLUTION INTRAVENOUS CONTINUOUS
Status: CANCELLED | OUTPATIENT
Start: 2021-12-23

## 2021-12-23 RX ORDER — SODIUM CHLORIDE 0.9 % (FLUSH) 0.9 %
10 SYRINGE (ML) INJECTION PRN
Status: CANCELLED | OUTPATIENT
Start: 2021-12-23

## 2021-12-23 RX ORDER — CLONIDINE HYDROCHLORIDE 0.1 MG/1
0.1 TABLET ORAL PRN
Status: CANCELLED | OUTPATIENT
Start: 2021-12-23

## 2021-12-23 RX ORDER — SODIUM CHLORIDE 9 MG/ML
25 INJECTION, SOLUTION INTRAVENOUS PRN
Status: CANCELLED | OUTPATIENT
Start: 2021-12-23

## 2021-12-23 RX ORDER — SODIUM CHLORIDE 0.9 % (FLUSH) 0.9 %
10 SYRINGE (ML) INJECTION EVERY 12 HOURS SCHEDULED
Status: CANCELLED | OUTPATIENT
Start: 2021-12-23

## 2021-12-23 NOTE — H&P (VIEW-ONLY)
Samuel Huff (:  1945) is a 67 y.o. female,Established patient, here for evaluation of the following chief complaint(s):  Follow-up (f/u dejuan)            SUBJECTIVE/OBJECTIVE:  Tien Machado has a history of peripheral vascular disease of the lower extremities. She has had this for < 1 year. Current treatment includes ASA EC daily. Tien Machado has new wounds. She reports her wound healed after the procedure, then came back. She needs bone shaved. Recently, she reports no claudication. However, she is not walking much. Samuel Huff is a 67 y.o. female with the following history as recorded in [facility]:  Patient Active Problem List    Diagnosis Date Noted    [facility]per[facility]     Urinary tract infection without hematuria 2020    Lumbar spinal stenosis 2017    Rheumatoid arthritis involving multiple sites with positive rheumatoid factor ([facility] 2016    Recurrent UTI 2016    Atonic bladder 2016    CIDP (chronic inflammatory demyelinating polyneuropathy) ([facility]) 2016    Restless legs syndrome (RLS) 2016     Current Outpatient Medications   Medication Sig Dispense Refill    [START ON 2021] HYDROcodone-acetaminophen (NORCO)  MG per tablet Take 1 tablet by mouth every 8 hours as needed for Pain for up to 30 days. 90 tablet 0    mirabegron (MYRBETRIQ) 25 MG TB24 Take 1 tablet by mouth daily 30 tablet 11    tiZANidine (ZANAFLEX) 4 MG tablet       Propylene Glycol (SYSTANE COMPLETE) 0.6 % SOLN Apply 1 drop to eye 3 times daily Each eye      estrogens, conjugated, (PREMARIN) 0.3 MG tablet Take 0.3 mg by mouth Twice a Week Takes sun, and thur      gabapentin (NEURONTIN) 600 MG tablet TAKE 1 TABLET THREE TIMES A DAY (Patient taking differently: 600 mg 3 times daily.  TAKE 1 TABLET THREE TIMES A DAY) 270 tablet 1    amLODIPine (NORVASC) 5 MG tablet Take 5 mg by mouth daily      conjugated estrogens (PREMARIN) 0.625 MG/GM vaginal cream Place vaginally twice a week 1 Tube 3    colestipol (COLESTID) 1 g tablet Take 1 g by mouth 3 times daily       Cyanocobalamin (VITAMIN B 12) 100 MCG LOZG Take by mouth daily       lidocaine (XYLOCAINE) 5 % ointment Apply topically as needed for Pain Apply topically as needed. 30 g 3    fenofibrate (TRICOR) 145 MG tablet 145 mg daily       Omega-3 Fatty Acids (FISH OIL) 1000 MG CAPS Take 1,000 mg by mouth 2 times daily      Garlic 3573 MG CAPS Take 1,000 mg by mouth 2 times daily      CRANBERRY SOFT PO Take 36 mg by mouth daily      carvedilol (COREG) 6.25 MG tablet Take 6.25 mg by mouth 2 times daily      aspirin 81 MG tablet Take 81 mg by mouth daily      folic acid (FOLVITE) 1 MG tablet Take 1 mg by mouth daily      Multiple Vitamins-Minerals (THERAPEUTIC MULTIVITAMIN-MINERALS) tablet Take 1 tablet by mouth daily      calcium-vitamin D (OSCAL) 250-125 MG-UNIT per tablet Take 1 tablet by mouth daily      allopurinol (ZYLOPRIM) 100 MG tablet Take 200 mg by mouth daily Indications: takes total of 500 mg daily       mesalamine (DELZICOL) 400 MG CPDR DR capsule Take 800 mg by mouth 3 times daily      indapamide (LOZOL) 2.5 MG tablet Take 2.5 mg by mouth every morning      lisinopril (PRINIVIL;ZESTRIL) 40 MG tablet Take 10 mg by mouth daily       omeprazole (PRILOSEC) 20 MG capsule Take 20 mg by mouth Daily        No current facility-administered medications for this visit. Allergies: Patient has no known allergies.   Past Medical History:   Diagnosis Date    Arthritis     Cancer [facility])     endometrial cancer    Cataract     CIDP [facility]chronic inflammatory demyelinating polyneuropathy) [facility])     Cro[facility]'s [facility]     GERD (gastroesophageal reflux disease)     History of blood transfusion     Hypertension     Macular degeneration     Radiation      Past Surgical History:   Procedure Laterality Date    BACK SURGERY      CARPAL TUNNEL RELEASE Bilateral     CATARACT REMOVAL Bilateral     CHOLECYSTECTOMY      HAMMER TOE SURGERY Left     HYSTERECTOMY      KNEE ARTHROSCOPY Right     LUMBAR FUSION       Family History   Problem Relation Age of Onset    Cancer Mother     High Blood Pressure Father     Heart Disease Father      Social History     Tobacco Use    Smoking status: Never Smoker    Smokeless tobacco: Never Used   Substance Use Topics    Alcohol use: No       ROS  Eyes  no sudden vision change or amaurosis. Respiratory  no significant shortness of breath,  Cardiovascular  no chest pain or syncope. No  significant leg swelling. no claudication. Musculoskeletal  no gait disturbance  Skin  has new wound. Neurologic   No speech difficulty or lateralizing weakness. All other review of systems are negative. Physical Exam    BP (!) 107/44   Pulse 74   Temp 95.2 °F (35.1 °C)   Resp 20   Ht 5' 4\" (1.626 m)   Wt 132 lb (59.9 kg)   SpO2 98%   BMI 22.66 kg/m²       Neck- carotid pulses 2+ to palpation with no bruit  Cardiovascular  Regular rate and rhythm. Pulmonary  effort appears normal.  No respiratory distress. Lungs - Breath sounds normal. No wheezes or rales. Extremities -  - Radial and brachial pulses are 2+ to palpation bilaterally. Right femoral pulse: present 2+; Right popliteal pulse: absent Right DP: absent; Right PT absent; Left femoral pulse: present 2+; Left popliteal pulse: absent; Left DP: present 2+; Left PT: absent No cyanosis, clubbing, or significant edema. No signs atheroembolic event. Neurologic  alert and oriented X 3. Physiologic. Face symmetric. Skin  warm, dry. .  has wound lateral foot  Psychiatric  mood, affect, and behavior appear normal.  Judgment and thought processes appear normal.    Risk factors for atherosclerosis of all vascular beds have been reviewed with the patient including:  Family history, tobacco abuse in all forms, elevated cholesterol, hyperlipidemia, and diabetes.     Lower extremity arterial study: Right GUANAKITO 1.71, Left GUANAKITO 1.02. evidence of recurrent tibial disease  Individual films reviewed: Yes. These results were reviewed with the patient. Disease process is chronic illness with exacerbation or progression      Reviewed on this visit: special[facility] care [facility]    Reviewed previous studies including: dejuan  Individual images were reviewed. I agree with the findings  Results were discussed with the patient. Options have been discussed with the patient including continued medical management vs. proceeding with angiogram with runoff and possible angioplasty/atherectomy/stent. Patient has opted to proceed with this. Risks have been discussed with the patient including but not limited to MI, death, CVA, bleed, contrast nephropathy, nerve injury, and infection. ASSESSMENT/PLAN:  1. Atheroscler of native artery of both legs with intermit claudication ([address]  2. 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 Atheroscler of native artery of both legs with intermit claudication ([address]    2. 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 Call our office with any progressive pain in leg(s) or hip(s) with walking. Take good care of your feet. Let us know right away if you develop a wound on your foot. An electronic signature was used to authenticate this note.     --ENZO Morejon

## 2021-12-23 NOTE — H&P
Yogesh Coyne (:  1945) is a 67 y.o. female,Established patient, here for evaluation of the following chief complaint(s):  Follow-up (f/u dejuan)            SUBJECTIVE/OBJECTIVE:  Herbert Tejada has a history of peripheral vascular disease of the lower extremities. She has had this for < 1 year. Current treatment includes ASA EC daily. Herbert Tejada has new wounds. She reports her wound healed after the procedure, then came back. She needs bone shaved. Recently, she reports no claudication. However, she is not walking much. Yogesh Coyne is a 67 y.o. female with the following history as recorded in [facility]:  Patient Active Problem List    Diagnosis Date Noted    [facility]     Urinary tract infection without hematuria 2020    Lumbar spinal stenosis 2017    Rheumatoid arthritis involving multiple sites with positive rheumatoid factor ([facility] 2016    Recurrent UTI 2016    Atonic bladder 2016    CIDP (chronic inflammatory demyelinating polyneuropathy) ([facility] 2016    Restless legs syndrome (RLS) 2016     Current Outpatient Medications   Medication Sig Dispense Refill    [START ON 2021] HYDROcodone-acetaminophen (NORCO)  MG per tablet Take 1 tablet by mouth every 8 hours as needed for Pain for up to 30 days. 90 tablet 0    mirabegron (MYRBETRIQ) 25 MG TB24 Take 1 tablet by mouth daily 30 tablet 11    tiZANidine (ZANAFLEX) 4 MG tablet       Propylene Glycol (SYSTANE COMPLETE) 0.6 % SOLN Apply 1 drop to eye 3 times daily Each eye      estrogens, conjugated, (PREMARIN) 0.3 MG tablet Take 0.3 mg by mouth Twice a Week Takes sun, and thur      gabapentin (NEURONTIN) 600 MG tablet TAKE 1 TABLET THREE TIMES A DAY (Patient taking differently: 600 mg 3 times daily.  TAKE 1 TABLET THREE TIMES A DAY) 270 tablet 1    amLODIPine (NORVASC) 5 MG tablet Take 5 mg by mouth daily      conjugated estrogens (PREMARIN) 0.625 MG/GM vaginal cream Place vaginally twice a week 1 Tube 3    colestipol (COLESTID) 1 g tablet Take 1 g by mouth 3 times daily       Cyanocobalamin (VITAMIN B 12) 100 MCG LOZG Take by mouth daily       lidocaine (XYLOCAINE) 5 % ointment Apply topically as needed for Pain Apply topically as needed. 30 g 3    fenofibrate (TRICOR) 145 MG tablet 145 mg daily       Omega-3 Fatty Acids (FISH OIL) 1000 MG CAPS Take 1,000 mg by mouth 2 times daily      Garlic 9639 MG CAPS Take 1,000 mg by mouth 2 times daily      CRANBERRY SOFT PO Take 36 mg by mouth daily      carvedilol (COREG) 6.25 MG tablet Take 6.25 mg by mouth 2 times daily      aspirin 81 MG tablet Take 81 mg by mouth daily      folic acid (FOLVITE) 1 MG tablet Take 1 mg by mouth daily      Multiple Vitamins-Minerals (THERAPEUTIC MULTIVITAMIN-MINERALS) tablet Take 1 tablet by mouth daily      calcium-vitamin D (OSCAL) 250-125 MG-UNIT per tablet Take 1 tablet by mouth daily      allopurinol (ZYLOPRIM) 100 MG tablet Take 200 mg by mouth daily Indications: takes total of 500 mg daily       mesalamine (DELZICOL) 400 MG CPDR DR capsule Take 800 mg by mouth 3 times daily      indapamide (LOZOL) 2.5 MG tablet Take 2.5 mg by mouth every morning      lisinopril (PRINIVIL;ZESTRIL) 40 MG tablet Take 10 mg by mouth daily       omeprazole (PRILOSEC) 20 MG capsule Take 20 mg by mouth Daily        No current facility-administered medications for this visit. Allergies: Patient has no known allergies.   Past Medical History:   Diagnosis Date    Arthritis     Cancer [facility])     endometrial cancer    Cataract     CIDP (chronic inflammatory demyelinating polyneuropathy) ([facility])     Cro[facility]     GERD (gastroesophageal reflux disease)     History of blood transfusion     Hypertension     Macular degeneration     Radiation      Past Surgical History:   Procedure Laterality Date    BACK SURGERY      CARPAL TUNNEL RELEASE Bilateral     CATARACT REMOVAL Bilateral     CHOLECYSTECTOMY      HAMMER TOE SURGERY Left     HYSTERECTOMY      KNEE ARTHROSCOPY Right     LUMBAR FUSION       Family History   Problem Relation Age of Onset    Cancer Mother     High Blood Pressure Father     Heart Disease Father      Social History     Tobacco Use    Smoking status: Never Smoker    Smokeless tobacco: Never Used   Substance Use Topics    Alcohol use: No       ROS  Eyes - no sudden vision change or amaurosis. Respiratory - no significant shortness of breath,  Cardiovascular - no chest pain or syncope. No  significant leg swelling. no claudication. Musculoskeletal - no gait disturbance  Skin - has new wound. Neurologic -  No speech difficulty or lateralizing weakness. All other review of systems are negative. Physical Exam    BP (!) 107/44   Pulse 74   Temp 95.2 °F (35.1 °C)   Resp 20   Ht 5' 4\" (1.626 m)   Wt 132 lb (59.9 kg)   SpO2 98%   BMI 22.66 kg/m²       Neck- carotid pulses 2+ to palpation with no bruit  Cardiovascular - Regular rate and rhythm. Pulmonary - effort appears normal.  No respiratory distress. Lungs - Breath sounds normal. No wheezes or rales. Extremities -  - Radial and brachial pulses are 2+ to palpation bilaterally. Right femoral pulse: present 2+; Right popliteal pulse: absent Right DP: absent; Right PT absent; Left femoral pulse: present 2+; Left popliteal pulse: absent; Left DP: present 2+; Left PT: absent No cyanosis, clubbing, or significant edema. No signs atheroembolic event. Neurologic - alert and oriented X 3. Physiologic. Face symmetric. Skin - warm, dry. .  has wound lateral foot  Psychiatric - mood, affect, and behavior appear normal.  Judgment and thought processes appear normal.    Risk factors for atherosclerosis of all vascular beds have been reviewed with the patient including:  Family history, tobacco abuse in all forms, elevated cholesterol, hyperlipidemia, and diabetes.     Lower extremity arterial study: Right GUANAKITO 1.71, Left GUANAKITO 1.02. evidence of recurrent tibial disease  Individual films reviewed: Yes. These results were reviewed with the patient. Disease process is chronic illness with exacerbation or progression      Reviewed on this visit: speciality care notes from [facility]    Reviewed previous studies including: dejuan  Individual images were reviewed. I agree with the findings  Results were discussed with the patient. Options have been discussed with the patient including continued medical management vs. proceeding with angiogram with runoff and possible angioplasty/atherectomy/stent. Patient has opted to proceed with this. Risks have been discussed with the patient including but not limited to MI, death, CVA, bleed, contrast nephropathy, nerve injury, and infection. ASSESSMENT/PLAN:  1. Atheroscler of native artery of both legs with intermit claudication ([address]  2. 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 Atheroscler of native artery of both legs with intermit claudication ([address]    2. 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 Call our office with any progressive pain in leg(s) or hip(s) with walking. Take good care of your feet. Let us know right away if you develop a wound on your foot. An electronic signature was used to authenticate this note.     --ENZO Perry

## 2021-12-27 ENCOUNTER — TELEPHONE (OUTPATIENT)
Dept: VASCULAR SURGERY | Age: 76
End: 2021-12-27

## 2021-12-27 NOTE — TELEPHONE ENCOUNTER
I sw the pt to discuss the details of her upcoming procedure with Dr. Arya Chaney on 22. I have also sent this letter to the pt in [facility]. Instructions discussed as follows:          Horace Andrew    Arteriogram Instructions    1. Report to the [facility] at [facility] (go in the front door and to the left) on 2022, at 7:00 am.  2. Nothing to eat or drink after midnight the night before the procedure. 3. Please take all medications as normally scheduled to take, including heart and blood pressure medicines with a sip of water. 4. Do not take Lasix, insulin, or any diabetic medicine the morning of the procedure. If you take insulin, you may only take 1/2 of any scheduled nightly dose, and none the morning of the procedure. You may take all regularly scheduled heart, cholesterol, and blood pressure medicines with a sip of water. 5. If you take Glucophage, Metformin, Actos Plus Met, or Glucovance,you can not take this the day of your procedure and two days after the procedure. 6. Hold aspirin for 0 days prior to surgery. 7. If you have sleep apnea and require C-PAP, please bring this with you to the hospital.  8. Bring a list of all of your allergies and medications with you to the hospital.  9. Please let our nurse know if you have had an allergy to iodine, shellfish, or x-ray dye. 10. Let the nurse know if you take any of the followin. Over the counter herbal supplements  2. Diclofenec, indomethacin, ketoprofen, Caridopa/levadopa, naproxen, sulindac, piroxicam, glucosamine, Chondrotin, cocchine, or methotrexate. 11. Plan to stay at the hospital for 4 - 6 hours before being released  by the physician. You will need someone to drive you home after the procedure. 12. Medications instructed to hold: See above  13. Please stop at your local [employer] or pharmacy and buy a bottle of Hibiclens.  Wash thoroughly with this the night before and the morning of the procedure, paying special attention to the area that will be operated on. Make sure you rinse very well. The Hibiclens should only be used prior to surgery. 15. New policy requires that anyone who comes into the hospital will be required to wear a face mask. A cloth mask is acceptable. 15. To ensure the health and safety of our patients and staff, [facility] has implemented visitor restrictions. Only one person will be allowed to accompany you for your procedure. If you or your visitor are exhibiting signs & symptoms of illness such as fever, cough, sore throat or body aches, we ask that you reschedule your procedure to a later date after your symptoms have been resolved. 16. Other Directions: Please make sure you bring a copy of your covid-19 vaccination card. Failure to do so will result in your procedure having to be rescheduled. Unless instructed otherwise by your physician, cleanse incision/puncture site twice daily with soap and water. Apply dry gauze. Do not get in tub. Okay to shower. Do not apply any salve, cream, peroxide or alcohol to the incision. Call with any increasing redness or drainage    PLEASE NOTE:  If the patient is unable to sign his/her own paperwork, the appointed caregiver (POA, child, sibling, etc) must be present at the time of registration for all testing and procedures. Transportation to and from all testing and procedure appointments is the sole responsibility of the patient, caregiver, and/or nursing facility in which they reside. Please remember you will not be able to drive after you are discharged. Please call the office at [phone] with any questions or concerns. Please allow 48-72 hours notice for cancellations or rescheduling. We will attempt to accommodate your needs to the best of our capabilities, however, strict policies with procedure room availability does not allow much flexibility.

## 2021-12-27 NOTE — TELEPHONE ENCOUNTER
----- Message from ENZO Huston sent at 12/22/2021  2:41 PM CST -----  Jerrell Herrera please call patient and let her know dr. Zenobia Bernheim thinks we need to repeat angiogram prior to foot surgery.   Let her know Gregorio Aragon will call to [email or web address] as soon as she is back she has osteo

## 2022-01-04 ENCOUNTER — HOSPITAL ENCOUNTER (OUTPATIENT)
Dept: INTERVENTIONAL RADIOLOGY/VASCULAR | Age: 77
Discharge: HOME | End: 2022-01-04
Payer: MEDICARE

## 2022-01-04 VITALS
OXYGEN SATURATION: 100 % | DIASTOLIC BLOOD PRESSURE: 46 MMHG | HEIGHT: 64 IN | TEMPERATURE: 96.1 F | BODY MASS INDEX: 22.53 KG/M2 | RESPIRATION RATE: 18 BRPM | SYSTOLIC BLOOD PRESSURE: 125 MMHG | WEIGHT: 132 LBS | HEART RATE: 68 BPM

## 2022-01-04 DIAGNOSIS — I73.9 PVD (PERIPHERAL VASCULAR DISEASE) (HCC): ICD-10-CM

## 2022-01-04 PROCEDURE — 99153 MOD SED SAME PHYS/QHP EA: CPT

## 2022-01-04 PROCEDURE — G0269 OCCLUSIVE DEVICE IN VEIN ART: HCPCS

## 2022-01-04 PROCEDURE — 6360000002 HC RX W HCPCS: Performed by: NURSE PRACTITIONER

## 2022-01-04 PROCEDURE — 75716 ARTERY X-RAYS ARMS/LEGS: CPT | Performed by: SURGERY

## 2022-01-04 PROCEDURE — 2500000003 HC RX 250 WO HCPCS: Performed by: SURGERY

## 2022-01-04 PROCEDURE — 6360000004 HC RX CONTRAST MEDICATION: Performed by: SURGERY

## 2022-01-04 PROCEDURE — 36200 PLACE CATHETER IN AORTA: CPT

## 2022-01-04 PROCEDURE — 36200 PLACE CATHETER IN AORTA: CPT | Performed by: SURGERY

## 2022-01-04 PROCEDURE — 99152 MOD SED SAME PHYS/QHP 5/>YRS: CPT

## 2022-01-04 PROCEDURE — 75716 ARTERY X-RAYS ARMS/LEGS: CPT

## 2022-01-04 PROCEDURE — 2580000003 HC RX 258: Performed by: NURSE PRACTITIONER

## 2022-01-04 PROCEDURE — 6360000002 HC RX W HCPCS: Performed by: SURGERY

## 2022-01-04 PROCEDURE — C1893 INTRO/SHEATH, FIXED,NON-PEEL: HCPCS

## 2022-01-04 RX ORDER — SODIUM CHLORIDE 9 MG/ML
25 INJECTION, SOLUTION INTRAVENOUS PRN
Status: DISCONTINUED | OUTPATIENT
Start: 2022-01-04 | End: 2022-01-06 | Stop reason: HOSPADM

## 2022-01-04 RX ORDER — MIDAZOLAM HYDROCHLORIDE 1 MG/ML
INJECTION INTRAMUSCULAR; INTRAVENOUS
Status: COMPLETED | OUTPATIENT
Start: 2022-01-04 | End: 2022-01-04

## 2022-01-04 RX ORDER — LIDOCAINE HYDROCHLORIDE 20 MG/ML
INJECTION, SOLUTION INFILTRATION; PERINEURAL
Status: COMPLETED | OUTPATIENT
Start: 2022-01-04 | End: 2022-01-04

## 2022-01-04 RX ORDER — ASPIRIN 81 MG/1
81 TABLET ORAL ONCE
Status: DISCONTINUED | OUTPATIENT
Start: 2022-01-04 | End: 2022-01-06 | Stop reason: HOSPADM

## 2022-01-04 RX ORDER — SODIUM CHLORIDE 0.9 % (FLUSH) 0.9 %
5-40 SYRINGE (ML) INJECTION EVERY 12 HOURS SCHEDULED
Status: DISCONTINUED | OUTPATIENT
Start: 2022-01-04 | End: 2022-01-06 | Stop reason: HOSPADM

## 2022-01-04 RX ORDER — IODIXANOL 320 MG/ML
INJECTION, SOLUTION INTRAVASCULAR
Status: COMPLETED | OUTPATIENT
Start: 2022-01-04 | End: 2022-01-04

## 2022-01-04 RX ORDER — SODIUM CHLORIDE 9 MG/ML
INJECTION, SOLUTION INTRAVENOUS CONTINUOUS
Status: DISCONTINUED | OUTPATIENT
Start: 2022-01-04 | End: 2022-01-06 | Stop reason: HOSPADM

## 2022-01-04 RX ORDER — FENTANYL CITRATE 50 UG/ML
INJECTION, SOLUTION INTRAMUSCULAR; INTRAVENOUS
Status: COMPLETED | OUTPATIENT
Start: 2022-01-04 | End: 2022-01-04

## 2022-01-04 RX ORDER — SODIUM CHLORIDE 0.9 % (FLUSH) 0.9 %
10 SYRINGE (ML) INJECTION EVERY 12 HOURS SCHEDULED
Status: DISCONTINUED | OUTPATIENT
Start: 2022-01-04 | End: 2022-01-06 | Stop reason: HOSPADM

## 2022-01-04 RX ORDER — SODIUM CHLORIDE 0.9 % (FLUSH) 0.9 %
10 SYRINGE (ML) INJECTION PRN
Status: DISCONTINUED | OUTPATIENT
Start: 2022-01-04 | End: 2022-01-06 | Stop reason: HOSPADM

## 2022-01-04 RX ORDER — SODIUM CHLORIDE 0.9 % (FLUSH) 0.9 %
5-40 SYRINGE (ML) INJECTION PRN
Status: DISCONTINUED | OUTPATIENT
Start: 2022-01-04 | End: 2022-01-06 | Stop reason: HOSPADM

## 2022-01-04 RX ORDER — SODIUM CHLORIDE 9 MG/ML
INJECTION, SOLUTION INTRAVENOUS CONTINUOUS
Status: ACTIVE | OUTPATIENT
Start: 2022-01-04 | End: 2022-01-04

## 2022-01-04 RX ORDER — CLONIDINE HYDROCHLORIDE 0.1 MG/1
0.1 TABLET ORAL PRN
Status: DISCONTINUED | OUTPATIENT
Start: 2022-01-04 | End: 2022-01-06 | Stop reason: HOSPADM

## 2022-01-04 RX ORDER — ALLOPURINOL 300 MG/1
300 TABLET ORAL DAILY
COMMUNITY

## 2022-01-04 RX ORDER — HEPARIN SODIUM 5000 [USP'U]/ML
INJECTION, SOLUTION INTRAVENOUS; SUBCUTANEOUS
Status: COMPLETED | OUTPATIENT
Start: 2022-01-04 | End: 2022-01-04

## 2022-01-04 RX ADMIN — FENTANYL CITRATE 25 MCG: 50 INJECTION, SOLUTION INTRAMUSCULAR; INTRAVENOUS at 09:44

## 2022-01-04 RX ADMIN — HEPARIN SODIUM 5000 UNITS: 5000 INJECTION INTRAVENOUS; SUBCUTANEOUS at 09:44

## 2022-01-04 RX ADMIN — IODIXANOL 72 ML: 320 INJECTION, SOLUTION INTRAVASCULAR at 10:01

## 2022-01-04 RX ADMIN — LIDOCAINE HYDROCHLORIDE 10 ML: 20 INJECTION, SOLUTION INFILTRATION; PERINEURAL at 09:44

## 2022-01-04 RX ADMIN — SODIUM CHLORIDE: 9 INJECTION, SOLUTION INTRAVENOUS at 09:00

## 2022-01-04 RX ADMIN — MIDAZOLAM 0.5 MG: 1 INJECTION INTRAMUSCULAR; INTRAVENOUS at 09:44

## 2022-01-04 RX ADMIN — Medication 1000 MG: at 09:18

## 2022-01-04 ASSESSMENT — PAIN SCALES - GENERAL: PAINLEVEL_OUTOF10: 0

## 2022-01-04 NOTE — PROGRESS NOTES
BEDREST COMPLETE. PT OOB WITH RN @ SIDE. PT AMBULATED TO BATHROOM TO VOID, THEN AMBULATED IN CATH HOLDING HALLS WITHOUT S/S BLEEDING NOTED. PT DENIES PAIN AT RT GROIN.

## 2022-01-04 NOTE — INTERVAL H&P NOTE
Update History & Physical    The patient's History and Physical of December 23, 2021 was reviewed with the patient and I examined the patient. There was no change. The surgical site was confirmed by the patient and me. Plan: The risks, benefits, expected outcome, and alternative to the recommended procedure have been discussed with the patient. Patient understands and wants to proceed with the procedure.    ASA III, Mallenpati 3    Electronically signed by Kai Lee DO on 1/4/2022 at 9:30 AM

## 2022-01-04 NOTE — OP NOTE
Patient Name: Adam Philippe   YOB: 1945   MRN: [id number]     Procedure Date: 1/4/2022     Pre Op Diagnosis: left foot ulcer, PAD    Post Op Diagnosis: same    Procedure: Bilateral lower extremity runoff    Anesthesia: Local with Moderate Sedation    Estimated Blood Loss: Less than 50 ml    Complications: None    Implants: Minx 5 Fr    Procedure Details: Patient was brought to the angiogram suite and placed in supine position. Her bilateral groins were prepped and draped in sterile fashion. Timeout performed. Right common femoral artery was accessed under continuous ultrasound guidance using standard micropuncture technique. 5 Wolof glide sheath was inserted. Applied Visual Sciencesson wire was advanced proximally followed by Omni Flush catheter was placed in the infrarenal aorta. Bilateral lower extremity runoff was performed with mentioned below findings. It appeared to be adequate perfusion of the lateral foot and ulceration bed with widely patent peroneal artery and anterior tibial artery. Access site was sealed using 5 Western Cecelia minx. Patient tolerated procedure well. She is okay to undergo podiatry procedure. Findings: Bilateral common iliac, internal iliac and external iliac arteries are patent. Bilateral common femoral arteries are patent. Bilateral profunda femoris patent. Left superficial femoral artery with mild disease at the midportion, the rest of the superficial femoral artery and popliteal artery are patent. Anterior tibial and peroneal artery runoff to the foot. Right superficial femoral artery and popliteal artery are patent. Posterior tibial runoff to the foot.     Disposition:aroused from sedation, and taken to the recovery room in a stable condition    Kiana Tavarez DO  1/4/2022 10:01 AM

## 2022-01-05 NOTE — PROGRESS NOTES
PT REDRESSED. AVS & MYNX BOOKLET EXPLAINED & GIVEN. TRANSPORTED PT TO CAR VIA W/C WITH ALL BELONGINGS FOR DISCHARGE HOME PER SON.

## 2022-01-11 ENCOUNTER — TELEPHONE (OUTPATIENT)
Dept: INFUSION THERAPY | Age: 77
End: 2022-01-11

## 2022-01-11 NOTE — TELEPHONE ENCOUNTER
Patient called and she is having foot surgery and needs to cancel appt.   She will call when ready to reschedule

## 2022-01-12 ENCOUNTER — OFFICE VISIT (OUTPATIENT)
Dept: UROLOGY | Age: 77
End: 2022-01-12
Payer: MEDICARE

## 2022-01-12 VITALS — HEIGHT: 64 IN | BODY MASS INDEX: 22.47 KG/M2 | TEMPERATURE: 97.8 F | WEIGHT: 131.6 LBS

## 2022-01-12 DIAGNOSIS — R33.9 INCOMPLETE BLADDER EMPTYING: ICD-10-CM

## 2022-01-12 DIAGNOSIS — N32.89 BLADDER SPASMS: ICD-10-CM

## 2022-01-12 DIAGNOSIS — N39.0 RECURRENT UTI: ICD-10-CM

## 2022-01-12 DIAGNOSIS — R30.0 DYSURIA: Primary | ICD-10-CM

## 2022-01-12 PROCEDURE — P9612 CATHETERIZE FOR URINE SPEC: HCPCS | Performed by: NURSE PRACTITIONER

## 2022-01-12 PROCEDURE — 99214 OFFICE O/P EST MOD 30 MIN: CPT | Performed by: NURSE PRACTITIONER

## 2022-01-12 ASSESSMENT — ENCOUNTER SYMPTOMS
BACK PAIN: 0
NAUSEA: 0
VOMITING: 0
ABDOMINAL DISTENTION: 0
ABDOMINAL PAIN: 0

## 2022-01-12 NOTE — PROGRESS NOTES
Patient complained of dysuria. After discussing with DAWN [place] was given verbal orders to obtain cath urine specimen. After obtaining consent from patient. Patient was then placed in the dorsal lithotomy position. Using sterile technique patient is carefully prepped with Betadine around the urethra. A pediatric catheterization kit is used which contains an approximate 5 Western Cecelia catheter. Urethral Catheter is introduced into the urinary bladder. Urine specimen is obtained and sent to the lab for culture and sensitivity. Patient tolerated procedure well. DAWN Drew was in office at time of procedure.

## 2022-01-12 NOTE — PROGRESS NOTES
Rosa Wheat is a 67 y.o. female who presents today   Chief Complaint   Patient presents with    Follow-up     I am here today for recurrent UTI         Patient is a 25-year-old female who presents to clinic today with complaints of bladder spasms, flank pain, dysuria for the last several days which has seemed to be worsening. She utilizes in and out catheterization about 3 times per day. Recently she states at nighttime she has been cathing with no urine output. She does have bladder spasms and pain with catheterization when she feels she is getting a UTI. She does void some on her own. She has a history of bilateral severe neuropathy and does wear braces on both legs. She does have incontinence as well and soaks approximately 5-6 pads per day and having to wear depends at night. Denies any fever, chills. I did place her on Myrbetriq 25 mg approximately 3 to 4 weeks ago and she currently has at least 4 to 5 weeks remaining prior to reevaluating symptoms.     Previous urine cultures:  11/29/2021 revealed E. coli resistant to gentamicin, levofloxacin  9/28/2021 revealed E. coli resistant to gentamicin, levofloxacin, tobramycin, Bactrim  7/22/2021 revealed E. coli resistant to gentamicin, Levaquin, tobramycin  7/9/2021 revealed E. coli resistant to gentamicin, Levaquin, tobramycin  3/31/2021 - urine culture negative      Past Medical History:   Diagnosis Date    Arthritis     Cancer ([facility] [place] [address]     endometrial cancer    Cataract     [facility]     Crohn's disease ([facility] [place] [address]     GERD (gastroesophageal reflux disease)     History of blood transfusion     Hypertension     Macular degeneration     PVD (peripheral vascular disease) ([facility] [place] [address]     Radiation        Past Surgical History:   Procedure Laterality Date    BACK SURGERY      CARPAL TUNNEL RELEASE Bilateral     CATARACT REMOVAL Bilateral     CHOLECYSTECTOMY      HAMMER TOE SURGERY Left     HX VASCULAR ANGIOPLASTY      & STENT    HYSTERECTOMY      KNEE ARTHROSCOPY Right     LUMBAR FUSION         Current Outpatient Medications   Medication Sig Dispense Refill    allopurinol (ZYLOPRIM) 300 MG tablet Take 300 mg by mouth daily      HYDROcodone-acetaminophen (NORCO)  MG per tablet Take 1 tablet by mouth every 8 hours as needed for Pain for up to 30 days. 90 tablet 0    mirabegron (MYRBETRIQ) 25 MG TB24 Take 1 tablet by mouth daily 30 tablet 11    tiZANidine (ZANAFLEX) 4 MG tablet Take 4 mg by mouth every 6 hours as needed       Propylene Glycol (SYSTANE COMPLETE) 0.6 % SOLN Apply 1 drop to eye 3 times daily Each eye      estrogens, conjugated, (PREMARIN) 0.3 MG tablet Take 0.3 mg by mouth Twice a Week Takes sun, and thur      gabapentin (NEURONTIN) 600 MG tablet TAKE 1 TABLET THREE TIMES A DAY (Patient taking differently: 600 mg 3 times daily. TAKE 1 TABLET THREE TIMES A DAY) 270 tablet 1    amLODIPine (NORVASC) 5 MG tablet Take 5 mg by mouth daily      conjugated estrogens (PREMARIN) 0.625 MG/GM vaginal cream Place vaginally twice a week 1 Tube 3    colestipol (COLESTID) 1 g tablet Take 1 g by mouth 3 times daily       Cyanocobalamin (VITAMIN B 12) 100 MCG LOZG Take by mouth daily       lidocaine (XYLOCAINE) 5 % ointment Apply topically as needed for Pain Apply topically as needed.  30 g 3    fenofibrate (TRICOR) 145 MG tablet 145 mg daily       Omega-3 Fatty Acids (FISH OIL) 1000 MG CAPS Take 1,000 mg by mouth 2 times daily      Garlic 7006 MG CAPS Take 1,000 mg by mouth 2 times daily      CRANBERRY SOFT PO Take 36 mg by mouth daily      carvedilol (COREG) 6.25 MG tablet Take 6.25 mg by mouth 2 times daily      aspirin 81 MG tablet Take 81 mg by mouth daily      folic acid (FOLVITE) 1 MG tablet Take 600 mg by mouth daily       Multiple Vitamins-Minerals (THERAPEUTIC MULTIVITAMIN-MINERALS) tablet Take 1 tablet by mouth daily      calcium-vitamin D (OSCAL) 250-125 MG-UNIT per tablet Take 1 tablet by mouth daily      mesalamine (DELZICOL) 400 MG CPDR DR capsule Take 400 mg by mouth 3 times daily       indapamide (LOZOL) 2.5 MG tablet Take 2.5 mg by mouth every morning      omeprazole (PRILOSEC) 20 MG capsule Take 20 mg by mouth Daily        No current facility-administered medications for this visit. No Known Allergies    Social History     Socioeconomic History    Marital status:      Spouse name: None    Number of children: None    Years of education: None    Highest education level: None   Occupational History    None   Tobacco Use    Smoking status: Never Smoker    Smokeless tobacco: Never Used   Vaping Use    Vaping Use: Never used   Substance and Sexual Activity    Alcohol use: No    Drug use: No    Sexual activity: Not Currently     Partners: Male   Other Topics Concern    None   Social History Narrative    None     Social Determinants of Health     Financial Resource Strain:     Difficulty of Paying Living Expenses: Not on file   Food Insecurity:     Worried About Running Out of Food in the Last Year: Not on file    Giancarlo of Food in the Last Year: Not on file   Transportation Needs:     Lack of Transportation (Medical): Not on file    Lack of Transportation (Non-Medical):  Not on file   Physical Activity:     Days of Exercise per Week: Not on file    Minutes of Exercise per Session: Not on file   Stress:     Feeling of Stress : Not on file   Social Connections:     Frequency of Communication with Friends and Family: Not on file    Frequency of Social Gatherings with Friends and Family: Not on file    Attends Restoration Services: Not on file    Active Member of Clubs or Organizations: Not on file    Attends Club or Organization Meetings: Not on file    Marital Status: Not on file   Intimate Partner Violence:     Fear of Current or Ex-Partner: Not on file    Emotionally Abused: Not on file    Physically Abused: Not on file    Sexually Abused: Not on file   Housing Stability:     Unable to Pay for Housing in the Last Year: Not on file    Number of Places Lived in the Last Year: Not on file    Unstable Housing in the Last Year: Not on file       Family History   Problem Relation Age of Onset    Cancer Mother     High Blood Pressure Father     Heart Disease Father        REVIEW OF SYSTEMS:  Review of Systems   Constitutional: Negative for chills and fever. Gastrointestinal: Negative for abdominal distention, abdominal pain, nausea and vomiting. Genitourinary: Positive for dysuria, frequency and urgency. Negative for difficulty urinating, flank pain and hematuria. Musculoskeletal: Positive for gait problem. Negative for back pain. Neurological: Positive for weakness. Psychiatric/Behavioral: Negative for agitation and confusion. PHYSICAL EXAM:  Temp 97.8 °F (36.6 °C) (Temporal)   Ht 5' 4\" (1.626 m)   Wt 131 lb 9.6 oz (59.7 kg)   BMI 22.59 kg/m²   Physical Exam  Vitals and nursing note reviewed. Constitutional:       General: She is not in acute distress. Appearance: Normal appearance. She is not ill-appearing. Pulmonary:      Effort: Pulmonary effort is normal. No respiratory distress. Abdominal:      General: There is no distension. Tenderness: There is no abdominal tenderness. There is no right CVA tenderness or left CVA tenderness. Neurological:      Mental Status: She is alert and oriented to person, place, and time. Mental status is at baseline. Motor: Weakness present. Gait: Gait abnormal.   Psychiatric:         Mood and Affect: Mood normal.         Behavior: Behavior normal.       1. Dysuria  Recent complaints of dysuria and flank pain. She feels she is getting a UTI and has worsened over the last several days. We will go ahead and send cath urine specimen for evaluation and treat patient accordingly. - Culture, Urine    2.  Incomplete bladder emptying  Patient continues to utilize in and out catheterization 2-3 times per day. This is likely secondary to neurogenic bladder given that she has severe bilateral neuropathy requiring braces. 3. Bladder spasms  Complains of intermittent severe bladder spasms. Has been on Myrbetriq 25 mg for about 3 to 4 weeks as of now. She does have an 8-week course to complete prior to reevaluation of symptoms. We will have her follow-up at scheduled appointment in 4 weeks. 4. Recurrent UTI  History of recurrent UTI. She does have history of diarrhea as well. She has recently had increasing incontinence. She does in and out catheterized twice per day and is currently on Premarin. She has been on daily antibiotics in the past and is no longer on these. Discussed with her only treating severe symptoms. Currently she feels like she is getting UTI and she is undergoing ankle surgery tomorrow therefore would like to be evaluated since she will be unable to leave her house for several weeks. Orders Placed This Encounter   Procedures    Culture, Urine     Order Specific Question:   Specify (ex-cath, midstream, cysto, etc)? Answer:   straight cath        Return for keep next scheduled appt. All information inputted into the note by the MA to include chief complaint, past medical history, past surgical history, medications, allergies, social and family history and review of systems has been reviewed and updated as needed by me. EMR Dragon/transcription disclaimer: Much of this documentt is electronic  transcription/translation of spoken language to printed text. The  electronic translation of spoken language may be erroneous, or at times,  nonsensical words or phrases may be inadvertently transcribed.  Although I  have reviewed the document for such errors, some may still exist.

## 2022-01-14 DIAGNOSIS — N30.90 CYSTITIS: Primary | ICD-10-CM

## 2022-01-14 LAB
ORGANISM: ABNORMAL
URINE CULTURE, ROUTINE: ABNORMAL
URINE CULTURE, ROUTINE: ABNORMAL

## 2022-01-14 RX ORDER — SULFAMETHOXAZOLE AND TRIMETHOPRIM 800; 160 MG/1; MG/1
1 TABLET ORAL 2 TIMES DAILY
Qty: 20 TABLET | Refills: 0 | Status: SHIPPED | OUTPATIENT
Start: 2022-01-14 | End: 2022-01-24

## 2022-01-21 RX ORDER — TIZANIDINE 4 MG/1
TABLET ORAL
Qty: 180 TABLET | Refills: 1 | Status: SHIPPED | OUTPATIENT
Start: 2022-01-21 | End: 2022-07-21

## 2022-01-21 NOTE — TELEPHONE ENCOUNTER
Mattie Shannon has requested a refill on her medication. Last office visit : 9/16/2021   Next office visit : 3/3/2022   Last medication refill :6/28/2021 w/jarrod      Requested Prescriptions     Pending Prescriptions Disp Refills    tiZANidine (ZANAFLEX) 4 MG tablet [Pharmacy Med Name: TIZANIDINE HCL 4 MG TABLET] 180 tablet 1     Sig: TAKE 1 TABLET BY MOUTH 2 TIMES DAILY AS NEEDED (PAIN)       **Dr Rhett Chicas patient please.

## 2022-01-24 DIAGNOSIS — M48.062 SPINAL STENOSIS OF LUMBAR REGION WITH NEUROGENIC CLAUDICATION: ICD-10-CM

## 2022-01-24 DIAGNOSIS — G61.81 CIDP (CHRONIC INFLAMMATORY DEMYELINATING POLYNEUROPATHY) (HCC): ICD-10-CM

## 2022-01-24 DIAGNOSIS — M05.79 RHEUMATOID ARTHRITIS INVOLVING MULTIPLE SITES WITH POSITIVE RHEUMATOID FACTOR (HCC): ICD-10-CM

## 2022-01-24 RX ORDER — HYDROCODONE BITARTRATE AND ACETAMINOPHEN 10; 325 MG/1; MG/1
1 TABLET ORAL EVERY 8 HOURS PRN
Qty: 90 TABLET | Refills: 0 | Status: SHIPPED | OUTPATIENT
Start: 2022-01-26 | End: 2022-02-24 | Stop reason: SDUPTHER

## 2022-02-09 ENCOUNTER — OFFICE VISIT (OUTPATIENT)
Dept: UROLOGY | Age: 77
End: 2022-02-09
Payer: MEDICARE

## 2022-02-09 VITALS — TEMPERATURE: 98 F | HEIGHT: 64 IN | WEIGHT: 136.4 LBS | BODY MASS INDEX: 23.29 KG/M2

## 2022-02-09 DIAGNOSIS — N31.2 ATONIC BLADDER: ICD-10-CM

## 2022-02-09 DIAGNOSIS — N39.0 RECURRENT UTI: ICD-10-CM

## 2022-02-09 DIAGNOSIS — N32.89 BLADDER SPASMS: Primary | ICD-10-CM

## 2022-02-09 LAB
APPEARANCE FLUID: ABNORMAL
BILIRUBIN, POC: ABNORMAL
BLOOD URINE, POC: ABNORMAL
CLARITY, POC: ABNORMAL
COLOR, POC: YELLOW
GLUCOSE URINE, POC: ABNORMAL
KETONES, POC: ABNORMAL
LEUKOCYTE EST, POC: ABNORMAL
NITRITE, POC: ABNORMAL
PH, POC: 5.5
PROTEIN, POC: 100
SPECIFIC GRAVITY, POC: 1.03
UROBILINOGEN, POC: 0.2

## 2022-02-09 PROCEDURE — 51701 INSERT BLADDER CATHETER: CPT | Performed by: NURSE PRACTITIONER

## 2022-02-09 PROCEDURE — 81002 URINALYSIS NONAUTO W/O SCOPE: CPT | Performed by: NURSE PRACTITIONER

## 2022-02-09 PROCEDURE — 99214 OFFICE O/P EST MOD 30 MIN: CPT | Performed by: NURSE PRACTITIONER

## 2022-02-09 NOTE — PROGRESS NOTES
Patient complained of frequency/dysuria. After discussing with ENZO Romo was given verbal orders to obtain cath urine specimen. After obtaining consent from patient. Patient was then placed in the dorsal lithotomy position. Using sterile technique patient is carefully prepped with Betadine around the urethra. A pediatric catheterization kit is used which contains an approximate 5 Western Cecelia catheter. Urethral Catheter is introduced into the urinary bladder. Urine specimen is obtained and sent to the lab for culture and sensitivity. Patient tolerated procedure well. Rubia Garcia was in office at time of procedure.

## 2022-02-09 NOTE — PROGRESS NOTES
Mattie Shannon is a 67 y.o., female, Established patient who presents today   Chief Complaint   Patient presents with    Follow-up     I am here today due to incontience and recurring utis. HPI   Patient presents for follow-up after initiation of Myrbetriq 25 mg for bladder spasms. She utilizes in and out catheterization 3-4 times per day. She reports that she catheterizes at times with very little urine output, but continues to have bladder spasms and other episodes of incontinence throughout the day. She reports that she is utilizing 3 packages of depends per week and cycling through 6-7 pads per day as well. She also has frequent UTI symptoms. She is not experiencing these today.     Previous urine cultures:  1/12/22 revealed Proteus mirabilis resistant to macrobid  11/29/2021 revealed E. coli resistant to gentamicin, levofloxacin  9/28/2021 revealed E. coli resistant to gentamicin, levofloxacin, tobramycin, Bactrim  7/22/2021 revealed E. coli resistant to gentamicin, Levaquin, tobramycin  7/9/2021 revealed E. coli resistant to gentamicin, Levaquin, tobramycin  3/31/2021 - urine culture negative    REVIEW OF SYSTEMS:  Review of Systems   Constitutional: Negative for chills and fever. Gastrointestinal: Negative for abdominal distention, abdominal pain, nausea and vomiting. Genitourinary: Positive for difficulty urinating. Negative for dysuria, flank pain, frequency, hematuria and urgency. Incontinence   Musculoskeletal: Positive for gait problem. Negative for back pain. Neurological: Positive for weakness. Psychiatric/Behavioral: Negative for agitation and confusion. PHYSICAL EXAM:  Temp 98 °F (36.7 °C)   Ht 5' 4\" (1.626 m)   Wt 136 lb 6.4 oz (61.9 kg)   BMI 23.41 kg/m²   Physical Exam  Vitals and nursing note reviewed. Constitutional:       General: She is not in acute distress. Appearance: Normal appearance. She is not ill-appearing.    Pulmonary:      Effort: Pulmonary effort is normal. No respiratory distress. Abdominal:      General: There is no distension. Tenderness: There is no abdominal tenderness. There is no right CVA tenderness or left CVA tenderness. Neurological:      Mental Status: She is alert and oriented to person, place, and time. Mental status is at baseline. Motor: Weakness present. Gait: Gait abnormal.   Psychiatric:         Mood and Affect: Mood normal.         Behavior: Behavior normal.         DATA:  Results for orders placed or performed in visit on 02/09/22   POCT Urinalysis no Micro   Result Value Ref Range    Color, UA yellow     Clarity, UA cloudy     Glucose, UA POC neg     Bilirubin, UA neg     Ketones, UA neg     Spec Grav, UA 1.030     Blood, UA POC small     pH, UA 5.5     Protein, UA      Urobilinogen, UA 0.2     Leukocytes, UA small     Nitrite, UA neg     Appearance, Fluid Cloudy (A) Clear, Slightly Cloudy       ASSESSMENT/PLAN  1. Bladder spasms  Patient presents for follow-up of bladder spasms after initiation of Myrbetriq. She reports she is not noticing a significant difference in her symptoms. We will switch her to a shorter acting Levsin to see if this may alleviate her symptoms better. We will continue to monitor. She reports she does not necessarily feel as if she empties her bladder the whole way. We may need to perform pelvic examination at her next visit. We will perform a post void residual to determine if she is emptying her entire bladder when catheterizing. If she is not, we will also need to evaluate her method of catheterization to see if this is effective. - POCT Urinalysis no Micro  - hyoscyamine (LEVSIN/SL) 125 MCG sublingual tablet; Place 1 tablet under the tongue every 4 hours as needed for Cramping  Dispense: 90 tablet; Refill: 5    2. Recurrent UTI  Patient does have recurrent urinary tract infections, however, she is not experiencing symptoms today.     3. Atonic bladder  Patient currently utilizing in and out catheterization. Orders Placed This Encounter   Procedures    POCT Urinalysis no Micro        Return for keep fu as scheduled. An electronic signature was used to authenticate this note. ENZO KAT - CNP    All information inputted into the note by the MA to include chief complaint, past medical history, past surgical history, medications, allergies, social and family history and review of systems has been reviewed and updated as needed by me. EMR Dragon/transcription disclaimer: Much of this document is electronic transcription/translation of spoken language to printed text. The electronic translation of spoken language may be erroneous or, at times, nonsensical words or phrases may be inadvertently transcribed.  Although I have reviewed the document for such errors, some may still exist.

## 2022-02-11 ASSESSMENT — ENCOUNTER SYMPTOMS
VOMITING: 0
NAUSEA: 0
ABDOMINAL PAIN: 0
BACK PAIN: 0
ABDOMINAL DISTENTION: 0

## 2022-02-13 ENCOUNTER — PATIENT MESSAGE (OUTPATIENT)
Dept: UROLOGY | Age: 77
End: 2022-02-13

## 2022-02-15 DIAGNOSIS — N32.89 BLADDER SPASMS: ICD-10-CM

## 2022-02-16 ENCOUNTER — TELEPHONE (OUTPATIENT)
Dept: NEUROLOGY | Age: 77
End: 2022-02-16

## 2022-02-16 ENCOUNTER — HOSPITAL ENCOUNTER (OUTPATIENT)
Dept: INFUSION THERAPY | Age: 77
Setting detail: INFUSION SERIES
Discharge: HOME | End: 2022-02-16
Payer: MEDICARE

## 2022-02-16 ENCOUNTER — OFFICE VISIT (OUTPATIENT)
Dept: VASCULAR SURGERY | Age: 77
End: 2022-02-16
Payer: MEDICARE

## 2022-02-16 VITALS
RESPIRATION RATE: 17 BRPM | BODY MASS INDEX: 22.71 KG/M2 | OXYGEN SATURATION: 96 % | HEIGHT: 64 IN | HEART RATE: 53 BPM | DIASTOLIC BLOOD PRESSURE: 58 MMHG | SYSTOLIC BLOOD PRESSURE: 134 MMHG | WEIGHT: 133 LBS | TEMPERATURE: 97.4 F

## 2022-02-16 VITALS
OXYGEN SATURATION: 99 % | HEART RATE: 65 BPM | BODY MASS INDEX: 22.83 KG/M2 | HEIGHT: 64 IN | TEMPERATURE: 97.4 F | SYSTOLIC BLOOD PRESSURE: 140 MMHG | DIASTOLIC BLOOD PRESSURE: 64 MMHG

## 2022-02-16 DIAGNOSIS — G61.81 CIDP (CHRONIC INFLAMMATORY DEMYELINATING POLYNEUROPATHY) (HCC): Primary | ICD-10-CM

## 2022-02-16 DIAGNOSIS — I70.245 ATHSCL NATIVE ARTERIES OF LEFT LEG W ULCERATION OTH PRT FOOT (HCC): Primary | ICD-10-CM

## 2022-02-16 LAB
ALBUMIN SERPL-MCNC: 4.1 G/DL (ref 3.5–5.2)
ALP BLD-CCNC: 38 U/L (ref 35–104)
ALT SERPL-CCNC: 10 U/L (ref 5–33)
ANION GAP SERPL CALCULATED.3IONS-SCNC: 9 MMOL/L (ref 7–19)
AST SERPL-CCNC: 14 U/L (ref 5–32)
BASOPHILS ABSOLUTE: 0 K/UL (ref 0–0.2)
BASOPHILS RELATIVE PERCENT: 0.6 % (ref 0–1)
BILIRUB SERPL-MCNC: 0.3 MG/DL (ref 0.2–1.2)
BUN BLDV-MCNC: 38 MG/DL (ref 8–23)
CALCIUM SERPL-MCNC: 9.5 MG/DL (ref 8.8–10.2)
CHLORIDE BLD-SCNC: 104 MMOL/L (ref 98–111)
CO2: 31 MMOL/L (ref 22–29)
CREAT SERPL-MCNC: 0.9 MG/DL (ref 0.5–0.9)
EOSINOPHILS ABSOLUTE: 0.2 K/UL (ref 0–0.6)
EOSINOPHILS RELATIVE PERCENT: 3.1 % (ref 0–5)
GFR AFRICAN AMERICAN: >59
GFR NON-AFRICAN AMERICAN: >60
GLUCOSE BLD-MCNC: 104 MG/DL (ref 74–109)
HCT VFR BLD CALC: 37.5 % (ref 37–47)
HEMOGLOBIN: 11.3 G/DL (ref 12–16)
IMMATURE GRANULOCYTES #: 0 K/UL
LYMPHOCYTES ABSOLUTE: 1.3 K/UL (ref 1.1–4.5)
LYMPHOCYTES RELATIVE PERCENT: 27.4 % (ref 20–40)
MCH RBC QN AUTO: 26.5 PG (ref 27–31)
MCHC RBC AUTO-ENTMCNC: 30.1 G/DL (ref 33–37)
MCV RBC AUTO: 88 FL (ref 81–99)
MONOCYTES ABSOLUTE: 0.8 K/UL (ref 0–0.9)
MONOCYTES RELATIVE PERCENT: 17.4 % (ref 0–10)
NEUTROPHILS ABSOLUTE: 2.5 K/UL (ref 1.5–7.5)
NEUTROPHILS RELATIVE PERCENT: 51.5 % (ref 50–65)
PDW BLD-RTO: 14.7 % (ref 11.5–14.5)
PLATELET # BLD: 212 K/UL (ref 130–400)
PMV BLD AUTO: 10 FL (ref 9.4–12.3)
POTASSIUM SERPL-SCNC: 4.1 MMOL/L (ref 3.5–5)
RBC # BLD: 4.26 M/UL (ref 4.2–5.4)
SODIUM BLD-SCNC: 144 MMOL/L (ref 136–145)
TOTAL PROTEIN: 6.5 G/DL (ref 6.6–8.7)
WBC # BLD: 4.8 K/UL (ref 4.8–10.8)

## 2022-02-16 PROCEDURE — 80053 COMPREHEN METABOLIC PANEL: CPT

## 2022-02-16 PROCEDURE — 96366 THER/PROPH/DIAG IV INF ADDON: CPT

## 2022-02-16 PROCEDURE — 6360000002 HC RX W HCPCS: Performed by: PSYCHIATRY & NEUROLOGY

## 2022-02-16 PROCEDURE — 96365 THER/PROPH/DIAG IV INF INIT: CPT

## 2022-02-16 PROCEDURE — 99214 OFFICE O/P EST MOD 30 MIN: CPT | Performed by: PHYSICIAN ASSISTANT

## 2022-02-16 PROCEDURE — 85025 COMPLETE CBC W/AUTO DIFF WBC: CPT

## 2022-02-16 RX ORDER — EPINEPHRINE 1 MG/ML
0.3 INJECTION, SOLUTION, CONCENTRATE INTRAVENOUS PRN
Status: CANCELLED | OUTPATIENT
Start: 2022-02-16

## 2022-02-16 RX ORDER — HEPARIN SODIUM (PORCINE) LOCK FLUSH IV SOLN 100 UNIT/ML 100 UNIT/ML
500 SOLUTION INTRAVENOUS PRN
Status: CANCELLED | OUTPATIENT
Start: 2022-02-16

## 2022-02-16 RX ORDER — ACETAMINOPHEN 325 MG/1
650 TABLET ORAL ONCE
Status: CANCELLED | OUTPATIENT
Start: 2022-02-16 | End: 2022-02-16

## 2022-02-16 RX ORDER — DIPHENHYDRAMINE HYDROCHLORIDE 50 MG/ML
50 INJECTION INTRAMUSCULAR; INTRAVENOUS ONCE
Status: CANCELLED | OUTPATIENT
Start: 2022-02-16 | End: 2022-02-16

## 2022-02-16 RX ORDER — DIPHENHYDRAMINE HYDROCHLORIDE 50 MG/ML
50 INJECTION INTRAMUSCULAR; INTRAVENOUS ONCE
Status: DISCONTINUED | OUTPATIENT
Start: 2022-02-16 | End: 2022-02-18 | Stop reason: HOSPADM

## 2022-02-16 RX ORDER — SODIUM CHLORIDE 9 MG/ML
INJECTION, SOLUTION INTRAVENOUS CONTINUOUS
Status: CANCELLED | OUTPATIENT
Start: 2022-02-16

## 2022-02-16 RX ORDER — DIPHENHYDRAMINE HYDROCHLORIDE 50 MG/ML
50 INJECTION INTRAMUSCULAR; INTRAVENOUS ONCE
Status: CANCELLED
Start: 2022-02-16 | End: 2022-02-16

## 2022-02-16 RX ORDER — SODIUM CHLORIDE 9 MG/ML
INJECTION, SOLUTION INTRAVENOUS CONTINUOUS
Status: DISCONTINUED | OUTPATIENT
Start: 2022-02-16 | End: 2022-02-16 | Stop reason: CLARIF

## 2022-02-16 RX ORDER — METHYLPREDNISOLONE SODIUM SUCCINATE 125 MG/2ML
125 INJECTION, POWDER, LYOPHILIZED, FOR SOLUTION INTRAMUSCULAR; INTRAVENOUS ONCE
Status: CANCELLED | OUTPATIENT
Start: 2022-02-16 | End: 2022-02-16

## 2022-02-16 RX ORDER — SODIUM CHLORIDE 9 MG/ML
25 INJECTION, SOLUTION INTRAVENOUS PRN
Status: CANCELLED | OUTPATIENT
Start: 2022-02-16

## 2022-02-16 RX ORDER — SODIUM CHLORIDE 0.9 % (FLUSH) 0.9 %
5-40 SYRINGE (ML) INJECTION PRN
Status: CANCELLED | OUTPATIENT
Start: 2022-02-16

## 2022-02-16 RX ORDER — ACETAMINOPHEN 325 MG/1
650 TABLET ORAL ONCE
Status: DISCONTINUED | OUTPATIENT
Start: 2022-02-16 | End: 2022-02-18 | Stop reason: HOSPADM

## 2022-02-16 RX ADMIN — IMMUNE GLOBULIN (HUMAN) 10 G: 10 INJECTION INTRAVENOUS; SUBCUTANEOUS at 09:27

## 2022-02-16 NOTE — PROGRESS NOTES
Patient Care Team:  Yordy Pirece DO as PCP - General (Family Medicine)  Leah Shafer MD as Neurologist (Neurology)  Stacey Naranjo DO as Consulting Physician (Vascular Surgery)    Mrs. Ward High is a 67-year-old female who has a past medical history that includes endometrial cancer, Crohn's disease, GERD, hypertension, PVD. Today we are following up post angiogram.  This was performed on 1/4/2022 by Dr. Stefany Pal for PVD with left foot wound. At the time of the angiogram Dr. Stefany Pal did not see any issues with her arteries that required endovascular intervention at that time. She denies any right groin pain she reports that she is still seeing Dr. Branden Escobar and she believes that the callus on the bottom of her left foot is improving. She is on aspirin 81 mg and TriCor 145 mg daily. Does not smoke      Brea Alvarado is a 67 y.o. 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  90 tablet 0    tiZANidine (ZANAFLEX) 4 MG tablet TAKE 1 TABLET BY MOUTH 2 TIMES DAILY AS NEEDED (PAIN) 180 tablet 1    allopurinol (ZYLOPRIM) 300 MG tablet Take 300 mg by mouth daily      Propylene Glycol (SYSTANE COMPLETE) 0.6 % SOLN Apply 1 drop to eye 3 times daily Each eye      estrogens, conjugated, (PREMARIN) 0.3 MG tablet Take 0.3 mg by mouth Twice a Week Takes sun, and thur      amLODIPine (NORVASC) 5 MG tablet Take 5 mg by mouth daily      conjugated estrogens (PREMARIN) 0.625 MG/GM vaginal cream Place vaginally twice a week 1 Tube 3    colestipol (COLESTID) 1 g tablet Take 1 g by mouth 3 times daily       Cyanocobalamin (VITAMIN B 12) 100 MCG LOZG Take by mouth daily       lidocaine (XYLOCAINE) 5 % ointment Apply topically as needed for Pain Apply topically as needed. 30 g 3    fenofibrate (TRICOR) 145 MG tablet 145 mg daily       Omega-3 Fatty Acids (FISH OIL) 1000 MG CAPS Take 1,000 mg by mouth 2 times daily      Garlic 0051 MG CAPS Take 1,000 mg by mouth 2 times daily      CRANBERRY SOFT PO Take 36 mg by mouth daily      carvedilol (COREG) 6.25 MG tablet Take 6.25 mg by mouth 2 times daily      aspirin 81 MG tablet Take 81 mg by mouth daily      folic acid (FOLVITE) 1 MG tablet Take 600 mg by mouth daily       Multiple Vitamins-Minerals (THERAPEUTIC MULTIVITAMIN-MINERALS) tablet Take 1 tablet by mouth daily      calcium-vitamin D (OSCAL) 250-125 MG-UNIT per tablet Take 1 tablet by mouth daily      mesalamine (DELZICOL) 400 MG CPDR DR capsule Take 400 mg by mouth 3 times daily       indapamide (LOZOL) 2.5 MG tablet Take 2.5 mg by mouth every morning      omeprazole (PRILOSEC) 20 MG capsule Take 20 mg by mouth Daily       gabapentin (NEURONTIN) 600 MG tablet TAKE 1 TABLET THREE TIMES A DAY (Patient taking differently: 600 mg 3 times daily. TAKE 1 TABLET THREE TIMES A DAY) 270 tablet 1     No current facility-administered medications for this visit.      Facility-Administered Medications Ordered in Other Visits   Medication Dose Route Frequency Provider Last Rate Last Admin    acetaminophen (TYLENOL) tablet 650 mg  650 mg Oral Once Leah Shafer MD        diphenhydrAMINE flank pain or hematuria. Musculoskeletal - no back pain, gait disturbance, or myalgia. Skin - no color change, rash, pallor. Wound plantar aspect left foot. She is seeing Dr. Nidia Titus for this  Neurologic - no dizziness, facial asymmetry, or light headedness. No seizures. No speech difficulty or lateralizing weakness. Hematologic - no easy bruising or excessive bleeding. Psychiatric - no severe anxiety or nervousness. No confusion. All other review of systems are negative. Physical Exam    BP (!) 140/64 (Site: Left Upper Arm)   Pulse 65   Temp 97.4 °F (36.3 °C)   Ht 5' 4\" (1.626 m)   SpO2 99%   BMI 22.83 kg/m²     Constitutional - well developed, well nourished. No diaphoresis or acute distress. HENT - head normocephalic. Right external ear canal appears normal.  Left external ear canal appears normal.  Septum appears midline. Eyes - conjunctiva normal.  EOMS normal.  No exudate. No icterus. Neck- ROM appears normal, no tracheal deviation. Cardiovascular - Regular rate and rhythm. Heart sounds are normal.  No murmur, rub, or gallop. Carotid pulses are 2+ to palpation bilaterally without bruit. Extremities - Radial and brachial pulses are 2+ to palpation bilaterally. Femoral pulses are palpable. DP and PT bilaterally with 2+ doppler signals present. No cyanosis, clubbing, or significant edema. No signs atheroembolic event. Right groin without significant ecchymosis or pulsatile mass to suggest pseudoaneurysm, AV fistula, or significant hematoma formation. Pulmonary - effort appears normal.  Breath sounds normal.  No respiratory distress. No wheezes or rales. Abdomen - soft, non tender, bowel sounds X 4 quadrants. No guarding or rebound tenderness. No distension or palpable mass. Genitourinary - deferred. Musculoskeletal - ROM appears normal.  No significant edema. Neurologic - alert and oriented X 3. Physiologic. Skin - warm, dry, and intact. No rash, erythema, or pallor. She has a callus on the lateral plantar aspect of the left foot there is no drainage or foul odor noted there is no surrounding erythema  Psychiatric - mood, affect, and behavior appear normal.  Judgment and thought processes appear normal.    Risk factors for atherosclerosis of all vascular beds have been reviewed with the patient including:  Family history, tobacco abuse in all forms, elevated cholesterol, hyperlipidemia, and diabetes. Assessment      1.  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 Robbin Joyce at [facility]  We will follow-up in 12 months with a repeat lower extremity arterial study or sooner if her wound progresses          Crissy - Mercedes Subramanian DO

## 2022-02-24 DIAGNOSIS — M05.79 RHEUMATOID ARTHRITIS INVOLVING MULTIPLE SITES WITH POSITIVE RHEUMATOID FACTOR (HCC): ICD-10-CM

## 2022-02-24 DIAGNOSIS — M48.062 SPINAL STENOSIS OF LUMBAR REGION WITH NEUROGENIC CLAUDICATION: ICD-10-CM

## 2022-02-24 DIAGNOSIS — G61.81 CIDP (CHRONIC INFLAMMATORY DEMYELINATING POLYNEUROPATHY) (HCC): ICD-10-CM

## 2022-02-24 NOTE — TELEPHONE ENCOUNTER
Miryamkumar Joyce has requested a refill on her medication. Last office visit : 9/16/2021   Next office visit : 3/3/2022   Last medication refill :1/26/2022  Tricia Ibarra : 12/22/2021      Requested Prescriptions     Pending Prescriptions Disp Refills    HYDROcodone-acetaminophen (NORCO)  MG per tablet 90 tablet 0     Sig: Take 1 tablet by mouth every 8 hours as needed for Pain for up to 30 days.

## 2022-02-25 RX ORDER — HYDROCODONE BITARTRATE AND ACETAMINOPHEN 10; 325 MG/1; MG/1
1 TABLET ORAL EVERY 8 HOURS PRN
Qty: 90 TABLET | Refills: 0 | Status: SHIPPED | OUTPATIENT
Start: 2022-02-25 | End: 2022-03-24 | Stop reason: SDUPTHER

## 2022-03-03 ENCOUNTER — OFFICE VISIT (OUTPATIENT)
Dept: NEUROLOGY | Age: 77
End: 2022-03-03
Payer: MEDICARE

## 2022-03-03 ENCOUNTER — OFFICE VISIT (OUTPATIENT)
Dept: UROLOGY | Age: 77
End: 2022-03-03
Payer: MEDICARE

## 2022-03-03 VITALS
RESPIRATION RATE: 16 BRPM | HEIGHT: 64 IN | DIASTOLIC BLOOD PRESSURE: 66 MMHG | HEART RATE: 61 BPM | WEIGHT: 137 LBS | SYSTOLIC BLOOD PRESSURE: 139 MMHG | BODY MASS INDEX: 23.39 KG/M2

## 2022-03-03 VITALS
BODY MASS INDEX: 23.46 KG/M2 | WEIGHT: 137.4 LBS | TEMPERATURE: 96.8 F | DIASTOLIC BLOOD PRESSURE: 80 MMHG | SYSTOLIC BLOOD PRESSURE: 136 MMHG | HEIGHT: 64 IN

## 2022-03-03 DIAGNOSIS — L72.3 SEBACEOUS CYST: ICD-10-CM

## 2022-03-03 DIAGNOSIS — M05.79 RHEUMATOID ARTHRITIS INVOLVING MULTIPLE SITES WITH POSITIVE RHEUMATOID FACTOR (HCC): ICD-10-CM

## 2022-03-03 DIAGNOSIS — N31.2 ATONIC BLADDER: Primary | ICD-10-CM

## 2022-03-03 DIAGNOSIS — G61.81 CIDP (CHRONIC INFLAMMATORY DEMYELINATING POLYNEUROPATHY) (HCC): Primary | ICD-10-CM

## 2022-03-03 DIAGNOSIS — M48.062 SPINAL STENOSIS OF LUMBAR REGION WITH NEUROGENIC CLAUDICATION: ICD-10-CM

## 2022-03-03 DIAGNOSIS — G25.81 RESTLESS LEGS SYNDROME (RLS): ICD-10-CM

## 2022-03-03 DIAGNOSIS — N32.89 BLADDER SPASMS: ICD-10-CM

## 2022-03-03 PROCEDURE — 99417 PROLNG OP E/M EACH 15 MIN: CPT | Performed by: NURSE PRACTITIONER

## 2022-03-03 PROCEDURE — 99215 OFFICE O/P EST HI 40 MIN: CPT | Performed by: NURSE PRACTITIONER

## 2022-03-03 PROCEDURE — 51798 US URINE CAPACITY MEASURE: CPT | Performed by: NURSE PRACTITIONER

## 2022-03-03 PROCEDURE — 99214 OFFICE O/P EST MOD 30 MIN: CPT | Performed by: PSYCHIATRY & NEUROLOGY

## 2022-03-03 RX ORDER — DIAZEPAM 5 MG/1
TABLET ORAL
COMMUNITY
Start: 2022-02-15 | End: 2022-03-06

## 2022-03-03 RX ORDER — MELOXICAM 15 MG/1
15 TABLET ORAL DAILY
COMMUNITY
Start: 2022-02-09

## 2022-03-03 NOTE — PROGRESS NOTES
Devin Sharpe is a 67 y.o., female, Established patient who presents today   Chief Complaint   Patient presents with    Follow-up     I am here today for my 3 month follow up on incomplete empyting. HPI   Patient presents for follow-up of bladder spasms after initiation of Levsin. She reports she has been utilizing the medication about once a day without any significant difference. She has only been taking the medication for about 2 weeks now. She does utilize and out catheterization 3-4 times per day, but reports very little urine output with catheterization. She reports in the morning, she is able to cath without significant difficulties, however, throughout the day, again she does not have much urinary output. She reports leakage continuously throughout the day causing her to utilize depends and pads. She has cut down some of her fluids, but reports no significant decrease in her leakage. She has been evaluated by Dr. Angel Kruse in the past and was actually referred here so that she can have her care locally. She does not recall if she has ever undergone urodynamic studies. Previous urine cultures:  1/12/22 revealed Proteus mirabilis resistant to macrobid  11/29/2021 revealed E. coli resistant to gentamicin, levofloxacin  9/28/2021 revealed E. coli resistant to gentamicin, levofloxacin, tobramycin, Bactrim  7/22/2021 revealed E. coli resistant to gentamicin, Levaquin, tobramycin  7/9/2021 revealed E. coli resistant to gentamicin, Levaquin, tobramycin  3/31/2021 - urine culture negative      REVIEW OF SYSTEMS:  Review of Systems   Constitutional: Negative for chills and fever. Gastrointestinal: Negative for abdominal distention, abdominal pain, nausea and vomiting. Genitourinary: Positive for difficulty urinating. Negative for dysuria, flank pain, frequency, hematuria and urgency. Musculoskeletal: Positive for back pain and gait problem. Neurological: Positive for weakness. Psychiatric/Behavioral: Negative for agitation and confusion. PHYSICAL EXAM:  /80   Temp 96.8 °F (36 °C) (Temporal)   Ht 5' 4\" (1.626 m)   Wt 137 lb 6.4 oz (62.3 kg)   BMI 23.58 kg/m²   Physical Exam  Vitals and nursing note reviewed. Constitutional:       General: She is not in acute distress. Appearance: Normal appearance. She is not ill-appearing. Pulmonary:      Effort: Pulmonary effort is normal. No respiratory distress. Abdominal:      General: There is no distension. Tenderness: There is no abdominal tenderness. There is no right CVA tenderness or left CVA tenderness. Genitourinary:     Rectum: External hemorrhoid present. No tenderness. Comments: Grade 3 cystocele noted. Stress urinary incontinence was demonstrated during today's exam.  Atrophic vaginitis  Neurological:      Mental Status: She is alert and oriented to person, place, and time. Mental status is at baseline. Psychiatric:         Mood and Affect: Mood normal.         Behavior: Behavior normal.       ASSESSMENT/PLAN  1. Atonic bladder  Currently managed with in and out catheterization, however, she is having significant leakage without much output during catheterizations. She is uncertain if she has ever undergone urodynamics in the past.  I would like to review her records from Dr. Mouna Chavez, but possibly get her set up for urodynamics. She also has a large cystocele which may be causing some incontinence as well. She may need referral to OB/GYN for possible pessary. We also discussed the possibility of placing an indwelling Santizo catheter to see if this may stop some of her lesion. I did explain to her that even with a Santizo catheter, she still may experience bladder spasms and leakage. She voiced understanding.  - UT Measure, post-void residual, US, non-imaging    2. Bladder spasms  Bladder spasms not well controlled. Has not been taking Levsin for long.   We will continue with his therapy for now.  - hyoscyamine (LEVSIN/SL) 125 MCG sublingual tablet; Place 1 tablet under the tongue 3 times daily as needed for Cramping  Dispense: 90 tablet; Refill: 5    3. Sebaceous cyst  Sebaceous cyst near her anus noted during pelvic exam.  Will refer to general surgery for possible removal.  [facility]    At least 60 minutes were spent on the day of the visit reviewing the patient's past medical records/imaging, speaking face to face with the patient, and charting in the post visit period. Orders Placed This Encounter   Procedures   [facility]     Referral Priority:   Routine     Referral Type:   Eval and Treat     Referral Reason:   Specialty Services Required     Requested Specialty:   General Surgery     Number of Visits Requested:   1    TN Measure, post-void residual, US, non-imaging        Return for UDS: NEED RADHA NOTES ON HER PRIOR! TODAY IF POSSIBLE! Edd Ervin An electronic signature was used to authenticate this note. TRINY MO, APRN - CNP    All information inputted into the note by the MA to include chief complaint, past medical history, past surgical history, medications, allergies, social and family history and review of systems has been reviewed and updated as needed by me. EMR Dragon/transcription disclaimer: Much of this document is electronic transcription/translation of spoken language to printed text. The electronic translation of spoken language may be erroneous or, at times, nonsensical words or phrases may be inadvertently transcribed.  Although I have reviewed the document for such errors, some may still exist.

## 2022-03-03 NOTE — PROGRESS NOTES
[facility]  [address] [place] [address] Drive, [address] [place] [address],[address]  Manasa Burroughs  Phone [phone]  Fax [phone]     [facility] Follow Up Encounter  3/3/22 10:56 AM CST    Information:   Patient Name: Alli Casas  :     Age:   67 y.o. MRN:   [id number]  Account #:  [de-identified]  Today:  3/3/22    Provider: Sim Mitchell M.D. Chief Complaint:   Chief Complaint   Patient presents with    6 Month Follow-Up       Subjective:   Alli Casas is a 67 y.o. woman with a history of RA, CIDP, lumbar spinal stenosis, and RLS who is following up. She had a right heal ulcer that would not heal.  She was found to have some vascular compromise and underwent an angioplasty. She then recently had heal surgery with Dr. Annia Mcrae and her wound has already healed. She has chronic weakness and numbness worse in her legs and worse in the left thigh. She has back problems with a left L4 radiculopathy as well. She is on Prednisone and getting IVIG. Without the IVIG, she worsened. Objective:     Past Medical History:  Past Medical History:   Diagnosis Date    Arthritis     Cancer ([facility] [facility]     endometrial cancer    Cataract     CIDP (chronic inflammatory demyelinating polyneuropathy) (HCC)     Crohn's disease ([facility] [facility]     GERD (gastroesophageal reflux disease)     History of blood transfusion     Hypertension     Macular degeneration     PVD (peripheral vascular disease) ([facility] [address] [facility]     Radiation        Past Surgical History:   Procedure Laterality Date    BACK SURGERY      CARPAL TUNNEL RELEASE Bilateral     CATARACT REMOVAL Bilateral     CHOLECYSTECTOMY      FOOT SURGERY Left     HAMMER TOE SURGERY Left     HX VASCULAR ANGIOPLASTY      & STENT    HYSTERECTOMY      KNEE ARTHROSCOPY Right     LUMBAR FUSION      VASCULAR SURGERY  2022    VI. Bilateral lower extremity runoff.        Recent Hospitalizations  · None    Significant Injuries  · None    Habits  Alli Casas reports that she has never smoked. She has never used smokeless tobacco. She reports that she does not drink alcohol and does not use drugs. Family History   Problem Relation Age of Onset    Cancer Mother     High Blood Pressure Father     Heart Disease Father        Social History  Iris Baker is , lives in [place], [place], and is retired. Medications:  Current Outpatient Medications   Medication Sig Dispense Refill    meloxicam (MOBIC) 15 MG tablet TAKE 1 TABLET BY MOUTH EVERY DAY      hyoscyamine (LEVSIN/SL) 125 MCG sublingual tablet Place 1 tablet under the tongue 3 times daily as needed for Cramping 90 tablet 5    HYDROcodone-acetaminophen (NORCO)  MG per tablet Take 1 tablet by mouth every 8 hours as needed for Pain for up to 30 days. 90 tablet 0    tiZANidine (ZANAFLEX) 4 MG tablet TAKE 1 TABLET BY MOUTH 2 TIMES DAILY AS NEEDED (PAIN) 180 tablet 1    allopurinol (ZYLOPRIM) 300 MG tablet Take 300 mg by mouth daily      Propylene Glycol (SYSTANE COMPLETE) 0.6 % SOLN Apply 1 drop to eye 3 times daily Each eye      estrogens, conjugated, (PREMARIN) 0.3 MG tablet Take 0.3 mg by mouth Twice a Week Takes sun, and thur      gabapentin (NEURONTIN) 600 MG tablet TAKE 1 TABLET THREE TIMES A DAY (Patient taking differently: 600 mg 3 times daily. TAKE 1 TABLET THREE TIMES A DAY) 270 tablet 1    amLODIPine (NORVASC) 5 MG tablet Take 5 mg by mouth daily      conjugated estrogens (PREMARIN) 0.625 MG/GM vaginal cream Place vaginally twice a week 1 Tube 3    colestipol (COLESTID) 1 g tablet Take 1 g by mouth 3 times daily       Cyanocobalamin (VITAMIN B 12) 100 MCG LOZG Take by mouth daily       lidocaine (XYLOCAINE) 5 % ointment Apply topically as needed for Pain Apply topically as needed.  30 g 3    fenofibrate (TRICOR) 145 MG tablet 145 mg daily       Omega-3 Fatty Acids (FISH OIL) 1000 MG CAPS Take 1,000 mg by mouth 2 times daily      Garlic 5785 MG CAPS Take 1,000 mg by mouth 2 times daily      CRANBERRY SOFT PO Take 36 mg by mouth daily      carvedilol (COREG) 6.25 MG tablet Take 6.25 mg by mouth 2 times daily      aspirin 81 MG tablet Take 81 mg by mouth daily      folic acid (FOLVITE) 1 MG tablet Take 600 mg by mouth daily       Multiple Vitamins-Minerals (THERAPEUTIC MULTIVITAMIN-MINERALS) tablet Take 1 tablet by mouth daily      calcium-vitamin D (OSCAL) 250-125 MG-UNIT per tablet Take 1 tablet by mouth daily      mesalamine (DELZICOL) 400 MG CPDR DR capsule Take 400 mg by mouth 3 times daily       indapamide (LOZOL) 2.5 MG tablet Take 2.5 mg by mouth every morning      omeprazole (PRILOSEC) 20 MG capsule Take 20 mg by mouth Daily       diazePAM (VALIUM) 5 MG tablet TAKE 1 TABLET BY MOUTH 1 HOUR PRIOR TO MRI AND TAKE 1 TABLET RIGHT BEFORE MRI IF NEEDED (Patient not taking: Reported on 3/3/2022)       No current facility-administered medications for this visit. Allergies:   Allergies as of 03/03/2022    (No Known Allergies)       Review of Systems:  Constitutional: negative for - chills and fever  Eyes:  negative for - visual disturbance and photophobia  HENMT: negative for - headaches and sinus pain  Respiratory: negative for - cough, hemoptysis, and shortness of breath  Cardiovascular: negative for - chest pain and palpitations  Gastrointestinal: negative for - blood in stools, constipation, diarrhea, nausea, and vomiting  Genito-Urinary: negative for - hematuria, urinary frequency, urinary urgency, and urinary retention  Musculoskeletal: positive for - joint pain, joint stiffness, and joint swelling  Hematological and Lymphatic: negative for - bleeding problems, abnormal bruising, and swollen lymph nodes  Endocrine:  negative for - polydipsia and polyphagia  Allergy/Immunology:  negative for - rhinorrhea, sinus congestion, hives  Integument:  negative for - negative for - rash, change in moles, new or changing lesions  Psychological: negative for - anxiety and depression  Neurological: negative for - memory loss  Positive for - numbness/tingling, and weakness     PHYSICAL EXAMINATION:  Vitals:  /66   Pulse 61   Resp 16   Ht 5' 4\" (1.626 m)   Wt 137 lb (62.1 kg)   BMI 23.52 kg/m²   General appearance:  Alert, well developed, well nourished, in no distress  HEENT:  normocephalic, atraumatic, sclera appear normal, no nasal abnormalities, no rhinorrhea, Ears appear normal, oral mucous membranes are moist without erythema, trachea midline, thyroid is normal, no lymphadenopathy or neck mass. Cardiovascular:  Regular rate and rhythm without murmer. No peripheral edema, No cyanosis or clubbing. No carotid bruits. Pulmonary:  Lungs are clear to auscultation. Breathing appears normal, good expansion, normal effort without use of accessory muscles  Musculoskeletal:  Joints are arthritic. Integument:  No rash, erythema, or pallor  Psychiatric:  Mood, affect, and behavior appear normal      NEUROLOGIC EXAMINATION:  Mental Status:  alert, oriented to person, place, and time.   Speech:  Clear without dysarthria or dysphonia  Language:  Fluent without aphasia  Cranial Nerves:              FM          ACFAVG fields are full to confrontation              III,IV, VI           Extraocular movements are full              VII        Facial movements are symmetrical without weakness              VIII       Hearing is intact              IX,X     Shoulder shrug and head rotation strength are intact              XII        No tongue atrophy or fasciculations.  Normal tongue protrusion.  No tongue weakness  Motor:  She has muscle atrophy in her hands, her feet, and her lower legs.  Muscle tone is reduced.  Strength testing shows upper extremities: D -1/-1, Tri -2/-2, Bi -1/-1, WE -1/-1, WF -1/-1, FE -2/-2, FF -1/-1, IO -3/-3.  Lower extremities:  IP -2/-3, HE -1/-1, Q -2/-3, AT -3/-4.  Deep tendon reflexes are absent.  Tello's signs are absent bilaterally.  There is no ankle clonus on either side. Sensation:  Sensation is reduced to light touch, temperature, and vibration in all distal extremities. Coordination:  Rapid alternating movements are normal in both upper extremities.  Finger to nose testing is unimpaired bilaterally. Gait:  Unsteady with walker. Pertinent Diagnostic Studies:  Previous encounters and imaging. Assessment:       ICD-10-CM    1. CIDP (chronic inflammatory demyelinating polyneuropathy) ([facility])  G61.81    2. Rheumatoid arthritis involving multiple sites with positive rheumatoid factor ([facility])  M05.79    3. Spinal stenosis of lumbar region with neurogenic claudication  M48.062    4. Restless legs syndrome (RLS)  G25.81    Neurologically she is gradually worsening, mostly related to age and arthritis. Plan:   1. Get MRI L spine report from [address] [place] [address]  2.  Continue present medications and care  3. FU in 6 months    Electronically signed by Lon Gomes MD on 3/3/22

## 2022-03-04 ASSESSMENT — ENCOUNTER SYMPTOMS
ABDOMINAL DISTENTION: 0
BACK PAIN: 1
VOMITING: 0
ABDOMINAL PAIN: 0
NAUSEA: 0

## 2022-03-08 ENCOUNTER — PROCEDURE VISIT (OUTPATIENT)
Dept: UROLOGY | Age: 77
End: 2022-03-08
Payer: MEDICARE

## 2022-03-08 DIAGNOSIS — R32 URINARY INCONTINENCE, UNSPECIFIED TYPE: ICD-10-CM

## 2022-03-08 DIAGNOSIS — N31.2 ATONIC BLADDER: Primary | ICD-10-CM

## 2022-03-08 DIAGNOSIS — N32.89 BLADDER SPASMS: ICD-10-CM

## 2022-03-08 PROCEDURE — 51797 INTRAABDOMINAL PRESSURE TEST: CPT | Performed by: UROLOGY

## 2022-03-08 PROCEDURE — 51784 ANAL/URINARY MUSCLE STUDY: CPT | Performed by: UROLOGY

## 2022-03-08 PROCEDURE — 51728 CYSTOMETROGRAM W/VP: CPT | Performed by: UROLOGY

## 2022-03-09 ENCOUNTER — OFFICE VISIT (OUTPATIENT)
Dept: SURGERY | Age: 77
End: 2022-03-09
Payer: MEDICARE

## 2022-03-09 VITALS
HEIGHT: 64 IN | BODY MASS INDEX: 23.18 KG/M2 | TEMPERATURE: 98 F | SYSTOLIC BLOOD PRESSURE: 138 MMHG | WEIGHT: 135.8 LBS | DIASTOLIC BLOOD PRESSURE: 70 MMHG

## 2022-03-09 DIAGNOSIS — N90.89 PERINEAL CYST IN FEMALE: Primary | ICD-10-CM

## 2022-03-09 PROCEDURE — 99203 OFFICE O/P NEW LOW 30 MIN: CPT | Performed by: SURGERY

## 2022-03-09 RX ORDER — CELECOXIB 100 MG/1
100 CAPSULE ORAL ONCE
Status: CANCELLED | OUTPATIENT
Start: 2022-03-09 | End: 2022-03-09

## 2022-03-09 RX ORDER — ACETAMINOPHEN 325 MG/1
1000 TABLET ORAL ONCE
Status: CANCELLED | OUTPATIENT
Start: 2022-03-09 | End: 2022-03-09

## 2022-03-09 RX ORDER — SODIUM CHLORIDE 0.9 % (FLUSH) 0.9 %
10 SYRINGE (ML) INJECTION EVERY 12 HOURS SCHEDULED
Status: CANCELLED | OUTPATIENT
Start: 2022-03-09

## 2022-03-09 RX ORDER — SODIUM CHLORIDE, SODIUM LACTATE, POTASSIUM CHLORIDE, CALCIUM CHLORIDE 600; 310; 30; 20 MG/100ML; MG/100ML; MG/100ML; MG/100ML
INJECTION, SOLUTION INTRAVENOUS CONTINUOUS
Status: CANCELLED | OUTPATIENT
Start: 2022-03-09

## 2022-03-09 RX ORDER — SODIUM CHLORIDE 9 MG/ML
25 INJECTION, SOLUTION INTRAVENOUS PRN
Status: CANCELLED | OUTPATIENT
Start: 2022-03-09

## 2022-03-09 RX ORDER — SODIUM CHLORIDE 0.9 % (FLUSH) 0.9 %
10 SYRINGE (ML) INJECTION PRN
Status: CANCELLED | OUTPATIENT
Start: 2022-03-09

## 2022-03-09 ASSESSMENT — ENCOUNTER SYMPTOMS
CHEST TIGHTNESS: 0
COUGH: 0
DIARRHEA: 0
NAUSEA: 0
SHORTNESS OF BREATH: 0
EYE PAIN: 0
VOMITING: 0
CONSTIPATION: 0
EYE REDNESS: 0
BACK PAIN: 1
COLOR CHANGE: 0
ABDOMINAL DISTENTION: 0
SORE THROAT: 0
ABDOMINAL PAIN: 0

## 2022-03-09 NOTE — H&P
[facility] History and Physical   SUBJECTIVE:  Ms. Lia Alston is a 67 y.o. female who presents today with a cyst near her anus. She states she has been suffering with incontinence, and while being cathed by her urology NP, this was found. She states she did not know it was there and she can not see it. She does not have pain. She is unsure of how long it has been there. Past Medical History:   Diagnosis Date    Arthritis     Cancer [facility])     endometrial cancer    Cataract     CIDP (chronic inflammatory demyelinating polyneuropathy) ([facility])     Cro[facility]'s disease ([facility] [address]     GERD (gastroesophageal reflux disease)     History of blood transfusion     Hypertension     Macular degeneration     PVD (peripheral vascular disease) ([facility] [address] [facility]     Radiation     Urinary incontinence      Past Surgical History:   Procedure Laterality Date    BACK SURGERY      lumbar    CARPAL TUNNEL RELEASE Bilateral     CATARACT REMOVAL Bilateral     CHOLECYSTECTOMY      FOOT SURGERY Left     HAMMER TOE SURGERY Left     HX VASCULAR ANGIOPLASTY      & STENT    HYSTERECTOMY      KNEE ARTHROSCOPY Right     LUMBAR FUSION      VASCULAR SURGERY  01/04/2022    VI. Bilateral lower extremity runoff. Current Outpatient Medications   Medication Sig Dispense Refill    meloxicam (MOBIC) 15 MG tablet TAKE 1 TABLET BY MOUTH EVERY DAY      hyoscyamine (LEVSIN/SL) 125 MCG sublingual tablet Place 1 tablet under the tongue 3 times daily as needed for Cramping 90 tablet 5    HYDROcodone-acetaminophen (NORCO)  MG per tablet Take 1 tablet by mouth every 8 hours as needed for Pain for up to 30 days.  90 tablet 0    tiZANidine (ZANAFLEX) 4 MG tablet TAKE 1 TABLET BY MOUTH 2 TIMES DAILY AS NEEDED (PAIN) 180 tablet 1    allopurinol (ZYLOPRIM) 300 MG tablet Take 300 mg by mouth daily      Propylene Glycol (SYSTANE COMPLETE) 0.6 % SOLN Apply 1 drop to eye 3 times daily Each eye      estrogens, conjugated, (PREMARIN) 0.3 MG tablet Take 0.3 mg by mouth Twice a Week Takes sun, and thur      gabapentin (NEURONTIN) 600 MG tablet TAKE 1 TABLET THREE TIMES A DAY (Patient taking differently: 600 mg 3 times daily. TAKE 1 TABLET THREE TIMES A DAY) 270 tablet 1    amLODIPine (NORVASC) 5 MG tablet Take 5 mg by mouth daily      conjugated estrogens (PREMARIN) 0.625 MG/GM vaginal cream Place vaginally twice a week 1 Tube 3    colestipol (COLESTID) 1 g tablet Take 1 g by mouth 3 times daily       Cyanocobalamin (VITAMIN B 12) 100 MCG LOZG Take by mouth daily       lidocaine (XYLOCAINE) 5 % ointment Apply topically as needed for Pain Apply topically as needed. 30 g 3    fenofibrate (TRICOR) 145 MG tablet 145 mg daily       Omega-3 Fatty Acids (FISH OIL) 1000 MG CAPS Take 1,000 mg by mouth 2 times daily      Garlic 2510 MG CAPS Take 1,000 mg by mouth 2 times daily      CRANBERRY SOFT PO Take 36 mg by mouth daily      carvedilol (COREG) 6.25 MG tablet Take 6.25 mg by mouth 2 times daily      aspirin 81 MG tablet Take 81 mg by mouth daily      folic acid (FOLVITE) 1 MG tablet Take 600 mg by mouth daily       Multiple Vitamins-Minerals (THERAPEUTIC MULTIVITAMIN-MINERALS) tablet Take 1 tablet by mouth daily      calcium-vitamin D (OSCAL) 250-125 MG-UNIT per tablet Take 1 tablet by mouth daily      mesalamine (DELZICOL) 400 MG CPDR DR capsule Take 400 mg by mouth 3 times daily       indapamide (LOZOL) 2.5 MG tablet Take 2.5 mg by mouth every morning      omeprazole (PRILOSEC) 20 MG capsule Take 20 mg by mouth Daily        No current facility-administered medications for this visit. Allergies: Patient has no known allergies.     Family History   Problem Relation Age of Onset    Cancer Mother     High Blood Pressure Father     Heart Disease Father     Cancer Daughter        Social History     Tobacco Use    Smoking status: Never Smoker    Smokeless tobacco: Never Used   Substance Use Topics    Alcohol use: No       Review of Systems   Constitutional: Positive for fatigue. Negative for fever and unexpected weight change. HENT: Positive for nosebleeds. Negative for hearing loss and sore throat. Eyes: Positive for visual disturbance. Negative for pain and redness. Respiratory: Negative for cough, chest tightness and shortness of breath. Cardiovascular: Negative for chest pain, palpitations and leg swelling. Gastrointestinal: Negative for abdominal distention, abdominal pain, constipation, diarrhea, nausea and vomiting. Endocrine: Positive for polyuria. Negative for cold intolerance, heat intolerance and polydipsia. Genitourinary: Positive for frequency. Negative for difficulty urinating and urgency. Musculoskeletal: Positive for arthralgias, back pain, gait problem, joint swelling and neck pain. Skin: Negative for color change, rash and wound. Allergic/Immunologic: Negative for environmental allergies and food allergies. Neurological: Positive for weakness and light-headedness. Negative for seizures and headaches. Hematological: Negative for adenopathy. Does not bruise/bleed easily. Psychiatric/Behavioral: Negative for confusion, sleep disturbance and suicidal ideas. OBJECTIVE:  /70 (Site: Right Upper Arm, Position: Sitting, Cuff Size: Medium Adult)   Temp 98 °F (36.7 °C) (Temporal)   Ht 5' 4\" (1.626 m)   Wt 135 lb 12.8 oz (61.6 kg)   BMI 23.31 kg/m²   Physical Exam  Vitals reviewed. Exam conducted with a chaperone present. Constitutional:       Appearance: She is well-developed. HENT:      Head: Normocephalic and atraumatic. Eyes:      Pupils: Pupils are equal, round, and reactive to light. Cardiovascular:      Rate and Rhythm: Normal rate and regular rhythm. Heart sounds: Normal heart sounds. Pulmonary:      Effort: Pulmonary effort is normal.      Breath sounds: Normal breath sounds. No wheezing or rales.    Abdominal:      General: Bowel sounds are normal. There is no distension. Palpations: Abdomen is soft. Tenderness: There is no abdominal tenderness. There is no guarding or rebound. Genitourinary:     Rectum: External hemorrhoid (skin tags) present. Comments: Examined in standing position, leaning on table (pt preference)  Non-tender enlarged perineal cyst. Superficial. Hemorrhoidal skin tags. Musculoskeletal:         General: Normal range of motion. Cervical back: Normal range of motion. Lymphadenopathy:      Cervical: No cervical adenopathy. Skin:     General: Skin is warm and dry. Neurological:      Mental Status: She is alert and oriented to person, place, and time. Deep Tendon Reflexes: Reflexes are normal and symmetric. Psychiatric:         Behavior: Behavior normal.         Thought Content: Thought content normal.         Judgment: Judgment normal.         ASSESSMENT:   Diagnosis Orders   1. Perineal cyst in female         PLAN:  No orders of the defined types were placed in this encounter. No orders of the defined types were placed in this encounter. The risks, benefits, and alternatives were discussed with the pt. She is willing to accept the risks and proceed with a excision of perineal cyst. The surgical risks included but not limited to bleeding, infection, perforation, recurrence, risk of needing further surgery, etc. The anesthetic risks included heart attacks, strokes, pneumonia, phlebitis, etc.  She is willing to accept all risks and proceed with surgery. No guarantees have been given. Return for Post-operative Visit.     DO Mounika 6/5/6496 1:42 PM

## 2022-03-09 NOTE — H&P (VIEW-ONLY)
[address] [facility] History and Physical   SUBJECTIVE:  Ms. Panfilo Meyer is a 67 y.o. female who presents today with a cyst near her anus. She states she has been suffering with incontinence, and while being cathed by her urology NP, this was found. She states she did not know it was there and she can not see it. She does not have pain. She is unsure of how long it has been there. Past Medical History:   Diagnosis Date    Arthritis     Cancer [facility])     endometrial cancer    Cataract     CIDP (chronic inflammatory demyelinating poly[facility]uropathy) ([facility])     Crohn's disease ([facility] [address]     GERD (gastroesophageal reflux disease)     History of blood transfusion     Hypertension     Macular degeneration     PVD (peripheral vascular disease) ([facility] [address]     Radiation     Urinary incontinence      Past Surgical History:   Procedure Laterality Date    BACK SURGERY      lumbar    CARPAL TUNNEL RELEASE Bilateral     CATARACT REMOVAL Bilateral     CHOLECYSTECTOMY      FOOT SURGERY Left     HAMMER TOE SURGERY Left     HX VASCULAR ANGIOPLASTY      & STENT    HYSTERECTOMY      KNEE ARTHROSCOPY Right     LUMBAR FUSION      VASCULAR SURGERY  01/04/2022    VI. Bilateral lower extremity runoff. Current Outpatient Medications   Medication Sig Dispense Refill    meloxicam (MOBIC) 15 MG tablet TAKE 1 TABLET BY MOUTH EVERY DAY      hyoscyamine (LEVSIN/SL) 125 MCG sublingual tablet Place 1 tablet under the tongue 3 times daily as needed for Cramping 90 tablet 5    HYDROcodone-acetaminophen (NORCO)  MG per tablet Take 1 tablet by mouth every 8 hours as needed for Pain for up to 30 days.  90 tablet 0    tiZANidine (ZANAFLEX) 4 MG tablet TAKE 1 TABLET BY MOUTH 2 TIMES DAILY AS NEEDED (PAIN) 180 tablet 1    allopurinol (ZYLOPRIM) 300 MG tablet Take 300 mg by mouth daily      Propylene Glycol (SYSTANE COMPLETE) 0.6 % SOLN Apply 1 drop to eye 3 times daily Each eye      estrogens, conjugated, (PREMARIN) 0.3 MG tablet Take 0.3 mg by mouth Twice a Week Takes sun, and thur      gabapentin (NEURONTIN) 600 MG tablet TAKE 1 TABLET THREE TIMES A DAY (Patient taking differently: 600 mg 3 times daily. TAKE 1 TABLET THREE TIMES A DAY) 270 tablet 1    amLODIPine (NORVASC) 5 MG tablet Take 5 mg by mouth daily      conjugated estrogens (PREMARIN) 0.625 MG/GM vaginal cream Place vaginally twice a week 1 Tube 3    colestipol (COLESTID) 1 g tablet Take 1 g by mouth 3 times daily       Cyanocobalamin (VITAMIN B 12) 100 MCG LOZG Take by mouth daily       lidocaine (XYLOCAINE) 5 % ointment Apply topically as needed for Pain Apply topically as needed. 30 g 3    fenofibrate (TRICOR) 145 MG tablet 145 mg daily       Omega-3 Fatty Acids (FISH OIL) 1000 MG CAPS Take 1,000 mg by mouth 2 times daily      Garlic 1279 MG CAPS Take 1,000 mg by mouth 2 times daily      CRANBERRY SOFT PO Take 36 mg by mouth daily      carvedilol (COREG) 6.25 MG tablet Take 6.25 mg by mouth 2 times daily      aspirin 81 MG tablet Take 81 mg by mouth daily      folic acid (FOLVITE) 1 MG tablet Take 600 mg by mouth daily       Multiple Vitamins-Minerals (THERAPEUTIC MULTIVITAMIN-MINERALS) tablet Take 1 tablet by mouth daily      calcium-vitamin D (OSCAL) 250-125 MG-UNIT per tablet Take 1 tablet by mouth daily      mesalamine (DELZICOL) 400 MG CPDR DR capsule Take 400 mg by mouth 3 times daily       indapamide (LOZOL) 2.5 MG tablet Take 2.5 mg by mouth every morning      omeprazole (PRILOSEC) 20 MG capsule Take 20 mg by mouth Daily        No current facility-administered medications for this visit. Allergies: Patient has no known allergies.     Family History   Problem Relation Age of Onset    Cancer Mother     High Blood Pressure Father     Heart Disease Father     Cancer Daughter        Social History     Tobacco Use    Smoking status: Never Smoker    Smokeless tobacco: Never Used   Substance Use Topics    Alcohol use: No       Review of Systems   Constitutional: Positive for fatigue. Negative for fever and unexpected weight change. HENT: Positive for nosebleeds. Negative for hearing loss and sore throat. Eyes: Positive for visual disturbance. Negative for pain and redness. Respiratory: Negative for cough, chest tightness and shortness of breath. Cardiovascular: Negative for chest pain, palpitations and leg swelling. Gastrointestinal: Negative for abdominal distention, abdominal pain, constipation, diarrhea, nausea and vomiting. Endocrine: Positive for polyuria. Negative for cold intolerance, heat intolerance and polydipsia. Genitourinary: Positive for frequency. Negative for difficulty urinating and urgency. Musculoskeletal: Positive for arthralgias, back pain, gait problem, joint swelling and neck pain. Skin: Negative for color change, rash and wound. Allergic/Immunologic: Negative for environmental allergies and food allergies. Neurological: Positive for weakness and light-headedness. Negative for seizures and headaches. Hematological: Negative for adenopathy. Does not bruise/bleed easily. Psychiatric/Behavioral: Negative for confusion, sleep disturbance and suicidal ideas. OBJECTIVE:  /70 (Site: Right Upper Arm, Position: Sitting, Cuff Size: Medium Adult)   Temp 98 °F (36.7 °C) (Temporal)   Ht 5' 4\" (1.626 m)   Wt 135 lb 12.8 oz (61.6 kg)   BMI 23.31 kg/m²   Physical Exam  Vitals reviewed. Exam conducted with a chaperone present. Constitutional:       Appearance: She is well-developed. HENT:      Head: Normocephalic and atraumatic. Eyes:      Pupils: Pupils are equal, round, and reactive to light. Cardiovascular:      Rate and Rhythm: Normal rate and regular rhythm. Heart sounds: Normal heart sounds. Pulmonary:      Effort: Pulmonary effort is normal.      Breath sounds: Normal breath sounds. No wheezing or rales.    Abdominal:      General: Bowel sounds are normal. There is no distension. Palpations: Abdomen is soft. Tenderness: There is no abdominal tenderness. There is no guarding or rebound. Genitourinary:     Rectum: External hemorrhoid (skin tags) present. Comments: Examined in standing position, leaning on table (pt preference)  Non-tender enlarged perineal cyst. Superficial. Hemorrhoidal skin tags. Musculoskeletal:         General: Normal range of motion. Cervical back: Normal range of motion. Lymphadenopathy:      Cervical: No cervical adenopathy. Skin:     General: Skin is warm and dry. Neurological:      Mental Status: She is alert and oriented to person, place, and time. Deep Tendon Reflexes: Reflexes are normal and symmetric. Psychiatric:         Behavior: Behavior normal.         Thought Content: Thought content normal.         Judgment: Judgment normal.         ASSESSMENT:   Diagnosis Orders   1. Perineal cyst in female         PLAN:  No orders of the defined types were placed in this encounter. No orders of the defined types were placed in this encounter. The risks, benefits, and alternatives were discussed with the pt. She is willing to accept the risks and proceed with a excision of perineal cyst. The surgical risks included but not limited to bleeding, infection, perforation, recurrence, risk of needing further surgery, etc. The anesthetic risks included heart attacks, strokes, pneumonia, phlebitis, etc.  She is willing to accept all risks and proceed with surgery. No guarantees have been given. Return for Post-operative Visit.     Giorgio Tsai DO 1/2/8736 1:42 PM

## 2022-03-09 NOTE — PROGRESS NOTES
SUBJECTIVE:  Ms. Panfilo Meyer is a 67 y.o. female who presents today with a cyst near her anus. She states she has been suffering with incontinence, and while being cathed by her urology NP, this was found. She states she did not know it was there and she can not see it. She does not have pain. She is unsure of how long it has been there. Past Medical History:   Diagnosis Date    Arthritis     Cancer [facility])     endometrial cancer    Cataract     CIDP [facility]chronic inflammatory [facility]yelinating polyneuropathy) ([facility])     Cro[facility]'s disease ([facility] [address]     GERD (gastroesophageal reflux disease)     History of blood transfusion     Hypertension     Macular degeneration     PVD (peripheral vascular disease) ([facility] [address]     Radiation     Urinary incontinence      Past Surgical History:   Procedure Laterality Date    BACK SURGERY      lumbar    CARPAL TUNNEL RELEASE Bilateral     CATARACT REMOVAL Bilateral     CHOLECYSTECTOMY      FOOT SURGERY Left     HAMMER TOE SURGERY Left     HX VASCULAR ANGIOPLASTY      & STENT    HYSTERECTOMY      KNEE ARTHROSCOPY Right     LUMBAR FUSION      VASCULAR SURGERY  01/04/2022    VI. Bilateral lower extremity runoff. Current Outpatient Medications   Medication Sig Dispense Refill    meloxicam (MOBIC) 15 MG tablet TAKE 1 TABLET BY MOUTH EVERY DAY      hyoscyamine (LEVSIN/SL) 125 MCG sublingual tablet Place 1 tablet under the tongue 3 times daily as needed for Cramping 90 tablet 5    HYDROcodone-acetaminophen (NORCO)  MG per tablet Take 1 tablet by mouth every 8 hours as needed for Pain for up to 30 days.  90 tablet 0    tiZANidine (ZANAFLEX) 4 MG tablet TAKE 1 TABLET BY MOUTH 2 TIMES DAILY AS NEEDED (PAIN) 180 tablet 1    allopurinol (ZYLOPRIM) 300 MG tablet Take 300 mg by mouth daily      Propylene Glycol (SYSTANE COMPLETE) 0.6 % SOLN Apply 1 drop to eye 3 times daily Each eye      estrogens, conjugated, (PREMARIN) 0.3 MG tablet Take 0.3 mg by mouth Twice a Week Takes sun, and thur      gabapentin (NEURONTIN) 600 MG tablet TAKE 1 TABLET THREE TIMES A DAY (Patient taking differently: 600 mg 3 times daily. TAKE 1 TABLET THREE TIMES A DAY) 270 tablet 1    amLODIPine (NORVASC) 5 MG tablet Take 5 mg by mouth daily      conjugated estrogens (PREMARIN) 0.625 MG/GM vaginal cream Place vaginally twice a week 1 Tube 3    colestipol (COLESTID) 1 g tablet Take 1 g by mouth 3 times daily       Cyanocobalamin (VITAMIN B 12) 100 MCG LOZG Take by mouth daily       lidocaine (XYLOCAINE) 5 % ointment Apply topically as needed for Pain Apply topically as needed. 30 g 3    fenofibrate (TRICOR) 145 MG tablet 145 mg daily       Omega-3 Fatty Acids (FISH OIL) 1000 MG CAPS Take 1,000 mg by mouth 2 times daily      Garlic 8847 MG CAPS Take 1,000 mg by mouth 2 times daily      CRANBERRY SOFT PO Take 36 mg by mouth daily      carvedilol (COREG) 6.25 MG tablet Take 6.25 mg by mouth 2 times daily      aspirin 81 MG tablet Take 81 mg by mouth daily      folic acid (FOLVITE) 1 MG tablet Take 600 mg by mouth daily       Multiple Vitamins-Minerals (THERAPEUTIC MULTIVITAMIN-MINERALS) tablet Take 1 tablet by mouth daily      calcium-vitamin D (OSCAL) 250-125 MG-UNIT per tablet Take 1 tablet by mouth daily      mesalamine (DELZICOL) 400 MG CPDR DR capsule Take 400 mg by mouth 3 times daily       indapamide (LOZOL) 2.5 MG tablet Take 2.5 mg by mouth every morning      omeprazole (PRILOSEC) 20 MG capsule Take 20 mg by mouth Daily        No current facility-administered medications for this visit. Allergies: Patient has no known allergies. Family History   Problem Relation Age of Onset    Cancer Mother     High Blood Pressure Father     Heart Disease Father     Cancer Daughter        Social History     Tobacco Use    Smoking status: Never Smoker    Smokeless tobacco: Never Used   Substance Use Topics    Alcohol use: No       Review of Systems   Constitutional: Positive for fatigue.  Negative for fever and unexpected weight change. HENT: Positive for nosebleeds. Negative for hearing loss and sore throat. Eyes: Positive for visual disturbance. Negative for pain and redness. Respiratory: Negative for cough, chest tightness and shortness of breath. Cardiovascular: Negative for chest pain, palpitations and leg swelling. Gastrointestinal: Negative for abdominal distention, abdominal pain, constipation, diarrhea, nausea and vomiting. Endocrine: Positive for polyuria. Negative for cold intolerance, heat intolerance and polydipsia. Genitourinary: Positive for frequency. Negative for difficulty urinating and urgency. Musculoskeletal: Positive for arthralgias, back pain, gait problem, joint swelling and neck pain. Skin: Negative for color change, rash and wound. Allergic/Immunologic: Negative for environmental allergies and food allergies. Neurological: Positive for weakness and light-headedness. Negative for seizures and headaches. Hematological: Negative for adenopathy. Does not bruise/bleed easily. Psychiatric/Behavioral: Negative for confusion, sleep disturbance and suicidal ideas. OBJECTIVE:  /70 (Site: Right Upper Arm, Position: Sitting, Cuff Size: Medium Adult)   Temp 98 °F (36.7 °C) (Temporal)   Ht 5' 4\" (1.626 m)   Wt 135 lb 12.8 oz (61.6 kg)   BMI 23.31 kg/m²   Physical Exam  Vitals reviewed. Exam conducted with a chaperone present. Constitutional:       Appearance: She is well-developed. HENT:      Head: Normocephalic and atraumatic. Eyes:      Pupils: Pupils are equal, round, and reactive to light. Cardiovascular:      Rate and Rhythm: Normal rate and regular rhythm. Heart sounds: Normal heart sounds. Pulmonary:      Effort: Pulmonary effort is normal.      Breath sounds: Normal breath sounds. No wheezing or rales. Abdominal:      General: Bowel sounds are normal. There is no distension. Palpations: Abdomen is soft.       Tenderness: There is no abdominal tenderness. There is no guarding or rebound. Genitourinary:     Rectum: External hemorrhoid (skin tags) present. Comments: Examined in standing position, leaning on table (pt preference)  Non-tender enlarged perineal cyst. Superficial. Hemorrhoidal skin tags. Musculoskeletal:         General: Normal range of motion. Cervical back: Normal range of motion. Lymphadenopathy:      Cervical: No cervical adenopathy. Skin:     General: Skin is warm and dry. Neurological:      Mental Status: She is alert and oriented to person, place, and time. Deep Tendon Reflexes: Reflexes are normal and symmetric. Psychiatric:         Behavior: Behavior normal.         Thought Content: Thought content normal.         Judgment: Judgment normal.         ASSESSMENT:   Diagnosis Orders   1. Perineal cyst in female         PLAN:  No orders of the defined types were placed in this encounter. No orders of the defined types were placed in this encounter. The risks, benefits, and alternatives were discussed with the pt. She is willing to accept the risks and proceed with a excision of perineal cyst. The surgical risks included but not limited to bleeding, infection, perforation, recurrence, risk of needing further surgery, etc. The anesthetic risks included heart attacks, strokes, pneumonia, phlebitis, etc.  She is willing to accept all risks and proceed with surgery. No guarantees have been given. Return for Post-operative Visit.     DO Mounika 1/3/6786 1:42 PM

## 2022-03-09 NOTE — LETTER
SCHEDULING INSTRUCTIONS:     Patient: Mecca Joyce  : [id number]    Hospital: Hospital    Admitting Physician:  Dr. Lori Gray    Diagnosis: Perineal Cyst    Procedure: Exam Under Anesthesia and Perineal Cyst Excision    ALLOW ADDITIONAL 30 MINS FOR TURNOVER EXCEPT FOR PHYSICIAN'S BOUNCE DAY    Anesthesia: GEN    Admission:Outpatient     Date: 3/25/2022               NOT TO BE USED OUTSIDE OF THE OFFICE

## 2022-03-17 ENCOUNTER — HOSPITAL ENCOUNTER (OUTPATIENT)
Dept: PREADMISSION TESTING | Age: 77
Discharge: HOME | End: 2022-03-21
Payer: MEDICARE

## 2022-03-17 ENCOUNTER — HOSPITAL ENCOUNTER (OUTPATIENT)
Dept: INFUSION THERAPY | Age: 77
Setting detail: INFUSION SERIES
Discharge: HOME | End: 2022-03-17
Payer: MEDICARE

## 2022-03-17 VITALS
DIASTOLIC BLOOD PRESSURE: 52 MMHG | SYSTOLIC BLOOD PRESSURE: 124 MMHG | TEMPERATURE: 97.9 F | OXYGEN SATURATION: 98 % | BODY MASS INDEX: 23.17 KG/M2 | HEART RATE: 55 BPM | RESPIRATION RATE: 16 BRPM | WEIGHT: 135 LBS

## 2022-03-17 DIAGNOSIS — G61.81 CIDP (CHRONIC INFLAMMATORY DEMYELINATING POLYNEUROPATHY) (HCC): Primary | ICD-10-CM

## 2022-03-17 LAB
ALBUMIN SERPL-MCNC: 3.9 G/DL (ref 3.5–5.2)
ALP BLD-CCNC: 38 U/L (ref 35–104)
ALT SERPL-CCNC: 10 U/L (ref 5–33)
ANION GAP SERPL CALCULATED.3IONS-SCNC: 9 MMOL/L (ref 7–19)
AST SERPL-CCNC: 16 U/L (ref 5–32)
BASOPHILS ABSOLUTE: 0 K/UL (ref 0–0.2)
BASOPHILS RELATIVE PERCENT: 0.6 % (ref 0–1)
BILIRUB SERPL-MCNC: <0.2 MG/DL (ref 0.2–1.2)
BUN BLDV-MCNC: 32 MG/DL (ref 8–23)
CALCIUM SERPL-MCNC: 9 MG/DL (ref 8.8–10.2)
CHLORIDE BLD-SCNC: 105 MMOL/L (ref 98–111)
CO2: 31 MMOL/L (ref 22–29)
CREAT SERPL-MCNC: 0.9 MG/DL (ref 0.5–0.9)
EOSINOPHILS ABSOLUTE: 0.2 K/UL (ref 0–0.6)
EOSINOPHILS RELATIVE PERCENT: 3.9 % (ref 0–5)
GFR AFRICAN AMERICAN: >59
GFR NON-AFRICAN AMERICAN: >60
GLUCOSE BLD-MCNC: 106 MG/DL (ref 74–109)
HCT VFR BLD CALC: 33.7 % (ref 37–47)
HEMOGLOBIN: 10.1 G/DL (ref 12–16)
IMMATURE GRANULOCYTES #: 0 K/UL
LYMPHOCYTES ABSOLUTE: 1.3 K/UL (ref 1.1–4.5)
LYMPHOCYTES RELATIVE PERCENT: 24.4 % (ref 20–40)
MCH RBC QN AUTO: 26.6 PG (ref 27–31)
MCHC RBC AUTO-ENTMCNC: 30 G/DL (ref 33–37)
MCV RBC AUTO: 88.9 FL (ref 81–99)
MONOCYTES ABSOLUTE: 0.9 K/UL (ref 0–0.9)
MONOCYTES RELATIVE PERCENT: 16.4 % (ref 0–10)
NEUTROPHILS ABSOLUTE: 2.8 K/UL (ref 1.5–7.5)
NEUTROPHILS RELATIVE PERCENT: 54.5 % (ref 50–65)
PDW BLD-RTO: 15.1 % (ref 11.5–14.5)
PLATELET # BLD: 196 K/UL (ref 130–400)
PMV BLD AUTO: 10.8 FL (ref 9.4–12.3)
POTASSIUM SERPL-SCNC: 3.8 MMOL/L (ref 3.5–5)
RBC # BLD: 3.79 M/UL (ref 4.2–5.4)
SODIUM BLD-SCNC: 145 MMOL/L (ref 136–145)
TOTAL PROTEIN: 6.3 G/DL (ref 6.6–8.7)
WBC # BLD: 5.2 K/UL (ref 4.8–10.8)

## 2022-03-17 PROCEDURE — 6360000002 HC RX W HCPCS: Performed by: PSYCHIATRY & NEUROLOGY

## 2022-03-17 PROCEDURE — 96365 THER/PROPH/DIAG IV INF INIT: CPT

## 2022-03-17 PROCEDURE — 80053 COMPREHEN METABOLIC PANEL: CPT

## 2022-03-17 PROCEDURE — 96366 THER/PROPH/DIAG IV INF ADDON: CPT

## 2022-03-17 PROCEDURE — 85025 COMPLETE CBC W/AUTO DIFF WBC: CPT

## 2022-03-17 RX ORDER — SODIUM CHLORIDE 0.9 % (FLUSH) 0.9 %
5-40 SYRINGE (ML) INJECTION PRN
Status: DISCONTINUED | OUTPATIENT
Start: 2022-03-17 | End: 2022-03-17

## 2022-03-17 RX ORDER — HEPARIN SODIUM (PORCINE) LOCK FLUSH IV SOLN 100 UNIT/ML 100 UNIT/ML
500 SOLUTION INTRAVENOUS PRN
Status: CANCELLED | OUTPATIENT
Start: 2022-03-17

## 2022-03-17 RX ORDER — DIPHENHYDRAMINE HYDROCHLORIDE 50 MG/ML
50 INJECTION INTRAMUSCULAR; INTRAVENOUS ONCE
Status: CANCELLED
Start: 2022-03-17 | End: 2022-03-17

## 2022-03-17 RX ORDER — SODIUM CHLORIDE 9 MG/ML
25 INJECTION, SOLUTION INTRAVENOUS PRN
Status: CANCELLED | OUTPATIENT
Start: 2022-03-17

## 2022-03-17 RX ORDER — SODIUM CHLORIDE 9 MG/ML
INJECTION, SOLUTION INTRAVENOUS CONTINUOUS
Status: DISCONTINUED | OUTPATIENT
Start: 2022-03-17 | End: 2022-03-17

## 2022-03-17 RX ORDER — DIPHENHYDRAMINE HYDROCHLORIDE 50 MG/ML
50 INJECTION INTRAMUSCULAR; INTRAVENOUS ONCE
Status: CANCELLED | OUTPATIENT
Start: 2022-03-17 | End: 2022-03-17

## 2022-03-17 RX ORDER — SODIUM CHLORIDE 0.9 % (FLUSH) 0.9 %
5-40 SYRINGE (ML) INJECTION PRN
Status: CANCELLED | OUTPATIENT
Start: 2022-03-17

## 2022-03-17 RX ORDER — ACETAMINOPHEN 325 MG/1
650 TABLET ORAL ONCE
Status: CANCELLED | OUTPATIENT
Start: 2022-03-17 | End: 2022-03-17

## 2022-03-17 RX ORDER — SODIUM CHLORIDE 9 MG/ML
INJECTION, SOLUTION INTRAVENOUS CONTINUOUS
Status: CANCELLED | OUTPATIENT
Start: 2022-03-17

## 2022-03-17 RX ORDER — EPINEPHRINE 1 MG/ML
0.3 INJECTION, SOLUTION, CONCENTRATE INTRAVENOUS PRN
Status: CANCELLED | OUTPATIENT
Start: 2022-03-17

## 2022-03-17 RX ORDER — METHYLPREDNISOLONE SODIUM SUCCINATE 125 MG/2ML
125 INJECTION, POWDER, LYOPHILIZED, FOR SOLUTION INTRAMUSCULAR; INTRAVENOUS ONCE
Status: CANCELLED | OUTPATIENT
Start: 2022-03-17 | End: 2022-03-17

## 2022-03-17 RX ADMIN — IMMUNE GLOBULIN (HUMAN) 30 G: 10 INJECTION INTRAVENOUS; SUBCUTANEOUS at 09:42

## 2022-03-17 NOTE — PROGRESS NOTES
Patient arrived for Gamunex C infusion IV started in R AC brisk blood return. Vital signs stable and charted in Epic.  Patient would like next infusion on April 20th @ 9am. Appointment made per patients request.

## 2022-03-18 ENCOUNTER — TELEPHONE (OUTPATIENT)
Dept: NEUROLOGY | Age: 77
End: 2022-03-18

## 2022-03-18 LAB
EKG P AXIS: -16 DEGREES
EKG P-R INTERVAL: 154 MS
EKG Q-T INTERVAL: 430 MS
EKG QRS DURATION: 88 MS
EKG QTC CALCULATION (BAZETT): 415 MS
EKG T AXIS: 13 DEGREES

## 2022-03-22 ENCOUNTER — HOSPITAL ENCOUNTER (OUTPATIENT)
Dept: PREADMISSION TESTING | Age: 77
Discharge: HOME | End: 2022-03-26
Payer: MEDICARE

## 2022-03-22 LAB — SARS-COV-2, PCR: NOT DETECTED

## 2022-03-22 PROCEDURE — U0003 INFECTIOUS AGENT DETECTION BY NUCLEIC ACID (DNA OR RNA); SEVERE ACUTE RESPIRATORY SYNDROME CORONAVIRUS 2 (SARS-COV-2) (CORONAVIRUS DISEASE [COVID-19]), AMPLIFIED PROBE TECHNIQUE, MAKING USE OF HIGH THROUGHPUT TECHNOLOGIES AS DESCRIBED BY CMS-2020-01-R: HCPCS

## 2022-03-22 PROCEDURE — U0005 INFEC AGEN DETEC AMPLI PROBE: HCPCS

## 2022-03-24 DIAGNOSIS — M48.062 SPINAL STENOSIS OF LUMBAR REGION WITH NEUROGENIC CLAUDICATION: ICD-10-CM

## 2022-03-24 DIAGNOSIS — G61.81 CIDP (CHRONIC INFLAMMATORY DEMYELINATING POLYNEUROPATHY) (HCC): ICD-10-CM

## 2022-03-24 DIAGNOSIS — M05.79 RHEUMATOID ARTHRITIS INVOLVING MULTIPLE SITES WITH POSITIVE RHEUMATOID FACTOR (HCC): ICD-10-CM

## 2022-03-25 ENCOUNTER — ANESTHESIA (OUTPATIENT)
Dept: OPERATING ROOM | Age: 77
End: 2022-03-25
Payer: MEDICARE

## 2022-03-25 ENCOUNTER — ANESTHESIA EVENT (OUTPATIENT)
Dept: OPERATING ROOM | Age: 77
End: 2022-03-25
Payer: MEDICARE

## 2022-03-25 ENCOUNTER — HOSPITAL ENCOUNTER (OUTPATIENT)
Age: 77
Setting detail: OUTPATIENT SURGERY
Discharge: HOME | End: 2022-03-25
Attending: SURGERY | Admitting: SURGERY
Payer: MEDICARE

## 2022-03-25 VITALS
OXYGEN SATURATION: 95 % | RESPIRATION RATE: 16 BRPM | TEMPERATURE: 98.1 F | HEIGHT: 64 IN | HEART RATE: 64 BPM | DIASTOLIC BLOOD PRESSURE: 64 MMHG | WEIGHT: 135 LBS | BODY MASS INDEX: 23.05 KG/M2 | SYSTOLIC BLOOD PRESSURE: 166 MMHG

## 2022-03-25 VITALS — OXYGEN SATURATION: 99 % | DIASTOLIC BLOOD PRESSURE: 52 MMHG | TEMPERATURE: 97 F | SYSTOLIC BLOOD PRESSURE: 104 MMHG

## 2022-03-25 DIAGNOSIS — N90.89 PERINEAL CYST IN FEMALE: ICD-10-CM

## 2022-03-25 PROCEDURE — 7100000001 HC PACU RECOVERY - ADDTL 15 MIN: Performed by: SURGERY

## 2022-03-25 PROCEDURE — 3700000000 HC ANESTHESIA ATTENDED CARE: Performed by: SURGERY

## 2022-03-25 PROCEDURE — 3600000013 HC SURGERY LEVEL 3 ADDTL 15MIN: Performed by: SURGERY

## 2022-03-25 PROCEDURE — 88304 TISSUE EXAM BY PATHOLOGIST: CPT

## 2022-03-25 PROCEDURE — 2500000003 HC RX 250 WO HCPCS: Performed by: SURGERY

## 2022-03-25 PROCEDURE — 2500000003 HC RX 250 WO HCPCS

## 2022-03-25 PROCEDURE — 6360000002 HC RX W HCPCS: Performed by: ANESTHESIOLOGY

## 2022-03-25 PROCEDURE — 2580000003 HC RX 258

## 2022-03-25 PROCEDURE — 6360000002 HC RX W HCPCS

## 2022-03-25 PROCEDURE — 7100000011 HC PHASE II RECOVERY - ADDTL 15 MIN: Performed by: SURGERY

## 2022-03-25 PROCEDURE — 7100000000 HC PACU RECOVERY - FIRST 15 MIN: Performed by: SURGERY

## 2022-03-25 PROCEDURE — 7100000010 HC PHASE II RECOVERY - FIRST 15 MIN: Performed by: SURGERY

## 2022-03-25 PROCEDURE — 3700000001 HC ADD 15 MINUTES (ANESTHESIA): Performed by: SURGERY

## 2022-03-25 PROCEDURE — 6370000000 HC RX 637 (ALT 250 FOR IP): Performed by: ANESTHESIOLOGY

## 2022-03-25 PROCEDURE — 11470 EXC SKN H/P/P/U SMPL/NTRM: CPT | Performed by: SURGERY

## 2022-03-25 PROCEDURE — 3600000003 HC SURGERY LEVEL 3 BASE: Performed by: SURGERY

## 2022-03-25 PROCEDURE — 2709999900 HC NON-CHARGEABLE SUPPLY: Performed by: SURGERY

## 2022-03-25 RX ORDER — HYDROCODONE BITARTRATE AND ACETAMINOPHEN 10; 325 MG/1; MG/1
1 TABLET ORAL EVERY 8 HOURS PRN
Qty: 90 TABLET | Refills: 0 | Status: SHIPPED | OUTPATIENT
Start: 2022-03-27 | End: 2022-04-24 | Stop reason: SDUPTHER

## 2022-03-25 RX ORDER — DEXAMETHASONE SODIUM PHOSPHATE 10 MG/ML
8 INJECTION, SOLUTION INTRAMUSCULAR; INTRAVENOUS ONCE
Status: DISCONTINUED | OUTPATIENT
Start: 2022-03-25 | End: 2022-03-25 | Stop reason: HOSPADM

## 2022-03-25 RX ORDER — BUPIVACAINE HYDROCHLORIDE 2.5 MG/ML
INJECTION, SOLUTION INFILTRATION; PERINEURAL PRN
Status: DISCONTINUED | OUTPATIENT
Start: 2022-03-25 | End: 2022-03-25 | Stop reason: ALTCHOICE

## 2022-03-25 RX ORDER — DEXAMETHASONE SODIUM PHOSPHATE 4 MG/ML
4 INJECTION, SOLUTION INTRA-ARTICULAR; INTRALESIONAL; INTRAMUSCULAR; INTRAVENOUS; SOFT TISSUE ONCE
Status: DISCONTINUED | OUTPATIENT
Start: 2022-03-25 | End: 2022-03-25

## 2022-03-25 RX ORDER — DEXAMETHASONE SODIUM PHOSPHATE 4 MG/ML
4 INJECTION, SOLUTION INTRA-ARTICULAR; INTRALESIONAL; INTRAMUSCULAR; INTRAVENOUS; SOFT TISSUE ONCE
Status: COMPLETED | OUTPATIENT
Start: 2022-03-25 | End: 2022-03-25

## 2022-03-25 RX ORDER — SODIUM CHLORIDE 0.9 % (FLUSH) 0.9 %
5-40 SYRINGE (ML) INJECTION PRN
Status: DISCONTINUED | OUTPATIENT
Start: 2022-03-25 | End: 2022-03-25 | Stop reason: HOSPADM

## 2022-03-25 RX ORDER — DIPHENHYDRAMINE HYDROCHLORIDE 50 MG/ML
12.5 INJECTION INTRAMUSCULAR; INTRAVENOUS
Status: DISCONTINUED | OUTPATIENT
Start: 2022-03-25 | End: 2022-03-25 | Stop reason: HOSPADM

## 2022-03-25 RX ORDER — SODIUM CHLORIDE 9 MG/ML
25 INJECTION, SOLUTION INTRAVENOUS PRN
Status: DISCONTINUED | OUTPATIENT
Start: 2022-03-25 | End: 2022-03-25 | Stop reason: HOSPADM

## 2022-03-25 RX ORDER — HYDROMORPHONE HYDROCHLORIDE 1 MG/ML
0.25 INJECTION, SOLUTION INTRAMUSCULAR; INTRAVENOUS; SUBCUTANEOUS EVERY 5 MIN PRN
Status: DISCONTINUED | OUTPATIENT
Start: 2022-03-25 | End: 2022-03-25 | Stop reason: HOSPADM

## 2022-03-25 RX ORDER — CELECOXIB 100 MG/1
100 CAPSULE ORAL ONCE
Status: DISCONTINUED | OUTPATIENT
Start: 2022-03-25 | End: 2022-03-25 | Stop reason: HOSPADM

## 2022-03-25 RX ORDER — METOCLOPRAMIDE HYDROCHLORIDE 5 MG/ML
10 INJECTION INTRAMUSCULAR; INTRAVENOUS
Status: DISCONTINUED | OUTPATIENT
Start: 2022-03-25 | End: 2022-03-25 | Stop reason: HOSPADM

## 2022-03-25 RX ORDER — ONDANSETRON 2 MG/ML
4 INJECTION INTRAMUSCULAR; INTRAVENOUS
Status: DISCONTINUED | OUTPATIENT
Start: 2022-03-25 | End: 2022-03-25 | Stop reason: HOSPADM

## 2022-03-25 RX ORDER — FAMOTIDINE 20 MG/1
20 TABLET, FILM COATED ORAL ONCE
Status: COMPLETED | OUTPATIENT
Start: 2022-03-25 | End: 2022-03-25

## 2022-03-25 RX ORDER — SODIUM CHLORIDE, SODIUM LACTATE, POTASSIUM CHLORIDE, CALCIUM CHLORIDE 600; 310; 30; 20 MG/100ML; MG/100ML; MG/100ML; MG/100ML
INJECTION, SOLUTION INTRAVENOUS CONTINUOUS PRN
Status: DISCONTINUED | OUTPATIENT
Start: 2022-03-25 | End: 2022-03-25 | Stop reason: SDUPTHER

## 2022-03-25 RX ORDER — ONDANSETRON 2 MG/ML
INJECTION INTRAMUSCULAR; INTRAVENOUS PRN
Status: DISCONTINUED | OUTPATIENT
Start: 2022-03-25 | End: 2022-03-25 | Stop reason: SDUPTHER

## 2022-03-25 RX ORDER — PROPOFOL 10 MG/ML
INJECTION, EMULSION INTRAVENOUS PRN
Status: DISCONTINUED | OUTPATIENT
Start: 2022-03-25 | End: 2022-03-25 | Stop reason: SDUPTHER

## 2022-03-25 RX ORDER — HYDROMORPHONE HYDROCHLORIDE 1 MG/ML
0.5 INJECTION, SOLUTION INTRAMUSCULAR; INTRAVENOUS; SUBCUTANEOUS EVERY 5 MIN PRN
Status: DISCONTINUED | OUTPATIENT
Start: 2022-03-25 | End: 2022-03-25 | Stop reason: HOSPADM

## 2022-03-25 RX ORDER — SODIUM CHLORIDE 0.9 % (FLUSH) 0.9 %
10 SYRINGE (ML) INJECTION EVERY 12 HOURS SCHEDULED
Status: DISCONTINUED | OUTPATIENT
Start: 2022-03-25 | End: 2022-03-25 | Stop reason: HOSPADM

## 2022-03-25 RX ORDER — ACETAMINOPHEN 500 MG
1000 TABLET ORAL ONCE
Status: DISCONTINUED | OUTPATIENT
Start: 2022-03-25 | End: 2022-03-25 | Stop reason: HOSPADM

## 2022-03-25 RX ORDER — LIDOCAINE HYDROCHLORIDE 10 MG/ML
INJECTION, SOLUTION EPIDURAL; INFILTRATION; INTRACAUDAL; PERINEURAL PRN
Status: DISCONTINUED | OUTPATIENT
Start: 2022-03-25 | End: 2022-03-25 | Stop reason: SDUPTHER

## 2022-03-25 RX ORDER — SODIUM CHLORIDE 0.9 % (FLUSH) 0.9 %
5-40 SYRINGE (ML) INJECTION EVERY 12 HOURS SCHEDULED
Status: DISCONTINUED | OUTPATIENT
Start: 2022-03-25 | End: 2022-03-25 | Stop reason: HOSPADM

## 2022-03-25 RX ORDER — FENTANYL CITRATE 50 UG/ML
INJECTION, SOLUTION INTRAMUSCULAR; INTRAVENOUS PRN
Status: DISCONTINUED | OUTPATIENT
Start: 2022-03-25 | End: 2022-03-25 | Stop reason: SDUPTHER

## 2022-03-25 RX ORDER — SODIUM CHLORIDE, SODIUM LACTATE, POTASSIUM CHLORIDE, CALCIUM CHLORIDE 600; 310; 30; 20 MG/100ML; MG/100ML; MG/100ML; MG/100ML
INJECTION, SOLUTION INTRAVENOUS CONTINUOUS
Status: DISCONTINUED | OUTPATIENT
Start: 2022-03-25 | End: 2022-03-25 | Stop reason: HOSPADM

## 2022-03-25 RX ORDER — SODIUM CHLORIDE 0.9 % (FLUSH) 0.9 %
10 SYRINGE (ML) INJECTION PRN
Status: DISCONTINUED | OUTPATIENT
Start: 2022-03-25 | End: 2022-03-25 | Stop reason: HOSPADM

## 2022-03-25 RX ORDER — MEPERIDINE HYDROCHLORIDE 25 MG/ML
12.5 INJECTION INTRAMUSCULAR; INTRAVENOUS; SUBCUTANEOUS EVERY 5 MIN PRN
Status: DISCONTINUED | OUTPATIENT
Start: 2022-03-25 | End: 2022-03-25 | Stop reason: HOSPADM

## 2022-03-25 RX ADMIN — SODIUM CHLORIDE, SODIUM LACTATE, POTASSIUM CHLORIDE, AND CALCIUM CHLORIDE: 600; 310; 30; 20 INJECTION, SOLUTION INTRAVENOUS at 07:27

## 2022-03-25 RX ADMIN — PROPOFOL 30 MG: 10 INJECTION, EMULSION INTRAVENOUS at 07:57

## 2022-03-25 RX ADMIN — FAMOTIDINE 20 MG: 20 TABLET ORAL at 07:43

## 2022-03-25 RX ADMIN — PROPOFOL 100 MG: 10 INJECTION, EMULSION INTRAVENOUS at 07:55

## 2022-03-25 RX ADMIN — LIDOCAINE HYDROCHLORIDE 50 MG: 10 INJECTION, SOLUTION EPIDURAL; INFILTRATION; INTRACAUDAL; PERINEURAL at 07:55

## 2022-03-25 RX ADMIN — DEXAMETHASONE SODIUM PHOSPHATE 4 MG: 4 INJECTION, SOLUTION INTRAMUSCULAR; INTRAVENOUS at 07:43

## 2022-03-25 RX ADMIN — FENTANYL CITRATE 25 MCG: 50 INJECTION, SOLUTION INTRAMUSCULAR; INTRAVENOUS at 08:04

## 2022-03-25 RX ADMIN — ONDANSETRON 4 MG: 2 INJECTION INTRAMUSCULAR; INTRAVENOUS at 08:02

## 2022-03-25 ASSESSMENT — PAIN SCALES - GENERAL
PAINLEVEL_OUTOF10: 0
PAINLEVEL_OUTOF10: 0

## 2022-03-25 ASSESSMENT — PAIN - FUNCTIONAL ASSESSMENT: PAIN_FUNCTIONAL_ASSESSMENT: 0-10

## 2022-03-25 ASSESSMENT — LIFESTYLE VARIABLES: SMOKING_STATUS: 0

## 2022-03-25 NOTE — DISCHARGE INSTR - ACTIVITY
No heavy lifting. May shower tomorrow. Rinse area well following bowel movements. Pat dry. Avoid constipation. Take colace nightly (up to 300 mg) and miralax daily (up to three doses). Drink 64 ounces of water and take a powdered fiber supplement daily (ie-Metamucil). If you have signs of infection, call you doctor.  These include:   -Increased pain, swelling, warmth, or redness  -Red streaks leading from the incision  -Pus draining from the incision  -A fever > 100.4

## 2022-03-25 NOTE — OP NOTE
Operative Note      Patient: Samuel Huff  YOB: 1945  MRN: [id number]    Date of Procedure: 3/25/2022    Pre-Op Diagnosis: N90.89 - PERINEAL CYST    Post-Op Diagnosis: Same       Procedure(s):  PERINEAL CYST EXCISION & EUA    Surgeon(s):  Rei Rivera DO    Assistant:   * No surgical staff found *    Anesthesia: General    Estimated Blood Loss (mL): Minimal    Complications: None    Specimens:   ID Type Source Tests Collected by Time Destination   A : perineal cyst Tissue Skin SURGICAL PATHOLOGY Rei Rivera DO 1/54/0941 0813        Implants:  * No implants in log *      Drains: * No LDAs found *    Detailed Description of Procedure:   Patient was taken to the main operating room, placed on the operating table in the supine position. The patient was placed under general anesthesia. The patient was then placed into lithotomy position. The perineum was prepped and draped in the usual sterile fashion. A timeout was performed identifying the correct patient, equipment, and antibiotics given. A skin incision was made overlying the mass after 0.25% Marcaine was injected. A combination of blunt and electrocautery dissection was used to circumferentially dissect the lesion and it was removed from its attachments. The wound was irrigated and hemostasis was obtained. It was about 0.25 cm in size. The wound was closed with 3-0 Monocryl. The area was washed and dried and sterile Dermabond was applied to the wound. Sponge, needle, instrument count correct on 2 occasions. Estimated  intraoperative blood loss 1 mL. Ms. Salinas aVil tolerated her surgery well and she was taken to PACU in  satisfactory condition.           ________________________________  Teresa Mcdermott DO            Electronically signed by Teresa Mcdermott DO on 8/58/9977 at 8:16 AM

## 2022-03-25 NOTE — INTERVAL H&P NOTE
Preeclampsia   Call your doctor right away if you have any of the following:  - Edema (swelling) in your face or hands  - Rapid weight gain-about 1 pound or more in a day  - Headache  - Abdominal pain on your right side  - Vision problems (flashes or spots)  - You have questions or concerns once you return home.      Follow up with Dr Garber on Friday at [facility] [place], [place] for a blood pressure check.    Update History & Physical    The patient's History and Physical of March 9, 2022 was reviewed with the patient and I examined the patient. There was no change. The surgical site was confirmed by the patient and me. Plan: The risks, benefits, expected outcome, and alternative to the recommended procedure have been discussed with the patient. Patient understands and wants to proceed with the procedure.      Electronically signed by Steven Rollins DO on 3/07/1266 at 7:43 AM

## 2022-03-25 NOTE — TELEPHONE ENCOUNTER
Requested Prescriptions     Pending Prescriptions Disp Refills    HYDROcodone-acetaminophen (NORCO)  MG per tablet 90 tablet 0     Sig: Take 1 tablet by mouth every 8 hours as needed for Pain for up to 30 days.        Last Office Visit:  3/3/2022  Next Office Visit:  9/8/2022  Last Medication Refill:  2/25/22  [facility]uss[facility]ul up to date: 3/25/22     *RX updated to reflect  3/27/22     fill date*

## 2022-03-25 NOTE — ANESTHESIA POSTPROCEDURE EVALUATION
Department of Anesthesiology  Postprocedure Note    Patient: Samuel Huff  MRN: [id number]  YOB: 1945  Date of evaluation: 3/25/2022  Time:  8:18 AM     Procedure Summary     Date: 03/25/22 Room / Location: [address]    Anesthesia Start: 6687 Anesthesia Stop: 1217    Procedure: PERINEAL CYST EXCISION (N/A ) Diagnosis:       Perineal cyst in female      (N90.89 - PERINEAL CYST)    Surgeons: Rei Rivera DO Responsible Provider: ENZO Hardy - CRNA    Anesthesia Type: general ASA Status: 3          Anesthesia Type: general    Gypsy Phase I: Gypsy Score: 10    Gypsy Phase II:      Last vitals: Reviewed and per EMR flowsheets.        Anesthesia Post Evaluation    Patient location during evaluation: PACU  Patient participation: complete - patient participated  Level of consciousness: awake and alert  Pain score: 0  Airway patency: patent  Nausea & Vomiting: no nausea and no vomiting  Complications: no  Cardiovascular status: hemodynamically stable and blood pressure returned to baseline  Respiratory status: acceptable and room air  Hydration status: stable

## 2022-03-25 NOTE — ANESTHESIA PRE PROCEDURE
Department of Anesthesiology  Preprocedure Note       Name:  Isra Reyes   Age:  67 y.o.  :  1945                                          MRN:  [id number]         Date:  3/25/2022      Surgeon: Jamari Quarles):  Ashely Bullard DO    Procedure: Procedure(s):  PERINEAL CYST EXCISION & EUA    Medications prior to admission:   Prior to Admission medications    Medication Sig Start Date End Date Taking? Authorizing Provider   meloxicam (MOBIC) 15 MG tablet Take 15 mg by mouth daily  22   Historical Provider, MD   hyoscyamine (LEVSIN/SL) 125 MCG sublingual tablet Place 1 tablet under the tongue 3 times daily as needed for Cramping 3/3/22   Anh Anne APRN - CNP   HYDROcodone-acetaminophen (NORCO)  MG per tablet Take 1 tablet by mouth every 8 hours as needed for Pain for up to 30 days. 2/25/22 3/27/22  Eze oCto MD   tiZANidine (ZANAFLEX) 4 MG tablet TAKE 1 TABLET BY MOUTH 2 TIMES DAILY AS NEEDED (PAIN)  Patient taking differently: Take 4 mg by mouth every 8 hours as needed  22   Alber Burch MD   allopurinol (ZYLOPRIM) 300 MG tablet Take 300 mg by mouth daily    Historical Provider, MD   Propylene Glycol (SYSTANE COMPLETE) 0.6 % SOLN Apply 1 drop to eye 3 times daily Each eye    Historical Provider, MD   estrogens, conjugated, (PREMARIN) 0.3 MG tablet Take 0.3 mg by mouth Twice a Week Takes sun, and thur    Historical Provider, MD   gabapentin (NEURONTIN) 600 MG tablet TAKE 1 TABLET THREE TIMES A DAY  Patient taking differently: Take 600 mg by mouth 3 times daily.  TAKE 1 TABLET THREE TIMES A DAY 7/28/21 3/3/22  Eze Coto MD   amLODIPine (NORVASC) 5 MG tablet Take 5 mg by mouth daily    Historical Provider, MD   conjugated estrogens (PREMARIN) 0.625 MG/GM vaginal cream Place vaginally twice a week 10/27/20   Jocelyne Page APRN - CNP   colestipol (COLESTID) 1 g tablet Take 1 g by mouth 3 times daily  19   Historical Provider, MD   Cyanocobalamin (VITAMIN B 12) 100 MCG LOZG Take by mouth daily     Historical Provider, MD   lidocaine (XYLOCAINE) 5 % ointment Apply topically as needed for Pain Apply topically as needed.  2/25/19   Mitzy Taylor MD   fenofibrate (TRICOR) 145 MG tablet 145 mg daily  2/27/17   Historical Provider, MD   Omega-3 Fatty Acids (FISH OIL) 1000 MG CAPS Take 1,000 mg by mouth 2 times daily    Historical Provider, MD   Garlic 2395 MG CAPS Take 1,000 mg by mouth 2 times daily    Historical Provider, MD   CRANBERRY SOFT PO Take 36 mg by mouth daily    Historical Provider, MD   carvedilol (COREG) 6.25 MG tablet Take 6.25 mg by mouth 2 times daily    Historical Provider, MD   aspirin 81 MG tablet Take 81 mg by mouth daily    Historical Provider, MD   folic acid (FOLVITE) 1 MG tablet Take 600 mg by mouth daily     Historical Provider, MD   Multiple Vitamins-Minerals (THERAPEUTIC MULTIVITAMIN-MINERALS) tablet Take 1 tablet by mouth daily    Historical Provider, MD   calcium-vitamin D (OSCAL) 250-125 MG-UNIT per tablet Take 1 tablet by mouth daily    Historical Provider, MD   mesalamine (DELZICOL) 400 MG CPDR DR capsule Take 400 mg by mouth 3 times daily     Historical Provider, MD   indapamide (LOZOL) 2.5 MG tablet Take 2.5 mg by mouth every morning    Historical Provider, MD   omeprazole (PRILOSEC) 20 MG capsule Take 20 mg by mouth Daily     Historical Provider, MD       Current medications:    Current Facility-Administered Medications   Medication Dose Route Frequency Provider Last Rate Last Admin    0.9 % sodium chloride infusion  25 mL IntraVENous PRN Sissy Mendosa DO        acetaminophen (TYLENOL) tablet 1,000 mg  1,945 mg Oral Once Daralene Mis, DO        celecoxib (CELEBREX) capsule 100 mg  287 mg Oral Once Sissy Mendosa DO        dexamethasone (PF) (DECADRON) injection 8 mg  8 mg IntraVENous Once Daralene Mis, DO        lactated ringers infusion   IntraVENous Continuous Sissy Mendosa DO        sodium chloride flush 0.9 % injection 10 mL 10 mL IntraVENous 2 times per day Sissy Mendosa DO        sodium chloride flush 0.9 % injection 10 mL  10 mL IntraVENous PRN Sissy Mendosa DO           Allergies:  No Known Allergies    Problem List:    Patient Active Problem List   Diagnosis Code    CIDP (chronic inflammatory demyelinating polyneuropathy) ([facility]) G61.81    Restless legs syndrome (RLS) G25.81    Recurrent UTI N39.0    Atonic bladder N31.2    Rheumatoid arthritis involving multiple sites with positive rheumatoid factor ([facility]) M05.79    Lumbar spinal stenosis M48.061    Urinary tract infection without hematuria N39.0    PVD (peripheral vascular disease) ([facility]) I73.9    Perineal cyst in female N90.89       Past Medical History:        Diagnosis Date    Arthritis     Cancer ([facility]     endometrial cancer    Cataract     CIDP (chronic inflammatory demyelinating polyneuropathy) ([facility] [facility]     Crohn's disease ([facility] [address]     GERD (gastroesophageal reflux disease)     History of blood transfusion     Hypertension     Macular degeneration     Perineal cyst in female     PVD (peripheral vascular disease) ([facility] [address]     Radiation     Urinary incontinence        Past Surgical History:        Procedure Laterality Date    BACK SURGERY      lumbar    CARPAL TUNNEL RELEASE Bilateral     CATARACT REMOVAL Bilateral     CHOLECYSTECTOMY      FOOT SURGERY Left     HAMMER TOE SURGERY Left     HX VASCULAR ANGIOPLASTY      & STENT    HYSTERECTOMY      KNEE ARTHROSCOPY Right     LUMBAR FUSION      VASCULAR SURGERY  01/04/2022    VI. Bilateral lower extremity runoff. Social History:    Social History     Tobacco Use    Smoking status: Never Smoker    Smokeless tobacco: Never Used   Substance Use Topics    Alcohol use: No                                Counseling given: Not Answered      Vital Signs (Current): There were no vitals filed for this visit.                                            BP Readings from Last 3 Encounters:   03/17/22 (!) 124/52 03/09/22 138/70   03/03/22 139/66       NPO Status:                                                                                 BMI:   Wt Readings from Last 3 Encounters:   03/17/22 135 lb (61.2 kg)   03/09/22 135 lb 12.8 oz (61.6 kg)   03/03/22 137 lb (62.1 kg)     There is no height or weight on file to calculate BMI.    CBC:   Lab Results   Component Value Date    WBC 5.2 03/17/2022    RBC 3.79 03/17/2022    HGB 10.1 03/17/2022    HCT 33.7 03/17/2022    MCV 88.9 03/17/2022    RDW 15.1 03/17/2022     03/17/2022       CMP:   Lab Results   Component Value Date     03/17/2022    K 3.8 03/17/2022     03/17/2022    CO2 31 03/17/2022    BUN 32 03/17/2022    CREATININE 0.9 03/17/2022    GFRAA >59 03/17/2022    LABGLOM >60 03/17/2022    GLUCOSE 106 03/17/2022    PROT 6.3 03/17/2022    CALCIUM 9.0 03/17/2022    BILITOT <0.2 03/17/2022    ALKPHOS 38 03/17/2022    AST 16 03/17/2022    ALT 10 03/17/2022       POC Tests: No results for input(s): POCGLU, POCNA, POCK, POCCL, POCBUN, POCHEMO, POCHCT in the last 72 hours.     Coags:   Lab Results   Component Value Date    PROTIME 12.7 09/11/2012    INR 0.99 09/11/2012       HCG (If Applicable): No results found for: PREGTESTUR, PREGSERUM, HCG, HCGQUANT     ABGs: No results found for: PHART, PO2ART, DLW4YCK, OVG6JQZ, BEART, J4DZOVKE     Type & Screen (If Applicable):  No results found for: LABABO, LABRH    Drug/Infectious Status (If Applicable):  No results found for: HIV, HEPCAB    COVID-19 Screening (If Applicable):   Lab Results   Component Value Date    COVID19 Not Detected 03/22/2022           Anesthesia Evaluation  Patient summary reviewed no history of anesthetic complications:   Airway: Mallampati: I  TM distance: >3 FB   Neck ROM: full  Mouth opening: > = 3 FB Dental:          Pulmonary:normal exam  breath sounds clear to auscultation      (-) asthma, recent URI, sleep apnea and not a current smoker Cardiovascular:  Exercise tolerance: good (>4 METS),   (+) hypertension:, hyperlipidemia    (-) pacemaker, past MI, CABG/stent and  angina    ECG reviewed  Rhythm: regular  Rate: normal           Beta Blocker:  Dose within 24 Hrs         Neuro/Psych:   (+) neuromuscular disease ([place] inflammatory demyelinating neuropathy):,    (-) seizures, TIA and CVA           GI/Hepatic/Renal:   (+) GERD: well controlled,      (-) liver disease and no renal disease       Endo/Other:    (+) : arthritis: rheumatoid. , .    (-) diabetes mellitus, hypothyroidism, hyperthyroidism               Abdominal:             Vascular:     - DVT. Other Findings:             Anesthesia Plan      general     ASA 3     (Preop famotidine, dexamethasone)  Induction: intravenous. MIPS: Postoperative opioids intended and Prophylactic antiemetics administered. Anesthetic plan and risks discussed with patient. Use of blood products discussed with patient whom consented to blood products.                  Valerie Logan MD   3/25/2022

## 2022-04-08 NOTE — PROGRESS NOTES
HPI:    Patient presents for follow-up of bladder spasms after initiation of Levsin. She reports she has been utilizing the medication about once a day without any significant difference. She has only been taking the medication for about 2 weeks now. She does utilize and out catheterization 3-4 times per day, but reports very little urine output with catheterization. She reports in the morning, she is able to cath without significant difficulties, however, throughout the day, again she does not have much urinary output. She reports leakage continuously throughout the day causing her to utilize depends and pads. She has cut down some of her fluids, but reports no significant decrease in her leakage. She has been evaluated by Dr. Reese Lea in the past and was actually referred here so that she can have her care locally. She does not recall if she has ever undergone urodynamic studies. INDICATION:  Atonic bladder, bladder spasms refractory to Levsin and urinary incontinence uncertain etiology    URODYNAMIC PROCEDURE  Please see the complete urodynamic study scanned into the chart    The following studies were carried out :    UROFLOW (Complex): Uroflow study was not carried out the patient was unable to void  PVR was 30 mL    COMPLEX CYSTOMETROGRAM: with H2O rate 80 ml/min. Normal desire 103 ml. Her first desire was 80  Strong Desire 130 ml. Bladder capacity 221 ml. With bladder pain                                                                                              Findings are compatible with increased sensation, decreased capacity, abnormal low compliance. With poorly compliant bladder hypersensitive    Detrusor hyper-reflexia / over activity present Yes. Significant detrusor overactivity demonstrated capacity 221 mL with urge incontinence detrusor pressure of 24 cm H2O  See printed graph / data Report for details. LEAK POINT PRESSURE:  positive.   ALLP not performed as patient had  Urge incontinence cm H2O with volume of 221  [employer] 24 cm H2O     INTRA-ABDOMINAL VOIDING PRESSURE: (Pressure - Flow / Micturition Study):  See the graph / data report for details. Max flow 1.9 mL/s  Average flow 4.6 mL/s   Voided volume 33.3 mL   mL  Pressure at peak flow 39.5 cm H2O  Peak pressure 46 cm H2O  Mean pressure 37.5 cm H2O  Pattern: Reports continuous however this cannot be determined due to the low flow and low volume  Abdominal straining present Yes mild      SPHINCTER EMG:    The findings are compatible with coordinated sphincter yes      FINAL IMPRESSION: 1. Patient has low compliance with decreased capacity increase sensation and detrusor overactivity with demonstrated urge incontinence but has safe detrusor leak point pressure of 24 cm of water. Patient has  inadequate and incomplete emptying but is demonstrating detrusor contractility but does not appear to be adequate enough for more complete emptying    PLAN: This is a complex patient who clearly demonstrates poor emptying elevated residual on the pressure flow micturition but by history she is not getting significant volume that was a catheterized probably because of detrusor overactivity and urge incontinence refractory to medical treatment therefore she never can hold urine. I would first recommend discontinuing in and out catheterization. And maximize medical therapy for detrusor overactivity and urge incontinence and monitor residuals.   I would recommend referral to a specialist in female voiding dysfunction she may benefit from neuromodulation and InterStim

## 2022-04-09 DIAGNOSIS — G61.81 CIDP (CHRONIC INFLAMMATORY DEMYELINATING POLYNEUROPATHY) (HCC): ICD-10-CM

## 2022-04-11 RX ORDER — GABAPENTIN 600 MG/1
TABLET ORAL
Qty: 270 TABLET | Refills: 1 | Status: SHIPPED | OUTPATIENT
Start: 2022-04-11 | End: 2022-10-16 | Stop reason: SDUPTHER

## 2022-04-11 NOTE — TELEPHONE ENCOUNTER
Requested Prescriptions     Pending Prescriptions Disp Refills    gabapentin (NEURONTIN) 600 MG tablet 270 tablet 1     Sig: TAKE 1 TABLET THREE TIMES A DAY       Last Office Visit:  3/3/2022  Next Office Visit:  9/8/2022  Last Medication Refill:  7/28/2021 with 1 refill    Court  up to date:   3/25/2022     *RX updated to reflect   4/11/2022  fill date*

## 2022-04-14 ENCOUNTER — OFFICE VISIT (OUTPATIENT)
Dept: UROLOGY | Age: 77
End: 2022-04-14
Payer: MEDICARE

## 2022-04-14 ENCOUNTER — OFFICE VISIT (OUTPATIENT)
Dept: SURGERY | Age: 77
End: 2022-04-14

## 2022-04-14 VITALS
OXYGEN SATURATION: 96 % | TEMPERATURE: 97.1 F | BODY MASS INDEX: 22.02 KG/M2 | HEIGHT: 64 IN | WEIGHT: 129 LBS | HEART RATE: 68 BPM

## 2022-04-14 VITALS
SYSTOLIC BLOOD PRESSURE: 161 MMHG | BODY MASS INDEX: 22.06 KG/M2 | DIASTOLIC BLOOD PRESSURE: 67 MMHG | HEIGHT: 64 IN | OXYGEN SATURATION: 98 % | TEMPERATURE: 97 F | HEART RATE: 72 BPM | WEIGHT: 129.2 LBS

## 2022-04-14 DIAGNOSIS — N39.41 URGE INCONTINENCE: Primary | ICD-10-CM

## 2022-04-14 DIAGNOSIS — N90.89 PERINEAL CYST IN FEMALE: Primary | ICD-10-CM

## 2022-04-14 DIAGNOSIS — N39.0 RECURRENT UTI: ICD-10-CM

## 2022-04-14 PROCEDURE — 99214 OFFICE O/P EST MOD 30 MIN: CPT | Performed by: NURSE PRACTITIONER

## 2022-04-14 PROCEDURE — 99024 POSTOP FOLLOW-UP VISIT: CPT | Performed by: PHYSICIAN ASSISTANT

## 2022-04-14 RX ORDER — FESOTERODINE FUMARATE 4 MG/1
4 TABLET, FILM COATED, EXTENDED RELEASE ORAL DAILY
Qty: 90 TABLET | Refills: 11 | Status: SHIPPED | OUTPATIENT
Start: 2022-04-14 | End: 2022-04-29 | Stop reason: SDUPTHER

## 2022-04-14 NOTE — PROGRESS NOTES
Shavonne Bell is a 67 y.o., female, Established patient who presents today   Chief Complaint   Patient presents with    Follow-up       HPI   Patient presents for follow-up after urodynamic studies. She had initially been referred to our office by Dr. Darcie Maki for atonic bladder and had been utilizing in and out catheterization for several years. Over the last few months, she has been having significant issues with continuous urine leakage and not having any urinary output with catheterization. She had failed therapy with Myrbetriq as well as Levsin and continues to have significant leakage. Urodynamics reveals a low compliance bladder with decreased capacity and increased sensation with detrusor overactivity and demonstrated urge incontinence. She does have a safe leak point pressure of 24 cm of water. She continues to have inadequate and incomplete emptying, but does demonstrate detrusor contractility. REVIEW OF SYSTEMS:  Review of Systems   Constitutional: Negative for chills and fever. Gastrointestinal: Negative for abdominal distention, abdominal pain, nausea and vomiting. Genitourinary: Negative for dysuria, flank pain, frequency, hematuria and urgency. Musculoskeletal: Positive for gait problem. Negative for back pain. Psychiatric/Behavioral: Negative for agitation and confusion. PHYSICAL EXAM:  BP (!) 161/67   Pulse 72   Temp 97 °F (36.1 °C)   Ht 5' 4\" (1.626 m)   Wt 129 lb 3.2 oz (58.6 kg)   SpO2 98%   BMI 22.18 kg/m²   Physical Exam  Vitals and nursing note reviewed. Constitutional:       General: She is not in acute distress. Appearance: Normal appearance. She is not ill-appearing. Pulmonary:      Effort: Pulmonary effort is normal. No respiratory distress. Abdominal:      General: There is no distension. Tenderness: There is no abdominal tenderness. There is no right CVA tenderness or left CVA tenderness.    Neurological:      Mental Status: She is alert and oriented to person, place, and time. Mental status is at baseline. Psychiatric:         Mood and Affect: Mood normal.         Behavior: Behavior normal.         ASSESSMENT/PLAN  1. Urge incontinence  Patient with urge incontinence and incomplete bladder emptying. Per recommendations of Dr. Shantal Powell, will maximize OAB therapy and monitor residuals. She has previously failed with Myrbetriq, so I will prescribe Dibenzyline today. I have given her 6 weeks of samples. In addition, I will start her on Toviaz. We will discontinue intermittent catheterization and monitor residuals at her next appointment. Should she continue to have issues, would likely recommend referral to a female voiding dysfunction specialist.  - fesoterodine (TOVIAZ) 4 MG TB24 ER tablet; Take 1 tablet by mouth daily  Dispense: 90 tablet; Refill: 11  - Vibegron 75 MG TABS; Take 75 mg by mouth daily  Dispense: 30 tablet; Refill: 11    2. Recurrent UTI  No symptoms of UTI at this time. We will continue on Premarin cream.  - conjugated estrogens (PREMARIN) 0.625 MG/GM vaginal cream; Place vaginally twice a week  Dispense: 30 g; Refill: 11      No orders of the defined types were placed in this encounter. Return in about 3 months (around 7/14/2022). An electronic signature was used to authenticate this note. ENZO KAT - CNP    All information inputted into the note by the MA to include chief complaint, past medical history, past surgical history, medications, allergies, social and family history and review of systems has been reviewed and updated as needed by me. EMR Dragon/transcription disclaimer: Much of this document is electronic transcription/translation of spoken language to printed text. The electronic translation of spoken language may be erroneous or, at times, nonsensical words or phrases may be inadvertently transcribed.  Although I have reviewed the document for such errors, some may still exist.

## 2022-04-15 ASSESSMENT — ENCOUNTER SYMPTOMS
VOMITING: 0
ABDOMINAL DISTENTION: 0
ABDOMINAL PAIN: 0
BACK PAIN: 0
NAUSEA: 0

## 2022-04-21 ENCOUNTER — OFFICE VISIT (OUTPATIENT)
Dept: UROLOGY | Age: 77
End: 2022-04-21
Payer: MEDICARE

## 2022-04-21 DIAGNOSIS — N39.0 RECURRENT UTI: Primary | ICD-10-CM

## 2022-04-21 DIAGNOSIS — N39.41 URGE INCONTINENCE: ICD-10-CM

## 2022-04-21 PROCEDURE — 99214 OFFICE O/P EST MOD 30 MIN: CPT | Performed by: NURSE PRACTITIONER

## 2022-04-21 ASSESSMENT — ENCOUNTER SYMPTOMS
BACK PAIN: 1
VOMITING: 0
ABDOMINAL PAIN: 0
NAUSEA: 0
ABDOMINAL DISTENTION: 0

## 2022-04-21 NOTE — PROGRESS NOTES
Rony Marie is a 67 y.o., female, Established patient who presents today   Chief Complaint   Patient presents with    Follow-up     I am here today for a poss UTI        HPI   Patient presents today with complaints of possible urinary tract infection. She reports yesterday she began having increased frequency and incontinence as well as some dysuria. She reports her symptoms are slightly better today. She denies any fevers, chills, nausea, vomiting, hematuria. She was seen last week in the office, see below for HPI from 4/14/22:  Patient presents for follow-up after urodynamic studies. She had initially been referred to our office by Dr. Bishop Peter for atonic bladder and had been utilizing in and out catheterization for several years. Over the last few months, she has been having significant issues with continuous urine leakage and not having any urinary output with catheterization. She had failed therapy with Myrbetriq as well as Levsin and continues to have significant leakage. Urodynamics reveals a low compliance bladder with decreased capacity and increased sensation with detrusor overactivity and demonstrated urge incontinence. She does have a safe leak point pressure of 24 cm of water. She continues to have inadequate and incomplete emptying, but does demonstrate detrusor contractility. REVIEW OF SYSTEMS:  Review of Systems   Constitutional: Negative for chills and fever. Gastrointestinal: Negative for abdominal distention, abdominal pain, nausea and vomiting. Genitourinary: Positive for dysuria, frequency and urgency. Negative for difficulty urinating, flank pain and hematuria. Musculoskeletal: Positive for back pain, gait problem and neck pain. Psychiatric/Behavioral: Negative for agitation and confusion. PHYSICAL EXAM:  There were no vitals taken for this visit. Physical Exam  Vitals and nursing note reviewed. Constitutional:       General: She is not in acute distress. Appearance: Normal appearance. She is not ill-appearing. Pulmonary:      Effort: Pulmonary effort is normal. No respiratory distress. Abdominal:      General: There is no distension. Tenderness: There is no abdominal tenderness. There is no right CVA tenderness or left CVA tenderness. Neurological:      Mental Status: She is alert and oriented to person, place, and time. Mental status is at baseline. Motor: Weakness present. Gait: Gait abnormal.   Psychiatric:         Mood and Affect: Mood normal.         Behavior: Behavior normal.       ASSESSMENT/PLAN  1. Recurrent UTI  Patient presents for possible urinary tract infection. She does have a history of In-N-Out catheterization and it is likely that she is still colonized as this practice was just discontinued last week. I will go ahead and send her catheterized urine for culture today and we will treat with antibiotics if necessary. I did discuss with the patient this is possibly secondary to her overactive bladder, however, she reports she was discussing surgical intervention on her neck with a neurosurgeon next week, so I will go ahead and treat if the culture is positive. - Culture, Urine    2. Urge incontinence  Recently initiated on Gemtesa as well as Toviaz. Plan for follow-up in about 3 months. Orders Placed This Encounter   Procedures    Culture, Urine     Order Specific Question:   Specify (ex-cath, midstream, cysto, etc)? Answer:   cath urine        Return for keep fu as scheduled. An electronic signature was used to authenticate this note. ENZO KAT - CNP    All information inputted into the note by the MA to include chief complaint, past medical history, past surgical history, medications, allergies, social and family history and review of systems has been reviewed and updated as needed by me.     EMR Dragon/transcription disclaimer: Much of this document is electronic transcription/translation of spoken language to printed text. The electronic translation of spoken language may be erroneous or, at times, nonsensical words or phrases may be inadvertently transcribed.  Although I have reviewed the document for such errors, some may still exist.

## 2022-04-23 DIAGNOSIS — N39.0 RECURRENT UTI: Primary | ICD-10-CM

## 2022-04-23 LAB
ORGANISM: ABNORMAL
URINE CULTURE, ROUTINE: ABNORMAL
URINE CULTURE, ROUTINE: ABNORMAL

## 2022-04-23 RX ORDER — LEVOFLOXACIN 250 MG/1
250 TABLET ORAL DAILY
Qty: 3 TABLET | Refills: 0 | Status: SHIPPED | OUTPATIENT
Start: 2022-04-23 | End: 2022-04-26

## 2022-04-24 DIAGNOSIS — M48.062 SPINAL STENOSIS OF LUMBAR REGION WITH NEUROGENIC CLAUDICATION: ICD-10-CM

## 2022-04-24 DIAGNOSIS — M05.79 RHEUMATOID ARTHRITIS INVOLVING MULTIPLE SITES WITH POSITIVE RHEUMATOID FACTOR (HCC): ICD-10-CM

## 2022-04-24 DIAGNOSIS — G61.81 CIDP (CHRONIC INFLAMMATORY DEMYELINATING POLYNEUROPATHY) (HCC): ICD-10-CM

## 2022-04-25 NOTE — TELEPHONE ENCOUNTER
Requested Prescriptions     Pending Prescriptions Disp Refills    HYDROcodone-acetaminophen (NORCO)  MG per tablet 90 tablet 0     Sig: Take 1 tablet by mouth every 8 hours as needed for Pain for up to 30 days.        Last Office Visit:  3/3/2022  Next Office Visit:  9/8/2022  Last Medication Refill:  3/27/2022 with 0 refills  Jinx File up to date:  3/25/2022    *RX updated to reflect   4/26/2022  fill date*

## 2022-04-26 RX ORDER — HYDROCODONE BITARTRATE AND ACETAMINOPHEN 10; 325 MG/1; MG/1
1 TABLET ORAL EVERY 8 HOURS PRN
Qty: 90 TABLET | Refills: 0 | Status: SHIPPED | OUTPATIENT
Start: 2022-04-26 | End: 2022-05-26

## 2022-04-29 DIAGNOSIS — N39.41 URGE INCONTINENCE: ICD-10-CM

## 2022-04-29 RX ORDER — FESOTERODINE FUMARATE 4 MG/1
4 TABLET, FILM COATED, EXTENDED RELEASE ORAL DAILY
Qty: 90 TABLET | Refills: 11 | Status: SHIPPED | OUTPATIENT
Start: 2022-04-29 | End: 2022-11-01 | Stop reason: ALTCHOICE

## 2022-05-01 NOTE — PROGRESS NOTES
And comes today status post excision of a perineal abscess inclusion cyst by Dr. Diamante Francois. She is doing well. 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/2016    CIDP (chronic inflammatory demyelinating polyneuropathy) (HCC) 08/01/2016    Restless legs syndrome (RLS) 08/01/2016     Current Outpatient Medications   Medication Sig Dispense Refill    Vibegron 75 MG TABS Take 75 mg by mouth daily 30 tablet 11    conjugated estrogens (PREMARIN) 0.625 MG/GM vaginal cream Place vaginally twice a week 30 g 11    gabapentin (NEURONTIN) 600 MG tablet TAKE 1 TABLET THREE TIMES A  tablet 1    meloxicam (MOBIC) 15 MG tablet Take 15 mg by mouth daily       tiZANidine (ZANAFLEX) 4 MG tablet TAKE 1 TABLET BY MOUTH 2 TIMES DAILY AS NEEDED (PAIN) (Patient taking differently: Take 4 mg by mouth every 8 hours as needed ) 180 tablet 1    allopurinol (ZYLOPRIM) 300 MG tablet Take 300 mg by mouth daily      Propylene Glycol (SYSTANE COMPLETE) 0.6 % SOLN Apply 1 drop to eye 3 times daily Each eye      estrogens, conjugated, (PREMARIN) 0.3 MG tablet Take 0.3 mg by mouth Twice a Week Takes sun, and thur      amLODIPine (NORVASC) 5 MG tablet Take 5 mg by mouth daily      colestipol (COLESTID) 1 g tablet Take 1 g by mouth 3 times daily       Cyanocobalamin (VITAMIN B 12) 100 MCG LOZG Take by mouth daily       lidocaine (XYLOCAINE) 5 % ointment Apply topically as needed for Pain Apply topically as needed.  30 g 3    fenofibrate (TRICOR) 145 MG tablet 145 mg daily       Omega-3 Fatty Acids (FISH OIL) 1000 MG CAPS Take 1,000 mg by mouth 2 times daily      Garlic 8194 MG CAPS Take 1,000 mg by mouth 2 times daily      CRANBERRY SOFT PO Take 36 mg by mouth daily      carvedilol (COREG) 6.25 MG tablet Take 6.25 mg by mouth 2 times daily      aspirin 81 MG tablet Take 81 mg by mouth daily      folic acid (FOLVITE) 1 MG tablet Take 600 mg by mouth daily       Multiple Vitamins-Minerals (THERAPEUTIC MULTIVITAMIN-MINERALS) tablet Take 1 tablet by mouth daily      calcium-vitamin D (OSCAL) 250-125 MG-UNIT per tablet Take 1 tablet by mouth daily      mesalamine (DELZICOL) 400 MG CPDR DR capsule Take 400 mg by mouth 3 times daily       indapamide (LOZOL) 2.5 MG tablet Take 2.5 mg by mouth every morning      omeprazole (PRILOSEC) 20 MG capsule Take 20 mg by mouth Daily       fesoterodine (TOVIAZ) 4 MG TB24 ER tablet Take 1 tablet by mouth daily 90 tablet 11    HYDROcodone-acetaminophen (NORCO)  MG per tablet Take 1 tablet by mouth every 8 hours as needed for Pain for up to 30 days. 90 tablet 0     No current facility-administered medications for this visit. Allergies: Patient has no known allergies.      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 Bilateral lower extremity runoff. Family History   Problem Relation Age of Onset    Cancer Mother     High Blood Pressure Father     Heart Disease Father     Cancer Daughter      Social History     Tobacco Use    Smoking status: Never Smoker    Smokeless tobacco: Never Used   Substance Use Topics    Alcohol use: No     Exam  Pulse 68, temperature 97.1 °F (36.2 °C), temperature source Temporal, height 5' 4\" (1.626 m), weight 129 lb (58.5 kg), SpO2 96 %, not currently breastfeeding. Wound evaluation reveals the incision to showed no evidence of infection. Impression  Status post excision of peritoneal mass, final pathology report revealing benign epidermal inclusion cyst    Plan  We will plan to see the patient back in the office on a as needed basis.

## 2022-05-05 ENCOUNTER — PRE-ADMISSION TESTING (OUTPATIENT)
Dept: PREADMISSION TESTING | Facility: HOSPITAL | Age: 77
End: 2022-05-05

## 2022-05-05 ENCOUNTER — HOSPITAL ENCOUNTER (OUTPATIENT)
Dept: GENERAL RADIOLOGY | Facility: HOSPITAL | Age: 77
Discharge: HOME | End: 2022-05-05

## 2022-05-05 VITALS
HEART RATE: 61 BPM | SYSTOLIC BLOOD PRESSURE: 119 MMHG | OXYGEN SATURATION: 99 % | HEIGHT: 63 IN | WEIGHT: 133.16 LBS | DIASTOLIC BLOOD PRESSURE: 51 MMHG | RESPIRATION RATE: 22 BRPM | BODY MASS INDEX: 23.59 KG/M2

## 2022-05-05 LAB
ALBUMIN SERPL-MCNC: 4.2 G/DL (ref 3.5–5.2)
ALBUMIN/GLOB SERPL: 1.8 G/DL
ALP SERPL-CCNC: 39 U/L (ref 39–117)
ALT SERPL W P-5'-P-CCNC: 11 U/L (ref 1–33)
AMORPH URATE CRY URNS QL MICRO: ABNORMAL /HPF
ANION GAP SERPL CALCULATED.3IONS-SCNC: 8 MMOL/L (ref 5–15)
APTT PPP: 26.6 SECONDS (ref 24.1–35)
AST SERPL-CCNC: 15 U/L (ref 1–32)
BACTERIA UR QL AUTO: ABNORMAL /HPF
BASOPHILS # BLD AUTO: 0.05 10*3/MM3 (ref 0–0.2)
BASOPHILS NFR BLD AUTO: 0.9 % (ref 0–1.5)
BILIRUB SERPL-MCNC: 0.3 MG/DL (ref 0–1.2)
BILIRUB UR QL STRIP: NEGATIVE
BUN SERPL-MCNC: 31 MG/DL (ref 8–23)
BUN/CREAT SERPL: 36.9 (ref 7–25)
CALCIUM SPEC-SCNC: 9.9 MG/DL (ref 8.6–10.5)
CHLORIDE SERPL-SCNC: 104 MMOL/L (ref 98–107)
CLARITY UR: ABNORMAL
CO2 SERPL-SCNC: 31 MMOL/L (ref 22–29)
COLOR UR: YELLOW
CREAT SERPL-MCNC: 0.84 MG/DL (ref 0.57–1)
DEPRECATED RDW RBC AUTO: 45 FL (ref 37–54)
EGFRCR SERPLBLD CKD-EPI 2021: 71.7 ML/MIN/1.73
EOSINOPHIL # BLD AUTO: 0.22 10*3/MM3 (ref 0–0.4)
EOSINOPHIL NFR BLD AUTO: 4.1 % (ref 0.3–6.2)
ERYTHROCYTE [DISTWIDTH] IN BLOOD BY AUTOMATED COUNT: 14.2 % (ref 12.3–15.4)
GLOBULIN UR ELPH-MCNC: 2.3 GM/DL
GLUCOSE SERPL-MCNC: 112 MG/DL (ref 65–99)
GLUCOSE UR STRIP-MCNC: NEGATIVE MG/DL
HCT VFR BLD AUTO: 36.8 % (ref 34–46.6)
HGB BLD-MCNC: 11.5 G/DL (ref 12–15.9)
HGB UR QL STRIP.AUTO: ABNORMAL
HYALINE CASTS UR QL AUTO: ABNORMAL /LPF
IMM GRANULOCYTES # BLD AUTO: 0 10*3/MM3 (ref 0–0.05)
IMM GRANULOCYTES NFR BLD AUTO: 0 % (ref 0–0.5)
INR PPP: 1.04 (ref 0.91–1.09)
KETONES UR QL STRIP: NEGATIVE
LEUKOCYTE ESTERASE UR QL STRIP.AUTO: ABNORMAL
LYMPHOCYTES # BLD AUTO: 1.38 10*3/MM3 (ref 0.7–3.1)
LYMPHOCYTES NFR BLD AUTO: 25.5 % (ref 19.6–45.3)
MCH RBC QN AUTO: 27.4 PG (ref 26.6–33)
MCHC RBC AUTO-ENTMCNC: 31.3 G/DL (ref 31.5–35.7)
MCV RBC AUTO: 87.6 FL (ref 79–97)
MONOCYTES # BLD AUTO: 0.91 10*3/MM3 (ref 0.1–0.9)
MONOCYTES NFR BLD AUTO: 16.8 % (ref 5–12)
NEUTROPHILS NFR BLD AUTO: 2.86 10*3/MM3 (ref 1.7–7)
NEUTROPHILS NFR BLD AUTO: 52.7 % (ref 42.7–76)
NITRITE UR QL STRIP: NEGATIVE
NRBC BLD AUTO-RTO: 0 /100 WBC (ref 0–0.2)
PH UR STRIP.AUTO: 5.5 [PH] (ref 5–8)
PLATELET # BLD AUTO: 211 10*3/MM3 (ref 140–450)
PMV BLD AUTO: 10.6 FL (ref 6–12)
POTASSIUM SERPL-SCNC: 4.3 MMOL/L (ref 3.5–5.2)
PROT SERPL-MCNC: 6.5 G/DL (ref 6–8.5)
PROT UR QL STRIP: ABNORMAL
PROTHROMBIN TIME: 13.2 SECONDS (ref 11.9–14.6)
RBC # BLD AUTO: 4.2 10*6/MM3 (ref 3.77–5.28)
RBC # UR STRIP: ABNORMAL /HPF
REF LAB TEST METHOD: ABNORMAL
SODIUM SERPL-SCNC: 143 MMOL/L (ref 136–145)
SP GR UR STRIP: 1.01 (ref 1–1.03)
SQUAMOUS #/AREA URNS HPF: ABNORMAL /HPF
UROBILINOGEN UR QL STRIP: ABNORMAL
WBC # UR STRIP: ABNORMAL /HPF
WBC CLUMPS # UR AUTO: ABNORMAL /HPF
WBC NRBC COR # BLD: 5.42 10*3/MM3 (ref 3.4–10.8)

## 2022-05-05 PROCEDURE — 81001 URINALYSIS AUTO W/SCOPE: CPT

## 2022-05-05 PROCEDURE — 36415 COLL VENOUS BLD VENIPUNCTURE: CPT

## 2022-05-05 PROCEDURE — 87086 URINE CULTURE/COLONY COUNT: CPT

## 2022-05-05 PROCEDURE — 93005 ELECTROCARDIOGRAM TRACING: CPT

## 2022-05-05 PROCEDURE — 87088 URINE BACTERIA CULTURE: CPT

## 2022-05-05 PROCEDURE — 87186 SC STD MICRODIL/AGAR DIL: CPT

## 2022-05-05 PROCEDURE — 80053 COMPREHEN METABOLIC PANEL: CPT

## 2022-05-05 PROCEDURE — 85730 THROMBOPLASTIN TIME PARTIAL: CPT

## 2022-05-05 PROCEDURE — 93010 ELECTROCARDIOGRAM REPORT: CPT | Performed by: EMERGENCY MEDICINE

## 2022-05-05 PROCEDURE — 85025 COMPLETE CBC W/AUTO DIFF WBC: CPT

## 2022-05-05 PROCEDURE — 71045 X-RAY EXAM CHEST 1 VIEW: CPT

## 2022-05-05 PROCEDURE — 85610 PROTHROMBIN TIME: CPT

## 2022-05-05 NOTE — DISCHARGE INSTRUCTIONS
Before you come to the hospital        Arrival time: AS DIRECTED BY OFFICE     YOU MAY TAKE THE FOLLOWING MEDICATION(S) THE MORNING OF SURGERY WITH A SIP OF WATER: ***  Carvedolol, gabapentin, Norco           ALL OTHER HOME MEDICATION CHECK WITH YOUR PHYSICIAN (especially if you are taking diabetes medicines or blood thinners)  Hold lisinopril for 24 hours prior to surgery    Do not take any Erectile Dysfunction medications (EX: CIALIS, VIAGRA) 24 hours prior to surgery      If you were given and instructed to use a germ- killing soap, use as directed the night before surgery and the morning of surgery before coming to the hospital.       Eating and drinking restrictions  (It is extremely important that you follow these guidelines to prevent delay or cancelation of your procedure)   Up to 2 hours prior to the time you are told to arrive to the hospital - you may continue to drink clear liquids, such as water, clear fruit juice (no pulp), black coffee, and plain tea.          Eating and drinking restrictions prior to scheduled arrival time  Clear liquids (water, apple juice-no pulp) - 2 hours   Breast milk - 4 hours   Milk or drinks that contain milk, full liquids-  6 hours   Light meals or foods, such as toast or cereal- 6 hours   Heavy foods, such as meat, fried foods or fatty foods- 8 hours       Clear Liquids  Water and flavored water                                                                       Sugar water.  Ice or frozen ice pops.  Clear Fruit juices, such as cranberry juice and apple juice.  Black coffee.  Plain tea  Clear bouillon or broth.  Broth-based soups that have been strained.  Flavored gelatin.  Soda.  Gatorade or Powerade.  Full liquid examples  Juices that have pulp.  Frozen ice pops that contain fruit pieces.  Coffee with creamer  Milk.  Cream or cream-based soups.  Yogurt.              MANAGING PAIN AFTER SURGERY    We know you are probably wondering what your pain will be like after  surgery.  Following surgery it is unrealistic to expect you will not have pain.   Pain is how our bodies let us know that something is wrong or cautions us to be careful.  That said, our goal is to make your pain tolerable.    Methods we may use to treat your pain include (oral or IV medications, PCAs, epidurals, nerve blocks, etc.)   While some procedures require IV pain medications for a short time after surgery, transitioning to pain medications by mouth allows for better management of pain.   Your nurse will encourage you to take oral pain medications whenever possible.  IV medications work almost immediately, but only last a short while.  Taking medications by mouth allows for a more constant level of medication in your blood stream for a longer period of time.      Once your pain is out of control it is harder to get back under control.  It is important you are aware when your next dose of pain medication is due.  If you are admitted, your nurse may write the time of your next dose on the white board in your room to help you remember.      We are interested in your pain and encourage you to inform us about aggravating factors during your visit.   Many times a simple repositioning every few hours can make a big difference.    If your physician says it is okay, do not let your pain prevent you from getting out of bed. Be sure to call your nurse for assistance prior to getting up so you do not fall.      Before surgery, please decide your tolerable pain goal.  These faces help describe the pain ratings we use on a 0-10 scale.   Be prepared to tell us your goal and whether or not you take pain or anxiety medications at home.

## 2022-05-06 ENCOUNTER — TRANSCRIBE ORDERS (OUTPATIENT)
Dept: LAB | Facility: HOSPITAL | Age: 77
End: 2022-05-06

## 2022-05-06 DIAGNOSIS — Z11.59 SCREENING FOR VIRAL DISEASE: Primary | ICD-10-CM

## 2022-05-06 LAB
QT INTERVAL: 420 MS
QTC INTERVAL: 408 MS

## 2022-05-07 LAB — BACTERIA SPEC AEROBE CULT: ABNORMAL

## 2022-05-10 ENCOUNTER — LAB (OUTPATIENT)
Dept: LAB | Facility: HOSPITAL | Age: 77
End: 2022-05-10

## 2022-05-10 LAB — SARS-COV-2 ORF1AB RESP QL NAA+PROBE: NOT DETECTED

## 2022-05-10 PROCEDURE — U0004 COV-19 TEST NON-CDC HGH THRU: HCPCS | Performed by: ORTHOPAEDIC SURGERY

## 2022-05-12 ENCOUNTER — ANESTHESIA (OUTPATIENT)
Dept: PERIOP | Facility: HOSPITAL | Age: 77
End: 2022-05-12

## 2022-05-12 ENCOUNTER — ANESTHESIA EVENT (OUTPATIENT)
Dept: PERIOP | Facility: HOSPITAL | Age: 77
End: 2022-05-12

## 2022-05-12 ENCOUNTER — HOSPITAL ENCOUNTER (INPATIENT)
Facility: HOSPITAL | Age: 77
LOS: 1 days | Discharge: HOME | End: 2022-05-13
Attending: ORTHOPAEDIC SURGERY | Admitting: ORTHOPAEDIC SURGERY

## 2022-05-12 ENCOUNTER — APPOINTMENT (OUTPATIENT)
Dept: GENERAL RADIOLOGY | Facility: HOSPITAL | Age: 77
End: 2022-05-12

## 2022-05-12 DIAGNOSIS — Z78.9 DECREASED ACTIVITIES OF DAILY LIVING (ADL): ICD-10-CM

## 2022-05-12 DIAGNOSIS — Z74.09 IMPAIRED MOBILITY: ICD-10-CM

## 2022-05-12 DIAGNOSIS — M48.02 STENOSIS, CERVICAL SPINE: Primary | ICD-10-CM

## 2022-05-12 PROCEDURE — 94664 DEMO&/EVAL PT USE INHALER: CPT

## 2022-05-12 PROCEDURE — 86850 RBC ANTIBODY SCREEN: CPT | Performed by: ORTHOPAEDIC SURGERY

## 2022-05-12 PROCEDURE — 94799 UNLISTED PULMONARY SVC/PX: CPT

## 2022-05-12 PROCEDURE — C1713 ANCHOR/SCREW BN/BN,TIS/BN: HCPCS | Performed by: ORTHOPAEDIC SURGERY

## 2022-05-12 PROCEDURE — 86901 BLOOD TYPING SEROLOGIC RH(D): CPT | Performed by: ORTHOPAEDIC SURGERY

## 2022-05-12 PROCEDURE — 76000 FLUOROSCOPY <1 HR PHYS/QHP: CPT

## 2022-05-12 PROCEDURE — 25010000002 ONDANSETRON PER 1 MG: Performed by: ORTHOPAEDIC SURGERY

## 2022-05-12 PROCEDURE — 63710000001 DIPHENHYDRAMINE PER 50 MG: Performed by: ORTHOPAEDIC SURGERY

## 2022-05-12 PROCEDURE — 4A11X4G MONITORING OF PERIPHERAL NERVOUS ELECTRICAL ACTIVITY, INTRAOPERATIVE, EXTERNAL APPROACH: ICD-10-PCS | Performed by: ORTHOPAEDIC SURGERY

## 2022-05-12 PROCEDURE — 0PB30ZZ EXCISION OF CERVICAL VERTEBRA, OPEN APPROACH: ICD-10-PCS | Performed by: ORTHOPAEDIC SURGERY

## 2022-05-12 PROCEDURE — 25010000002 PROPOFOL 10 MG/ML EMULSION: Performed by: NURSE ANESTHETIST, CERTIFIED REGISTERED

## 2022-05-12 PROCEDURE — 25010000002 CEFAZOLIN PER 500 MG: Performed by: ORTHOPAEDIC SURGERY

## 2022-05-12 PROCEDURE — 25010000002 DEXAMETHASONE PER 1 MG: Performed by: NURSE ANESTHETIST, CERTIFIED REGISTERED

## 2022-05-12 PROCEDURE — 0RG20A0 FUSION OF 2 OR MORE CERVICAL VERTEBRAL JOINTS WITH INTERBODY FUSION DEVICE, ANTERIOR APPROACH, ANTERIOR COLUMN, OPEN APPROACH: ICD-10-PCS | Performed by: ORTHOPAEDIC SURGERY

## 2022-05-12 PROCEDURE — 0RB30ZZ EXCISION OF CERVICAL VERTEBRAL DISC, OPEN APPROACH: ICD-10-PCS | Performed by: ORTHOPAEDIC SURGERY

## 2022-05-12 PROCEDURE — 86900 BLOOD TYPING SEROLOGIC ABO: CPT | Performed by: ORTHOPAEDIC SURGERY

## 2022-05-12 PROCEDURE — 94761 N-INVAS EAR/PLS OXIMETRY MLT: CPT

## 2022-05-12 PROCEDURE — 72040 X-RAY EXAM NECK SPINE 2-3 VW: CPT

## 2022-05-12 PROCEDURE — 97166 OT EVAL MOD COMPLEX 45 MIN: CPT

## 2022-05-12 PROCEDURE — 25010000002 ONDANSETRON PER 1 MG: Performed by: NURSE ANESTHETIST, CERTIFIED REGISTERED

## 2022-05-12 DEVICE — BENGAL STACKABLE CAGE SYSTEM STANDARD LORDOTIC 12 X 14 X 22MM, 0 DEGREES/6 DEGREES
Type: IMPLANTABLE DEVICE | Site: SPINE CERVICAL | Status: FUNCTIONAL
Brand: BENGAL

## 2022-05-12 DEVICE — IMPLANTABLE DEVICE: Type: IMPLANTABLE DEVICE | Site: SPINE CERVICAL | Status: FUNCTIONAL

## 2022-05-12 DEVICE — SCRW SLF/DRL ACP TI 3.5X15MM: Type: IMPLANTABLE DEVICE | Site: SPINE CERVICAL | Status: FUNCTIONAL

## 2022-05-12 DEVICE — ALLOGRFT FIBR OSTEOAMP SELECT 1CC: Type: IMPLANTABLE DEVICE | Site: SPINE CERVICAL | Status: FUNCTIONAL

## 2022-05-12 DEVICE — KT HEMOST ABS SURGIFOAM PORCN 1GRAM: Type: IMPLANTABLE DEVICE | Site: SPINE CERVICAL | Status: FUNCTIONAL

## 2022-05-12 RX ORDER — TIZANIDINE 4 MG/1
4 TABLET ORAL EVERY 8 HOURS PRN
Status: DISCONTINUED | OUTPATIENT
Start: 2022-05-12 | End: 2022-05-13 | Stop reason: HOSPADM

## 2022-05-12 RX ORDER — FAMOTIDINE 20 MG/1
20 TABLET, FILM COATED ORAL EVERY 12 HOURS SCHEDULED
Status: DISCONTINUED | OUTPATIENT
Start: 2022-05-12 | End: 2022-05-13 | Stop reason: HOSPADM

## 2022-05-12 RX ORDER — SODIUM CHLORIDE 0.9 % (FLUSH) 0.9 %
10 SYRINGE (ML) INJECTION AS NEEDED
Status: DISCONTINUED | OUTPATIENT
Start: 2022-05-12 | End: 2022-05-12 | Stop reason: HOSPADM

## 2022-05-12 RX ORDER — SODIUM CHLORIDE 0.9 % (FLUSH) 0.9 %
3 SYRINGE (ML) INJECTION EVERY 12 HOURS SCHEDULED
Status: DISCONTINUED | OUTPATIENT
Start: 2022-05-12 | End: 2022-05-13 | Stop reason: HOSPADM

## 2022-05-12 RX ORDER — SODIUM CHLORIDE 0.9 % (FLUSH) 0.9 %
10 SYRINGE (ML) INJECTION AS NEEDED
Status: DISCONTINUED | OUTPATIENT
Start: 2022-05-12 | End: 2022-05-13 | Stop reason: HOSPADM

## 2022-05-12 RX ORDER — CHOLESTYRAMINE LIGHT 4 G/5.7G
1 POWDER, FOR SUSPENSION ORAL DAILY
Status: DISCONTINUED | OUTPATIENT
Start: 2022-05-12 | End: 2022-05-13 | Stop reason: HOSPADM

## 2022-05-12 RX ORDER — SODIUM CHLORIDE, SODIUM LACTATE, POTASSIUM CHLORIDE, CALCIUM CHLORIDE 600; 310; 30; 20 MG/100ML; MG/100ML; MG/100ML; MG/100ML
1000 INJECTION, SOLUTION INTRAVENOUS CONTINUOUS
Status: DISCONTINUED | OUTPATIENT
Start: 2022-05-12 | End: 2022-05-12 | Stop reason: HOSPADM

## 2022-05-12 RX ORDER — FLUMAZENIL 0.1 MG/ML
0.2 INJECTION INTRAVENOUS AS NEEDED
Status: DISCONTINUED | OUTPATIENT
Start: 2022-05-12 | End: 2022-05-12 | Stop reason: HOSPADM

## 2022-05-12 RX ORDER — DIPHENHYDRAMINE HCL 25 MG
25 CAPSULE ORAL NIGHTLY PRN
Status: DISCONTINUED | OUTPATIENT
Start: 2022-05-12 | End: 2022-05-13 | Stop reason: HOSPADM

## 2022-05-12 RX ORDER — NALOXONE HCL 0.4 MG/ML
0.04 VIAL (ML) INJECTION AS NEEDED
Status: DISCONTINUED | OUTPATIENT
Start: 2022-05-12 | End: 2022-05-12 | Stop reason: HOSPADM

## 2022-05-12 RX ORDER — LIDOCAINE HYDROCHLORIDE 40 MG/ML
SOLUTION TOPICAL AS NEEDED
Status: DISCONTINUED | OUTPATIENT
Start: 2022-05-12 | End: 2022-05-12 | Stop reason: SURG

## 2022-05-12 RX ORDER — FENTANYL CITRATE 50 UG/ML
25 INJECTION, SOLUTION INTRAMUSCULAR; INTRAVENOUS
Status: DISCONTINUED | OUTPATIENT
Start: 2022-05-12 | End: 2022-05-12 | Stop reason: HOSPADM

## 2022-05-12 RX ORDER — ONDANSETRON 2 MG/ML
4 INJECTION INTRAMUSCULAR; INTRAVENOUS ONCE AS NEEDED
Status: DISCONTINUED | OUTPATIENT
Start: 2022-05-12 | End: 2022-05-12 | Stop reason: HOSPADM

## 2022-05-12 RX ORDER — ONDANSETRON 2 MG/ML
INJECTION INTRAMUSCULAR; INTRAVENOUS AS NEEDED
Status: DISCONTINUED | OUTPATIENT
Start: 2022-05-12 | End: 2022-05-12 | Stop reason: SURG

## 2022-05-12 RX ORDER — AMLODIPINE BESYLATE 5 MG/1
5 TABLET ORAL DAILY
Status: DISCONTINUED | OUTPATIENT
Start: 2022-05-12 | End: 2022-05-13 | Stop reason: HOSPADM

## 2022-05-12 RX ORDER — DEXTROSE MONOHYDRATE 25 G/50ML
12.5 INJECTION, SOLUTION INTRAVENOUS AS NEEDED
Status: DISCONTINUED | OUTPATIENT
Start: 2022-05-12 | End: 2022-05-12 | Stop reason: HOSPADM

## 2022-05-12 RX ORDER — SODIUM CHLORIDE 9 MG/ML
75 INJECTION, SOLUTION INTRAVENOUS CONTINUOUS
Status: DISPENSED | OUTPATIENT
Start: 2022-05-12 | End: 2022-05-13

## 2022-05-12 RX ORDER — CARVEDILOL 6.25 MG/1
6.25 TABLET ORAL 2 TIMES DAILY WITH MEALS
Status: DISCONTINUED | OUTPATIENT
Start: 2022-05-12 | End: 2022-05-13 | Stop reason: HOSPADM

## 2022-05-12 RX ORDER — KETAMINE HCL IN NACL, ISO-OSM 100MG/10ML
SYRINGE (ML) INJECTION AS NEEDED
Status: DISCONTINUED | OUTPATIENT
Start: 2022-05-12 | End: 2022-05-12 | Stop reason: SURG

## 2022-05-12 RX ORDER — INDAPAMIDE 1.25 MG/1
2.5 TABLET, FILM COATED ORAL EVERY MORNING
Status: DISCONTINUED | OUTPATIENT
Start: 2022-05-12 | End: 2022-05-13 | Stop reason: HOSPADM

## 2022-05-12 RX ORDER — SODIUM CHLORIDE 0.9 % (FLUSH) 0.9 %
3 SYRINGE (ML) INJECTION AS NEEDED
Status: DISCONTINUED | OUTPATIENT
Start: 2022-05-12 | End: 2022-05-12 | Stop reason: HOSPADM

## 2022-05-12 RX ORDER — SODIUM CHLORIDE, SODIUM LACTATE, POTASSIUM CHLORIDE, CALCIUM CHLORIDE 600; 310; 30; 20 MG/100ML; MG/100ML; MG/100ML; MG/100ML
100 INJECTION, SOLUTION INTRAVENOUS CONTINUOUS PRN
Status: DISCONTINUED | OUTPATIENT
Start: 2022-05-12 | End: 2022-05-12 | Stop reason: HOSPADM

## 2022-05-12 RX ORDER — FOLIC ACID 1 MG/1
1 TABLET ORAL DAILY
Status: DISCONTINUED | OUTPATIENT
Start: 2022-05-12 | End: 2022-05-13 | Stop reason: HOSPADM

## 2022-05-12 RX ORDER — SODIUM CHLORIDE 9 MG/ML
75 INJECTION, SOLUTION INTRAVENOUS CONTINUOUS
Status: DISCONTINUED | OUTPATIENT
Start: 2022-05-12 | End: 2022-05-13 | Stop reason: HOSPADM

## 2022-05-12 RX ORDER — ROCURONIUM BROMIDE 10 MG/ML
INJECTION, SOLUTION INTRAVENOUS AS NEEDED
Status: DISCONTINUED | OUTPATIENT
Start: 2022-05-12 | End: 2022-05-12 | Stop reason: SURG

## 2022-05-12 RX ORDER — LISINOPRIL 10 MG/1
10 TABLET ORAL DAILY
Status: DISCONTINUED | OUTPATIENT
Start: 2022-05-12 | End: 2022-05-13 | Stop reason: HOSPADM

## 2022-05-12 RX ORDER — OXYCODONE AND ACETAMINOPHEN 10; 325 MG/1; MG/1
1 TABLET ORAL ONCE AS NEEDED
Status: COMPLETED | OUTPATIENT
Start: 2022-05-12 | End: 2022-05-12

## 2022-05-12 RX ORDER — DROPERIDOL 2.5 MG/ML
0.62 INJECTION, SOLUTION INTRAMUSCULAR; INTRAVENOUS ONCE AS NEEDED
Status: DISCONTINUED | OUTPATIENT
Start: 2022-05-12 | End: 2022-05-12 | Stop reason: HOSPADM

## 2022-05-12 RX ORDER — SODIUM CHLORIDE, SODIUM LACTATE, POTASSIUM CHLORIDE, CALCIUM CHLORIDE 600; 310; 30; 20 MG/100ML; MG/100ML; MG/100ML; MG/100ML
9 INJECTION, SOLUTION INTRAVENOUS CONTINUOUS
Status: DISCONTINUED | OUTPATIENT
Start: 2022-05-12 | End: 2022-05-12 | Stop reason: HOSPADM

## 2022-05-12 RX ORDER — FAMOTIDINE 10 MG/ML
20 INJECTION, SOLUTION INTRAVENOUS EVERY 12 HOURS SCHEDULED
Status: DISCONTINUED | OUTPATIENT
Start: 2022-05-12 | End: 2022-05-13 | Stop reason: HOSPADM

## 2022-05-12 RX ORDER — LIDOCAINE HYDROCHLORIDE 10 MG/ML
0.5 INJECTION, SOLUTION EPIDURAL; INFILTRATION; INTRACAUDAL; PERINEURAL ONCE AS NEEDED
Status: DISCONTINUED | OUTPATIENT
Start: 2022-05-12 | End: 2022-05-12 | Stop reason: HOSPADM

## 2022-05-12 RX ORDER — SODIUM CHLORIDE 0.9 % (FLUSH) 0.9 %
10 SYRINGE (ML) INJECTION EVERY 12 HOURS SCHEDULED
Status: DISCONTINUED | OUTPATIENT
Start: 2022-05-12 | End: 2022-05-12 | Stop reason: HOSPADM

## 2022-05-12 RX ORDER — ONDANSETRON 2 MG/ML
4 INJECTION INTRAMUSCULAR; INTRAVENOUS EVERY 6 HOURS PRN
Status: DISCONTINUED | OUTPATIENT
Start: 2022-05-12 | End: 2022-05-13 | Stop reason: HOSPADM

## 2022-05-12 RX ORDER — CYANOCOBALAMIN (VITAMIN B-12) 500 MCG
250 TABLET ORAL DAILY
Status: DISCONTINUED | OUTPATIENT
Start: 2022-05-12 | End: 2022-05-13 | Stop reason: HOSPADM

## 2022-05-12 RX ORDER — SUCCINYLCHOLINE/SOD CL,ISO/PF 200MG/10ML
SYRINGE (ML) INTRAVENOUS AS NEEDED
Status: DISCONTINUED | OUTPATIENT
Start: 2022-05-12 | End: 2022-05-12 | Stop reason: SURG

## 2022-05-12 RX ORDER — MESALAMINE 400 MG/1
800 CAPSULE, DELAYED RELEASE ORAL 3 TIMES DAILY
Status: DISCONTINUED | OUTPATIENT
Start: 2022-05-12 | End: 2022-05-13 | Stop reason: HOSPADM

## 2022-05-12 RX ORDER — FENOFIBRATE 145 MG/1
145 TABLET, COATED ORAL DAILY
Status: DISCONTINUED | OUTPATIENT
Start: 2022-05-12 | End: 2022-05-13 | Stop reason: HOSPADM

## 2022-05-12 RX ORDER — SUFENTANIL CITRATE 50 UG/ML
INJECTION EPIDURAL; INTRAVENOUS AS NEEDED
Status: DISCONTINUED | OUTPATIENT
Start: 2022-05-12 | End: 2022-05-12 | Stop reason: SURG

## 2022-05-12 RX ORDER — FESOTERODINE FUMARATE 4 MG/1
4 TABLET, EXTENDED RELEASE ORAL
COMMUNITY

## 2022-05-12 RX ORDER — LIDOCAINE 50 MG/G
1 OINTMENT TOPICAL
Status: DISCONTINUED | OUTPATIENT
Start: 2022-05-12 | End: 2022-05-13 | Stop reason: HOSPADM

## 2022-05-12 RX ORDER — HYDROMORPHONE HYDROCHLORIDE 1 MG/ML
0.5 INJECTION, SOLUTION INTRAMUSCULAR; INTRAVENOUS; SUBCUTANEOUS
Status: DISCONTINUED | OUTPATIENT
Start: 2022-05-12 | End: 2022-05-12 | Stop reason: HOSPADM

## 2022-05-12 RX ORDER — IBUPROFEN 600 MG/1
600 TABLET ORAL ONCE AS NEEDED
Status: DISCONTINUED | OUTPATIENT
Start: 2022-05-12 | End: 2022-05-12 | Stop reason: HOSPADM

## 2022-05-12 RX ORDER — ONDANSETRON 4 MG/1
4 TABLET, FILM COATED ORAL EVERY 6 HOURS PRN
Status: DISCONTINUED | OUTPATIENT
Start: 2022-05-12 | End: 2022-05-13 | Stop reason: HOSPADM

## 2022-05-12 RX ORDER — OXYCODONE HCL 20 MG/1
20 TABLET, FILM COATED, EXTENDED RELEASE ORAL ONCE
Status: COMPLETED | OUTPATIENT
Start: 2022-05-12 | End: 2022-05-12

## 2022-05-12 RX ORDER — OXYBUTYNIN CHLORIDE 5 MG/1
5 TABLET, EXTENDED RELEASE ORAL DAILY
Status: DISCONTINUED | OUTPATIENT
Start: 2022-05-12 | End: 2022-05-13 | Stop reason: HOSPADM

## 2022-05-12 RX ORDER — MIDAZOLAM HYDROCHLORIDE 1 MG/ML
0.5 INJECTION INTRAMUSCULAR; INTRAVENOUS
Status: DISCONTINUED | OUTPATIENT
Start: 2022-05-12 | End: 2022-05-12 | Stop reason: HOSPADM

## 2022-05-12 RX ORDER — LIDOCAINE HYDROCHLORIDE 20 MG/ML
INJECTION, SOLUTION EPIDURAL; INFILTRATION; INTRACAUDAL; PERINEURAL AS NEEDED
Status: DISCONTINUED | OUTPATIENT
Start: 2022-05-12 | End: 2022-05-12 | Stop reason: SURG

## 2022-05-12 RX ORDER — PROPOFOL 10 MG/ML
VIAL (ML) INTRAVENOUS AS NEEDED
Status: DISCONTINUED | OUTPATIENT
Start: 2022-05-12 | End: 2022-05-12 | Stop reason: SURG

## 2022-05-12 RX ORDER — ONDANSETRON 2 MG/ML
4 INJECTION INTRAMUSCULAR; INTRAVENOUS EVERY 6 HOURS PRN
Status: DISCONTINUED | OUTPATIENT
Start: 2022-05-12 | End: 2022-05-12 | Stop reason: SDUPTHER

## 2022-05-12 RX ORDER — VIBEGRON 75 MG/1
75 TABLET, FILM COATED ORAL DAILY
COMMUNITY

## 2022-05-12 RX ORDER — DEXAMETHASONE SODIUM PHOSPHATE 4 MG/ML
INJECTION, SOLUTION INTRA-ARTICULAR; INTRALESIONAL; INTRAMUSCULAR; INTRAVENOUS; SOFT TISSUE AS NEEDED
Status: DISCONTINUED | OUTPATIENT
Start: 2022-05-12 | End: 2022-05-12 | Stop reason: SURG

## 2022-05-12 RX ORDER — OXYCODONE AND ACETAMINOPHEN 10; 325 MG/1; MG/1
1 TABLET ORAL EVERY 4 HOURS PRN
Status: DISCONTINUED | OUTPATIENT
Start: 2022-05-12 | End: 2022-05-13 | Stop reason: HOSPADM

## 2022-05-12 RX ORDER — MAGNESIUM HYDROXIDE 1200 MG/15ML
LIQUID ORAL AS NEEDED
Status: DISCONTINUED | OUTPATIENT
Start: 2022-05-12 | End: 2022-05-12 | Stop reason: HOSPADM

## 2022-05-12 RX ORDER — GABAPENTIN 300 MG/1
600 CAPSULE ORAL 3 TIMES DAILY
Status: DISCONTINUED | OUTPATIENT
Start: 2022-05-12 | End: 2022-05-13 | Stop reason: HOSPADM

## 2022-05-12 RX ORDER — LABETALOL HYDROCHLORIDE 5 MG/ML
5 INJECTION, SOLUTION INTRAVENOUS
Status: DISCONTINUED | OUTPATIENT
Start: 2022-05-12 | End: 2022-05-12 | Stop reason: HOSPADM

## 2022-05-12 RX ORDER — SODIUM CHLORIDE 9 MG/ML
INJECTION, SOLUTION INTRAVENOUS AS NEEDED
Status: DISCONTINUED | OUTPATIENT
Start: 2022-05-12 | End: 2022-05-12 | Stop reason: HOSPADM

## 2022-05-12 RX ADMIN — OXYCODONE HYDROCHLORIDE 20 MG: 20 TABLET, FILM COATED, EXTENDED RELEASE ORAL at 06:21

## 2022-05-12 RX ADMIN — OXYCODONE HYDROCHLORIDE AND ACETAMINOPHEN 1 TABLET: 10; 325 TABLET ORAL at 14:47

## 2022-05-12 RX ADMIN — FAMOTIDINE 20 MG: 20 TABLET, FILM COATED ORAL at 20:01

## 2022-05-12 RX ADMIN — OXYBUTYNIN CHLORIDE 5 MG: 5 TABLET, EXTENDED RELEASE ORAL at 13:59

## 2022-05-12 RX ADMIN — SODIUM CHLORIDE, POTASSIUM CHLORIDE, SODIUM LACTATE AND CALCIUM CHLORIDE 1000 ML: 600; 310; 30; 20 INJECTION, SOLUTION INTRAVENOUS at 06:45

## 2022-05-12 RX ADMIN — ALLOPURINOL 500 MG: 300 TABLET ORAL at 13:59

## 2022-05-12 RX ADMIN — LIDOCAINE HYDROCHLORIDE 40 MG: 20 INJECTION, SOLUTION EPIDURAL; INFILTRATION; INTRACAUDAL; PERINEURAL at 07:02

## 2022-05-12 RX ADMIN — OXYCODONE HYDROCHLORIDE AND ACETAMINOPHEN 1 TABLET: 10; 325 TABLET ORAL at 18:22

## 2022-05-12 RX ADMIN — MESALAMINE 800 MG: 400 CAPSULE, DELAYED RELEASE ORAL at 20:01

## 2022-05-12 RX ADMIN — AMLODIPINE BESYLATE 5 MG: 5 TABLET ORAL at 13:59

## 2022-05-12 RX ADMIN — PROPOFOL 100 MG: 10 INJECTION, EMULSION INTRAVENOUS at 07:02

## 2022-05-12 RX ADMIN — SODIUM CHLORIDE 75 ML/HR: 9 INJECTION, SOLUTION INTRAVENOUS at 22:21

## 2022-05-12 RX ADMIN — OXYCODONE HYDROCHLORIDE AND ACETAMINOPHEN 1 TABLET: 10; 325 TABLET ORAL at 22:22

## 2022-05-12 RX ADMIN — TIZANIDINE 4 MG: 4 TABLET ORAL at 13:59

## 2022-05-12 RX ADMIN — Medication 100 MG: at 07:02

## 2022-05-12 RX ADMIN — ROCURONIUM BROMIDE 10 MG: 10 SOLUTION INTRAVENOUS at 07:02

## 2022-05-12 RX ADMIN — ONDANSETRON 4 MG: 2 INJECTION INTRAMUSCULAR; INTRAVENOUS at 22:43

## 2022-05-12 RX ADMIN — SUFENTANIL CITRATE 10 MCG: 50 INJECTION EPIDURAL; INTRAVENOUS at 07:38

## 2022-05-12 RX ADMIN — OXYCODONE HYDROCHLORIDE AND ACETAMINOPHEN 1 TABLET: 10; 325 TABLET ORAL at 10:42

## 2022-05-12 RX ADMIN — Medication 25 MG: at 07:20

## 2022-05-12 RX ADMIN — MESALAMINE 800 MG: 400 CAPSULE, DELAYED RELEASE ORAL at 16:37

## 2022-05-12 RX ADMIN — GABAPENTIN 600 MG: 300 CAPSULE ORAL at 16:37

## 2022-05-12 RX ADMIN — CEFAZOLIN SODIUM 1 G: 10 INJECTION, POWDER, FOR SOLUTION INTRAVENOUS at 22:22

## 2022-05-12 RX ADMIN — SODIUM CHLORIDE 75 ML/HR: 9 INJECTION, SOLUTION INTRAVENOUS at 13:52

## 2022-05-12 RX ADMIN — SUFENTANIL CITRATE 20 MCG: 50 INJECTION EPIDURAL; INTRAVENOUS at 08:11

## 2022-05-12 RX ADMIN — PROPOFOL 100 MCG/KG/MIN: 10 INJECTION, EMULSION INTRAVENOUS at 07:09

## 2022-05-12 RX ADMIN — CEFAZOLIN SODIUM 1 G: 10 INJECTION, POWDER, FOR SOLUTION INTRAVENOUS at 14:47

## 2022-05-12 RX ADMIN — FAMOTIDINE 20 MG: 20 TABLET, FILM COATED ORAL at 13:59

## 2022-05-12 RX ADMIN — SUFENTANIL CITRATE 10 MCG: 50 INJECTION EPIDURAL; INTRAVENOUS at 08:56

## 2022-05-12 RX ADMIN — SODIUM CHLORIDE, POTASSIUM CHLORIDE, SODIUM LACTATE AND CALCIUM CHLORIDE 1000 ML: 600; 310; 30; 20 INJECTION, SOLUTION INTRAVENOUS at 06:44

## 2022-05-12 RX ADMIN — SUFENTANIL CITRATE 10 MCG: 50 INJECTION EPIDURAL; INTRAVENOUS at 07:30

## 2022-05-12 RX ADMIN — SUFENTANIL CITRATE 20 MCG: 50 INJECTION EPIDURAL; INTRAVENOUS at 07:02

## 2022-05-12 RX ADMIN — SUFENTANIL CITRATE 10 MCG: 50 INJECTION EPIDURAL; INTRAVENOUS at 07:53

## 2022-05-12 RX ADMIN — LISINOPRIL 10 MG: 10 TABLET ORAL at 13:59

## 2022-05-12 RX ADMIN — CARVEDILOL 6.25 MG: 6.25 TABLET, FILM COATED ORAL at 18:22

## 2022-05-12 RX ADMIN — ONDANSETRON 4 MG: 2 INJECTION INTRAMUSCULAR; INTRAVENOUS at 09:37

## 2022-05-12 RX ADMIN — LIDOCAINE HYDROCHLORIDE 1 EACH: 40 SOLUTION TOPICAL at 07:04

## 2022-05-12 RX ADMIN — SUFENTANIL CITRATE 10 MCG: 50 INJECTION EPIDURAL; INTRAVENOUS at 09:54

## 2022-05-12 RX ADMIN — DIPHENHYDRAMINE HYDROCHLORIDE 25 MG: 25 CAPSULE ORAL at 20:02

## 2022-05-12 RX ADMIN — DEXAMETHASONE SODIUM PHOSPHATE 4 MG: 4 INJECTION, SOLUTION INTRA-ARTICULAR; INTRALESIONAL; INTRAMUSCULAR; INTRAVENOUS; SOFT TISSUE at 07:55

## 2022-05-12 RX ADMIN — SUFENTANIL CITRATE 10 MCG: 50 INJECTION EPIDURAL; INTRAVENOUS at 08:22

## 2022-05-12 RX ADMIN — GABAPENTIN 600 MG: 300 CAPSULE ORAL at 20:02

## 2022-05-12 RX ADMIN — Medication 25 MG: at 07:59

## 2022-05-12 NOTE — NURSING NOTE
Pt ua and culture was abnormal faxed to office, no new orders received pt states no anx called in for her.  Pt is incontinent and wears depends

## 2022-05-12 NOTE — THERAPY EVALUATION
Patient Name: Deisi Ponce  : 1945    MRN: [id number]                              Today's Date: 2022       Admit Date: 2022    Visit Dx:     ICD-10-CM ICD-9-CM   1. Decreased activities of daily living (ADL)  Z78.9 V49.89     Patient Active Problem List   Diagnosis   • Crohn's disease of small and large intestines with complication (HCC)   • Crohn's disease of both small and large intestine without complication (HCC)   • Crohn's disease without complication (HCC)   • Lumbar stenosis   • GERD without esophagitis   • Essential hypertension   • Chronic gout   • Calculus of gallbladder without cholecystitis without obstruction   • Stenosis, cervical spine     Past Medical History:   Diagnosis Date   • Arthritis    • Cancer (HCC)     endometrial cancer   • Crohn disease (HCC)    • Crohn's disease (HCC)    • DDD (degenerative disc disease), lumbar    • Elevated cholesterol    • GERD (gastroesophageal reflux disease)    • Gout    • History of transfusion    • Hypertension    • Intermittent self-catheterization of bladder    • Macular degeneration    • Osteopenia    • Peripheral neuropathy    • RA (rheumatoid arthritis) (HCC)    • Urinary retention      Past Surgical History:   Procedure Laterality Date   • BACK SURGERY     • CARPAL TUNNEL RELEASE Bilateral    • CATARACT EXTRACTION Bilateral    • CHOLECYSTECTOMY N/A 2021    Procedure: LAPAROSCOPIC CHOLECYSTECTOMY;  Surgeon: Agustina Mc MD;  Location: [facility] OR;  Service: General;  Laterality: N/A;   • COLONOSCOPY  10/09/2015   • COLONOSCOPY N/A 2016    Procedure: COLONOSCOPY WITH ANESTHESIA;  Surgeon: Jose Raul Butt DO;  Location: [facility] ENDOSCOPY;  Service:    • COLONOSCOPY N/A 2019    Procedure: COLONOSCOPY WITH ANESTHESIA;  Surgeon: Jose Raul Butt DO;  Location: [facility] ENDOSCOPY;  Service: Gastroenterology   • COLONOSCOPY N/A 10/14/2021    Procedure: COLONOSCOPY WITH ANESTHESIA;  Surgeon: Jose Raul Butt DO;   Location:  [facility] ENDOSCOPY;  Service: Gastroenterology;  Laterality: N/A;  pre crohn's disease  post random  colon bx  Dr. Pdailla   • CYST REMOVAL      from chest x2   • HARDWARE REMOVAL N/A 2/19/2020    Procedure: REMOVAL OF INSTRUMENTATION;  Surgeon: EBONI Rader MD;  Location:  [facility] OR;  Service: Orthopedic Spine;  Laterality: N/A;   • HYSTERECTOMY     • LUMBAR FUSION Bilateral 2/17/2020    Procedure: LATERAL LUMBAR INTERBODY FUSION LEFT L1-2 WITH INSTRUMENTATION, RIGHT L2-3;  Surgeon: EBONI Rader MD;  Location:  [facility] OR;  Service: Orthopedic Spine;  Laterality: Bilateral;   • LUMBAR LAMINECTOMY WITH FUSION N/A 2/19/2020    Procedure: EXPLORATION OF FUSION L3-5, POSTERIOR SPINAL FUSION WITH INSTRUMENTATION L1-3;  Surgeon: EBONI Rader MD;  Location:  [facility] OR;  Service: Orthopedic Spine;  Laterality: N/A;   • MENISCECTOMY Left    • UPPER GASTROINTESTINAL ENDOSCOPY  08/31/2010      General Information     Row Name 05/12/22 1420          OT Time and Intention    Document Type evaluation  -     Mode of Treatment occupational therapy  -     Row Name 05/12/22 1424          General Information    Patient Profile Reviewed yes  -MW     Prior Level of Function independent:;all household mobility;ADL's;dependent:;driving  AFOs at baseline  -MW     Existing Precautions/Restrictions fall;spinal  aspen collar  -     Barriers to Rehab medically complex;previous functional deficit  -     Row Name 05/12/22 1424          Occupational Profile    Reason for Services/Referral (Occupational Profile) s/p corpectomy C4, partial corpectomy C5, ACDF C5-7, anterior fusion w instrumentation C3-7; LUE radiculopathy, probable myelopathy, severe bladder incontinence, BLE neuropathy - wears AFOs at baseline  -MW     Environmental Supports and Barriers (Occupational Profile) rollator at all times; tub shower and walk in shower; small seat in shower but stands  -MW     Row Name 05/12/22 1421          Living  Environment    People in Home child(adolfo), adult  -     Name(s) of People in Home Kasandra  -     Row Name 05/12/22 1420          Home Main Entrance    Number of Stairs, Main Entrance two  -     Stair Railings, Main Entrance --  grab bars at top of steps  -     Row Name 05/12/22 1420          Stairs Within Home, Primary    Number of Stairs, Within Home, Primary none  -     Row Name 05/12/22 1420          Cognition    Orientation Status (Cognition) oriented x 4  -     Row Name 05/12/22 1420          Safety Issues, Functional Mobility    Safety Issues Affecting Function (Mobility) ability to follow commands;insight into deficits/self-awareness;safety precaution awareness;safety precautions follow-through/compliance  -     Impairments Affecting Function (Mobility) balance;coordination;motor control;pain;postural/trunk control;strength;sensation/sensory awareness  -           User Key  (r) = Recorded By, (t) = Taken By, (c) = Cosigned By    Initials Name Provider Type     Valentina Joshua, OTR/L Occupational Therapist                 Mobility/ADL's     Row Name 05/12/22 1420          Bed Mobility    Bed Mobility rolling right;scooting/bridging;sidelying-sit;sit-sidelying  -     Rolling Right [place] (Bed Mobility) minimum assist (75% patient effort);verbal cues  -     Scooting/Bridging [place] (Bed Mobility) minimum assist (75% patient effort);verbal cues  -     Sidelying-Sit [place] (Bed Mobility) minimum assist (75% patient effort);verbal cues  -     Sit-Sidelying [place] (Bed Mobility) minimum assist (75% patient effort);verbal cues  -     Assistive Device (Bed Mobility) bed rails;head of bed elevated  -     Row Name 05/12/22 1420          Transfers    Transfers sit-stand transfer;stand-sit transfer  -     Sit-Stand [place] (Transfers) moderate assist (50% patient effort);verbal cues  -     Stand-Sit [place] (Transfers) minimum assist (75% patient  "effort);verbal cues  -[facility] Name 05/12/22 1420          Sit-Stand Transfer    Assistive Device (Sit-Stand Transfers) walker, front-wheeled  -     Comment, (Sit-Stand Transfer) pulls up from walker  -[facility] Name 05/12/22 1420          Stand-Sit Transfer    Assistive Device (Stand-Sit Transfers) walker, front-wheeled  -[facility] Name 05/12/22 1420          Functional Mobility    Functional Mobility- Ind. Level minimum assist (75% patient effort);moderate assist (50% patient effort);verbal cues required;nonverbal cues required (demo/gesture)  -     Functional Mobility- Comment significant difficulty advancing feet to take steps to R, reports this is from not having her AFOs however she does state at home she walks to the bathroom at night without the braces  -[facility] Name 05/12/22 1420          Activities of Daily Living    BADL Assessment/Intervention lower body dressing  -MW     Row Name 05/12/22 1420          Lower Body Dressing Assessment/Training    [place] Level (Lower Body Dressing) don;doff;socks;maximum assist (25% patient effort)  -     Position (Lower Body Dressing) edge of bed sitting  -     Comment, (Lower Body Dressing) poor postural control with posterior lean and difficulty bringing B feet to opp knee  -           User Key  (r) = Recorded By, (t) = Taken By, (c) = Cosigned By    Initials Name Provider Type     Valentina Joshua, OTR/L Occupational Therapist               Obj/Interventions     [facility] Name 05/12/22 1420          Sensory Assessment (Somatosensory)    Sensory Assessment (Somatosensory) UE sensation intact  -MW     Row Name 05/12/22 1420          Vision Assessment/Intervention    Vision Assessment Comment decreased visual search for FTN, reports \"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\" at baseline but the eyes do not move in a direction to search for objects vs true inability to see  -[facility] Name 05/12/22 1420          Range of Motion Comprehensive    General Range of Motion " bilateral upper extremity ROM WFL  -MW     Row Name 05/12/22 1420          Strength Comprehensive (MMT)    Comment, General Manual Muscle Testing (MMT) Assessment 3+ to 4-/5 BUE, will need further assessment when in less pain and more awake  -MW     Row Name 05/12/22 1420          Motor Skills    Motor Skills coordination  -     Coordination fine motor deficit;gross motor deficit;ataxia;bilateral;upper extremity  pastpointing BUE with FTN, opposition intact  -MW     Row Name 05/12/22 1420          Balance    Balance Assessment sitting static balance;sitting dynamic balance;standing static balance  -MW     Static Sitting Balance contact guard  -MW     Dynamic Sitting Balance minimal assist  -MW     Position, Sitting Balance sitting edge of bed  -MW     Static Standing Balance minimal assist;moderate assist  -MW     Position/Device Used, Standing Balance walker, rolling  -     Comment, Balance psoterior lean in standing and sitting  -           User Key  (r) = Recorded By, (t) = Taken By, (c) = Cosigned By    Initials Name Provider Type     Valentina Joshua, OTR/L Occupational Therapist               Goals/Plan     Row Name 05/12/22 1420          Transfer Goal 1 (OT)    Activity/Assistive Device (Transfer Goal 1, OT) sit-to-stand/stand-to-sit;bed-to-chair/chair-to-bed;commode, 3-in-1  -MW     [place] Level/Cues Needed (Transfer Goal 1, OT) contact guard required  -MW     Time Frame (Transfer Goal 1, OT) long term goal (LTG);10 days  -MW     Progress/Outcome (Transfer Goal 1, OT) goal ongoing  -MW     Row Name 05/12/22 1420          Dressing Goal 1 (OT)    Activity/Device (Dressing Goal 1, OT) dressing skills, all  -MW     [place]/Cues Needed (Dressing Goal 1, OT) minimum assist (75% or more patient effort)  -     Time Frame (Dressing Goal 1, OT) long term goal (LTG);10 days  -MW     Progress/Outcome (Dressing Goal 1, OT) goal ongoing  -MW     Row Name 05/12/22 1420          Toileting Goal 1 (OT)     "Activity/Device (Toileting Goal 1, OT) toileting skills, all  -MW     [place] Level/Cues Needed (Toileting Goal 1, OT) minimum assist (75% or more patient effort)  -MW     Time Frame (Toileting Goal 1, OT) long term goal (LTG);10 days  -MW     Progress/Outcome (Toileting Goal 1, OT) goal ongoing  -MW     Row Name 05/12/22 1420          Problem Specific Goal 1 (OT)    Problem Specific Goal 1 (OT) FMC/GMC task with set up and cues with 75% accuracy with task  -MW     Time Frame (Problem Specific Goal 1, OT) long term goal (LTG);10 days  -MW     Progress/Outcome (Problem Specific Goal 1, OT) goal ongoing  -MW     Row Name 05/12/22 1420          Therapy Assessment/Plan (OT)    Planned Therapy Interventions (OT) activity tolerance training;BADL retraining;functional balance retraining;IADL retraining;occupation/activity based interventions;neuromuscular control/coordination retraining;patient/caregiver education/training;strengthening exercise;transfer/mobility retraining  -MW           User Key  (r) = Recorded By, (t) = Taken By, (c) = Cosigned By    Initials Name Provider Type    MW Valentina Joshua, OTR/L Occupational Therapist               Clinical Impression     Row Name 05/12/22 1420          Pain Assessment    Pretreatment Pain Rating 6/10  -MW     Posttreatment Pain Rating 6/10  -MW     Pre/Posttreatment Pain Comment \"all around\" neck, radiating into L shoulder and down arm, R shoulder  -MW     Pain Intervention(s) Medication (See MAR);Repositioned;Ambulation/increased activity  -MW     Row Name 05/12/22 1420          Plan of Care Review    Plan of Care Reviewed With patient  -MW     Progress no change  -MW     Outcome Evaluation OT eval completed. Pt awake and alert in fowlers, Aspen collar in place. Daughter present and assists with clarification of PLOF and history. Reports at baseline pt wears AFOs for chronic neuropathy, walking with a rollator at all times, able to independently perform ADL and IADL " within home. Hx of incontinence that has become severe. Comes to EOB with Laura and vc for strategies, use of bed rail and assist with LLE 2' weakness. Pt demos weakness in BUE and impaired coordination BUE. Pt demos full body posterior lean with legs elevated, is able to tell directional lean and correct when cued but returns to this position within ~10 seconds. MaxA for LB dressing, unable to bring feet higher than mid-shin height and with posterior lean. Stands with modA, unable to advance feet to take steps toward HOB. OT indicated to address motor control/trunk control and strengthening to increase independence and decrease fall risk with ADL/IADL, transfers, and fxl mobility. Recommend d/c inpatient rehab vs home w 24/7care and HH pending progress.  -     Row Name 05/12/22 1420          Therapy Assessment/Plan (OT)    Rehab Potential (OT) good, to achieve stated therapy goals  -     Criteria for Skilled Therapeutic Interventions Met (OT) yes;skilled treatment is necessary  -     Therapy Frequency (OT) 5 times/wk  -MW     Predicted Duration of Therapy Intervention (OT) 10 days  -     Row Name 05/12/22 1420          Therapy Plan Review/Discharge Plan (OT)    Anticipated Discharge Disposition (OT) home with 24/7 care;home with home health;inpatient rehabilitation facility  -     Row Name 05/12/22 1420          Vital Signs    Pre SpO2 (%) 97  -MW     O2 Delivery Pre Treatment room air  -MW     Pre Patient Position Supine  -     Row Name 05/12/22 1420          Positioning and Restraints    Pre-Treatment Position in bed  -MW     Post Treatment Position bed  -MW     In Bed fowlers;call light within reach;encouraged to call for assist;exit alarm on;side rails up x2;SCD pump applied;with family/caregiver;with brace  -           User Key  (r) = Recorded By, (t) = Taken By, (c) = Cosigned By    Initials Name Provider Type    MW Valentina Joshua, OTR/L Occupational Therapist               Outcome Measures      Row Name 05/12/22 1420          How much help from another is currently needed...    Putting on and taking off regular lower body clothing? 2  -MW     Bathing (including washing, rinsing, and drying) 2  -MW     Toileting (which includes using toilet bed pan or urinal) 2  -MW     Putting on and taking off regular upper body clothing 2  -MW     Taking care of personal grooming (such as brushing teeth) 3  -MW     Eating meals 3  -MW     AM-PAC 6 Clicks Score (OT) 14  -MW     Row Name 05/12/22 1420          Functional Assessment    Outcome Measure Options AM-PAC 6 Clicks Daily Activity (OT)  -MW           User Key  (r) = Recorded By, (t) = Taken By, (c) = Cosigned By    Initials Name Provider Type    Valentina Ferrer, OTR/L Occupational Therapist                Occupational Therapy Education                 Title: PT OT SLP Therapies (In Progress)     Topic: Occupational Therapy (In Progress)     Point: ADL training (In Progress)     Description:   Instruct learner(s) on proper safety adaptation and remediation techniques during self care or transfers.   Instruct in proper use of assistive devices.              Learning Progress Summary           Patient Acceptance, E,D, NR by  at 5/12/2022 1538   Family Acceptance, E,D, NR by  at 5/12/2022 1538                   Point: Home exercise program (In Progress)     Description:   Instruct learner(s) on appropriate technique for monitoring, assisting and/or progressing therapeutic exercises/activities.              Learning Progress Summary           Patient Acceptance, E,D, NR by  at 5/12/2022 1538   Family Acceptance, E,D, NR by  at 5/12/2022 1538                   Point: Precautions (In Progress)     Description:   Instruct learner(s) on prescribed precautions during self-care and functional transfers.              Learning Progress Summary           Patient Acceptance, E,D, NR by MABLE at 5/12/2022 1538   Family Acceptance, E,D, NR by  at 5/12/2022 1538                    Point: Body mechanics (In Progress)     Description:   Instruct learner(s) on proper positioning and spine alignment during self-care, functional mobility activities and/or exercises.              Learning Progress Summary           Patient Acceptance, E,D, NR by  at 5/12/2022 1538   Family Acceptance, E,D, NR by  at 5/12/2022 1538                               User Key     Initials Effective Dates Name Provider Type Discipline     08/28/18 -  Valentina Joshua, OTR/L Occupational Therapist OT              OT Recommendation and Plan  Planned Therapy Interventions (OT): activity tolerance training, BADL retraining, functional balance retraining, IADL retraining, occupation/activity based interventions, neuromuscular control/coordination retraining, patient/caregiver education/training, strengthening exercise, transfer/mobility retraining  Therapy Frequency (OT): 5 times/wk  Plan of Care Review  Plan of Care Reviewed With: patient  Progress: no change  Outcome Evaluation: OT eval completed. Pt awake and alert in fowlers, Aspen collar in place. Daughter present and assists with clarification of PLOF and history. Reports at baseline pt wears AFOs for chronic neuropathy, walking with a rollator at all times, able to independently perform ADL and IADL within home. Hx of incontinence that has become severe. Comes to EOB with Laura and vc for strategies, use of bed rail and assist with LLE 2' weakness. Pt demos weakness in BUE and impaired coordination BUE. Pt demos full body posterior lean with legs elevated, is able to tell directional lean and correct when cued but returns to this position within ~10 seconds. MaxA for LB dressing, unable to bring feet higher than mid-shin height and with posterior lean. Stands with modA, unable to advance feet to take steps toward HOB. OT indicated to address motor control/trunk control and strengthening to increase independence and decrease fall risk with ADL/IADL,  transfers, and fxl mobility. Recommend d/c inpatient rehab vs home w 24/7care and HH pending progress.     Time Calculation:    Time Calculation- OT     Row Name 05/12/22 1538             Time Calculation- OT    OT Start Time 1418  +7 min chart review  -MW      OT Stop Time 1506  -MW      OT Time Calculation (min) 48 min  -MW      OT Received On 05/12/22  -MW      OT Goal Re-Cert Due Date 05/22/22  -MW            User Key  (r) = Recorded By, (t) = Taken By, (c) = Cosigned By    Initials Name Provider Type    Valentina Ferrer, OTR/L Occupational Therapist              Therapy Charges for Today     Code Description Service Date Service Provider Modifiers Qty    10148381823 HC OT EVAL MOD COMPLEXITY 4 5/12/2022 Valentina Joshua OTR/L GO 1               Valentina Joshua OTR/MARCIA  5/12/2022

## 2022-05-12 NOTE — PLAN OF CARE
Goal Outcome Evaluation:  Plan of Care Reviewed With: patient        Progress: no change  Outcome Evaluation: OT eval completed. Pt awake and alert in fowlers, Aspen collar in place. Daughter present and assists with clarification of PLOF and history. Reports at baseline pt wears AFOs for chronic neuropathy, walking with a rollator at all times, able to independently perform ADL and IADL within home. Hx of incontinence that has become severe. Comes to EOB with Laura and vc for strategies, use of bed rail and assist with LLE 2' weakness. Pt demos weakness in BUE and impaired coordination BUE. Pt demos full body posterior lean with legs elevated, is able to tell directional lean and correct when cued but returns to this position within ~10 seconds. MaxA for LB dressing, unable to bring feet higher than mid-shin height and with posterior lean. Stands with modA, unable to advance feet to take steps toward HOB. OT indicated to address motor control/trunk control and strengthening to increase independence and decrease fall risk with ADL/IADL, transfers, and fxl mobility. Recommend d/c inpatient rehab vs home w 24/7care and HH pending progress.

## 2022-05-12 NOTE — PLAN OF CARE
Goal Outcome Evaluation:  Plan of Care Reviewed With: patient, daughter        Progress: improving  Outcome Evaluation: Pt arrived from PACU at 1120. [place] Collar in place, dressing CDI, moderate pedal pushes and pulls, strong  bilaterally. daughter at bedside, complaints of pain improved with PRN medications. Up with therapy this shift, up to BSC x2 assist and walker. tolerated well. FC in place with Clear yellow urine to BSD. safety maintained.

## 2022-05-12 NOTE — ANESTHESIA POSTPROCEDURE EVALUATION
Patient: Deisi Ponce    Procedure Summary     Date: 05/12/22 [facility] / [facility]:  [facility]  [facility]    Anesthesia Start: 0658 Anesthesia Stop: 1009    Procedures:       CORPECTOMY C4, PARTIAL CORPECTOMY C5, ANTERIOR CERVICAL DISCECTOMY FUSION C5-7, ANTERIOR FUSION WITH INSTRUMENTATION C3-7 (N/A Spine Cervical)      ANTERIOR FUSION WITH INSTRUMENTATION C3-7 (N/A Spine Cervical) Diagnosis: (M54.16)    Surgeons: EBONI Rader MD Provider: Adam Turner CRNA    Anesthesia Type: general ASA Status: 2          Anesthesia Type: general    Vitals  Vitals Value Taken Time   /52 05/12/22 1104   Temp 98.2 °F (36.8 °C) 05/12/22 1104   Pulse 65 05/12/22 1113   Resp 16 05/12/22 1104   SpO2 99 % 05/12/22 1113   Vitals shown include unvalidated device data.        Post Anesthesia Care and Evaluation    Patient location during evaluation: PACU  Patient participation: complete - patient participated  Level of consciousness: awake and alert  Pain management: adequate  Airway patency: patent  Anesthetic complications: No anesthetic complications    Cardiovascular status: acceptable  Respiratory status: acceptable  Hydration status: acceptable    Comments: Blood pressure 142/50, pulse 75, temperature 97.6 °F (36.4 °C), temperature source Oral, resp. rate 16, SpO2 96 %, not currently breastfeeding.    Pt discharged from PACU based on caroline score >8

## 2022-05-12 NOTE — ANESTHESIA PREPROCEDURE EVALUATION
Anesthesia Evaluation     Patient summary reviewed   no history of anesthetic complications:  NPO Solid Status: > 8 hours             Airway   Mallampati: II  TM distance: >3 FB  Neck ROM: full  Dental      Pulmonary    (-) COPD, asthma, sleep apnea, not a smoker  Cardiovascular   Exercise tolerance: good (4-7 METS)    (+) hypertension, hyperlipidemia,   (-) pacemaker, past MI, angina, cardiac stents      Neuro/Psych  (-) seizures, TIA, CVA  GI/Hepatic/Renal/Endo    (+)  GERD,    (-) liver disease, no renal disease, diabetes    Musculoskeletal     Abdominal    Substance History      OB/GYN          Other   arthritis,                      Anesthesia Plan    ASA 2     general     intravenous induction     Anesthetic plan, all risks, benefits, and alternatives have been provided, discussed and informed consent has been obtained with: patient.        CODE STATUS:

## 2022-05-12 NOTE — OP NOTE
CERVICAL CORPECTOMY, CERVICAL DISCECTOMY ANTERIOR FUSION WITH INSTRUMENTATION  Procedure Note    Deisi Ponce  5/12/2022    Pre-op Diagnosis:      1.  Status post right LLIF L3 to 5, PSF with instrumentation L3 to 5, Dr. Justo Heath, 05/27/2010.  2.  Status post left LLIF L1 to 3 with instrumentation L1-2, 02/17/2020.  3.  Status post PSF with instrumentation L1 to 3, 02/19/2020.  4. Increasing chronic neck pain.  5. Right-sided occipital headaches.  6. New onset left shoulder and arm radiculopathy.  7. 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  8. Severe bladder incontinence.  9. Degenerative disc disease C3 to T1.  10. Spondylolisthesis, C3-4, C7-T1.  11. Retrolisthesis C4-5, C5-6, C6-7.  12. Severe central and foraminal stenosis, C3-7, worse C4-5, C5-6.  13. Multilevel thoracic spondylosis.  14. Adjacent level degenerative disc disease L5-S1.  15. Facet arthropathy L5-S1.  16. Loss of lordosis, L5-S1.     Post-op Diagnosis:     same     Procedure/CPT® Codes:    1.  Corpectomy C4  2.  Partial corpectomy C5  3.  Anterior cervical discectomy with interbody fusion C5-6, C6-7  4.  Anterior interbody fusion C3-4, C4-5  5.  Anterior spinal instrumentation C3 to 7 (NuVasive anterior plate and screws)  6.  Use of PEEK interbody biomechanical device for fusion C3-5, C5-6, C6-7 (Vertera Cohere PEEK spacer x 2, DePuy corpectomy spacer)  7.  Use of locally obtained autograft bone for fusion  8.  Use of allograft bone matrix for fusion (OsteoAmp)  9.  Use of operating microscope for decompression and microdissection  10.  Use of fluoroscopy for confirmation of surgical level, placement of instrumentation and PEEK spacers  11.  Intraoperative neural monitoring    Anesthesia: General    Surgeon: DAVEY Rader MD    Assistant: Sachin Wilson PA-C    Estimated Blood Loss: 50 mL    Complications: None    Condition: Stable to PACU.    Indications:    The patient is a 76-year-old who sees Dr. Cipriano Padilla for medical  issues.  She presented to the office with increasing chronic neck pain, right-sided occipital headaches, as well as new onset left shoulder and arm radiculopathy.  She had signs and symptoms consistent with 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 with severe bladder incontinence.  Imaging studies of the cervical spine reveal degenerative disc disease from C3-T1, with spondylolisthesis at C3-4 and C7-T1 as well as retrolisthesis at C4-5, C5-6, and C6-7.  She was noted to have severe central and foraminal stenosis from C3-7 that was worse at C4-5 and C5-6.    After failing conservative measures, it was mutually decided that surgery would be the best option, mainly to prevent worsening or permanent neurologic function given the high level of stenosis.  Risks, benefits, and complications of surgery were discussed with the patient.  She appeared well informed and wished to proceed.  We specifically discussed the risks of infection, blood loss, nerve root injury, CSF leak, spinal cord injury, and the possibility of incomplete resolution of symptoms.  We also discussed the possible risk of a nonunion and the potential need for additional surgery in the event of a pseudoarthrosis or hardware failure.    Operative Procedure:    After obtaining informed consent and verifying the correct operative levels, the patient was brought to the operating room and placed supine on an operating table.  A general anesthetic was provided by the anesthesia service with the assistance of an endotracheal tube.  Once this was properly positioned and secured, a bump was placed under the patient's shoulders placing the neck into a gentle extended position.  Head halter traction was then used with 10 pounds weight to maintain head position.  The shoulders were taped using 3 inch wide cloth tape to assist with radiographic visualization.  Fluoroscopy was used to identify the disc spaces from C3 to 7, and the skin was marked for our planned  incision.  The anterior neck region was then prepped and draped in usual sterile fashion.  A surgical timeout was taken to confirm this was the correct patient, we are working at the correct levels, and that preoperative antibiotics were given in a timely fashion.    A transverse incision was then created over the anterior cervical region using a 10 blade scalpel directly centered over the levels of interest.  Dissection was carried sharply to subcutaneous tissues.  The platysma was divided in line with the incision and blunt dissection was carried along the medial border of the sternocleidomastoid muscle, lateral to the strap musculature the neck.  The carotid pulse was then palpated, and dissection was carried medial to the carotid sheath and lateral to the trachea and esophagus.  Handheld Cloward retractors along with Kitners were used to dissect through pretracheal and prevertebral fascia.  A radiographic marker was placed into one of the disc spaces and fluoroscopy was used to confirm that we are indeed at the correct levels.  The longus coli muscles were then elevated from either side of the spine using Bovie cautery.    An initial discectomy was started at each level from C3-7 by creating an annulotomy with Bovie cautery.  A Queen elevator was used to remove some of the disc material off of the endplates.  Disc material was initially removed using forward angled curettes and pituitaries.  Some anterior osteophytes were removed using a rongeur.  All retrieved bone was cleaned, morcellized and saved for later packing into the disc spaces to assist with obtaining a fusion.  Turkey pins were then placed to allow gentle distraction across the disc spaces.  The microscope was then positioned for the microdissection and decompression portion of the procedure.  Straight curettes were used to remove the cartilaginous endplates and all remaining disc material.     Given the severe central stenosis as a result of the  spondylolisthesis at C3-4 and the retrolisthesis at C4-5 as well as the advanced degenerative changes at both levels, it was felt necessary to perform a corpectomy of C4 in order to adequately and safely decompress the central spinal canal.  After initially preparing the disc spaces previously, I used a rongeur to remove the central portion of the C4 vertebral body.  All retrieved bone was saved for later packing back into spacers to assist with obtaining a fusion.  The rongeur was used as posterior as safely possible, leaving a thin shelf of posterior bone.  I used a forward angled curettes to release this as much as possible and then resected it with Kerrisons.  The curettes were used to release the posterior longitudinal ligament and this was resected using Kerrisons as well revealing the central spinal canal.  Kerrisons were used to remove osteophytes from the superior endplate of C5 and to perform a neural foraminotomy of the exiting nerve roots at that area.  Bleeding in the epidural space was controlled using FloSeal.    There was still very severe stenosis at the posterior aspect of the endplate superiorly of C5.  I tried to decompress this area with Kerrisons and curettes, but it was felt that a partial corpectomy would be necessary to more safely decompress this area.  Using a combination of Kerrisons, the high-speed bur, and the rongeur, I removed approximately 50% of the C5 vertebral body inferiorly.  This allowed much more visualization to safely decompress the central spinal canal.  I then performed a bilateral neural foraminotomy with Kerrisons of the exiting nerve roots at what was the C4-5 disc space.  Again bleeding was controlled using FloSeal.  The wound was copiously irrigated with saline solution.  At the end of the decompression involving the corpectomy of C4 and the partial corpectomy of C5, the central spinal canal and all exiting nerve roots were free of compression.    The calipers were  then used to measure the corpectomy defect from the inferior endplate of C3 up to the remaining portion of C5.  An appropriately sized corpectomy cage from the DePuy instrumentation set with the larger footprint was then packed as tightly as possible with locally obtained autograft bone.  This PEEK corpectomy cage was then tapped into position spanning the corpectomy defect from the remaining portion of C5 up to C3.  This spacer was placed as a PEEK interbody biomechanical device to assist with fusion.    I then turned my attention to the disc spaces at C5-6 and C6-7.  Both levels were prepared identically.  Kerrisons were used to remove remaining anterior osteophytes off of the endplates.  Straight curettes were used to remove the cartilaginous endplates and all remaining disc material.  The high-speed bur was then used to remove posterior osteophytes and to contour the endplates in preparation for fusion.  A forward angled curette was used to free up the posterior margins of the vertebral bodies, releasing the posterior longitudinal ligament.  Posterior osteophytes, posterior disc material, and the PLL were all resected using Kerrisons.  Kerrisons were also used to perform a bilateral neural foraminotomy.  At the end of the discectomy decompression, the central spinal canal and the exiting nerve roots at both C5-6 and C6-7 appeared to be free of compression.  Bleeding in the epidural space was controlled using FloSeal.  The wound was then copiously irrigated with saline solution.    Next, trial spacers from the NuVasive cohere set were tapped into position into each of the disc spaces of C5-6 and C6-7.  Once the appropriate sizes were determined, an actual porous PEEK spacer matching the same size as the trial was delivered to the sterile field for each level.  The spacers were packed as tightly as possible with locally obtained autograft bone.  The spacers were then malleted into position into the disc spaces of  C5-6 and C6-7 under fluoroscopic guidance.  They were placed as PEEK interbody biomechanical devices to assist with fusion.    After confirming the PEEK spacers were properly positioned with fluoroscopy, the Summit pins were removed.  Remaining anterior osteophytes were contoured off of the vertebral bodies using a combination of the high-speed bur and the Rongeur to allow for the best possible plate fixation.  An anterior plate from the NuVasive instrumentation set was then chosen to span C3 to 7.  This was held into position using a series of screws.  I placed 2 screws into C3, 2 screws into the remaining portion of C5, and 2 screws each into C6 and C7 for a total of 8 screws.    Final fluoroscopy imaging confirmed adequate position of the plate and screws as well as the PEEK spacers.  All implants appeared to be properly positioned with good maintenance of cervical alignment.  A final inspection of the operative field was then undertaken to ensure that we had adequate hemostasis.  Bleeding at this point was controlled with FloSeal and bipolar cautery.    Closure was accomplished by reapproximating the platysma with a 3-0 Vicryl.  Immediate subcutaneous tissues were reapproximated with a 4-0 Vicryl in a buried fashion.  Final skin closure was accomplished with Mastisol and Steri-Strips.  The wound was then washed and a sterile Bioclusive dressing was applied.  The patient was then placed into a hard cervical collar, extubated, and sent to the recovery room in good stable condition.    The patient tolerated the procedure well.  There were no complications.  We estimated blood loss to be 50 mL.      Intraoperative neuro monitoring was ordered and carried out throughout the procedure to add an increased level of safety for the patient.  The interpreting physician was available by means of real-time continuous, bidirectional, remote audio and visual communication as needed throughout the entire procedure.  Modalities  used during the procedure included SSEP, EMG, and TOF.  There were no neuro monitoring signal changes during the procedure.    Sachin Wilson PA-C provided critical assistance during the procedure.  His assistance was medically necessary in order to allow the procedure to occur in the most safe and efficient manner.  He assisted with not only agent positioning and wound closure, but more importantly with retraction of delicate neurovascular structures, assistance during the corpectomy and discectomy decompressions, as well as the placement of the PEEK spacers and instrumentation to obtain a fusion.    DAVEY Rader MD     Date: 5/12/2022  Time: 09:54 CDT

## 2022-05-13 VITALS
SYSTOLIC BLOOD PRESSURE: 119 MMHG | RESPIRATION RATE: 16 BRPM | DIASTOLIC BLOOD PRESSURE: 46 MMHG | OXYGEN SATURATION: 91 % | TEMPERATURE: 98.1 F | HEART RATE: 73 BPM

## 2022-05-13 LAB
ANION GAP SERPL CALCULATED.3IONS-SCNC: 9 MMOL/L (ref 5–15)
BASOPHILS # BLD AUTO: 0.02 10*3/MM3 (ref 0–0.2)
BASOPHILS NFR BLD AUTO: 0.2 % (ref 0–1.5)
BUN SERPL-MCNC: 20 MG/DL (ref 8–23)
BUN/CREAT SERPL: 33.3 (ref 7–25)
CALCIUM SPEC-SCNC: 8.3 MG/DL (ref 8.6–10.5)
CHLORIDE SERPL-SCNC: 103 MMOL/L (ref 98–107)
CO2 SERPL-SCNC: 29 MMOL/L (ref 22–29)
CREAT SERPL-MCNC: 0.6 MG/DL (ref 0.57–1)
DEPRECATED RDW RBC AUTO: 44.3 FL (ref 37–54)
EGFRCR SERPLBLD CKD-EPI 2021: 92.6 ML/MIN/1.73
EOSINOPHIL # BLD AUTO: 0.01 10*3/MM3 (ref 0–0.4)
EOSINOPHIL NFR BLD AUTO: 0.1 % (ref 0.3–6.2)
ERYTHROCYTE [DISTWIDTH] IN BLOOD BY AUTOMATED COUNT: 13.9 % (ref 12.3–15.4)
GLUCOSE SERPL-MCNC: 107 MG/DL (ref 65–99)
HCT VFR BLD AUTO: 30.4 % (ref 34–46.6)
HGB BLD-MCNC: 9.4 G/DL (ref 12–15.9)
IMM GRANULOCYTES # BLD AUTO: 0.03 10*3/MM3 (ref 0–0.05)
IMM GRANULOCYTES NFR BLD AUTO: 0.3 % (ref 0–0.5)
LYMPHOCYTES # BLD AUTO: 1.31 10*3/MM3 (ref 0.7–3.1)
LYMPHOCYTES NFR BLD AUTO: 14.6 % (ref 19.6–45.3)
MCH RBC QN AUTO: 26.9 PG (ref 26.6–33)
MCHC RBC AUTO-ENTMCNC: 30.9 G/DL (ref 31.5–35.7)
MCV RBC AUTO: 87.1 FL (ref 79–97)
MONOCYTES # BLD AUTO: 2.02 10*3/MM3 (ref 0.1–0.9)
MONOCYTES NFR BLD AUTO: 22.6 % (ref 5–12)
NEUTROPHILS NFR BLD AUTO: 5.56 10*3/MM3 (ref 1.7–7)
NEUTROPHILS NFR BLD AUTO: 62.2 % (ref 42.7–76)
NRBC BLD AUTO-RTO: 0 /100 WBC (ref 0–0.2)
PLATELET # BLD AUTO: 188 10*3/MM3 (ref 140–450)
PMV BLD AUTO: 11.1 FL (ref 6–12)
POTASSIUM SERPL-SCNC: 2.8 MMOL/L (ref 3.5–5.2)
RBC # BLD AUTO: 3.49 10*6/MM3 (ref 3.77–5.28)
SODIUM SERPL-SCNC: 141 MMOL/L (ref 136–145)
WBC NRBC COR # BLD: 8.95 10*3/MM3 (ref 3.4–10.8)

## 2022-05-13 PROCEDURE — 97161 PT EVAL LOW COMPLEX 20 MIN: CPT | Performed by: PHYSICAL THERAPIST

## 2022-05-13 PROCEDURE — 85025 COMPLETE CBC W/AUTO DIFF WBC: CPT | Performed by: ORTHOPAEDIC SURGERY

## 2022-05-13 PROCEDURE — 97116 GAIT TRAINING THERAPY: CPT

## 2022-05-13 PROCEDURE — 25010000002 CEFAZOLIN PER 500 MG: Performed by: ORTHOPAEDIC SURGERY

## 2022-05-13 PROCEDURE — 80048 BASIC METABOLIC PNL TOTAL CA: CPT | Performed by: ORTHOPAEDIC SURGERY

## 2022-05-13 PROCEDURE — 97535 SELF CARE MNGMENT TRAINING: CPT

## 2022-05-13 RX ORDER — OXYCODONE AND ACETAMINOPHEN 10; 325 MG/1; MG/1
1 TABLET ORAL EVERY 4 HOURS PRN
Qty: 42 TABLET | Refills: 0 | Status: SHIPPED | OUTPATIENT
Start: 2022-05-13 | End: 2022-05-20

## 2022-05-13 RX ADMIN — GABAPENTIN 600 MG: 300 CAPSULE ORAL at 09:13

## 2022-05-13 RX ADMIN — CEFAZOLIN SODIUM 1 G: 10 INJECTION, POWDER, FOR SOLUTION INTRAVENOUS at 06:26

## 2022-05-13 RX ADMIN — MESALAMINE 800 MG: 400 CAPSULE, DELAYED RELEASE ORAL at 16:15

## 2022-05-13 RX ADMIN — INDAPAMIDE 2.5 MG: 1.25 TABLET, FILM COATED ORAL at 06:26

## 2022-05-13 RX ADMIN — CHOLESTYRAMINE 4 G: 4 POWDER, FOR SUSPENSION ORAL at 09:15

## 2022-05-13 RX ADMIN — OXYBUTYNIN CHLORIDE 5 MG: 5 TABLET, EXTENDED RELEASE ORAL at 09:14

## 2022-05-13 RX ADMIN — OXYCODONE HYDROCHLORIDE AND ACETAMINOPHEN 1 TABLET: 10; 325 TABLET ORAL at 10:52

## 2022-05-13 RX ADMIN — Medication 1 TABLET: at 09:14

## 2022-05-13 RX ADMIN — SODIUM CHLORIDE 75 ML/HR: 9 INJECTION, SOLUTION INTRAVENOUS at 10:54

## 2022-05-13 RX ADMIN — Medication 250 MCG: at 09:14

## 2022-05-13 RX ADMIN — GABAPENTIN 600 MG: 300 CAPSULE ORAL at 16:16

## 2022-05-13 RX ADMIN — MESALAMINE 800 MG: 400 CAPSULE, DELAYED RELEASE ORAL at 09:13

## 2022-05-13 RX ADMIN — OXYCODONE HYDROCHLORIDE AND ACETAMINOPHEN 1 TABLET: 10; 325 TABLET ORAL at 16:16

## 2022-05-13 RX ADMIN — OXYCODONE HYDROCHLORIDE AND ACETAMINOPHEN 1 TABLET: 10; 325 TABLET ORAL at 02:26

## 2022-05-13 RX ADMIN — FAMOTIDINE 20 MG: 20 TABLET, FILM COATED ORAL at 09:14

## 2022-05-13 RX ADMIN — FENOFIBRATE 145 MG: 145 TABLET ORAL at 09:15

## 2022-05-13 RX ADMIN — FOLIC ACID 1 MG: 1 TABLET ORAL at 09:14

## 2022-05-13 RX ADMIN — OXYCODONE HYDROCHLORIDE AND ACETAMINOPHEN 1 TABLET: 10; 325 TABLET ORAL at 06:34

## 2022-05-13 RX ADMIN — ALLOPURINOL 500 MG: 300 TABLET ORAL at 09:14

## 2022-05-13 NOTE — PLAN OF CARE
Goal Outcome Evaluation:  Plan of Care Reviewed With: patient, daughter        Progress: improving  Outcome Evaluation: THe patient presents alert and oriented x4. She demonstrates myelopathic movement and gait pattern. 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 She has impaired sensation due to this as well, but the L is worsened by a previous lateral lumbar approach that also left her with L hip flexion and quad weakness. She is now in a cervical collar which prevents her from looking down so she is having difficulty with compensating for her poor proprioception in her LEs during gait. She takes small clumsy steps with use of a front wheeled walker. She uses a rollator at home when may not be safe due to the ability to move laterally easily. She will benefit from continued PT to work on her balance and proprioception and compensation techniques. Recommend discharage home with assist and outpt PT to focus on gait and balance safety to prevent falls.

## 2022-05-13 NOTE — THERAPY EVALUATION
Patient Name: Deisi Ponce  : 1945    MRN: [id number]                              Today's Date: 2022       Admit Date: 2022    Visit Dx:     ICD-10-CM ICD-9-CM   1. Decreased activities of daily living (ADL)  Z78.9 V49.89   2. Impaired mobility  Z74.09 799.89     Patient Active Problem List   Diagnosis   • Crohn's disease of small and large intestines with complication (HCC)   • Crohn's disease of both small and large intestine without complication (HCC)   • Crohn's disease without complication (HCC)   • Lumbar stenosis   • GERD without esophagitis   • Essential hypertension   • Chronic gout   • Calculus of gallbladder without cholecystitis without obstruction   • Stenosis, cervical spine     Past Medical History:   Diagnosis Date   • Arthritis    • Cancer (HCC)     endometrial cancer   • Crohn disease (HCC)    • Crohn's disease (HCC)    • DDD (degenerative disc disease), lumbar    • Elevated cholesterol    • GERD (gastroesophageal reflux disease)    • Gout    • History of transfusion    • Hypertension    • Intermittent self-catheterization of bladder    • Macular degeneration    • Osteopenia    • Peripheral neuropathy    • RA (rheumatoid arthritis) (HCC)    • Urinary retention      Past Surgical History:   Procedure Laterality Date   • BACK SURGERY     • CARPAL TUNNEL RELEASE Bilateral    • CATARACT EXTRACTION Bilateral    • CHOLECYSTECTOMY N/A 2021    Procedure: LAPAROSCOPIC CHOLECYSTECTOMY;  Surgeon: Agustina Mc MD;  Location: [facility] OR;  Service: General;  Laterality: N/A;   • COLONOSCOPY  10/09/2015   • COLONOSCOPY N/A 2016    Procedure: COLONOSCOPY WITH ANESTHESIA;  Surgeon: Jose Raul Butt DO;  Location: [facility] ENDOSCOPY;  Service:    • COLONOSCOPY N/A 2019    Procedure: COLONOSCOPY WITH ANESTHESIA;  Surgeon: Jose Raul Butt DO;  Location: [facility] ENDOSCOPY;  Service: Gastroenterology   • COLONOSCOPY N/A 10/14/2021    Procedure: COLONOSCOPY WITH  ANESTHESIA;  Surgeon: Jose Raul Butt DO;  Location:  [facility] ENDOSCOPY;  Service: Gastroenterology;  Laterality: N/A;  pre crohn's disease  post random  colon bx  Dr. Padilla   • CYST REMOVAL      from chest x2   • HARDWARE REMOVAL N/A 2/19/2020    Procedure: REMOVAL OF INSTRUMENTATION;  Surgeon: EBONI Rader MD;  Location:  [facility] OR;  Service: Orthopedic Spine;  Laterality: N/A;   • HYSTERECTOMY     • LUMBAR FUSION Bilateral 2/17/2020    Procedure: LATERAL LUMBAR INTERBODY FUSION LEFT L1-2 WITH INSTRUMENTATION, RIGHT L2-3;  Surgeon: EBONI Rader MD;  Location:  [facility] OR;  Service: Orthopedic Spine;  Laterality: Bilateral;   • LUMBAR LAMINECTOMY WITH FUSION N/A 2/19/2020    Procedure: EXPLORATION OF FUSION L3-5, POSTERIOR SPINAL FUSION WITH INSTRUMENTATION L1-3;  Surgeon: EBONI Rader MD;  Location:  [facility] OR;  Service: Orthopedic Spine;  Laterality: N/A;   • MENISCECTOMY Left    • UPPER GASTROINTESTINAL ENDOSCOPY  08/31/2010      General Information     Row Name 05/13/22 0729          Physical Therapy Time and Intention    Document Type evaluation  s/p ACDF C3-7 due to neck pain, R side occipital HA, L shoulder and UE radicular pain, 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, severe bladder incontinence  -MS     Mode of Treatment physical therapy;individual therapy  -MS     Row Name 05/13/22 0729          General Information    Patient Profile Reviewed yes  -MS     Prior Level of Function independent:;all household mobility;ADL's;dependent:;driving  walks with rollator, B AFOs  -MS     Existing Precautions/Restrictions fall;spinal  cervical collar at all times  -MS     Barriers to Rehab medically complex;previous functional deficit  -MS     Row Name 05/13/22 0729          Living Environment    People in Home child(adolfo), adult  -MS     Row Name 05/13/22 0729          Home Main Entrance    Number of Stairs, Main Entrance two  -MS     Stair Railings, Main Entrance --  grab at top of steps  -MS      Row Name 05/13/22 0729          Stairs Within Home, Primary    Number of Stairs, Within Home, Primary none  -MS     Row Name 05/13/22 0729          Cognition    Orientation Status (Cognition) oriented x 4  -MS     Row Name 05/13/22 0729          Safety Issues, Functional Mobility    Safety Issues Affecting Function (Mobility) sequencing abilities  -MS     Impairments Affecting Function (Mobility) balance;coordination;endurance/activity tolerance;grasp;motor control;postural/trunk control;range of motion (ROM);sensation/sensory awareness;strength  -MS           User Key  (r) = Recorded By, (t) = Taken By, (c) = Cosigned By    Initials Name Provider Type    MS Dong Chelo R, PT, DPT, NCS Physical Therapist               Mobility     Row Name 05/13/22 0730          Bed Mobility    Bed Mobility rolling right;sidelying-sit  -MS     Rolling Right [place] (Bed Mobility) contact guard;verbal cues;nonverbal cues (demo/gesture)  -MS     Sidelying-Sit [place] (Bed Mobility) contact guard;verbal cues;nonverbal cues (demo/gesture)  -MS     Assistive Device (Bed Mobility) bed rails;head of bed elevated  -MS     Comment, (Bed Mobility) pt used UEs to lift L LE to assist with rolling to the R  -MS     Row Name 05/13/22 0730          Sit-Stand Transfer    Sit-Stand [place] (Transfers) contact guard;verbal cues;nonverbal cues (demo/gesture)  -MS     Assistive Device (Sit-Stand Transfers) walker, front-wheeled  -MS     Comment, (Sit-Stand Transfer) pt pulls up on walker and pushes from bed with other hand  -MS     Row Name 05/13/22 0730          Gait/Stairs (Locomotion)    [place] Level (Gait) contact guard;verbal cues;nonverbal cues (demo/gesture)  -MS     Assistive Device (Gait) walker, front-wheeled  -MS     Distance in Feet (Gait) 20ft x2 with myelopathic gait pattern, short stride length and midfoot strike due to B AFOs  -MS           User Key  (r) = Recorded By, (t) = Taken By, (c) = Cosigned By     Initials Name Provider Type    Chelo Muñoz R, PT, DPT, NCS Physical Therapist               Obj/Interventions     Row Name 05/13/22 0730          Range of Motion Comprehensive    Comment, General Range of Motion B dorsiflexion impaired 25%, L hip flexion impaired 25%. both chronic, B finger deformities from arthritis  -MS     Row Name 05/13/22 0730          Strength Comprehensive (MMT)    Comment, General Manual Muscle Testing (MMT) Assessment B EHL 2-/5, R dorsiflexion 3-/5, L dorsiflexion 2+/5, L quad 3/5, L hip flexion 3-/5, B  3+/5  -MS     Row Name 05/13/22 0730          Motor Skills    Motor Skills coordination;muscle tone;neuro-muscular function  -MS     Coordination fine motor deficit;gross motor deficit;bilateral;upper extremity;lower extremity;moderate impairment;ataxia  -MS     Muscle Tone bilateral;lower extremity(s);mild impairment;hypotonia  -MS     Neuromuscular Function bilateral;upper extremity;lower extremity;ataxia;moderate impairment  -MS     Row Name 05/13/22 0730          Balance    Balance Assessment sitting static balance;sitting dynamic balance;standing static balance;standing dynamic balance  -MS     Static Sitting Balance contact guard  -MS     Dynamic Sitting Balance contact guard  posterior lean at times  -MS     Position, Sitting Balance sitting edge of bed  -MS     Static Standing Balance contact guard  -MS     Dynamic Standing Balance contact guard  -MS     Position/Device Used, Standing Balance walker, rolling  -MS     Row Name 05/13/22 0730          Sensory Assessment (Somatosensory)    Sensory Assessment (Somatosensory) --  chronic impaired sensation in B feet up to knees due to peripheral neuropathy, L LE is more impaired than R due to previous lumbar surgery  -MS           User Key  (r) = Recorded By, (t) = Taken By, (c) = Cosigned By    Initials Name Provider Type    Chelo Muñoz R, PT, DPT, NCS Physical Therapist               Goals/Plan     Row Name 05/13/22  0730          Bed Mobility Goal 1 (PT)    Activity/Assistive Device (Bed Mobility Goal 1, PT) bed mobility activities, all  adjustable bed  -MS     [place] Level/Cues Needed (Bed Mobility Goal 1, PT) modified independence  -MS     Time Frame (Bed Mobility Goal 1, PT) long term goal (LTG);by discharge  -MS     Progress/Outcomes (Bed Mobility Goal 1, PT) goal ongoing  -MS     Row Name 05/13/22 0730          Transfer Goal 1 (PT)    Activity/Assistive Device (Transfer Goal 1, PT) sit-to-stand/stand-to-sit;bed-to-chair/chair-to-bed;walker, rolling  -MS     [place] Level/Cues Needed (Transfer Goal 1, PT) modified independence  -MS     Time Frame (Transfer Goal 1, PT) long term goal (LTG);by discharge  -MS     Progress/Outcome (Transfer Goal 1, PT) goal ongoing  -MS     Row Name 05/13/22 0730          Gait Training Goal 1 (PT)    Activity/Assistive Device (Gait Training Goal 1, PT) gait (walking locomotion);assistive device use;decrease fall risk;diminish gait deviation;forward stepping;improve balance and speed;increase endurance/gait distance;walker, rolling  -MS     [place] Level (Gait Training Goal 1, PT) modified independence;tactile cues required;verbal cues required  -MS     Distance (Gait Training Goal 1, PT) 50ft with step through gait pattern 100% of the time  -MS     Time Frame (Gait Training Goal 1, PT) long term goal (LTG);by discharge  -MS     Progress/Outcome (Gait Training Goal 1, PT) goal ongoing  -MS     Row Name 05/13/22 0730          Stairs Goal 1 (PT)    Activity/Assistive Device (Stairs Goal 1, PT) ascending stairs;descending stairs;step-to-step  -MS     [place] Level/Cues Needed (Stairs Goal 1, PT) contact guard required  -MS     Number of Stairs (Stairs Goal 1, PT) 2  -MS     Time Frame (Stairs Goal 1, PT) long term goal (LTG);by discharge  -MS     Progress/Outcome (Stairs Goal 1, PT) goal ongoing  -MS     Row Name 05/13/22 0730          Therapy Assessment/Plan (PT)    Planned  Therapy Interventions (PT) balance training;bed mobility training;gait training;patient/family education;orthotic fitting/training;strengthening;stair training;neuromuscular re-education;motor coordination training;transfer training  -MS           User Key  (r) = Recorded By, (t) = Taken By, (c) = Cosigned By    Initials Name Provider Type    Chelo Muñoz R, PT, DPT, NCS Physical Therapist               Clinical Impression     Row Name 05/13/22 0730          Pain    Pretreatment Pain Rating 7/10  numbness in B hands that is chronic  -MS     Posttreatment Pain Rating 7/10  -MS     Pain Location - neck  -MS     Pre/Posttreatment Pain Comment and throat  -MS     Pain Intervention(s) Medication (See MAR);Repositioned;Ambulation/increased activity  -MS     Row Name 05/13/22 0730          Plan of Care Review    Plan of Care Reviewed With patient;daughter  -MS     Progress improving  -MS     Outcome Evaluation THe patient presents alert and oriented x4. She demonstrates myelopathic movement and gait pattern. 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 She has impaired sensation due to this as well, but the L is worsened by a previous lateral lumbar approach that also left her with L hip flexion and quad weakness. She is now in a cervical collar which prevents her from looking down so she is having difficulty with compensating for her poor proprioception in her LEs during gait. She takes small clumsy steps with use of a front wheeled walker. She uses a rollator at home when may not be safe due to the ability to move laterally easily. She will benefit from continued PT to work on her balance and proprioception and compensation techniques. Recommend discharage home with assist and outpt PT to focus on gait and balance safety to prevent falls.  -MS     Row Name 05/13/22 0730          Therapy Assessment/Plan (PT)     Patient/Family Therapy Goals Statement (PT) return home and walk safely  -MS     Rehab Potential (PT) good, to achieve stated therapy goals  -MS     Criteria for Skilled Interventions Met (PT) yes;meets criteria;skilled treatment is necessary  -MS     Therapy Frequency (PT) 2 times/day  -MS     Predicted Duration of Therapy Intervention (PT) until discharge  -MS     Row Name 05/13/22 0730          Vital Signs    Pre SpO2 (%) 90  -MS     O2 Delivery Pre Treatment room air  -MS     O2 Delivery Intra Treatment room air  -MS     Post SpO2 (%) 90  -MS     O2 Delivery Post Treatment room air  -MS     Pre Patient Position Supine  -MS     Intra Patient Position Standing  -MS     Post Patient Position Sitting  -MS     Row Name 05/13/22 0730          Positioning and Restraints    Post Treatment Position chair  -MS     In Chair notified nsg;sitting;call light within reach;encouraged to call for assist;with family/caregiver;with brace  -MS           User Key  (r) = Recorded By, (t) = Taken By, (c) = Cosigned By    Initials Name Provider Type    MS Chelo Umana, PT, DPT, NCS Physical Therapist               Outcome Measures     Row Name 05/13/22 0730          How much help from another person do you currently need...    Turning from your back to your side while in flat bed without using bedrails? 3  -MS     Moving from lying on back to sitting on the side of a flat bed without bedrails? 3  -MS     Moving to and from a bed to a chair (including a wheelchair)? 3  -MS     Standing up from a chair using your arms (e.g., wheelchair, bedside chair)? 3  -MS     Climbing 3-5 steps with a railing? 2  -MS     To walk in hospital room? 3  -MS     AM-PAC 6 Clicks Score (PT) 17  -MS     Highest level of mobility 5 --> Static standing  -MS     Row Name 05/13/22 0730          Functional Assessment    Outcome Measure Options AM-PAC 6 Clicks Basic Mobility (PT)  -MS           User Key  (r) = Recorded By, (t) = Taken By, (c) = Cosigned By     Initials Name Provider Type    MS Chelo Umana, PT, DPT, NCS Physical Therapist                             Physical Therapy Education                 Title: PT OT SLP Therapies (In Progress)     Topic: Physical Therapy (In Progress)     Point: Mobility training (Done)     Learning Progress Summary           Patient Acceptance, E, VU by MS at 5/13/2022 0733    Comment: role of PT in her care, use of cervical collar, and spinal restrictions                   Point: Home exercise program (Not Started)     Learner Progress:  Not documented in this visit.          Point: Body mechanics (Not Started)     Learner Progress:  Not documented in this visit.          Point: Precautions (Done)     Learning Progress Summary           Patient Acceptance, E, VU by MS at 5/13/2022 0733    Comment: role of PT in her care, use of cervical collar, and spinal restrictions                               User Key     Initials Effective Dates Name Provider Type Discipline    MS 06/19/18 -  Chelo Umana, PT, DPT, NCS Physical Therapist PT              PT Recommendation and Plan  Planned Therapy Interventions (PT): balance training, bed mobility training, gait training, patient/family education, orthotic fitting/training, strengthening, stair training, neuromuscular re-education, motor coordination training, transfer training  Plan of Care Reviewed With: patient, daughter  Progress: improving  Outcome Evaluation: THe patient presents alert and oriented x4. She demonstrates myelopathic movement and gait pattern. 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 She has impaired sensation due to this as well, but the L is worsened by a previous lateral lumbar approach that also left her with L hip flexion and quad weakness. She is now in a cervical collar which prevents her from looking down so she is having difficulty with compensating for her  poor proprioception in her LEs during gait. She takes small clumsy steps with use of a front wheeled walker. She uses a rollator at home when may not be safe due to the ability to move laterally easily. She will benefit from continued PT to work on her balance and proprioception and compensation techniques. Recommend discharage home with assist and outpt PT to focus on gait and balance safety to prevent falls.     Time Calculation:    PT Charges     Row Name 05/13/22 0730             Time Calculation    Start Time 0715  -MS      Stop Time 0808  -MS      Time Calculation (min) 53 min  -MS      PT Received On 05/13/22  -MS      PT Goal Re-Cert Due Date 05/23/22  -MS              Untimed Charges    PT Eval/Re-eval Minutes 53  -MS              Total Minutes    Untimed Charges Total Minutes 53  -MS       Total Minutes 53  -MS            User Key  (r) = Recorded By, (t) = Taken By, (c) = Cosigned By    Initials Name Provider Type    MS Chelo Umana, PT, DPT, NCS Physical Therapist              Therapy Charges for Today     Code Description Service Date Service Provider Modifiers Qty    [id number] HC PT EVAL LOW COMPLEXITY 4 5/13/2022 Chelo Umana PT, DPT, NCS GP 1          PT G-Codes  Outcome Measure Options: AM-PAC 6 Clicks Basic Mobility (PT)  AM-PAC 6 Clicks Score (PT): 17  AM-PAC 6 Clicks Score (OT): 14    Chelo Umana PT, DPT, NCS  5/13/2022

## 2022-05-13 NOTE — THERAPY TREATMENT NOTE
Acute Care - Occupational Therapy Treatment Note  [facility]     Patient Name: Deisi Ponce  : 1945  MRN: [id number]  Today's Date: 2022             Admit Date: 2022       ICD-10-CM ICD-9-CM   1. Decreased activities of daily living (ADL)  Z78.9 V49.89   2. Impaired mobility  Z74.09 799.89     Patient Active Problem List   Diagnosis   • Crohn's disease of small and large intestines with complication (HCC)   • Crohn's disease of both small and large intestine without complication (HCC)   • Crohn's disease without complication (HCC)   • Lumbar stenosis   • GERD without esophagitis   • Essential hypertension   • Chronic gout   • Calculus of gallbladder without cholecystitis without obstruction   • Stenosis, cervical spine     Past Medical History:   Diagnosis Date   • Arthritis    • Cancer (HCC)     endometrial cancer   • Crohn disease (HCC)    • Crohn's disease (HCC)    • DDD (degenerative disc disease), lumbar    • Elevated cholesterol    • GERD (gastroesophageal reflux disease)    • Gout    • History of transfusion    • Hypertension    • Intermittent self-catheterization of bladder    • Macular degeneration    • Osteopenia    • Peripheral neuropathy    • RA (rheumatoid arthritis) (HCC)    • Urinary retention      Past Surgical History:   Procedure Laterality Date   • BACK SURGERY     • CARPAL TUNNEL RELEASE Bilateral    • CATARACT EXTRACTION Bilateral    • CHOLECYSTECTOMY N/A 2021    Procedure: LAPAROSCOPIC CHOLECYSTECTOMY;  Surgeon: Agustina Mc MD;  Location: [facility] OR;  Service: General;  Laterality: N/A;   • COLONOSCOPY  10/09/2015   • COLONOSCOPY N/A 2016    Procedure: COLONOSCOPY WITH ANESTHESIA;  Surgeon: Jose Raul Butt DO;  Location: [facility] ENDOSCOPY;  Service:    • COLONOSCOPY N/A 2019    Procedure: COLONOSCOPY WITH ANESTHESIA;  Surgeon: Jose Raul Butt DO;  Location: [facility] ENDOSCOPY;  Service: Gastroenterology   • COLONOSCOPY N/A 10/14/2021     Procedure: COLONOSCOPY WITH ANESTHESIA;  Surgeon: Jose Raul Butt DO;  Location:  [facility] ENDOSCOPY;  Service: Gastroenterology;  Laterality: N/A;  pre crohn's disease  post random  colon bx  Dr. Padilla   • CYST REMOVAL      from chest x2   • HARDWARE REMOVAL N/A 2/19/2020    Procedure: REMOVAL OF INSTRUMENTATION;  Surgeon: EBONI Rader MD;  Location:  [facility] OR;  Service: Orthopedic Spine;  Laterality: N/A;   • HYSTERECTOMY     • LUMBAR FUSION Bilateral 2/17/2020    Procedure: LATERAL LUMBAR INTERBODY FUSION LEFT L1-2 WITH INSTRUMENTATION, RIGHT L2-3;  Surgeon: EBONI Rader MD;  Location:  [facility] OR;  Service: Orthopedic Spine;  Laterality: Bilateral;   • LUMBAR LAMINECTOMY WITH FUSION N/A 2/19/2020    Procedure: EXPLORATION OF FUSION L3-5, POSTERIOR SPINAL FUSION WITH INSTRUMENTATION L1-3;  Surgeon: EBONI Rader MD;  Location:  [facility] OR;  Service: Orthopedic Spine;  Laterality: N/A;   • MENISCECTOMY Left    • UPPER GASTROINTESTINAL ENDOSCOPY  08/31/2010         OT ASSESSMENT FLOWSHEET (last 12 hours)     OT Evaluation and Treatment     Row Name 05/13/22 1019                   OT Time and Intention    Subjective Information no complaints  -TS        Document Type therapy note (daily note)  -TS        Mode of Treatment occupational therapy  -TS        Patient Effort good  -TS        Comment c collar  -TS                  General Information    Existing Precautions/Restrictions fall;spinal  -TS                  Pain Assessment    Pretreatment Pain Rating 0/10 - no pain  -TS        Posttreatment Pain Rating 0/10 - no pain  -TS                  Cognition    Personal Safety Interventions fall prevention program maintained;gait belt;nonskid shoes/slippers when out of bed  -TS                  Activities of Daily Living    BADL Assessment/Intervention lower body dressing;toileting  -TS                  Lower Body Dressing Assessment/Training    [place] Level (Lower Body Dressing) don;doff;set  up;moderate assist (50% patient effort);shoes/slippers  -TS        Position (Lower Body Dressing) edge of bed sitting  -TS                  Toileting Assessment/Training    [place] Level (Toileting) toileting skills;perform perineal hygiene;adjust/manage clothing;set up;supervision  -TS        Assistive Devices (Toileting) commode;grab bar/safety frame  -TS        Position (Toileting) unsupported sitting;unsupported standing  -TS                  Bed Mobility    Sidelying-Sit [place] (Bed Mobility) supervision  -TS        Sit-Sidelying [place] (Bed Mobility) supervision  -TS                  Functional Mobility    Functional Mobility- Ind. Level standby assist  -TS        Functional Mobility- Device walker, front-wheeled  -TS        Functional Mobility- Comment in room, in BR  -TS                  Transfer Assessment/Treatment    Transfers sit-stand transfer;stand-sit transfer;toilet transfer  -TS                  Transfers    Sit-Stand [place] (Transfers) standby assist  -TS        Stand-Sit [place] (Transfers) standby assist  -TS        [place] Level (Toilet Transfer) standby assist;supervision  -TS        Assistive Device (Toilet Transfer) commode;grab bars/safety frame  -TS                  Sit-Stand Transfer    Assistive Device (Sit-Stand Transfers) walker, front-wheeled  -TS                  Stand-Sit Transfer    Assistive Device (Stand-Sit Transfers) walker, front-wheeled  -TS                  Toilet Transfer    Type (Toilet Transfer) sit-stand;stand-sit  -TS                  Orthotics & Prosthetics Management    Orthosis Location spinal orthosis  -TS        Additional Documentation Orthosis Location (Row)  -TS                  Spinal Orthosis Management    Orthosis Training (Spinal Orthosis) patient and caregiver;cleaning/care of orthosis;donning/doffing orthosis;activity limitations/precautions;wearing schedule  -TS        Compliance/Wearing Issues (Spinal Orthosis)  patient/caregiver comprehend strategies;training provided to address compliance/wearing issues  -TS                  Wound 05/12/22 0749 anterior neck Incision    Wound - Properties Group Placement Date: 05/12/22  -LESLI Placement Time: 0749 -LESLI Orientation: anterior  -LELSI Location: neck  -LESLI Primary Wound Type: Incision  -LESLI        Retired Wound - Properties Group Placement Date: 05/12/22  -LESLI Placement Time: 0749 -LESLI Orientation: anterior  -LESLI Location: neck  -LESLI Primary Wound Type: Incision  -LESLI        Retired Wound - Properties Group Date first assessed: 05/12/22  -LESLI Time first assessed: 0749 -LESLI Location: neck  -LESLI Primary Wound Type: Incision  -LESLI                  Positioning and Restraints    Pre-Treatment Position in bed  -TS        Post Treatment Position bed  -TS        In Bed fowlers;call light within reach;encouraged to call for assist;with family/caregiver;side rails up x2  -TS              User Key  (r) = Recorded By, (t) = Taken By, (c) = Cosigned By    Initials Name Effective Dates    TS Hillary Bellamy COTA 06/16/21 -     LELSI Aris Denson RN 02/17/22 -                  Occupational Therapy Education                 Title: PT OT SLP Therapies (In Progress)     Topic: Occupational Therapy (In Progress)     Point: ADL training (In Progress)     Description:   Instruct learner(s) on proper safety adaptation and remediation techniques during self care or transfers.   Instruct in proper use of assistive devices.              Learning Progress Summary           Patient Acceptance, E,D, NR by MABLE at 5/12/2022 1538   Family Acceptance, E,D, NR by MW at 5/12/2022 1538                   Point: Home exercise program (In Progress)     Description:   Instruct learner(s) on appropriate technique for monitoring, assisting and/or progressing therapeutic exercises/activities.              Learning Progress Summary           Patient Acceptance, E,D, NR by MABLE at 5/12/2022 1538   Family Acceptance, E,D, NR by MABLE  at 5/12/2022 1538                   Point: Precautions (In Progress)     Description:   Instruct learner(s) on prescribed precautions during self-care and functional transfers.              Learning Progress Summary           Patient Acceptance, E,D, NR by  at 5/12/2022 1538   Family Acceptance, E,D, NR by  at 5/12/2022 1538                   Point: Body mechanics (In Progress)     Description:   Instruct learner(s) on proper positioning and spine alignment during self-care, functional mobility activities and/or exercises.              Learning Progress Summary           Patient Acceptance, E,D, NR by  at 5/12/2022 1538   Family Acceptance, E,D, NR by  at 5/12/2022 1538                               User Key     Initials Effective Dates Name Provider Type Discipline     08/28/18 -  Valentina Joshua, OTR/L Occupational Therapist OT                  OT Recommendation and Plan              Time Calculation:    Time Calculation- OT     Row Name 05/13/22 1248             Time Calculation- OT    OT Start Time 1019  -TS      OT Stop Time 1105  -TS      OT Time Calculation (min) 46 min  -TS      Total Timed Code Minutes- OT 46 minute(s)  -TS      OT Received On 05/13/22  -TS              Timed Charges    20451 - OT Self Care/Mgmt Minutes 46  -TS              Total Minutes    Timed Charges Total Minutes 46  -TS       Total Minutes 46  -TS            User Key  (r) = Recorded By, (t) = Taken By, (c) = Cosigned By    Initials Name Provider Type    TS Hillary Bellamy COTA Occupational Therapist Assistant              Therapy Charges for Today     Code Description Service Date Service Provider Modifiers Qty    28096130287 HC OT SELF CARE/MGMT/TRAIN EA 15 MIN 5/13/2022 Hillary Bellamy COTA GO 3               Hillary VIGIL. MOHINI Bellamy  5/13/2022

## 2022-05-13 NOTE — DISCHARGE SUMMARY
Date of Discharge:  5/13/2022    Admission Diagnosis: M54.16    Discharge Diagnosis:   1.  Status post right LLIF L3 to 5, PSF with instrumentation L3 to 5, Dr. Justo Heath, 05/27/2010.  2.  Status post left LLIF L1 to 3 with instrumentation L1-2, 02/17/2020.  3.  Status post PSF with instrumentation L1 to 3, 02/19/2020.  4. Increasing chronic neck pain.  5. Right-sided occipital headaches.  6. New onset left shoulder and arm radiculopathy.  7. 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  8. Severe bladder incontinence.  9. Degenerative disc disease C3 to T1.  10. Spondylolisthesis, C3-4, C7-T1.  11. Retrolisthesis C4-5, C5-6, C6-7.  12. Severe central and foraminal stenosis, C3-7, worse C4-5, C5-6.  13. Multilevel thoracic spondylosis.  14. Adjacent level degenerative disc disease L5-S1.  15. Facet arthropathy L5-S1.  16. Loss of lordosis, L5-S1.  17.  Status post Corpectomy C4, Partial corpectomy C5, ACDF C5-7, Anterior interbody fusion C3-4, C4-5 with instrumentation C3-7, 5/12/2022      Consults During Admission: None    Hospital Course  Patient is a 76 y.o. female Known to our practice. Admitted for the above cervical fusion.  This has been well tolerated and the patient will be discharged home today in good stable condition with instructions for brace at all times.  No driving until directed.  Patient will follow-up with Dr. Rader's clinic in two weeks. They will call if problems arise.         Condition on Discharge:  STABLE    Vital Signs  Temp:  [97.9 °F (36.6 °C)-99.4 °F (37.4 °C)] 98.1 °F (36.7 °C)  Heart Rate:  [64-95] 73  Resp:  [16-18] 16  BP: (109-142)/(46-56) 119/46    Physical Exam:   Alert and oriented ×3, no acute distress, grossly neurovascularly intact, vital signs stable, dressing clean dry and intact, moving all extremities without focal deficit      Discharge Disposition      Discharge Medications     Discharge Medications      ASK your doctor about these medications      Instructions Start  Date   allopurinol 100 MG tablet  Commonly known as: ZYLOPRIM   500 mg, Oral, Daily      amLODIPine 5 MG tablet  Commonly known as: NORVASC   5 mg, Oral, Daily      aspirin 81 MG EC tablet   81 mg, Oral, Daily      calcium carbonate 600 MG tablet  Commonly known as: OS-LUKE   600 mg, Oral, Daily      carvedilol 6.25 MG tablet  Commonly known as: COREG   6.25 mg, Oral, 2 Times Daily With Meals      colestipol 1 g tablet  Commonly known as: COLESTID   1 g, Oral, 3 Times Daily      CRANBERRY PO   1 tablet, Oral, Daily      estrogens (conjugated) 0.3 MG tablet  Commonly known as: PREMARIN   0.3 mg, Oral, 2 Times Weekly, Twice weekly       fenofibrate 145 MG tablet  Commonly known as: TRICOR   145 mg, Oral, Daily      fesoterodine fumarate 4 MG tablet sustained-release 24 hour tablet  Commonly known as: TOVIAZ ER   4 mg, Oral, Every 24 Hours Scheduled      fish oil 1000 MG capsule capsule   1,000 mg, Oral, Daily With Breakfast      folic acid 1 MG tablet  Commonly known as: FOLVITE   1 mg, Oral, Daily      gabapentin 300 MG capsule  Commonly known as: NEURONTIN   600 mg, Oral, 3 Times Daily, 2 pills tid      Garlic Oil 1000 MG capsule   1,000 mg, Oral, Daily      Gemtesa 75 MG tablet  Generic drug: Vibegron   75 mg, Oral, Daily      HYDROcodone-acetaminophen  MG per tablet  Commonly known as: NORCO   1 tablet, Oral, Every 6 Hours PRN      immune globulin (human) 10 GM/100ML solution infusion  Commonly known as: GAMMAGARD   400 mg/kg, Intravenous, Every 30 Days      indapamide 2.5 MG tablet  Commonly known as: LOZOL   2.5 mg, Oral, Every Morning      lidocaine 5 % ointment  Commonly known as: XYLOCAINE   1 application, Topical, Every 2 Hours PRN      lisinopril 40 MG tablet  Commonly known as: PRINIVIL,ZESTRIL   10 mg, Oral, Daily      meloxicam 15 MG tablet  Commonly known as: MOBIC   15 mg, Oral, Daily      mesalamine 400 MG capsule delayed-release delayed release capsule  Commonly known as: DELZICOL   800 mg,  Oral, 3 Times Daily, Two pills tid      multivitamin with minerals tablet tablet   1 tablet, Oral, Daily      omeprazole 40 MG capsule  Commonly known as: priLOSEC   20 mg, Oral, 2 Times Daily      ondansetron 4 MG tablet  Commonly known as: Zofran   4 mg, Oral, Every 8 Hours PRN      polyethyl glycol-propyl glycol 0.4-0.3 % solution ophthalmic solution (artificial tears)  Commonly known as: SYSTANE   2 drops, Both Eyes, Every 3 Hours PRN      VITAMIN B 12 PO   1 tablet, Oral, Daily             Discharge Diet: Resume Home diet, advance as tolerated    Activity at Discharge: Resume home activity, advance as tolerated, no lifting, no twisting, no bending, brace as directed, no driving until directed.     Follow-up Appointments  Followup with PCP within one week  Followup [facility] at 2 weeks post-op         Sachin Wilson PA-C  05/13/22  14:01 JOVANNI

## 2022-05-13 NOTE — PLAN OF CARE
Goal Outcome Evaluation:              Outcome Evaluation: Aox4. VSS. Maintaining sats on 1L o2 with ETCO2 monitoring. C/o pain relieved by PRN PO meds. x1 emesis this shift, relieved by zophran C-collar intact, dressing to anterior neck CDI. SCD's present bilaterally. Voiding via f/c. Safety maintained. Call light in reach.

## 2022-05-13 NOTE — PLAN OF CARE
Goal Outcome Evaluation:  Plan of Care Reviewed With: patient        Progress: improving  Outcome Evaluation: Pt is A&Ox4. Neck dressing CDI. Dressing change per orders. Aspen collar in place. Minimal c/o pain, treated with PRN medication with relief noted. Rested well this shift. Got up to the chair. Daughter at bedside. Plans to d/c today. Safety maintained, call light in reach.

## 2022-05-13 NOTE — THERAPY TREATMENT NOTE
Acute Care - Physical Therapy Treatment Note  [facility]     Patient Name: Deisi Ponce  : 1945  MRN: [id number]  Today's Date: 2022      Visit Dx:     ICD-10-CM ICD-9-CM   1. Decreased activities of daily living (ADL)  Z78.9 V49.89   2. Impaired mobility  Z74.09 799.89     Patient Active Problem List   Diagnosis   • Crohn's disease of small and large intestines with complication (HCC)   • Crohn's disease of both small and large intestine without complication (HCC)   • Crohn's disease without complication (HCC)   • Lumbar stenosis   • GERD without esophagitis   • Essential hypertension   • Chronic gout   • Calculus of gallbladder without cholecystitis without obstruction   • Stenosis, cervical spine     Past Medical History:   Diagnosis Date   • Arthritis    • Cancer (HCC)     endometrial cancer   • Crohn disease (HCC)    • Crohn's disease (HCC)    • DDD (degenerative disc disease), lumbar    • Elevated cholesterol    • GERD (gastroesophageal reflux disease)    • Gout    • History of transfusion    • Hypertension    • Intermittent self-catheterization of bladder    • Macular degeneration    • Osteopenia    • Peripheral neuropathy    • RA (rheumatoid arthritis) (HCC)    • Urinary retention      Past Surgical History:   Procedure Laterality Date   • BACK SURGERY     • CARPAL TUNNEL RELEASE Bilateral    • CATARACT EXTRACTION Bilateral    • CHOLECYSTECTOMY N/A 2021    Procedure: LAPAROSCOPIC CHOLECYSTECTOMY;  Surgeon: Agustina Mc MD;  Location: [facility] OR;  Service: General;  Laterality: N/A;   • COLONOSCOPY  10/09/2015   • COLONOSCOPY N/A 2016    Procedure: COLONOSCOPY WITH ANESTHESIA;  Surgeon: Jose Raul Butt DO;  Location: [facility] ENDOSCOPY;  Service:    • COLONOSCOPY N/A 2019    Procedure: COLONOSCOPY WITH ANESTHESIA;  Surgeon: Jose Raul Butt DO;  Location: [facility] ENDOSCOPY;  Service: Gastroenterology   • COLONOSCOPY N/A 10/14/2021    Procedure: COLONOSCOPY WITH  ANESTHESIA;  Surgeon: Jose Raul Butt DO;  Location:  [facility] ENDOSCOPY;  Service: Gastroenterology;  Laterality: N/A;  pre crohn's disease  post random  colon bx  Dr. Padilla   • CYST REMOVAL      from chest x2   • HARDWARE REMOVAL N/A 2/19/2020    Procedure: REMOVAL OF INSTRUMENTATION;  Surgeon: EBONI Rader MD;  Location:  [facility] OR;  Service: Orthopedic Spine;  Laterality: N/A;   • HYSTERECTOMY     • LUMBAR FUSION Bilateral 2/17/2020    Procedure: LATERAL LUMBAR INTERBODY FUSION LEFT L1-2 WITH INSTRUMENTATION, RIGHT L2-3;  Surgeon: EBONI Rader MD;  Location:  [facility] OR;  Service: Orthopedic Spine;  Laterality: Bilateral;   • LUMBAR LAMINECTOMY WITH FUSION N/A 2/19/2020    Procedure: EXPLORATION OF FUSION L3-5, POSTERIOR SPINAL FUSION WITH INSTRUMENTATION L1-3;  Surgeon: EBONI Rader MD;  Location:  [facility] OR;  Service: Orthopedic Spine;  Laterality: N/A;   • MENISCECTOMY Left    • UPPER GASTROINTESTINAL ENDOSCOPY  08/31/2010     PT Assessment (last 12 hours)     PT Evaluation and Treatment     [facility] Name 05/13/22 1251 05/13/22 1214       Physical Therapy Time and Intention    Subjective Information complains of;weakness;pain  - --  -    Document Type therapy note (daily note)  - --    Mode of Treatment physical therapy  - --    Session Not Performed -- other (see comments)  -    Comment, Session Not Performed -- pt sleeping, family requested to check back later  -    Row Name 05/13/22 1251          General Information    Existing Precautions/Restrictions fall;spinal  c-collar AAT  -     Row Name 05/13/22 1251          Pain    Pretreatment Pain Rating 5/10  -     Posttreatment Pain Rating 5/10  -     Pain Location - neck  -     Pain Intervention(s) Medication (See MAR);Repositioned;Ambulation/increased activity  -     Row Name 05/13/22 1251          Bed Mobility    Sidelying-Sit [place] (Bed Mobility) standby assist  -     Sit-Sidelying [place] (Bed Mobility) --   sitting EOB  -[facility] Name 05/13/22 1251          Transfers    Sit-Stand [place] (Transfers) standby assist  -     Stand-Sit [place] (Transfers) standby assist  -     [place] Level (Toilet Transfer) standby assist  -[facility] Name 05/13/22 1251          Gait/Stairs (Locomotion)    [place] Level (Gait) contact guard;standby assist;verbal cues  -     Assistive Device (Gait) walker, front-wheeled  -     Distance in Feet (Gait) 50 X 2  -     Comment, (Gait/Stairs) pt declined stair training  -     Row Name             Wound 05/12/22 0749 anterior neck Incision    Wound - Properties Group Placement Date: 05/12/22  -LESLI Placement Time: 0749 -LESLI Orientation: anterior  -LESLI Location: neck  -LESLI Primary Wound Type: Incision  -LESLI     Retired Wound - Properties Group Placement Date: 05/12/22  -LESLI Placement Time: 0749 -LESLI Orientation: anterior  -LESLI Location: neck  -LESLI Primary Wound Type: Incision  -LESLI     Retired Wound - Properties Group Date first assessed: 05/12/22  - Time first assessed: 0749 - Location: neck  -LESLI Primary Wound Type: Incision  -LESLI     Row Name 05/13/22 1251          Positioning and Restraints    Pre-Treatment Position in bed  -     Post Treatment Position bed  -     In Bed sitting EOB;call light within reach;encouraged to call for assist;with family/caregiver  -           User Key  (r) = Recorded By, (t) = Taken By, (c) = Cosigned By    Initials Name Provider Type     Tiffany Vickers PTA Physical Therapist Assistant    Aris Ritchie, RN Registered Nurse                Physical Therapy Education                 Title: PT OT SLP Therapies (In Progress)     Topic: Physical Therapy (In Progress)     Point: Mobility training (Done)     Learning Progress Summary           Patient Acceptance, E, VU by MS at 5/13/2022 6614    Comment: role of PT in her care, use of cervical collar, and spinal restrictions                   Point: Home exercise program (Not  Started)     Learner Progress:  Not documented in this visit.          Point: Body mechanics (Not Started)     Learner Progress:  Not documented in this visit.          Point: Precautions (Done)     Learning Progress Summary           Patient Acceptance, E, VU by MS at 5/13/2022 0733    Comment: role of PT in her care, use of cervical collar, and spinal restrictions                               User Key     Initials Effective Dates Name Provider Type Discipline    MS 06/19/18 -  Chelo Umana, PT, DPT, NCS Physical Therapist PT              PT Recommendation and Plan             Time Calculation:    PT Charges     Row Name 05/13/22 1324 05/13/22 0730          Time Calculation    Start Time 1251  -AH 0715  -MS     Stop Time 1319  -AH 0808  -MS     Time Calculation (min) 28 min  -AH 53 min  -MS     PT Received On -- 05/13/22  -MS     PT Goal Re-Cert Due Date -- 05/23/22  -MS            Time Calculation- PT    Total Timed Code Minutes- PT 28 minute(s)  -AH --            Timed Charges    77472 - Gait Training Minutes  28  -AH --            Untimed Charges    PT Eval/Re-eval Minutes -- 53  -MS            Total Minutes    Timed Charges Total Minutes 28  -AH --     Untimed Charges Total Minutes -- 53  -MS      Total Minutes 28  -AH 53  -MS           User Key  (r) = Recorded By, (t) = Taken By, (c) = Cosigned By    Initials Name Provider Type     Tiffany Vickers PTA Physical Therapist Assistant    MS Chelo Umana, PT, DPT, NCS Physical Therapist              Therapy Charges for Today     Code Description Service Date Service Provider Modifiers Qty    [id number] HC GAIT TRAINING EA 15 MIN 5/13/2022 Tiffany Vickers PTA GP 2          PT G-Codes  Outcome Measure Options: AM-PAC 6 Clicks Basic Mobility (PT)  AM-PAC 6 Clicks Score (PT): 17  AM-PAC 6 Clicks Score (OT): 14    Tiffany Vickers PTA  5/13/2022

## 2022-05-14 NOTE — THERAPY DISCHARGE NOTE
Acute Care - Physical Therapy Discharge Summary  [facility]       Patient Name: Deisi Ponce  : 1945  MRN: [id number]    Today's Date: 2022                 Admit Date: 2022      PT Recommendation and Plan    Visit Dx:    ICD-10-CM ICD-9-CM   1. Stenosis, cervical spine  M48.02 723.0   2. Decreased activities of daily living (ADL)  Z78.9 V49.89   3. Impaired mobility  Z74.09 799.89                PT Rehab Goals     Row Name 22 0710             Bed Mobility Goal 1 (PT)    Activity/Assistive Device (Bed Mobility Goal 1, PT) bed mobility activities, all  adjustable bed  -AB      [place] Level/Cues Needed (Bed Mobility Goal 1, PT) modified independence  -AB      Time Frame (Bed Mobility Goal 1, PT) long term goal (LTG);by discharge  -AB      Progress/Outcomes (Bed Mobility Goal 1, PT) goal not met  -AB              Transfer Goal 1 (PT)    Activity/Assistive Device (Transfer Goal 1, PT) sit-to-stand/stand-to-sit;bed-to-chair/chair-to-bed;walker, rolling  -AB      [place] Level/Cues Needed (Transfer Goal 1, PT) modified independence  -AB      Time Frame (Transfer Goal 1, PT) long term goal (LTG);by discharge  -AB      Progress/Outcome (Transfer Goal 1, PT) goal not met  -AB              Gait Training Goal 1 (PT)    Activity/Assistive Device (Gait Training Goal 1, PT) gait (walking locomotion);assistive device use;decrease fall risk;diminish gait deviation;forward stepping;improve balance and speed;increase endurance/gait distance;walker, rolling  -AB      [place] Level (Gait Training Goal 1, PT) modified independence;tactile cues required;verbal cues required  -AB      Distance (Gait Training Goal 1, PT) 50ft with step through gait pattern 100% of the time  -AB      Time Frame (Gait Training Goal 1, PT) long term goal (LTG);by discharge  -AB      Progress/Outcome (Gait Training Goal 1, PT) goal not met  -AB              Stairs Goal 1 (PT)    Activity/Assistive Device (Stairs Goal  Pt returning call / pt declines scheduling nurse visit per note below as she does not have her schedule / pt states she will have to call back another date   1, PT) ascending stairs;descending stairs;step-to-step  -AB      [place] Level/Cues Needed (Stairs Goal 1, PT) contact guard required  -AB      Number of Stairs (Stairs Goal 1, PT) 2  -AB      Time Frame (Stairs Goal 1, PT) long term goal (LTG);by discharge  -AB      Progress/Outcome (Stairs Goal 1, PT) goal not met  -AB            User Key  (r) = Recorded By, (t) = Taken By, (c) = Cosigned By    Initials Name Provider Type Discipline    Kathya Nunez, PTA Physical Therapist Assistant PT                    PT Discharge Summary  Anticipated Discharge Disposition (PT): home with assist, home with outpatient therapy services  Reason for Discharge: Discharge from facility  Outcomes Achieved: Refer to plan of care for updates on goals achieved  Discharge Destination: Home with assist      Kathya Do, GWEN   5/14/2022

## 2022-05-14 NOTE — THERAPY DISCHARGE NOTE
Acute Care - Occupational Therapy Discharge Summary  [facility]     Patient Name: Deisi Ponce  : 1945  MRN: [id number]    Today's Date: 2022                 Admit Date: 2022        OT Recommendation and Plan    Visit Dx:    ICD-10-CM ICD-9-CM   1. Stenosis, cervical spine  M48.02 723.0   2. Decreased activities of daily living (ADL)  Z78.9 V49.89   3. Impaired mobility  Z74.09 799.89                OT Rehab Goals     Row Name 22 0738             Transfer Goal 1 (OT)    Activity/Assistive Device (Transfer Goal 1, OT) sit-to-stand/stand-to-sit;bed-to-chair/chair-to-bed;commode, 3-in-1  -MT      [place] Level/Cues Needed (Transfer Goal 1, OT) contact guard required  -MT      Time Frame (Transfer Goal 1, OT) long term goal (LTG);10 days  -MT      Progress/Outcome (Transfer Goal 1, OT) goal not met  -MT              Dressing Goal 1 (OT)    Activity/Device (Dressing Goal 1, OT) dressing skills, all  -MT      [place]/Cues Needed (Dressing Goal 1, OT) minimum assist (75% or more patient effort)  -MT      Time Frame (Dressing Goal 1, OT) long term goal (LTG);10 days  -MT      Progress/Outcome (Dressing Goal 1, OT) goal not met  -MT              Toileting Goal 1 (OT)    Activity/Device (Toileting Goal 1, OT) toileting skills, all  -MT      [place] Level/Cues Needed (Toileting Goal 1, OT) minimum assist (75% or more patient effort)  -MT      Time Frame (Toileting Goal 1, OT) long term goal (LTG);10 days  -MT      Progress/Outcome (Toileting Goal 1, OT) goal not met  -MT              Problem Specific Goal 1 (OT)    Problem Specific Goal 1 (OT) FMC/GMC task with set up and cues with 75% accuracy with task  -MT      Time Frame (Problem Specific Goal 1, OT) long term goal (LTG);10 days  -MT      Progress/Outcome (Problem Specific Goal 1, OT) goal not met  -MT            User Key  (r) = Recorded By, (t) = Taken By, (c) = Cosigned By    Initials Name Provider Type Discipline    ROSALINA Platt,  MOHINI Mauricio Occupational Therapist Assistant OT                    Timed Therapy Charges  Total Units: 3    Charges  Total Units: 3    Procedure Name Documented Minutes Units Code    HC OT SELF CARE/MGMT/TRAIN EA 15 MIN 46  3    13584 (CPT®)               Documented Minutes  Total Minutes: 46    Therapy Provided Minutes    03478 - OT Self Care/Mgmt Minutes 46                    OT Discharge Summary  Reason for Discharge: Discharge from facility  Outcomes Achieved: Refer to plan of care for updates on goals achieved  Discharge Destination: Home with assist      MOHINI Lopez  5/14/2022

## 2022-05-16 NOTE — PAYOR COMM NOTE
"REF:  [id number]  [id number]    [facility]  JOSE LUIS,  [phone]  OR  FAX   [phone]      Deisy Santiago (76 y.o. Female)             Date of Birth   1945    Social Security Number       Address   [address] [address] [place] [address]    Home Phone   [phone]    MRN   [id number]       Yazidi   [place] [employer] [place]    Marital Status                               Admission Date   5/12/22    Admission Type   Elective    Admitting Provider   EBONI Rader MD    Attending Provider       Department, Room/Bed   [facility] 3A, 331/1       Discharge Date   5/13/2022    Discharge Disposition   Home or Self Care    Discharge Destination                               Attending Provider: (none)   Allergies: No Known Allergies    Isolation: None   Infection: None   Code Status: Prior   Advance Care Planning Activity    Ht: 161 cm (63.39\")   Wt: 60.4 kg (133 lb 2.5 oz)    Admission Cmt: None   Principal Problem: None                Active Insurance as of 5/12/2022     Primary Coverage     Payor Plan Insurance Group Employer/Plan Group    ANTHEM MEDICARE REPLACEMENT ANTHEM MEDICARE ADVANTAGE [id number]     Payor Plan Address Payor Plan Phone Number Payor Plan Fax Number Effective Dates    [address] [phone]  1/1/2015 - None Entered    [facility] [phone]       Subscriber Name Subscriber Birth Date Member ID       DEISY SANTIAGO 1945 [id number]                 Emergency Contacts      (Rel.) Home Phone Work Phone Mobile Phone    Jose Luis Santiago (Daughter) [phone] -- [phone]    Daryl Santiago (Son) [phone] -- --               Discharge Summary      Sachin Wilson PA-C at 05/13/22 1401            Date of Discharge:  5/13/2022    Admission Diagnosis: M54.16    Discharge Diagnosis:   1.  Status post right LLIF L3 to 5, PSF with instrumentation L3 to 5, Dr. Justo Heath, 05/27/2010.  2.  Status post left LLIF L1 to 3 " with instrumentation L1-2, 02/17/2020.  3.  Status post PSF with instrumentation L1 to 3, 02/19/2020.  4. Increasing chronic neck pain.  5. Right-sided occipital headaches.  6. New onset left shoulder and arm radiculopathy.  7. 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  8. Severe bladder incontinence.  9. Degenerative disc disease C3 to T1.  10. Spondylolisthesis, C3-4, C7-T1.  11. Retrolisthesis C4-5, C5-6, C6-7.  12. Severe central and foraminal stenosis, C3-7, worse C4-5, C5-6.  13. Multilevel thoracic spondylosis.  14. Adjacent level degenerative disc disease L5-S1.  15. Facet arthropathy L5-S1.  16. Loss of lordosis, L5-S1.  17.  Status post Corpectomy C4, Partial corpectomy C5, ACDF C5-7, Anterior interbody fusion C3-4, C4-5 with instrumentation C3-7, 5/12/2022      Consults During Admission: None    Hospital Course  Patient is a 76 y.o. female Known to our practice. Admitted for the above cervical fusion.  This has been well tolerated and the patient will be discharged home today in good stable condition with instructions for brace at all times.  No driving until directed.  Patient will follow-up with Dr. Rader's clinic in two weeks. They will call if problems arise.         Condition on Discharge:  STABLE    Vital Signs  Temp:  [97.9 °F (36.6 °C)-99.4 °F (37.4 °C)] 98.1 °F (36.7 °C)  Heart Rate:  [64-95] 73  Resp:  [16-18] 16  BP: (109-142)/(46-56) 119/46    Physical Exam:   Alert and oriented ×3, no acute distress, grossly neurovascularly intact, vital signs stable, dressing clean dry and intact, moving all extremities without focal deficit      Discharge Disposition      Discharge Medications     Discharge Medications      ASK your doctor about these medications      Instructions Start Date   allopurinol 100 MG tablet  Commonly known as: ZYLOPRIM   500 mg, Oral, Daily      amLODIPine 5 MG tablet  Commonly known as: NORVASC   5 mg, Oral, Daily      aspirin 81 MG EC tablet   81 mg, Oral, Daily      calcium  carbonate 600 MG tablet  Commonly known as: OS-LUKE   600 mg, Oral, Daily      carvedilol 6.25 MG tablet  Commonly known as: COREG   6.25 mg, Oral, 2 Times Daily With Meals      colestipol 1 g tablet  Commonly known as: COLESTID   1 g, Oral, 3 Times Daily      CRANBERRY PO   1 tablet, Oral, Daily      estrogens (conjugated) 0.3 MG tablet  Commonly known as: PREMARIN   0.3 mg, Oral, 2 Times Weekly, Twice weekly       fenofibrate 145 MG tablet  Commonly known as: TRICOR   145 mg, Oral, Daily      fesoterodine fumarate 4 MG tablet sustained-release 24 hour tablet  Commonly known as: TOVIAZ ER   4 mg, Oral, Every 24 Hours Scheduled      fish oil 1000 MG capsule capsule   1,000 mg, Oral, Daily With Breakfast      folic acid 1 MG tablet  Commonly known as: FOLVITE   1 mg, Oral, Daily      gabapentin 300 MG capsule  Commonly known as: NEURONTIN   600 mg, Oral, 3 Times Daily, 2 pills tid      Garlic Oil 1000 MG capsule   1,000 mg, Oral, Daily      Gemtesa 75 MG tablet  Generic drug: Vibegron   75 mg, Oral, Daily      HYDROcodone-acetaminophen  MG per tablet  Commonly known as: NORCO   1 tablet, Oral, Every 6 Hours PRN      immune globulin (human) 10 GM/100ML solution infusion  Commonly known as: GAMMAGARD   400 mg/kg, Intravenous, Every 30 Days      indapamide 2.5 MG tablet  Commonly known as: LOZOL   2.5 mg, Oral, Every Morning      lidocaine 5 % ointment  Commonly known as: XYLOCAINE   1 application, Topical, Every 2 Hours PRN      lisinopril 40 MG tablet  Commonly known as: PRINIVIL,ZESTRIL   10 mg, Oral, Daily      meloxicam 15 MG tablet  Commonly known as: MOBIC   15 mg, Oral, Daily      mesalamine 400 MG capsule delayed-release delayed release capsule  Commonly known as: DELZICOL   800 mg, Oral, 3 Times Daily, Two pills tid      multivitamin with minerals tablet tablet   1 tablet, Oral, Daily      omeprazole 40 MG capsule  Commonly known as: priLOSEC   20 mg, Oral, 2 Times Daily      ondansetron 4 MG  tablet  Commonly known as: Zofran   4 mg, Oral, Every 8 Hours PRN      polyethyl glycol-propyl glycol 0.4-0.3 % solution ophthalmic solution (artificial tears)  Commonly known as: SYSTANE   2 drops, Both Eyes, Every 3 Hours PRN      VITAMIN B 12 PO   1 tablet, Oral, Daily             Discharge Diet: Resume Home diet, advance as tolerated    Activity at Discharge: Resume home activity, advance as tolerated, no lifting, no twisting, no bending, brace as directed, no driving until directed.     Follow-up Appointments  Followup with PCP within one week  Followup [facility] at 2 weeks post-op         Sachin Wilson PA-C  05/13/22  14:01 CDT                Electronically signed by Sachin Wilson PA-C at 05/13/22 1402       Discharge Order (From admission, onward)     Start     Ordered    05/13/22 1404  Discharge patient  Once        Expected Discharge Date: 05/13/22    Discharge Disposition: Home or Self Care    Physician of Record for Attribution - Please select from Treatment Team: EBONI ROSADO [[id number]]    Review needed by CMO to determine Physician of Record: No       Question Answer Comment   Physician of Record for Attribution - Please select from Treatment Team EBONI ROSADO    Review needed by CMO to determine Physician of Record No        05/13/22 5198

## 2022-07-15 ENCOUNTER — OFFICE VISIT (OUTPATIENT)
Dept: UROLOGY | Age: 77
End: 2022-07-15
Payer: MEDICARE

## 2022-07-15 VITALS — BODY MASS INDEX: 22.2 KG/M2 | WEIGHT: 130 LBS | HEIGHT: 64 IN | TEMPERATURE: 98 F

## 2022-07-15 DIAGNOSIS — N81.10 FEMALE CYSTOCELE: ICD-10-CM

## 2022-07-15 DIAGNOSIS — N39.41 URGE INCONTINENCE: Primary | ICD-10-CM

## 2022-07-15 PROCEDURE — 99214 OFFICE O/P EST MOD 30 MIN: CPT | Performed by: NURSE PRACTITIONER

## 2022-07-15 PROCEDURE — 1123F ACP DISCUSS/DSCN MKR DOCD: CPT | Performed by: NURSE PRACTITIONER

## 2022-07-15 RX ORDER — OXYCODONE AND ACETAMINOPHEN 10; 325 MG/1; MG/1
TABLET ORAL
COMMUNITY
Start: 2022-07-12

## 2022-07-15 RX ORDER — LISINOPRIL 5 MG/1
TABLET ORAL
COMMUNITY
Start: 2022-04-26

## 2022-07-15 ASSESSMENT — ENCOUNTER SYMPTOMS
BACK PAIN: 1
NAUSEA: 0
VOMITING: 0
ABDOMINAL DISTENTION: 0
ABDOMINAL PAIN: 0

## 2022-07-15 NOTE — PROGRESS NOTES
distress. Appearance: Normal appearance. She is not ill-appearing. Pulmonary:      Effort: Pulmonary effort is normal. No respiratory distress. Abdominal:      General: There is no distension. Tenderness: There is no abdominal tenderness. There is no right CVA tenderness or left CVA tenderness. Neurological:      Mental Status: She is alert and oriented to person, place, and time. Mental status is at baseline. Motor: Weakness present. Gait: Gait abnormal.   Psychiatric:         Mood and Affect: Mood normal.         Behavior: Behavior normal.     ASSESSMENT/PLAN  1. Urge incontinence  Patient does have some complex issues in regards to urination as noted on urodynamic study. She is currently maintained on maximal medical therapy for detrusor overactivity, but is still experiencing significant leakage. Spoke to Dr. Yulisa Burton regarding this patient who suggested referral to a specialist in female voiding dysfunction. On a physical exam conducted earlier this year, there was also a cystocele noted which may or may not be contributing to her symptoms.  - External Referral To Urogynecology    2. Female cystocele  Plan for referral to urogynecologist for a more specialized evaluation of her bladder dysfunction as well as cystocele. - External Referral To Urogynecology      Orders Placed This Encounter   Procedures    External Referral To Urogynecology     Referral Priority:   Routine     Referral Type:   Eval and Treat     Referral Reason:   Specialty Services Required     Requested Specialty:   Urogynecology     Number of Visits Requested:   1        No follow-ups on file. An electronic signature was used to authenticate this note.     ENZO KAT - CNP    All information inputted into the note by the MA to include chief complaint, past medical history, past surgical history, medications, allergies, social and family history and review of systems has been reviewed and updated as needed by me.    DANIEL Dragon/transcription disclaimer: Much of this document is electronic transcription/translation of spoken language to printed text. The electronic translation of spoken language may be erroneous or, at times, nonsensical words or phrases may be inadvertently transcribed.  Although I have reviewed the document for such errors, some may still exist.

## 2022-07-21 RX ORDER — TIZANIDINE 4 MG/1
TABLET ORAL
Qty: 180 TABLET | Refills: 1 | Status: SHIPPED | OUTPATIENT
Start: 2022-07-21

## 2022-07-21 NOTE — TELEPHONE ENCOUNTER
Requested Prescriptions     Pending Prescriptions Disp Refills    tiZANidine (ZANAFLEX) 4 MG tablet [Pharmacy Med Name: TIZANIDINE HCL 4 MG TABLET] 180 tablet 1     Sig: TAKE 1 TABLET BY MOUTH 2 TIMES DAILY AS NEEDED (PAIN)       Last Office Visit: 3/3/2022  Next Office Visit: 9/8/2022  Last Medication Refill: 1/21/2022 with 1 DINA Macias patient

## 2022-09-08 ENCOUNTER — OFFICE VISIT (OUTPATIENT)
Dept: NEUROLOGY | Age: 77
End: 2022-09-08
Payer: MEDICARE

## 2022-09-08 ENCOUNTER — NURSE ONLY (OUTPATIENT)
Dept: UROLOGY | Age: 77
End: 2022-09-08
Payer: MEDICARE

## 2022-09-08 VITALS
DIASTOLIC BLOOD PRESSURE: 60 MMHG | HEIGHT: 63 IN | BODY MASS INDEX: 22.5 KG/M2 | WEIGHT: 127 LBS | SYSTOLIC BLOOD PRESSURE: 122 MMHG | RESPIRATION RATE: 18 BRPM | HEART RATE: 64 BPM

## 2022-09-08 DIAGNOSIS — G25.81 RESTLESS LEGS SYNDROME (RLS): ICD-10-CM

## 2022-09-08 DIAGNOSIS — G61.81 CIDP (CHRONIC INFLAMMATORY DEMYELINATING POLYNEUROPATHY) (HCC): Primary | ICD-10-CM

## 2022-09-08 DIAGNOSIS — M48.062 SPINAL STENOSIS OF LUMBAR REGION WITH NEUROGENIC CLAUDICATION: ICD-10-CM

## 2022-09-08 DIAGNOSIS — N39.0 RECURRENT UTI: Primary | ICD-10-CM

## 2022-09-08 DIAGNOSIS — M05.79 RHEUMATOID ARTHRITIS INVOLVING MULTIPLE SITES WITH POSITIVE RHEUMATOID FACTOR (HCC): ICD-10-CM

## 2022-09-08 PROCEDURE — 1123F ACP DISCUSS/DSCN MKR DOCD: CPT | Performed by: PSYCHIATRY & NEUROLOGY

## 2022-09-08 PROCEDURE — 99213 OFFICE O/P EST LOW 20 MIN: CPT | Performed by: PSYCHIATRY & NEUROLOGY

## 2022-09-08 PROCEDURE — P9612 CATHETERIZE FOR URINE SPEC: HCPCS | Performed by: NURSE PRACTITIONER

## 2022-09-08 NOTE — PROGRESS NOTES
[id number] [facility]  [address] [place] [address] Drive, [address] [place] [address],[address]  Manasa Burroughs  Phone [phone]  Fax [phone] [address] [facility] Follow Up Encounter  22 10:04 AM CDT    Information:   Patient Name: Vitaliy Casanova  :   [birth year]  Age:   65 y.o. MRN:   [id number]  Account #:  [de-identified]  Today:  22    Provider: Steph Livingston M.D. Chief Complaint:   Chief Complaint   Patient presents with    Follow-up       Subjective:   Vitaliy Casanova is a 65 y.o. woman with a history of CIDP, lumbar spinal stenosis, lumbar spine surgery, RLS, PVD, and RA who is following up. She had an anterior cervical fusion by Dr. Jem Richards in 2022. She is doing fairly well. She is currently getting some PT. Her strength and numbness are about the same. She is getting IVIG monthly. She has bilateral AFOS and ambulates with a wheeled walker. She has numbness up to her thighs, worse on the left and in her hands. Objective:     Past Medical History:  Past Medical History:   Diagnosis Date    Arthritis     Cancer ([facility] [address]     endometrial cancer    Cataract     CIDP (chronic inflammatory demyelinating polyneuropathy) (HCC)     Crohn's disease (HCC)     GERD (gastroesophageal reflux disease)     History of blood transfusion     Hypertension     Macular degeneration     Perineal cyst in female     PVD (peripheral vascular disease) (HCC)     Radiation     Urinary incontinence        Past Surgical History:   Procedure Laterality Date    BACK SURGERY      lumbar    CARPAL TUNNEL RELEASE Bilateral     CATARACT REMOVAL Bilateral     CERVICAL SPINE SURGERY      CHOLECYSTECTOMY      FOOT SURGERY Left     HAMMER TOE SURGERY Left     HX VASCULAR ANGIOPLASTY      & STENT    HYSTERECTOMY (CERVIX STATUS UNKNOWN)      KNEE ARTHROSCOPY Right     LUMBAR FUSION      RECTAL SURGERY N/A 2022    PERINEAL CYST EXCISION performed by Valdemar Herrera DO at [address] [place] [address]  2022    VI. Bilateral lower extremity runoff. Recent Hospitalizations  None    Significant Injuries  None    Habits  Elder Kamran reports that she has never smoked. She has never used smokeless tobacco. She reports that she does not drink alcohol and does not use drugs. Family History   Problem Relation Age of Onset    Cancer Mother     High Blood Pressure Father     Heart Disease Father     Cancer Daughter        Social History  Tran Whitt is , lives in [address], and is retired. Medications:  Current Outpatient Medications   Medication Sig Dispense Refill    tiZANidine (ZANAFLEX) 4 MG tablet TAKE 1 TABLET BY MOUTH 2 TIMES DAILY AS NEEDED (PAIN). 180 tablet 1    oxyCODONE-acetaminophen (PERCOCET)  MG per tablet       lisinopril (PRINIVIL;ZESTRIL) 5 MG tablet       fesoterodine (TOVIAZ) 4 MG TB24 ER tablet Take 1 tablet by mouth daily 90 tablet 11    Vibegron 75 MG TABS Take 75 mg by mouth daily 30 tablet 11    conjugated estrogens (PREMARIN) 0.625 MG/GM vaginal cream Place vaginally twice a week 30 g 11    gabapentin (NEURONTIN) 600 MG tablet TAKE 1 TABLET THREE TIMES A  tablet 1    meloxicam (MOBIC) 15 MG tablet Take 15 mg by mouth daily       allopurinol (ZYLOPRIM) 300 MG tablet Take 300 mg by mouth daily      Propylene Glycol (SYSTANE COMPLETE) 0.6 % SOLN Apply 1 drop to eye 3 times daily Each eye      amLODIPine (NORVASC) 5 MG tablet Take 5 mg by mouth daily      colestipol (COLESTID) 1 g tablet Take 1 g by mouth 3 times daily       Cyanocobalamin (VITAMIN B 12) 100 MCG LOZG Take by mouth daily       lidocaine (XYLOCAINE) 5 % ointment Apply topically as needed for Pain Apply topically as needed.  30 g 3    fenofibrate (TRICOR) 145 MG tablet 145 mg daily       Omega-3 Fatty Acids (FISH OIL) 1000 MG CAPS Take 1,000 mg by mouth 2 times daily      Garlic 5893 MG CAPS Take 1,000 mg by mouth 2 times daily      CRANBERRY SOFT PO Take 36 mg by mouth daily      carvedilol (COREG) 6.25 MG tablet Take 6.25 mg by mouth 2 times daily      aspirin 81 MG tablet Take 81 mg by mouth daily      folic acid (FOLVITE) 1 MG tablet Take 600 mg by mouth daily       Multiple Vitamins-Minerals (THERAPEUTIC MULTIVITAMIN-MINERALS) tablet Take 1 tablet by mouth daily      calcium-vitamin D (OSCAL) 250-125 MG-UNIT per tablet Take 1 tablet by mouth daily      mesalamine (DELZICOL) 400 MG CPDR DR capsule Take 400 mg by mouth 3 times daily       indapamide (LOZOL) 2.5 MG tablet Take 2.5 mg by mouth every morning      omeprazole (PRILOSEC) 20 MG capsule Take 20 mg by mouth Daily        No current facility-administered medications for this visit. Allergies:   Allergies as of 09/08/2022    (No Known Allergies)       Review of Systems:  Constitutional: negative for - chills and fever  Eyes:  negative for - visual disturbance and photophobia  HENMT: negative for - headaches and sinus pain  Respiratory: negative for - cough, hemoptysis, and shortness of breath  Cardiovascular: negative for - chest pain and palpitations  Gastrointestinal: negative for - blood in stools, constipation, diarrhea, nausea, and vomiting  Genito-Urinary: negative for - hematuria, urinary frequency, urinary urgency, and urinary retention  Musculoskeletal: negative for - joint pain, joint stiffness, and joint swelling  Hematological and Lymphatic: negative for - bleeding problems, abnormal bruising, and swollen lymph nodes  Endocrine:  negative for - polydipsia and polyphagia  Allergy/Immunology:  negative for - rhinorrhea, sinus congestion, hives  Integument:  negative for - negative for - rash, change in moles, new or changing lesions  Psychological: negative for - anxiety and depression  Neurological: negative for - memory loss, numbness/tingling, and weakness     PHYSICAL EXAMINATION:  Vitals:  /60   Pulse 64   Resp 18   Ht 5' 3\" (1.6 m)   Wt 127 lb (57.6 kg)   BMI 22.50 kg/m²   General appearance:  Alert, well developed, well nourished, in no distress  HEENT: normocephalic, atraumatic, sclera appear normal, no nasal abnormalities, no rhinorrhea, Ears appear normal, oral mucous membranes are moist without erythema, trachea midline, thyroid is normal, no lymphadenopathy or neck mass. Cardiovascular:  Regular rate and rhythm without murmer. No peripheral edema, No cyanosis or clubbing. No carotid bruits. Pulmonary:  Lungs are clear to auscultation. Breathing appears normal, good expansion, normal effort without use of accessory muscles  Musculoskeletal:  Joints show advanced osteoarthritis and rheumatoid arthritis  Integument:  No rash, erythema, or pallor  Psychiatric:  Mood, affect, and behavior appear normal      NEUROLOGIC EXAMINATION:  Mental Status:  alert, oriented to person, place, and time. Speech:  Clear without dysarthria or dysphonia  Language:  Fluent without aphasia  Cranial Nerves:              II          Visual fields are full to confrontation              III,IV, VI           Extraocular movements are full              VII        Facial movements are symmetrical without weakness              VIII       Hearing is intact              IX,X     Shoulder shrug and head rotation strength are intact              XII        No tongue atrophy or fasciculations. Normal tongue protrusion. No tongue weakness  Motor:  She has muscle atrophy in her hands, her feet, and her lower legs. Muscle tone is reduced. Strength testing shows upper extremities: D -1/-1, Tri -2/-2, Bi -1/-1, WE -1/-1, WF -1/-1, FE -2/-2, FF -1/-1, IO -3/-3. Lower extremities:  IP -2/-3, HE -1/-1, Q -2/-3, AT -3/-4. Deep tendon reflexes are absent. Tello's signs are absent bilaterally. There is no ankle clonus on either side. Sensation:  Sensation is reduced to light touch, temperature, and vibration in all distal extremities. Coordination:  Rapid alternating movements are normal in both upper extremities. Finger to nose testing is unimpaired bilaterally.   Gait:  Unsteady with walker. Assessment:       ICD-10-CM    1. CIDP (chronic inflammatory demyelinating polyneuropathy) ([facility])  G61.81       2. Spinal stenosis of lumbar region with neurogenic claudication  M48.062       3. Rheumatoid arthritis involving multiple sites with positive rheumatoid factor ([facility])  M05.79       4. Restless legs syndrome (RLS)  G25.81       She has considerable lower extremity weakness and impaired gait. She has had some neuropathic pain recently. Plan:   1. Restart gabapentin  2. Continue present medications and care including IVIG 0.5 gm/kg every 4 weeks.   3. Follow up here in 6 months    Electronically signed by Fili Andrews MD on 9/8/22

## 2022-09-09 DIAGNOSIS — R30.0 DYSURIA: ICD-10-CM

## 2022-09-09 DIAGNOSIS — N39.0 RECURRENT UTI: Primary | ICD-10-CM

## 2022-09-09 RX ORDER — PHENAZOPYRIDINE HYDROCHLORIDE 100 MG/1
100 TABLET, FILM COATED ORAL 3 TIMES DAILY PRN
Qty: 15 TABLET | Refills: 0 | Status: SHIPPED | OUTPATIENT
Start: 2022-09-09 | End: 2022-09-14

## 2022-09-09 RX ORDER — SULFAMETHOXAZOLE AND TRIMETHOPRIM 800; 160 MG/1; MG/1
1 TABLET ORAL 2 TIMES DAILY
Qty: 6 TABLET | Refills: 0 | Status: SHIPPED | OUTPATIENT
Start: 2022-09-09 | End: 2022-09-10 | Stop reason: SDUPTHER

## 2022-09-09 NOTE — PROGRESS NOTES
Lawrence Pimentel is a 65 y.o. female evaluated via telephone on 9/9/2022 for chief complaint of burning on urination        Documentation:  I communicated with the patient and/or health care decision maker about possible urinary tract infection. Patient has recurrent UTIs. She also has urgency and urge incontinence. She called the office. Yesterday complaining of dysuria. She called telephone answering service now complaining of dysuria frequency and a lot of pressure she denies any flank pain fevers or chills or other systemic symptoms such as nausea vomiting. She had a catheterized urine for culture done yesterday that has grown out mixed skin kelby. Details of this discussion including any medical advice provided: I discussed with the patient that this may just be some irritation because of incontinence and that it was best practice not to treat with MAITE skin kelby and less we did it for a very short course therefore we will send in an order for Bactrim DS 1 p.o. twice daily x3 days. Also prescription for Pyridium for 5 days. She is to increase her fluid intake. She is to use barrier ointment and make sure her out to her skin mucosas not irritated. Total Time: minutes: 5-10 minutes    Lawrence Pimentel was evaluated through a synchronous (real-time) audio encounter. Patient identification was verified at the start of the visit. She (or guardian if applicable) is aware that this is a billable service, which includes applicable co-pays. This visit was conducted with the patient's (and/or legal guardian's) verbal consent. She has not had a related appointment within my department in the past 7 days or scheduled within the next 24 hours. The patient was located at Home: [address] [place] [address]. The provider was located at [place] [address] (Appt Dept): [address]  [facility],  [place] [address].     Note: not billable if this call serves to triage the patient into an appointment for the relevant concern    Cheo Figueroa MD

## 2022-09-10 DIAGNOSIS — N39.0 RECURRENT UTI: ICD-10-CM

## 2022-09-10 LAB — URINE CULTURE, ROUTINE: NORMAL

## 2022-09-10 RX ORDER — SULFAMETHOXAZOLE AND TRIMETHOPRIM 800; 160 MG/1; MG/1
1 TABLET ORAL 2 TIMES DAILY
Qty: 6 TABLET | Refills: 0 | Status: SHIPPED | OUTPATIENT
Start: 2022-09-10 | End: 2022-09-13

## 2022-09-10 NOTE — PROGRESS NOTES
I received a text message from [facility] stating that Nicko Cristobal did not get the Bactrim from [facility] pharmacy that the pharmacy did not receive it . they did receive the Pyridium and they asked me to resend it. The Bactrim was sent I had confirmation that it was sent to the pharmacy I do not no know why they did not receive it how they would receive Pyridium which was also sent the same time but not Bactrim I reordered the Bactrim and was sent again. I requested that [facility] contact Ms. Edis Seymour and let her know that I was resending the antibiotic

## 2022-09-15 ENCOUNTER — TELEPHONE (OUTPATIENT)
Dept: UROLOGY | Age: 77
End: 2022-09-15

## 2022-09-15 NOTE — TELEPHONE ENCOUNTER
She was prescribed Bactrim BID x3 day. If she is having s/s of infection I would see if Carlos Crandall wants to have her come in to see her or put on nurse visit for cath urine.

## 2022-09-15 NOTE — TELEPHONE ENCOUNTER
Looks like pt has a phone visit with Marimar Stephenson on 9/9/22 and was prescribed 3 antibiotics for her reccurent uti's she states she finished them and has developed the burning symptoms and pressure again. Wants to know what she needs to do now.

## 2022-09-16 NOTE — TELEPHONE ENCOUNTER
Pt has appt with them October 27th or 29th I notified her to keep that appointment and to keep using barrier ointment . She voiced understanding.

## 2022-09-23 ENCOUNTER — PATIENT MESSAGE (OUTPATIENT)
Dept: NEUROLOGY | Age: 77
End: 2022-09-23

## 2022-09-23 DIAGNOSIS — G61.81 CIDP (CHRONIC INFLAMMATORY DEMYELINATING POLYNEUROPATHY) (HCC): Primary | ICD-10-CM

## 2022-09-23 RX ORDER — DIPHENHYDRAMINE HYDROCHLORIDE 50 MG/ML
50 INJECTION INTRAMUSCULAR; INTRAVENOUS
Status: CANCELLED | OUTPATIENT
Start: 2022-09-23

## 2022-09-23 RX ORDER — SODIUM CHLORIDE 9 MG/ML
INJECTION, SOLUTION INTRAVENOUS CONTINUOUS
Status: CANCELLED | OUTPATIENT
Start: 2022-09-23

## 2022-09-23 RX ORDER — SODIUM CHLORIDE 0.9 % (FLUSH) 0.9 %
5-40 SYRINGE (ML) INJECTION PRN
Status: CANCELLED | OUTPATIENT
Start: 2022-09-23

## 2022-09-23 RX ORDER — DIPHENHYDRAMINE HCL 25 MG
50 TABLET ORAL ONCE
Status: CANCELLED | OUTPATIENT
Start: 2022-09-23 | End: 2022-09-23

## 2022-09-23 RX ORDER — ACETAMINOPHEN 325 MG/1
650 TABLET ORAL ONCE
Status: CANCELLED | OUTPATIENT
Start: 2022-09-23 | End: 2022-09-23

## 2022-09-23 RX ORDER — SODIUM CHLORIDE 9 MG/ML
5-250 INJECTION, SOLUTION INTRAVENOUS PRN
Status: CANCELLED | OUTPATIENT
Start: 2022-09-23

## 2022-09-23 NOTE — TELEPHONE ENCOUNTER
From: Sho Junior  To: Dr. Rebecca Parker: 9/23/2022 3:14 AM CDT  Subject: IVIG Treatments    Negin Preciado, Mom needs you to call her when you can at [phone]. She called to setup an IVIG appointment, and they said they needed an order. I also think that they said she needed to get more treatments approved. Hope you have a good day! Thanks!

## 2022-09-27 DIAGNOSIS — G61.81 CIDP (CHRONIC INFLAMMATORY DEMYELINATING POLYNEUROPATHY) (HCC): Primary | ICD-10-CM

## 2022-09-27 RX ORDER — ACETAMINOPHEN 325 MG/1
650 TABLET ORAL ONCE
Status: CANCELLED | OUTPATIENT
Start: 2022-10-05 | End: 2022-10-05

## 2022-09-27 RX ORDER — SODIUM CHLORIDE 9 MG/ML
5-250 INJECTION, SOLUTION INTRAVENOUS PRN
Status: CANCELLED | OUTPATIENT
Start: 2022-10-05

## 2022-09-27 RX ORDER — SODIUM CHLORIDE 0.9 % (FLUSH) 0.9 %
5-40 SYRINGE (ML) INJECTION PRN
Status: CANCELLED | OUTPATIENT
Start: 2022-10-05

## 2022-09-27 RX ORDER — DIPHENHYDRAMINE HYDROCHLORIDE 50 MG/ML
50 INJECTION INTRAMUSCULAR; INTRAVENOUS ONCE
Status: CANCELLED
Start: 2022-10-05 | End: 2022-10-05

## 2022-10-05 ENCOUNTER — HOSPITAL ENCOUNTER (OUTPATIENT)
Dept: INFUSION THERAPY | Age: 77
Setting detail: INFUSION SERIES
Discharge: HOME | End: 2022-10-05
Payer: MEDICARE

## 2022-10-05 VITALS
RESPIRATION RATE: 17 BRPM | TEMPERATURE: 97.7 F | WEIGHT: 127 LBS | HEART RATE: 59 BPM | BODY MASS INDEX: 22.5 KG/M2 | OXYGEN SATURATION: 93 % | SYSTOLIC BLOOD PRESSURE: 112 MMHG | DIASTOLIC BLOOD PRESSURE: 56 MMHG

## 2022-10-05 DIAGNOSIS — G61.81 CIDP (CHRONIC INFLAMMATORY DEMYELINATING POLYNEUROPATHY) (HCC): Primary | ICD-10-CM

## 2022-10-05 LAB
ALBUMIN SERPL-MCNC: 3.7 G/DL (ref 3.5–5.2)
ALP BLD-CCNC: 35 U/L (ref 35–104)
ALT SERPL-CCNC: 7 U/L (ref 5–33)
ANION GAP SERPL CALCULATED.3IONS-SCNC: 5 MMOL/L (ref 7–19)
AST SERPL-CCNC: 11 U/L (ref 5–32)
BASOPHILS ABSOLUTE: 0 K/UL (ref 0–0.2)
BASOPHILS RELATIVE PERCENT: 0.6 % (ref 0–1)
BILIRUB SERPL-MCNC: <0.2 MG/DL (ref 0.2–1.2)
BUN BLDV-MCNC: 27 MG/DL (ref 8–23)
CALCIUM SERPL-MCNC: 9.4 MG/DL (ref 8.8–10.2)
CHLORIDE BLD-SCNC: 104 MMOL/L (ref 98–111)
CO2: 35 MMOL/L (ref 22–29)
CREAT SERPL-MCNC: 0.7 MG/DL (ref 0.5–0.9)
EOSINOPHILS ABSOLUTE: 0.2 K/UL (ref 0–0.6)
EOSINOPHILS RELATIVE PERCENT: 3.4 % (ref 0–5)
GFR AFRICAN AMERICAN: >59
GFR NON-AFRICAN AMERICAN: >60
GLUCOSE BLD-MCNC: 107 MG/DL (ref 74–109)
HCT VFR BLD CALC: 36.9 % (ref 37–47)
HEMOGLOBIN: 11.4 G/DL (ref 12–16)
IMMATURE GRANULOCYTES #: 0 K/UL
LYMPHOCYTES ABSOLUTE: 1.2 K/UL (ref 1.1–4.5)
LYMPHOCYTES RELATIVE PERCENT: 23.4 % (ref 20–40)
MCH RBC QN AUTO: 26.8 PG (ref 27–31)
MCHC RBC AUTO-ENTMCNC: 30.9 G/DL (ref 33–37)
MCV RBC AUTO: 86.8 FL (ref 81–99)
MONOCYTES ABSOLUTE: 0.9 K/UL (ref 0–0.9)
MONOCYTES RELATIVE PERCENT: 17.2 % (ref 0–10)
NEUTROPHILS ABSOLUTE: 2.8 K/UL (ref 1.5–7.5)
NEUTROPHILS RELATIVE PERCENT: 55.4 % (ref 50–65)
PDW BLD-RTO: 15.2 % (ref 11.5–14.5)
PLATELET # BLD: 206 K/UL (ref 130–400)
PMV BLD AUTO: 10.7 FL (ref 9.4–12.3)
POTASSIUM SERPL-SCNC: 3.7 MMOL/L (ref 3.5–5)
RBC # BLD: 4.25 M/UL (ref 4.2–5.4)
SODIUM BLD-SCNC: 144 MMOL/L (ref 136–145)
TOTAL PROTEIN: 6.1 G/DL (ref 6.6–8.7)
WBC # BLD: 5.1 K/UL (ref 4.8–10.8)

## 2022-10-05 PROCEDURE — 80053 COMPREHEN METABOLIC PANEL: CPT

## 2022-10-05 PROCEDURE — 96375 TX/PRO/DX INJ NEW DRUG ADDON: CPT

## 2022-10-05 PROCEDURE — 2580000003 HC RX 258: Performed by: PSYCHIATRY & NEUROLOGY

## 2022-10-05 PROCEDURE — 85025 COMPLETE CBC W/AUTO DIFF WBC: CPT

## 2022-10-05 PROCEDURE — 96366 THER/PROPH/DIAG IV INF ADDON: CPT

## 2022-10-05 PROCEDURE — 6360000002 HC RX W HCPCS: Performed by: PSYCHIATRY & NEUROLOGY

## 2022-10-05 PROCEDURE — 6370000000 HC RX 637 (ALT 250 FOR IP): Performed by: PSYCHIATRY & NEUROLOGY

## 2022-10-05 PROCEDURE — 96365 THER/PROPH/DIAG IV INF INIT: CPT

## 2022-10-05 RX ORDER — SODIUM CHLORIDE 0.9 % (FLUSH) 0.9 %
5-40 SYRINGE (ML) INJECTION PRN
Status: DISCONTINUED | OUTPATIENT
Start: 2022-10-05 | End: 2022-10-06 | Stop reason: HOSPADM

## 2022-10-05 RX ORDER — SODIUM CHLORIDE 9 MG/ML
INJECTION, SOLUTION INTRAVENOUS CONTINUOUS
Status: CANCELLED | OUTPATIENT
Start: 2022-10-17

## 2022-10-05 RX ORDER — SODIUM CHLORIDE 0.9 % (FLUSH) 0.9 %
5-40 SYRINGE (ML) INJECTION PRN
Status: CANCELLED | OUTPATIENT
Start: 2022-10-17

## 2022-10-05 RX ORDER — SODIUM CHLORIDE 9 MG/ML
5-250 INJECTION, SOLUTION INTRAVENOUS PRN
Status: DISCONTINUED | OUTPATIENT
Start: 2022-10-05 | End: 2022-10-06 | Stop reason: HOSPADM

## 2022-10-05 RX ORDER — ACETAMINOPHEN 325 MG/1
650 TABLET ORAL ONCE
Status: COMPLETED | OUTPATIENT
Start: 2022-10-05 | End: 2022-10-05

## 2022-10-05 RX ORDER — DIPHENHYDRAMINE HYDROCHLORIDE 50 MG/ML
50 INJECTION INTRAMUSCULAR; INTRAVENOUS ONCE
Status: CANCELLED
Start: 2022-10-17 | End: 2022-10-17

## 2022-10-05 RX ORDER — SODIUM CHLORIDE 9 MG/ML
5-250 INJECTION, SOLUTION INTRAVENOUS PRN
Status: CANCELLED | OUTPATIENT
Start: 2022-10-17

## 2022-10-05 RX ORDER — DIPHENHYDRAMINE HYDROCHLORIDE 50 MG/ML
50 INJECTION INTRAMUSCULAR; INTRAVENOUS
Status: CANCELLED | OUTPATIENT
Start: 2022-10-17

## 2022-10-05 RX ORDER — METHYLPREDNISOLONE 4 MG/1
TABLET ORAL
COMMUNITY
Start: 2022-09-22

## 2022-10-05 RX ORDER — DIPHENHYDRAMINE HYDROCHLORIDE 50 MG/ML
50 INJECTION INTRAMUSCULAR; INTRAVENOUS ONCE
Status: COMPLETED | OUTPATIENT
Start: 2022-10-05 | End: 2022-10-05

## 2022-10-05 RX ORDER — ACETAMINOPHEN 325 MG/1
650 TABLET ORAL ONCE
Status: CANCELLED | OUTPATIENT
Start: 2022-10-17 | End: 2022-10-17

## 2022-10-05 RX ADMIN — ACETAMINOPHEN 650 MG: 325 TABLET, FILM COATED ORAL at 09:53

## 2022-10-05 RX ADMIN — IMMUNE GLOBULIN (HUMAN) 30 G: 10 INJECTION INTRAVENOUS; SUBCUTANEOUS at 10:15

## 2022-10-05 RX ADMIN — SODIUM CHLORIDE 20 ML/HR: 9 INJECTION, SOLUTION INTRAVENOUS at 09:52

## 2022-10-05 RX ADMIN — DIPHENHYDRAMINE HYDROCHLORIDE 50 MG: 50 INJECTION INTRAMUSCULAR; INTRAVENOUS at 09:53

## 2022-10-05 NOTE — DISCHARGE INSTRUCTIONS
immune globulin (intravenous) (IGIV)  Pronunciation:  ryan hdz  Brand:  Bivigam, Carimune, Flebogamma, Gammagard S/D, Gammaplex, Octagam, Panzyga, Privigen  What is the most important information I should know about immune globulin intravenous? This medicine can cause blood clots. The risk is highest in older adults or in people who have had blood clots, heart problems, or blood circulation problems. Blood clots are also more likely during long-term bedrest, while using birth control pills or hormone replacement therapy, or while having a central intravenous (IV) catheter in place. Call your doctor at once if you have chest pain, trouble breathing, fast heartbeats, numbness or weakness, or swelling and warmth or discoloration in an arm or leg. This medicine can also harm your kidneys, especially if you have kidney disease or you also use certain medicines. Tell your doctor right away if you have signs of kidney problems, such as swelling, rapid weight gain, and little or no urination. What is immune globulin intravenous (IGIV)? Immune globulin intravenous (IGIV, for injection into a vein) is used to treat primary immunodeficiency. IGIV is also used to increase platelets (blood clotting cells) in people with immune thrombocytopenic purpura. IGIV is also used in to help prevent certain infections in people with B-cell chronic lymphocytic leukemia. IGIV is also used in people with Kawasaki syndrome, to prevent aneurysm caused by a weakening of the main artery in the heart. IGIV may also be used for purposes not listed in this medication guide. What should I discuss with my healthcare provider before using IGIV? You may not be able to use this medicine if:  you have had an allergic reaction to an immune globulin or blood product;  you have immune globulin A (IgA) deficiency with antibody to IgA; or  you are allergic to corn.   IGIV can cause blood clots or kidney problems, especially in older adults or in people with certain conditions. Tell your doctor if you have ever had:  heart problems, blood circulation problems, or \"thick blood\";  a stroke or blood clot;  kidney disease;  diabetes;  an infection called sepsis;  if you use estrogens (birth control pills or hormone replacement therapy);  if you have been on long-term bedrest; or  if you have a central intravenous (IV) catheter in place. You may need a dose adjustment if you are exposed to measles, or if you travel to an area where this disease is common. Tell your doctor if you are pregnant or breastfeeding. Immune globulin is made from donated human plasma and may contain viruses or other infectious agents. Donated plasma is tested and treated to reduce the risk of contamination, but there is still a small possibility it could transmit disease. Ask your doctor about any possible risk. How should I use IGIV? IGIV is given as an infusion into a vein, usually once every 3 to 4 weeks. A healthcare provider will give you this injection. Drink plenty of liquids  while you are using this medicine to help improve your blood flow and keep your kidneys working properly. You may need frequent blood or urine tests. This medicine can affect the results of certain medical tests. Tell any doctor who treats you that you are using IGIV. What happens if I miss a dose? Call your doctor for instructions if you miss an appointment for your IGIV injection. What happens if I overdose? Seek emergency medical attention or call the Poison Help line at 1-531.760.6579. What should I avoid while using IGIV? Ask your doctor before receiving a \"live\" vaccine while using IGIV. The vaccine may not work as well and may not fully protect you from disease. Live vaccines include measles, mumps, rubella (MMR), rotavirus, typhoid, yellow fever, varicella (chickenpox), zoster (shingles), and nasal flu (influenza) vaccine.   What are the possible side effects of IGIV?  Get emergency medical help if you have signs of an allergic reaction: hives; difficult breathing; swelling of your face, lips, tongue, or throat. Some side effects may occur during the injection. Tell your caregiver if you feel dizzy, nauseated, light-headed, sweaty, or have a headache, pounding in your neck or ears, fever, chills, chest tightness, or warmth or redness in your face. Call your doctor at once if you have:  a blood cell disorder --pale or yellowed skin, dark colored urine, fever, confusion or weakness;  dehydration symptoms --feeling very thirsty or hot, being unable to urinate, heavy sweating, or hot and dry skin;  kidney problems --little or no urination, swelling, rapid weight gain, feeling short of breath;  lung problems --chest pain, trouble breathing, blue colored lips, fingers, or toes;  signs of a new infection --fever with a severe headache, neck stiffness, eye pain, and increased sensitivity to light; or  signs of a blood clot --shortness of breath, chest pain with deep breathing, rapid heart rate, numbness or weakness on one side of the body, swelling and warmth or discoloration in an arm or leg. Common side effects may include:  headache, back pain, joint pain;  fever, chills, sweating, warmth or tingling;  stomach pain, nausea, diarrhea;  increased blood pressure, fast heartbeats;  dizziness, tiredness, lack of energy;  stuffy nose, sinus pain; or  pain, swelling, burning, or irritation around the IV needle. This is not a complete list of side effects and others may occur. Call your doctor for medical advice about side effects. You may report side effects to FDA at 3-939-FDA-5759. What other drugs will affect IGIV? IGIV can harm your kidneys, especially if you also use certain medicines for infections, cancer, osteoporosis, organ transplant rejection, bowel disorders, high blood pressure, or pain or arthritis (including Advil, Motrin, and Aleve).   Other drugs may affect IGIV, including prescription and over-the-counter medicines, vitamins, and herbal products. Tell your doctor about all your current medicines and any medicine you start or stop using. Where can I get more information? Your pharmacist can provide more information about immune globulin intravenous. Remember, keep this and all other medicines out of the reach of children, never share your medicines with others, and use this medication only for the indication prescribed. Every effort has been made to ensure that the information provided by [address] is accurate, up-to-date, and complete, but no guarantee is made to that effect. Drug information contained herein may be time sensitive. [facility] information has been compiled for use by healthcare practitioners and consumers in the [place] and therefore [facility] does not warrant that uses outside of the [place] are appropriate, unless specifically indicated otherwise. [facility]'s drug information does not endorse drugs, diagnose patients or recommend therapy. [facility] drug information is an informational resource designed to assist licensed healthcare practitioners in caring for their patients and/or to serve consumers viewing this service as a supplement to, and not a substitute for, the expertise, skill, knowledge and judgment of healthcare practitioners. The absence of a warning for a given drug or drug combination in no way should be construed to indicate that the drug or drug combination is safe, effective or appropriate for any given patient. [facility] does not assume any responsibility for any aspect of healthcare administered with the aid of information [facility] provides. The information contained herein is not intended to cover all possible uses, directions, precautions, warnings, drug interactions, allergic reactions, or adverse effects.  If you have questions about the drugs you are taking, check with your doctor, nurse or pharmacist.  Copyright 6462-6376 [employer] Version: 6.02. Revision date: 2/26/2021. Care instructions adapted under license by [facility] ([facility]). If you have questions about a medical condition or this instruction, always ask your healthcare professional. [address] any warranty or liability for your use of this information.

## 2022-10-16 DIAGNOSIS — G61.81 CIDP (CHRONIC INFLAMMATORY DEMYELINATING POLYNEUROPATHY) (HCC): ICD-10-CM

## 2022-10-17 RX ORDER — GABAPENTIN 600 MG/1
TABLET ORAL
Qty: 270 TABLET | Refills: 1 | Status: SHIPPED | OUTPATIENT
Start: 2022-10-17 | End: 2023-05-20

## 2022-10-17 NOTE — TELEPHONE ENCOUNTER
Requested Prescriptions     Pending Prescriptions Disp Refills    gabapentin (NEURONTIN) 600 MG tablet 270 tablet 1     Sig: TAKE 1 TABLET THREE TIMES A DAY       Last Office Visit:  9/8/2022  Next Office Visit:  3/9/2023  Last Medication Refill:  4/11/2022 with 1 RF   Shahida  up to date:  10/17/2022     *RX updated to reflect   10/17/2022  fill date*

## 2022-11-01 ENCOUNTER — OFFICE VISIT (OUTPATIENT)
Dept: UROLOGY | Age: 77
End: 2022-11-01
Payer: MEDICARE

## 2022-11-01 VITALS — TEMPERATURE: 97.9 F | HEIGHT: 64 IN | WEIGHT: 127.6 LBS | BODY MASS INDEX: 21.78 KG/M2

## 2022-11-01 DIAGNOSIS — R30.0 DYSURIA: Primary | ICD-10-CM

## 2022-11-01 PROCEDURE — P9612 CATHETERIZE FOR URINE SPEC: HCPCS | Performed by: NURSE PRACTITIONER

## 2022-11-01 PROCEDURE — 1123F ACP DISCUSS/DSCN MKR DOCD: CPT | Performed by: NURSE PRACTITIONER

## 2022-11-01 PROCEDURE — 99213 OFFICE O/P EST LOW 20 MIN: CPT | Performed by: NURSE PRACTITIONER

## 2022-11-01 NOTE — PROGRESS NOTES
Patient complained of dysuria. After discussing with DAWN Christie Europe was given verbal orders to obtain cath urine specimen. After obtaining consent from patient. Patient was then placed in the dorsal lithotomy position. Using sterile technique patient is carefully prepped with Betadine around the urethra. A pediatric catheterization kit is used which contains an approximate 5 Dominik Sury catheter. Urethral Catheter is introduced into the urinary bladder. Urine specimen is obtained and sent to the lab for culture and sensitivity. Patient tolerated procedure well. DAWN Horvath was in office at time of procedure.

## 2022-11-01 NOTE — PROGRESS NOTES
Rebeca Kapoor is a 65 y.o. female who presents today   Chief Complaint   Patient presents with    Follow-up     I am here today for a poss UTI       Patient is 24-year-old female presents clinic today with complaints of worsening frequency and urgency for the last 2 days. She also states her incontinence has worsened along with dysuria. She has a history of refractory urge and stress incontinence. 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 She is now seeing Rohan Craig urogynecologist.  She has discontinued all medications such as [place]. She is also currently maintained on Premarin. She does realize that she is likely chronically colonized due to incontinence. She denies any fever or hematuria. Previous culture   9/8/22  Neg   4/21/22 Proteus mirabilis   1/12/22 Proteus   11/29/21 E coli      Past Medical History:   Diagnosis Date    Arthritis     Cancer ([facility] [address]     endometrial cancer    Cataract     CIDP (chronic inflammatory demyelinating polyneuropathy) ([facility])     Crohn's disease ([facility])     GERD (gastroesophageal reflux disease)     History of blood transfusion     Hypertension     Macular degeneration     Perineal cyst in female     PVD (peripheral vascular disease) ([facility])     Radiation     Urinary incontinence        Past Surgical History:   Procedure Laterality Date    BACK SURGERY      lumbar    CARPAL TUNNEL RELEASE Bilateral     CATARACT REMOVAL Bilateral     CERVICAL SPINE SURGERY      CHOLECYSTECTOMY      FOOT SURGERY Left     HAMMER TOE SURGERY Left     HX VASCULAR ANGIOPLASTY      & STENT    HYSTERECTOMY (CERVIX STATUS UNKNOWN)      KNEE ARTHROSCOPY Right     LUMBAR FUSION      RECTAL SURGERY N/A 03/25/2022    PERINEAL CYST EXCISION performed by Natividad Robin DO at [address] [place] [address]  01/04/2022    VI. Bilateral lower extremity runoff.        Current Outpatient Medications   Medication Sig Dispense Refill gabapentin (NEURONTIN) 600 MG tablet TAKE 1 TABLET THREE TIMES A  tablet 1    methylPREDNISolone (MEDROL DOSEPACK) 4 MG tablet       mupirocin (BACTROBAN) 2 % ointment APPLY A THIN LAYER TWICE A DAY UNTIL HEALED      tiZANidine (ZANAFLEX) 4 MG tablet TAKE 1 TABLET BY MOUTH 2 TIMES DAILY AS NEEDED (PAIN). 180 tablet 1    oxyCODONE-acetaminophen (PERCOCET)  MG per tablet       lisinopril (PRINIVIL;ZESTRIL) 5 MG tablet       conjugated estrogens (PREMARIN) 0.625 MG/GM vaginal cream Place vaginally twice a week 30 g 11    meloxicam (MOBIC) 15 MG tablet Take 15 mg by mouth daily       allopurinol (ZYLOPRIM) 300 MG tablet Take 300 mg by mouth daily      Propylene Glycol (SYSTANE COMPLETE) 0.6 % SOLN Apply 1 drop to eye 3 times daily Each eye      amLODIPine (NORVASC) 5 MG tablet Take 5 mg by mouth daily      colestipol (COLESTID) 1 g tablet Take 1 g by mouth 3 times daily       Cyanocobalamin (VITAMIN B 12) 100 MCG LOZG Take by mouth daily       lidocaine (XYLOCAINE) 5 % ointment Apply topically as needed for Pain Apply topically as needed.  30 g 3    fenofibrate (TRICOR) 145 MG tablet 145 mg daily       Omega-3 Fatty Acids (FISH OIL) 1000 MG CAPS Take 1,000 mg by mouth 2 times daily      Garlic 8036 MG CAPS Take 1,000 mg by mouth 2 times daily      CRANBERRY SOFT PO Take 36 mg by mouth daily      carvedilol (COREG) 6.25 MG tablet Take 6.25 mg by mouth 2 times daily      aspirin 81 MG tablet Take 81 mg by mouth daily      folic acid (FOLVITE) 1 MG tablet Take 600 mg by mouth daily       Multiple Vitamins-Minerals (THERAPEUTIC MULTIVITAMIN-MINERALS) tablet Take 1 tablet by mouth daily      calcium-vitamin D (OSCAL) 250-125 MG-UNIT per tablet Take 1 tablet by mouth daily      mesalamine (DELZICOL) 400 MG CPDR DR capsule Take 400 mg by mouth 3 times daily       indapamide (LOZOL) 2.5 MG tablet Take 2.5 mg by mouth every morning      omeprazole (PRILOSEC) 20 MG capsule Take 20 mg by mouth Daily       amoxicillin (AMOXIL) 500 MG capsule Take 1 capsule by mouth 2 times daily for 10 days 20 capsule 0     No current facility-administered medications for this visit. No Known Allergies    Social History     Socioeconomic History    Marital status:      Spouse name: None    Number of children: None    Years of education: None    Highest education level: None   Tobacco Use    Smoking status: Never    Smokeless tobacco: Never   Vaping Use    Vaping Use: Never used   Substance and Sexual Activity    Alcohol use: No    Drug use: No    Sexual activity: Not Currently     Partners: Male       Family History   Problem Relation Age of Onset    Cancer Mother     High Blood Pressure Father     Heart Disease Father     Cancer Daughter        REVIEW OF SYSTEMS:  Review of Systems   Constitutional:  Negative for chills and fever. Gastrointestinal:  Negative for abdominal distention, abdominal pain, nausea and vomiting. Genitourinary:  Positive for dysuria, frequency and urgency. Negative for difficulty urinating, flank pain and hematuria. Musculoskeletal:  Positive for gait problem. Negative for back pain. Psychiatric/Behavioral:  Negative for agitation and confusion. PHYSICAL EXAM:  Temp 97.9 °F (36.6 °C) (Temporal)   Ht 5' 4\" (1.626 m)   Wt 127 lb 9.6 oz (57.9 kg)   BMI 21.90 kg/m²   Physical Exam  Vitals and nursing note reviewed. Constitutional:       General: She is not in acute distress. Appearance: Normal appearance. She is not ill-appearing. Pulmonary:      Effort: Pulmonary effort is normal. No respiratory distress. Abdominal:      General: There is no distension. Tenderness: There is no abdominal tenderness. There is no right CVA tenderness or left CVA tenderness. Neurological:      Mental Status: She is alert and oriented to person, place, and time. Mental status is at baseline. Motor: Weakness present.       Gait: Gait abnormal.   Psychiatric:         Mood and Affect: Mood normal. Behavior: Behavior normal.       DATA:  Results for orders placed or performed in visit on 11/01/22   Culture, Urine    Specimen: Urine, clean catch   Result Value Ref Range    Urine Culture, Routine >100,000 CFU/ml (A)     Organism Aerococcus urinae (A)     Urine Culture, Routine       Heavy growth  No definitive guidelines exist for antimicrobial therapy. Aerococcus urinae has been described as susceptible to  Penicillin, Amoxicillin, and Nitrofurantoin but  resistant to Sulfonamides and Netilmicin. Organism Aerococcus (A)     Urine Culture, Routine       Heavy growth  Aerococcus sanguinicola  Susceptibility testing not routinely done. No further work up. 1. Dysuria  Complaints of dysuria. She is likely chronically colonized although she usually does not have dysuria. We will go ahead and send a cath urine specimen for evaluation and treat her accordingly. - Culture, Urine    Orders Placed This Encounter   Procedures    Culture, Urine     Order Specific Question:   Specify (ex-cath, midstream, cysto, etc)? Answer:   cath urine     Return if symptoms worsen or fail to improve. At least 21 minutes were spent on the day of the visit reviewing the patient's past medical records/imaging, speaking face to face with the patient, and charting in the post visit period. All information inputted into the note by the MA to include chief complaint, past medical history, past surgical history, medications, allergies, social and family history and review of systems has been reviewed and updated as needed by me. EMR Dragon/transcription disclaimer: Much of this documentt is electronic  transcription/translation of spoken language to printed text. The  electronic translation of spoken language may be erroneous, or at times,  nonsensical words or phrases may be inadvertently transcribed.  Although I  have reviewed the document for such errors, some may still exist.

## 2022-11-02 ENCOUNTER — HOSPITAL ENCOUNTER (OUTPATIENT)
Dept: INFUSION THERAPY | Age: 77
Setting detail: INFUSION SERIES
Discharge: HOME | End: 2022-11-02

## 2022-11-02 DIAGNOSIS — G61.81 CIDP (CHRONIC INFLAMMATORY DEMYELINATING POLYNEUROPATHY) (HCC): Primary | ICD-10-CM

## 2022-11-02 RX ORDER — DIPHENHYDRAMINE HYDROCHLORIDE 50 MG/ML
50 INJECTION INTRAMUSCULAR; INTRAVENOUS
OUTPATIENT
Start: 2022-11-16

## 2022-11-02 RX ORDER — ACETAMINOPHEN 325 MG/1
650 TABLET ORAL ONCE
Status: DISCONTINUED | OUTPATIENT
Start: 2022-11-02 | End: 2022-11-02

## 2022-11-02 RX ORDER — ACETAMINOPHEN 325 MG/1
650 TABLET ORAL ONCE
Status: CANCELLED | OUTPATIENT
Start: 2022-11-16 | End: 2022-11-16

## 2022-11-02 RX ORDER — SODIUM CHLORIDE 9 MG/ML
5-250 INJECTION, SOLUTION INTRAVENOUS PRN
Status: CANCELLED | OUTPATIENT
Start: 2022-11-16

## 2022-11-02 RX ORDER — DIPHENHYDRAMINE HYDROCHLORIDE 50 MG/ML
50 INJECTION INTRAMUSCULAR; INTRAVENOUS ONCE
Status: CANCELLED
Start: 2022-11-16 | End: 2022-11-16

## 2022-11-02 RX ORDER — SODIUM CHLORIDE 0.9 % (FLUSH) 0.9 %
5-40 SYRINGE (ML) INJECTION PRN
Status: CANCELLED | OUTPATIENT
Start: 2022-11-16

## 2022-11-02 RX ORDER — DIPHENHYDRAMINE HYDROCHLORIDE 50 MG/ML
50 INJECTION INTRAMUSCULAR; INTRAVENOUS ONCE
Status: DISCONTINUED | OUTPATIENT
Start: 2022-11-02 | End: 2022-11-02

## 2022-11-02 RX ORDER — SODIUM CHLORIDE 9 MG/ML
5-250 INJECTION, SOLUTION INTRAVENOUS PRN
Status: DISCONTINUED | OUTPATIENT
Start: 2022-11-02 | End: 2022-11-02

## 2022-11-02 RX ORDER — SODIUM CHLORIDE 9 MG/ML
INJECTION, SOLUTION INTRAVENOUS CONTINUOUS
OUTPATIENT
Start: 2022-11-16

## 2022-11-04 DIAGNOSIS — N30.90 CYSTITIS: Primary | ICD-10-CM

## 2022-11-04 LAB
ORGANISM: ABNORMAL
ORGANISM: ABNORMAL
URINE CULTURE, ROUTINE: ABNORMAL

## 2022-11-04 RX ORDER — AMOXICILLIN 500 MG/1
500 CAPSULE ORAL 2 TIMES DAILY
Qty: 20 CAPSULE | Refills: 0 | Status: SHIPPED | OUTPATIENT
Start: 2022-11-04 | End: 2022-11-14

## 2022-11-05 ASSESSMENT — ENCOUNTER SYMPTOMS
VOMITING: 0
BACK PAIN: 0
ABDOMINAL PAIN: 0
NAUSEA: 0
ABDOMINAL DISTENTION: 0

## 2022-11-08 ENCOUNTER — PATIENT MESSAGE (OUTPATIENT)
Dept: NEUROLOGY | Age: 77
End: 2022-11-08

## 2022-11-09 DIAGNOSIS — M48.062 SPINAL STENOSIS OF LUMBAR REGION WITH NEUROGENIC CLAUDICATION: Primary | ICD-10-CM

## 2022-11-09 RX ORDER — HYDROCODONE BITARTRATE AND ACETAMINOPHEN 10; 325 MG/1; MG/1
1 TABLET ORAL EVERY 8 HOURS PRN
Qty: 90 TABLET | Refills: 0 | Status: SHIPPED | OUTPATIENT
Start: 2022-11-09 | End: 2022-12-09

## 2022-11-09 NOTE — TELEPHONE ENCOUNTER
Patients daughter called to report Dr Loreen Litten has discharged her from her surgery follow ups and needs to return to getting her pain medication from our office. Last fill of Percocet 10mg #60 from Dr Loreen Litten was 9-26-22. Ether Keas 10-15-22. Last fill of Hurley from Dr Carolyn Sahu was 4-26-22, before her surgery.

## 2022-11-18 ENCOUNTER — HOSPITAL ENCOUNTER (OUTPATIENT)
Dept: INFUSION THERAPY | Age: 77
Setting detail: INFUSION SERIES
Discharge: HOME | End: 2022-11-18
Payer: MEDICARE

## 2022-11-18 VITALS
RESPIRATION RATE: 18 BRPM | TEMPERATURE: 97.6 F | OXYGEN SATURATION: 99 % | HEART RATE: 62 BPM | DIASTOLIC BLOOD PRESSURE: 52 MMHG | SYSTOLIC BLOOD PRESSURE: 110 MMHG

## 2022-11-18 DIAGNOSIS — G61.81 CIDP (CHRONIC INFLAMMATORY DEMYELINATING POLYNEUROPATHY) (HCC): Primary | ICD-10-CM

## 2022-11-18 PROCEDURE — 96365 THER/PROPH/DIAG IV INF INIT: CPT

## 2022-11-18 PROCEDURE — 6360000002 HC RX W HCPCS: Performed by: PSYCHIATRY & NEUROLOGY

## 2022-11-18 PROCEDURE — 96366 THER/PROPH/DIAG IV INF ADDON: CPT

## 2022-11-18 RX ORDER — ACETAMINOPHEN 325 MG/1
650 TABLET ORAL ONCE
Status: DISCONTINUED | OUTPATIENT
Start: 2022-11-18 | End: 2022-11-20 | Stop reason: HOSPADM

## 2022-11-18 RX ORDER — ACETAMINOPHEN 325 MG/1
650 TABLET ORAL ONCE
OUTPATIENT
Start: 2022-11-30 | End: 2022-11-30

## 2022-11-18 RX ORDER — SODIUM CHLORIDE 9 MG/ML
5-250 INJECTION, SOLUTION INTRAVENOUS PRN
OUTPATIENT
Start: 2022-11-30

## 2022-11-18 RX ORDER — SODIUM CHLORIDE 9 MG/ML
5-250 INJECTION, SOLUTION INTRAVENOUS PRN
Status: DISCONTINUED | OUTPATIENT
Start: 2022-11-18 | End: 2022-11-19 | Stop reason: HOSPADM

## 2022-11-18 RX ORDER — DIPHENHYDRAMINE HYDROCHLORIDE 50 MG/ML
50 INJECTION INTRAMUSCULAR; INTRAVENOUS ONCE
Start: 2022-11-30 | End: 2022-11-30

## 2022-11-18 RX ORDER — SODIUM CHLORIDE 9 MG/ML
INJECTION, SOLUTION INTRAVENOUS CONTINUOUS
OUTPATIENT
Start: 2022-11-30

## 2022-11-18 RX ORDER — SODIUM CHLORIDE 0.9 % (FLUSH) 0.9 %
5-40 SYRINGE (ML) INJECTION PRN
OUTPATIENT
Start: 2022-11-30

## 2022-11-18 RX ORDER — DIPHENHYDRAMINE HYDROCHLORIDE 50 MG/ML
50 INJECTION INTRAMUSCULAR; INTRAVENOUS ONCE
Status: DISCONTINUED | OUTPATIENT
Start: 2022-11-18 | End: 2022-11-20 | Stop reason: HOSPADM

## 2022-11-18 RX ORDER — DIPHENHYDRAMINE HYDROCHLORIDE 50 MG/ML
50 INJECTION INTRAMUSCULAR; INTRAVENOUS
OUTPATIENT
Start: 2022-11-30

## 2022-11-18 RX ORDER — SODIUM CHLORIDE 0.9 % (FLUSH) 0.9 %
5-40 SYRINGE (ML) INJECTION PRN
Status: DISCONTINUED | OUTPATIENT
Start: 2022-11-18 | End: 2022-11-19 | Stop reason: HOSPADM

## 2022-11-18 RX ADMIN — IMMUNE GLOBULIN (HUMAN) 30 G: 10 INJECTION INTRAVENOUS; SUBCUTANEOUS at 09:58

## 2022-11-18 NOTE — DISCHARGE INSTRUCTIONS
immune globulin (intravenous and subcutaneous)  Pronunciation:  ryan ERMA hdz  Brand:  Gammagard Liquid, Gammaked, Gamunex-C  What is the most important information I should know about immune globulin? This medicine can cause blood clots. The risk is highest in older adults or in people who have had blood clots, heart problems, or blood circulation problems. Blood clots are also more likely during long-term bedrest, while using birth control pills or hormone replacement therapy, or while having a central intravenous (IV) catheter in place. Call your doctor at once if you have chest pain, trouble breathing, fast heartbeats, numbness or weakness, or swelling and warmth or discoloration in an arm or leg. This medicine can also harm your kidneys, especially if you have kidney disease or if you also use certain medicines. Tell your doctor right away if you have signs of kidney problems, such as swelling, rapid weight gain, and little or no urination. What is immune globulin? Immune globulin intravenous and subcutaneous (for injection into a vein or under the skin) is used to treat primary immunodeficiency. Immune globulin is also used to increase platelets (blood clotting cells) in people with idiopathic thrombocytopenic purpura. Immune globulin is also used to treat certain debilitating nerve disorders that cause muscle weakness and can affect daily activities. Immune globulin may also be used for purposes not listed in this medication guide. What should I discuss with my healthcare provider before using immune globulin? You should not use this medicine if:  you have had an allergic reaction to an immune globulin or blood product; or  you have immune globulin A (IgA) deficiency with antibody to IgA. Immune globulin can cause blood clots or kidney problems, especially in older adults or in people with certain conditions.  Tell your doctor if you have ever had:  heart problems, blood circulation problems, or \"thick blood\";  a stroke or blood clot;  kidney disease;  diabetes;  an infection called sepsis;  if you use estrogens (birth control pills or hormone replacement therapy);  if you have been on long-term bedrest; or  if you have a central intravenous (IV) catheter in place. You may need a dose adjustment if you are exposed to measles, or if you travel to an area where this disease is common. Tell your doctor if you are pregnant or breastfeeding. Immune globulin is made from donated human plasma and may contain viruses or other infectious agents. Donated plasma is tested and treated to reduce the risk of contamination, but there is still a small possibility it could transmit disease. Ask your doctor about any possible risk. How should I use immune globulin? Follow all directions on your prescription label and read all medication guides or instruction sheets. Your doctor may occasionally change your dose. Use the medicine exactly as directed. Immune globulin is given as an infusion into a vein, or injected under the skin using an infusion pump. A healthcare provider will give your first dose and may teach you how to properly use the medication by yourself. Do not inject immune globulin into a vein if you have been instructed to give the medicine as a subcutaneous injection (under the skin). How you give this medication, how often you use it, and the length of your infusion time will depend on the condition being treated. Read and carefully follow any Instructions for Use provided with your medicine. Ask your doctor or pharmacist if you don't understand all instructions. Prepare an injection only when you are ready to give it. Do not use if the medicine looks cloudy, has changed colors, or has particles in it. Call your pharmacist for new medicine. Do not shake the medication bottle or you may ruin the medicine. Immune globulin must be given slowly.  You may need to use several catheters to inject this medicine into different body areas at the same time. Your healthcare provider will show you the best places on your body to inject the medication. Keep a diary of the days and times you gave the injection and where you injected it on your body. Drink plenty of liquids  while you are using this medicine to help improve your blood flow and keep your kidneys working properly. You may need frequent blood or urine tests. This medicine can affect the results of certain medical tests. Tell any doctor who treats you that you are using immune globulin. Store this medicine in its original carton in the refrigerator. Do not freeze immune globulin, and throw the medicine away if it has frozen. Take the medicine out of the refrigerator and let it reach room temperature for up to 1 hour before injecting your dose. You may also store immune globulin at room temperature. You will need to use immune globulin within a certain number months. This will depend on the how you store the medicine (at room temperature, or in a refrigerator). Carefully follow the storage instructions provided with your medicine. Do not use the medicine after the expiration date on the label has passed. Each vial (bottle) is for one use only. Throw it away after one use, even if there is still medicine left inside. Use disposable injection items (needle, catheter, tubing) only once and then place them in a puncture-proof \"sharps\" container. Follow state or local laws about how to dispose of this container. Keep it out of the reach of children and pets. What happens if I miss a dose? Call your doctor for instructions if you miss a dose. What happens if I overdose? Seek emergency medical attention or call the Poison Help line at 1-172.308.8429. What should I avoid while using immune globulin? Do not receive a \"live\" vaccine while using immune globulin. The vaccine may not work as well and may not fully protect you from disease.  Live vaccines include measles, mumps, rubella (MMR), rotavirus, typhoid, yellow fever, varicella (chickenpox), zoster (shingles), and nasal flu (influenza) vaccine. What are the possible side effects of immune globulin? Get emergency medical help if you have signs of an allergic reaction: hives; wheezing, difficulty breathing; dizziness, feeling like you might pass out; swelling of your face, lips, tongue, or throat. Some side effects may occur during the injection. Tell your caregiver if you feel light-headed, itchy, chilled, sweaty, or have chest discomfort, fast heartbeats, severe headache, or pounding in your neck or ears. Call your doctor at once if you have:  a blood cell disorder --pale or yellowed skin, dark colored urine, fever, confusion or weakness;  dehydration symptoms --feeling very thirsty or hot, being unable to urinate, heavy sweating, or hot and dry skin;  kidney problems --little or no urination, swelling, rapid weight gain, feeling short of breath;  lung problems --chest pain, wheezing, trouble breathing, blue colored lips, fingers, or toes;  signs of a new infection --fever with a severe headache, neck stiffness, eye pain, and increased sensitivity to light; or  signs of a blood clot --shortness of breath, chest pain with deep breathing, rapid heart rate, numbness or weakness on one side of the body, swelling and warmth or discoloration in an arm or leg. Common side effects may include:  runny or stuffy nose, sinus pain, cough, sore throat;  fever, chills, weakness;  headache, back pain, muscle or joint pain;  dizziness, tiredness, depressed mood;  swelling in your hands or feet;  skin rash, redness, or bruising;  blisters or ulcers in your mouth, red or swollen gums, trouble swallowing;  nausea, diarrhea, stomach pain, upset stomach;  increased blood pressure; or  redness, swelling, or itching where an injection was given. This is not a complete list of side effects and others may occur.  Call your doctor for medical advice about side effects. You may report side effects to FDA at 2-165-XLT-1417. What other drugs will affect immune globulin? Immune globulin can harm your kidneys, especially if you also use certain medicines for infections, cancer, osteoporosis, organ transplant rejection, bowel disorders, high blood pressure, or pain or arthritis (including Advil, Motrin, and Aleve). Other drugs may affect immune globulin, including prescription and over-the-counter medicines, vitamins, and herbal products. Tell your doctor about all your current medicines and any medicine you start or stop using. Where can I get more information? Your doctor or pharmacist can provide more information about immune globulin. Remember, keep this and all other medicines out of the reach of children, never share your medicines with others, and use this medication only for the indication prescribed. Every effort has been made to ensure that the information provided by [address] is accurate, up-to-date, and complete, but no guarantee is made to that effect. Drug information contained herein may be time sensitive. [employer] information has been compiled for use by healthcare practitioners and consumers in the [place] and therefore [employer] does not warrant that uses outside of the [place] are appropriate, unless specifically indicated otherwise. [employer]'s drug information does not endorse drugs, diagnose patients or recommend therapy. [employer] drug information is an informational resource designed to assist licensed healthcare practitioners in caring for their patients and/or to serve consumers viewing this service as a supplement to, and not a substitute for, the expertise, skill, knowledge and judgment of healthcare practitioners.  The absence of a warning for a given drug or drug combination in no way should be construed to indicate that the drug or drug combination is safe, effective or appropriate for any given patient. [facility] does not assume any responsibility for any aspect of healthcare administered with the aid of information [facility] provides. The information contained herein is not intended to cover all possible uses, directions, precautions, warnings, drug interactions, allergic reactions, or adverse effects. If you have questions about the drugs you are taking, check with your doctor, nurse or pharmacist.  Copyright 1774-3333 [facility] [address] [employer]NA[employer]. Version: 4.02. Revision date: 2/26/2021. Care instructions adapted under license by [facility] ([facility]). If you have questions about a medical condition or this instruction, always ask your healthcare professional. [place] [address] any warranty or liability for your use of this information.

## 2022-11-18 NOTE — PROGRESS NOTES
Here today for IVIG. Infused with no difficulty. Future appointments reviewed with patient and discharged to home.

## 2022-12-01 ENCOUNTER — OFFICE VISIT (OUTPATIENT)
Dept: UROLOGY | Age: 77
End: 2022-12-01
Payer: MEDICARE

## 2022-12-01 VITALS — WEIGHT: 131.8 LBS | HEIGHT: 64 IN | BODY MASS INDEX: 22.5 KG/M2 | TEMPERATURE: 97.7 F

## 2022-12-01 DIAGNOSIS — N39.0 RECURRENT UTI: Primary | ICD-10-CM

## 2022-12-01 DIAGNOSIS — N81.10 FEMALE CYSTOCELE: ICD-10-CM

## 2022-12-01 DIAGNOSIS — R30.0 DYSURIA: ICD-10-CM

## 2022-12-01 DIAGNOSIS — N32.81 OVERACTIVE BLADDER: ICD-10-CM

## 2022-12-01 PROCEDURE — 51701 INSERT BLADDER CATHETER: CPT | Performed by: NURSE PRACTITIONER

## 2022-12-01 PROCEDURE — 1123F ACP DISCUSS/DSCN MKR DOCD: CPT | Performed by: NURSE PRACTITIONER

## 2022-12-01 PROCEDURE — 99213 OFFICE O/P EST LOW 20 MIN: CPT | Performed by: NURSE PRACTITIONER

## 2022-12-01 RX ORDER — CEPHALEXIN 500 MG/1
500 CAPSULE ORAL 2 TIMES DAILY
Qty: 20 CAPSULE | Refills: 0 | Status: SHIPPED | OUTPATIENT
Start: 2022-12-01 | End: 2022-12-11

## 2022-12-01 ASSESSMENT — ENCOUNTER SYMPTOMS
NAUSEA: 0
VOMITING: 0
BACK PAIN: 1
ABDOMINAL PAIN: 0
ABDOMINAL DISTENTION: 0

## 2022-12-01 NOTE — PROGRESS NOTES
Patient complained of dysuria and frequency. After discussing with ENZO Maravilla I was given verbal orders to obtain cath urine specimen. After obtaining consent from patient. Patient was then placed in the dorsal lithotomy position. Using sterile technique patient is carefully prepped with Betadine around the urethra. A pediatric catheterization kit is used which contains an approximate 5 Western Cecelia catheter. Urethral Catheter is introduced into the urinary bladder. Urine specimen is obtained and sent to the lab for culture and sensitivity. Patient tolerated procedure well. ENZO Maravilla was in office at time of procedure.

## 2022-12-01 NOTE — PROGRESS NOTES
Donita Singletary is a 65 y.o. female who presents today   Chief Complaint   Patient presents with    Follow-up     I am here today for possible UTI       Patient is 65-year-old female presents to clinic today with complaints of dysuria over the last 2 days. She is also had some suprapubic and low back pain. Denies any fever or chills. Her incontinence has also worsened along with dysuria. She does have a history of refractory urge and stress incontinence. She previously had a bladder and had been utilizing in and out catheterization for several years. She is now seeing [facility] urogynecologist.  Dr. Anthony Donohue has discontinued her medications of Gemtesa and Vernadine Ga and different options for InterStim and Botox. She has a history of endometrial cancer and cystocele. She is currently on vaginal estrogen by Dr. Emerson Fraser, urogynecologist.  Her last visit with Dr. Anthony Donohue was the end of October 2022. She does have an upcoming appointment with her in January.     Previous culture   11/1/22 Aerococcus urinae   9/8/22              Neg   4/21/22            Proteus mirabilis   1/12/22            Proteus   11/29/21          E coli    Past Medical History:   Diagnosis Date    Arthritis     Cancer ([facility] [address]     endometrial cancer    Cataract     CIDP (chronic inflammatory demyelinating polyneuropathy) ([facility])     Crohn's disease ([facility])     GERD (gastroesophageal reflux disease)     History of blood transfusion     Hypertension     Macular degeneration     Perineal cyst in female     PVD (peripheral vascular disease) ([facility])     Radiation     Urinary incontinence        Past Surgical History:   Procedure Laterality Date    BACK SURGERY      lumbar    CARPAL TUNNEL RELEASE Bilateral     CATARACT REMOVAL Bilateral     CERVICAL SPINE SURGERY      CHOLECYSTECTOMY      FOOT SURGERY Left     HAMMER TOE SURGERY Left     HX VASCULAR ANGIOPLASTY      & STENT    HYSTERECTOMY (CERVIX STATUS UNKNOWN)      KNEE ARTHROSCOPY Right     LUMBAR FUSION      RECTAL SURGERY N/A 03/25/2022    PERINEAL CYST EXCISION performed by Jorge Colon DO at [address] [facility]  01/04/2022    VI. Bilateral lower extremity runoff. Current Outpatient Medications   Medication Sig Dispense Refill    cephALEXin (KEFLEX) 500 MG capsule Take 1 capsule by mouth 2 times daily for 10 days 20 capsule 0    HYDROcodone-acetaminophen (NORCO)  MG per tablet Take 1 tablet by mouth every 8 hours as needed for Pain for up to 30 days. Intended supply: 30 days 90 tablet 0    gabapentin (NEURONTIN) 600 MG tablet TAKE 1 TABLET THREE TIMES A  tablet 1    methylPREDNISolone (MEDROL DOSEPACK) 4 MG tablet       mupirocin (BACTROBAN) 2 % ointment APPLY A THIN LAYER TWICE A DAY UNTIL HEALED      tiZANidine (ZANAFLEX) 4 MG tablet TAKE 1 TABLET BY MOUTH 2 TIMES DAILY AS NEEDED (PAIN). 180 tablet 1    oxyCODONE-acetaminophen (PERCOCET)  MG per tablet       lisinopril (PRINIVIL;ZESTRIL) 5 MG tablet       conjugated estrogens (PREMARIN) 0.625 MG/GM vaginal cream Place vaginally twice a week 30 g 11    meloxicam (MOBIC) 15 MG tablet Take 15 mg by mouth daily       allopurinol (ZYLOPRIM) 300 MG tablet Take 300 mg by mouth daily      Propylene Glycol (SYSTANE COMPLETE) 0.6 % SOLN Apply 1 drop to eye 3 times daily Each eye      amLODIPine (NORVASC) 5 MG tablet Take 5 mg by mouth daily      colestipol (COLESTID) 1 g tablet Take 1 g by mouth 3 times daily       Cyanocobalamin (VITAMIN B 12) 100 MCG LOZG Take by mouth daily       lidocaine (XYLOCAINE) 5 % ointment Apply topically as needed for Pain Apply topically as needed.  30 g 3    fenofibrate (TRICOR) 145 MG tablet 145 mg daily       Omega-3 Fatty Acids (FISH OIL) 1000 MG CAPS Take 1,000 mg by mouth 2 times daily      Garlic 1651 MG CAPS Take 1,000 mg by mouth 2 times daily      CRANBERRY SOFT PO Take 36 mg by mouth daily      carvedilol (COREG) 6.25 MG tablet Take 6.25 mg by mouth 2 times daily      aspirin 81 MG tablet Take 81 mg by mouth daily      folic acid (FOLVITE) 1 MG tablet Take 600 mg by mouth daily       Multiple Vitamins-Minerals (THERAPEUTIC MULTIVITAMIN-MINERALS) tablet Take 1 tablet by mouth daily      calcium-vitamin D (OSCAL) 250-125 MG-UNIT per tablet Take 1 tablet by mouth daily      mesalamine (DELZICOL) 400 MG CPDR DR capsule Take 400 mg by mouth 3 times daily       indapamide (LOZOL) 2.5 MG tablet Take 2.5 mg by mouth every morning      omeprazole (PRILOSEC) 20 MG capsule Take 20 mg by mouth Daily        No current facility-administered medications for this visit. No Known Allergies    Social History     Socioeconomic History    Marital status:      Spouse name: None    Number of children: None    Years of education: None    Highest education level: None   Tobacco Use    Smoking status: Never    Smokeless tobacco: Never   Vaping Use    Vaping Use: Never used   Substance and Sexual Activity    Alcohol use: No    Drug use: No    Sexual activity: Not Currently     Partners: Male       Family History   Problem Relation Age of Onset    Cancer Mother     High Blood Pressure Father     Heart Disease Father     Cancer Daughter        REVIEW OF SYSTEMS:  Review of Systems   Constitutional:  Negative for chills and fever. Gastrointestinal:  Negative for abdominal distention, abdominal pain, nausea and vomiting. Genitourinary:  Positive for dysuria and urgency. Negative for difficulty urinating, flank pain, frequency and hematuria. Musculoskeletal:  Positive for arthralgias and back pain. Negative for gait problem. Neurological:  Positive for weakness. Psychiatric/Behavioral:  Negative for agitation and confusion. PHYSICAL EXAM:  Temp 97.7 °F (36.5 °C)   Ht 5' 4\" (1.626 m)   Wt 131 lb 12.8 oz (59.8 kg)   BMI 22.62 kg/m²   Physical Exam  Vitals and nursing note reviewed. Constitutional:       General: She is not in acute distress. Appearance: Normal appearance. She is not ill-appearing.    Pulmonary: Effort: Pulmonary effort is normal. No respiratory distress. Abdominal:      General: There is no distension. Tenderness: There is no abdominal tenderness. There is no right CVA tenderness or left CVA tenderness. Neurological:      Mental Status: She is alert and oriented to person, place, and time. Mental status is at baseline. Motor: Weakness present. Gait: Gait abnormal.   Psychiatric:         Mood and Affect: Mood normal.         Behavior: Behavior normal.       1. Recurrent UTI  Incontinent likely colonized. She is having some complaints of back pain suprapubic pain as well as dysuria we will go ahead and send cath urine specimen for evaluation  - Culture, Urine    2. Dysuria  Send cath urine specimen for evaluation. We will go ahead and empirically treat her since this is close to the weekend I will not have her culture back until Saturday. - cephALEXin (KEFLEX) 500 MG capsule; Take 1 capsule by mouth 2 times daily for 10 days  Dispense: 20 capsule; Refill: 0    3. Female cystocele  She does have a cystocele present and is seeing a current urogynecologist.    4.  Overactive bladder  No longer on Gemtesa and Marcela Maddie. Dr. Jose Armando Dougherty did take her off of these. We did discuss possible ureteroplasty. She reports that Dr. Jose Armando Dougherty did discuss InterStim as well as Botox. She does have a follow-up with Dr. Mica Maza in January. She did request information on posttibial nerve stimulation therefore I provided her some brochures today. We will have her follow-up with Anh in around 6 months    Orders Placed This Encounter   Procedures    Culture, Urine     Order Specific Question:   Specify (ex-cath, midstream, cysto, etc)? Answer:   in and out catheter        Return in about 6 months (around 6/1/2023) for with ENZO Patel.     All information inputted into the note by the MA to include chief complaint, past medical history, past surgical history, medications, allergies, social and family history and review of systems has been reviewed and updated as needed by me. EMR Dragon/transcription disclaimer: Much of this documentt is electronic  transcription/translation of spoken language to printed text. The  electronic translation of spoken language may be erroneous, or at times,  nonsensical words or phrases may be inadvertently transcribed.  Although I  have reviewed the document for such errors, some may still exist.

## 2022-12-04 LAB
ORGANISM: ABNORMAL
URINE CULTURE, ROUTINE: ABNORMAL

## 2022-12-05 DIAGNOSIS — N30.90 CYSTITIS: Primary | ICD-10-CM

## 2022-12-05 RX ORDER — NITROFURANTOIN 25; 75 MG/1; MG/1
100 CAPSULE ORAL 2 TIMES DAILY
Qty: 28 CAPSULE | Refills: 0 | Status: SHIPPED | OUTPATIENT
Start: 2022-12-05 | End: 2022-12-19

## 2022-12-13 DIAGNOSIS — M48.062 SPINAL STENOSIS OF LUMBAR REGION WITH NEUROGENIC CLAUDICATION: ICD-10-CM

## 2022-12-13 RX ORDER — HYDROCODONE BITARTRATE AND ACETAMINOPHEN 10; 325 MG/1; MG/1
1 TABLET ORAL EVERY 8 HOURS PRN
Qty: 90 TABLET | Refills: 0 | Status: SHIPPED | OUTPATIENT
Start: 2022-12-13 | End: 2023-01-12

## 2022-12-13 NOTE — TELEPHONE ENCOUNTER
Requested Prescriptions     Pending Prescriptions Disp Refills    HYDROcodone-acetaminophen (NORCO)  MG per tablet 90 tablet 0     Sig: Take 1 tablet by mouth every 8 hours as needed for Pain for up to 30 days.  Intended supply: 30 days       Last Office Visit:  9/8/2022  Next Office Visit:  3/9/2023  Last Medication Refill:  11/09/2022  Brent Lopez up to date:  up to date     *RX updated to reflect   12/13/2021  fill date*

## 2022-12-19 ENCOUNTER — HOSPITAL ENCOUNTER (OUTPATIENT)
Dept: INFUSION THERAPY | Age: 77
Setting detail: INFUSION SERIES
Discharge: HOME | End: 2022-12-19
Payer: MEDICARE

## 2022-12-19 VITALS
OXYGEN SATURATION: 99 % | DIASTOLIC BLOOD PRESSURE: 62 MMHG | SYSTOLIC BLOOD PRESSURE: 124 MMHG | RESPIRATION RATE: 18 BRPM | TEMPERATURE: 97.4 F | HEART RATE: 54 BPM

## 2022-12-19 DIAGNOSIS — G61.81 CIDP (CHRONIC INFLAMMATORY DEMYELINATING POLYNEUROPATHY) (HCC): Primary | ICD-10-CM

## 2022-12-19 LAB
ALBUMIN SERPL-MCNC: 3.9 G/DL (ref 3.5–5.2)
ALP BLD-CCNC: 37 U/L (ref 35–104)
ALT SERPL-CCNC: 12 U/L (ref 5–33)
ANION GAP SERPL CALCULATED.3IONS-SCNC: 9 MMOL/L (ref 7–19)
AST SERPL-CCNC: 14 U/L (ref 5–32)
BASOPHILS ABSOLUTE: 0 K/UL (ref 0–0.2)
BASOPHILS RELATIVE PERCENT: 0.7 % (ref 0–1)
BILIRUB SERPL-MCNC: <0.2 MG/DL (ref 0.2–1.2)
BUN BLDV-MCNC: 36 MG/DL (ref 8–23)
CALCIUM SERPL-MCNC: 9.3 MG/DL (ref 8.8–10.2)
CHLORIDE BLD-SCNC: 107 MMOL/L (ref 98–111)
CO2: 31 MMOL/L (ref 22–29)
CREAT SERPL-MCNC: 0.7 MG/DL (ref 0.5–0.9)
EOSINOPHILS ABSOLUTE: 0.2 K/UL (ref 0–0.6)
EOSINOPHILS RELATIVE PERCENT: 4.1 % (ref 0–5)
GFR SERPL CREATININE-BSD FRML MDRD: >60 ML/MIN/{1.73_M2}
GLUCOSE BLD-MCNC: 95 MG/DL (ref 74–109)
HCT VFR BLD CALC: 35.5 % (ref 37–47)
HEMOGLOBIN: 11 G/DL (ref 12–16)
IMMATURE GRANULOCYTES #: 0 K/UL
LYMPHOCYTES ABSOLUTE: 1.4 K/UL (ref 1.1–4.5)
LYMPHOCYTES RELATIVE PERCENT: 25.6 % (ref 20–40)
MCH RBC QN AUTO: 27.3 PG (ref 27–31)
MCHC RBC AUTO-ENTMCNC: 31 G/DL (ref 33–37)
MCV RBC AUTO: 88.1 FL (ref 81–99)
MONOCYTES ABSOLUTE: 1.1 K/UL (ref 0–0.9)
MONOCYTES RELATIVE PERCENT: 20.1 % (ref 0–10)
NEUTROPHILS ABSOLUTE: 2.8 K/UL (ref 1.5–7.5)
NEUTROPHILS RELATIVE PERCENT: 49.5 % (ref 50–65)
PDW BLD-RTO: 13.8 % (ref 11.5–14.5)
PLATELET # BLD: 212 K/UL (ref 130–400)
PMV BLD AUTO: 11.2 FL (ref 9.4–12.3)
POTASSIUM SERPL-SCNC: 4.2 MMOL/L (ref 3.5–5)
RBC # BLD: 4.03 M/UL (ref 4.2–5.4)
SODIUM BLD-SCNC: 147 MMOL/L (ref 136–145)
TOTAL PROTEIN: 6.4 G/DL (ref 6.6–8.7)
WBC # BLD: 5.6 K/UL (ref 4.8–10.8)

## 2022-12-19 PROCEDURE — 96366 THER/PROPH/DIAG IV INF ADDON: CPT

## 2022-12-19 PROCEDURE — 85025 COMPLETE CBC W/AUTO DIFF WBC: CPT

## 2022-12-19 PROCEDURE — 6360000002 HC RX W HCPCS: Performed by: PSYCHIATRY & NEUROLOGY

## 2022-12-19 PROCEDURE — 80053 COMPREHEN METABOLIC PANEL: CPT

## 2022-12-19 PROCEDURE — 96365 THER/PROPH/DIAG IV INF INIT: CPT

## 2022-12-19 RX ORDER — SODIUM CHLORIDE 9 MG/ML
INJECTION, SOLUTION INTRAVENOUS CONTINUOUS
OUTPATIENT
Start: 2022-12-25

## 2022-12-19 RX ORDER — ACETAMINOPHEN 325 MG/1
650 TABLET ORAL ONCE
OUTPATIENT
Start: 2022-12-25 | End: 2022-12-25

## 2022-12-19 RX ORDER — DIPHENHYDRAMINE HYDROCHLORIDE 50 MG/ML
50 INJECTION INTRAMUSCULAR; INTRAVENOUS ONCE
Start: 2022-12-25 | End: 2022-12-25

## 2022-12-19 RX ORDER — SODIUM CHLORIDE 9 MG/ML
5-250 INJECTION, SOLUTION INTRAVENOUS PRN
OUTPATIENT
Start: 2022-12-25

## 2022-12-19 RX ORDER — DIPHENHYDRAMINE HYDROCHLORIDE 50 MG/ML
50 INJECTION INTRAMUSCULAR; INTRAVENOUS ONCE
Status: DISCONTINUED | OUTPATIENT
Start: 2022-12-19 | End: 2022-12-21 | Stop reason: HOSPADM

## 2022-12-19 RX ORDER — DIPHENHYDRAMINE HYDROCHLORIDE 50 MG/ML
50 INJECTION INTRAMUSCULAR; INTRAVENOUS
OUTPATIENT
Start: 2022-12-25

## 2022-12-19 RX ORDER — ACETAMINOPHEN 325 MG/1
650 TABLET ORAL ONCE
Status: DISCONTINUED | OUTPATIENT
Start: 2022-12-19 | End: 2022-12-21 | Stop reason: HOSPADM

## 2022-12-19 RX ORDER — SODIUM CHLORIDE 9 MG/ML
5-250 INJECTION, SOLUTION INTRAVENOUS PRN
Status: DISCONTINUED | OUTPATIENT
Start: 2022-12-19 | End: 2022-12-20 | Stop reason: HOSPADM

## 2022-12-19 RX ORDER — SODIUM CHLORIDE 0.9 % (FLUSH) 0.9 %
5-40 SYRINGE (ML) INJECTION PRN
OUTPATIENT
Start: 2022-12-25

## 2022-12-19 RX ADMIN — IMMUNE GLOBULIN (HUMAN) 30 G: 10 INJECTION INTRAVENOUS; SUBCUTANEOUS at 09:53

## 2022-12-19 NOTE — DISCHARGE INSTRUCTIONS
immune globulin (intravenous) (IGIV)  Pronunciation:  ryan hdz  Brand:  Bivigam, Carimune, Flebogamma, Gammagard S/D, Gammaplex, Octagam, Panzyga, Privigen  What is the most important information I should know about immune globulin intravenous? This medicine can cause blood clots. The risk is highest in older adults or in people who have had blood clots, heart problems, or blood circulation problems. Blood clots are also more likely during long-term bedrest, while using birth control pills or hormone replacement therapy, or while having a central intravenous (IV) catheter in place. Call your doctor at once if you have chest pain, trouble breathing, fast heartbeats, numbness or weakness, or swelling and warmth or discoloration in an arm or leg. This medicine can also harm your kidneys, especially if you have kidney disease or you also use certain medicines. Tell your doctor right away if you have signs of kidney problems, such as swelling, rapid weight gain, and little or no urination. What is immune globulin intravenous (IGIV)? Immune globulin intravenous (IGIV, for injection into a vein) is used to treat primary immunodeficiency. IGIV is also used to increase platelets (blood clotting cells) in people with immune thrombocytopenic purpura. IGIV is also used in to help prevent certain infections in people with B-cell chronic lymphocytic leukemia. IGIV is also used in people with Kawasaki syndrome, to prevent aneurysm caused by a weakening of the main artery in the heart. IGIV may also be used for purposes not listed in this medication guide. What should I discuss with my healthcare provider before using IGIV? You may not be able to use this medicine if:  you have had an allergic reaction to an immune globulin or blood product;  you have immune globulin A (IgA) deficiency with antibody to IgA; or  you are allergic to corn.   IGIV can cause blood clots or kidney problems, especially in older adults or in people with certain conditions. Tell your doctor if you have ever had:  heart problems, blood circulation problems, or \"thick blood\";  a stroke or blood clot;  kidney disease;  diabetes;  an infection called sepsis;  if you use estrogens (birth control pills or hormone replacement therapy);  if you have been on long-term bedrest; or  if you have a central intravenous (IV) catheter in place. You may need a dose adjustment if you are exposed to measles, or if you travel to an area where this disease is common. Tell your doctor if you are pregnant or breastfeeding. Immune globulin is made from donated human plasma and may contain viruses or other infectious agents. Donated plasma is tested and treated to reduce the risk of contamination, but there is still a small possibility it could transmit disease. Ask your doctor about any possible risk. How should I use IGIV? IGIV is given as an infusion into a vein, usually once every 3 to 4 weeks. A healthcare provider will give you this injection. Drink plenty of liquids  while you are using this medicine to help improve your blood flow and keep your kidneys working properly. You may need frequent blood or urine tests. This medicine can affect the results of certain medical tests. Tell any doctor who treats you that you are using IGIV. What happens if I miss a dose? Call your doctor for instructions if you miss an appointment for your IGIV injection. What happens if I overdose? Seek emergency medical attention or call the Poison Help line at 1-987.950.2085. What should I avoid while using IGIV? Ask your doctor before receiving a \"live\" vaccine while using IGIV. The vaccine may not work as well and may not fully protect you from disease. Live vaccines include measles, mumps, rubella (MMR), rotavirus, typhoid, yellow fever, varicella (chickenpox), zoster (shingles), and nasal flu (influenza) vaccine.   What are the possible side effects of IGIV?  Get emergency medical help if you have signs of an allergic reaction: hives; difficult breathing; swelling of your face, lips, tongue, or throat. Some side effects may occur during the injection. Tell your caregiver if you feel dizzy, nauseated, light-headed, sweaty, or have a headache, pounding in your neck or ears, fever, chills, chest tightness, or warmth or redness in your face. Call your doctor at once if you have:  a blood cell disorder --pale or yellowed skin, dark colored urine, fever, confusion or weakness;  dehydration symptoms --feeling very thirsty or hot, being unable to urinate, heavy sweating, or hot and dry skin;  kidney problems --little or no urination, swelling, rapid weight gain, feeling short of breath;  lung problems --chest pain, trouble breathing, blue colored lips, fingers, or toes;  signs of a new infection --fever with a severe headache, neck stiffness, eye pain, and increased sensitivity to light; or  signs of a blood clot --shortness of breath, chest pain with deep breathing, rapid heart rate, numbness or weakness on one side of the body, swelling and warmth or discoloration in an arm or leg. Common side effects may include:  headache, back pain, joint pain;  fever, chills, sweating, warmth or tingling;  stomach pain, nausea, diarrhea;  increased blood pressure, fast heartbeats;  dizziness, tiredness, lack of energy;  stuffy nose, sinus pain; or  pain, swelling, burning, or irritation around the IV needle. This is not a complete list of side effects and others may occur. Call your doctor for medical advice about side effects. You may report side effects to FDA at 9-151-FDA-2676. What other drugs will affect IGIV? IGIV can harm your kidneys, especially if you also use certain medicines for infections, cancer, osteoporosis, organ transplant rejection, bowel disorders, high blood pressure, or pain or arthritis (including Advil, Motrin, and Aleve).   Other drugs may affect IGIV, including prescription and over-the-counter medicines, vitamins, and herbal products. Tell your doctor about all your current medicines and any medicine you start or stop using. Where can I get more information? Your pharmacist can provide more information about immune globulin intravenous. Remember, keep this and all other medicines out of the reach of children, never share your medicines with others, and use this medication only for the indication prescribed. Every effort has been made to ensure that the information provided by [address] is accurate, up-to-date, and complete, but no guarantee is made to that effect. Drug information contained herein may be time sensitive. [facility] information has been compiled for use by healthcare practitioners and consumers in the [facility] and therefore [facility] does not warrant that uses outside of the [facility] are appropriate, unless specifically indicated otherwise. [facility]'s drug information does not endorse drugs, diagnose patients or recommend therapy. [facility]'s drug information is an informational resource designed to assist licensed healthcare practitioners in caring for their patients and/or to serve consumers viewing this service as a supplement to, and not a substitute for, the expertise, skill, knowledge and judgment of healthcare practitioners. The absence of a warning for a given drug or drug combination in no way should be construed to indicate that the drug or drug combination is safe, effective or appropriate for any given patient. [facility] does not assume any responsibility for any aspect of healthcare administered with the aid of information [facility] provides. The information contained herein is not intended to cover all possible uses, directions, precautions, warnings, drug interactions, allergic reactions, or adverse effects.  If you have questions about the drugs you are taking, check with your doctor, nurse or pharmacist.  Copyright 2885-2567 [employer] Version: 6.02. Revision date: 2/26/2021. Care instructions adapted under license by [facility] ([facility]). If you have questions about a medical condition or this instruction, always ask your healthcare professional. [address] any warranty or liability for your use of this information.

## 2023-01-11 DIAGNOSIS — M48.062 SPINAL STENOSIS OF LUMBAR REGION WITH NEUROGENIC CLAUDICATION: ICD-10-CM

## 2023-01-11 RX ORDER — HYDROCODONE BITARTRATE AND ACETAMINOPHEN 10; 325 MG/1; MG/1
1 TABLET ORAL EVERY 8 HOURS PRN
Qty: 90 TABLET | Refills: 0 | Status: SHIPPED | OUTPATIENT
Start: 2023-01-12 | End: 2023-02-11

## 2023-01-11 NOTE — TELEPHONE ENCOUNTER
Requested Prescriptions     Pending Prescriptions Disp Refills    HYDROcodone-acetaminophen (NORCO)  MG per tablet 90 tablet 0     Sig: Take 1 tablet by mouth every 8 hours as needed for Pain for up to 30 days.  Intended supply: 30 days       Last Office Visit:  9/8/2022  Next Office Visit:  3/9/2023  Last Medication Refill:  12/13/22  Isaak Cope up to date:  10/17/22    *RX updated to reflect   1/12/23  fill date*

## 2023-01-16 ENCOUNTER — HOSPITAL ENCOUNTER (OUTPATIENT)
Dept: INFUSION THERAPY | Age: 78
Setting detail: INFUSION SERIES
Discharge: HOME | End: 2023-01-16
Payer: MEDICARE

## 2023-01-16 VITALS
RESPIRATION RATE: 17 BRPM | OXYGEN SATURATION: 100 % | DIASTOLIC BLOOD PRESSURE: 54 MMHG | TEMPERATURE: 97.5 F | HEART RATE: 72 BPM | SYSTOLIC BLOOD PRESSURE: 130 MMHG

## 2023-01-16 DIAGNOSIS — G61.81 CIDP (CHRONIC INFLAMMATORY DEMYELINATING POLYNEUROPATHY) (HCC): Primary | ICD-10-CM

## 2023-01-16 LAB
ALBUMIN SERPL-MCNC: 3.6 G/DL (ref 3.5–5.2)
ALP BLD-CCNC: 36 U/L (ref 35–104)
ALT SERPL-CCNC: 12 U/L (ref 5–33)
ANION GAP SERPL CALCULATED.3IONS-SCNC: 10 MMOL/L (ref 7–19)
AST SERPL-CCNC: 15 U/L (ref 5–32)
BASOPHILS ABSOLUTE: 0.1 K/UL (ref 0–0.2)
BASOPHILS RELATIVE PERCENT: 1 % (ref 0–1)
BILIRUB SERPL-MCNC: <0.2 MG/DL (ref 0.2–1.2)
BUN BLDV-MCNC: 33 MG/DL (ref 8–23)
CALCIUM SERPL-MCNC: 8.6 MG/DL (ref 8.8–10.2)
CHLORIDE BLD-SCNC: 107 MMOL/L (ref 98–111)
CO2: 28 MMOL/L (ref 22–29)
CREAT SERPL-MCNC: 0.8 MG/DL (ref 0.5–0.9)
EOSINOPHILS ABSOLUTE: 0.2 K/UL (ref 0–0.6)
EOSINOPHILS RELATIVE PERCENT: 4.6 % (ref 0–5)
GFR SERPL CREATININE-BSD FRML MDRD: >60 ML/MIN/{1.73_M2}
GLUCOSE BLD-MCNC: 106 MG/DL (ref 74–109)
HCT VFR BLD CALC: 34.4 % (ref 37–47)
HEMOGLOBIN: 10.6 G/DL (ref 12–16)
IMMATURE GRANULOCYTES #: 0 K/UL
LYMPHOCYTES ABSOLUTE: 1.2 K/UL (ref 1.1–4.5)
LYMPHOCYTES RELATIVE PERCENT: 23 % (ref 20–40)
MCH RBC QN AUTO: 26.6 PG (ref 27–31)
MCHC RBC AUTO-ENTMCNC: 30.8 G/DL (ref 33–37)
MCV RBC AUTO: 86.4 FL (ref 81–99)
MONOCYTES ABSOLUTE: 1.1 K/UL (ref 0–0.9)
MONOCYTES RELATIVE PERCENT: 20.7 % (ref 0–10)
NEUTROPHILS ABSOLUTE: 2.6 K/UL (ref 1.5–7.5)
NEUTROPHILS RELATIVE PERCENT: 50.7 % (ref 50–65)
PDW BLD-RTO: 14.2 % (ref 11.5–14.5)
PLATELET # BLD: 239 K/UL (ref 130–400)
PMV BLD AUTO: 10.4 FL (ref 9.4–12.3)
POTASSIUM SERPL-SCNC: 3.9 MMOL/L (ref 3.5–5)
RBC # BLD: 3.98 M/UL (ref 4.2–5.4)
SODIUM BLD-SCNC: 145 MMOL/L (ref 136–145)
TOTAL PROTEIN: 6.2 G/DL (ref 6.6–8.7)
WBC # BLD: 5.2 K/UL (ref 4.8–10.8)

## 2023-01-16 PROCEDURE — 96366 THER/PROPH/DIAG IV INF ADDON: CPT

## 2023-01-16 PROCEDURE — 2580000003 HC RX 258: Performed by: PSYCHIATRY & NEUROLOGY

## 2023-01-16 PROCEDURE — 6360000002 HC RX W HCPCS: Performed by: PSYCHIATRY & NEUROLOGY

## 2023-01-16 PROCEDURE — 80053 COMPREHEN METABOLIC PANEL: CPT

## 2023-01-16 PROCEDURE — 85025 COMPLETE CBC W/AUTO DIFF WBC: CPT

## 2023-01-16 PROCEDURE — 96365 THER/PROPH/DIAG IV INF INIT: CPT

## 2023-01-16 RX ORDER — DIPHENHYDRAMINE HYDROCHLORIDE 50 MG/ML
50 INJECTION INTRAMUSCULAR; INTRAVENOUS
OUTPATIENT
Start: 2023-01-23

## 2023-01-16 RX ORDER — SODIUM CHLORIDE 9 MG/ML
INJECTION, SOLUTION INTRAVENOUS CONTINUOUS
OUTPATIENT
Start: 2023-01-23

## 2023-01-16 RX ORDER — SODIUM CHLORIDE 9 MG/ML
5-250 INJECTION, SOLUTION INTRAVENOUS PRN
Status: DISCONTINUED | OUTPATIENT
Start: 2023-01-16 | End: 2023-01-17 | Stop reason: HOSPADM

## 2023-01-16 RX ORDER — SODIUM CHLORIDE 0.9 % (FLUSH) 0.9 %
5-40 SYRINGE (ML) INJECTION PRN
Status: DISCONTINUED | OUTPATIENT
Start: 2023-01-16 | End: 2023-01-17 | Stop reason: HOSPADM

## 2023-01-16 RX ORDER — SODIUM CHLORIDE 9 MG/ML
5-250 INJECTION, SOLUTION INTRAVENOUS PRN
OUTPATIENT
Start: 2023-01-23

## 2023-01-16 RX ORDER — SODIUM CHLORIDE 0.9 % (FLUSH) 0.9 %
5-40 SYRINGE (ML) INJECTION PRN
OUTPATIENT
Start: 2023-01-23

## 2023-01-16 RX ORDER — DIPHENHYDRAMINE HYDROCHLORIDE 50 MG/ML
50 INJECTION INTRAMUSCULAR; INTRAVENOUS ONCE
Start: 2023-01-23 | End: 2023-01-23

## 2023-01-16 RX ORDER — ACETAMINOPHEN 325 MG/1
650 TABLET ORAL ONCE
OUTPATIENT
Start: 2023-01-23 | End: 2023-01-23

## 2023-01-16 RX ADMIN — SODIUM CHLORIDE, PRESERVATIVE FREE 10 ML: 5 INJECTION INTRAVENOUS at 09:45

## 2023-01-16 RX ADMIN — IMMUNE GLOBULIN (HUMAN) 30 G: 10 INJECTION INTRAVENOUS; SUBCUTANEOUS at 09:59

## 2023-01-16 RX ADMIN — SODIUM CHLORIDE 20 ML/HR: 9 INJECTION, SOLUTION INTRAVENOUS at 09:56

## 2023-01-16 NOTE — DISCHARGE INSTRUCTIONS
immune globulin (intravenous and subcutaneous)  Pronunciation:  ryan ERMA hdz  Brand:  Gammagard Liquid, Gammaked, Gamunex-C  What is the most important information I should know about immune globulin? This medicine can cause blood clots. The risk is highest in older adults or in people who have had blood clots, heart problems, or blood circulation problems. Blood clots are also more likely during long-term bedrest, while using birth control pills or hormone replacement therapy, or while having a central intravenous (IV) catheter in place. Call your doctor at once if you have chest pain, trouble breathing, fast heartbeats, numbness or weakness, or swelling and warmth or discoloration in an arm or leg. This medicine can also harm your kidneys, especially if you have kidney disease or if you also use certain medicines. Tell your doctor right away if you have signs of kidney problems, such as swelling, rapid weight gain, and little or no urination. What is immune globulin? Immune globulin intravenous and subcutaneous (for injection into a vein or under the skin) is used to treat primary immunodeficiency. Immune globulin is also used to increase platelets (blood clotting cells) in people with idiopathic thrombocytopenic purpura. Immune globulin is also used to treat certain debilitating nerve disorders that cause muscle weakness and can affect daily activities. Immune globulin may also be used for purposes not listed in this medication guide. What should I discuss with my healthcare provider before using immune globulin? You should not use this medicine if:  you have had an allergic reaction to an immune globulin or blood product; or  you have immune globulin A (IgA) deficiency with antibody to IgA. Immune globulin can cause blood clots or kidney problems, especially in older adults or in people with certain conditions.  Tell your doctor if you have ever had:  heart problems, blood circulation problems, or \"thick blood\";  a stroke or blood clot;  kidney disease;  diabetes;  an infection called sepsis;  if you use estrogens (birth control pills or hormone replacement therapy);  if you have been on long-term bedrest; or  if you have a central intravenous (IV) catheter in place. You may need a dose adjustment if you are exposed to measles, or if you travel to an area where this disease is common. Tell your doctor if you are pregnant or breastfeeding. Immune globulin is made from donated human plasma and may contain viruses or other infectious agents. Donated plasma is tested and treated to reduce the risk of contamination, but there is still a small possibility it could transmit disease. Ask your doctor about any possible risk. How should I use immune globulin? Follow all directions on your prescription label and read all medication guides or instruction sheets. Your doctor may occasionally change your dose. Use the medicine exactly as directed. Immune globulin is given as an infusion into a vein, or injected under the skin using an infusion pump. A healthcare provider will give your first dose and may teach you how to properly use the medication by yourself. Do not inject immune globulin into a vein if you have been instructed to give the medicine as a subcutaneous injection (under the skin). How you give this medication, how often you use it, and the length of your infusion time will depend on the condition being treated. Read and carefully follow any Instructions for Use provided with your medicine. Ask your doctor or pharmacist if you don't understand all instructions. Prepare an injection only when you are ready to give it. Do not use if the medicine looks cloudy, has changed colors, or has particles in it. Call your pharmacist for new medicine. Do not shake the medication bottle or you may ruin the medicine. Immune globulin must be given slowly.  You may need to use several catheters to inject this medicine into different body areas at the same time. Your healthcare provider will show you the best places on your body to inject the medication. Keep a diary of the days and times you gave the injection and where you injected it on your body. Drink plenty of liquids  while you are using this medicine to help improve your blood flow and keep your kidneys working properly. You may need frequent blood or urine tests. This medicine can affect the results of certain medical tests. Tell any doctor who treats you that you are using immune globulin. Store this medicine in its original carton in the refrigerator. Do not freeze immune globulin, and throw the medicine away if it has frozen. Take the medicine out of the refrigerator and let it reach room temperature for up to 1 hour before injecting your dose. You may also store immune globulin at room temperature. You will need to use immune globulin within a certain number months. This will depend on the how you store the medicine (at room temperature, or in a refrigerator). Carefully follow the storage instructions provided with your medicine. Do not use the medicine after the expiration date on the label has passed. Each vial (bottle) is for one use only. Throw it away after one use, even if there is still medicine left inside. Use disposable injection items (needle, catheter, tubing) only once and then place them in a puncture-proof \"sharps\" container. Follow state or local laws about how to dispose of this container. Keep it out of the reach of children and pets. What happens if I miss a dose? Call your doctor for instructions if you miss a dose. What happens if I overdose? Seek emergency medical attention or call the Poison Help line at 1-225.735.5197. What should I avoid while using immune globulin? Do not receive a \"live\" vaccine while using immune globulin. The vaccine may not work as well and may not fully protect you from disease.  Live vaccines include measles, mumps, rubella (MMR), rotavirus, typhoid, yellow fever, varicella (chickenpox), zoster (shingles), and nasal flu (influenza) vaccine. What are the possible side effects of immune globulin? Get emergency medical help if you have signs of an allergic reaction: hives; wheezing, difficulty breathing; dizziness, feeling like you might pass out; swelling of your face, lips, tongue, or throat. Some side effects may occur during the injection. Tell your caregiver if you feel light-headed, itchy, chilled, sweaty, or have chest discomfort, fast heartbeats, severe headache, or pounding in your neck or ears. Call your doctor at once if you have:  a blood cell disorder --pale or yellowed skin, dark colored urine, fever, confusion or weakness;  dehydration symptoms --feeling very thirsty or hot, being unable to urinate, heavy sweating, or hot and dry skin;  kidney problems --little or no urination, swelling, rapid weight gain, feeling short of breath;  lung problems --chest pain, wheezing, trouble breathing, blue colored lips, fingers, or toes;  signs of a new infection --fever with a severe headache, neck stiffness, eye pain, and increased sensitivity to light; or  signs of a blood clot --shortness of breath, chest pain with deep breathing, rapid heart rate, numbness or weakness on one side of the body, swelling and warmth or discoloration in an arm or leg. Common side effects may include:  runny or stuffy nose, sinus pain, cough, sore throat;  fever, chills, weakness;  headache, back pain, muscle or joint pain;  dizziness, tiredness, depressed mood;  swelling in your hands or feet;  skin rash, redness, or bruising;  blisters or ulcers in your mouth, red or swollen gums, trouble swallowing;  nausea, diarrhea, stomach pain, upset stomach;  increased blood pressure; or  redness, swelling, or itching where an injection was given. This is not a complete list of side effects and others may occur.  Call your doctor for medical advice about side effects. You may report side effects to FDA at 8-494-SNM-4463. What other drugs will affect immune globulin? Immune globulin can harm your kidneys, especially if you also use certain medicines for infections, cancer, osteoporosis, organ transplant rejection, bowel disorders, high blood pressure, or pain or arthritis (including Advil, Motrin, and Aleve). Other drugs may affect immune globulin, including prescription and over-the-counter medicines, vitamins, and herbal products. Tell your doctor about all your current medicines and any medicine you start or stop using. Where can I get more information? Your doctor or pharmacist can provide more information about immune globulin. Remember, keep this and all other medicines out of the reach of children, never share your medicines with others, and use this medication only for the indication prescribed. Every effort has been made to ensure that the information provided by [address] is accurate, up-to-date, and complete, but no guarantee is made to that effect. Drug information contained herein may be time sensitive. [facility] information has been compiled for use by healthcare practitioners and consumers in the [facility] and therefore [facility] does not warrant that uses outside of the [facility] are appropriate, unless specifically indicated otherwise. [facility]'s drug information does not endorse drugs, diagnose patients or recommend therapy. [facility] drug information is an informational resource designed to assist licensed healthcare practitioners in caring for their patients and/or to serve consumers viewing this service as a supplement to, and not a substitute for, the expertise, skill, knowledge and judgment of healthcare practitioners.  The absence of a warning for a given drug or drug combination in no way should be construed to indicate that the drug or drug combination is safe, effective or appropriate for any given patient. [facility] does not assume any responsibility for any aspect of healthcare administered with the aid of information [facility] provides. The information contained herein is not intended to cover all possible uses, directions, precautions, warnings, drug interactions, allergic reactions, or adverse effects. If you have questions about the drugs you are taking, check with your doctor, nurse or pharmacist.  Copyright 6840-3669 [address] [place] [address] Dr [address]NAE. Version: 4.02. Revision date: 2/26/2021. Care instructions adapted under license by [facility] ([facility]). If you have questions about a medical condition or this instruction, always ask your healthcare professional. [place] [address] any warranty or liability for your use of this information.

## 2023-01-23 ENCOUNTER — OFFICE VISIT (OUTPATIENT)
Dept: UROLOGY | Age: 78
End: 2023-01-23
Payer: MEDICARE

## 2023-01-23 VITALS
HEIGHT: 64 IN | WEIGHT: 134 LBS | BODY MASS INDEX: 22.88 KG/M2 | DIASTOLIC BLOOD PRESSURE: 60 MMHG | TEMPERATURE: 98.1 F | SYSTOLIC BLOOD PRESSURE: 140 MMHG

## 2023-01-23 DIAGNOSIS — N39.0 RECURRENT UTI: Primary | ICD-10-CM

## 2023-01-23 PROCEDURE — 99214 OFFICE O/P EST MOD 30 MIN: CPT | Performed by: NURSE PRACTITIONER

## 2023-01-23 PROCEDURE — P9612 CATHETERIZE FOR URINE SPEC: HCPCS | Performed by: NURSE PRACTITIONER

## 2023-01-23 PROCEDURE — 1123F ACP DISCUSS/DSCN MKR DOCD: CPT | Performed by: NURSE PRACTITIONER

## 2023-01-23 RX ORDER — CIPROFLOXACIN 500 MG/1
TABLET, FILM COATED ORAL
COMMUNITY
Start: 2023-01-11

## 2023-01-23 RX ORDER — CEPHALEXIN 250 MG/1
CAPSULE ORAL
COMMUNITY
Start: 2023-01-11

## 2023-01-23 NOTE — PROGRESS NOTES
Vitaliy Casanova is a 65 y.o., female, Established patient who presents today   Chief Complaint   Patient presents with    Follow-up     CATH URINE        HPI   Patient presents at the request of her urogynecologist, Dr. Elvira Wilkinson. Patient reports that she is attempting to pursue InterStim and will be undergoing PNE, however, she has just completed antibiotics for a urinary tract infection and Dr. Elvira Wilkinson would like culture results to ensure the infection has cleared prior to her initiating her 90-day course of cephalexin. We have received an order from their clinic to send a culture and fax them results. Patient reports that she is having some suprapubic pain that began in the middle of last week. She reports she typically does not have this pain with urinary tract infections, but is unsure what is causing it. REVIEW OF SYSTEMS:  Review of Systems   Constitutional:  Negative for chills and fever. Gastrointestinal:  Positive for abdominal pain. Negative for abdominal distention, nausea and vomiting. Genitourinary:  Negative for difficulty urinating, dysuria, flank pain and hematuria. Musculoskeletal:  Positive for gait problem. Psychiatric/Behavioral:  Negative for agitation and confusion. PHYSICAL EXAM:  BP (!) 140/60 (Site: Right Upper Arm, Position: Sitting, Cuff Size: Medium Adult)   Temp 98.1 °F (36.7 °C)   Ht 5' 4\" (1.626 m)   Wt 134 lb (60.8 kg)   BMI 23.00 kg/m²   Physical Exam  Vitals and nursing note reviewed. Constitutional:       General: She is not in acute distress. Appearance: Normal appearance. She is not ill-appearing. Pulmonary:      Effort: Pulmonary effort is normal. No respiratory distress. Abdominal:      General: There is no distension. Tenderness: There is no abdominal tenderness. There is no right CVA tenderness or left CVA tenderness. Neurological:      Mental Status: She is alert and oriented to person, place, and time. Mental status is at baseline.       Gait: Gait abnormal.   Psychiatric:         Mood and Affect: Mood normal.         Behavior: Behavior normal.       DATA:  Results for orders placed or performed in visit on 01/23/23   Culture, Urine    Specimen: Urine, straight catheter   Result Value Ref Range    Urine Culture, Routine No growth      ASSESSMENT/PLAN  1. Recurrent UTI  Patient with history of recurrent urinary tract infection. She is currently being treated by Dr. Roland Fleming in [place]. Dr. Roland Fleming wanted to reevaluate a urine culture, but the patient did not want want to travel all the way to [place] for this. So we are completing the culture for Dr. Roland Fleming. I explained to the patient that I would initiate antibiotic therapy if the culture is positive, but will also alert Dr. Roland Fleming of culture results and that her office may change the antibiotic if needed. - Culture, Urine    Orders Placed This Encounter   Procedures    Culture, Urine     Order Specific Question:   Specify (ex-cath, midstream, cysto, etc)? Answer:   CATH URINE          No follow-ups on file. An electronic signature was used to authenticate this note. ENZO KAT - CNP    All information inputted into the note by the MA to include chief complaint, past medical history, past surgical history, medications, allergies, social and family history and review of systems has been reviewed and updated as needed by me. EMR Dragon/transcription disclaimer: Much of this document is electronic transcription/translation of spoken language to printed text. The electronic translation of spoken language may be erroneous or, at times, nonsensical words or phrases may be inadvertently transcribed.  Although I have reviewed the document for such errors, some may still exist.

## 2023-01-23 NOTE — PROGRESS NOTES
Patient brought order from Dr. Diane Augustine for cath urine. After discussing with ENZO Hemphill I was given verbal orders to obtain cath urine specimen. After obtaining consent from patient. Patient was then placed in the dorsal lithotomy position. Using sterile technique patient is carefully prepped with Betadine around the urethra. A pediatric catheterization kit is used which contains an approximate 5 [place] catheter. Urethral Catheter is introduced into the urinary bladder. Urine specimen is obtained and sent to the lab for culture and sensitivity. Patient tolerated procedure well. ENZO Hemphill was in office at time of procedure.

## 2023-01-24 ASSESSMENT — ENCOUNTER SYMPTOMS
NAUSEA: 0
ABDOMINAL PAIN: 1
VOMITING: 0
ABDOMINAL DISTENTION: 0

## 2023-01-25 LAB — URINE CULTURE, ROUTINE: NORMAL

## 2023-02-07 ENCOUNTER — OFFICE VISIT (OUTPATIENT)
Dept: UROLOGY | Age: 78
End: 2023-02-07
Payer: MEDICARE

## 2023-02-07 VITALS — HEIGHT: 64 IN | BODY MASS INDEX: 22.71 KG/M2 | WEIGHT: 133 LBS | TEMPERATURE: 97.9 F

## 2023-02-07 DIAGNOSIS — N39.0 RECURRENT UTI: Primary | ICD-10-CM

## 2023-02-07 PROCEDURE — 99214 OFFICE O/P EST MOD 30 MIN: CPT | Performed by: NURSE PRACTITIONER

## 2023-02-07 PROCEDURE — 1123F ACP DISCUSS/DSCN MKR DOCD: CPT | Performed by: NURSE PRACTITIONER

## 2023-02-07 PROCEDURE — P9612 CATHETERIZE FOR URINE SPEC: HCPCS | Performed by: NURSE PRACTITIONER

## 2023-02-07 ASSESSMENT — ENCOUNTER SYMPTOMS
NAUSEA: 0
ABDOMINAL DISTENTION: 0
ABDOMINAL PAIN: 1
VOMITING: 0
BACK PAIN: 0

## 2023-02-07 NOTE — PROGRESS NOTES
Fab Holt is a 65 y.o., female, Established patient who presents today   Chief Complaint   Patient presents with    Follow-up     I am here today for UTI symptoms        HPI   Patient presents with complaints of possible urinary tract infection. She states that she has been having some increased nocturia as well as suprapubic and back pain that she does not typically have. She is being treated by urogynecologist, Dr. Alonso Bolden. She reports she did not contact their office prior to scheduling her appointment today. She is maintained on daily Keflex from their office. She states she is seeing them on Monday. REVIEW OF SYSTEMS:  Review of Systems   Constitutional:  Negative for chills and fever. Gastrointestinal:  Positive for abdominal pain. Negative for abdominal distention, nausea and vomiting. Genitourinary:  Positive for frequency. Negative for difficulty urinating, dysuria, flank pain, hematuria and urgency. Musculoskeletal:  Negative for back pain and gait problem. Psychiatric/Behavioral:  Negative for agitation and confusion. PHYSICAL EXAM:  Temp 97.9 °F (36.6 °C) (Temporal)   Ht 5' 4\" (1.626 m)   Wt 133 lb (60.3 kg)   BMI 22.83 kg/m²   Physical Exam  Vitals and nursing note reviewed. Constitutional:       General: She is not in acute distress. Appearance: Normal appearance. She is not ill-appearing. Pulmonary:      Effort: Pulmonary effort is normal. No respiratory distress. Abdominal:      General: There is no distension. Tenderness: There is no abdominal tenderness. There is no right CVA tenderness or left CVA tenderness. Neurological:      Mental Status: She is alert and oriented to person, place, and time. Mental status is at baseline. Psychiatric:         Mood and Affect: Mood normal.         Behavior: Behavior normal.       ASSESSMENT/PLAN  1. Recurrent UTI  Patient presents with complaints of possible urinary tract infection.   The patient has been referred by our office to Dr. Camila Gerber. I did explain to the patient that Dr. Camila Gerber should be functioning as her primary urologist and she should seek  with their office in regards to treatment of her symptoms. Obviously, if Dr. Camila Gerber would like us to collect a specimen to send for culture, I am happy to do that. - Culture, Urine      Orders Placed This Encounter   Procedures    Culture, Urine     Order Specific Question:   Specify (ex-cath, midstream, cysto, etc)? Answer:   cath urine        No follow-ups on file. An electronic signature was used to authenticate this note. ENZO KAT - CNP    All information inputted into the note by the MA to include chief complaint, past medical history, past surgical history, medications, allergies, social and family history and review of systems has been reviewed and updated as needed by me. EMR Dragon/transcription disclaimer: Much of this document is electronic transcription/translation of spoken language to printed text. The electronic translation of spoken language may be erroneous or, at times, nonsensical words or phrases may be inadvertently transcribed.  Although I have reviewed the document for such errors, some may still exist.

## 2023-02-09 DIAGNOSIS — M48.062 SPINAL STENOSIS OF LUMBAR REGION WITH NEUROGENIC CLAUDICATION: ICD-10-CM

## 2023-02-09 RX ORDER — HYDROCODONE BITARTRATE AND ACETAMINOPHEN 10; 325 MG/1; MG/1
1 TABLET ORAL EVERY 8 HOURS PRN
Qty: 90 TABLET | Refills: 0 | Status: SHIPPED | OUTPATIENT
Start: 2023-02-11 | End: 2023-03-13

## 2023-02-09 NOTE — TELEPHONE ENCOUNTER
Requested Prescriptions     Pending Prescriptions Disp Refills    HYDROcodone-acetaminophen (NORCO)  MG per tablet 90 tablet 0     Sig: Take 1 tablet by mouth every 8 hours as needed for Pain for up to 30 days.  Intended supply: 30 days       Last Office Visit:  9/8/2022  Next Office Visit:  3/9/2023  Last Medication Refill: 1/12/2023 with 0 Pretty Choi up to date:  2/9/2023    *RX updated to reflect   2/11/2023  fill date*

## 2023-02-12 LAB
ORGANISM: ABNORMAL
ORGANISM: ABNORMAL
URINE CULTURE, ROUTINE: ABNORMAL

## 2023-02-20 ENCOUNTER — HOSPITAL ENCOUNTER (OUTPATIENT)
Dept: INFUSION THERAPY | Age: 78
Setting detail: INFUSION SERIES
Discharge: HOME | End: 2023-02-20
Payer: MEDICARE

## 2023-02-20 VITALS
TEMPERATURE: 97.8 F | SYSTOLIC BLOOD PRESSURE: 107 MMHG | DIASTOLIC BLOOD PRESSURE: 50 MMHG | OXYGEN SATURATION: 96 % | HEART RATE: 69 BPM | RESPIRATION RATE: 18 BRPM

## 2023-02-20 DIAGNOSIS — G61.81 CIDP (CHRONIC INFLAMMATORY DEMYELINATING POLYNEUROPATHY) (HCC): Primary | ICD-10-CM

## 2023-02-20 PROCEDURE — 2580000003 HC RX 258: Performed by: PSYCHIATRY & NEUROLOGY

## 2023-02-20 PROCEDURE — 96366 THER/PROPH/DIAG IV INF ADDON: CPT

## 2023-02-20 PROCEDURE — 96365 THER/PROPH/DIAG IV INF INIT: CPT

## 2023-02-20 PROCEDURE — 6360000002 HC RX W HCPCS: Performed by: PSYCHIATRY & NEUROLOGY

## 2023-02-20 RX ORDER — SODIUM CHLORIDE 0.9 % (FLUSH) 0.9 %
5-40 SYRINGE (ML) INJECTION PRN
OUTPATIENT
Start: 2023-03-12

## 2023-02-20 RX ORDER — SODIUM CHLORIDE 0.9 % (FLUSH) 0.9 %
5-40 SYRINGE (ML) INJECTION PRN
Status: DISCONTINUED | OUTPATIENT
Start: 2023-02-20 | End: 2023-02-21 | Stop reason: HOSPADM

## 2023-02-20 RX ORDER — DIPHENHYDRAMINE HYDROCHLORIDE 50 MG/ML
50 INJECTION INTRAMUSCULAR; INTRAVENOUS ONCE
Start: 2023-03-12 | End: 2023-03-12

## 2023-02-20 RX ORDER — ACETAMINOPHEN 325 MG/1
650 TABLET ORAL ONCE
OUTPATIENT
Start: 2023-03-12 | End: 2023-03-12

## 2023-02-20 RX ORDER — SODIUM CHLORIDE 9 MG/ML
INJECTION, SOLUTION INTRAVENOUS CONTINUOUS
OUTPATIENT
Start: 2023-03-12

## 2023-02-20 RX ORDER — DIPHENHYDRAMINE HYDROCHLORIDE 50 MG/ML
50 INJECTION INTRAMUSCULAR; INTRAVENOUS
OUTPATIENT
Start: 2023-03-12

## 2023-02-20 RX ORDER — SODIUM CHLORIDE 9 MG/ML
5-250 INJECTION, SOLUTION INTRAVENOUS PRN
OUTPATIENT
Start: 2023-03-12

## 2023-02-20 RX ORDER — SODIUM CHLORIDE 9 MG/ML
5-250 INJECTION, SOLUTION INTRAVENOUS PRN
Status: DISCONTINUED | OUTPATIENT
Start: 2023-02-20 | End: 2023-02-21 | Stop reason: HOSPADM

## 2023-02-20 RX ADMIN — SODIUM CHLORIDE, PRESERVATIVE FREE 10 ML: 5 INJECTION INTRAVENOUS at 11:45

## 2023-02-20 RX ADMIN — IMMUNE GLOBULIN (HUMAN) 30 G: 10 INJECTION INTRAVENOUS; SUBCUTANEOUS at 09:26

## 2023-02-20 NOTE — PROGRESS NOTES
IVIG 30 GRAMS GIVEN AS ORDERED AND PT TOLERATED WELL. PT STATED HAD LABS DONE LAST WEEK AND DIDN'T WNT PREMEDS.

## 2023-02-20 NOTE — DISCHARGE INSTRUCTIONS
immune globulin (intravenous) (IGIV)  Pronunciation:  ryan hdz  Brand:  Bivigam, Carimune, Flebogamma, Gammagard S/D, Gammaplex, Octagam, Panzyga, Privigen  What is the most important information I should know about immune globulin intravenous? This medicine can cause blood clots. The risk is highest in older adults or in people who have had blood clots, heart problems, or blood circulation problems. Blood clots are also more likely during long-term bedrest, while using birth control pills or hormone replacement therapy, or while having a central intravenous (IV) catheter in place. Call your doctor at once if you have chest pain, trouble breathing, fast heartbeats, numbness or weakness, or swelling and warmth or discoloration in an arm or leg. This medicine can also harm your kidneys, especially if you have kidney disease or you also use certain medicines. Tell your doctor right away if you have signs of kidney problems, such as swelling, rapid weight gain, and little or no urination. What is immune globulin intravenous (IGIV)? Immune globulin intravenous (IGIV, for injection into a vein) is used to treat primary immunodeficiency. IGIV is also used to increase platelets (blood clotting cells) in people with immune thrombocytopenic purpura. IGIV is also used in to help prevent certain infections in people with B-cell chronic lymphocytic leukemia. IGIV is also used in people with Kawasaki syndrome, to prevent aneurysm caused by a weakening of the main artery in the heart. IGIV may also be used for purposes not listed in this medication guide. What should I discuss with my healthcare provider before using IGIV? You may not be able to use this medicine if:  you have had an allergic reaction to an immune globulin or blood product;  you have immune globulin A (IgA) deficiency with antibody to IgA; or  you are allergic to corn.   IGIV can cause blood clots or kidney problems, especially in older adults or in people with certain conditions. Tell your doctor if you have ever had:  heart problems, blood circulation problems, or \"thick blood\";  a stroke or blood clot;  kidney disease;  diabetes;  an infection called sepsis;  if you use estrogens (birth control pills or hormone replacement therapy);  if you have been on long-term bedrest; or  if you have a central intravenous (IV) catheter in place. You may need a dose adjustment if you are exposed to measles, or if you travel to an area where this disease is common. Tell your doctor if you are pregnant or breastfeeding. Immune globulin is made from donated human plasma and may contain viruses or other infectious agents. Donated plasma is tested and treated to reduce the risk of contamination, but there is still a small possibility it could transmit disease. Ask your doctor about any possible risk. How should I use IGIV? IGIV is given as an infusion into a vein, usually once every 3 to 4 weeks. A healthcare provider will give you this injection. Drink plenty of liquids  while you are using this medicine to help improve your blood flow and keep your kidneys working properly. You may need frequent blood or urine tests. This medicine can affect the results of certain medical tests. Tell any doctor who treats you that you are using IGIV. What happens if I miss a dose? Call your doctor for instructions if you miss an appointment for your IGIV injection. What happens if I overdose? Seek emergency medical attention or call the Poison Help line at 1-633.411.5301. What should I avoid while using IGIV? Ask your doctor before receiving a \"live\" vaccine while using IGIV. The vaccine may not work as well and may not fully protect you from disease. Live vaccines include measles, mumps, rubella (MMR), rotavirus, typhoid, yellow fever, varicella (chickenpox), zoster (shingles), and nasal flu (influenza) vaccine.   What are the possible side effects of IGIV?  Get emergency medical help if you have signs of an allergic reaction: hives; difficult breathing; swelling of your face, lips, tongue, or throat. Some side effects may occur during the injection. Tell your caregiver if you feel dizzy, nauseated, light-headed, sweaty, or have a headache, pounding in your neck or ears, fever, chills, chest tightness, or warmth or redness in your face. Call your doctor at once if you have:  a blood cell disorder --pale or yellowed skin, dark colored urine, fever, confusion or weakness;  dehydration symptoms --feeling very thirsty or hot, being unable to urinate, heavy sweating, or hot and dry skin;  kidney problems --little or no urination, swelling, rapid weight gain, feeling short of breath;  lung problems --chest pain, trouble breathing, blue colored lips, fingers, or toes;  signs of a new infection --fever with a severe headache, neck stiffness, eye pain, and increased sensitivity to light; or  signs of a blood clot --shortness of breath, chest pain with deep breathing, rapid heart rate, numbness or weakness on one side of the body, swelling and warmth or discoloration in an arm or leg. Common side effects may include:  headache, back pain, joint pain;  fever, chills, sweating, warmth or tingling;  stomach pain, nausea, diarrhea;  increased blood pressure, fast heartbeats;  dizziness, tiredness, lack of energy;  stuffy nose, sinus pain; or  pain, swelling, burning, or irritation around the IV needle. This is not a complete list of side effects and others may occur. Call your doctor for medical advice about side effects. You may report side effects to FDA at 4-564-FDA-8192. What other drugs will affect IGIV? IGIV can harm your kidneys, especially if you also use certain medicines for infections, cancer, osteoporosis, organ transplant rejection, bowel disorders, high blood pressure, or pain or arthritis (including Advil, Motrin, and Aleve).   Other drugs may affect IGIV, including prescription and over-the-counter medicines, vitamins, and herbal products. Tell your doctor about all your current medicines and any medicine you start or stop using. Where can I get more information? Your pharmacist can provide more information about immune globulin intravenous. Remember, keep this and all other medicines out of the reach of children, never share your medicines with others, and use this medication only for the indication prescribed. Every effort has been made to ensure that the information provided by [address] is accurate, up-to-date, and complete, but no guarantee is made to that effect. Drug information contained herein may be time sensitive. [facility] information has been compiled for use by healthcare practitioners and consumers in the [facility] and therefore [facility] does not warrant that uses outside of the [facility] are appropriate, unless specifically indicated otherwise. [facility]'s drug information does not endorse drugs, diagnose patients or recommend therapy. [facility]'s drug information is an informational resource designed to assist licensed healthcare practitioners in caring for their patients and/or to serve consumers viewing this service as a supplement to, and not a substitute for, the expertise, skill, knowledge and judgment of healthcare practitioners. The absence of a warning for a given drug or drug combination in no way should be construed to indicate that the drug or drug combination is safe, effective or appropriate for any given patient. [facility] does not assume any responsibility for any aspect of healthcare administered with the aid of information [facility] provides. The information contained herein is not intended to cover all possible uses, directions, precautions, warnings, drug interactions, allergic reactions, or adverse effects.  If you have questions about the drugs you are taking, check with your doctor, nurse or pharmacist.  Copyright 6002-6714 [employer]. Version: 6.02. Revision date: 2/26/2021. Care instructions adapted under license by [facility] ([facility]). If you have questions about a medical condition or this instruction, always ask your healthcare professional. [address] any warranty or liability for your use of this information.

## 2023-02-23 ENCOUNTER — OFFICE VISIT (OUTPATIENT)
Dept: UROLOGY | Age: 78
End: 2023-02-23
Payer: MEDICARE

## 2023-02-23 VITALS — HEIGHT: 64 IN | TEMPERATURE: 98.3 F | WEIGHT: 134.2 LBS | BODY MASS INDEX: 22.91 KG/M2

## 2023-02-23 DIAGNOSIS — N39.0 RECURRENT UTI: Primary | ICD-10-CM

## 2023-02-23 PROCEDURE — 1123F ACP DISCUSS/DSCN MKR DOCD: CPT | Performed by: NURSE PRACTITIONER

## 2023-02-23 PROCEDURE — 99214 OFFICE O/P EST MOD 30 MIN: CPT | Performed by: NURSE PRACTITIONER

## 2023-02-23 ASSESSMENT — ENCOUNTER SYMPTOMS
ABDOMINAL PAIN: 0
ABDOMINAL DISTENTION: 0
BACK PAIN: 0
VOMITING: 0
NAUSEA: 0

## 2023-02-23 NOTE — PROGRESS NOTES
Dennis Anaya is a 65 y.o., female, Established patient who presents today   Chief Complaint   Patient presents with    Follow-up     I am here for a follow up for Dr horton. HPI   Patient presents at the request of her urogynecologist for catheterized specimen to send for culture. She was recently treated for a urinary tract infection and needs to be infection free prior to proceeding with the procedure in [facility]. She reports today she is asymptomatic and that she just finished her antibiotics a few days ago. REVIEW OF SYSTEMS:  Review of Systems   Constitutional:  Negative for chills and fever. Gastrointestinal:  Negative for abdominal distention, abdominal pain, nausea and vomiting. Genitourinary:  Negative for difficulty urinating, dysuria, flank pain, frequency, hematuria and urgency. Musculoskeletal:  Positive for gait problem. Negative for back pain. Neurological:  Positive for weakness. Psychiatric/Behavioral:  Negative for agitation and confusion. PHYSICAL EXAM:  Temp 98.3 °F (36.8 °C) (Temporal)   Ht 5' 4\" (1.626 m)   Wt 134 lb 3.2 oz (60.9 kg)   BMI 23.04 kg/m²   Physical Exam  Vitals and nursing note reviewed. Constitutional:       General: She is not in acute distress. Appearance: Normal appearance. She is not ill-appearing. Pulmonary:      Effort: Pulmonary effort is normal. No respiratory distress. Abdominal:      General: There is no distension. Tenderness: There is no abdominal tenderness. There is no right CVA tenderness or left CVA tenderness. Neurological:      Mental Status: She is alert and oriented to person, place, and time. Mental status is at baseline. Psychiatric:         Mood and Affect: Mood normal.         Behavior: Behavior normal.     ASSESSMENT/PLAN  1. Recurrent UTI  Catheterized specimen sent for culture. We will send the results to her urogynecologist in [facility].   - Culture, Urine      Orders Placed This Encounter   Procedures Culture, Urine     Order Specific Question:   Specify (ex-cath, midstream, cysto, etc)? Answer:   staright cath        No follow-ups on file. An electronic signature was used to authenticate this note. ENZO KAT CNP    All information inputted into the note by the MA to include chief complaint, past medical history, past surgical history, medications, allergies, social and family history and review of systems has been reviewed and updated as needed by me. EMR Dragon/transcription disclaimer: Much of this document is electronic transcription/translation of spoken language to printed text. The electronic translation of spoken language may be erroneous or, at times, nonsensical words or phrases may be inadvertently transcribed.  Although I have reviewed the document for such errors, some may still exist.

## 2023-02-26 LAB
ORGANISM: ABNORMAL
URINE CULTURE, ROUTINE: ABNORMAL
URINE CULTURE, ROUTINE: ABNORMAL

## 2023-02-27 ENCOUNTER — TELEPHONE (OUTPATIENT)
Dept: UROLOGY | Age: 78
End: 2023-02-27

## 2023-02-27 DIAGNOSIS — N39.0 RECURRENT UTI: Primary | ICD-10-CM

## 2023-02-27 RX ORDER — CEFDINIR 300 MG/1
300 CAPSULE ORAL 2 TIMES DAILY
Qty: 20 CAPSULE | Refills: 0 | Status: SHIPPED | OUTPATIENT
Start: 2023-02-27 | End: 2023-03-09

## 2023-02-27 NOTE — TELEPHONE ENCOUNTER
----- Message from Cathaleen Jeans, APRN - CNP sent at 2/27/2023 11:49 AM CST -----  Please let the patient know the culture did grow bacteria and I have sent in an antibiotic to their pharmacy. Omnicef 300mg BID 10 days. PLEASE SEND TO DR GRAMAJO. SHE MAY WANT DIFFERENT TREATMENT.

## 2023-02-28 ENCOUNTER — OFFICE VISIT (OUTPATIENT)
Dept: VASCULAR SURGERY | Age: 78
End: 2023-02-28
Payer: MEDICARE

## 2023-02-28 ENCOUNTER — HOSPITAL ENCOUNTER (OUTPATIENT)
Dept: VASCULAR LAB | Age: 78
Discharge: HOME | End: 2023-02-28
Payer: MEDICARE

## 2023-02-28 VITALS
DIASTOLIC BLOOD PRESSURE: 62 MMHG | SYSTOLIC BLOOD PRESSURE: 140 MMHG | TEMPERATURE: 97.6 F | OXYGEN SATURATION: 95 % | HEART RATE: 58 BPM

## 2023-02-28 DIAGNOSIS — I73.9 PVD (PERIPHERAL VASCULAR DISEASE) (HCC): Primary | ICD-10-CM

## 2023-02-28 DIAGNOSIS — I70.245 ATHSCL NATIVE ARTERIES OF LEFT LEG W ULCERATION OTH PRT FOOT (HCC): ICD-10-CM

## 2023-02-28 PROCEDURE — 99213 OFFICE O/P EST LOW 20 MIN: CPT | Performed by: PHYSICIAN ASSISTANT

## 2023-02-28 PROCEDURE — 1123F ACP DISCUSS/DSCN MKR DOCD: CPT | Performed by: PHYSICIAN ASSISTANT

## 2023-02-28 PROCEDURE — 93923 UPR/LXTR ART STDY 3+ LVLS: CPT

## 2023-02-28 NOTE — PROGRESS NOTES
Patient Care Team:  Katja East DO as PCP - General (Family Medicine)  Millie Epley, MD as Neurologist (Neurology)  Frances Calderon DO as Consulting Physician (Vascular Surgery)      History and Physical  Ms. Yvonne Arteaga is a 74-year-old female whom we are seeing today for follow-up of PVD. She past medical history includes hypertension, GERD, arthritis, CIDP, Crohn's disease and endometrial cancer. Currently she is on aspirin 81 mg and fenofibrate 145 mg daily. She denies any lifestyle limiting claudication, ischemic rest pain or nonhealing wounds on her lower extremities. She does not smoke    (Chart reviewed including PMH, medications and allergies, previous imaging/tests for comparison, current imaging/tests, labs, other pertinent providers office/consult notes)     Susannah Henley is a 65 y.o. 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  Intended supply: 30 days 90 tablet 0    cephALEXin (KEFLEX) 250 MG capsule TAKE 1 CAPSULE BY MOUTH EVERYDAY FOR 90 DAYS      gabapentin (NEURONTIN) 600 MG tablet TAKE 1 TABLET THREE TIMES A  tablet 1    mupirocin (BACTROBAN) 2 % ointment APPLY A THIN LAYER TWICE A DAY UNTIL HEALED      tiZANidine (ZANAFLEX) 4 MG tablet TAKE 1 TABLET BY MOUTH 2 TIMES DAILY AS NEEDED (PAIN). 180 tablet 1    lisinopril (PRINIVIL;ZESTRIL) 5 MG tablet       conjugated estrogens (PREMARIN) 0.625 MG/GM vaginal cream Place vaginally twice a week 30 g 11    allopurinol (ZYLOPRIM) 300 MG tablet Take 300 mg by mouth daily      Propylene Glycol (SYSTANE COMPLETE) 0.6 % SOLN Apply 1 drop to eye 3 times daily Each eye      amLODIPine (NORVASC) 5 MG tablet Take 5 mg by mouth daily      colestipol (COLESTID) 1 g tablet Take 1 g by mouth 3 times daily       Cyanocobalamin (VITAMIN B 12) 100 MCG LOZG Take by mouth daily       lidocaine (XYLOCAINE) 5 % ointment Apply topically as needed for Pain Apply topically as needed. 30 g 3    fenofibrate (TRICOR) 145 MG tablet 145 mg daily       Omega-3 Fatty Acids (FISH OIL) 1000 MG CAPS Take 1,000 mg by mouth 2 times daily      Garlic 5905 MG CAPS Take 1,000 mg by mouth 2 times daily      CRANBERRY SOFT PO Take 36 mg by mouth daily      carvedilol (COREG) 6.25 MG tablet Take 6.25 mg by mouth 2 times daily      aspirin 81 MG tablet Take 81 mg by mouth daily      folic acid (FOLVITE) 1 MG tablet Take 600 mg by mouth daily       Multiple Vitamins-Minerals (THERAPEUTIC MULTIVITAMIN-MINERALS) tablet Take 1 tablet by mouth daily      calcium-vitamin D (OSCAL) 250-125 MG-UNIT per tablet Take 1 tablet by mouth daily      mesalamine (DELZICOL) 400 MG CPDR DR capsule Take 400 mg by mouth 3 times daily       indapamide (LOZOL) 2.5 MG tablet Take 2.5 mg by mouth every morning      omeprazole (PRILOSEC) 20 MG capsule Take 20 mg by mouth Daily        No current facility-administered medications for this visit. Allergies: Patient has no known allergies.   Past Medical History:   Diagnosis Date    Arthritis     Cancer [facility])     endometrial cancer    Cataract     CIDP (chronic inflammatory demyelinating polyneuropathy) (HCC)     Crohn's disease ([facility] [address]     GERD (gastroesophageal reflux disease)     History of blood transfusion     Hypertension     Macular degeneration     Perineal cyst in female     PVD (peripheral vascular disease) (HCC)     Radiation     Urinary incontinence      Past Surgical History:   Procedure Laterality Date    BACK SURGERY      lumbar    CARPAL TUNNEL RELEASE Bilateral     CATARACT REMOVAL Bilateral     CERVICAL SPINE SURGERY      CHOLECYSTECTOMY      FOOT SURGERY Left     HAMMER TOE SURGERY Left     HX VASCULAR ANGIOPLASTY      & STENT    HYSTERECTOMY (CERVIX STATUS UNKNOWN)      KNEE ARTHROSCOPY Right     LUMBAR FUSION      RECTAL SURGERY N/A 03/25/2022    PERINEAL CYST EXCISION performed by Padmaja Bergeron DO at [address] [place] [address]  01/04/2022    VI. Bilateral lower extremity runoff. Family History   Problem Relation Age of Onset    Cancer Mother     High Blood Pressure Father     Heart Disease Father     Cancer Daughter      Social History     Tobacco Use    Smoking status: Never    Smokeless tobacco: Never   Substance Use Topics    Alcohol use: No       Review of Systems    Constitutional - no significant activity change, appetite change, or unexpected weight change. No fever or chills. No diaphoresis or significant fatigue. HENT - no significant rhinorrhea or epistaxis. No tinnitus or significant hearing loss. Eyes - no sudden vision change or amaurosis. Respiratory - no significant shortness of breath, wheezing, or stridor. No apnea, cough, or chest tightness associated with shortness of breath. Cardiovascular - no chest pain, syncope, or significant dizziness. No palpitations or significant leg swelling. (see HPI)  Gastrointestinal - no abdominal swelling or pain. No blood in stool. No severe constipation, diarrhea, nausea, or vomiting. Genitourinary - No difficulty urinating, dysuria, frequency, or urgency. No flank pain or hematuria. Musculoskeletal - no back pain, gait disturbance, or myalgia.   Skin - no color change, rash, pallor, or new wound. Neurologic - no dizziness, facial asymmetry, or light headedness. No seizures. No speech difficulty or lateralizing weakness. Hematologic - no easy bruising or excessive bleeding. Psychiatric - no severe anxiety or nervousness. No confusion. All other review of systems are negative. Physical Exam    BP (!) 140/62 (Site: Right Lower Arm, Position: Sitting, Cuff Size: Medium Adult)   Pulse 58   Temp 97.6 °F (36.4 °C)   SpO2 95%     Constitutional - well developed, well nourished. No diaphoresis or acute distress. HENT - head normocephalic. Right external ear canal appears normal.  Left external ear canal appears normal.  Septum appears midline. Eyes - conjunctiva normal.  EOMS normal.  No exudate. No icterus. Neck- ROM appears normal, no tracheal deviation. Cardiovascular - Regular rate and rhythm. Heart sounds are normal.  No murmur, rub, or gallop. Carotid pulses are 2+ to palpation bilaterally without bruit. Extremities - Radial and ulnar pulses are 2+ to palpation bilaterally. DP and PT pulses are palpable. No cyanosis, clubbing, or significant edema. No signs atheroembolic event. Pulmonary - effort appears normal.  No respiratory distress. Lungs - Breath sounds normal. No wheezes or rales. GI - Abdomen - soft, non tender, bowel sounds X 4 quadrants. No guarding or rebound tenderness. No distension or palpable mass. Genitourinary - deferred. Musculoskeletal - ROM appears normal.  No significant edema. Neurologic - alert and oriented X 3. Physiologic. Skin - warm, dry, and intact. No rash, erythema, or pallor. Psychiatric - mood, affect, and behavior appear normal.  Judgment and thought processes appear normal.    Risk factors for atherosclerosis of all vascular beds have been reviewed with the patient including:  Family history, tobacco abuse in all forms, elevated cholesterol, hyperlipidemia, and diabetes.     Lower extremity arterial study:  2/28/23  Right GUANAKITO 1.34, Left GUANAKITO 0.95. Individual films reviewed: Yes. Test results/imaging were reviewed with a slight decrease in GUANAKITO left lower extremity. Results were reviewed with the patient. Assessment      1. PVD (peripheral vascular disease) ([address]          Plan      Recommend she continue aspirin 81 mg and fenofibrate 145 mg daily  Recommend consideration of initiation of statin therapy  Recommend no smoking  Recommend low-fat low-cholesterol diet in moderation  Recommend daily exercise in moderation  Return in about 1 year (around 2/28/2024) for KEITH.  or sooner if claudication worsens, patient develops wound or IRP

## 2023-03-10 ENCOUNTER — TELEPHONE (OUTPATIENT)
Dept: GASTROENTEROLOGY | Facility: CLINIC | Age: 78
End: 2023-03-10
Payer: MEDICARE

## 2023-03-10 NOTE — TELEPHONE ENCOUNTER
[facility] into [facility] [phone] colestipol 1 g tid #270 3 refills.      Mesalamine (delzicol) 400mg 2 tid #540 3 refills.

## 2023-03-13 DIAGNOSIS — M48.062 SPINAL STENOSIS OF LUMBAR REGION WITH NEUROGENIC CLAUDICATION: ICD-10-CM

## 2023-03-13 NOTE — TELEPHONE ENCOUNTER
Requested Prescriptions     Pending Prescriptions Disp Refills    HYDROcodone-acetaminophen (NORCO)  MG per tablet 90 tablet 0     Sig: Take 1 tablet by mouth every 8 hours as needed for Pain for up to 30 days.  Intended supply: 30 days       Last Office Visit:  9/8/2022  Next Office Visit:  5/4/2023  Last Medication Refill:  2/11/23  Desiree Pulling up to date:  2/9/23    *RX updated to reflect   3/13/23  fill date*

## 2023-03-14 RX ORDER — HYDROCODONE BITARTRATE AND ACETAMINOPHEN 10; 325 MG/1; MG/1
1 TABLET ORAL EVERY 8 HOURS PRN
Qty: 90 TABLET | Refills: 0 | Status: SHIPPED | OUTPATIENT
Start: 2023-03-14 | End: 2023-04-13

## 2023-03-20 ENCOUNTER — TELEPHONE (OUTPATIENT)
Dept: INFUSION THERAPY | Age: 78
End: 2023-03-20

## 2023-03-20 NOTE — TELEPHONE ENCOUNTER
Patient is having surgery and unable to come today for infusion. She did not wish to reschedule at this time. Will call and schedule when ready.

## 2023-03-21 ENCOUNTER — TELEPHONE (OUTPATIENT)
Dept: NEUROLOGY | Age: 78
End: 2023-03-21

## 2023-04-24 DIAGNOSIS — G61.81 CIDP (CHRONIC INFLAMMATORY DEMYELINATING POLYNEUROPATHY) (HCC): ICD-10-CM

## 2023-04-25 RX ORDER — GABAPENTIN 600 MG/1
TABLET ORAL
Qty: 270 TABLET | Refills: 1 | Status: SHIPPED | OUTPATIENT
Start: 2023-04-25 | End: 2023-11-25

## 2023-04-25 NOTE — TELEPHONE ENCOUNTER
Requested Prescriptions     Pending Prescriptions Disp Refills    gabapentin (NEURONTIN) 600 MG tablet 270 tablet 1     Sig: TAKE 1 TABLET THREE TIMES A DAY       Last Office Visit:  9/8/2022  Next Office Visit:  5/9/2023  Last Medication Refill:  10/17/2022 with 1 RF   Shahida  up to date:  2/9/2023    *RX updated to reflect   4/25/2023  fill date*

## 2023-05-04 ENCOUNTER — APPOINTMENT (OUTPATIENT)
Dept: INFUSION THERAPY | Age: 78
End: 2023-05-04
Payer: MEDICARE

## 2023-05-05 ENCOUNTER — HOSPITAL ENCOUNTER (OUTPATIENT)
Dept: INFUSION THERAPY | Age: 78
Setting detail: INFUSION SERIES
Discharge: HOME | End: 2023-05-05
Payer: MEDICARE

## 2023-05-05 VITALS
HEART RATE: 62 BPM | RESPIRATION RATE: 18 BRPM | TEMPERATURE: 97.7 F | SYSTOLIC BLOOD PRESSURE: 124 MMHG | OXYGEN SATURATION: 94 % | DIASTOLIC BLOOD PRESSURE: 57 MMHG

## 2023-05-05 DIAGNOSIS — G61.81 CIDP (CHRONIC INFLAMMATORY DEMYELINATING POLYNEUROPATHY) (HCC): Primary | ICD-10-CM

## 2023-05-05 LAB
ALBUMIN SERPL-MCNC: 4.2 G/DL (ref 3.5–5.2)
ALP SERPL-CCNC: 49 U/L (ref 35–104)
ALT SERPL-CCNC: 15 U/L (ref 5–33)
ANION GAP SERPL CALCULATED.3IONS-SCNC: 7 MMOL/L (ref 7–19)
AST SERPL-CCNC: 18 U/L (ref 5–32)
BASOPHILS # BLD: 0.1 K/UL (ref 0–0.2)
BASOPHILS NFR BLD: 0.9 % (ref 0–1)
BILIRUB SERPL-MCNC: 0.3 MG/DL (ref 0.2–1.2)
BUN SERPL-MCNC: 32 MG/DL (ref 8–23)
CALCIUM SERPL-MCNC: 10 MG/DL (ref 8.8–10.2)
CHLORIDE SERPL-SCNC: 105 MMOL/L (ref 98–111)
CO2 SERPL-SCNC: 32 MMOL/L (ref 22–29)
CREAT SERPL-MCNC: 0.8 MG/DL (ref 0.5–0.9)
EOSINOPHIL # BLD: 0.3 K/UL (ref 0–0.6)
EOSINOPHIL NFR BLD: 4.6 % (ref 0–5)
ERYTHROCYTE [DISTWIDTH] IN BLOOD BY AUTOMATED COUNT: 15.4 % (ref 11.5–14.5)
GLUCOSE SERPL-MCNC: 94 MG/DL (ref 74–109)
HCT VFR BLD AUTO: 37.8 % (ref 37–47)
HGB BLD-MCNC: 11.4 G/DL (ref 12–16)
IMM GRANULOCYTES # BLD: 0 K/UL
LYMPHOCYTES # BLD: 1.5 K/UL (ref 1.1–4.5)
LYMPHOCYTES NFR BLD: 21.6 % (ref 20–40)
MCH RBC QN AUTO: 26.6 PG (ref 27–31)
MCHC RBC AUTO-ENTMCNC: 30.2 G/DL (ref 33–37)
MCV RBC AUTO: 88.1 FL (ref 81–99)
MONOCYTES # BLD: 1.3 K/UL (ref 0–0.9)
MONOCYTES NFR BLD: 19.2 % (ref 0–10)
NEUTROPHILS # BLD: 3.6 K/UL (ref 1.5–7.5)
NEUTS SEG NFR BLD: 53.6 % (ref 50–65)
PLATELET # BLD AUTO: 229 K/UL (ref 130–400)
PMV BLD AUTO: 10.3 FL (ref 9.4–12.3)
POTASSIUM SERPL-SCNC: 4.5 MMOL/L (ref 3.5–5)
PROT SERPL-MCNC: 7 G/DL (ref 6.6–8.7)
RBC # BLD AUTO: 4.29 M/UL (ref 4.2–5.4)
SODIUM SERPL-SCNC: 144 MMOL/L (ref 136–145)
WBC # BLD AUTO: 6.8 K/UL (ref 4.8–10.8)

## 2023-05-05 PROCEDURE — 6360000002 HC RX W HCPCS: Performed by: PSYCHIATRY & NEUROLOGY

## 2023-05-05 PROCEDURE — 96366 THER/PROPH/DIAG IV INF ADDON: CPT

## 2023-05-05 PROCEDURE — 80053 COMPREHEN METABOLIC PANEL: CPT

## 2023-05-05 PROCEDURE — 96365 THER/PROPH/DIAG IV INF INIT: CPT

## 2023-05-05 PROCEDURE — 36415 COLL VENOUS BLD VENIPUNCTURE: CPT

## 2023-05-05 PROCEDURE — 96413 CHEMO IV INFUSION 1 HR: CPT

## 2023-05-05 PROCEDURE — 85025 COMPLETE CBC W/AUTO DIFF WBC: CPT

## 2023-05-05 RX ORDER — ACETAMINOPHEN 325 MG/1
650 TABLET ORAL ONCE
OUTPATIENT
Start: 2023-05-07 | End: 2023-05-07

## 2023-05-05 RX ORDER — SODIUM CHLORIDE 9 MG/ML
5-250 INJECTION, SOLUTION INTRAVENOUS PRN
Status: DISCONTINUED | OUTPATIENT
Start: 2023-05-05 | End: 2023-05-06 | Stop reason: HOSPADM

## 2023-05-05 RX ORDER — ACETAMINOPHEN 325 MG/1
650 TABLET ORAL ONCE
Status: DISCONTINUED | OUTPATIENT
Start: 2023-05-05 | End: 2023-05-07 | Stop reason: HOSPADM

## 2023-05-05 RX ORDER — DIPHENHYDRAMINE HYDROCHLORIDE 50 MG/ML
50 INJECTION INTRAMUSCULAR; INTRAVENOUS ONCE
Status: DISCONTINUED | OUTPATIENT
Start: 2023-05-05 | End: 2023-05-07 | Stop reason: HOSPADM

## 2023-05-05 RX ORDER — SODIUM CHLORIDE 9 MG/ML
INJECTION, SOLUTION INTRAVENOUS CONTINUOUS
OUTPATIENT
Start: 2023-05-07

## 2023-05-05 RX ORDER — SODIUM CHLORIDE 0.9 % (FLUSH) 0.9 %
5-40 SYRINGE (ML) INJECTION PRN
Status: DISCONTINUED | OUTPATIENT
Start: 2023-05-05 | End: 2023-05-06 | Stop reason: HOSPADM

## 2023-05-05 RX ORDER — DIPHENHYDRAMINE HYDROCHLORIDE 50 MG/ML
50 INJECTION INTRAMUSCULAR; INTRAVENOUS ONCE
Start: 2023-05-07 | End: 2023-05-07

## 2023-05-05 RX ORDER — SODIUM CHLORIDE 9 MG/ML
5-250 INJECTION, SOLUTION INTRAVENOUS PRN
OUTPATIENT
Start: 2023-05-07

## 2023-05-05 RX ORDER — DIPHENHYDRAMINE HYDROCHLORIDE 50 MG/ML
50 INJECTION INTRAMUSCULAR; INTRAVENOUS
OUTPATIENT
Start: 2023-05-07

## 2023-05-05 RX ORDER — SODIUM CHLORIDE 0.9 % (FLUSH) 0.9 %
5-40 SYRINGE (ML) INJECTION PRN
OUTPATIENT
Start: 2023-05-07

## 2023-05-05 RX ADMIN — IMMUNE GLOBULIN (HUMAN) 30 G: 10 INJECTION INTRAVENOUS; SUBCUTANEOUS at 12:59

## 2023-05-05 NOTE — DISCHARGE INSTRUCTIONS
immune globulin (intravenous and subcutaneous)  Pronunciation:  ryan ERMA hdz  Brand:  Gammagard Liquid, Gammaked, Gamunex-C  What is the most important information I should know about immune globulin? This medicine can cause blood clots. The risk is highest in older adults or in people who have had blood clots, heart problems, or blood circulation problems. Blood clots are also more likely during long-term bedrest, while using birth control pills or hormone replacement therapy, or while having a central intravenous (IV) catheter in place. Call your doctor at once if you have chest pain, trouble breathing, fast heartbeats, numbness or weakness, or swelling and warmth or discoloration in an arm or leg. This medicine can also harm your kidneys, especially if you have kidney disease or if you also use certain medicines. Tell your doctor right away if you have signs of kidney problems, such as swelling, rapid weight gain, and little or no urination. What is immune globulin? Immune globulin intravenous and subcutaneous (for injection into a vein or under the skin) is used to treat primary immunodeficiency. Immune globulin is also used to increase platelets (blood clotting cells) in people with idiopathic thrombocytopenic purpura. Immune globulin is also used to treat certain debilitating nerve disorders that cause muscle weakness and can affect daily activities. Immune globulin may also be used for purposes not listed in this medication guide. What should I discuss with my healthcare provider before using immune globulin? You should not use this medicine if:  you have had an allergic reaction to an immune globulin or blood product; or  you have immune globulin A (IgA) deficiency with antibody to IgA. Immune globulin can cause blood clots or kidney problems, especially in older adults or in people with certain conditions.  Tell your doctor if you have ever had:  heart problems, blood circulation

## 2023-05-08 ENCOUNTER — TELEPHONE (OUTPATIENT)
Dept: UROLOGY | Age: 78
End: 2023-05-08

## 2023-05-08 NOTE — TELEPHONE ENCOUNTER
Catracho Bermudez requests that someone from the office to return her call in re: to a referral to [place] for continuation of care for a bladder infection. The best time to reach her is  as soon as possible . Thank you.

## 2023-05-09 ENCOUNTER — OFFICE VISIT (OUTPATIENT)
Dept: UROLOGY | Age: 78
End: 2023-05-09
Payer: MEDICARE

## 2023-05-09 ENCOUNTER — OFFICE VISIT (OUTPATIENT)
Dept: NEUROLOGY | Age: 78
End: 2023-05-09
Payer: MEDICARE

## 2023-05-09 VITALS
SYSTOLIC BLOOD PRESSURE: 137 MMHG | DIASTOLIC BLOOD PRESSURE: 66 MMHG | WEIGHT: 132.4 LBS | HEIGHT: 64 IN | BODY MASS INDEX: 22.61 KG/M2 | RESPIRATION RATE: 18 BRPM | HEART RATE: 62 BPM

## 2023-05-09 DIAGNOSIS — M48.062 SPINAL STENOSIS OF LUMBAR REGION WITH NEUROGENIC CLAUDICATION: ICD-10-CM

## 2023-05-09 DIAGNOSIS — M05.79 RHEUMATOID ARTHRITIS INVOLVING MULTIPLE SITES WITH POSITIVE RHEUMATOID FACTOR (HCC): ICD-10-CM

## 2023-05-09 DIAGNOSIS — R30.0 DYSURIA: Primary | ICD-10-CM

## 2023-05-09 DIAGNOSIS — G61.81 CIDP (CHRONIC INFLAMMATORY DEMYELINATING POLYNEUROPATHY) (HCC): Primary | ICD-10-CM

## 2023-05-09 LAB
APPEARANCE FLUID: CLEAR
BILIRUBIN, POC: NORMAL
BLOOD URINE, POC: NORMAL
CLARITY, POC: CLEAR
COLOR, POC: YELLOW
GLUCOSE URINE, POC: NORMAL
KETONES, POC: NORMAL
LEUKOCYTE EST, POC: NORMAL
NITRITE, POC: NORMAL
PH, POC: 7
PROTEIN, POC: 100
SPECIFIC GRAVITY, POC: 1.02
UROBILINOGEN, POC: 0.2

## 2023-05-09 PROCEDURE — 1123F ACP DISCUSS/DSCN MKR DOCD: CPT | Performed by: PSYCHIATRY & NEUROLOGY

## 2023-05-09 PROCEDURE — 81002 URINALYSIS NONAUTO W/O SCOPE: CPT | Performed by: NURSE PRACTITIONER

## 2023-05-09 PROCEDURE — 99213 OFFICE O/P EST LOW 20 MIN: CPT | Performed by: PSYCHIATRY & NEUROLOGY

## 2023-05-09 PROCEDURE — 99214 OFFICE O/P EST MOD 30 MIN: CPT | Performed by: NURSE PRACTITIONER

## 2023-05-09 PROCEDURE — 1123F ACP DISCUSS/DSCN MKR DOCD: CPT | Performed by: NURSE PRACTITIONER

## 2023-05-09 RX ORDER — TIMOLOL MALEATE 5 MG/ML
SOLUTION/ DROPS OPHTHALMIC
COMMUNITY
Start: 2023-04-17

## 2023-05-09 RX ORDER — MAGNESIUM OXIDE 400 MG/1
1 TABLET ORAL 2 TIMES DAILY WITH MEALS
COMMUNITY
Start: 2023-04-04

## 2023-05-09 RX ORDER — METHENAMINE HIPPURATE 1000 MG/1
1 TABLET ORAL 2 TIMES DAILY
COMMUNITY
Start: 2023-04-13 | End: 2023-05-13 | Stop reason: ALTCHOICE

## 2023-05-09 NOTE — PROGRESS NOTES
Jaqueline Garcia is a 65 y.o., female, Established patient who presents today   Chief Complaint   Patient presents with    Follow-up     I am here today for my cath urine and ua       HPI   Patient presents for catheterized urine specimen. She is currently seeing Dr. Leanna Perdomo, urogynecologist who has sent an order to our office to collect a specimen. Patient is having increased being increased frequency, malodorous urine, some right-sided flank pain. She denies any prior history of stones. She denies fever. She reports she is currently being worked up at [facility] by a urologist for a rare bladder condition and will likely need a salvage cystectomy in the future. REVIEW OF SYSTEMS:  Review of Systems   Constitutional:  Negative for chills and fever. Gastrointestinal:  Negative for abdominal distention, abdominal pain, nausea and vomiting. Genitourinary:  Positive for flank pain and frequency. Negative for difficulty urinating, dysuria, hematuria and urgency. Musculoskeletal:  Positive for gait problem. Neurological:  Positive for weakness. Psychiatric/Behavioral:  Negative for agitation and confusion. PHYSICAL EXAM:  There were no vitals taken for this visit. Physical Exam  Vitals and nursing note reviewed. Constitutional:       General: She is not in acute distress. Appearance: Normal appearance. She is not ill-appearing. Pulmonary:      Effort: Pulmonary effort is normal. No respiratory distress. Abdominal:      General: There is no distension. Tenderness: There is no abdominal tenderness. There is no right CVA tenderness or left CVA tenderness. Neurological:      Mental Status: She is alert and oriented to person, place, and time. Mental status is at baseline.    Psychiatric:         Mood and Affect: Mood normal.         Behavior: Behavior normal.       DATA:  Results for orders placed or performed in visit on 05/09/23   Culture, Urine    Specimen: Urine, straight catheter

## 2023-05-10 ASSESSMENT — ENCOUNTER SYMPTOMS
VOMITING: 0
ABDOMINAL PAIN: 0
ABDOMINAL DISTENTION: 0
NAUSEA: 0

## 2023-05-11 DIAGNOSIS — M48.062 SPINAL STENOSIS OF LUMBAR REGION WITH NEUROGENIC CLAUDICATION: ICD-10-CM

## 2023-05-11 LAB
BACTERIA UR CULT: ABNORMAL
ORGANISM: ABNORMAL
ORGANISM: ABNORMAL

## 2023-05-11 RX ORDER — HYDROCODONE BITARTRATE AND ACETAMINOPHEN 10; 325 MG/1; MG/1
1 TABLET ORAL EVERY 8 HOURS PRN
Qty: 90 TABLET | Refills: 0 | Status: SHIPPED | OUTPATIENT
Start: 2023-05-13 | End: 2023-06-12

## 2023-05-11 NOTE — TELEPHONE ENCOUNTER
Requested Prescriptions     Pending Prescriptions Disp Refills    HYDROcodone-acetaminophen (NORCO)  MG per tablet 90 tablet 0     Sig: Take 1 tablet by mouth every 8 hours as needed for Pain for up to 30 days.  Intended supply: 30 days       Last Office Visit:  5/9/2023  Next Office Visit: 11/9/23  Last Medication Refill:  4/13/23  Anders Monzon up to date:  2/9/23    *RX updated to reflect   5/13/23  fill date*

## 2023-05-13 RX ORDER — NITROFURANTOIN 25; 75 MG/1; MG/1
100 CAPSULE ORAL 2 TIMES DAILY
Qty: 6 CAPSULE | Refills: 0 | Status: SHIPPED | OUTPATIENT
Start: 2023-05-13 | End: 2023-05-18

## 2023-05-13 NOTE — PROGRESS NOTES
Patient called about her positive urine culture for enterococcus UTI. Prescribed nitrofurantoin 100mg po bid for 5 days. Advised to continue hipprex after the above course.

## 2023-06-02 ENCOUNTER — HOSPITAL ENCOUNTER (OUTPATIENT)
Dept: INFUSION THERAPY | Age: 78
Setting detail: INFUSION SERIES
End: 2023-06-02

## 2023-06-09 DIAGNOSIS — M48.062 SPINAL STENOSIS OF LUMBAR REGION WITH NEUROGENIC CLAUDICATION: ICD-10-CM

## 2023-06-09 RX ORDER — HYDROCODONE BITARTRATE AND ACETAMINOPHEN 10; 325 MG/1; MG/1
1 TABLET ORAL EVERY 8 HOURS PRN
Qty: 90 TABLET | Refills: 0 | Status: SHIPPED | OUTPATIENT
Start: 2023-06-12 | End: 2023-07-12

## 2023-06-09 NOTE — TELEPHONE ENCOUNTER
Requested Prescriptions     Pending Prescriptions Disp Refills    HYDROcodone-acetaminophen (NORCO)  MG per tablet 90 tablet 0     Sig: Take 1 tablet by mouth every 8 hours as needed for Pain for up to 30 days.  Intended supply: 30 days       Last Office Visit:  5/9/2023  Next Office Visit:  11/9/2023  Last Medication Refill:  5/13/23  Virginia Gaytan up to date:  2/23    *RX updated to reflect   6/12/23  fill date*

## 2023-06-13 ENCOUNTER — HOSPITAL ENCOUNTER (INPATIENT)
Age: 78
LOS: 3 days | Discharge: HOME HEALTH | DRG: 872 | End: 2023-06-16
Attending: EMERGENCY MEDICINE | Admitting: STUDENT IN AN ORGANIZED HEALTH CARE EDUCATION/TRAINING PROGRAM
Payer: MEDICARE

## 2023-06-13 DIAGNOSIS — N30.00 ACUTE CYSTITIS WITHOUT HEMATURIA: ICD-10-CM

## 2023-06-13 DIAGNOSIS — A41.9 SEPSIS WITHOUT ACUTE ORGAN DYSFUNCTION, DUE TO UNSPECIFIED ORGANISM (HCC): Primary | ICD-10-CM

## 2023-06-13 PROBLEM — Z16.24 MULTIPLE DRUG RESISTANT ORGANISM (MDRO) CULTURE POSITIVE: Status: ACTIVE | Noted: 2023-06-13

## 2023-06-13 PROBLEM — N39.0 SEPSIS SECONDARY TO UTI (HCC): Status: ACTIVE | Noted: 2023-06-13

## 2023-06-13 LAB
ALBUMIN SERPL-MCNC: 3 G/DL (ref 3.5–5.2)
ALP SERPL-CCNC: 63 U/L (ref 35–104)
ALT SERPL-CCNC: <5 U/L (ref 5–33)
ANION GAP SERPL CALCULATED.3IONS-SCNC: 10 MMOL/L (ref 7–19)
ANISOCYTOSIS BLD QL SMEAR: ABNORMAL
AST SERPL-CCNC: 11 U/L (ref 5–32)
BACTERIA #/AREA URNS HPF: ABNORMAL /HPF
BASOPHILS # BLD: 0 K/UL (ref 0–0.2)
BASOPHILS NFR BLD: 0 % (ref 0–1)
BILIRUB SERPL-MCNC: 0.3 MG/DL (ref 0.2–1.2)
BILIRUB UR QL STRIP: NEGATIVE
BUN SERPL-MCNC: 50 MG/DL (ref 8–23)
CALCIUM SERPL-MCNC: 8.9 MG/DL (ref 8.8–10.2)
CHLORIDE SERPL-SCNC: 106 MMOL/L (ref 98–111)
CLARITY UR: ABNORMAL
CO2 SERPL-SCNC: 28 MMOL/L (ref 22–29)
COLOR UR: YELLOW
CREAT SERPL-MCNC: 1 MG/DL (ref 0.5–0.9)
EOSINOPHIL # BLD: 0 K/UL (ref 0–0.6)
EOSINOPHIL NFR BLD: 0 % (ref 0–5)
ERYTHROCYTE [DISTWIDTH] IN BLOOD BY AUTOMATED COUNT: 14.9 % (ref 11.5–14.5)
GLUCOSE SERPL-MCNC: 136 MG/DL (ref 74–109)
GLUCOSE UR STRIP.AUTO-MCNC: NEGATIVE MG/DL
HCT VFR BLD AUTO: 31.2 % (ref 37–47)
HGB BLD-MCNC: 9.4 G/DL (ref 12–16)
HGB UR STRIP.AUTO-MCNC: ABNORMAL MG/L
HYPOCHROMIA BLD QL SMEAR: ABNORMAL
IMM GRANULOCYTES # BLD: 0.1 K/UL
KETONES UR STRIP.AUTO-MCNC: NEGATIVE MG/DL
LACTATE BLDV-SCNC: 1 MMOL/L (ref 0.5–1.9)
LEUKOCYTE ESTERASE UR QL STRIP.AUTO: ABNORMAL
LYMPHOCYTES # BLD: 0.7 K/UL (ref 1.1–4.5)
LYMPHOCYTES NFR BLD: 4 % (ref 20–40)
MCH RBC QN AUTO: 25.8 PG (ref 27–31)
MCHC RBC AUTO-ENTMCNC: 30.1 G/DL (ref 33–37)
MCV RBC AUTO: 85.5 FL (ref 81–99)
MONOCYTES # BLD: 2.9 K/UL (ref 0–0.9)
MONOCYTES NFR BLD: 17 % (ref 0–10)
MUCOUS THREADS URNS QL MICRO: ABNORMAL /LPF
NEUTROPHILS # BLD: 13.3 K/UL (ref 1.5–7.5)
NEUTS BAND NFR BLD MANUAL: 1 % (ref 0–5)
NEUTS SEG NFR BLD: 78 % (ref 50–65)
NITRITE UR QL STRIP.AUTO: POSITIVE
PH UR STRIP.AUTO: 7.5 [PH] (ref 5–8)
PLATELET # BLD AUTO: 344 K/UL (ref 130–400)
PLATELET SLIDE REVIEW: ADEQUATE
PMV BLD AUTO: 9.1 FL (ref 9.4–12.3)
POTASSIUM SERPL-SCNC: 4.8 MMOL/L (ref 3.5–5)
PROT SERPL-MCNC: 6.6 G/DL (ref 6.6–8.7)
PROT UR STRIP.AUTO-MCNC: 30 MG/DL
RBC # BLD AUTO: 3.65 M/UL (ref 4.2–5.4)
RBC #/AREA URNS HPF: ABNORMAL /HPF (ref 0–2)
SODIUM SERPL-SCNC: 144 MMOL/L (ref 136–145)
SP GR UR STRIP.AUTO: 1.01 (ref 1–1.03)
SQUAMOUS #/AREA URNS HPF: ABNORMAL /HPF
UROBILINOGEN UR STRIP.AUTO-MCNC: 0.2 E.U./DL
WBC # BLD AUTO: 16.8 K/UL (ref 4.8–10.8)
WBC #/AREA URNS HPF: ABNORMAL /HPF (ref 0–5)

## 2023-06-13 PROCEDURE — 36415 COLL VENOUS BLD VENIPUNCTURE: CPT

## 2023-06-13 PROCEDURE — 93005 ELECTROCARDIOGRAM TRACING: CPT | Performed by: EMERGENCY MEDICINE

## 2023-06-13 PROCEDURE — 87186 SC STD MICRODIL/AGAR DIL: CPT

## 2023-06-13 PROCEDURE — 6360000002 HC RX W HCPCS: Performed by: EMERGENCY MEDICINE

## 2023-06-13 PROCEDURE — 2580000003 HC RX 258: Performed by: HOSPITALIST

## 2023-06-13 PROCEDURE — 1210000000 HC MED SURG R&B

## 2023-06-13 PROCEDURE — 96361 HYDRATE IV INFUSION ADD-ON: CPT

## 2023-06-13 PROCEDURE — 6360000002 HC RX W HCPCS: Performed by: HOSPITALIST

## 2023-06-13 PROCEDURE — 2580000003 HC RX 258: Performed by: EMERGENCY MEDICINE

## 2023-06-13 PROCEDURE — 99285 EMERGENCY DEPT VISIT HI MDM: CPT

## 2023-06-13 PROCEDURE — 85025 COMPLETE CBC W/AUTO DIFF WBC: CPT

## 2023-06-13 PROCEDURE — 83605 ASSAY OF LACTIC ACID: CPT

## 2023-06-13 PROCEDURE — 96374 THER/PROPH/DIAG INJ IV PUSH: CPT

## 2023-06-13 PROCEDURE — 81001 URINALYSIS AUTO W/SCOPE: CPT

## 2023-06-13 PROCEDURE — 87040 BLOOD CULTURE FOR BACTERIA: CPT

## 2023-06-13 PROCEDURE — 80053 COMPREHEN METABOLIC PANEL: CPT

## 2023-06-13 PROCEDURE — 87086 URINE CULTURE/COLONY COUNT: CPT

## 2023-06-13 RX ORDER — 0.9 % SODIUM CHLORIDE 0.9 %
30 INTRAVENOUS SOLUTION INTRAVENOUS ONCE
Status: COMPLETED | OUTPATIENT
Start: 2023-06-13 | End: 2023-06-14

## 2023-06-13 RX ADMIN — CEFTRIAXONE 1000 MG: 1 INJECTION, POWDER, FOR SOLUTION INTRAMUSCULAR; INTRAVENOUS at 22:28

## 2023-06-13 RX ADMIN — MEROPENEM 1000 MG: 1 INJECTION, POWDER, FOR SOLUTION INTRAVENOUS at 23:39

## 2023-06-13 RX ADMIN — SODIUM CHLORIDE 1728 ML: 9 INJECTION, SOLUTION INTRAVENOUS at 22:29

## 2023-06-13 ASSESSMENT — ENCOUNTER SYMPTOMS
DIARRHEA: 0
BACK PAIN: 0
NAUSEA: 0
SORE THROAT: 0
RHINORRHEA: 0
ABDOMINAL PAIN: 0
COUGH: 0
SHORTNESS OF BREATH: 0
VOMITING: 0

## 2023-06-13 ASSESSMENT — PAIN - FUNCTIONAL ASSESSMENT: PAIN_FUNCTIONAL_ASSESSMENT: 0-10

## 2023-06-13 ASSESSMENT — PAIN SCALES - GENERAL: PAINLEVEL_OUTOF10: 5

## 2023-06-14 LAB
ANION GAP SERPL CALCULATED.3IONS-SCNC: 9 MMOL/L (ref 7–19)
BASOPHILS # BLD: 0 K/UL (ref 0–0.2)
BASOPHILS NFR BLD: 0 % (ref 0–1)
BUN SERPL-MCNC: 36 MG/DL (ref 8–23)
CALCIUM SERPL-MCNC: 8 MG/DL (ref 8.8–10.2)
CHLORIDE SERPL-SCNC: 107 MMOL/L (ref 98–111)
CO2 SERPL-SCNC: 26 MMOL/L (ref 22–29)
CREAT SERPL-MCNC: 0.8 MG/DL (ref 0.5–0.9)
EKG P AXIS: 37 DEGREES
EKG P-R INTERVAL: 134 MS
EKG Q-T INTERVAL: 318 MS
EKG QRS DURATION: 80 MS
EKG QTC CALCULATION (BAZETT): 407 MS
EKG T AXIS: -23 DEGREES
EOSINOPHIL # BLD: 0 K/UL (ref 0–0.6)
EOSINOPHIL NFR BLD: 0 % (ref 0–5)
ERYTHROCYTE [DISTWIDTH] IN BLOOD BY AUTOMATED COUNT: 14.9 % (ref 11.5–14.5)
FERRITIN SERPL-MCNC: 171.1 NG/ML (ref 13–150)
FOLATE SERPL-MCNC: >20 NG/ML (ref 4.8–37.3)
GLUCOSE SERPL-MCNC: 152 MG/DL (ref 74–109)
HCT VFR BLD AUTO: 24.6 % (ref 37–47)
HGB BLD-MCNC: 7.3 G/DL (ref 12–16)
HYPOCHROMIA BLD QL SMEAR: ABNORMAL
IMM GRANULOCYTES # BLD: 0.1 K/UL
IRON SATN MFR SERPL: 4 % (ref 14–50)
IRON SERPL-MCNC: 10 UG/DL (ref 37–145)
LYMPHOCYTES # BLD: 1.1 K/UL (ref 1.1–4.5)
LYMPHOCYTES NFR BLD: 9 % (ref 20–40)
MCH RBC QN AUTO: 25.7 PG (ref 27–31)
MCHC RBC AUTO-ENTMCNC: 29.7 G/DL (ref 33–37)
MCV RBC AUTO: 86.6 FL (ref 81–99)
MONOCYTES # BLD: 2.4 K/UL (ref 0–0.9)
MONOCYTES NFR BLD: 19 % (ref 0–10)
NEUTROPHILS # BLD: 9.1 K/UL (ref 1.5–7.5)
NEUTS SEG NFR BLD: 72 % (ref 50–65)
PLATELET # BLD AUTO: 244 K/UL (ref 130–400)
PLATELET SLIDE REVIEW: ADEQUATE
PMV BLD AUTO: 9.1 FL (ref 9.4–12.3)
POTASSIUM SERPL-SCNC: 3.8 MMOL/L (ref 3.5–5)
RBC # BLD AUTO: 2.84 M/UL (ref 4.2–5.4)
SODIUM SERPL-SCNC: 142 MMOL/L (ref 136–145)
TIBC SERPL-MCNC: 255 UG/DL (ref 250–400)
VIT B12 SERPL-MCNC: 653 PG/ML (ref 211–946)
WBC # BLD AUTO: 12.6 K/UL (ref 4.8–10.8)

## 2023-06-14 PROCEDURE — 82607 VITAMIN B-12: CPT

## 2023-06-14 PROCEDURE — 80048 BASIC METABOLIC PNL TOTAL CA: CPT

## 2023-06-14 PROCEDURE — 82746 ASSAY OF FOLIC ACID SERUM: CPT

## 2023-06-14 PROCEDURE — 83550 IRON BINDING TEST: CPT

## 2023-06-14 PROCEDURE — 6360000002 HC RX W HCPCS: Performed by: HOSPITALIST

## 2023-06-14 PROCEDURE — 6360000002 HC RX W HCPCS: Performed by: STUDENT IN AN ORGANIZED HEALTH CARE EDUCATION/TRAINING PROGRAM

## 2023-06-14 PROCEDURE — 2580000003 HC RX 258: Performed by: HOSPITALIST

## 2023-06-14 PROCEDURE — 6370000000 HC RX 637 (ALT 250 FOR IP): Performed by: HOSPITALIST

## 2023-06-14 PROCEDURE — 93010 ELECTROCARDIOGRAM REPORT: CPT | Performed by: INTERNAL MEDICINE

## 2023-06-14 PROCEDURE — 36415 COLL VENOUS BLD VENIPUNCTURE: CPT

## 2023-06-14 PROCEDURE — 85025 COMPLETE CBC W/AUTO DIFF WBC: CPT

## 2023-06-14 PROCEDURE — 1210000000 HC MED SURG R&B

## 2023-06-14 PROCEDURE — 82728 ASSAY OF FERRITIN: CPT

## 2023-06-14 PROCEDURE — 6370000000 HC RX 637 (ALT 250 FOR IP): Performed by: NURSE PRACTITIONER

## 2023-06-14 PROCEDURE — 83540 ASSAY OF IRON: CPT

## 2023-06-14 PROCEDURE — 94760 N-INVAS EAR/PLS OXIMETRY 1: CPT

## 2023-06-14 RX ORDER — CHLORAL HYDRATE 500 MG
1 CAPSULE ORAL 2 TIMES DAILY
Status: DISCONTINUED | OUTPATIENT
Start: 2023-06-14 | End: 2023-06-14 | Stop reason: CLARIF

## 2023-06-14 RX ORDER — ACETAMINOPHEN 325 MG/1
650 TABLET ORAL EVERY 4 HOURS PRN
Status: DISCONTINUED | OUTPATIENT
Start: 2023-06-14 | End: 2023-06-16 | Stop reason: HOSPADM

## 2023-06-14 RX ORDER — HYDROCHLOROTHIAZIDE 25 MG/1
50 TABLET ORAL DAILY
Status: DISCONTINUED | OUTPATIENT
Start: 2023-06-14 | End: 2023-06-16 | Stop reason: HOSPADM

## 2023-06-14 RX ORDER — AMLODIPINE BESYLATE 5 MG/1
5 TABLET ORAL DAILY
Status: DISCONTINUED | OUTPATIENT
Start: 2023-06-14 | End: 2023-06-16 | Stop reason: HOSPADM

## 2023-06-14 RX ORDER — ASPIRIN 81 MG/1
81 TABLET, CHEWABLE ORAL NIGHTLY
Status: DISCONTINUED | OUTPATIENT
Start: 2023-06-14 | End: 2023-06-16 | Stop reason: HOSPADM

## 2023-06-14 RX ORDER — LIDOCAINE 40 MG/G
CREAM TOPICAL PRN
Status: DISCONTINUED | OUTPATIENT
Start: 2023-06-14 | End: 2023-06-16 | Stop reason: HOSPADM

## 2023-06-14 RX ORDER — METHENAMINE HIPPURATE 1000 MG/1
1 TABLET ORAL 2 TIMES DAILY WITH MEALS
Status: ON HOLD | COMMUNITY
End: 2023-06-16 | Stop reason: HOSPADM

## 2023-06-14 RX ORDER — SODIUM CHLORIDE 0.9 % (FLUSH) 0.9 %
5-40 SYRINGE (ML) INJECTION EVERY 12 HOURS SCHEDULED
Status: DISCONTINUED | OUTPATIENT
Start: 2023-06-14 | End: 2023-06-16 | Stop reason: HOSPADM

## 2023-06-14 RX ORDER — M-VIT,TX,IRON,MINS/CALC/FOLIC 27MG-0.4MG
1 TABLET ORAL DAILY
Status: DISCONTINUED | OUTPATIENT
Start: 2023-06-14 | End: 2023-06-16 | Stop reason: HOSPADM

## 2023-06-14 RX ORDER — CARVEDILOL 6.25 MG/1
6.25 TABLET ORAL 2 TIMES DAILY
Status: DISCONTINUED | OUTPATIENT
Start: 2023-06-14 | End: 2023-06-16 | Stop reason: HOSPADM

## 2023-06-14 RX ORDER — ACETAMINOPHEN 650 MG/1
650 SUPPOSITORY RECTAL EVERY 4 HOURS PRN
Status: DISCONTINUED | OUTPATIENT
Start: 2023-06-14 | End: 2023-06-16 | Stop reason: HOSPADM

## 2023-06-14 RX ORDER — ENOXAPARIN SODIUM 100 MG/ML
40 INJECTION SUBCUTANEOUS DAILY
Status: DISCONTINUED | OUTPATIENT
Start: 2023-06-14 | End: 2023-06-16 | Stop reason: HOSPADM

## 2023-06-14 RX ORDER — POLYETHYLENE GLYCOL 3350 17 G/17G
17 POWDER, FOR SOLUTION ORAL DAILY PRN
Status: DISCONTINUED | OUTPATIENT
Start: 2023-06-14 | End: 2023-06-16 | Stop reason: HOSPADM

## 2023-06-14 RX ORDER — UBIDECARENONE 75 MG
100 CAPSULE ORAL DAILY
Status: DISCONTINUED | OUTPATIENT
Start: 2023-06-14 | End: 2023-06-16 | Stop reason: HOSPADM

## 2023-06-14 RX ORDER — CRANBERRY FRUIT 500 MG
36 TABLET,CHEWABLE ORAL DAILY
Status: DISCONTINUED | OUTPATIENT
Start: 2023-06-14 | End: 2023-06-14 | Stop reason: CLARIF

## 2023-06-14 RX ORDER — POTASSIUM CHLORIDE 7.45 MG/ML
10 INJECTION INTRAVENOUS PRN
Status: DISCONTINUED | OUTPATIENT
Start: 2023-06-14 | End: 2023-06-16 | Stop reason: HOSPADM

## 2023-06-14 RX ORDER — POLYVINYL ALCOHOL 14 MG/ML
1 SOLUTION/ DROPS OPHTHALMIC 3 TIMES DAILY
Status: DISCONTINUED | OUTPATIENT
Start: 2023-06-14 | End: 2023-06-16 | Stop reason: HOSPADM

## 2023-06-14 RX ORDER — PANTOPRAZOLE SODIUM 20 MG/1
20 TABLET, DELAYED RELEASE ORAL
Status: DISCONTINUED | OUTPATIENT
Start: 2023-06-14 | End: 2023-06-16 | Stop reason: HOSPADM

## 2023-06-14 RX ORDER — SODIUM CHLORIDE 9 MG/ML
INJECTION, SOLUTION INTRAVENOUS PRN
Status: DISCONTINUED | OUTPATIENT
Start: 2023-06-14 | End: 2023-06-16 | Stop reason: HOSPADM

## 2023-06-14 RX ORDER — MAGNESIUM SULFATE IN WATER 40 MG/ML
2000 INJECTION, SOLUTION INTRAVENOUS PRN
Status: DISCONTINUED | OUTPATIENT
Start: 2023-06-14 | End: 2023-06-16 | Stop reason: HOSPADM

## 2023-06-14 RX ORDER — POTASSIUM CHLORIDE 20 MEQ/1
40 TABLET, EXTENDED RELEASE ORAL PRN
Status: DISCONTINUED | OUTPATIENT
Start: 2023-06-14 | End: 2023-06-16 | Stop reason: HOSPADM

## 2023-06-14 RX ORDER — MECOBALAMIN 5000 MCG
5 TABLET,DISINTEGRATING ORAL NIGHTLY PRN
Status: DISCONTINUED | OUTPATIENT
Start: 2023-06-14 | End: 2023-06-16 | Stop reason: HOSPADM

## 2023-06-14 RX ORDER — MESALAMINE 400 MG/1
400 CAPSULE, DELAYED RELEASE ORAL 3 TIMES DAILY
Status: DISCONTINUED | OUTPATIENT
Start: 2023-06-14 | End: 2023-06-16 | Stop reason: HOSPADM

## 2023-06-14 RX ORDER — GABAPENTIN 600 MG/1
300 TABLET ORAL 3 TIMES DAILY
Status: DISCONTINUED | OUTPATIENT
Start: 2023-06-14 | End: 2023-06-15

## 2023-06-14 RX ORDER — ONDANSETRON 2 MG/ML
4 INJECTION INTRAMUSCULAR; INTRAVENOUS EVERY 6 HOURS PRN
Status: DISCONTINUED | OUTPATIENT
Start: 2023-06-14 | End: 2023-06-16 | Stop reason: HOSPADM

## 2023-06-14 RX ORDER — HYDROCODONE BITARTRATE AND ACETAMINOPHEN 10; 325 MG/1; MG/1
1 TABLET ORAL EVERY 8 HOURS PRN
Status: DISCONTINUED | OUTPATIENT
Start: 2023-06-14 | End: 2023-06-16 | Stop reason: HOSPADM

## 2023-06-14 RX ORDER — GINGER ROOT/GINGER ROOT EXT 262.5 MG
1 CAPSULE ORAL DAILY
COMMUNITY

## 2023-06-14 RX ORDER — LANOLIN ALCOHOL/MO/W.PET/CERES
400 CREAM (GRAM) TOPICAL 2 TIMES DAILY WITH MEALS
Status: DISCONTINUED | OUTPATIENT
Start: 2023-06-14 | End: 2023-06-16 | Stop reason: HOSPADM

## 2023-06-14 RX ORDER — ONDANSETRON 4 MG/1
4 TABLET, ORALLY DISINTEGRATING ORAL EVERY 8 HOURS PRN
Status: DISCONTINUED | OUTPATIENT
Start: 2023-06-14 | End: 2023-06-16 | Stop reason: HOSPADM

## 2023-06-14 RX ORDER — ALLOPURINOL 300 MG/1
300 TABLET ORAL DAILY
Status: DISCONTINUED | OUTPATIENT
Start: 2023-06-14 | End: 2023-06-16 | Stop reason: HOSPADM

## 2023-06-14 RX ORDER — MECOBALAMIN 5000 MCG
5 TABLET,DISINTEGRATING ORAL NIGHTLY
Status: DISCONTINUED | OUTPATIENT
Start: 2023-06-14 | End: 2023-06-16 | Stop reason: HOSPADM

## 2023-06-14 RX ORDER — MELOXICAM 15 MG/1
15 TABLET ORAL DAILY
Status: ON HOLD | COMMUNITY
End: 2023-06-21 | Stop reason: HOSPADM

## 2023-06-14 RX ORDER — CALCIUM CARBONATE 500 MG/1
500 TABLET, CHEWABLE ORAL 3 TIMES DAILY PRN
Status: DISCONTINUED | OUTPATIENT
Start: 2023-06-14 | End: 2023-06-16 | Stop reason: HOSPADM

## 2023-06-14 RX ORDER — LISINOPRIL 20 MG/1
20 TABLET ORAL DAILY
Status: DISCONTINUED | OUTPATIENT
Start: 2023-06-14 | End: 2023-06-16 | Stop reason: HOSPADM

## 2023-06-14 RX ORDER — CHOLESTYRAMINE LIGHT 4 G/5.7G
4 POWDER, FOR SUSPENSION ORAL DAILY
Status: DISCONTINUED | OUTPATIENT
Start: 2023-06-14 | End: 2023-06-16 | Stop reason: HOSPADM

## 2023-06-14 RX ORDER — FENOFIBRATE 160 MG/1
160 TABLET ORAL DAILY
Status: DISCONTINUED | OUTPATIENT
Start: 2023-06-14 | End: 2023-06-16 | Stop reason: HOSPADM

## 2023-06-14 RX ORDER — SODIUM CHLORIDE 0.9 % (FLUSH) 0.9 %
5-40 SYRINGE (ML) INJECTION PRN
Status: DISCONTINUED | OUTPATIENT
Start: 2023-06-14 | End: 2023-06-16 | Stop reason: HOSPADM

## 2023-06-14 RX ORDER — FOLIC ACID 1 MG/1
1 TABLET ORAL DAILY
Status: DISCONTINUED | OUTPATIENT
Start: 2023-06-14 | End: 2023-06-16 | Stop reason: HOSPADM

## 2023-06-14 RX ORDER — TIMOLOL MALEATE 5 MG/ML
1 SOLUTION/ DROPS OPHTHALMIC 2 TIMES DAILY
Status: DISCONTINUED | OUTPATIENT
Start: 2023-06-14 | End: 2023-06-16 | Stop reason: HOSPADM

## 2023-06-14 RX ORDER — TIZANIDINE 4 MG/1
4 TABLET ORAL EVERY 12 HOURS PRN
Status: DISCONTINUED | OUTPATIENT
Start: 2023-06-14 | End: 2023-06-16 | Stop reason: HOSPADM

## 2023-06-14 RX ADMIN — FENOFIBRATE 160 MG: 160 TABLET ORAL at 08:07

## 2023-06-14 RX ADMIN — Medication 100 MCG: at 08:07

## 2023-06-14 RX ADMIN — TIMOLOL MALEATE 1 DROP: 5 SOLUTION/ DROPS OPHTHALMIC at 01:44

## 2023-06-14 RX ADMIN — MEROPENEM 1000 MG: 1 INJECTION, POWDER, FOR SOLUTION INTRAVENOUS at 07:56

## 2023-06-14 RX ADMIN — MESALAMINE 400 MG: 400 CAPSULE, DELAYED RELEASE ORAL at 08:06

## 2023-06-14 RX ADMIN — MULTIPLE VITAMINS W/ MINERALS TAB 1 TABLET: TAB at 08:07

## 2023-06-14 RX ADMIN — GABAPENTIN 300 MG: 600 TABLET, FILM COATED ORAL at 20:30

## 2023-06-14 RX ADMIN — PANTOPRAZOLE SODIUM 20 MG: 20 TABLET, DELAYED RELEASE ORAL at 05:43

## 2023-06-14 RX ADMIN — GABAPENTIN 300 MG: 600 TABLET, FILM COATED ORAL at 08:23

## 2023-06-14 RX ADMIN — Medication 5 MG: at 20:27

## 2023-06-14 RX ADMIN — ASPIRIN 81 MG: 81 TABLET, CHEWABLE ORAL at 20:27

## 2023-06-14 RX ADMIN — ALLOPURINOL 300 MG: 300 TABLET ORAL at 08:07

## 2023-06-14 RX ADMIN — SODIUM CHLORIDE, PRESERVATIVE FREE 10 ML: 5 INJECTION INTRAVENOUS at 08:06

## 2023-06-14 RX ADMIN — ANORECTAL OINTMENT: 15.7; .44; 24; 20.6 OINTMENT TOPICAL at 20:32

## 2023-06-14 RX ADMIN — MESALAMINE 400 MG: 400 CAPSULE, DELAYED RELEASE ORAL at 14:51

## 2023-06-14 RX ADMIN — HYDROCHLOROTHIAZIDE 50 MG: 25 TABLET ORAL at 08:07

## 2023-06-14 RX ADMIN — Medication 400 MG: at 08:07

## 2023-06-14 RX ADMIN — ENOXAPARIN SODIUM 40 MG: 100 INJECTION SUBCUTANEOUS at 08:24

## 2023-06-14 RX ADMIN — LISINOPRIL 20 MG: 20 TABLET ORAL at 08:07

## 2023-06-14 RX ADMIN — POLYVINYL ALCOHOL 1 DROP: 14 SOLUTION/ DROPS OPHTHALMIC at 14:52

## 2023-06-14 RX ADMIN — CARVEDILOL 6.25 MG: 6.25 TABLET, FILM COATED ORAL at 08:07

## 2023-06-14 RX ADMIN — ASPIRIN 81 MG: 81 TABLET, CHEWABLE ORAL at 01:40

## 2023-06-14 RX ADMIN — CARVEDILOL 6.25 MG: 6.25 TABLET, FILM COATED ORAL at 20:27

## 2023-06-14 RX ADMIN — SODIUM CHLORIDE, PRESERVATIVE FREE 10 ML: 5 INJECTION INTRAVENOUS at 20:28

## 2023-06-14 RX ADMIN — CHOLESTYRAMINE 4 G: 4 POWDER, FOR SUSPENSION ORAL at 08:06

## 2023-06-14 RX ADMIN — FOLIC ACID 1 MG: 1 TABLET ORAL at 08:07

## 2023-06-14 RX ADMIN — TIMOLOL MALEATE 1 DROP: 5 SOLUTION/ DROPS OPHTHALMIC at 21:46

## 2023-06-14 RX ADMIN — MESALAMINE 400 MG: 400 CAPSULE, DELAYED RELEASE ORAL at 20:27

## 2023-06-14 RX ADMIN — Medication 1 TABLET: at 08:07

## 2023-06-14 RX ADMIN — Medication 400 MG: at 17:42

## 2023-06-14 RX ADMIN — MEROPENEM 1000 MG: 1 INJECTION, POWDER, FOR SOLUTION INTRAVENOUS at 17:41

## 2023-06-14 RX ADMIN — Medication 1000 MG: at 11:43

## 2023-06-14 RX ADMIN — ANORECTAL OINTMENT: 15.7; .44; 24; 20.6 OINTMENT TOPICAL at 11:20

## 2023-06-14 RX ADMIN — POLYVINYL ALCOHOL 1 DROP: 14 SOLUTION/ DROPS OPHTHALMIC at 20:32

## 2023-06-14 RX ADMIN — AMLODIPINE BESYLATE 5 MG: 5 TABLET ORAL at 08:07

## 2023-06-14 RX ADMIN — GABAPENTIN 300 MG: 600 TABLET, FILM COATED ORAL at 14:50

## 2023-06-14 ASSESSMENT — ENCOUNTER SYMPTOMS
ABDOMINAL DISTENTION: 0
VOMITING: 0
NAUSEA: 0
BLOOD IN STOOL: 0
CONSTIPATION: 0
WHEEZING: 0
ABDOMINAL PAIN: 0
SINUS PAIN: 0
DIARRHEA: 0
COUGH: 0
SHORTNESS OF BREATH: 0
TROUBLE SWALLOWING: 0
SORE THROAT: 0

## 2023-06-14 ASSESSMENT — PAIN SCALES - GENERAL: PAINLEVEL_OUTOF10: 0

## 2023-06-14 NOTE — H&P
last two UTIs  CBC with Diff    Chronic medical Problems:  Continue home regimen as indicated   aspirin  81 mg Oral Nightly    allopurinol  300 mg Oral Daily    amLODIPine  5 mg Oral Daily    oyster shell calcium w/D  1 tablet Oral Daily    [START ON 6/15/2023] conjugated estrogens   Vaginal Once per day on Mon Thu    cholestyramine light  4 g Oral Daily    fenofibrate  160 mg Oral Daily    folic acid  1 mg Oral Daily    gabapentin  300 mg Oral TID    mesalamine  400 mg Oral TID    therapeutic multivitamin-minerals  1 tablet Oral Daily    timolol  1 drop Both Eyes BID    polyvinyl alcohol  1 drop Ophthalmic TID    pantoprazole  20 mg Oral QAM AC    magnesium oxide  400 mg Oral BID WC    vitamin B-12  100 mcg Oral Daily    hydroCHLOROthiazide  50 mg Oral Daily    lisinopril  20 mg Oral Daily    carvedilol  6.25 mg Oral BID     Supportive and Prophylactic Txx:  DVT PPx: not indicated as such  GI (PUD) PPx: not indicated  PT: indicated as per age and admitted status  ADULT DIET; Regular; 4 carb choices (60 gm/meal); Low Fat/Low Chol/High Fiber/2 gm Na  lidocaine, tiZANidine, sodium chloride flush, sodium chloride, ondansetron **OR** ondansetron, acetaminophen **OR** acetaminophen, potassium chloride **OR** potassium alternative oral replacement **OR** potassium chloride, magnesium sulfate, polyethylene glycol, melatonin, calcium carbonate, sodium phosphate IVPB **OR** sodium phosphate IVPB      Care time of 45 minutes  Pt seen/examined and admitted to inpatient status. Inpatient status is used for patients with an expected LOS extending past two midnights due to medical therapy and or critical care needs, otherwise patients are placed to OBServation status. Signed:  Electronically signed by Ronnie Rosa MD on 6/14/23 at 6:20 AM CDT.

## 2023-06-14 NOTE — PLAN OF CARE
Problem: Discharge Planning  Goal: Discharge to home or other facility with appropriate resources  6/14/2023 1005 by Evelyne Cordero RN  Outcome: Progressing  6/14/2023 0318 by Christen Marvin RN  Outcome: Progressing     Problem: Pain  Goal: Verbalizes/displays adequate comfort level or baseline comfort level  6/14/2023 1005 by Evelyne Cordero RN  Outcome: Progressing  6/14/2023 0318 by Christen Marvin RN  Outcome: Progressing     Problem: Safety - Adult  Goal: Free from fall injury  6/14/2023 1005 by Evelyne Cordero RN  Outcome: Progressing  6/14/2023 0318 by Christen Marvin RN  Outcome: Progressing     Problem: Skin/Tissue Integrity  Goal: Absence of new skin breakdown  Description: 1. Monitor for areas of redness and/or skin breakdown  2. Assess vascular access sites hourly  3. Every 4-6 hours minimum:  Change oxygen saturation probe site  4. Every 4-6 hours:  If on nasal continuous positive airway pressure, respiratory therapy assess nares and determine need for appliance change or resting period.   6/14/2023 1005 by Evelyne Cordero RN  Outcome: Progressing  6/14/2023 0318 by Christen Marvin RN  Outcome: Progressing     Problem: ABCDS Injury Assessment  Goal: Absence of physical injury  6/14/2023 1005 by Evelyne Cordero RN  Outcome: Progressing  6/14/2023 0318 by Christen Marvin RN  Outcome: Progressing

## 2023-06-14 NOTE — PLAN OF CARE
Nutrition Problem #1: Inadequate oral intake, Altered nutrition-related lab values  Intervention: Food and/or Nutrient Delivery: Continue Current Diet  Nutritional

## 2023-06-14 NOTE — ED PROVIDER NOTES
[facility] EMERGENCY DEPT  eMERGENCY dEPARTMENT eNCOUnter      Pt Name: Reza Kennedy  MRN: [id number]  [place] 1945  Date of evaluation: 6/13/2023  Provider: Ama Saldana MD    [address] [place] [address]       Chief Complaint   Patient presents with    Fever    Urinary Tract Infection     S/p bladder removal 5/22/23 urostomy bag intact         HISTORY OF PRESENT ILLNESS   (Location/Symptom, Timing/Onset,Context/Setting, Quality, Duration, Modifying Factors, Severity)  Note limiting factors. Reza Kennedy is a 65 y.o. female who presents to the emergency department for concern of possible urinary tract infection. Patient underwent recent cystectomy at [facility] in [place] May 22 and has done very well postoperatively and now has a urostomy. Patient had bladder cancer that was contained she is not on chemotherapy or radiation treatment. Last week they noticed somewhat of a foul smell to the urine but supposedly was negative when it was tested. Today when she woke up she was slightly confused but alert and oriented here and family states her temperature was 102.5. Denies any other infectious symptoms. HPI    NursingNotes were reviewed. REVIEW OF SYSTEMS    (2-9 systems for level 4, 10 or more for level 5)     Review of Systems   Constitutional:  Positive for fatigue and fever. Negative for chills. HENT:  Negative for rhinorrhea and sore throat. Respiratory:  Negative for cough and shortness of breath. Cardiovascular:  Negative for chest pain and leg swelling. Gastrointestinal:  Negative for abdominal pain, diarrhea, nausea and vomiting. Musculoskeletal:  Negative for back pain and neck pain. Neurological:  Negative for dizziness and headaches. All other systems reviewed and are negative.          PAST MEDICALHISTORY     Past Medical History:   Diagnosis Date    Arthritis     Cancer [facility])     endometrial cancer    Cataract     CIDP (chronic inflammatory demyelinating polyneuropathy) ([facility])

## 2023-06-15 LAB
ABO + RH BLD: NORMAL
ANION GAP SERPL CALCULATED.3IONS-SCNC: 9 MMOL/L (ref 7–19)
BASOPHILS # BLD: 0.1 K/UL (ref 0–0.2)
BASOPHILS NFR BLD: 0.7 % (ref 0–1)
BLD GP AB SCN SERPL QL: NORMAL
BLOOD BANK DISPENSE STATUS: NORMAL
BLOOD BANK PRODUCT CODE: NORMAL
BPU ID: NORMAL
BUN SERPL-MCNC: 29 MG/DL (ref 8–23)
CALCIUM SERPL-MCNC: 8.2 MG/DL (ref 8.8–10.2)
CHLORIDE SERPL-SCNC: 107 MMOL/L (ref 98–111)
CO2 SERPL-SCNC: 28 MMOL/L (ref 22–29)
CREAT SERPL-MCNC: 0.8 MG/DL (ref 0.5–0.9)
DESCRIPTION BLOOD BANK: NORMAL
EOSINOPHIL # BLD: 0.3 K/UL (ref 0–0.6)
EOSINOPHIL NFR BLD: 4.2 % (ref 0–5)
ERYTHROCYTE [DISTWIDTH] IN BLOOD BY AUTOMATED COUNT: 15 % (ref 11.5–14.5)
GLUCOSE SERPL-MCNC: 104 MG/DL (ref 74–109)
HCT VFR BLD AUTO: 23.5 % (ref 37–47)
HCT VFR BLD AUTO: 31.2 % (ref 37–47)
HGB BLD-MCNC: 6.9 G/DL (ref 12–16)
HGB BLD-MCNC: 9.5 G/DL (ref 12–16)
IMM GRANULOCYTES # BLD: 0 K/UL
LYMPHOCYTES # BLD: 0.9 K/UL (ref 1.1–4.5)
LYMPHOCYTES NFR BLD: 11.6 % (ref 20–40)
MCH RBC QN AUTO: 25.3 PG (ref 27–31)
MCHC RBC AUTO-ENTMCNC: 29.4 G/DL (ref 33–37)
MCV RBC AUTO: 86.1 FL (ref 81–99)
MONOCYTES # BLD: 1.6 K/UL (ref 0–0.9)
MONOCYTES NFR BLD: 20.8 % (ref 0–10)
NEUTROPHILS # BLD: 4.8 K/UL (ref 1.5–7.5)
NEUTS SEG NFR BLD: 62.2 % (ref 50–65)
PLATELET # BLD AUTO: 240 K/UL (ref 130–400)
PMV BLD AUTO: 9.9 FL (ref 9.4–12.3)
POTASSIUM SERPL-SCNC: 3.7 MMOL/L (ref 3.5–5)
RBC # BLD AUTO: 2.73 M/UL (ref 4.2–5.4)
REASON FOR REJECTION: NORMAL
REJECTED TEST: NORMAL
SODIUM SERPL-SCNC: 144 MMOL/L (ref 136–145)
WBC # BLD AUTO: 7.7 K/UL (ref 4.8–10.8)

## 2023-06-15 PROCEDURE — 6360000002 HC RX W HCPCS: Performed by: HOSPITALIST

## 2023-06-15 PROCEDURE — 85014 HEMATOCRIT: CPT

## 2023-06-15 PROCEDURE — 85025 COMPLETE CBC W/AUTO DIFF WBC: CPT

## 2023-06-15 PROCEDURE — 94760 N-INVAS EAR/PLS OXIMETRY 1: CPT

## 2023-06-15 PROCEDURE — 6370000000 HC RX 637 (ALT 250 FOR IP): Performed by: HOSPITALIST

## 2023-06-15 PROCEDURE — 86850 RBC ANTIBODY SCREEN: CPT

## 2023-06-15 PROCEDURE — 36430 TRANSFUSION BLD/BLD COMPNT: CPT

## 2023-06-15 PROCEDURE — 2580000003 HC RX 258: Performed by: STUDENT IN AN ORGANIZED HEALTH CARE EDUCATION/TRAINING PROGRAM

## 2023-06-15 PROCEDURE — 86923 COMPATIBILITY TEST ELECTRIC: CPT

## 2023-06-15 PROCEDURE — 6370000000 HC RX 637 (ALT 250 FOR IP): Performed by: NURSE PRACTITIONER

## 2023-06-15 PROCEDURE — P9016 RBC LEUKOCYTES REDUCED: HCPCS

## 2023-06-15 PROCEDURE — 80048 BASIC METABOLIC PNL TOTAL CA: CPT

## 2023-06-15 PROCEDURE — 6360000002 HC RX W HCPCS: Performed by: STUDENT IN AN ORGANIZED HEALTH CARE EDUCATION/TRAINING PROGRAM

## 2023-06-15 PROCEDURE — 2580000003 HC RX 258: Performed by: HOSPITALIST

## 2023-06-15 PROCEDURE — 86901 BLOOD TYPING SEROLOGIC RH(D): CPT

## 2023-06-15 PROCEDURE — 1210000000 HC MED SURG R&B

## 2023-06-15 PROCEDURE — 36415 COLL VENOUS BLD VENIPUNCTURE: CPT

## 2023-06-15 PROCEDURE — 85018 HEMOGLOBIN: CPT

## 2023-06-15 PROCEDURE — 86900 BLOOD TYPING SEROLOGIC ABO: CPT

## 2023-06-15 RX ORDER — SODIUM CHLORIDE 9 MG/ML
INJECTION, SOLUTION INTRAVENOUS PRN
Status: DISCONTINUED | OUTPATIENT
Start: 2023-06-15 | End: 2023-06-15

## 2023-06-15 RX ORDER — GABAPENTIN 600 MG/1
600 TABLET ORAL 3 TIMES DAILY
Status: DISCONTINUED | OUTPATIENT
Start: 2023-06-15 | End: 2023-06-16 | Stop reason: HOSPADM

## 2023-06-15 RX ADMIN — LISINOPRIL 20 MG: 20 TABLET ORAL at 07:59

## 2023-06-15 RX ADMIN — Medication 400 MG: at 07:59

## 2023-06-15 RX ADMIN — MULTIPLE VITAMINS W/ MINERALS TAB 1 TABLET: TAB at 07:59

## 2023-06-15 RX ADMIN — HYDROCODONE BITARTRATE AND ACETAMINOPHEN 1 TABLET: 10; 325 TABLET ORAL at 09:20

## 2023-06-15 RX ADMIN — CARVEDILOL 6.25 MG: 6.25 TABLET, FILM COATED ORAL at 20:44

## 2023-06-15 RX ADMIN — FOLIC ACID 1 MG: 1 TABLET ORAL at 07:58

## 2023-06-15 RX ADMIN — MEROPENEM 1000 MG: 1 INJECTION, POWDER, FOR SOLUTION INTRAVENOUS at 00:19

## 2023-06-15 RX ADMIN — Medication 5 MG: at 20:43

## 2023-06-15 RX ADMIN — Medication 1 TABLET: at 07:58

## 2023-06-15 RX ADMIN — ASPIRIN 81 MG: 81 TABLET, CHEWABLE ORAL at 20:44

## 2023-06-15 RX ADMIN — ANORECTAL OINTMENT: 15.7; .44; 24; 20.6 OINTMENT TOPICAL at 20:49

## 2023-06-15 RX ADMIN — MESALAMINE 400 MG: 400 CAPSULE, DELAYED RELEASE ORAL at 15:25

## 2023-06-15 RX ADMIN — ANORECTAL OINTMENT: 15.7; .44; 24; 20.6 OINTMENT TOPICAL at 07:59

## 2023-06-15 RX ADMIN — Medication 400 MG: at 15:25

## 2023-06-15 RX ADMIN — HYDROCODONE BITARTRATE AND ACETAMINOPHEN 1 TABLET: 10; 325 TABLET ORAL at 15:25

## 2023-06-15 RX ADMIN — CARVEDILOL 6.25 MG: 6.25 TABLET, FILM COATED ORAL at 07:58

## 2023-06-15 RX ADMIN — MESALAMINE 400 MG: 400 CAPSULE, DELAYED RELEASE ORAL at 07:58

## 2023-06-15 RX ADMIN — SODIUM CHLORIDE, PRESERVATIVE FREE 10 ML: 5 INJECTION INTRAVENOUS at 20:42

## 2023-06-15 RX ADMIN — Medication 1000 MG: at 09:20

## 2023-06-15 RX ADMIN — MEROPENEM 1000 MG: 1 INJECTION, POWDER, FOR SOLUTION INTRAVENOUS at 07:58

## 2023-06-15 RX ADMIN — POLYVINYL ALCOHOL 1 DROP: 14 SOLUTION/ DROPS OPHTHALMIC at 20:49

## 2023-06-15 RX ADMIN — TIMOLOL MALEATE 1 DROP: 5 SOLUTION/ DROPS OPHTHALMIC at 08:00

## 2023-06-15 RX ADMIN — ALLOPURINOL 300 MG: 300 TABLET ORAL at 07:59

## 2023-06-15 RX ADMIN — AMLODIPINE BESYLATE 5 MG: 5 TABLET ORAL at 07:59

## 2023-06-15 RX ADMIN — FENOFIBRATE 160 MG: 160 TABLET ORAL at 07:59

## 2023-06-15 RX ADMIN — CHOLESTYRAMINE 4 G: 4 POWDER, FOR SUSPENSION ORAL at 07:58

## 2023-06-15 RX ADMIN — Medication 100 MCG: at 07:58

## 2023-06-15 RX ADMIN — GABAPENTIN 300 MG: 600 TABLET, FILM COATED ORAL at 07:59

## 2023-06-15 RX ADMIN — GABAPENTIN 600 MG: 600 TABLET, FILM COATED ORAL at 15:25

## 2023-06-15 RX ADMIN — TIMOLOL MALEATE 1 DROP: 5 SOLUTION/ DROPS OPHTHALMIC at 20:48

## 2023-06-15 RX ADMIN — PANTOPRAZOLE SODIUM 20 MG: 20 TABLET, DELAYED RELEASE ORAL at 07:58

## 2023-06-15 RX ADMIN — MEROPENEM 1000 MG: 1 INJECTION, POWDER, FOR SOLUTION INTRAVENOUS at 15:24

## 2023-06-15 RX ADMIN — GABAPENTIN 600 MG: 600 TABLET, FILM COATED ORAL at 20:44

## 2023-06-15 RX ADMIN — POLYVINYL ALCOHOL 1 DROP: 14 SOLUTION/ DROPS OPHTHALMIC at 08:00

## 2023-06-15 RX ADMIN — ENOXAPARIN SODIUM 40 MG: 100 INJECTION SUBCUTANEOUS at 07:59

## 2023-06-15 RX ADMIN — HYDROCHLOROTHIAZIDE 50 MG: 25 TABLET ORAL at 07:59

## 2023-06-15 RX ADMIN — MESALAMINE 400 MG: 400 CAPSULE, DELAYED RELEASE ORAL at 20:44

## 2023-06-15 ASSESSMENT — ENCOUNTER SYMPTOMS
VOMITING: 0
SORE THROAT: 0
SHORTNESS OF BREATH: 0
ABDOMINAL PAIN: 0
TROUBLE SWALLOWING: 0
NAUSEA: 0
CONSTIPATION: 0
BLOOD IN STOOL: 0
SINUS PAIN: 0
ABDOMINAL DISTENTION: 0
DIARRHEA: 0
COUGH: 0
WHEEZING: 0

## 2023-06-15 ASSESSMENT — PAIN SCALES - GENERAL
PAINLEVEL_OUTOF10: 0
PAINLEVEL_OUTOF10: 0
PAINLEVEL_OUTOF10: 6

## 2023-06-15 ASSESSMENT — PAIN DESCRIPTION - PAIN TYPE: TYPE: CHRONIC PAIN

## 2023-06-15 ASSESSMENT — PAIN DESCRIPTION - ORIENTATION: ORIENTATION: RIGHT;LEFT

## 2023-06-15 ASSESSMENT — PAIN DESCRIPTION - LOCATION: LOCATION: LEG

## 2023-06-15 ASSESSMENT — PAIN DESCRIPTION - DESCRIPTORS: DESCRIPTORS: ACHING

## 2023-06-15 ASSESSMENT — PAIN - FUNCTIONAL ASSESSMENT: PAIN_FUNCTIONAL_ASSESSMENT: PREVENTS OR INTERFERES SOME ACTIVE ACTIVITIES AND ADLS

## 2023-06-15 NOTE — PLAN OF CARE
Problem: Pain  Goal: Verbalizes/displays adequate comfort level or baseline comfort level  6/15/2023 1455 by Don Arredondo RN  Outcome: Not Progressing  6/15/2023 0137 by Isidra Kwon LPN  Outcome: Progressing     Problem: Safety - Adult  Goal: Free from fall injury  6/15/2023 1455 by Don Arredondo RN  Outcome: Not Progressing  6/15/2023 0137 by Isidra Kwon LPN  Outcome: Progressing  Flowsheets (Taken 6/14/2023 2038)  Free From Fall Injury: Instruct family/caregiver on patient safety     Problem: Skin/Tissue Integrity  Goal: Absence of new skin breakdown  6/15/2023 1455 by Don Arredondo RN  Outcome: Not Progressing  6/15/2023 0137 by Isidra Kwon LPN  Outcome: Progressing     Problem: ABCDS Injury Assessment  Goal: Absence of physical injury  Outcome: Not Progressing     Problem: Nutrition Deficit:  Goal: Optimize nutritional status  Outcome: Not Progressing     Problem: Discharge Planning  Goal: Discharge to home or other facility with appropriate resources  6/15/2023 1455 by Don Arredondo RN  Outcome: Not Progressing  6/15/2023 0137 by Isidra Kwon LPN  Outcome: Progressing  [address] (Taken 6/14/2023 2034)  Discharge to home or other facility with appropriate resources: Identify barriers to discharge with patient and caregiver     Problem: Pain  Goal: Verbalizes/displays adequate comfort level or baseline comfort level  6/15/2023 1455 by Don Arredondo RN  Outcome: Not Progressing  6/15/2023 0137 by Isidra Kwon LPN  Outcome: Progressing     Problem: Safety - Adult  Goal: Free from fall injury  6/15/2023 1455 by Don Arredondo RN  Outcome: Not Progressing  6/15/2023 0137 by Isidra Kwon LPN  Outcome: Progressing  [address] (Taken 6/14/2023 2038)  Free From Fall Injury: Instruct family/caregiver on patient safety     Problem: Skin/Tissue Integrity  Goal: Absence of new skin breakdown  6/15/2023 1455 by Don Arredondo RN  Outcome: Not

## 2023-06-15 NOTE — PLAN OF CARE
Problem: Skin/Tissue Integrity  Goal: Absence of new skin breakdown  Description: 1. Monitor for areas of redness and/or skin breakdown  2. Assess vascular access sites hourly  3. Every 4-6 hours minimum:  Change oxygen saturation probe site  4. Every 4-6 hours:  If on nasal continuous positive airway pressure, respiratory therapy assess nares and determine need for appliance change or resting period.   Outcome: Progressing     Problem: Safety - Adult  Goal: Free from fall injury  Outcome: Progressing  Flowsheets (Taken 6/14/2023 2038)  Free From Fall Injury: Instruct family/caregiver on patient safety     Problem: Pain  Goal: Verbalizes/displays adequate comfort level or baseline comfort level  Outcome: Progressing     Problem: Discharge Planning  Goal: Discharge to home or other facility with appropriate resources  Outcome: Progressing  Flowsheets (Taken 6/14/2023 2034)  Discharge to home or other facility with appropriate resources: Identify barriers to discharge with patient and caregiver

## 2023-06-16 VITALS
HEART RATE: 70 BPM | HEIGHT: 64 IN | BODY MASS INDEX: 21.63 KG/M2 | TEMPERATURE: 98.1 F | WEIGHT: 126.7 LBS | OXYGEN SATURATION: 96 % | RESPIRATION RATE: 18 BRPM | SYSTOLIC BLOOD PRESSURE: 128 MMHG | DIASTOLIC BLOOD PRESSURE: 62 MMHG

## 2023-06-16 LAB
ANION GAP SERPL CALCULATED.3IONS-SCNC: 10 MMOL/L (ref 7–19)
ANION GAP SERPL CALCULATED.3IONS-SCNC: 13 MMOL/L (ref 7–19)
BACTERIA UR CULT: ABNORMAL
BACTERIA UR CULT: ABNORMAL
BASOPHILS # BLD: 0 K/UL (ref 0–0.2)
BASOPHILS NFR BLD: 0.6 % (ref 0–1)
BUN SERPL-MCNC: 24 MG/DL (ref 8–23)
BUN SERPL-MCNC: 27 MG/DL (ref 8–23)
CALCIUM SERPL-MCNC: 8.3 MG/DL (ref 8.8–10.2)
CALCIUM SERPL-MCNC: 9 MG/DL (ref 8.8–10.2)
CHLORIDE SERPL-SCNC: 107 MMOL/L (ref 98–111)
CHLORIDE SERPL-SCNC: 110 MMOL/L (ref 98–111)
CO2 SERPL-SCNC: 25 MMOL/L (ref 22–29)
CO2 SERPL-SCNC: 27 MMOL/L (ref 22–29)
CREAT SERPL-MCNC: 0.7 MG/DL (ref 0.5–0.9)
CREAT SERPL-MCNC: 0.8 MG/DL (ref 0.5–0.9)
EOSINOPHIL # BLD: 0.7 K/UL (ref 0–0.6)
EOSINOPHIL NFR BLD: 10.6 % (ref 0–5)
ERYTHROCYTE [DISTWIDTH] IN BLOOD BY AUTOMATED COUNT: 14.9 % (ref 11.5–14.5)
GLUCOSE SERPL-MCNC: 101 MG/DL (ref 74–109)
GLUCOSE SERPL-MCNC: 105 MG/DL (ref 74–109)
HCT VFR BLD AUTO: 30.3 % (ref 37–47)
HGB BLD-MCNC: 9.2 G/DL (ref 12–16)
IMM GRANULOCYTES # BLD: 0.1 K/UL
LYMPHOCYTES # BLD: 0.7 K/UL (ref 1.1–4.5)
LYMPHOCYTES NFR BLD: 9.9 % (ref 20–40)
MAGNESIUM SERPL-MCNC: 1.2 MG/DL (ref 1.6–2.4)
MAGNESIUM SERPL-MCNC: 2.2 MG/DL (ref 1.6–2.4)
MCH RBC QN AUTO: 26.2 PG (ref 27–31)
MCHC RBC AUTO-ENTMCNC: 30.4 G/DL (ref 33–37)
MCV RBC AUTO: 86.3 FL (ref 81–99)
MONOCYTES # BLD: 1.4 K/UL (ref 0–0.9)
MONOCYTES NFR BLD: 19.9 % (ref 0–10)
NEUTROPHILS # BLD: 4 K/UL (ref 1.5–7.5)
NEUTS SEG NFR BLD: 58.3 % (ref 50–65)
ORGANISM: ABNORMAL
PLATELET # BLD AUTO: 254 K/UL (ref 130–400)
PMV BLD AUTO: 9.6 FL (ref 9.4–12.3)
POTASSIUM SERPL-SCNC: 3.4 MMOL/L (ref 3.5–5)
POTASSIUM SERPL-SCNC: 3.9 MMOL/L (ref 3.5–5)
RBC # BLD AUTO: 3.51 M/UL (ref 4.2–5.4)
SODIUM SERPL-SCNC: 145 MMOL/L (ref 136–145)
SODIUM SERPL-SCNC: 147 MMOL/L (ref 136–145)
WBC # BLD AUTO: 6.8 K/UL (ref 4.8–10.8)

## 2023-06-16 PROCEDURE — 97162 PT EVAL MOD COMPLEX 30 MIN: CPT

## 2023-06-16 PROCEDURE — 94760 N-INVAS EAR/PLS OXIMETRY 1: CPT

## 2023-06-16 PROCEDURE — 2580000003 HC RX 258: Performed by: NURSE PRACTITIONER

## 2023-06-16 PROCEDURE — 6370000000 HC RX 637 (ALT 250 FOR IP): Performed by: HOSPITALIST

## 2023-06-16 PROCEDURE — 83735 ASSAY OF MAGNESIUM: CPT

## 2023-06-16 PROCEDURE — 97110 THERAPEUTIC EXERCISES: CPT

## 2023-06-16 PROCEDURE — 6370000000 HC RX 637 (ALT 250 FOR IP): Performed by: NURSE PRACTITIONER

## 2023-06-16 PROCEDURE — 80048 BASIC METABOLIC PNL TOTAL CA: CPT

## 2023-06-16 PROCEDURE — 6360000002 HC RX W HCPCS: Performed by: HOSPITALIST

## 2023-06-16 PROCEDURE — 6360000002 HC RX W HCPCS: Performed by: NURSE PRACTITIONER

## 2023-06-16 PROCEDURE — 2580000003 HC RX 258: Performed by: HOSPITALIST

## 2023-06-16 PROCEDURE — 36415 COLL VENOUS BLD VENIPUNCTURE: CPT

## 2023-06-16 PROCEDURE — 85025 COMPLETE CBC W/AUTO DIFF WBC: CPT

## 2023-06-16 RX ORDER — POTASSIUM CHLORIDE 20 MEQ/1
40 TABLET, EXTENDED RELEASE ORAL ONCE
Status: COMPLETED | OUTPATIENT
Start: 2023-06-16 | End: 2023-06-16

## 2023-06-16 RX ORDER — AMOXICILLIN AND CLAVULANATE POTASSIUM 875; 125 MG/1; MG/1
1 TABLET, FILM COATED ORAL 2 TIMES DAILY
Qty: 14 TABLET | Refills: 0 | Status: SHIPPED | OUTPATIENT
Start: 2023-06-16 | End: 2023-06-23

## 2023-06-16 RX ORDER — LOPERAMIDE HYDROCHLORIDE 2 MG/1
2 CAPSULE ORAL 4 TIMES DAILY PRN
Status: DISCONTINUED | OUTPATIENT
Start: 2023-06-16 | End: 2023-06-16 | Stop reason: HOSPADM

## 2023-06-16 RX ADMIN — Medication 100 MCG: at 09:30

## 2023-06-16 RX ADMIN — MAGNESIUM SULFATE HEPTAHYDRATE 2000 MG: 40 INJECTION, SOLUTION INTRAVENOUS at 05:57

## 2023-06-16 RX ADMIN — LOPERAMIDE HYDROCHLORIDE 2 MG: 2 CAPSULE ORAL at 12:49

## 2023-06-16 RX ADMIN — MAGNESIUM SULFATE HEPTAHYDRATE 2000 MG: 40 INJECTION, SOLUTION INTRAVENOUS at 08:16

## 2023-06-16 RX ADMIN — Medication 1 TABLET: at 09:30

## 2023-06-16 RX ADMIN — POLYVINYL ALCOHOL 1 DROP: 14 SOLUTION/ DROPS OPHTHALMIC at 09:34

## 2023-06-16 RX ADMIN — FENOFIBRATE 160 MG: 160 TABLET ORAL at 09:31

## 2023-06-16 RX ADMIN — GABAPENTIN 600 MG: 600 TABLET, FILM COATED ORAL at 12:49

## 2023-06-16 RX ADMIN — CARVEDILOL 6.25 MG: 6.25 TABLET, FILM COATED ORAL at 09:32

## 2023-06-16 RX ADMIN — MEROPENEM 1000 MG: 1 INJECTION, POWDER, FOR SOLUTION INTRAVENOUS at 00:18

## 2023-06-16 RX ADMIN — MULTIPLE VITAMINS W/ MINERALS TAB 1 TABLET: TAB at 09:31

## 2023-06-16 RX ADMIN — CHOLESTYRAMINE 4 G: 4 POWDER, FOR SUSPENSION ORAL at 09:30

## 2023-06-16 RX ADMIN — FOLIC ACID 1 MG: 1 TABLET ORAL at 09:30

## 2023-06-16 RX ADMIN — HYDROCHLOROTHIAZIDE 50 MG: 25 TABLET ORAL at 09:32

## 2023-06-16 RX ADMIN — ANORECTAL OINTMENT: 15.7; .44; 24; 20.6 OINTMENT TOPICAL at 09:34

## 2023-06-16 RX ADMIN — GABAPENTIN 600 MG: 600 TABLET, FILM COATED ORAL at 09:31

## 2023-06-16 RX ADMIN — AMPICILLIN SODIUM 2000 MG: 2 INJECTION, POWDER, FOR SOLUTION INTRAVENOUS at 09:22

## 2023-06-16 RX ADMIN — Medication 400 MG: at 09:31

## 2023-06-16 RX ADMIN — PANTOPRAZOLE SODIUM 20 MG: 20 TABLET, DELAYED RELEASE ORAL at 09:42

## 2023-06-16 RX ADMIN — MESALAMINE 400 MG: 400 CAPSULE, DELAYED RELEASE ORAL at 09:30

## 2023-06-16 RX ADMIN — ENOXAPARIN SODIUM 40 MG: 100 INJECTION SUBCUTANEOUS at 09:29

## 2023-06-16 RX ADMIN — MESALAMINE 400 MG: 400 CAPSULE, DELAYED RELEASE ORAL at 12:50

## 2023-06-16 RX ADMIN — LISINOPRIL 20 MG: 20 TABLET ORAL at 09:33

## 2023-06-16 RX ADMIN — ALLOPURINOL 300 MG: 300 TABLET ORAL at 09:31

## 2023-06-16 RX ADMIN — HYDROCODONE BITARTRATE AND ACETAMINOPHEN 1 TABLET: 10; 325 TABLET ORAL at 12:50

## 2023-06-16 RX ADMIN — POTASSIUM CHLORIDE 40 MEQ: 1500 TABLET, EXTENDED RELEASE ORAL at 09:30

## 2023-06-16 RX ADMIN — AMLODIPINE BESYLATE 5 MG: 5 TABLET ORAL at 09:33

## 2023-06-16 RX ADMIN — TIMOLOL MALEATE 1 DROP: 5 SOLUTION/ DROPS OPHTHALMIC at 09:35

## 2023-06-16 NOTE — DISCHARGE SUMMARY
CBC:   Recent Labs     06/14/23  0845 06/15/23  0253 06/15/23  1707 06/16/23  0319   WBC 12.6* 7.7  --  6.8   HGB 7.3* 6.9* 9.5* 9.2*    240  --  254     BMP:    Recent Labs     06/14/23  0845 06/15/23  0253 06/16/23  0319    144 147*   K 3.8 3.7 3.4*    107 110   CO2 26 28 27   BUN 36* 29* 27*   CREATININE 0.8 0.8 0.8   GLUCOSE 152* 104 105         Physical Exam:   Vital Signs: /62   Pulse 70   Temp 98.1 °F (36.7 °C) (Temporal)   Resp 18   Ht 5' 4\" (1.626 m)   Wt 126 lb 11.2 oz (57.5 kg)   SpO2 96%   BMI 21.75 kg/m²   General appearance:. Alert and Cooperative   HEENT: Normocephalic. Chest: Lung sounds clear bilaterally without wheezes or rhonchi. Cardiac: RRR, S1, S2 normal. No murmurs, gallops, or rubs auscultated. Abdomen: soft, non-tender; non-distended normal bowel sounds no masses, no organomegaly. Extremities: No clubbing or cyanosis. No peripheral edema. Peripheral pulses palpable. Neurologic: Grossly intact.         Discharge Medications:          Medication List        START taking these medications      amoxicillin-clavulanate 875-125 MG per tablet  Commonly known as: AUGMENTIN  Take 1 tablet by mouth 2 times daily for 7 days            CONTINUE taking these medications      allopurinol 300 MG tablet  Commonly known as: ZYLOPRIM     amLODIPine 5 MG tablet  Commonly known as: NORVASC     aspirin 81 MG tablet     Calcium Plus Vitamin D3 600-20 MG-MCG Tabs  Generic drug: calcium carb-cholecalciferol     calcium-vitamin D 250-125 MG-UNIT per tablet  Commonly known as: OSCAL     carvedilol 6.25 MG tablet  Commonly known as: COREG     colestipol 1 g tablet  Commonly known as: COLESTID     CRANBERRY SOFT PO     fenofibrate 145 MG tablet  Commonly known as: TRICOR     fish oil 1000 MG capsule     folic acid 1 MG tablet  Commonly known as: FOLVITE     gabapentin 600 MG tablet  Commonly known as: NEURONTIN  TAKE 1 TABLET THREE TIMES A DAY     Garlic 4835 MG Caps

## 2023-06-16 NOTE — DISCHARGE INSTRUCTIONS
Ostomy supplies needed:    Coloplast 39772 1 5/16\" [employer] [place] Deep Convex MAXI Urostomy Pouch  Coloplast [address] [facility]  Coloplast [address] Paste  Coloplast [address] for [employer] [place] 40\"    Stoma Care: Change the ostomy pouch on schedule 2-3 times per week and immediately if leaking. Prepare your pouch with ring prior to removing old pouch. Cut the pouch at 25 mm and let the ring overlap the hole slightly. Clean the peristomal skin with plain warm water and dry. Place the ostomy pouch and use the belt. When using drainage bag secure to the upper leg with medical tape or bandaid to keep tubing from twisting pouch. You may need to use paste strip at the 3:00 and 9:00 o'clock positions in the creases.

## 2023-06-16 NOTE — PROGRESS NOTES
.. Lizandro Gordon arrived to room # 326. Presented with: uti  Mental Status: Patient is oriented, alert, coherent, logical, thought processes intact, and able to concentrate and follow conversation. Vitals:    06/14/23 0047   BP: (!) 118/59   Pulse: (!) 108   Resp: 16   Temp: 100.2 °F (37.9 °C)   SpO2: 97%     Patient safety contract and falls prevention contract reviewed with patient Yes. Oriented Patient to room. Call light within reach. Yes. Needs, issues or concerns expressed at this time: yes, need to use restroom.       Electronically signed by Melecio Wayne RN on 6/14/2023 at 4:19 AM
..4 Eyes Skin Assessment    Ismael Chilel is being assessed upon: Admission    I agree that Luis M Johnson, RN, along with Cheryl Laws, RN (either 2 RN's or 1 LPN and 1 RN) have performed a thorough Head to Toe Skin Assessment on the patient. ALL assessment sites listed below have been assessed. Areas assessed by both nurses:     [x]   Head, Face, and Ears   [x]   Shoulders, Back, and Chest  [x]   Arms, Elbows, and Hands   [x]   Coccyx, Sacrum, and Ischium  [x]   Legs, Feet, and Heels    Does the Patient have Skin Breakdown? Yes, wound(s) noted upon assessment. It is the responsibility of the Primary Nurse to assure that the following documentation, preventions, orders, and consults are complete on the above noted wound(s): Wound LDA initiated. LDA Flowsheet Documentation includes the Lara-wound, Wound Assessment, Measurements, Dressing Treatment, Drainage, and Color. Sacrum; pic taken; st 2  Gage Prevention initiated: Yes  Wound Care Orders initiated: Yes    [address]  nurse consulted for Pressure Injury (Stage 3,4, Unstageable, DTI, NWPT, and Complex wounds) and New or Established Ostomies: Yes  Urostomy (recent cystecomty)      Primary Nurse eSignature:  Jojo Kay RN on 6/14/2023 at 4:25 AM      Co-Signer eSignature: Electronically signed by Thuy Tran RN on 6/14/23 at 5:06 AM CDT
06/14/23 0920   Encounter Summary   Encounter Overview/Reason  Attempted Encounter   Service Provided For: Patient not available   Referral/Consult From: Nurse  (Consult:  Advance Directives)   Leo Muñoz  (Daughter)   Complexity of Encounter Low   Begin Time 0920   End Time  0921   Total Time Calculated 1 min   Assessment/Intervention/Outcome   Assessment Unable to assess  (Pt sleeping)   Plan and Referrals   Plan/Referrals Continue to visit, (comment)  (To check tomorrow)     Electronically signed by [facility] on 6/14/2023 at 9:24 AM
[address] [facility] Pharmacokinetic Monitoring Service - Vancomycin     Davin Sanchez is a 65 y.o. female starting on vancomycin therapy for UTI. Pharmacy consulted by Dr. Martine Robison for monitoring and adjustment. Target Concentration: Goal AUC/MAITE 400-600 mg*hr/L    Additional Antimicrobials: meropenem    Pertinent Laboratory Values: Wt Readings from Last 1 Encounters:   06/13/23 127 lb (57.6 kg)     Temp Readings from Last 1 Encounters:   06/14/23 99 °F (37.2 °C)     Estimated Creatinine Clearance: 40 mL/min (A) (based on SCr of 1 mg/dL (H)). Recent Labs     06/13/23  2130   CREATININE 1.0*   WBC 16.8*     Procalcitonin: No level    Pertinent Cultures:  Culture Date Source Results   06/13/23 Urine No result   06/13/23 Blood Sent   MRSA Nasal Swab: N/A. Non-respiratory infection.     Plan:  Dosing recommendations based on Bayesian software  Start vancomycin 1000mg IV q 24 hours  Anticipated AUC of 453 and trough concentration of 13.7 at steady state  Renal labs as indicated   Vancomycin concentration ordered for 06/16/23 @ 0930   Pharmacy will continue to monitor patient and adjust therapy as indicated    Thank you for the consult,  TOBY SALOMON, PHARM D, 6/14/2023, 8:45 AM
Comprehensive Nutrition Assessment    Type and Reason for Visit:  Initial, Positive Nutrition Screen    Nutrition Recommendations/Plan:   Start ONS. Follow for po intake and tolerance     Malnutrition Assessment:  Malnutrition Status: At risk for malnutrition (Comment) (06/14/23 1322)    Context:  Acute Illness     Findings of the 6 clinical characteristics of malnutrition:  Energy Intake:  Mild decrease in energy intake (Comment)  Weight Loss:  No significant weight loss     Body Fat Loss:  Mild body fat loss Orbital, Buccal region   Muscle Mass Loss:  No significant muscle mass loss    Fluid Accumulation:  No significant fluid accumulation Extremities   Strength:  Not Performed    Nutrition Assessment:    +NS for decreaed weight and po intake. PO intake is fair with intake ranging 25-50%. Current weight has decreased, but approx 4-6# less from current weight in the past 1-6months. Nutrition Related Findings:    urostomy Wound Type: None (urostomy)       Current Nutrition Intake & Therapies:    Average Meal Intake: 26-50%  Average Supplements Intake: Unable to assess  ADULT DIET; Regular; 4 carb choices (60 gm/meal); Low Fat/Low Chol/High Fiber/2 gm Na  ADULT ORAL NUTRITION SUPPLEMENT; Breakfast, Lunch, Dinner; Diabetic Oral Supplement    Anthropometric Measures:  Height: 5' 4\" (162.6 cm)  Ideal Body Weight (IBW): 120 lbs (55 kg)    Admission Body Weight: 127 lb (57.6 kg)  Current Body Weight: 127 lb (57.6 kg), 105.8 % IBW. Weight Source: Stated  Current BMI (kg/m2): 21.8  Usual Body Weight: 132 lb (59.9 kg) (5/2023.   131# 112.8oz 12/2022)  % Weight Change (Calculated): -3.8  Weight Adjustment For: No Adjustment                 BMI Categories: Normal Weight (BMI 18.5-24. 9)    Estimated Daily Nutrient Needs:  Energy Requirements Based On: Kcal/kg  Weight Used for Energy Requirements: Current  Energy (kcal/day): 1957-0381 kcals (25-30 kcals/kg)  Weight Used for Protein Requirements: Current  Protein
PHARMACY NOTE  Tiffanie Boggs was ordered Fish Oil, Cranberry, and Garlic. Per the [facility] Formulary Committee, this medication is non-formulary and not stocked by pharmacy. It has been discontinued. The medication can be reordered at discharge.      Electronically signed by Robert Mejia [facility] on 6/14/2023 at 1:02 AM
Pharmacy Renal Adjustment    Selena Murray is a 65 y.o. female. Pharmacy has renally adjusted medications per protocol. Recent Labs     06/14/23  0845 06/15/23  0253   BUN 36* 29*       Recent Labs     06/14/23  0845 06/15/23  0253   CREATININE 0.8 0.8       Estimated Creatinine Clearance: 50 mL/min (based on SCr of 0.8 mg/dL).     Height:   Ht Readings from Last 1 Encounters:   06/14/23 5' 4\" (1.626 m)     Weight:  Wt Readings from Last 1 Encounters:   06/13/23 127 lb (57.6 kg)       Plan: Adjust the following medications based on renal function:           Gabapentin back to home dose of 600 mg three times daily    Electronically signed by RIKKI [facility] on 6/15/2023 at 2:10 PM
Physical Therapy  Facility/Department: [facility] 3 PAULY/VAS/MED  Physical Therapy Initial Assessment    Name: Nate Lujan  :   MRN: [id number]  Date of Service: 2023    Discharge Recommendations:  Continue to assess pending progress, 24 hour supervision or assist, Patient would benefit from continued therapy after discharge          Patient Diagnosis(es): The primary encounter diagnosis was Sepsis without acute organ dysfunction, due to unspecified organism [facility]). A diagnosis of Acute cystitis without hematuria was also pertinent to this visit. 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 Past Surgical History:  has a past surgical history that includes Hysterectomy (); Carpal tunnel release (Bilateral, ); Knee arthroscopy (Right); back surgery; Cataract removal (Bilateral); Hammer toe surgery (Left); lumbar fusion; Cholecystectomy, laparoscopic; hx vascular angioplasty; vascular surgery (2022); Rectal surgery (N/A, 2022); Cervical spine surgery; and Bladder removal (2023). Assessment   Body Structures, Functions, Activity Limitations Requiring Skilled Therapeutic Intervention: Decreased functional mobility ; Decreased ROM; Decreased endurance;Decreased strength;Decreased sensation;Decreased balance  Assessment: Pt. will benefit from cont. PT to decrease impairments. Pt. a fall risk and should not attempt mobility on her own at this time. Pt. needs to use RW and 24 hr care until a little stronger if going home today. Encouraged out of bed to chair and will work on progressive mobility while pt is in acute care.  Pt. states HHPT is planning on
Physician Progress Note      Eris Garsia  CSN #:                  [id number]  :                       1945  ADMIT DATE:       2023 9:31 PM  [address] [place] DATE:  RESPONDING  PROVIDER #:        Marcin TORRES        QUERY TEXT:    Type of Anemia: Please provide further specificity, if known. Clinical indicators include: cancer, hgb, blood in stool, bleed, vitamin b-12,   anemia, iron, transfuse  Options provided:  -- Anemia due to acute blood loss  -- Anemia due to chronic blood loss  -- Anemia due to iron deficiency  -- Anemia due to postoperative blood loss  -- Anemia due to chronic disease  -- Other - I will add my own diagnosis  -- Disagree - Not applicable / Not valid  -- Disagree - Clinically Unable to determine / Unknown        PROVIDER RESPONSE TEXT:    The patient has anemia due to chronic disease.       Electronically signed by:  Paul Newby 6/15/2023 2:42 PM
Spoke with Kp Conde regarding her spot on her sacrum, he stated to d/c mepilex and just use the cream he ordered as this is most likely from moisture.
[facility]      [facility] Note    Patient:  Nate Lujan  YOB: 1945  Date of Service: 6/14/2023  MRN: [id number]   Acct: [de-identified]   Primary Care Physician: Tony Jernigan DO  Advance Directive: Full Code  Admit Date: 6/13/2023       Hospital Day: 1    Portions of this note have been copied forward, however, updated to reflect the most current clinical status of this patient. CHIEF COMPLAINT fever/ UTI     SUBJECTIVE:  Ms. Renate Kothari was resting in bed this morning. Denies fever, chills, nausea, or vomiting. Denies suprapubic pain. CUMULATIVE HOSPITAL COURSE:   The patient is a 65 y.o. female with PMH of Recent urostomy due to bladder cancer, s/p cystectomy, Crohn's disease, HTN, PVD, and endometrial cancer who presented to [facility] ED with multiple health concerns. Reported noticing foul smell to urine but tested negative for UTI. Family reported patient having fever, lethargic and altered mental status. Of note patient underwent cystectomy due to bladder cancer at [facility] in [place] on May 22, 2023, s/p urostomy. Workup in ED revealed Cr 1.0, WBC 16.8, Hgb 9.4, Urinalysis indicated large leukocytes, positive nitrite, +1 bacteria, WBC 31-50. Patient was admitted to hospital medicine for further evaluation. Empiric antibiotics with Merrem was initiated due to history of MDRO. Vancomycin added for recent UTI with Enterococcus. Follow-up urine culture. Review of Systems   Constitutional:  Negative for chills, diaphoresis, fatigue and fever. HENT:  Negative for congestion, ear pain, sinus pain, sore throat and trouble swallowing. Eyes:  Negative for visual disturbance. Respiratory:  Negative for cough, shortness of breath and wheezing. Cardiovascular:  Negative for chest pain, palpitations and leg swelling. Gastrointestinal:  Negative for abdominal distention, abdominal pain, blood in stool, constipation, diarrhea, nausea and vomiting.
Palpations: Abdomen is soft. Tenderness: There is no abdominal tenderness. Genitourinary:     Comments: Urostomy in place  Musculoskeletal:         General: No swelling. Normal range of motion. Cervical back: Normal range of motion and neck supple. Right lower leg: No edema. Left lower leg: No edema. Skin:     General: Skin is warm and dry. Coloration: Skin is not jaundiced. Findings: No erythema, lesion or rash. Neurological:      General: No focal deficit present. Mental Status: She is alert and oriented to person, place, and time. Mental status is at baseline. Cranial Nerves: No cranial nerve deficit. Sensory: No sensory deficit. Motor: No weakness. Comments: A&O x 3   Psychiatric:         Mood and Affect: Mood normal.         Behavior: Behavior normal.         Thought Content:  Thought content normal.         Judgment: Judgment normal.          Medications:      sodium chloride        aspirin  81 mg Oral Nightly    allopurinol  300 mg Oral Daily    amLODIPine  5 mg Oral Daily    oyster shell calcium w/D  1 tablet Oral Daily    conjugated estrogens   Vaginal Once per day on Mon Thu    cholestyramine light  4 g Oral Daily    fenofibrate  160 mg Oral Daily    folic acid  1 mg Oral Daily    gabapentin  300 mg Oral TID    mesalamine  400 mg Oral TID    therapeutic multivitamin-minerals  1 tablet Oral Daily    timolol  1 drop Both Eyes BID    polyvinyl alcohol  1 drop Ophthalmic TID    pantoprazole  20 mg Oral QAM AC    magnesium oxide  400 mg Oral BID WC    vitamin B-12  100 mcg Oral Daily    hydroCHLOROthiazide  50 mg Oral Daily    lisinopril  20 mg Oral Daily    carvedilol  6.25 mg Oral BID    sodium chloride flush  5-40 mL IntraVENous 2 times per day    enoxaparin  40 mg SubCUTAneous Daily    melatonin  5 mg Oral Nightly    vancomycin (VANCOCIN) intermittent dosing (placeholder)   Other RX Placeholder    vancomycin  1,000 mg IntraVENous Q24H
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 N90.89    Seps[facility] secondary to [facility] ([facility] A41.9, N39.0    Multiple drug resistant organism (MDRO) culture positive Z16.24       Assessment                       Urostomy RLQ (Active)   Stomal Appliance 1 piece; Changed 06/16/23 1203   Flange Size (inches) 1.3 Inches 06/16/23 1203   Stoma  Assessment Retracted;Red; Other (Comment) 06/16/23 1203   Peristomal Assessment Clean, dry & intact 06/16/23 1203   Collection Container Other (Comment) 06/16/23 1203   Treatment Bag change;Stoma powder;Site care; Other (Comment) 06/16/23 1203   Urine Color Yellow 06/16/23 1203   Urine Appearance Clear 06/16/23 1203   Output (ml) 700 ml 06/16/23 0604   Number of days:          Intake/Output Summary (Last 24 hours) at 6/16/2023 1214  Last data filed at 6/16/2023 0948  Gross per 24 hour   Intake 4056 ml   Output 1200 ml   Net 2856 ml         Plan   Plan for Ostomy Care:        Ostomy Plan of Care  [x] Supplies/Instructions left in room  [] Patient using home supplies  [x] Brand/supplies at bedside -[employer]  [] Current pouching system     Current Diet: ADULT DIET; Regular; 4 carb choices (60 gm/meal);  Low Fat/Low Chol/High Fiber/2 gm Na  ADULT ORAL NUTRITION SUPPLEMENT; Breakfast, Lunch, Dinner; Diabetic Oral Supplement  Dietician consult:  N/A    Discharge Plan:  Placement for patient upon discharge: home with support    Outpatient visit plan No  Supplies given Yes   Samples requested N/A    Referrals:  []   [] [facility]  [] Supplies  [] Other      Patient/Caregiver Teaching:  Written Instructions given to patient/family n/a  Teaching provided:  [] Reviewed GI and A&P        [] Supplies  [] Pouch emptying      [] Manipulate closure  [] Routine Care         [] Comment  [] Pouch maintenance           Level of patient/caregiver understanding able to:  [x] Indicates understanding       [] Needs reinforcement  [] Unsuccessful      [x] Verbal Understanding  [] Demonstrated

## 2023-06-16 NOTE — PLAN OF CARE
Problem: Discharge Planning  Goal: Discharge to home or other facility with appropriate resources  6/16/2023 0059 by Rebecca Nelson LPN  Outcome: Progressing  Flowsheets (Taken 6/15/2023 2046)  Discharge to home or other facility with appropriate resources: Identify barriers to discharge with patient and caregiver  6/15/2023 1455 by Herman Doss RN  Outcome: Not Progressing     Problem: Pain  Goal: Verbalizes/displays adequate comfort level or baseline comfort level  6/16/2023 0059 by Rebecca Nelson LPN  Outcome: Progressing  6/15/2023 1455 by Herman Doss RN  Outcome: Not Progressing     Problem: Safety - Adult  Goal: Free from fall injury  6/16/2023 0059 by Rebecca Nelson LPN  Outcome: Helen Leung (Taken 6/15/2023 2053)  Free From Fall Injury: Instruct family/caregiver on patient safety  6/15/2023 1455 by Herman Doss RN  Outcome: Not Progressing     Problem: Skin/Tissue Integrity  Goal: Absence of new skin breakdown  Description: 1. Monitor for areas of redness and/or skin breakdown  2. Assess vascular access sites hourly  3. Every 4-6 hours minimum:  Change oxygen saturation probe site  4. Every 4-6 hours:  If on nasal continuous positive airway pressure, respiratory therapy assess nares and determine need for appliance change or resting period.   6/16/2023 0059 by Rebecca Nelson LPN  Outcome: Progressing  6/15/2023 1455 by Herman Doss RN  Outcome: Not Progressing     Problem: ABCDS Injury Assessment  Goal: Absence of physical injury  6/15/2023 1455 by Herman Doss RN  Outcome: Not Progressing     Problem: Nutrition Deficit:  Goal: Optimize nutritional status  6/15/2023 1455 by Herman Doss RN  Outcome: Not Progressing

## 2023-06-16 NOTE — CARE COORDINATION
06/16/23 1116   IMM Letter   IMM Letter given to Patient/Family/Significant other/Guardian/POA/by: explained to pt and daughter. Luis TIDWELLCM   IMM Letter date given: 06/15/23   IMM Letter time given: 1104     Important Message from [facility] letter explained and provided to patient by Malathi Fishman RN CM. All questions answered. Patient voiced understanding. Copy placed in soft chart.
[facility] notified of patient discharge today. DC Summary and DC med list faxed.   Electronically signed by Deandre Singh on 6/16/23 at 12:10 PM CDT
Patient is current with [address] [address] for Skilled Nursing, PT Services. Will follow. Please advise when patient discharges. WILL NEED RESUMPTION OF CARE ORDERS TO RESUME HH SERVICES WHEN PATIENT DISCHARGES. Thank you. [address] [place] [address] [phone]. [phone].     Genice [place] RN, Home Care Liaison  [phone] P  Electronically signed by Genice Phoenix on 6/14/2023 at 8:22 AM
and/or Patient Representative Agree with the Discharge Plan?       Rene Casas RN  Case Management Department  Ph: [phone] Fax:

## 2023-06-16 NOTE — PLAN OF CARE
Problem: Discharge Planning  Goal: Discharge to home or other facility with appropriate resources  6/16/2023 1228 by Brian Diaz RN  Outcome: Adequate for Discharge  6/16/2023 0059 by William Aponte LPN  Outcome: Progressing  Flowsheets (Taken 6/15/2023 2046)  Discharge to home or other facility with appropriate resources: Identify barriers to discharge with patient and caregiver     Problem: Pain  Goal: Verbalizes/displays adequate comfort level or baseline comfort level  6/16/2023 1228 by Brian Diaz RN  Outcome: Adequate for Discharge  6/16/2023 0059 by William Aponte LPN  Outcome: Progressing     Problem: Safety - Adult  Goal: Free from fall injury  6/16/2023 1228 by Brian Diaz RN  Outcome: Adequate for Discharge  6/16/2023 0059 by William Aponte LPN  Outcome: Progressing  Flowsheets (Taken 6/15/2023 2053)  Free From Fall Injury: Instruct family/caregiver on patient safety     Problem: Skin/Tissue Integrity  Goal: Absence of new skin breakdown  6/16/2023 1228 by Brian Diaz RN  Outcome: Adequate for Discharge  6/16/2023 0059 by William Aponte LPN  Outcome: Progressing     Problem: ABCDS Injury Assessment  Goal: Absence of physical injury  Outcome: Adequate for Discharge     Problem: Nutrition Deficit:  Goal: Optimize nutritional status  Outcome: Adequate for Discharge

## 2023-06-19 LAB
BACTERIA BLD CULT ORG #2: NORMAL
BACTERIA BLD CULT: NORMAL

## 2023-06-20 ENCOUNTER — HOSPITAL ENCOUNTER (INPATIENT)
Age: 78
LOS: 1 days | Discharge: ANOTHER HOSPITAL | DRG: 699 | End: 2023-06-21
Attending: HOSPITALIST | Admitting: HOSPITALIST
Payer: MEDICARE

## 2023-06-20 ENCOUNTER — APPOINTMENT (OUTPATIENT)
Dept: GENERAL RADIOLOGY | Age: 78
DRG: 699 | End: 2023-06-20
Payer: MEDICARE

## 2023-06-20 DIAGNOSIS — R31.9 HEMATURIA OF UNKNOWN ETIOLOGY: ICD-10-CM

## 2023-06-20 DIAGNOSIS — N17.9 AKI (ACUTE KIDNEY INJURY) (HCC): Primary | ICD-10-CM

## 2023-06-20 LAB
ALBUMIN SERPL-MCNC: 3.1 G/DL (ref 3.5–5.2)
ALP SERPL-CCNC: 62 U/L (ref 35–104)
ALT SERPL-CCNC: 10 U/L (ref 5–33)
ANION GAP SERPL CALCULATED.3IONS-SCNC: 10 MMOL/L (ref 7–19)
AST SERPL-CCNC: 12 U/L (ref 5–32)
BACTERIA #/AREA URNS HPF: ABNORMAL /HPF
BASOPHILS # BLD: 0 K/UL (ref 0–0.2)
BASOPHILS NFR BLD: 0 % (ref 0–1)
BILIRUB SERPL-MCNC: <0.2 MG/DL (ref 0.2–1.2)
BILIRUB UR QL STRIP: NEGATIVE
BNP BLD-MCNC: 222 PG/ML (ref 0–449)
BUN SERPL-MCNC: 67 MG/DL (ref 8–23)
CALCIUM SERPL-MCNC: 8.7 MG/DL (ref 8.8–10.2)
CHLORIDE SERPL-SCNC: 108 MMOL/L (ref 98–111)
CLARITY UR: CLEAR
CO2 SERPL-SCNC: 24 MMOL/L (ref 22–29)
COLOR UR: YELLOW
CREAT SERPL-MCNC: 1.8 MG/DL (ref 0.5–0.9)
CRYSTALS URNS MICRO: ABNORMAL /HPF
EOSINOPHIL # BLD: 0.63 K/UL (ref 0–0.6)
EOSINOPHIL NFR BLD: 7 % (ref 0–5)
ERYTHROCYTE [DISTWIDTH] IN BLOOD BY AUTOMATED COUNT: 15.8 % (ref 11.5–14.5)
GLUCOSE SERPL-MCNC: 144 MG/DL (ref 74–109)
GLUCOSE UR STRIP.AUTO-MCNC: NEGATIVE MG/DL
HCT VFR BLD AUTO: 32.1 % (ref 37–47)
HGB BLD-MCNC: 9.8 G/DL (ref 12–16)
HGB UR STRIP.AUTO-MCNC: ABNORMAL MG/L
IMM GRANULOCYTES # BLD: 0 K/UL
IRON SATN MFR SERPL: 6 % (ref 14–50)
IRON SERPL-MCNC: 19 UG/DL (ref 37–145)
KETONES UR STRIP.AUTO-MCNC: NEGATIVE MG/DL
LEUKOCYTE ESTERASE UR QL STRIP.AUTO: ABNORMAL
LYMPHOCYTES # BLD: 1.4 K/UL (ref 1.1–4.5)
LYMPHOCYTES NFR BLD: 15 % (ref 20–40)
MAGNESIUM SERPL-MCNC: 1.6 MG/DL (ref 1.6–2.4)
MCH RBC QN AUTO: 26.1 PG (ref 27–31)
MCHC RBC AUTO-ENTMCNC: 30.5 G/DL (ref 33–37)
MCV RBC AUTO: 85.4 FL (ref 81–99)
MONOCYTES # BLD: 1.6 K/UL (ref 0–0.9)
MONOCYTES NFR BLD: 18 % (ref 0–10)
MUCOUS THREADS URNS QL MICRO: ABNORMAL /LPF
NEUTROPHILS # BLD: 5.4 K/UL (ref 1.5–7.5)
NEUTS SEG NFR BLD: 60 % (ref 50–65)
NITRITE UR QL STRIP.AUTO: NEGATIVE
PH UR STRIP.AUTO: 6.5 [PH] (ref 5–8)
PLATELET # BLD AUTO: 303 K/UL (ref 130–400)
PLATELET SLIDE REVIEW: ADEQUATE
PMV BLD AUTO: 10.2 FL (ref 9.4–12.3)
POTASSIUM SERPL-SCNC: 4.9 MMOL/L (ref 3.5–5)
PROT SERPL-MCNC: 6.1 G/DL (ref 6.6–8.7)
PROT UR STRIP.AUTO-MCNC: ABNORMAL MG/DL
RBC # BLD AUTO: 3.76 M/UL (ref 4.2–5.4)
RBC #/AREA URNS HPF: ABNORMAL /HPF (ref 0–2)
SODIUM SERPL-SCNC: 142 MMOL/L (ref 136–145)
SP GR UR STRIP.AUTO: 1.01 (ref 1–1.03)
SQUAMOUS #/AREA URNS HPF: ABNORMAL /HPF
TIBC SERPL-MCNC: 298 UG/DL (ref 250–400)
URATE SERPL-MCNC: 5 MG/DL (ref 2.4–5.7)
UROBILINOGEN UR STRIP.AUTO-MCNC: 0.2 E.U./DL
WBC # BLD AUTO: 9 K/UL (ref 4.8–10.8)
WBC #/AREA URNS HPF: ABNORMAL /HPF (ref 0–5)
YEAST #/AREA URNS HPF: PRESENT /HPF

## 2023-06-20 PROCEDURE — 84550 ASSAY OF BLOOD/URIC ACID: CPT

## 2023-06-20 PROCEDURE — 85025 COMPLETE CBC W/AUTO DIFF WBC: CPT

## 2023-06-20 PROCEDURE — 83735 ASSAY OF MAGNESIUM: CPT

## 2023-06-20 PROCEDURE — 83880 ASSAY OF NATRIURETIC PEPTIDE: CPT

## 2023-06-20 PROCEDURE — 6360000002 HC RX W HCPCS: Performed by: HOSPITALIST

## 2023-06-20 PROCEDURE — 83540 ASSAY OF IRON: CPT

## 2023-06-20 PROCEDURE — 80053 COMPREHEN METABOLIC PANEL: CPT

## 2023-06-20 PROCEDURE — 82306 VITAMIN D 25 HYDROXY: CPT

## 2023-06-20 PROCEDURE — 71045 X-RAY EXAM CHEST 1 VIEW: CPT

## 2023-06-20 PROCEDURE — 36415 COLL VENOUS BLD VENIPUNCTURE: CPT

## 2023-06-20 PROCEDURE — 2580000003 HC RX 258: Performed by: HOSPITALIST

## 2023-06-20 PROCEDURE — 84443 ASSAY THYROID STIM HORMONE: CPT

## 2023-06-20 PROCEDURE — 82746 ASSAY OF FOLIC ACID SERUM: CPT

## 2023-06-20 PROCEDURE — 2580000003 HC RX 258: Performed by: NURSE PRACTITIONER

## 2023-06-20 PROCEDURE — 83550 IRON BINDING TEST: CPT

## 2023-06-20 PROCEDURE — 99285 EMERGENCY DEPT VISIT HI MDM: CPT

## 2023-06-20 PROCEDURE — 82728 ASSAY OF FERRITIN: CPT

## 2023-06-20 PROCEDURE — 1210000000 HC MED SURG R&B

## 2023-06-20 PROCEDURE — 81001 URINALYSIS AUTO W/SCOPE: CPT

## 2023-06-20 PROCEDURE — 85379 FIBRIN DEGRADATION QUANT: CPT

## 2023-06-20 PROCEDURE — 82607 VITAMIN B-12: CPT

## 2023-06-20 RX ORDER — POLYETHYLENE GLYCOL 3350 17 G/17G
17 POWDER, FOR SOLUTION ORAL DAILY PRN
Status: DISCONTINUED | OUTPATIENT
Start: 2023-06-20 | End: 2023-06-22 | Stop reason: HOSPADM

## 2023-06-20 RX ORDER — FLUCONAZOLE, SODIUM CHLORIDE 2 MG/ML
100 INJECTION INTRAVENOUS EVERY 24 HOURS
Status: DISCONTINUED | OUTPATIENT
Start: 2023-06-20 | End: 2023-06-21 | Stop reason: DRUGHIGH

## 2023-06-20 RX ORDER — ACETAMINOPHEN 325 MG/1
650 TABLET ORAL EVERY 6 HOURS PRN
Status: DISCONTINUED | OUTPATIENT
Start: 2023-06-20 | End: 2023-06-22 | Stop reason: HOSPADM

## 2023-06-20 RX ORDER — CARVEDILOL 3.12 MG/1
6.25 TABLET ORAL 2 TIMES DAILY
Status: DISCONTINUED | OUTPATIENT
Start: 2023-06-21 | End: 2023-06-22 | Stop reason: HOSPADM

## 2023-06-20 RX ORDER — ALLOPURINOL 300 MG/1
300 TABLET ORAL DAILY
Status: DISCONTINUED | OUTPATIENT
Start: 2023-06-21 | End: 2023-06-22 | Stop reason: HOSPADM

## 2023-06-20 RX ORDER — MESALAMINE 400 MG/1
400 CAPSULE, DELAYED RELEASE ORAL 3 TIMES DAILY
Status: DISCONTINUED | OUTPATIENT
Start: 2023-06-21 | End: 2023-06-22 | Stop reason: HOSPADM

## 2023-06-20 RX ORDER — CHOLESTYRAMINE LIGHT 4 G/5.7G
4 POWDER, FOR SUSPENSION ORAL DAILY
Status: DISCONTINUED | OUTPATIENT
Start: 2023-06-21 | End: 2023-06-22 | Stop reason: HOSPADM

## 2023-06-20 RX ORDER — ACETAMINOPHEN 650 MG/1
650 SUPPOSITORY RECTAL EVERY 6 HOURS PRN
Status: DISCONTINUED | OUTPATIENT
Start: 2023-06-20 | End: 2023-06-22 | Stop reason: HOSPADM

## 2023-06-20 RX ORDER — SODIUM CHLORIDE 9 MG/ML
INJECTION, SOLUTION INTRAVENOUS CONTINUOUS
Status: DISCONTINUED | OUTPATIENT
Start: 2023-06-20 | End: 2023-06-22 | Stop reason: HOSPADM

## 2023-06-20 RX ORDER — ONDANSETRON 2 MG/ML
4 INJECTION INTRAMUSCULAR; INTRAVENOUS EVERY 6 HOURS PRN
Status: DISCONTINUED | OUTPATIENT
Start: 2023-06-20 | End: 2023-06-22 | Stop reason: HOSPADM

## 2023-06-20 RX ORDER — GABAPENTIN 600 MG/1
300 TABLET ORAL 2 TIMES DAILY
Status: DISCONTINUED | OUTPATIENT
Start: 2023-06-21 | End: 2023-06-22 | Stop reason: HOSPADM

## 2023-06-20 RX ORDER — TIMOLOL MALEATE 5 MG/ML
1 SOLUTION/ DROPS OPHTHALMIC 2 TIMES DAILY
Status: DISCONTINUED | OUTPATIENT
Start: 2023-06-21 | End: 2023-06-22 | Stop reason: HOSPADM

## 2023-06-20 RX ORDER — HYDROCODONE BITARTRATE AND ACETAMINOPHEN 10; 325 MG/1; MG/1
1 TABLET ORAL EVERY 8 HOURS PRN
Status: DISCONTINUED | OUTPATIENT
Start: 2023-06-20 | End: 2023-06-22 | Stop reason: HOSPADM

## 2023-06-20 RX ORDER — SODIUM CHLORIDE 9 MG/ML
INJECTION, SOLUTION INTRAVENOUS PRN
Status: DISCONTINUED | OUTPATIENT
Start: 2023-06-20 | End: 2023-06-22 | Stop reason: HOSPADM

## 2023-06-20 RX ORDER — 0.9 % SODIUM CHLORIDE 0.9 %
500 INTRAVENOUS SOLUTION INTRAVENOUS ONCE
Status: COMPLETED | OUTPATIENT
Start: 2023-06-20 | End: 2023-06-21

## 2023-06-20 RX ORDER — TIZANIDINE 4 MG/1
2 TABLET ORAL EVERY 6 HOURS PRN
Status: DISCONTINUED | OUTPATIENT
Start: 2023-06-20 | End: 2023-06-22 | Stop reason: HOSPADM

## 2023-06-20 RX ORDER — LANOLIN ALCOHOL/MO/W.PET/CERES
400 CREAM (GRAM) TOPICAL 2 TIMES DAILY WITH MEALS
Status: DISCONTINUED | OUTPATIENT
Start: 2023-06-21 | End: 2023-06-22 | Stop reason: HOSPADM

## 2023-06-20 RX ORDER — SODIUM CHLORIDE 0.9 % (FLUSH) 0.9 %
5-40 SYRINGE (ML) INJECTION PRN
Status: DISCONTINUED | OUTPATIENT
Start: 2023-06-20 | End: 2023-06-22 | Stop reason: HOSPADM

## 2023-06-20 RX ORDER — SODIUM CHLORIDE 0.9 % (FLUSH) 0.9 %
5-40 SYRINGE (ML) INJECTION EVERY 12 HOURS SCHEDULED
Status: DISCONTINUED | OUTPATIENT
Start: 2023-06-20 | End: 2023-06-22 | Stop reason: HOSPADM

## 2023-06-20 RX ORDER — ONDANSETRON 4 MG/1
4 TABLET, ORALLY DISINTEGRATING ORAL EVERY 8 HOURS PRN
Status: DISCONTINUED | OUTPATIENT
Start: 2023-06-20 | End: 2023-06-22 | Stop reason: HOSPADM

## 2023-06-20 RX ORDER — PANTOPRAZOLE SODIUM 20 MG/1
20 TABLET, DELAYED RELEASE ORAL
Status: DISCONTINUED | OUTPATIENT
Start: 2023-06-21 | End: 2023-06-22 | Stop reason: HOSPADM

## 2023-06-20 RX ORDER — 0.9 % SODIUM CHLORIDE 0.9 %
1000 INTRAVENOUS SOLUTION INTRAVENOUS ONCE
Status: COMPLETED | OUTPATIENT
Start: 2023-06-20 | End: 2023-06-21

## 2023-06-20 RX ADMIN — CEFTRIAXONE 1000 MG: 1 INJECTION, POWDER, FOR SOLUTION INTRAMUSCULAR; INTRAVENOUS at 23:22

## 2023-06-20 RX ADMIN — SODIUM CHLORIDE 1000 ML: 9 INJECTION, SOLUTION INTRAVENOUS at 22:46

## 2023-06-20 ASSESSMENT — LIFESTYLE VARIABLES
HOW OFTEN DO YOU HAVE A DRINK CONTAINING ALCOHOL: NEVER
HOW MANY STANDARD DRINKS CONTAINING ALCOHOL DO YOU HAVE ON A TYPICAL DAY: PATIENT DOES NOT DRINK

## 2023-06-20 ASSESSMENT — ENCOUNTER SYMPTOMS: ABDOMINAL PAIN: 0

## 2023-06-20 ASSESSMENT — PAIN - FUNCTIONAL ASSESSMENT: PAIN_FUNCTIONAL_ASSESSMENT: NONE - DENIES PAIN

## 2023-06-21 ENCOUNTER — APPOINTMENT (OUTPATIENT)
Dept: ULTRASOUND IMAGING | Age: 78
DRG: 699 | End: 2023-06-21
Payer: MEDICARE

## 2023-06-21 ENCOUNTER — APPOINTMENT (OUTPATIENT)
Dept: CT IMAGING | Age: 78
DRG: 699 | End: 2023-06-21
Payer: MEDICARE

## 2023-06-21 VITALS
WEIGHT: 126 LBS | DIASTOLIC BLOOD PRESSURE: 59 MMHG | TEMPERATURE: 98.6 F | OXYGEN SATURATION: 94 % | HEIGHT: 64 IN | RESPIRATION RATE: 16 BRPM | HEART RATE: 80 BPM | SYSTOLIC BLOOD PRESSURE: 106 MMHG | BODY MASS INDEX: 21.51 KG/M2

## 2023-06-21 LAB
25(OH)D3 SERPL-MCNC: 29.7 NG/ML
ANION GAP SERPL CALCULATED.3IONS-SCNC: 8 MMOL/L (ref 7–19)
BUN SERPL-MCNC: 63 MG/DL (ref 8–23)
CALCIUM SERPL-MCNC: 8 MG/DL (ref 8.8–10.2)
CHLORIDE SERPL-SCNC: 113 MMOL/L (ref 98–111)
CO2 SERPL-SCNC: 22 MMOL/L (ref 22–29)
CREAT SERPL-MCNC: 1.4 MG/DL (ref 0.5–0.9)
D DIMER PPP FEU-MCNC: 0.94 UG/ML FEU (ref 0–0.48)
ERYTHROCYTE [DISTWIDTH] IN BLOOD BY AUTOMATED COUNT: 15.9 % (ref 11.5–14.5)
FERRITIN SERPL-MCNC: 173 NG/ML (ref 13–150)
FOLATE SERPL-MCNC: >20 NG/ML (ref 4.8–37.3)
GLUCOSE SERPL-MCNC: 100 MG/DL (ref 74–109)
HCT VFR BLD AUTO: 25.8 % (ref 37–47)
HGB BLD-MCNC: 7.8 G/DL (ref 12–16)
LV EF: 63 %
LVEF MODALITY: NORMAL
MAGNESIUM SERPL-MCNC: 1.6 MG/DL (ref 1.6–2.4)
MCH RBC QN AUTO: 26.4 PG (ref 27–31)
MCHC RBC AUTO-ENTMCNC: 30.2 G/DL (ref 33–37)
MCV RBC AUTO: 87.2 FL (ref 81–99)
PLATELET # BLD AUTO: 221 K/UL (ref 130–400)
PMV BLD AUTO: 10.1 FL (ref 9.4–12.3)
POTASSIUM SERPL-SCNC: 4.4 MMOL/L (ref 3.5–5)
RBC # BLD AUTO: 2.96 M/UL (ref 4.2–5.4)
SODIUM SERPL-SCNC: 143 MMOL/L (ref 136–145)
TSH SERPL DL<=0.005 MIU/L-ACNC: 0.86 UIU/ML (ref 0.27–4.2)
VIT B12 SERPL-MCNC: 683 PG/ML (ref 211–946)
WBC # BLD AUTO: 6.3 K/UL (ref 4.8–10.8)

## 2023-06-21 PROCEDURE — 36415 COLL VENOUS BLD VENIPUNCTURE: CPT

## 2023-06-21 PROCEDURE — 76770 US EXAM ABDO BACK WALL COMP: CPT

## 2023-06-21 PROCEDURE — 74176 CT ABD & PELVIS W/O CONTRAST: CPT

## 2023-06-21 PROCEDURE — 99222 1ST HOSP IP/OBS MODERATE 55: CPT | Performed by: NURSE PRACTITIONER

## 2023-06-21 PROCEDURE — 6360000002 HC RX W HCPCS: Performed by: HOSPITALIST

## 2023-06-21 PROCEDURE — 83735 ASSAY OF MAGNESIUM: CPT

## 2023-06-21 PROCEDURE — 93306 TTE W/DOPPLER COMPLETE: CPT

## 2023-06-21 PROCEDURE — 94760 N-INVAS EAR/PLS OXIMETRY 1: CPT

## 2023-06-21 PROCEDURE — 2580000003 HC RX 258: Performed by: HOSPITALIST

## 2023-06-21 PROCEDURE — 85027 COMPLETE CBC AUTOMATED: CPT

## 2023-06-21 PROCEDURE — 6370000000 HC RX 637 (ALT 250 FOR IP): Performed by: STUDENT IN AN ORGANIZED HEALTH CARE EDUCATION/TRAINING PROGRAM

## 2023-06-21 PROCEDURE — 6370000000 HC RX 637 (ALT 250 FOR IP): Performed by: HOSPITALIST

## 2023-06-21 PROCEDURE — 6360000002 HC RX W HCPCS: Performed by: STUDENT IN AN ORGANIZED HEALTH CARE EDUCATION/TRAINING PROGRAM

## 2023-06-21 PROCEDURE — 80048 BASIC METABOLIC PNL TOTAL CA: CPT

## 2023-06-21 RX ORDER — FERROUS SULFATE 325(65) MG
325 TABLET ORAL 2 TIMES DAILY WITH MEALS
Status: DISCONTINUED | OUTPATIENT
Start: 2023-06-21 | End: 2023-06-22 | Stop reason: HOSPADM

## 2023-06-21 RX ORDER — ERGOCALCIFEROL 1.25 MG/1
50000 CAPSULE ORAL WEEKLY
Status: DISCONTINUED | OUTPATIENT
Start: 2023-06-21 | End: 2023-06-22 | Stop reason: HOSPADM

## 2023-06-21 RX ORDER — FLUCONAZOLE 2 MG/ML
200 INJECTION, SOLUTION INTRAVENOUS EVERY 24 HOURS
Status: DISCONTINUED | OUTPATIENT
Start: 2023-06-21 | End: 2023-06-21

## 2023-06-21 RX ORDER — AMOXICILLIN 500 MG/1
500 CAPSULE ORAL EVERY 12 HOURS SCHEDULED
Status: DISCONTINUED | OUTPATIENT
Start: 2023-06-21 | End: 2023-06-22 | Stop reason: HOSPADM

## 2023-06-21 RX ORDER — MAGNESIUM SULFATE IN WATER 40 MG/ML
2000 INJECTION, SOLUTION INTRAVENOUS ONCE
Status: COMPLETED | OUTPATIENT
Start: 2023-06-21 | End: 2023-06-21

## 2023-06-21 RX ADMIN — MAGNESIUM SULFATE HEPTAHYDRATE 2000 MG: 40 INJECTION, SOLUTION INTRAVENOUS at 12:40

## 2023-06-21 RX ADMIN — CHOLESTYRAMINE 4 G: 4 POWDER, FOR SUSPENSION ORAL at 09:12

## 2023-06-21 RX ADMIN — CARVEDILOL 6.25 MG: 3.12 TABLET, FILM COATED ORAL at 09:20

## 2023-06-21 RX ADMIN — CARVEDILOL 6.25 MG: 3.12 TABLET, FILM COATED ORAL at 16:14

## 2023-06-21 RX ADMIN — SODIUM CHLORIDE 500 ML: 9 INJECTION, SOLUTION INTRAVENOUS at 00:36

## 2023-06-21 RX ADMIN — ALLOPURINOL 300 MG: 300 TABLET ORAL at 09:14

## 2023-06-21 RX ADMIN — SODIUM CHLORIDE: 9 INJECTION, SOLUTION INTRAVENOUS at 02:40

## 2023-06-21 RX ADMIN — Medication 400 MG: at 16:15

## 2023-06-21 RX ADMIN — Medication 400 MG: at 09:14

## 2023-06-21 RX ADMIN — PANTOPRAZOLE SODIUM 20 MG: 20 TABLET, DELAYED RELEASE ORAL at 05:17

## 2023-06-21 RX ADMIN — MEROPENEM 1000 MG: 1 INJECTION, POWDER, FOR SOLUTION INTRAVENOUS at 05:15

## 2023-06-21 RX ADMIN — TIMOLOL MALEATE 1 DROP: 5 SOLUTION/ DROPS OPHTHALMIC at 09:16

## 2023-06-21 RX ADMIN — ERGOCALCIFEROL 50000 UNITS: 1.25 CAPSULE ORAL at 09:14

## 2023-06-21 RX ADMIN — AMOXICILLIN 500 MG: 500 CAPSULE ORAL at 09:14

## 2023-06-21 RX ADMIN — FLUCONAZOLE IN SODIUM CHLORIDE 200 MG: 2 INJECTION, SOLUTION INTRAVENOUS at 00:40

## 2023-06-21 RX ADMIN — MESALAMINE 400 MG: 400 CAPSULE, DELAYED RELEASE ORAL at 16:21

## 2023-06-21 RX ADMIN — FERROUS SULFATE TAB 325 MG (65 MG ELEMENTAL FE) 325 MG: 325 (65 FE) TAB at 09:14

## 2023-06-21 RX ADMIN — GABAPENTIN 300 MG: 600 TABLET, FILM COATED ORAL at 09:14

## 2023-06-21 RX ADMIN — MESALAMINE 400 MG: 400 CAPSULE, DELAYED RELEASE ORAL at 09:14

## 2023-06-21 RX ADMIN — FERROUS SULFATE TAB 325 MG (65 MG ELEMENTAL FE) 325 MG: 325 (65 FE) TAB at 16:15

## 2023-06-21 ASSESSMENT — ENCOUNTER SYMPTOMS
BACK PAIN: 0
VOMITING: 0
COUGH: 0
ABDOMINAL PAIN: 0
NAUSEA: 0
ABDOMINAL DISTENTION: 0
SHORTNESS OF BREATH: 0

## 2023-07-13 ENCOUNTER — PATIENT MESSAGE (OUTPATIENT)
Dept: NEUROLOGY | Age: 78
End: 2023-07-13

## 2023-07-13 DIAGNOSIS — M48.062 SPINAL STENOSIS OF LUMBAR REGION WITH NEUROGENIC CLAUDICATION: ICD-10-CM

## 2023-07-14 ENCOUNTER — HOSPITAL ENCOUNTER (OUTPATIENT)
Dept: VASCULAR LAB | Age: 78
Discharge: HOME | End: 2023-07-14
Payer: MEDICARE

## 2023-07-14 ENCOUNTER — OFFICE VISIT (OUTPATIENT)
Dept: VASCULAR SURGERY | Age: 78
End: 2023-07-14
Payer: MEDICARE

## 2023-07-14 VITALS
BODY MASS INDEX: 21.51 KG/M2 | SYSTOLIC BLOOD PRESSURE: 107 MMHG | WEIGHT: 126 LBS | DIASTOLIC BLOOD PRESSURE: 50 MMHG | OXYGEN SATURATION: 97 % | HEIGHT: 64 IN | TEMPERATURE: 98.2 F | HEART RATE: 68 BPM

## 2023-07-14 DIAGNOSIS — S91.109A OPEN TOE WOUND, INITIAL ENCOUNTER: ICD-10-CM

## 2023-07-14 DIAGNOSIS — I70.234 ATHEROSCLEROSIS OF NATIVE ARTERY OF RIGHT LOWER EXTREMITY WITH ULCERATION OF HEEL (HCC): Primary | ICD-10-CM

## 2023-07-14 DIAGNOSIS — I73.9 PVD (PERIPHERAL VASCULAR DISEASE) (HCC): ICD-10-CM

## 2023-07-14 PROCEDURE — 93923 UPR/LXTR ART STDY 3+ LVLS: CPT

## 2023-07-14 PROCEDURE — 99214 OFFICE O/P EST MOD 30 MIN: CPT | Performed by: PHYSICIAN ASSISTANT

## 2023-07-14 PROCEDURE — 1123F ACP DISCUSS/DSCN MKR DOCD: CPT | Performed by: PHYSICIAN ASSISTANT

## 2023-07-14 NOTE — TELEPHONE ENCOUNTER
Requested Prescriptions     Pending Prescriptions Disp Refills    HYDROcodone-acetaminophen (NORCO)  MG per tablet 90 tablet 0     Sig: Take 1 tablet by mouth every 8 hours as needed for Pain for up to 30 days.  Intended supply: 30 days       Last Office Visit:  5/9/2023  Next Office Visit:  11/9/2023  Last Medication Refill:  6/12/2023 with 0 DINA Spencer up to date:  7/14/2023    *RX updated to reflect   7/14/2023  fill date*

## 2023-07-15 RX ORDER — HYDROCODONE BITARTRATE AND ACETAMINOPHEN 10; 325 MG/1; MG/1
1 TABLET ORAL EVERY 8 HOURS PRN
Qty: 90 TABLET | Refills: 0 | Status: SHIPPED | OUTPATIENT
Start: 2023-07-15 | End: 2023-08-14

## 2023-07-20 ENCOUNTER — TELEPHONE (OUTPATIENT)
Dept: VASCULAR SURGERY | Age: 78
End: 2023-07-20

## 2023-07-20 DIAGNOSIS — Z01.818 PRE-OP TESTING: Primary | ICD-10-CM

## 2023-07-20 NOTE — TELEPHONE ENCOUNTER
Spoke with the patient's daughter to let her know the following. The patient's daughter acknowledged. Kendra Bishop    Angiogram Instructions    Report to the [facility] [place] [facility] at [facility] (go in the        front door of the hospital and turn to the left.) on August 1 @ 10:00 AM.   Nothing to eat or drink after midnight the night before the procedure. Please take all medications as normally scheduled to take, including heart and blood pressure medicines with a sip of water. Do not take Lasix, insulin, or any diabetic medicine the morning of the procedure. If you take insulin, you may only take 1/2 of any scheduled nightly dose, and none the morning of the procedure. You may take all regularly scheduled heart, cholesterol, and blood pressure medicines with a sip of water. If you take Glucophage, Metformin, Actos Plus Met, or Glucovance,please tell our nurse. it may interfere with some of the medications given during surgery. If you have sleep apnea and require C-PAP, please bring this with you to the hospital.  Bring a list of all of your allergies and medications with you to the hospital.  Please let our nurse know if you have had an allergy to iodine, shellfish, or x-ray dye. Let the nurse know if you take any of the following:  Over the counter herbal supplements  Diclofenec, indomethacin, ketoprofen, Caridopa/levadopa, naproxen, sulindac, piroxicam, glucosamine, Chondrotin, cocchine, or methotrexate. Plan to stay at the hospital for 4 - 6 hours before being released  by the physician. You will need someone to drive you home after the procedure. Please stop at your local [employer] or pharmacy and buy a bottle of Hibiclens. Wash thoroughly with this the night before and the morning of the procedure, paying special attention to the area that will be operated on. Make sure you rinse very well. The Hibiclens should only be used prior to surgery. 12. Other Directions:  For any

## 2023-07-31 ENCOUNTER — PREP FOR PROCEDURE (OUTPATIENT)
Dept: VASCULAR SURGERY | Age: 78
End: 2023-07-31

## 2023-07-31 RX ORDER — SODIUM CHLORIDE 9 MG/ML
INJECTION, SOLUTION INTRAVENOUS CONTINUOUS
Status: CANCELLED | OUTPATIENT
Start: 2023-07-31

## 2023-07-31 RX ORDER — ASPIRIN 81 MG/1
81 TABLET ORAL ONCE
Status: CANCELLED | OUTPATIENT
Start: 2023-07-31 | End: 2023-07-31

## 2023-07-31 RX ORDER — SODIUM CHLORIDE 0.9 % (FLUSH) 0.9 %
5-40 SYRINGE (ML) INJECTION EVERY 12 HOURS SCHEDULED
Status: CANCELLED | OUTPATIENT
Start: 2023-07-31

## 2023-07-31 RX ORDER — SODIUM CHLORIDE 0.9 % (FLUSH) 0.9 %
5-40 SYRINGE (ML) INJECTION PRN
Status: CANCELLED | OUTPATIENT
Start: 2023-07-31

## 2023-07-31 RX ORDER — SODIUM CHLORIDE 9 MG/ML
INJECTION, SOLUTION INTRAVENOUS PRN
Status: CANCELLED | OUTPATIENT
Start: 2023-07-31

## 2023-07-31 NOTE — H&P (VIEW-ONLY)
she was seen at wound yesterday and was told her wounds were looking a little better. Options were discussed with the patient's daughter including continued medical management versus proceeding with angiography and potential intervention for PVD with wounds. Patient has opted to proceed with angiography. Risks have been discussed with the patient including but not limited to MI, death, CVA, bleed, nerve injury, infection, contrast nephropathy, possible need for dialysis, and need for further surgery. Proceed with aortogram with runoff possible angioplasty/atherectomy/stent       Please note that parts of the chart were generated using Dragon dictation software. Although every effort was made to ensure the accuracy of this automated transcription, some errors in transcription may have occurred.

## 2023-08-01 ENCOUNTER — HOSPITAL ENCOUNTER (OUTPATIENT)
Dept: INTERVENTIONAL RADIOLOGY/VASCULAR | Age: 78
Discharge: HOME | End: 2023-08-01
Payer: MEDICARE

## 2023-08-01 VITALS
SYSTOLIC BLOOD PRESSURE: 109 MMHG | OXYGEN SATURATION: 93 % | TEMPERATURE: 97.2 F | HEART RATE: 69 BPM | WEIGHT: 126 LBS | DIASTOLIC BLOOD PRESSURE: 45 MMHG | RESPIRATION RATE: 13 BRPM | BODY MASS INDEX: 21.51 KG/M2 | HEIGHT: 64 IN

## 2023-08-01 DIAGNOSIS — I73.9 PVD (PERIPHERAL VASCULAR DISEASE) (HCC): ICD-10-CM

## 2023-08-01 LAB
ANION GAP SERPL CALCULATED.3IONS-SCNC: 7 MMOL/L (ref 7–19)
BUN SERPL-MCNC: 29 MG/DL (ref 8–23)
CALCIUM SERPL-MCNC: 9 MG/DL (ref 8.8–10.2)
CHLORIDE SERPL-SCNC: 106 MMOL/L (ref 98–111)
CO2 SERPL-SCNC: 29 MMOL/L (ref 22–29)
CREAT SERPL-MCNC: 0.7 MG/DL (ref 0.5–0.9)
ERYTHROCYTE [DISTWIDTH] IN BLOOD BY AUTOMATED COUNT: 15.5 % (ref 11.5–14.5)
GLUCOSE SERPL-MCNC: 114 MG/DL (ref 74–109)
HCT VFR BLD AUTO: 34.2 % (ref 37–47)
HGB BLD-MCNC: 9.7 G/DL (ref 12–16)
MCH RBC QN AUTO: 25.7 PG (ref 27–31)
MCHC RBC AUTO-ENTMCNC: 28.4 G/DL (ref 33–37)
MCV RBC AUTO: 90.5 FL (ref 81–99)
PLATELET # BLD AUTO: 274 K/UL (ref 130–400)
PMV BLD AUTO: 10.1 FL (ref 9.4–12.3)
POTASSIUM SERPL-SCNC: 4 MMOL/L (ref 3.5–5)
RBC # BLD AUTO: 3.78 M/UL (ref 4.2–5.4)
SODIUM SERPL-SCNC: 142 MMOL/L (ref 136–145)
WBC # BLD AUTO: 8.1 K/UL (ref 4.8–10.8)

## 2023-08-01 PROCEDURE — 2709999900 IR AORTAGRAM

## 2023-08-01 PROCEDURE — 75716 ARTERY X-RAYS ARMS/LEGS: CPT

## 2023-08-01 PROCEDURE — 80048 BASIC METABOLIC PNL TOTAL CA: CPT

## 2023-08-01 PROCEDURE — 2500000003 HC RX 250 WO HCPCS: Performed by: SURGERY

## 2023-08-01 PROCEDURE — 6360000002 HC RX W HCPCS: Performed by: PHYSICIAN ASSISTANT

## 2023-08-01 PROCEDURE — 36415 COLL VENOUS BLD VENIPUNCTURE: CPT

## 2023-08-01 PROCEDURE — 6370000000 HC RX 637 (ALT 250 FOR IP): Performed by: PHYSICIAN ASSISTANT

## 2023-08-01 PROCEDURE — 99152 MOD SED SAME PHYS/QHP 5/>YRS: CPT

## 2023-08-01 PROCEDURE — 6360000004 HC RX CONTRAST MEDICATION: Performed by: SURGERY

## 2023-08-01 PROCEDURE — 75774 ARTERY X-RAY EACH VESSEL: CPT

## 2023-08-01 PROCEDURE — 37228 HC PLASTY UNI TIB/PER INITIAL: CPT

## 2023-08-01 PROCEDURE — 99153 MOD SED SAME PHYS/QHP EA: CPT

## 2023-08-01 PROCEDURE — 6360000002 HC RX W HCPCS: Performed by: SURGERY

## 2023-08-01 PROCEDURE — 85027 COMPLETE CBC AUTOMATED: CPT

## 2023-08-01 PROCEDURE — 2580000003 HC RX 258: Performed by: PHYSICIAN ASSISTANT

## 2023-08-01 PROCEDURE — 75625 CONTRAST EXAM ABDOMINL AORTA: CPT

## 2023-08-01 RX ORDER — FENTANYL CITRATE 50 UG/ML
INJECTION, SOLUTION INTRAMUSCULAR; INTRAVENOUS PRN
Status: COMPLETED | OUTPATIENT
Start: 2023-08-01 | End: 2023-08-01

## 2023-08-01 RX ORDER — ASPIRIN 81 MG/1
81 TABLET ORAL ONCE
Status: COMPLETED | OUTPATIENT
Start: 2023-08-01 | End: 2023-08-01

## 2023-08-01 RX ORDER — SODIUM CHLORIDE 0.9 % (FLUSH) 0.9 %
5-40 SYRINGE (ML) INJECTION PRN
Status: DISCONTINUED | OUTPATIENT
Start: 2023-08-01 | End: 2023-08-03 | Stop reason: HOSPADM

## 2023-08-01 RX ORDER — NITROGLYCERIN 10 MG/100ML
INJECTION INTRAVENOUS CONTINUOUS PRN
Status: COMPLETED | OUTPATIENT
Start: 2023-08-01 | End: 2023-08-01

## 2023-08-01 RX ORDER — COLLAGENASE SANTYL 250 [ARB'U]/G
OINTMENT TOPICAL
COMMUNITY
Start: 2023-07-08

## 2023-08-01 RX ORDER — IODIXANOL 320 MG/ML
INJECTION, SOLUTION INTRAVASCULAR PRN
Status: COMPLETED | OUTPATIENT
Start: 2023-08-01 | End: 2023-08-01

## 2023-08-01 RX ORDER — CLONIDINE HYDROCHLORIDE 0.1 MG/1
0.1 TABLET ORAL
Status: DISCONTINUED | OUTPATIENT
Start: 2023-08-01 | End: 2023-08-03 | Stop reason: HOSPADM

## 2023-08-01 RX ORDER — SODIUM CHLORIDE 0.9 % (FLUSH) 0.9 %
5-40 SYRINGE (ML) INJECTION EVERY 12 HOURS SCHEDULED
Status: DISCONTINUED | OUTPATIENT
Start: 2023-08-01 | End: 2023-08-03 | Stop reason: HOSPADM

## 2023-08-01 RX ORDER — SODIUM CHLORIDE 9 MG/ML
INJECTION, SOLUTION INTRAVENOUS PRN
Status: DISCONTINUED | OUTPATIENT
Start: 2023-08-01 | End: 2023-08-03 | Stop reason: HOSPADM

## 2023-08-01 RX ORDER — MIDAZOLAM HYDROCHLORIDE 1 MG/ML
INJECTION INTRAMUSCULAR; INTRAVENOUS PRN
Status: COMPLETED | OUTPATIENT
Start: 2023-08-01 | End: 2023-08-01

## 2023-08-01 RX ORDER — SODIUM CHLORIDE 9 MG/ML
INJECTION, SOLUTION INTRAVENOUS PRN
Status: DISCONTINUED | OUTPATIENT
Start: 2023-08-01 | End: 2023-08-01 | Stop reason: SDUPTHER

## 2023-08-01 RX ORDER — HEPARIN SODIUM 5000 [USP'U]/ML
INJECTION, SOLUTION INTRAVENOUS; SUBCUTANEOUS PRN
Status: COMPLETED | OUTPATIENT
Start: 2023-08-01 | End: 2023-08-01

## 2023-08-01 RX ORDER — LIDOCAINE HYDROCHLORIDE 20 MG/ML
INJECTION, SOLUTION EPIDURAL; INFILTRATION; INTRACAUDAL; PERINEURAL PRN
Status: COMPLETED | OUTPATIENT
Start: 2023-08-01 | End: 2023-08-01

## 2023-08-01 RX ORDER — SODIUM CHLORIDE 9 MG/ML
INJECTION, SOLUTION INTRAVENOUS CONTINUOUS
Status: DISCONTINUED | OUTPATIENT
Start: 2023-08-01 | End: 2023-08-01 | Stop reason: SDUPTHER

## 2023-08-01 RX ORDER — METOPROLOL TARTRATE 50 MG/1
25 TABLET, FILM COATED ORAL EVERY 12 HOURS PRN
Status: DISCONTINUED | OUTPATIENT
Start: 2023-08-01 | End: 2023-08-03 | Stop reason: HOSPADM

## 2023-08-01 RX ORDER — SODIUM CHLORIDE 9 MG/ML
INJECTION, SOLUTION INTRAVENOUS CONTINUOUS
Status: DISCONTINUED | OUTPATIENT
Start: 2023-08-01 | End: 2023-08-03 | Stop reason: HOSPADM

## 2023-08-01 RX ADMIN — MIDAZOLAM 0.5 MG: 1 INJECTION INTRAMUSCULAR; INTRAVENOUS at 14:04

## 2023-08-01 RX ADMIN — ASPIRIN 81 MG: 81 TABLET, COATED ORAL at 11:13

## 2023-08-01 RX ADMIN — FENTANYL CITRATE 25 MCG: 50 INJECTION, SOLUTION INTRAMUSCULAR; INTRAVENOUS at 13:43

## 2023-08-01 RX ADMIN — MIDAZOLAM 0.5 MG: 1 INJECTION INTRAMUSCULAR; INTRAVENOUS at 13:43

## 2023-08-01 RX ADMIN — HEPARIN SODIUM 5000 UNITS: 5000 INJECTION INTRAVENOUS; SUBCUTANEOUS at 13:37

## 2023-08-01 RX ADMIN — SODIUM CHLORIDE: 9 INJECTION, SOLUTION INTRAVENOUS at 11:12

## 2023-08-01 RX ADMIN — HEPARIN SODIUM 2000 UNITS: 5000 INJECTION INTRAVENOUS; SUBCUTANEOUS at 14:12

## 2023-08-01 RX ADMIN — HEPARIN SODIUM 3000 UNITS: 5000 INJECTION INTRAVENOUS; SUBCUTANEOUS at 14:02

## 2023-08-01 RX ADMIN — FENTANYL CITRATE 25 MCG: 50 INJECTION, SOLUTION INTRAMUSCULAR; INTRAVENOUS at 14:04

## 2023-08-01 RX ADMIN — CEFAZOLIN 2000 MG: 2 INJECTION, POWDER, FOR SOLUTION INTRAMUSCULAR; INTRAVENOUS at 13:39

## 2023-08-01 RX ADMIN — IODIXANOL 175 ML: 320 INJECTION, SOLUTION INTRAVASCULAR at 14:56

## 2023-08-01 RX ADMIN — NITROGLYCERIN 200 MCG/MIN: 10 INJECTION INTRAVENOUS at 14:47

## 2023-08-01 RX ADMIN — LIDOCAINE HYDROCHLORIDE 10 ML: 20 INJECTION, SOLUTION EPIDURAL; INFILTRATION; INTRACAUDAL; PERINEURAL at 13:37

## 2023-08-01 NOTE — PROGRESS NOTES
Patient ambulated in [place] without difficulty/complaints of pain. Left groin site c/d/i post ambulation.   Electronically signed by Misael Finch RN on 8/1/2023 at 6:29 PM

## 2023-08-01 NOTE — OP NOTE
Patient Name: Stefany Groves   YOB: 1945   MRN: [id number]     Procedure Date: 8/1/2023     Pre Op Diagnosis: PAD with right heel ulcer wound    Post Op Diagnosis: same    Procedure: Aortogram, bilateral lower extremity. Balloon angioplasty of the  2 x 100 Carlos, 2.5 x 100 Carlos     Anesthesia: Local with Moderate Sedation      Estimated Blood Loss: Less than 50 ml    Complications: None    Implants: Minx 5 Fr    Procedure Details: Patient was brought to the angiogram suite and placed in supine position. Her bilateral groins were prepped and draped in sterile fashion. Timeout performed. Left common femoral artery was accessed under continuous ultrasound guidance using sterile micropuncture technique. 5 Lao glide sheath was inserted. J-wire was advanced proximally and followed by Omni Flush catheter. Using power injection aortogram with bilateral lower extremity runoff were performed as mentioned above findings. It was difficult to see the tibial arteries. And therefore patient was given 3000 heparin. J-wire was maneuvered to the right SFA Omni Flush catheter was floated the right SFA proximal.  Wire was removed. Additional pictures were performed of the popliteal and tibial arteries. Then using glide advantage wire was positioned in the right  popliteal artery. The catheter and sheath were removed and the Raabi 5 x 70 was placed with the tip in the right distal SFA. Patient was given additional 2000 heparin. Navicross catheter  was placed and additional pictures were taken of the posterior tibial artery showing 2 areas of stenosis 1 in the mid portion and the other in the ankle. I attempted to advance command wire, however it was not going to dislodge. Before I exchanged to V18 wire. It was successfully floated into the foot. I then used 2 x 100 Carlos balloon thromboembolectomy of the proximal lesion and that appears to be undersized.   Therefore I changed to 2.5 100

## 2023-08-01 NOTE — INTERVAL H&P NOTE
Update History & Physical    The patient's History and Physical of July 31, 2023 was reviewed with the patient and I examined the patient. There was no change. The surgical site was confirmed by the patient and me. Plan: The risks, benefits, expected outcome, and alternative to the recommended procedure have been discussed with the patient. Patient understands and wants to proceed with the procedure.      ASA III, Mallampati 3    Electronically signed by Lucio Finch DO on 8/1/2023 at 1:35 PM

## 2023-08-01 NOTE — DISCHARGE INSTRUCTIONS
POST PROCEDURE HOME CARE INSTRUCTIONS:         YOU CAN SHOWER AFTER 24 HOURS. DO NOT TAKE ANY BATHS OR SUBMERGE THE SITE IN ANY WATER SOURCE FOR 5 DAYS. REMOVE THE DRESSING AFTER 24 HOURS. [address] WITH ANTIBACTERIAL SOAP. PAT DRY. REAPPLY A CLEAN BANDAID EVERYDAY FOR 3-4 DAYS. MONITOR SITE FOR ANY SIGNS OF INFECTION. SIGNS AND SYMPTOMS OF INFECTION INCLUDE SUDDEN ELEVATED TEMP OVER 101, GREEN/YELLOW  DRAINAGE, OR RED STREAKING FROM SITE. CALL THE DOCTOR IF YOU NOTICE SIGNS OR SYMPTOMS OF INFECTION. 4.   REST UNTIL MORNING. 5.   DO NOT DRIVE FOR THE NEXT 24 HOURS. 6.   Drink one 8-10 oz glass of non-alcoholic liquid every one hour until bedtime. 7.   If bleeding occurs, lay flat and apply pressure to site. If unable to get bleeding to stop after 15 minutes call 911 or rush to nearest emergency room. 8.   Keep affected leg straight until bedtime doing leg exercises to encourage blood flow, (Try wiggling your toes and rotating your ankle in circles)  9. Resume normal ( non-strenuous) activity AFTER 2 DAYS. 10. Bruising and soreness at the puncture site may occur. This will heal and clear after several weeks. 11. NO LIFTING, TUGGING, OR PULLING ANYTHING MORE THAN 5 POUNDS FOR 2 DAYS.

## 2023-08-05 DIAGNOSIS — G61.81 CIDP (CHRONIC INFLAMMATORY DEMYELINATING POLYNEUROPATHY) (HCC): ICD-10-CM

## 2023-08-07 RX ORDER — GABAPENTIN 600 MG/1
TABLET ORAL
Qty: 270 TABLET | Refills: 1 | Status: SHIPPED | OUTPATIENT
Start: 2023-08-07 | End: 2023-09-06

## 2023-08-07 NOTE — TELEPHONE ENCOUNTER
Requested Prescriptions     Pending Prescriptions Disp Refills    gabapentin (NEURONTIN) 600 MG tablet [Pharmacy Med Name: GABAPENTIN 600 MG TABLET] 270 tablet 1     Sig: TAKE 1 TABLET BY MOUTH THREE TIMES A DAY       Last Office Visit:  5/9/2023  Next Office Visit:  11/9/2023  Last Medication Refill: 4/25/2023  with 1 DINA Mckeon Cover up to date: 7/14/2023     *RX updated to reflect   8/7/2023  fill date*

## 2023-08-13 DIAGNOSIS — M48.062 SPINAL STENOSIS OF LUMBAR REGION WITH NEUROGENIC CLAUDICATION: ICD-10-CM

## 2023-08-14 RX ORDER — HYDROCODONE BITARTRATE AND ACETAMINOPHEN 10; 325 MG/1; MG/1
1 TABLET ORAL EVERY 8 HOURS PRN
Qty: 90 TABLET | Refills: 0 | Status: SHIPPED | OUTPATIENT
Start: 2023-08-14 | End: 2023-09-13

## 2023-08-14 NOTE — TELEPHONE ENCOUNTER
Requested Prescriptions     Pending Prescriptions Disp Refills    HYDROcodone-acetaminophen (NORCO)  MG per tablet 90 tablet 0     Sig: Take 1 tablet by mouth every 8 hours as needed for Pain for up to 30 days.  Intended supply: 30 days       Last Office Visit:  5/9/2023  Next Office Visit:  11/9/2023  Last Medication Refill:  7/15/23  Susan Bruce up to date:  7/14/23    *RX updated to reflect   8/14/23  fill date*

## 2023-08-16 ENCOUNTER — OFFICE VISIT (OUTPATIENT)
Dept: SURGERY | Age: 78
End: 2023-08-16
Payer: MEDICARE

## 2023-08-16 ENCOUNTER — OFFICE VISIT (OUTPATIENT)
Dept: VASCULAR SURGERY | Age: 78
End: 2023-08-16
Payer: MEDICARE

## 2023-08-16 VITALS
HEART RATE: 72 BPM | HEIGHT: 64 IN | WEIGHT: 135.2 LBS | OXYGEN SATURATION: 92 % | TEMPERATURE: 97.8 F | BODY MASS INDEX: 23.08 KG/M2

## 2023-08-16 VITALS
SYSTOLIC BLOOD PRESSURE: 120 MMHG | DIASTOLIC BLOOD PRESSURE: 60 MMHG | OXYGEN SATURATION: 99 % | HEART RATE: 68 BPM | WEIGHT: 126 LBS | BODY MASS INDEX: 21.51 KG/M2 | TEMPERATURE: 98.1 F | HEIGHT: 64 IN

## 2023-08-16 DIAGNOSIS — L89.150 PRESSURE INJURY OF SACRAL REGION, UNSTAGEABLE (HCC): Primary | ICD-10-CM

## 2023-08-16 DIAGNOSIS — I70.234 ATHEROSCLEROSIS OF NATIVE ARTERY OF RIGHT LOWER EXTREMITY WITH ULCERATION OF HEEL (HCC): Primary | ICD-10-CM

## 2023-08-16 PROCEDURE — 99213 OFFICE O/P EST LOW 20 MIN: CPT | Performed by: SURGERY

## 2023-08-16 PROCEDURE — 1123F ACP DISCUSS/DSCN MKR DOCD: CPT | Performed by: PHYSICIAN ASSISTANT

## 2023-08-16 PROCEDURE — 1123F ACP DISCUSS/DSCN MKR DOCD: CPT | Performed by: SURGERY

## 2023-08-16 PROCEDURE — 99213 OFFICE O/P EST LOW 20 MIN: CPT | Performed by: PHYSICIAN ASSISTANT

## 2023-08-16 RX ORDER — POTASSIUM CHLORIDE 750 MG/1
TABLET, FILM COATED, EXTENDED RELEASE ORAL
COMMUNITY

## 2023-08-16 RX ORDER — SODIUM CHLORIDE 0.9 % (FLUSH) 0.9 %
5-40 SYRINGE (ML) INJECTION PRN
Status: CANCELLED | OUTPATIENT
Start: 2023-08-16

## 2023-08-16 RX ORDER — SODIUM CHLORIDE 0.9 % (FLUSH) 0.9 %
5-40 SYRINGE (ML) INJECTION EVERY 12 HOURS SCHEDULED
Status: CANCELLED | OUTPATIENT
Start: 2023-08-16

## 2023-08-16 RX ORDER — CELECOXIB 100 MG/1
100 CAPSULE ORAL ONCE
Status: CANCELLED | OUTPATIENT
Start: 2023-08-16 | End: 2023-08-16

## 2023-08-16 RX ORDER — SODIUM CHLORIDE 9 MG/ML
INJECTION, SOLUTION INTRAVENOUS PRN
Status: CANCELLED | OUTPATIENT
Start: 2023-08-16

## 2023-08-16 RX ORDER — SODIUM CHLORIDE, SODIUM LACTATE, POTASSIUM CHLORIDE, CALCIUM CHLORIDE 600; 310; 30; 20 MG/100ML; MG/100ML; MG/100ML; MG/100ML
INJECTION, SOLUTION INTRAVENOUS CONTINUOUS
Status: CANCELLED | OUTPATIENT
Start: 2023-08-16

## 2023-08-16 RX ORDER — CLINDAMYCIN HYDROCHLORIDE 300 MG/1
300 CAPSULE ORAL 3 TIMES DAILY
COMMUNITY
Start: 2023-08-10

## 2023-08-16 RX ORDER — FUROSEMIDE 20 MG/1
TABLET ORAL
COMMUNITY

## 2023-08-16 RX ORDER — ACETAMINOPHEN 325 MG/1
1000 TABLET ORAL ONCE
Status: CANCELLED | OUTPATIENT
Start: 2023-08-16 | End: 2023-08-16

## 2023-08-16 RX ORDER — LINEZOLID 600 MG/1
TABLET, FILM COATED ORAL
COMMUNITY
Start: 2023-08-15

## 2023-08-16 ASSESSMENT — ENCOUNTER SYMPTOMS
NAUSEA: 0
CONSTIPATION: 0
CHEST TIGHTNESS: 0
COLOR CHANGE: 0
ABDOMINAL PAIN: 0
DIARRHEA: 0
EYE REDNESS: 0
COUGH: 0
SHORTNESS OF BREATH: 0
VOMITING: 0
SORE THROAT: 0
ABDOMINAL DISTENTION: 0
EYE PAIN: 0
BACK PAIN: 1

## 2023-08-16 NOTE — PROGRESS NOTES
times daily      indapamide (LOZOL) 2.5 MG tablet Take 1 tablet by mouth every morning      omeprazole (PRILOSEC) 20 MG capsule Take 1 capsule by mouth Daily       No current facility-administered medications for this visit. Allergies: Patient has no known allergies. Past Medical History:   Diagnosis Date    Arthritis     Cancer ([address])     endometrial cancer, bladder    Cataract     CIDP (chronic inflammatory demyelinating polyneuropathy) ([facility])     Crohn's disease ([facility])     GERD (gastroesophageal reflux disease)     History of blood transfusion     Hypertension     Macular degeneration     Neuropathy     Perineal cyst in female     PVD (peripheral vascular disease) ([facility])     Radiation     Urinary incontinence      Past Surgical History:   Procedure Laterality Date    BACK SURGERY      lumbar    BLADDER REMOVAL  05/22/2023    urostomy    CARPAL TUNNEL RELEASE Bilateral 1999    CATARACT REMOVAL Bilateral     CERVICAL SPINE SURGERY      metal in neck    CHOLECYSTECTOMY, LAPAROSCOPIC      HAMMER TOE SURGERY Left     HX VASCULAR ANGIOPLASTY      & STENT    HYSTERECTOMY (CERVIX STATUS UNKNOWN)  1999    KNEE ARTHROSCOPY Right     LUMBAR FUSION      RECTAL SURGERY N/A 03/25/2022    PERINEAL CYST EXCISION performed by Manolo Kimble DO at [facility]  01/04/2022    VI. Bilateral lower extremity runoff. VASCULAR SURGERY  08/01/2023    Aortogram, bilateral lower extremity.  Balloon angioplasty of the  2 x 100 Carlos, 2.5 x 100 Carlos; VI     Family History   Problem Relation Age of Onset    Ovarian Cancer Mother 52        nonsmoker    High Blood Pressure Father         nonsmoker    Heart Disease Father     No Known Problems Sister     No Known Problems Sister     No Known Problems Sister     Cancer Daughter         never had the type determined    No Known Problems Daughter     No Known Problems Son     No Known Problems Son      Social History     Tobacco Use    Smoking status: Never    Smokeless

## 2023-08-17 ENCOUNTER — ANESTHESIA EVENT (OUTPATIENT)
Dept: OPERATING ROOM | Age: 78
End: 2023-08-17
Payer: MEDICARE

## 2023-08-17 ENCOUNTER — ANESTHESIA (OUTPATIENT)
Dept: OPERATING ROOM | Age: 78
End: 2023-08-17
Payer: MEDICARE

## 2023-08-17 ENCOUNTER — HOSPITAL ENCOUNTER (OUTPATIENT)
Age: 78
Setting detail: OUTPATIENT SURGERY
Discharge: HOME | End: 2023-08-17
Attending: SURGERY | Admitting: SURGERY
Payer: MEDICARE

## 2023-08-17 VITALS
HEIGHT: 64 IN | SYSTOLIC BLOOD PRESSURE: 137 MMHG | BODY MASS INDEX: 23.05 KG/M2 | OXYGEN SATURATION: 93 % | HEART RATE: 63 BPM | RESPIRATION RATE: 16 BRPM | TEMPERATURE: 97.7 F | WEIGHT: 135 LBS | DIASTOLIC BLOOD PRESSURE: 53 MMHG

## 2023-08-17 PROCEDURE — 2709999900 HC NON-CHARGEABLE SUPPLY: Performed by: SURGERY

## 2023-08-17 PROCEDURE — 3700000001 HC ADD 15 MINUTES (ANESTHESIA): Performed by: SURGERY

## 2023-08-17 PROCEDURE — 6360000002 HC RX W HCPCS: Performed by: SURGERY

## 2023-08-17 PROCEDURE — 3700000000 HC ANESTHESIA ATTENDED CARE: Performed by: SURGERY

## 2023-08-17 PROCEDURE — 7100000010 HC PHASE II RECOVERY - FIRST 15 MIN: Performed by: SURGERY

## 2023-08-17 PROCEDURE — 7100000000 HC PACU RECOVERY - FIRST 15 MIN: Performed by: SURGERY

## 2023-08-17 PROCEDURE — 2500000003 HC RX 250 WO HCPCS: Performed by: ANESTHESIOLOGY

## 2023-08-17 PROCEDURE — 11043 DBRDMT MUSC&/FSCA 1ST 20/<: CPT | Performed by: SURGERY

## 2023-08-17 PROCEDURE — 2580000003 HC RX 258: Performed by: SURGERY

## 2023-08-17 PROCEDURE — 3600000003 HC SURGERY LEVEL 3 BASE: Performed by: SURGERY

## 2023-08-17 PROCEDURE — 7100000001 HC PACU RECOVERY - ADDTL 15 MIN: Performed by: SURGERY

## 2023-08-17 PROCEDURE — 6360000002 HC RX W HCPCS: Performed by: ANESTHESIOLOGY

## 2023-08-17 PROCEDURE — 6370000000 HC RX 637 (ALT 250 FOR IP): Performed by: ANESTHESIOLOGY

## 2023-08-17 PROCEDURE — 3600000013 HC SURGERY LEVEL 3 ADDTL 15MIN: Performed by: SURGERY

## 2023-08-17 PROCEDURE — 7100000011 HC PHASE II RECOVERY - ADDTL 15 MIN: Performed by: SURGERY

## 2023-08-17 PROCEDURE — 2580000003 HC RX 258: Performed by: ANESTHESIOLOGY

## 2023-08-17 PROCEDURE — 6370000000 HC RX 637 (ALT 250 FOR IP): Performed by: SURGERY

## 2023-08-17 RX ORDER — DIPHENHYDRAMINE HYDROCHLORIDE 50 MG/ML
12.5 INJECTION INTRAMUSCULAR; INTRAVENOUS
Status: DISCONTINUED | OUTPATIENT
Start: 2023-08-17 | End: 2023-08-17 | Stop reason: HOSPADM

## 2023-08-17 RX ORDER — FAMOTIDINE 20 MG/1
20 TABLET, FILM COATED ORAL ONCE
Status: COMPLETED | OUTPATIENT
Start: 2023-08-17 | End: 2023-08-17

## 2023-08-17 RX ORDER — LIDOCAINE HYDROCHLORIDE 10 MG/ML
INJECTION, SOLUTION EPIDURAL; INFILTRATION; INTRACAUDAL; PERINEURAL PRN
Status: DISCONTINUED | OUTPATIENT
Start: 2023-08-17 | End: 2023-08-17 | Stop reason: SDUPTHER

## 2023-08-17 RX ORDER — SODIUM CHLORIDE 0.9 % (FLUSH) 0.9 %
5-40 SYRINGE (ML) INJECTION EVERY 12 HOURS SCHEDULED
Status: DISCONTINUED | OUTPATIENT
Start: 2023-08-17 | End: 2023-08-17 | Stop reason: HOSPADM

## 2023-08-17 RX ORDER — CELECOXIB 100 MG/1
100 CAPSULE ORAL ONCE
Status: COMPLETED | OUTPATIENT
Start: 2023-08-17 | End: 2023-08-17

## 2023-08-17 RX ORDER — FENTANYL CITRATE 50 UG/ML
INJECTION, SOLUTION INTRAMUSCULAR; INTRAVENOUS PRN
Status: DISCONTINUED | OUTPATIENT
Start: 2023-08-17 | End: 2023-08-17 | Stop reason: SDUPTHER

## 2023-08-17 RX ORDER — SODIUM CHLORIDE 0.9 % (FLUSH) 0.9 %
5-40 SYRINGE (ML) INJECTION PRN
Status: DISCONTINUED | OUTPATIENT
Start: 2023-08-17 | End: 2023-08-17 | Stop reason: HOSPADM

## 2023-08-17 RX ORDER — SODIUM CHLORIDE, SODIUM LACTATE, POTASSIUM CHLORIDE, CALCIUM CHLORIDE 600; 310; 30; 20 MG/100ML; MG/100ML; MG/100ML; MG/100ML
INJECTION, SOLUTION INTRAVENOUS CONTINUOUS
Status: DISCONTINUED | OUTPATIENT
Start: 2023-08-17 | End: 2023-08-17 | Stop reason: HOSPADM

## 2023-08-17 RX ORDER — ONDANSETRON 2 MG/ML
INJECTION INTRAMUSCULAR; INTRAVENOUS PRN
Status: DISCONTINUED | OUTPATIENT
Start: 2023-08-17 | End: 2023-08-17 | Stop reason: SDUPTHER

## 2023-08-17 RX ORDER — SODIUM CHLORIDE 9 MG/ML
INJECTION, SOLUTION INTRAVENOUS CONTINUOUS PRN
Status: DISCONTINUED | OUTPATIENT
Start: 2023-08-17 | End: 2023-08-17 | Stop reason: SDUPTHER

## 2023-08-17 RX ORDER — HYDROMORPHONE HYDROCHLORIDE 1 MG/ML
0.25 INJECTION, SOLUTION INTRAMUSCULAR; INTRAVENOUS; SUBCUTANEOUS EVERY 5 MIN PRN
Status: DISCONTINUED | OUTPATIENT
Start: 2023-08-17 | End: 2023-08-17 | Stop reason: HOSPADM

## 2023-08-17 RX ORDER — PROCHLORPERAZINE EDISYLATE 5 MG/ML
5 INJECTION INTRAMUSCULAR; INTRAVENOUS
Status: DISCONTINUED | OUTPATIENT
Start: 2023-08-17 | End: 2023-08-17 | Stop reason: HOSPADM

## 2023-08-17 RX ORDER — HYDRALAZINE HYDROCHLORIDE 20 MG/ML
10 INJECTION INTRAMUSCULAR; INTRAVENOUS
Status: DISCONTINUED | OUTPATIENT
Start: 2023-08-17 | End: 2023-08-17 | Stop reason: HOSPADM

## 2023-08-17 RX ORDER — HYDROMORPHONE HYDROCHLORIDE 1 MG/ML
0.5 INJECTION, SOLUTION INTRAMUSCULAR; INTRAVENOUS; SUBCUTANEOUS EVERY 5 MIN PRN
Status: DISCONTINUED | OUTPATIENT
Start: 2023-08-17 | End: 2023-08-17 | Stop reason: HOSPADM

## 2023-08-17 RX ORDER — ACETAMINOPHEN 500 MG
1000 TABLET ORAL ONCE
Status: COMPLETED | OUTPATIENT
Start: 2023-08-17 | End: 2023-08-17

## 2023-08-17 RX ORDER — LIDOCAINE HYDROCHLORIDE 10 MG/ML
1 INJECTION, SOLUTION EPIDURAL; INFILTRATION; INTRACAUDAL; PERINEURAL
Status: DISCONTINUED | OUTPATIENT
Start: 2023-08-17 | End: 2023-08-17 | Stop reason: HOSPADM

## 2023-08-17 RX ORDER — SODIUM CHLORIDE 9 MG/ML
INJECTION, SOLUTION INTRAVENOUS PRN
Status: DISCONTINUED | OUTPATIENT
Start: 2023-08-17 | End: 2023-08-17 | Stop reason: HOSPADM

## 2023-08-17 RX ORDER — DEXAMETHASONE SODIUM PHOSPHATE 4 MG/ML
INJECTION, SOLUTION INTRA-ARTICULAR; INTRALESIONAL; INTRAMUSCULAR; INTRAVENOUS; SOFT TISSUE PRN
Status: DISCONTINUED | OUTPATIENT
Start: 2023-08-17 | End: 2023-08-17 | Stop reason: SDUPTHER

## 2023-08-17 RX ORDER — LABETALOL HYDROCHLORIDE 5 MG/ML
10 INJECTION, SOLUTION INTRAVENOUS
Status: DISCONTINUED | OUTPATIENT
Start: 2023-08-17 | End: 2023-08-17 | Stop reason: HOSPADM

## 2023-08-17 RX ORDER — PROPOFOL 10 MG/ML
INJECTION, EMULSION INTRAVENOUS PRN
Status: DISCONTINUED | OUTPATIENT
Start: 2023-08-17 | End: 2023-08-17 | Stop reason: SDUPTHER

## 2023-08-17 RX ORDER — DEXAMETHASONE SODIUM PHOSPHATE 10 MG/ML
8 INJECTION, SOLUTION INTRAMUSCULAR; INTRAVENOUS ONCE
Status: DISCONTINUED | OUTPATIENT
Start: 2023-08-17 | End: 2023-08-17 | Stop reason: HOSPADM

## 2023-08-17 RX ORDER — EPHEDRINE SULFATE 50 MG/ML
INJECTION, SOLUTION INTRAVENOUS PRN
Status: DISCONTINUED | OUTPATIENT
Start: 2023-08-17 | End: 2023-08-17 | Stop reason: SDUPTHER

## 2023-08-17 RX ADMIN — SODIUM CHLORIDE: 9 INJECTION, SOLUTION INTRAVENOUS at 17:15

## 2023-08-17 RX ADMIN — CEFAZOLIN 2000 MG: 2 INJECTION, POWDER, FOR SOLUTION INTRAMUSCULAR; INTRAVENOUS at 17:16

## 2023-08-17 RX ADMIN — ONDANSETRON 4 MG: 2 INJECTION INTRAMUSCULAR; INTRAVENOUS at 17:29

## 2023-08-17 RX ADMIN — EPHEDRINE SULFATE 10 MG: 50 INJECTION INTRAMUSCULAR; INTRAVENOUS; SUBCUTANEOUS at 17:36

## 2023-08-17 RX ADMIN — PHENYLEPHRINE HYDROCHLORIDE 100 MCG: 10 INJECTION INTRAVENOUS at 17:33

## 2023-08-17 RX ADMIN — DEXAMETHASONE SODIUM PHOSPHATE 4 MG: 4 INJECTION, SOLUTION INTRAMUSCULAR; INTRAVENOUS at 17:29

## 2023-08-17 RX ADMIN — ACETAMINOPHEN 1000 MG: 500 TABLET ORAL at 14:36

## 2023-08-17 RX ADMIN — FENTANYL CITRATE 25 MCG: 0.05 INJECTION, SOLUTION INTRAMUSCULAR; INTRAVENOUS at 17:39

## 2023-08-17 RX ADMIN — LIDOCAINE HYDROCHLORIDE 50 MG: 10 INJECTION, SOLUTION EPIDURAL; INFILTRATION; INTRACAUDAL; PERINEURAL at 17:22

## 2023-08-17 RX ADMIN — CELECOXIB 100 MG: 100 CAPSULE ORAL at 14:36

## 2023-08-17 RX ADMIN — FAMOTIDINE 20 MG: 20 TABLET, FILM COATED ORAL at 15:02

## 2023-08-17 RX ADMIN — FENTANYL CITRATE 25 MCG: 0.05 INJECTION, SOLUTION INTRAMUSCULAR; INTRAVENOUS at 17:22

## 2023-08-17 RX ADMIN — PROPOFOL 90 MG: 10 INJECTION, EMULSION INTRAVENOUS at 17:22

## 2023-08-17 RX ADMIN — SODIUM CHLORIDE, POTASSIUM CHLORIDE, SODIUM LACTATE AND CALCIUM CHLORIDE: 600; 310; 30; 20 INJECTION, SOLUTION INTRAVENOUS at 14:37

## 2023-08-17 ASSESSMENT — PAIN DESCRIPTION - PAIN TYPE
TYPE: SURGICAL PAIN
TYPE: SURGICAL PAIN

## 2023-08-17 ASSESSMENT — PAIN DESCRIPTION - LOCATION
LOCATION: SACRUM
LOCATION: SACRUM

## 2023-08-17 ASSESSMENT — PAIN DESCRIPTION - DESCRIPTORS
DESCRIPTORS: ACHING
DESCRIPTORS: ACHING

## 2023-08-17 ASSESSMENT — PAIN - FUNCTIONAL ASSESSMENT: PAIN_FUNCTIONAL_ASSESSMENT: 0-10

## 2023-08-17 ASSESSMENT — LIFESTYLE VARIABLES: SMOKING_STATUS: 0

## 2023-08-17 ASSESSMENT — PAIN SCALES - GENERAL
PAINLEVEL_OUTOF10: 8
PAINLEVEL_OUTOF10: 7

## 2023-08-17 NOTE — ANESTHESIA PRE PROCEDURE
Department of Anesthesiology  Preprocedure Note       Name:  Liberty Lamas   Age:  77 y.o.  :  1945                                          MRN:  [id number]         Date:  2023      Surgeon: Felicity Smith):  Angella Moncada DO    Procedure: Procedure(s):  DEBRIDEMENT OF SACRAL DECUBITUS ULCER    Medications prior to admission:   Prior to Admission medications    Medication Sig Start Date End Date Taking? Authorizing Provider   linezolid (ZYVOX) 600 MG tablet  8/15/23   Historical Provider, MD   furosemide (LASIX) 20 MG tablet 1 tablet Orally every three days    Historical Provider, MD   clindamycin (CLEOCIN) 300 MG capsule Take 1 capsule by mouth 3 times daily  Patient not taking: Reported on 2023 8/10/23   Historical Provider, MD   vitamin D 25 MCG (1000 UT) CAPS 1 capsule 23   Historical Provider, MD   potassium chloride (KLOR-CON) 10 MEQ extended release tablet 1 tablet with food Orally every three days    Historical Provider, MD   HYDROcodone-acetaminophen (NORCO)  MG per tablet Take 1 tablet by mouth every 8 hours as needed for Pain for up to 30 days. Intended supply: 30 days 23  Jena Dwyer MD   gabapentin (NEURONTIN) 600 MG tablet TAKE 1 TABLET BY MOUTH THREE TIMES A DAY 23  Jena Dwyer MD   SANTYL 250 UNIT/GM ointment  23   Historical Provider, MD   calcium carb-cholecalciferol 600-20 MG-MCG TABS Take 1 tablet by mouth daily    Historical Provider, MD   IVIG 15G every 30 days Dosage not listed on home med list    Historical Provider, MD   magnesium oxide (MAG-OX) 400 MG tablet Take 1 tablet by mouth 2 times daily (with meals) 23   Historical Provider, MD   timolol (TIMOPTIC) 0.5 % ophthalmic solution INSTILL 1 DROP INTO BOTH EYES TWICE A DAY 23   Historical Provider, MD   mupirocin (BACTROBAN) 2 % ointment  22   Historical Provider, MD   tiZANidine (ZANAFLEX) 4 MG tablet TAKE 1 TABLET BY MOUTH 2 TIMES DAILY AS NEEDED (PAIN).

## 2023-08-17 NOTE — INTERVAL H&P NOTE
Update History & Physical    The patient's History and Physical of August 16, 2023 was reviewed with the patient and I examined the patient. There was no change. The surgical site was confirmed by the patient and me. Plan: The risks, benefits, expected outcome, and alternative to the recommended procedure have been discussed with the patient. Patient understands and wants to proceed with the procedure.      Electronically signed by Marcella Cazares DO on 3/76/8011 at 4:06 PM

## 2023-08-17 NOTE — ANESTHESIA POSTPROCEDURE EVALUATION
Department of Anesthesiology  Postprocedure Note    Patient: Ab Nye  MRN: [id number]  YOB: 1945  Date of evaluation: 8/17/2023      Procedure Summary     Date: 08/17/23 Room / Location: [address]    Anesthesia Start: 2448 Anesthesia Stop: 6566    Procedure: DEBRIDEMENT OF SACRAL DECUBITUS ULCER TO THE LEVEL OF BONE Diagnosis:       Pressure injury of deep tissue of sacral region      (Pressure injury of deep tissue of sacral region [S36.244])    Surgeons: Gustavo Oneill DO Responsible Provider: Leonora Herring MD    Anesthesia Type: general ASA Status: 3          Anesthesia Type: No value filed.     Gypsy Phase I: Gypsy Score: 10    Gypsy Phase II:        Anesthesia Post Evaluation    Patient location during evaluation: PACU  Patient participation: complete - patient participated  Level of consciousness: awake and alert  Pain score: 0  Airway patency: patent  Nausea & Vomiting: no nausea and no vomiting  Complications: no  Cardiovascular status: hemodynamically stable  Respiratory status: acceptable and spontaneous ventilation  Hydration status: euvolemic  Pain management: adequate

## 2023-08-17 NOTE — DISCHARGE INSTR - ACTIVITY
Wet to dry dressing changes daily. Keep wound care follow up appointment. If you have signs of infection, call you doctor.  These include:   -Increased pain, swelling, warmth, or redness  -Red streaks leading from the incision  -Pus draining from the incision  -A fever > 100.4

## 2023-08-17 NOTE — OP NOTE
Operative Note      Patient: Yassine Levy  YOB: 1945  MRN: [id number]    Date of Procedure: 8/17/2023    Pre-Op Diagnosis Codes:     * Pressure injury of deep tissue of sacral region [L89.156]    Post-Op Diagnosis: Same       Procedure(s):  DEBRIDEMENT OF SACRAL DECUBITUS ULCER TO THE LEVEL OF BONE    Surgeon(s):  Sue Lamb DO    Assistant:   * No surgical staff found *    Anesthesia: General    Estimated Blood Loss (mL): Minimal    Complications: None    Specimens:   * No specimens in log *    Implants:  * No implants in log *      Drains:   Urostomy RLQ (Active)   Stomal Appliance Clean, dry & intact 08/01/23 1111   Stoma  Assessment [place] 08/01/23 1111   Peristomal Assessment Clean, dry & intact 08/01/23 1111   Collection Container Belly bag 08/01/23 1111   Urine Color Yellow 08/01/23 1111       Findings: 6 x 6 x 0.5 cm decubitus prior to debridement. 6 x 7 x 1.5 cm following debridement. Sharp debridement to the level of the bone. This procedure was not performed to treat primary cutaneous melanoma through wide local excision    Indications:   Ms. Yassine Levy presented to the office with complaints of an infected decubitus ulcer. she was found to have an unstageable decubitus ulcer overlying the sacrum and has elected for debridement. Detailed Description of Procedure:   Patient was taken to the main operating room, placed on the operating table in the supine position. After IV Antibiotics were administered, the patient was placed under general anesthesia and turned to the right lateral decubitus position. The sacral area was prepped and draped in the usual sterile fashion. A timeout was performed identifying the correct patient, equipment, and antibiotics given. The wound measured 6 cm tall by 6 cm wide by 0.5 cm deep prior to incision.   Sharp debridement was performed with cautery through the skin, soft tissue and muscle to the level of the bone until the tissues were

## 2023-08-18 NOTE — PROGRESS NOTES
Patient friendly and talkative no acute distress noted, transferred to car without difficulty, no acute distress noted

## 2023-09-13 DIAGNOSIS — M48.062 SPINAL STENOSIS OF LUMBAR REGION WITH NEUROGENIC CLAUDICATION: ICD-10-CM

## 2023-09-13 RX ORDER — HYDROCODONE BITARTRATE AND ACETAMINOPHEN 10; 325 MG/1; MG/1
1 TABLET ORAL EVERY 8 HOURS PRN
Qty: 90 TABLET | Refills: 0 | Status: SHIPPED | OUTPATIENT
Start: 2023-09-13 | End: 2023-10-13

## 2023-09-13 NOTE — TELEPHONE ENCOUNTER
Requested Prescriptions     Pending Prescriptions Disp Refills    HYDROcodone-acetaminophen (NORCO)  MG per tablet 90 tablet 0     Sig: Take 1 tablet by mouth every 8 hours as needed for Pain for up to 30 days.  Intended supply: 30 days       Last Office Visit:  5/9/2023  Next Office Visit:  11/9/2023  Last Medication Refill: 8/14/2023 with 0 DINA Keyes up to date:  7/14/2023     *RX updated to reflect   9/13/2023  fill date*

## 2023-10-10 ENCOUNTER — APPOINTMENT (OUTPATIENT)
Dept: CT IMAGING | Age: 78
End: 2023-10-10
Payer: MEDICARE

## 2023-10-10 ENCOUNTER — HOSPITAL ENCOUNTER (INPATIENT)
Age: 78
LOS: 11 days | Discharge: NURSING, REHAB OR LONG-TERM CARE | End: 2023-10-21
Attending: HOSPITALIST
Payer: MEDICARE

## 2023-10-10 ENCOUNTER — APPOINTMENT (OUTPATIENT)
Dept: GENERAL RADIOLOGY | Age: 78
End: 2023-10-10
Payer: MEDICARE

## 2023-10-10 DIAGNOSIS — G61.81 CIDP (CHRONIC INFLAMMATORY DEMYELINATING POLYNEUROPATHY) (HCC): ICD-10-CM

## 2023-10-10 DIAGNOSIS — N17.9 ACUTE KIDNEY INJURY (HCC): Primary | ICD-10-CM

## 2023-10-10 DIAGNOSIS — K50.118 CROHN'S DISEASE OF COLON WITH OTHER COMPLICATION (HCC): ICD-10-CM

## 2023-10-10 DIAGNOSIS — R82.71 BACTERIA IN URINE: ICD-10-CM

## 2023-10-10 DIAGNOSIS — M48.062 SPINAL STENOSIS OF LUMBAR REGION WITH NEUROGENIC CLAUDICATION: ICD-10-CM

## 2023-10-10 PROBLEM — R65.20 SEVERE SEPSIS (HCC): Status: ACTIVE | Noted: 2023-10-10

## 2023-10-10 PROBLEM — A41.9 SEPSIS (HCC): Status: ACTIVE | Noted: 2023-10-10

## 2023-10-10 PROBLEM — Z98.890 HISTORY OF UROSTOMY: Status: ACTIVE | Noted: 2023-10-10

## 2023-10-10 PROBLEM — T14.8XXA CHRONIC WOUND: Status: ACTIVE | Noted: 2023-10-10

## 2023-10-10 PROBLEM — N13.30 BILATERAL HYDRONEPHROSIS: Status: ACTIVE | Noted: 2023-10-10

## 2023-10-10 LAB
25(OH)D3 SERPL-MCNC: 28 NG/ML
ALBUMIN SERPL-MCNC: 3.1 G/DL (ref 3.5–5.2)
ALP SERPL-CCNC: 51 U/L (ref 35–104)
ALT SERPL-CCNC: <5 U/L (ref 5–33)
ANION GAP SERPL CALCULATED.3IONS-SCNC: 10 MMOL/L (ref 7–19)
ANISOCYTOSIS BLD QL SMEAR: ABNORMAL
APTT PPP: 23.9 SEC (ref 26–36.2)
AST SERPL-CCNC: 11 U/L (ref 5–32)
B PARAP IS1001 DNA NPH QL NAA+NON-PROBE: NOT DETECTED
B PERT.PT PRMT NPH QL NAA+NON-PROBE: NOT DETECTED
BACTERIA #/AREA URNS HPF: ABNORMAL /HPF
BASOPHILS # BLD: 0.1 K/UL (ref 0–0.2)
BASOPHILS NFR BLD: 1 % (ref 0–1)
BILIRUB SERPL-MCNC: <0.2 MG/DL (ref 0.2–1.2)
BILIRUB UR QL STRIP: NEGATIVE
BUN SERPL-MCNC: 53 MG/DL (ref 8–23)
C PNEUM DNA NPH QL NAA+NON-PROBE: NOT DETECTED
CALCIUM SERPL-MCNC: 8.3 MG/DL (ref 8.8–10.2)
CHLORIDE SERPL-SCNC: 102 MMOL/L (ref 98–111)
CLARITY UR: ABNORMAL
CO2 SERPL-SCNC: 28 MMOL/L (ref 22–29)
COLOR UR: YELLOW
CREAT SERPL-MCNC: 1.8 MG/DL (ref 0.5–0.9)
EOSINOPHIL # BLD: 0 K/UL (ref 0–0.6)
EOSINOPHIL NFR BLD: 0 % (ref 0–5)
ERYTHROCYTE [DISTWIDTH] IN BLOOD BY AUTOMATED COUNT: 17.6 % (ref 11.5–14.5)
FERRITIN SERPL-MCNC: 146.9 NG/ML (ref 13–150)
FLUAV RNA NPH QL NAA+NON-PROBE: NOT DETECTED
FLUBV RNA NPH QL NAA+NON-PROBE: NOT DETECTED
FOLATE SERPL-MCNC: >20 NG/ML (ref 4.8–37.3)
GLUCOSE SERPL-MCNC: 116 MG/DL (ref 74–109)
GLUCOSE UR STRIP.AUTO-MCNC: NEGATIVE MG/DL
HADV DNA NPH QL NAA+NON-PROBE: NOT DETECTED
HAPTOGLOB SERPL-MCNC: 199 MG/DL (ref 30–200)
HCOV 229E RNA NPH QL NAA+NON-PROBE: NOT DETECTED
HCOV HKU1 RNA NPH QL NAA+NON-PROBE: NOT DETECTED
HCOV NL63 RNA NPH QL NAA+NON-PROBE: NOT DETECTED
HCOV OC43 RNA NPH QL NAA+NON-PROBE: NOT DETECTED
HCT VFR BLD AUTO: 26.4 % (ref 37–47)
HGB BLD-MCNC: 7.9 G/DL (ref 12–16)
HGB UR STRIP.AUTO-MCNC: ABNORMAL MG/L
HMPV RNA NPH QL NAA+NON-PROBE: NOT DETECTED
HPIV1 RNA NPH QL NAA+NON-PROBE: NOT DETECTED
HPIV2 RNA NPH QL NAA+NON-PROBE: NOT DETECTED
HPIV3 RNA NPH QL NAA+NON-PROBE: NOT DETECTED
HPIV4 RNA NPH QL NAA+NON-PROBE: NOT DETECTED
HYPOCHROMIA BLD QL SMEAR: ABNORMAL
IMM GRANULOCYTES # BLD: 0.1 K/UL
INR PPP: 1.29 (ref 0.88–1.18)
IRON SATN MFR SERPL: 4 % (ref 14–50)
IRON SERPL-MCNC: 10 UG/DL (ref 37–145)
KETONES UR STRIP.AUTO-MCNC: NEGATIVE MG/DL
LACTATE BLDV-SCNC: 0.8 MG/DL (ref 0.5–1.9)
LACTATE BLDV-SCNC: 0.9 MG/DL (ref 0.5–1.9)
LDH SERPL-CCNC: 189 U/L (ref 91–215)
LEUKOCYTE ESTERASE UR QL STRIP.AUTO: ABNORMAL
LYMPHOCYTES # BLD: 1.5 K/UL (ref 1.1–4.5)
LYMPHOCYTES NFR BLD: 8 % (ref 20–40)
M PNEUMO DNA NPH QL NAA+NON-PROBE: NOT DETECTED
MAGNESIUM SERPL-MCNC: 1.2 MG/DL (ref 1.6–2.4)
MCH RBC QN AUTO: 24.2 PG (ref 27–31)
MCHC RBC AUTO-ENTMCNC: 29.9 G/DL (ref 33–37)
MCV RBC AUTO: 81 FL (ref 81–99)
MONOCYTES # BLD: 1.2 K/UL (ref 0–0.9)
MONOCYTES NFR BLD: 9 % (ref 0–10)
NEUTROPHILS # BLD: 10 K/UL (ref 1.5–7.5)
NEUTS SEG NFR BLD: 78 % (ref 50–65)
NITRITE UR QL STRIP.AUTO: NEGATIVE
PH UR STRIP.AUTO: 5.5 [PH] (ref 5–8)
PHOSPHATE SERPL-MCNC: 4.3 MG/DL (ref 2.5–4.5)
PLATELET # BLD AUTO: 322 K/UL (ref 130–400)
PLATELET SLIDE REVIEW: ADEQUATE
PMV BLD AUTO: 9.7 FL (ref 9.4–12.3)
POTASSIUM SERPL-SCNC: 4.2 MMOL/L (ref 3.5–5)
PROT SERPL-MCNC: 6.4 G/DL (ref 6.6–8.7)
PROT UR STRIP.AUTO-MCNC: 30 MG/DL
PROTHROMBIN TIME: 15.7 SEC (ref 12–14.6)
RBC # BLD AUTO: 3.26 M/UL (ref 4.2–5.4)
RBC #/AREA URNS HPF: ABNORMAL /HPF (ref 0–2)
RSV RNA NPH QL NAA+NON-PROBE: NOT DETECTED
RV+EV RNA NPH QL NAA+NON-PROBE: NOT DETECTED
SARS-COV-2 RNA NPH QL NAA+NON-PROBE: NOT DETECTED
SODIUM SERPL-SCNC: 140 MMOL/L (ref 136–145)
SP GR UR STRIP.AUTO: 1.01 (ref 1–1.03)
SQUAMOUS #/AREA URNS HPF: ABNORMAL /HPF
TIBC SERPL-MCNC: 246 UG/DL (ref 250–400)
UROBILINOGEN UR STRIP.AUTO-MCNC: 0.2 E.U./DL
VARIANT LYMPHS NFR BLD: 4 % (ref 0–8)
VIT B12 SERPL-MCNC: 436 PG/ML (ref 211–946)
WBC # BLD AUTO: 12.8 K/UL (ref 4.8–10.8)
WBC #/AREA URNS HPF: ABNORMAL /HPF (ref 0–5)

## 2023-10-10 PROCEDURE — 83605 ASSAY OF LACTIC ACID: CPT

## 2023-10-10 PROCEDURE — 82746 ASSAY OF FOLIC ACID SERUM: CPT

## 2023-10-10 PROCEDURE — 83735 ASSAY OF MAGNESIUM: CPT

## 2023-10-10 PROCEDURE — 83615 LACTATE (LD) (LDH) ENZYME: CPT

## 2023-10-10 PROCEDURE — 6370000000 HC RX 637 (ALT 250 FOR IP): Performed by: NURSE PRACTITIONER

## 2023-10-10 PROCEDURE — 94760 N-INVAS EAR/PLS OXIMETRY 1: CPT

## 2023-10-10 PROCEDURE — 74176 CT ABD & PELVIS W/O CONTRAST: CPT

## 2023-10-10 PROCEDURE — 73630 X-RAY EXAM OF FOOT: CPT

## 2023-10-10 PROCEDURE — 1210000000 HC MED SURG R&B

## 2023-10-10 PROCEDURE — 83540 ASSAY OF IRON: CPT

## 2023-10-10 PROCEDURE — 85025 COMPLETE CBC W/AUTO DIFF WBC: CPT

## 2023-10-10 PROCEDURE — 87186 SC STD MICRODIL/AGAR DIL: CPT

## 2023-10-10 PROCEDURE — 6360000002 HC RX W HCPCS: Performed by: PHYSICIAN ASSISTANT

## 2023-10-10 PROCEDURE — 84100 ASSAY OF PHOSPHORUS: CPT

## 2023-10-10 PROCEDURE — 83010 ASSAY OF HAPTOGLOBIN QUANT: CPT

## 2023-10-10 PROCEDURE — 87086 URINE CULTURE/COLONY COUNT: CPT

## 2023-10-10 PROCEDURE — 99285 EMERGENCY DEPT VISIT HI MDM: CPT

## 2023-10-10 PROCEDURE — 85610 PROTHROMBIN TIME: CPT

## 2023-10-10 PROCEDURE — 82728 ASSAY OF FERRITIN: CPT

## 2023-10-10 PROCEDURE — 83550 IRON BINDING TEST: CPT

## 2023-10-10 PROCEDURE — 2580000003 HC RX 258: Performed by: PHYSICIAN ASSISTANT

## 2023-10-10 PROCEDURE — 87040 BLOOD CULTURE FOR BACTERIA: CPT

## 2023-10-10 PROCEDURE — 2580000003 HC RX 258: Performed by: NURSE PRACTITIONER

## 2023-10-10 PROCEDURE — 36415 COLL VENOUS BLD VENIPUNCTURE: CPT

## 2023-10-10 PROCEDURE — 82306 VITAMIN D 25 HYDROXY: CPT

## 2023-10-10 PROCEDURE — 80053 COMPREHEN METABOLIC PANEL: CPT

## 2023-10-10 PROCEDURE — 0202U NFCT DS 22 TRGT SARS-COV-2: CPT

## 2023-10-10 PROCEDURE — 71045 X-RAY EXAM CHEST 1 VIEW: CPT

## 2023-10-10 PROCEDURE — 81001 URINALYSIS AUTO W/SCOPE: CPT

## 2023-10-10 PROCEDURE — 6360000002 HC RX W HCPCS: Performed by: NURSE PRACTITIONER

## 2023-10-10 PROCEDURE — 85730 THROMBOPLASTIN TIME PARTIAL: CPT

## 2023-10-10 PROCEDURE — 82607 VITAMIN B-12: CPT

## 2023-10-10 RX ORDER — SODIUM CHLORIDE 9 MG/ML
INJECTION, SOLUTION INTRAVENOUS PRN
Status: DISCONTINUED | OUTPATIENT
Start: 2023-10-10 | End: 2023-10-21 | Stop reason: HOSPADM

## 2023-10-10 RX ORDER — SODIUM CHLORIDE 9 MG/ML
INJECTION, SOLUTION INTRAVENOUS PRN
Status: DISCONTINUED | OUTPATIENT
Start: 2023-10-10 | End: 2023-10-10 | Stop reason: SDUPTHER

## 2023-10-10 RX ORDER — FENOFIBRATE 160 MG/1
160 TABLET ORAL DAILY
Status: DISCONTINUED | OUTPATIENT
Start: 2023-10-10 | End: 2023-10-21 | Stop reason: HOSPADM

## 2023-10-10 RX ORDER — M-VIT,TX,IRON,MINS/CALC/FOLIC 27MG-0.4MG
1 TABLET ORAL DAILY
Status: DISCONTINUED | OUTPATIENT
Start: 2023-10-10 | End: 2023-10-21 | Stop reason: HOSPADM

## 2023-10-10 RX ORDER — CHOLESTYRAMINE LIGHT 4 G/5.7G
4 POWDER, FOR SUSPENSION ORAL DAILY
Status: DISCONTINUED | OUTPATIENT
Start: 2023-10-10 | End: 2023-10-11

## 2023-10-10 RX ORDER — SODIUM CHLORIDE 0.9 % (FLUSH) 0.9 %
5-40 SYRINGE (ML) INJECTION PRN
Status: DISCONTINUED | OUTPATIENT
Start: 2023-10-10 | End: 2023-10-10 | Stop reason: SDUPTHER

## 2023-10-10 RX ORDER — ASPIRIN 81 MG/1
81 TABLET, CHEWABLE ORAL NIGHTLY
Status: DISCONTINUED | OUTPATIENT
Start: 2023-10-10 | End: 2023-10-21 | Stop reason: HOSPADM

## 2023-10-10 RX ORDER — LANOLIN ALCOHOL/MO/W.PET/CERES
600 CREAM (GRAM) TOPICAL DAILY
Status: DISCONTINUED | OUTPATIENT
Start: 2023-10-10 | End: 2023-10-21 | Stop reason: HOSPADM

## 2023-10-10 RX ORDER — TIMOLOL MALEATE 5 MG/ML
1 SOLUTION/ DROPS OPHTHALMIC 2 TIMES DAILY
Status: DISCONTINUED | OUTPATIENT
Start: 2023-10-10 | End: 2023-10-21 | Stop reason: HOSPADM

## 2023-10-10 RX ORDER — GABAPENTIN 300 MG/1
300 CAPSULE ORAL 2 TIMES DAILY
Status: DISCONTINUED | OUTPATIENT
Start: 2023-10-10 | End: 2023-10-12

## 2023-10-10 RX ORDER — ALLOPURINOL 100 MG/1
300 TABLET ORAL DAILY
Status: DISCONTINUED | OUTPATIENT
Start: 2023-10-10 | End: 2023-10-21 | Stop reason: HOSPADM

## 2023-10-10 RX ORDER — ACETAMINOPHEN 325 MG/1
650 TABLET ORAL EVERY 6 HOURS PRN
Status: DISCONTINUED | OUTPATIENT
Start: 2023-10-10 | End: 2023-10-21 | Stop reason: HOSPADM

## 2023-10-10 RX ORDER — TIZANIDINE 4 MG/1
4 TABLET ORAL 2 TIMES DAILY PRN
Status: DISCONTINUED | OUTPATIENT
Start: 2023-10-10 | End: 2023-10-21 | Stop reason: HOSPADM

## 2023-10-10 RX ORDER — SODIUM CHLORIDE 0.9 % (FLUSH) 0.9 %
5-40 SYRINGE (ML) INJECTION EVERY 12 HOURS SCHEDULED
Status: DISCONTINUED | OUTPATIENT
Start: 2023-10-10 | End: 2023-10-21 | Stop reason: HOSPADM

## 2023-10-10 RX ORDER — HYDROCODONE BITARTRATE AND ACETAMINOPHEN 10; 325 MG/1; MG/1
1 TABLET ORAL EVERY 8 HOURS PRN
Status: DISCONTINUED | OUTPATIENT
Start: 2023-10-10 | End: 2023-10-21 | Stop reason: HOSPADM

## 2023-10-10 RX ORDER — PANTOPRAZOLE SODIUM 40 MG/1
40 TABLET, DELAYED RELEASE ORAL
Status: DISCONTINUED | OUTPATIENT
Start: 2023-10-11 | End: 2023-10-21 | Stop reason: HOSPADM

## 2023-10-10 RX ORDER — SODIUM CHLORIDE 9 MG/ML
INJECTION, SOLUTION INTRAVENOUS CONTINUOUS
Status: DISCONTINUED | OUTPATIENT
Start: 2023-10-10 | End: 2023-10-11

## 2023-10-10 RX ORDER — SODIUM CHLORIDE 0.9 % (FLUSH) 0.9 %
5-40 SYRINGE (ML) INJECTION PRN
Status: DISCONTINUED | OUTPATIENT
Start: 2023-10-10 | End: 2023-10-21 | Stop reason: HOSPADM

## 2023-10-10 RX ORDER — ACETAMINOPHEN 650 MG/1
650 SUPPOSITORY RECTAL EVERY 6 HOURS PRN
Status: DISCONTINUED | OUTPATIENT
Start: 2023-10-10 | End: 2023-10-21 | Stop reason: HOSPADM

## 2023-10-10 RX ORDER — ENOXAPARIN SODIUM 100 MG/ML
30 INJECTION SUBCUTANEOUS DAILY
Status: DISCONTINUED | OUTPATIENT
Start: 2023-10-10 | End: 2023-10-12

## 2023-10-10 RX ORDER — 0.9 % SODIUM CHLORIDE 0.9 %
30 INTRAVENOUS SOLUTION INTRAVENOUS ONCE
Status: COMPLETED | OUTPATIENT
Start: 2023-10-10 | End: 2023-10-10

## 2023-10-10 RX ORDER — MESALAMINE 400 MG/1
400 CAPSULE, DELAYED RELEASE ORAL 3 TIMES DAILY
Status: DISCONTINUED | OUTPATIENT
Start: 2023-10-10 | End: 2023-10-21 | Stop reason: HOSPADM

## 2023-10-10 RX ORDER — SODIUM CHLORIDE 0.9 % (FLUSH) 0.9 %
5-40 SYRINGE (ML) INJECTION EVERY 12 HOURS SCHEDULED
Status: DISCONTINUED | OUTPATIENT
Start: 2023-10-10 | End: 2023-10-10 | Stop reason: SDUPTHER

## 2023-10-10 RX ORDER — GABAPENTIN 600 MG/1
600 TABLET ORAL 3 TIMES DAILY
Status: DISCONTINUED | OUTPATIENT
Start: 2023-10-10 | End: 2023-10-10

## 2023-10-10 RX ADMIN — HYDROCODONE BITARTRATE AND ACETAMINOPHEN 1 TABLET: 10; 325 TABLET ORAL at 21:41

## 2023-10-10 RX ADMIN — ENOXAPARIN SODIUM 30 MG: 100 INJECTION SUBCUTANEOUS at 21:44

## 2023-10-10 RX ADMIN — CEFEPIME 1000 MG: 1 INJECTION, POWDER, FOR SOLUTION INTRAMUSCULAR; INTRAVENOUS at 19:17

## 2023-10-10 RX ADMIN — MESALAMINE 400 MG: 400 CAPSULE, DELAYED RELEASE ORAL at 21:40

## 2023-10-10 RX ADMIN — VANCOMYCIN HYDROCHLORIDE 1500 MG: 10 INJECTION, POWDER, LYOPHILIZED, FOR SOLUTION INTRAVENOUS at 22:58

## 2023-10-10 RX ADMIN — FOLIC ACID TAB 400 MCG 600 MCG: 400 TAB at 21:42

## 2023-10-10 RX ADMIN — CHOLESTYRAMINE 4 G: 4 POWDER, FOR SUSPENSION ORAL at 21:44

## 2023-10-10 RX ADMIN — TIMOLOL MALEATE 1 DROP: 5 SOLUTION OPHTHALMIC at 22:19

## 2023-10-10 RX ADMIN — Medication 1 TABLET: at 21:40

## 2023-10-10 RX ADMIN — SODIUM CHLORIDE: 9 INJECTION, SOLUTION INTRAVENOUS at 21:11

## 2023-10-10 RX ADMIN — SODIUM CHLORIDE 1641 ML: 9 INJECTION, SOLUTION INTRAVENOUS at 18:00

## 2023-10-10 RX ADMIN — MULTIPLE VITAMINS W/ MINERALS TAB 1 TABLET: TAB at 21:40

## 2023-10-10 RX ADMIN — ALLOPURINOL 300 MG: 100 TABLET ORAL at 22:19

## 2023-10-10 RX ADMIN — GABAPENTIN 300 MG: 300 CAPSULE ORAL at 21:40

## 2023-10-10 RX ADMIN — ASPIRIN 81 MG: 81 TABLET, CHEWABLE ORAL at 21:40

## 2023-10-10 RX ADMIN — CEFEPIME 1000 MG: 1 INJECTION, POWDER, FOR SOLUTION INTRAMUSCULAR; INTRAVENOUS at 22:19

## 2023-10-10 ASSESSMENT — ENCOUNTER SYMPTOMS
ABDOMINAL PAIN: 0
SHORTNESS OF BREATH: 0
RHINORRHEA: 0
APNEA: 0
COUGH: 0
SORE THROAT: 0
DIARRHEA: 1
COLOR CHANGE: 0
ABDOMINAL DISTENTION: 0
EYE PAIN: 0
BACK PAIN: 0
PHOTOPHOBIA: 0
NAUSEA: 0
EYE DISCHARGE: 0

## 2023-10-10 ASSESSMENT — PAIN SCALES - GENERAL: PAINLEVEL_OUTOF10: 9

## 2023-10-10 ASSESSMENT — PAIN DESCRIPTION - LOCATION: LOCATION: SACRUM

## 2023-10-10 ASSESSMENT — PAIN DESCRIPTION - ORIENTATION: ORIENTATION: POSTERIOR

## 2023-10-10 ASSESSMENT — PAIN DESCRIPTION - DESCRIPTORS: DESCRIPTORS: CRUSHING;CRAMPING

## 2023-10-11 ENCOUNTER — APPOINTMENT (OUTPATIENT)
Dept: ULTRASOUND IMAGING | Age: 78
End: 2023-10-11
Payer: MEDICARE

## 2023-10-11 PROBLEM — Z51.5 PALLIATIVE CARE PATIENT: Status: ACTIVE | Noted: 2023-10-11

## 2023-10-11 LAB
ANION GAP SERPL CALCULATED.3IONS-SCNC: 12 MMOL/L (ref 7–19)
ANISOCYTOSIS BLD QL SMEAR: ABNORMAL
BASOPHILS # BLD: 0 K/UL (ref 0–0.2)
BASOPHILS NFR BLD: 0.4 % (ref 0–1)
BUN SERPL-MCNC: 47 MG/DL (ref 8–23)
C DIFF TOX A+B STL QL IA: NORMAL
C DIFF TOX GENS STL QL NAA+PROBE: ABNORMAL
CALCIUM SERPL-MCNC: 7.3 MG/DL (ref 8.8–10.2)
CHLORIDE SERPL-SCNC: 110 MMOL/L (ref 98–111)
CO2 SERPL-SCNC: 21 MMOL/L (ref 22–29)
CREAT SERPL-MCNC: 1.2 MG/DL (ref 0.5–0.9)
CREAT UR-MCNC: 32.9 MG/DL (ref 28–217)
EOSINOPHIL # BLD: 0.2 K/UL (ref 0–0.6)
EOSINOPHIL NFR BLD: 1.9 % (ref 0–5)
EOSINOPHIL URNS QL MICRO: NORMAL
ERYTHROCYTE [DISTWIDTH] IN BLOOD BY AUTOMATED COUNT: 17.6 % (ref 11.5–14.5)
ERYTHROCYTE [DISTWIDTH] IN BLOOD BY AUTOMATED COUNT: 17.7 % (ref 11.5–14.5)
GLUCOSE SERPL-MCNC: 94 MG/DL (ref 74–109)
HCT VFR BLD AUTO: 22.1 % (ref 37–47)
HCT VFR BLD AUTO: 22.7 % (ref 37–47)
HEMOCCULT SP1 STL QL: NORMAL
HGB BLD-MCNC: 6.4 G/DL (ref 12–16)
HGB BLD-MCNC: 6.5 G/DL (ref 12–16)
HYPOCHROMIA BLD QL SMEAR: ABNORMAL
IMM GRANULOCYTES # BLD: 0 K/UL
LYMPHOCYTES # BLD: 1.1 K/UL (ref 1.1–4.5)
LYMPHOCYTES NFR BLD: 11.4 % (ref 20–40)
MCH RBC QN AUTO: 23.6 PG (ref 27–31)
MCH RBC QN AUTO: 24.3 PG (ref 27–31)
MCHC RBC AUTO-ENTMCNC: 28.6 G/DL (ref 33–37)
MCHC RBC AUTO-ENTMCNC: 29 G/DL (ref 33–37)
MCV RBC AUTO: 82.5 FL (ref 81–99)
MCV RBC AUTO: 84 FL (ref 81–99)
MONOCYTES # BLD: 1.7 K/UL (ref 0–0.9)
MONOCYTES NFR BLD: 17.8 % (ref 0–10)
NEUTROPHILS # BLD: 6.3 K/UL (ref 1.5–7.5)
NEUTS SEG NFR BLD: 68.1 % (ref 50–65)
ORGANISM: ABNORMAL
PLATELET # BLD AUTO: 220 K/UL (ref 130–400)
PLATELET # BLD AUTO: 242 K/UL (ref 130–400)
PMV BLD AUTO: 10.1 FL (ref 9.4–12.3)
PMV BLD AUTO: 10.9 FL (ref 9.4–12.3)
POTASSIUM SERPL-SCNC: 3.6 MMOL/L (ref 3.5–5)
PROCALCITONIN: 0.17 NG/ML (ref 0–0.09)
RBC # BLD AUTO: 2.63 M/UL (ref 4.2–5.4)
RBC # BLD AUTO: 2.75 M/UL (ref 4.2–5.4)
SODIUM SERPL-SCNC: 143 MMOL/L (ref 136–145)
SODIUM UR-SCNC: 92 MMOL/L
UUN UR-MCNC: 426 MG/DL
VANCOMYCIN SERPL-MCNC: 8.2 UG/ML
WBC # BLD AUTO: 12 K/UL (ref 4.8–10.8)
WBC # BLD AUTO: 9.3 K/UL (ref 4.8–10.8)

## 2023-10-11 PROCEDURE — 99222 1ST HOSP IP/OBS MODERATE 55: CPT | Performed by: NURSE PRACTITIONER

## 2023-10-11 PROCEDURE — 2580000003 HC RX 258: Performed by: NURSE PRACTITIONER

## 2023-10-11 PROCEDURE — 2580000003 HC RX 258: Performed by: INTERNAL MEDICINE

## 2023-10-11 PROCEDURE — 82570 ASSAY OF URINE CREATININE: CPT

## 2023-10-11 PROCEDURE — 6370000000 HC RX 637 (ALT 250 FOR IP): Performed by: INTERNAL MEDICINE

## 2023-10-11 PROCEDURE — 6360000002 HC RX W HCPCS: Performed by: HOSPITALIST

## 2023-10-11 PROCEDURE — 84145 PROCALCITONIN (PCT): CPT

## 2023-10-11 PROCEDURE — 76770 US EXAM ABDO BACK WALL COMP: CPT

## 2023-10-11 PROCEDURE — 2700000000 HC OXYGEN THERAPY PER DAY

## 2023-10-11 PROCEDURE — 1210000000 HC MED SURG R&B

## 2023-10-11 PROCEDURE — 2500000003 HC RX 250 WO HCPCS: Performed by: INTERNAL MEDICINE

## 2023-10-11 PROCEDURE — 6360000002 HC RX W HCPCS: Performed by: NURSE PRACTITIONER

## 2023-10-11 PROCEDURE — 80202 ASSAY OF VANCOMYCIN: CPT

## 2023-10-11 PROCEDURE — 84540 ASSAY OF URINE/UREA-N: CPT

## 2023-10-11 PROCEDURE — 87493 C DIFF AMPLIFIED PROBE: CPT

## 2023-10-11 PROCEDURE — 85025 COMPLETE CBC W/AUTO DIFF WBC: CPT

## 2023-10-11 PROCEDURE — 84300 ASSAY OF URINE SODIUM: CPT

## 2023-10-11 PROCEDURE — 87205 SMEAR GRAM STAIN: CPT

## 2023-10-11 PROCEDURE — 6360000002 HC RX W HCPCS: Performed by: INTERNAL MEDICINE

## 2023-10-11 PROCEDURE — 80048 BASIC METABOLIC PNL TOTAL CA: CPT

## 2023-10-11 PROCEDURE — 6370000000 HC RX 637 (ALT 250 FOR IP): Performed by: HOSPITALIST

## 2023-10-11 PROCEDURE — 36415 COLL VENOUS BLD VENIPUNCTURE: CPT

## 2023-10-11 PROCEDURE — 87449 NOS EACH ORGANISM AG IA: CPT

## 2023-10-11 PROCEDURE — 87324 CLOSTRIDIUM AG IA: CPT

## 2023-10-11 PROCEDURE — 85027 COMPLETE CBC AUTOMATED: CPT

## 2023-10-11 PROCEDURE — 82270 OCCULT BLOOD FECES: CPT

## 2023-10-11 PROCEDURE — 94760 N-INVAS EAR/PLS OXIMETRY 1: CPT

## 2023-10-11 PROCEDURE — 6370000000 HC RX 637 (ALT 250 FOR IP): Performed by: NURSE PRACTITIONER

## 2023-10-11 RX ORDER — MAGNESIUM SULFATE IN WATER 40 MG/ML
4000 INJECTION, SOLUTION INTRAVENOUS ONCE
Status: COMPLETED | OUTPATIENT
Start: 2023-10-11 | End: 2023-10-11

## 2023-10-11 RX ORDER — CHOLESTYRAMINE LIGHT 4 G/5.7G
4 POWDER, FOR SUSPENSION ORAL
Status: DISCONTINUED | OUTPATIENT
Start: 2023-10-11 | End: 2023-10-12

## 2023-10-11 RX ORDER — ERGOCALCIFEROL 1.25 MG/1
50000 CAPSULE ORAL WEEKLY
Status: DISCONTINUED | OUTPATIENT
Start: 2023-10-11 | End: 2023-10-21 | Stop reason: HOSPADM

## 2023-10-11 RX ORDER — MAGNESIUM SULFATE IN WATER 40 MG/ML
2000 INJECTION, SOLUTION INTRAVENOUS ONCE
Status: COMPLETED | OUTPATIENT
Start: 2023-10-11 | End: 2023-10-11

## 2023-10-11 RX ADMIN — MAGNESIUM SULFATE HEPTAHYDRATE 2000 MG: 2 INJECTION, SOLUTION INTRAVENOUS at 04:43

## 2023-10-11 RX ADMIN — SODIUM BICARBONATE: 84 INJECTION, SOLUTION INTRAVENOUS at 11:33

## 2023-10-11 RX ADMIN — CHOLESTYRAMINE 4 G: 4 POWDER, FOR SUSPENSION ORAL at 07:58

## 2023-10-11 RX ADMIN — GABAPENTIN 300 MG: 300 CAPSULE ORAL at 20:42

## 2023-10-11 RX ADMIN — SODIUM CHLORIDE: 9 INJECTION, SOLUTION INTRAVENOUS at 04:47

## 2023-10-11 RX ADMIN — CHOLESTYRAMINE 4 G: 4 POWDER, FOR SUSPENSION ORAL at 16:49

## 2023-10-11 RX ADMIN — MESALAMINE 400 MG: 400 CAPSULE, DELAYED RELEASE ORAL at 20:42

## 2023-10-11 RX ADMIN — ALLOPURINOL 300 MG: 100 TABLET ORAL at 07:58

## 2023-10-11 RX ADMIN — TIMOLOL MALEATE 1 DROP: 5 SOLUTION OPHTHALMIC at 20:42

## 2023-10-11 RX ADMIN — MESALAMINE 400 MG: 400 CAPSULE, DELAYED RELEASE ORAL at 12:38

## 2023-10-11 RX ADMIN — HYDROCODONE BITARTRATE AND ACETAMINOPHEN 1 TABLET: 10; 325 TABLET ORAL at 17:44

## 2023-10-11 RX ADMIN — ENOXAPARIN SODIUM 30 MG: 100 INJECTION SUBCUTANEOUS at 20:41

## 2023-10-11 RX ADMIN — SODIUM CHLORIDE, PRESERVATIVE FREE 10 ML: 5 INJECTION INTRAVENOUS at 07:59

## 2023-10-11 RX ADMIN — VANCOMYCIN HYDROCHLORIDE 1000 MG: 10 INJECTION, POWDER, LYOPHILIZED, FOR SOLUTION INTRAVENOUS at 22:35

## 2023-10-11 RX ADMIN — EPOETIN ALFA-EPBX 10000 UNITS: 10000 INJECTION, SOLUTION INTRAVENOUS; SUBCUTANEOUS at 11:18

## 2023-10-11 RX ADMIN — PANTOPRAZOLE SODIUM 40 MG: 40 TABLET, DELAYED RELEASE ORAL at 06:34

## 2023-10-11 RX ADMIN — CEFEPIME 1000 MG: 1 INJECTION, POWDER, FOR SOLUTION INTRAMUSCULAR; INTRAVENOUS at 07:57

## 2023-10-11 RX ADMIN — MULTIPLE VITAMINS W/ MINERALS TAB 1 TABLET: TAB at 07:58

## 2023-10-11 RX ADMIN — ACETAMINOPHEN 650 MG: 325 TABLET ORAL at 08:15

## 2023-10-11 RX ADMIN — MESALAMINE 400 MG: 400 CAPSULE, DELAYED RELEASE ORAL at 08:46

## 2023-10-11 RX ADMIN — Medication 1 TABLET: at 07:58

## 2023-10-11 RX ADMIN — ASPIRIN 81 MG: 81 TABLET, CHEWABLE ORAL at 20:42

## 2023-10-11 RX ADMIN — SODIUM CHLORIDE, PRESERVATIVE FREE 10 ML: 5 INJECTION INTRAVENOUS at 20:41

## 2023-10-11 RX ADMIN — COLLAGENASE SANTYL: 250 OINTMENT TOPICAL at 08:46

## 2023-10-11 RX ADMIN — GABAPENTIN 300 MG: 300 CAPSULE ORAL at 07:58

## 2023-10-11 RX ADMIN — FOLIC ACID TAB 400 MCG 600 MCG: 400 TAB at 10:29

## 2023-10-11 RX ADMIN — SODIUM BICARBONATE: 84 INJECTION, SOLUTION INTRAVENOUS at 22:32

## 2023-10-11 RX ADMIN — ERGOCALCIFEROL 50000 UNITS: 1.25 CAPSULE ORAL at 07:58

## 2023-10-11 RX ADMIN — TIMOLOL MALEATE 1 DROP: 5 SOLUTION OPHTHALMIC at 08:09

## 2023-10-11 RX ADMIN — IRON SUCROSE 200 MG: 20 INJECTION, SOLUTION INTRAVENOUS at 07:49

## 2023-10-11 RX ADMIN — MAGNESIUM SULFATE IN WATER 4000 MG: 4 INJECTION, SOLUTION INTRAVENOUS at 10:33

## 2023-10-11 RX ADMIN — CEFEPIME 1000 MG: 1 INJECTION, POWDER, FOR SOLUTION INTRAMUSCULAR; INTRAVENOUS at 20:41

## 2023-10-11 ASSESSMENT — ENCOUNTER SYMPTOMS
BACK PAIN: 0
VOICE CHANGE: 0
NAUSEA: 0
COLOR CHANGE: 0
COUGH: 0
DIARRHEA: 0
ABDOMINAL PAIN: 0
CONSTIPATION: 0
RHINORRHEA: 0
VOMITING: 0
ABDOMINAL DISTENTION: 0
SHORTNESS OF BREATH: 0

## 2023-10-11 ASSESSMENT — PAIN DESCRIPTION - LOCATION: LOCATION: BUTTOCKS

## 2023-10-11 ASSESSMENT — PAIN SCALES - GENERAL
PAINLEVEL_OUTOF10: 0
PAINLEVEL_OUTOF10: 8

## 2023-10-11 NOTE — PROGRESS NOTES
Pt is warm to the touch Temp 100.4 diaphoretic, /34 O2 sat 75% on rm air. Applied O2 @ 2L, Sat 90%. NS @ 100 Pulse tachy @ 110. Text Dr Haven Ramon. Pt states \"she dont feel good. \"

## 2023-10-11 NOTE — PROGRESS NOTES
Palliative Care/Spiritual Care: Met with pt to initiate palliative care. Pt says, \"I think I'm doing okay. \" Pt says she had surgery in [place] and they removed her bladder due to bladder cancer. Pt also had endometrial cancer. She currently has a UTI, and an infection. Pt has other diagnoses in past medical history. Advance Directives: This  assisted pt with completing a new AD/LW. Pt was given the original and copies, and a copy was emailed to Paty Rai to be scanned to pt's EMR. She named her daughter Db Barone as primary decision makers and she named her sons Priyanka Randolph and Gracie Cerda as secondary/alternate decision makers. Pt is full code and wants CPR and Ventilator, but she says if she is on a ventilator and her health worsens she does not want to remain on the ventilator. SEE ACP NOTE. Pain/other symptoms: Pt says she is not having pain. Social/Spiritual: She says she attends a [address].         Pt/family discussion r/t goals: Pt lives at home and her daughter Celester Mail lives with her. She says she ambulates with a walker, and performs daily living skills to care for herself at home. Her daughter also helps her at home as needed. Pt's goal is to return home with previous quality of life. Provided spiritual care with sustaining presence, nurtured hope, and prayer. Pt expressed gratitude for spiritual care.      Electronically signed by Key Deng on 10/11/2023 at 11:43 AM

## 2023-10-11 NOTE — ED NOTES
Report given to [facility], RN 3rd floor.       Yaima Bailey RN  10/10/23 1942

## 2023-10-11 NOTE — H&P
[facility] - History & Physical      PCP: Sushila Pemberton DO    Date of Admission: 10/10/2023    Date of Service: 10/10/2023    Chief Complaint:  Fever    History Of Present Illness: The patient is a 77 y.o. female who presented to [facility] ED for evaluation of fever. 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 Pt is here with her daughter who assists with history of present illness. Pt has had fever of 101.6 today at home associated with low blood pressure reading by home health. She has chronic wounds to sacral area and foot followed by wound care having recently finished antibiotic treatment for wound infection. Daughter relates that the wound to pt's heel has had increased drainage from baselines. She had reported that sacral wound has been healing with use of wound vac. Pt has had no abdominal pain or vomiting. She has had episodes of diarrhea over past few days. In ED, CT abdomen/pelvis-Moderate to severe bilateral hydronephrosis and hydroureter, similar to the prior study. urine culture-tntc wbc, 6-10 rbc, 4+bacteria, cxr- Cardiomegaly and atherosclerosis. Subtle patchy densities in the lower lung zones bilaterally could represent pneumonia, atelectasis or scarring. These were not present previously. Resp pcr panel negative, righ tfoot imaging No clear signs of osteomyelitis radiographically. Wbc 12.8k, hgb 7.9, platelets 624X, creatinine 1.8/bun 53, calcium 8.3, total protein 6.4, lactic acid 0.9.  Pt is admitted inpatient to hospitalist.     Past Medical History:        Diagnosis Date    Arthritis     Cancer [facility])     endometrial cancer, bladder    Cataract     CIDP (chronic inflammatory demyelinating polyneuropathy) ([facility])     Crohn's disease ([facility])     GERD (gastroesophageal reflux disease)     History of blood transfusion     Hypertension ill-appearing. HENT:      Right Ear: External ear normal.      Left Ear: External ear normal.      Mouth/Throat:      Pharynx: Oropharynx is clear. Eyes:      Conjunctiva/sclera: Conjunctivae normal.   Cardiovascular:      Rate and Rhythm: Normal rate and regular rhythm. Abdominal:      Tenderness: There is no abdominal tenderness. Comments: Urostomy appliance in place   Musculoskeletal:      Right lower leg: No edema. Left lower leg: No edema. Comments: Chronic appearing wound to right heel  Sacral area with wound vac   Skin:     General: Skin is warm and dry. Findings: Erythema (mild erythema right lower leg) present. Neurological:      Mental Status: She is alert and oriented to person, place, and time. Diagnostic Data:  CBC:  Recent Labs     10/10/23  1645   WBC 12.8*   HGB 7.9*   HCT 26.4*        BMP:  Recent Labs     10/10/23  1645      K 4.2      CO2 28   BUN 53*   CREATININE 1.8*   CALCIUM 8.3*     Recent Labs     10/10/23  1645   AST 11   ALT <5*   BILITOT <0.2   ALKPHOS 51     Urinalysis:  Lab Results   Component Value Date/Time    NITRU Negative 10/10/2023 06:03 PM    [place] 10/10/2023 06:03 PM    BACTERIA 4+ 10/10/2023 06:03 PM    RBCUA 6-10 10/10/2023 06:03 PM    BLOODU MODERATE 10/10/2023 06:03 PM    SPECGRAV 1.014 10/10/2023 06:03 PM    GLUCOSEU Negative 10/10/2023 06:03 PM     CT ABDOMEN PELVIS WO CONTRAST Additional Contrast? None    Result Date: 10/10/2023  EXAM:  CT OF THE ABDOMEN PELVIS WITHOUT CONTRAST  History:  Urostomy, sacral wound. Comparison:  CT abdomen pelvis 06/21/2023  Technique:  Multiplanar CT images through the abdomen pelvis were obtained without the administration of IV contrast  FINDINGS:  Lung bases are free of consolidation. No acute osseous abnormalities. 3 cm soft tissue ulceration posterior to the sacrum with no gucci cortical destruction or periostitis.   Postsurgical changes of the spine and degenerative changes

## 2023-10-11 NOTE — PROGRESS NOTES
Sidra Lopez transferred to [phone] from icu via bed. Explained reason for transfer to Patient. Belongings: Glasses with patient at bedside . Soft chart transferred with patient: Yes. Telemetry box number N/A transferred with patient: no.  Report given to: Berhane Santoyo RN, telephone.       Electronically signed by Berhane Satnoyo RN on 10/11/2023 at 3:17 PM

## 2023-10-11 NOTE — PROGRESS NOTES
4 Eyes Skin Assessment     NAME:  Jase Esteban  YOB: 1945  MEDICAL RECORD NUMBER:  [id number]    The patient is being assessed for  Admission    I agree that at least one RN has performed a thorough Head to Toe Skin Assessment on the patient. ALL assessment sites listed below have been assessed. Areas assessed by both nurses:    Head, Face, Ears, Shoulders, Back, Chest, Arms, Elbows, Hands, Sacrum. Buttock, Coccyx, Ischium, Legs. Feet and Heels, Under Medical Devices , and Other          Does the Patient have a Wound? Yes wound(s) were present on assessment.  LDA wound assessment was Initiated and completed by RN       Gage Prevention initiated by RN: No  Wound Care Orders initiated by RN: Yes    Pressure Injury (Stage 3,4, Unstageable, DTI, NWPT, and Complex wounds) if present, place Wound referral order by RN under : Yes    New Ostomies, if present place, Ostomy referral order under : No     Nurse 1 eSignature: Electronically signed by Danish Resendez RN on 10/11/23 at 1:39 AM CDT    **SHARE this note so that the co-signing nurse can place an eSignature**    Nurse 2 eSignature: Electronically signed by Bailey Michelle RN on 10/11/23 at 2:20 AM CDT

## 2023-10-11 NOTE — PROGRESS NOTES
Received orders to transfer pt to ICU. Notified Leticia Batistapool daughter of transfer. Report called to [facility].

## 2023-10-11 NOTE — ACP (ADVANCE CARE PLANNING)
Advance Care Planning     Advance Care Planning Inpatient Note  [facility] Department    Today's Date: 10/11/2023  Unit: [facility] ICU    Received request from Other: Palliative care . Upon review of chart and communication with care team, patient's decision making abilities are not in question. . Patient was/were present in the room during visit. Goals of ACP Conversation:  Discuss advance care planning documents    Health Care Decision Makers:       Primary Decision Maker: Miguel Ángel Chau Child - [phone]    Secondary Decision Maker: Ulis Hodgkins - Child - [phone]    Secondary Decision Maker: Karely Hilario - Child - [phone]  Summary:  Completed New Documents    Advance Care Planning Documents (Patient Wishes):  Living Will/Advance Directive     Assessment:  Pt recently had surgery and her bladder was removed at another facility in [place], [place]. Pt now has a urostomy. She says she also has a UTI and infection. Pt wanted to complete an AD/LW saying she has been meaning to get it done. Interventions:  Assisted in the completion of documents according to patient's wishes at this time    Care Preferences Communicated:     Hospitalization:  If the patient's health worsens and it becomes clear that the chance of recovery is unlikely,     the patient wants hospitalization. Ventilation:   If the patient, in their present state of health, suddenly became very ill and unable to breathe on their own,     the patient would desire the use of a ventilator (breathing machine). If their health worsens and it becomes clear that the change of recovery is unlikely,     the patient would NOT desire the use of a ventilator (breathing machine). Resuscitation:  In the event the patient's heart stopped as a result of an underlying serious health condition, the patient communicates a preference for      resuscitative attempts (CPR). Outcomes/Plan:  New advance directive completed.     Electronically signed by Chaplain Pop on 10/11/2023 at 11:46 AM

## 2023-10-11 NOTE — CARE COORDINATION
Patient is current with [facility] for [address]. Will follow. Please advise when patient discharges. WILL NEED RESUMPTION OF CARE ORDERS TO RESUME HH SERVICES WHEN PATIENT DISCHARGES. Thank you. [id number] [facility] [phone]. [phone].     Heath Ferraro RN, Home Care Liaison  [phone] P  Electronically signed by Heath Ferraro on 10/11/2023 at 8:49 AM

## 2023-10-11 NOTE — CONSULTS
Urology Consult Note    Patient:  Pasquale Nicole  YOB: 1945  Date of Service: 10/11/2023  MRN: [id number]   Acct: [de-identified]   Primary Care Physician: Marlin Fragoso DO  Advance Directive: Full Code  Admit Date: 10/10/2023       Hospital Day: 1    Chief Complaint: \" I was running a fever and did not feel good so I came to the ER\"    History:  HPI   Patient presented to the emergency room yesterday for evaluation of fever and low blood pressure per home health. She has history of chronic wounds that are followed by vascular and wound care. Also has history of bladder and endometrial malignancy with cystectomy and urinary diversion. In the emergency room, CTA revealed moderate to severe bilateral hydronephrosis and hydroureter. She was also noted to have CYNTHIA with creatinine up to 1.8 and GFR down to 28 also with leukocytosis up to 12.8. Vital signs revealed. Fever, hypotension, tachycardia. Procalcitonin also mildly elevated without lactic acidosis. Urinalysis appears suspicious for infection, but not an unexpected finding for someone with cystostomy and urinary diversion. Urology is consulted for evaluation of hydronephrosis. The patient is well-known to our office. She is a 65-year-old female status post hysterectomy and radiation for uterine cancer with stage II cystocele, severe atrophy, urgency, urge incontinence, sensory unawareness incontinence, history of overflow incontinence/atonic bladder now with incontinence that is not related to overflow as well as recurrent UTI. She originally had urodynamics in 2014 which revealed overflow incontinence by Dr. Rozella Canavan. She was maintained on CIC for several years. However, after being in our care for quite some time, she began to have significant leakage and was unable to catheterize as she was just experiencing constant leakage.   She had been on several medications before we referred her back to a urogynecologist, Dr. Hunter Mckeon for

## 2023-10-11 NOTE — PROGRESS NOTES
Ab Nye transferred to [id number] from [facility] via bed. Reason for transfer: lower level of care   Explained reason for transfer to Patient and Family. Belongings: Glasses, clothing, cell phone  with patient at bedside . Soft chart transferred with patient: Yes. Telemetry box number N/A transferred with patient: N/A. Report given to: Cat Miguel, at bedside.       Electronically signed by Yolande Mora RN on 10/11/2023 at 3:27 PM

## 2023-10-11 NOTE — PROGRESS NOTES
Pharmacy Renal Adjustment    Roz Phalen is a 77 y.o. female. Pharmacy has renally adjusted medications per protocol. Recent Labs     10/10/23  1645   BUN 53*       Recent Labs     10/10/23  1645   CREATININE 1.8*       Estimated Creatinine Clearance: 22 mL/min (A) (based on SCr of 1.8 mg/dL (H)).     Height:   Ht Readings from Last 1 Encounters:   10/10/23 5' 4\" (1.626 m)     Weight:  Wt Readings from Last 1 Encounters:   10/10/23 131 lb (59.4 kg)       Plan: Adjust the following medications based on renal function:           Gabapentin 300 mg oral twice daily    Electronically signed by Jessica Bruce, [address] [place] [address] on 10/10/2023 at 9:00 PM

## 2023-10-11 NOTE — PROGRESS NOTES
Pt arrived to  324 via stretcher from ER with diagnosis of UTI, CYNTHIA with daughter at [facility]. Pt is alert and oriented. Pt has urostomy as a result of bladder cancer. She has a history of PVD. Pedal pulses are doppled with moderate pulses. Pt has multiple wounds to rt foot with lower leg very red with very scaly skin with minimal edema in rt foot. LLE is edematous. Pt has large sacral wound with wound vac in place. [facility] and goes to wound care center weekly.

## 2023-10-11 NOTE — PROGRESS NOTES
Pharmacy Adjustment per [facility] protocol    Wicho Herring is a 77 y.o. female. Pharmacy has adjusted medications per [facility] protocol. Recent Labs     10/10/23  1645   BUN 53*       Recent Labs     10/10/23  1645   CREATININE 1.8*       Estimated Creatinine Clearance: 22 mL/min (A) (based on SCr of 1.8 mg/dL (H)). Height:   Ht Readings from Last 1 Encounters:   10/10/23 5' 4\" (1.626 m)     Weight:  Wt Readings from Last 1 Encounters:   10/10/23 131 lb (59.4 kg)         Plan: Adjust the following medications based on [facility] protocol:           Cefepime 1 g IV x 1 dose given in the ED. Give another 1 g dose over 30 minutes for total 2 g load dose, followed by 1 g IV every 12 hours extended infusion.     Electronically signed by RIKKI Saravia [facility] on 10/10/2023 at 9:45 PM

## 2023-10-11 NOTE — PROGRESS NOTES
Pharmacy Adjustment per [facility] protocol    Yogi Ortiz is a 77 y.o. female. Pharmacy has adjusted medications per [facility] protocol. Recent Labs     10/10/23  1645   BUN 53*       Recent Labs     10/10/23  1645   CREATININE 1.8*       Estimated Creatinine Clearance: 22 mL/min (A) (based on SCr of 1.8 mg/dL (H)). Height:   Ht Readings from Last 1 Encounters:   10/10/23 5' 4\" (1.626 m)     Weight:  Wt Readings from Last 1 Encounters:   10/10/23 131 lb (59.4 kg)         Plan: Adjust the following medications based on [facility] protocol:           Fenofibrate held due to CrCl ? 30 mL/minute: Use contraindicated. Patient with CYNTHIA.     Electronically signed by Justine Thompson, [facility] on 10/10/2023 at 10:11 PM Consent (Near Eyelid Margin)/Introductory Paragraph: The rationale for Mohs was explained to the patient and consent was obtained. The risks, benefits and alternatives to therapy were discussed in detail. Specifically, the risks of ectropion or eyelid deformity, infection, scarring, bleeding, prolonged wound healing, incomplete removal, allergy to anesthesia, nerve injury and recurrence were addressed. Prior to the procedure, the treatment site was clearly identified and confirmed by the patient. All components of Universal Protocol/PAUSE Rule completed.

## 2023-10-12 ENCOUNTER — APPOINTMENT (OUTPATIENT)
Dept: CT IMAGING | Age: 78
End: 2023-10-12
Payer: MEDICARE

## 2023-10-12 LAB
ANION GAP SERPL CALCULATED.3IONS-SCNC: 10 MMOL/L (ref 7–19)
ANISOCYTOSIS BLD QL SMEAR: ABNORMAL
BACTERIA UR CULT: ABNORMAL
BACTERIA UR CULT: ABNORMAL
BASOPHILS # BLD: 0 K/UL (ref 0–0.2)
BASOPHILS NFR BLD: 0 % (ref 0–1)
BUN SERPL-MCNC: 25 MG/DL (ref 8–23)
CALCIUM SERPL-MCNC: 7.6 MG/DL (ref 8.8–10.2)
CHLORIDE SERPL-SCNC: 108 MMOL/L (ref 98–111)
CO2 SERPL-SCNC: 23 MMOL/L (ref 22–29)
CREAT SERPL-MCNC: 0.8 MG/DL (ref 0.5–0.9)
EOSINOPHIL # BLD: 0.38 K/UL (ref 0–0.6)
EOSINOPHIL NFR BLD: 3 % (ref 0–5)
ERYTHROCYTE [DISTWIDTH] IN BLOOD BY AUTOMATED COUNT: 17.7 % (ref 11.5–14.5)
GLUCOSE SERPL-MCNC: 94 MG/DL (ref 74–109)
HCT VFR BLD AUTO: 22.8 % (ref 37–47)
HGB BLD-MCNC: 6.6 G/DL (ref 12–16)
HYPOCHROMIA BLD QL SMEAR: ABNORMAL
IMM GRANULOCYTES # BLD: 0.1 K/UL
LYMPHOCYTES # BLD: 1 K/UL (ref 1.1–4.5)
LYMPHOCYTES NFR BLD: 8 % (ref 20–40)
MAGNESIUM SERPL-MCNC: 2.5 MG/DL (ref 1.6–2.4)
MCH RBC QN AUTO: 23.7 PG (ref 27–31)
MCHC RBC AUTO-ENTMCNC: 28.9 G/DL (ref 33–37)
MCV RBC AUTO: 82 FL (ref 81–99)
MONOCYTES # BLD: 1.9 K/UL (ref 0–0.9)
MONOCYTES NFR BLD: 15 % (ref 0–10)
NEUTROPHILS # BLD: 9.4 K/UL (ref 1.5–7.5)
NEUTS SEG NFR BLD: 74 % (ref 50–65)
ORGANISM: ABNORMAL
PLATELET # BLD AUTO: 240 K/UL (ref 130–400)
PLATELET SLIDE REVIEW: ADEQUATE
PMV BLD AUTO: 9.7 FL (ref 9.4–12.3)
POLYCHROMASIA BLD QL SMEAR: ABNORMAL
POTASSIUM SERPL-SCNC: 3.4 MMOL/L (ref 3.5–5)
RBC # BLD AUTO: 2.78 M/UL (ref 4.2–5.4)
SODIUM SERPL-SCNC: 141 MMOL/L (ref 136–145)
TARGETS BLD QL SMEAR: ABNORMAL
VANCOMYCIN SERPL-MCNC: 10.9 UG/ML
WBC # BLD AUTO: 12.7 K/UL (ref 4.8–10.8)

## 2023-10-12 PROCEDURE — 2580000003 HC RX 258: Performed by: INTERNAL MEDICINE

## 2023-10-12 PROCEDURE — 99232 SBSQ HOSP IP/OBS MODERATE 35: CPT | Performed by: NURSE PRACTITIONER

## 2023-10-12 PROCEDURE — 97530 THERAPEUTIC ACTIVITIES: CPT

## 2023-10-12 PROCEDURE — 97165 OT EVAL LOW COMPLEX 30 MIN: CPT

## 2023-10-12 PROCEDURE — 6360000002 HC RX W HCPCS: Performed by: INTERNAL MEDICINE

## 2023-10-12 PROCEDURE — 80202 ASSAY OF VANCOMYCIN: CPT

## 2023-10-12 PROCEDURE — 94760 N-INVAS EAR/PLS OXIMETRY 1: CPT

## 2023-10-12 PROCEDURE — 97162 PT EVAL MOD COMPLEX 30 MIN: CPT

## 2023-10-12 PROCEDURE — 2700000000 HC OXYGEN THERAPY PER DAY

## 2023-10-12 PROCEDURE — 2500000003 HC RX 250 WO HCPCS: Performed by: INTERNAL MEDICINE

## 2023-10-12 PROCEDURE — 6360000004 HC RX CONTRAST MEDICATION: Performed by: NURSE PRACTITIONER

## 2023-10-12 PROCEDURE — 6370000000 HC RX 637 (ALT 250 FOR IP): Performed by: INTERNAL MEDICINE

## 2023-10-12 PROCEDURE — 1210000000 HC MED SURG R&B

## 2023-10-12 PROCEDURE — 74178 CT ABD&PLV WO CNTR FLWD CNTR: CPT | Performed by: NURSE PRACTITIONER

## 2023-10-12 PROCEDURE — 85025 COMPLETE CBC W/AUTO DIFF WBC: CPT

## 2023-10-12 PROCEDURE — 83735 ASSAY OF MAGNESIUM: CPT

## 2023-10-12 PROCEDURE — 80048 BASIC METABOLIC PNL TOTAL CA: CPT

## 2023-10-12 PROCEDURE — 36415 COLL VENOUS BLD VENIPUNCTURE: CPT

## 2023-10-12 RX ORDER — SODIUM HYPOCHLORITE 1.25 MG/ML
SOLUTION TOPICAL DAILY
Status: DISCONTINUED | OUTPATIENT
Start: 2023-10-12 | End: 2023-10-21 | Stop reason: HOSPADM

## 2023-10-12 RX ORDER — VANCOMYCIN HYDROCHLORIDE 125 MG/1
125 CAPSULE ORAL 4 TIMES DAILY
Status: DISCONTINUED | OUTPATIENT
Start: 2023-10-12 | End: 2023-10-21 | Stop reason: HOSPADM

## 2023-10-12 RX ORDER — GABAPENTIN 300 MG/1
300 CAPSULE ORAL 3 TIMES DAILY
Status: DISCONTINUED | OUTPATIENT
Start: 2023-10-12 | End: 2023-10-13

## 2023-10-12 RX ORDER — ENOXAPARIN SODIUM 100 MG/ML
40 INJECTION SUBCUTANEOUS DAILY
Status: DISCONTINUED | OUTPATIENT
Start: 2023-10-12 | End: 2023-10-21 | Stop reason: HOSPADM

## 2023-10-12 RX ADMIN — FENOFIBRATE 160 MG: 160 TABLET ORAL at 11:11

## 2023-10-12 RX ADMIN — IOPAMIDOL 70 ML: 755 INJECTION, SOLUTION INTRAVENOUS at 11:07

## 2023-10-12 RX ADMIN — VANCOMYCIN HYDROCHLORIDE 125 MG: 125 CAPSULE ORAL at 13:17

## 2023-10-12 RX ADMIN — MULTIPLE VITAMINS W/ MINERALS TAB 1 TABLET: TAB at 11:12

## 2023-10-12 RX ADMIN — VANCOMYCIN HYDROCHLORIDE 125 MG: 125 CAPSULE ORAL at 17:09

## 2023-10-12 RX ADMIN — GABAPENTIN 300 MG: 300 CAPSULE ORAL at 22:20

## 2023-10-12 RX ADMIN — TIMOLOL MALEATE 1 DROP: 5 SOLUTION OPHTHALMIC at 22:33

## 2023-10-12 RX ADMIN — HYDROCODONE BITARTRATE AND ACETAMINOPHEN 1 TABLET: 10; 325 TABLET ORAL at 15:11

## 2023-10-12 RX ADMIN — COLLAGENASE SANTYL: 250 OINTMENT TOPICAL at 11:25

## 2023-10-12 RX ADMIN — ASPIRIN 81 MG: 81 TABLET, CHEWABLE ORAL at 22:20

## 2023-10-12 RX ADMIN — CHOLESTYRAMINE 4 G: 4 POWDER, FOR SUSPENSION ORAL at 05:42

## 2023-10-12 RX ADMIN — VANCOMYCIN HYDROCHLORIDE 125 MG: 125 CAPSULE ORAL at 22:21

## 2023-10-12 RX ADMIN — SODIUM BICARBONATE: 84 INJECTION, SOLUTION INTRAVENOUS at 23:53

## 2023-10-12 RX ADMIN — FOLIC ACID TAB 400 MCG 600 MCG: 400 TAB at 11:11

## 2023-10-12 RX ADMIN — GABAPENTIN 300 MG: 300 CAPSULE ORAL at 11:25

## 2023-10-12 RX ADMIN — ENOXAPARIN SODIUM 40 MG: 100 INJECTION SUBCUTANEOUS at 22:34

## 2023-10-12 RX ADMIN — PANTOPRAZOLE SODIUM 40 MG: 40 TABLET, DELAYED RELEASE ORAL at 05:42

## 2023-10-12 RX ADMIN — TIMOLOL MALEATE 1 DROP: 5 SOLUTION OPHTHALMIC at 11:26

## 2023-10-12 RX ADMIN — MESALAMINE 400 MG: 400 CAPSULE, DELAYED RELEASE ORAL at 22:20

## 2023-10-12 RX ADMIN — MESALAMINE 400 MG: 400 CAPSULE, DELAYED RELEASE ORAL at 11:12

## 2023-10-12 RX ADMIN — Medication 1 TABLET: at 11:11

## 2023-10-12 RX ADMIN — HYDROCODONE BITARTRATE AND ACETAMINOPHEN 1 TABLET: 10; 325 TABLET ORAL at 22:32

## 2023-10-12 RX ADMIN — SODIUM CHLORIDE, PRESERVATIVE FREE 10 ML: 5 INJECTION INTRAVENOUS at 13:17

## 2023-10-12 RX ADMIN — ALLOPURINOL 300 MG: 100 TABLET ORAL at 11:11

## 2023-10-12 RX ADMIN — SODIUM BICARBONATE: 84 INJECTION, SOLUTION INTRAVENOUS at 13:34

## 2023-10-12 RX ADMIN — IRON SUCROSE 200 MG: 20 INJECTION, SOLUTION INTRAVENOUS at 05:42

## 2023-10-12 RX ADMIN — WATER 1000 MG: 1 INJECTION INTRAMUSCULAR; INTRAVENOUS; SUBCUTANEOUS at 11:15

## 2023-10-12 RX ADMIN — CHOLESTYRAMINE 4 G: 4 POWDER, FOR SUSPENSION ORAL at 11:15

## 2023-10-12 RX ADMIN — HYDROCODONE BITARTRATE AND ACETAMINOPHEN 1 TABLET: 10; 325 TABLET ORAL at 05:42

## 2023-10-12 ASSESSMENT — PAIN DESCRIPTION - LOCATION: LOCATION: BUTTOCKS

## 2023-10-12 ASSESSMENT — PAIN SCALES - GENERAL
PAINLEVEL_OUTOF10: 6
PAINLEVEL_OUTOF10: 7

## 2023-10-12 ASSESSMENT — PAIN DESCRIPTION - DESCRIPTORS: DESCRIPTORS: ACHING

## 2023-10-12 NOTE — PROGRESS NOTES
Occupational Therapy  Facility/Department: [facility] 4 ONCOLOGY UNIT  Occupational Therapy Initial Assessment    Name: Anita Shaikh  : 3/81/1805  MRN: [id number]  Date of Service: 10/12/2023    Discharge Recommendations:             Patient Diagnosis(es): The primary encounter diagnosis was Acute kidney injury [facility]). A diagnosis of Bacteria in urine was also pertinent to this visit. Past Medical History:  has a past medical history of Arthritis, Cancer ([address]), Cataract, CIDP (chronic inflammatory demyelinating polyneuropathy) ([address]), Crohn's disease ([address]), GERD (gastroesophageal reflux disease), History of blood transfusion, Hypertension, Macular degeneration, Neuropathy, Palliative care patient, Perineal cyst in female, PVD (peripheral vascular disease) ([address]), Radiation, and Urinary incontinence. Past Surgical History:  has a past surgical history that includes Hysterectomy (); Carpal tunnel release (Bilateral, ); Knee arthroscopy (Right); back surgery; Cataract removal (Bilateral); Hammer toe surgery (Left); lumbar fusion; Cholecystectomy, laparoscopic; hx vascular angioplasty; vascular surgery (2022); Rectal surgery (N/A, 2022); Cervical spine surgery; Bladder removal (2023); vascular surgery (2023); and Skin lesion excision (N/A, 2023). Assessment   Performance deficits / Impairments: Decreased functional mobility ; Decreased ADL status  REQUIRES OT FOLLOW-UP: Yes  Activity Tolerance  Activity Tolerance: Patient Tolerated treatment well        Plan   Occupational Therapy Plan  Times Per Week: 3-5  Times Per Day:  Once a day     Restrictions  Restrictions/Precautions  Restrictions/Precautions: Fall Risk, Contact Precautions    Subjective   General  Chart Reviewed: Yes  Patient assessed for rehabilitation services?: Yes  Additional Pertinent Hx: Scaral wound, L LE wound, woundvac, urostomy  Family / Caregiver Present: No  Diagnosis: Severe Sepsis     Social/Functional

## 2023-10-12 NOTE — PROGRESS NOTES
Pharmacy Renal Adjustment    Roz Phalen is a 77 y.o. female. Pharmacy has renally adjusted medications per protocol. Recent Labs     10/11/23  0137 10/12/23  0454   BUN 47* 25*       Recent Labs     10/11/23  0137 10/12/23  0454   CREATININE 1.2* 0.8       Estimated Creatinine Clearance: 49 mL/min (based on SCr of 0.8 mg/dL). Height:   Ht Readings from Last 1 Encounters:   10/10/23 5' 4\" (1.626 m)     Weight:  Wt Readings from Last 1 Encounters:   10/11/23 137 lb 6.4 oz (62.3 kg)         Plan: Adjust the following medications based on renal function:           Change gabapentin to 300mg tid.     Riana Hawthorne, PHARM D, 10/12/2023, 12:49 PM

## 2023-10-12 NOTE — PROGRESS NOTES
Date:10/12/2023  Patient: Corinne Dawson  : [id number]  [id number]  CODE:                                                                        Chapin Nguyen DO    Admit Date: 10/10/2023  4:11 PM   LOS: 2 days     Hospital course : The patient is a 77 y.o. female who presented to [facility] ED for evaluation of fever. 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 Pt is here with her daughter who assists with history of present illness. Pt has had fever of 101.6 today at home associated with low blood pressure reading by home health. She has chronic wounds to sacral area and foot followed by wound care having recently finished antibiotic treatment for wound infection. Daughter relates that the wound to pt's heel has had increased drainage from baselines. She had reported that sacral wound has been healing with use of wound vac. Pt has had no abdominal pain or vomiting. She has had episodes of diarrhea over past few days. In ED, CT abdomen/pelvis-Moderate to severe bilateral hydronephrosis and hydroureter, similar to the prior study. urine culture-tntc wbc, 6-10 rbc, 4+bacteria, cxr- Cardiomegaly and atherosclerosis. Subtle patchy densities in the lower lung zones bilaterally could represent pneumonia, atelectasis or scarring. These were not present previously. Resp pcr panel negative, righ tfoot imaging No clear signs of osteomyelitis radiographically. Wbc 12.8k, hgb 7.9, platelets 438F, creatinine 1.8/bun 53, calcium 8.3, total protein 6.4, lactic acid 0.9. Pt is admitted inpatient to hospitalist.     10/11 She did well overnight. Hemodynamics are stable. GFR is improving. Her stool has tested positive for C. difficile. I feel she is stable for transfer out of ICU.     Subjective: 10/12 seen and evaluated at bedside in the presence of her , RN, wound care, discussed disclaimer: This dictation was performed using [email or web address] [email or web address]. Mistake and misspelling may have been created without  realizing them, although attempts have made to review the note for such errors. Please notify me for clarification. Thank you    Part of the note is copied forward from previous provider.

## 2023-10-12 NOTE — PROGRESS NOTES
Physical Therapy  Facility/Department: [facility] ONCOLOGY UNIT  Physical Therapy Initial Assessment    Name: Fidencio Cabrera  :   MRN: [id number]  Date of Service: 10/12/2023    Discharge Recommendations:  Continue to assess pending progress, Patient would benefit from continued therapy after discharge          Patient Diagnosis(es): The primary encounter diagnosis was Acute kidney injury ([address]). A diagnosis of Bacteria in urine was also pertinent to this visit. Past Medical History:  has a past medical history of Arthritis, Cancer ([address]), Cataract, CIDP (chronic inflammatory demyelinating polyneuropathy) ([address]), Crohn's disease ([address]), GERD (gastroesophageal reflux disease), History of blood transfusion, Hypertension, Macular degeneration, Neuropathy, Palliative care patient, Perineal cyst in female, PVD (peripheral vascular disease) ([address]), Radiation, and Urinary incontinence. Past Surgical History:  has a past surgical history that includes Hysterectomy (); Carpal tunnel release (Bilateral, ); Knee arthroscopy (Right); back surgery; Cataract removal (Bilateral); Hammer toe surgery (Left); lumbar fusion; Cholecystectomy, laparoscopic; hx vascular angioplasty; vascular surgery (2022); Rectal surgery (N/A, 2022); Cervical spine surgery; Bladder removal (2023); vascular surgery (2023); and Skin lesion excision (N/A, 2023). Assessment   Body Structures, Functions, Activity Limitations Requiring Skilled Therapeutic Intervention: Decreased functional mobility ; Decreased ADL status; Decreased ROM; Decreased strength;Decreased balance;Decreased endurance; Increased pain;Decreased posture  Assessment: Pt ABLE TO SIT EOB AND STAND BRIEFLY WITH ASSIST. WILL PROGRESS AS TOLERATED. Pt WOULD NOT BE SAFE TO RETURN HOME AT THIS LEVEL.   Requires PT Follow-Up: Yes  Activity Tolerance  Activity Tolerance: Patient limited by fatigue;Patient limited by endurance     Plan   Physcial Therapy Plan  General Plan: 5-7 times per week  Current Treatment Recommendations: Strengthening, ROM, Balance training, Functional mobility training, Transfer training, Gait training, Safety education & training, Patient/Caregiver education & training, Endurance training  Safety Devices  Type of Devices: Call light within reach     Restrictions  Restrictions/Precautions  Restrictions/Precautions: Fall Risk, Isolation     Subjective   Pain: REPORTS POSITIONAL PAIN AT [address]  Patient assessed for rehabilitation services?: Yes  Diagnosis: SEPSIS  Subjective  Subjective: Pt WILLING TO PARTICIPATE         Social/Functional History  Social/Functional History  Lives With: Daughter  Type of Home: House  Home Layout: One level  ADL Assistance: Independent  Ambulation Assistance: Independent  Transfer Assistance: Independent  Vision/Hearing  Vision  Vision: Within Functional Limits  Hearing  Hearing: Within functional limits    Cognition   Orientation  Overall Orientation Status: Within Normal Limits  Cognition  Overall Cognitive Status: WNL     Objective   Pulse: 78  BP: (!) 123/56  BP Location: Right upper arm  MAP (Calculated): 78  Respirations: 18  SpO2: 97 %  O2 Device: Nasal cannula     Observation/Palpation  Posture: Fair  Observation: RECTAL TUBE, WOUND VAC L FLANK, WHATLEY, IV  Gross Assessment  AROM: Generally decreased, functional  Strength: Generally decreased, functional                    Bed mobility  Supine to Sit: Maximum assistance  Sit to Supine: Maximum assistance;2 Person assistance  Transfers  Sit to Stand:  Moderate Assistance;2 Person Assistance (MIN-MOD x 2 FIRST ATT, FATIGUE QUICKER FOLLOWING TIME)  Stand to Sit: Moderate Assistance;2 Person Assistance  Comment: ATTEMPTED SIDE STEPS, Pt TOOK 1 EACH FOOT THEN UNABLE, FATIGUES QUICKLY, HHA x 2        Balance  Sitting - Dynamic: Fair  Standing - Dynamic: Poor           OutComes Score                                                  AM-PAC Score

## 2023-10-12 NOTE — PROGRESS NOTES
dry 10/12/23 1226   Drainage Amount None (dry) 10/12/23 1226   Odor None 10/12/23 1226   Lara-wound Assessment Intact 10/12/23 1226   Margins Attached edges; Defined edges 10/12/23 1226   Number of days: 0       Wound 10/12/23 Heel Right (Active)   Wound Image   10/12/23 1226   Wound Etiology Pressure Stage 4 10/12/23 1226   Dressing Status New dressing applied 10/12/23 1226   Wound Cleansed Soap and water 10/12/23 1226   Dressing/Treatment Pharmaceutical agent (see MAR); Moist to dry; Roll gauze 10/12/23 1226   Dressing Change Due 10/13/23 10/12/23 1226   Wound Length (cm) 3.2 cm 10/12/23 1226   Wound Width (cm) 4.4 cm 10/12/23 1226   Wound Depth (cm) 1 cm 10/12/23 1226   Wound Surface Area (cm^2) 14.08 cm^2 10/12/23 1226   Wound Volume (cm^3) 14.08 cm^3 10/12/23 1226   Wound Assessment Granulation tissue; Exposed structure muscle 10/12/23 1226   Drainage Amount Small (< 25%) 10/12/23 1226   Drainage Description Serous 10/12/23 1226   Odor None 10/12/23 1226   Lara-wound Assessment Intact 10/12/23 1226   Margins Attached edges; Defined edges 10/12/23 1226   Number of days: 0     Incision 03/25/22 Perineum (Active)   Number of days: 566         Response to treatment:  Well tolerated by patient.      Pain Assessment:  Severity:  0 / 10  Quality of pain: N/A  Wound Pain Timing/Severity: none  Premedicated: No    Plan   Plan of Care: Wound 10/12/23 Sacrum-Dressing/Treatment: Negative pressure wound therapy  Wound 10/12/23 Ankle Lateral;Right-Dressing/Treatment: Pharmaceutical agent (see MAR), Moist to dry, Roll gauze  Wound 10/12/23 Foot Medial;Right-Dressing/Treatment: Pharmaceutical agent (see MAR), Moist to dry, Roll gauze  Wound 10/12/23 Heel Right-Dressing/Treatment: Pharmaceutical agent (see MAR), Moist to dry, Roll gauze    Specialty Bed Required : Yes - on Dolphin mattress  [x] Low Air Loss   [] Pressure Redistribution  [] Fluid Immersion  [] Bariatric  [] Total Pressure Relief  [] Other:     Current Diet: ADULT DIET; Regular; 4 carb choices (60 gm/meal); Low Fat/Low Chol/High Fiber/2 gm Na  Dietician consult:  Yes    Discharge Plan:  Placement for patient upon discharge: home with support    Patient appropriate for Outpatient One Day [facility]: Yes    Patient goes to [facility]    Referrals:  []   [] [address] [facility]  [] Supplies  [] Other    Patient/Caregiver Teaching:  Level of patient/caregiver understanding able to:   [] Indicates understanding       [] Needs reinforcement  [] Unsuccessful      [] Verbal Understanding  [] Demonstrated understanding       [] No evidence of learning  [] Refused teaching         [x] N/A           Recommendations:    Continue NPWT to sacrum. RIGHT LATERAL ANKLE, HEEL, MEDIAL FOOT: Clean with soap and water. Apply barrier film wipe (skin prep) to periwound skin. Apply Santyl the thickness of a nickel to the wound beds. Cover with Dakin's moistened gauze, dry gauze, and secure with roll gauze. Change daily and if dressing becomes soiled, saturated, or dislodged.     Electronically signed by   Pat Clancy RN, [facility] 10/12/2023

## 2023-10-12 NOTE — PROGRESS NOTES
YEAST Present 06/20/2023 10:10 PM    BACTERIA 4+ 10/10/2023 06:03 PM    CLARITYU TURBID 10/10/2023 06:03 PM    SPECGRAV 1.014 10/10/2023 06:03 PM    LEUKOCYTESUR LARGE 10/10/2023 06:03 PM    UROBILINOGEN 0.2 10/10/2023 06:03 PM    BILIRUBINUR Negative 10/10/2023 06:03 PM    BILIRUBINUR - 05/09/2023 12:00 AM    BLOODU MODERATE 10/10/2023 06:03 PM    GLUCOSEU Negative 10/10/2023 06:03 PM       ASSESSMENT AND PLAN    Patient Active Problem List   Diagnosis    CIDP (chronic inflammatory demyelinating polyneuropathy) ([facility])    Restless legs syndrome (RLS)    Recurrent UTI    Atonic bladder    Rheumatoid arthritis involving multiple sites with positive rheumatoid factor ([facility])    Lumbar spinal stenosis    UTI (urinary tract infection)    PVD (peripheral vascular disease) (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)    Chronic wound    History of urostomy    Bilateral hydronephrosis    CYNTHIA (acute kidney injury) ([address])    Palliative care patient     Bilateral hydronephrosis in the context of having cystectomy with ileal conduit urostomy urinary diversion. Creatinine WNL. This has been present and unchanged since June and her surgeon who did her cystectomy and ileal loop did not feel intervention was needed at that time. I have no further recommendations and would defer to her primary surgeon. Will obtain a CT urogram to assess for obstruction at the level of the ureteral ileal loop anastomosis versus reflux and open anastomosis. If she did have obstruction this would need to be managed at a higher level of care and her surgeon Dr Monae Johnson.  At [facility].  Would need percutaneous antegrade nephro ureteral stents placed beyond the scope of care and availability here at [facility]. 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   Cystostomy in right lower quadrant with clear yellow urine. Adequate output. UTI versus colonization. Cultures reveal MDRO Ecoli, sensitivities pending. Would expect bacteriuria in a patient with cystostomy and ileal conduit, however, given her clinical status, would recommend treatment with culture specific antibiotics. Currently on Vanco and cefepime.     TRINY MO, ENZO - CNP  10/12/2023 7:57 AM

## 2023-10-12 NOTE — PROGRESS NOTES
Automatic Dose Adjustment of                Subcutaneous Anticoagulant for Prophylaxis    Dave Ramos is a 77 y.o. female. Recent Labs     10/11/23  0137 10/12/23  0454   CREATININE 1.2* 0.8       Estimated Creatinine Clearance: 49 mL/min (based on SCr of 0.8 mg/dL). Weight:  Wt Readings from Last 1 Encounters:   10/11/23 137 lb 6.4 oz (62.3 kg)           Pharmacy has adjusted subcutaneous anticoagulant for prophylaxis to Enoxaparin 40 mg SC daily based on the patient's weight and estimated CrCl per [address] [facility] policy.              Rikki Eason, PHARM D, 10/12/2023, 12:44 PM

## 2023-10-13 LAB
ANION GAP SERPL CALCULATED.3IONS-SCNC: 9 MMOL/L (ref 7–19)
ANISOCYTOSIS BLD QL SMEAR: ABNORMAL
BASOPHILS # BLD: 0.1 K/UL (ref 0–0.2)
BASOPHILS NFR BLD: 1 % (ref 0–1)
BUN SERPL-MCNC: 17 MG/DL (ref 8–23)
CALCIUM SERPL-MCNC: 7.8 MG/DL (ref 8.8–10.2)
CHLORIDE SERPL-SCNC: 105 MMOL/L (ref 98–111)
CO2 SERPL-SCNC: 26 MMOL/L (ref 22–29)
CREAT SERPL-MCNC: 0.6 MG/DL (ref 0.5–0.9)
EOSINOPHIL # BLD: 0 K/UL (ref 0–0.6)
EOSINOPHIL NFR BLD: 0 % (ref 0–5)
ERYTHROCYTE [DISTWIDTH] IN BLOOD BY AUTOMATED COUNT: 17.8 % (ref 11.5–14.5)
GLUCOSE SERPL-MCNC: 87 MG/DL (ref 74–109)
HCT VFR BLD AUTO: 25.3 % (ref 37–47)
HGB BLD-MCNC: 7.1 G/DL (ref 12–16)
HYPOCHROMIA BLD QL SMEAR: ABNORMAL
IMM GRANULOCYTES # BLD: 0.2 K/UL
LYMPHOCYTES # BLD: 0.8 K/UL (ref 1.1–4.5)
LYMPHOCYTES NFR BLD: 6 % (ref 20–40)
MAGNESIUM SERPL-MCNC: 2 MG/DL (ref 1.6–2.4)
MCH RBC QN AUTO: 23.7 PG (ref 27–31)
MCHC RBC AUTO-ENTMCNC: 28.1 G/DL (ref 33–37)
MCV RBC AUTO: 84.6 FL (ref 81–99)
MONOCYTES # BLD: 0.8 K/UL (ref 0–0.9)
MONOCYTES NFR BLD: 6 % (ref 0–10)
NEUTROPHILS # BLD: 11.7 K/UL (ref 1.5–7.5)
NEUTS SEG NFR BLD: 87 % (ref 50–65)
PLATELET # BLD AUTO: 249 K/UL (ref 130–400)
PLATELET SLIDE REVIEW: ADEQUATE
PMV BLD AUTO: 10.2 FL (ref 9.4–12.3)
POLYCHROMASIA BLD QL SMEAR: ABNORMAL
POTASSIUM SERPL-SCNC: 3.3 MMOL/L (ref 3.5–5)
RBC # BLD AUTO: 2.99 M/UL (ref 4.2–5.4)
SODIUM SERPL-SCNC: 140 MMOL/L (ref 136–145)
WBC # BLD AUTO: 13.4 K/UL (ref 4.8–10.8)

## 2023-10-13 PROCEDURE — 1210000000 HC MED SURG R&B

## 2023-10-13 PROCEDURE — 6360000002 HC RX W HCPCS: Performed by: INTERNAL MEDICINE

## 2023-10-13 PROCEDURE — 2580000003 HC RX 258: Performed by: INTERNAL MEDICINE

## 2023-10-13 PROCEDURE — 94760 N-INVAS EAR/PLS OXIMETRY 1: CPT

## 2023-10-13 PROCEDURE — 6370000000 HC RX 637 (ALT 250 FOR IP): Performed by: INTERNAL MEDICINE

## 2023-10-13 PROCEDURE — 83735 ASSAY OF MAGNESIUM: CPT

## 2023-10-13 PROCEDURE — 6370000000 HC RX 637 (ALT 250 FOR IP): Performed by: NURSE PRACTITIONER

## 2023-10-13 PROCEDURE — 36415 COLL VENOUS BLD VENIPUNCTURE: CPT

## 2023-10-13 PROCEDURE — 80048 BASIC METABOLIC PNL TOTAL CA: CPT

## 2023-10-13 PROCEDURE — 85025 COMPLETE CBC W/AUTO DIFF WBC: CPT

## 2023-10-13 PROCEDURE — 99233 SBSQ HOSP IP/OBS HIGH 50: CPT | Performed by: NURSE PRACTITIONER

## 2023-10-13 RX ORDER — SODIUM CHLORIDE, SODIUM LACTATE, POTASSIUM CHLORIDE, CALCIUM CHLORIDE 600; 310; 30; 20 MG/100ML; MG/100ML; MG/100ML; MG/100ML
INJECTION, SOLUTION INTRAVENOUS CONTINUOUS
Status: DISCONTINUED | OUTPATIENT
Start: 2023-10-13 | End: 2023-10-15

## 2023-10-13 RX ORDER — POTASSIUM CHLORIDE 20 MEQ/1
20 TABLET, EXTENDED RELEASE ORAL 2 TIMES DAILY
Status: DISPENSED | OUTPATIENT
Start: 2023-10-13 | End: 2023-10-15

## 2023-10-13 RX ORDER — GABAPENTIN 300 MG/1
600 CAPSULE ORAL 3 TIMES DAILY
Status: DISCONTINUED | OUTPATIENT
Start: 2023-10-13 | End: 2023-10-20 | Stop reason: SDUPTHER

## 2023-10-13 RX ADMIN — MESALAMINE 400 MG: 400 CAPSULE, DELAYED RELEASE ORAL at 13:39

## 2023-10-13 RX ADMIN — TIMOLOL MALEATE 1 DROP: 5 SOLUTION OPHTHALMIC at 20:16

## 2023-10-13 RX ADMIN — WATER 1000 MG: 1 INJECTION INTRAMUSCULAR; INTRAVENOUS; SUBCUTANEOUS at 11:17

## 2023-10-13 RX ADMIN — VANCOMYCIN HYDROCHLORIDE 125 MG: 125 CAPSULE ORAL at 16:54

## 2023-10-13 RX ADMIN — GABAPENTIN 600 MG: 300 CAPSULE ORAL at 20:15

## 2023-10-13 RX ADMIN — IRON SUCROSE 200 MG: 20 INJECTION, SOLUTION INTRAVENOUS at 08:56

## 2023-10-13 RX ADMIN — ENOXAPARIN SODIUM 40 MG: 100 INJECTION SUBCUTANEOUS at 20:15

## 2023-10-13 RX ADMIN — FENOFIBRATE 160 MG: 160 TABLET ORAL at 08:56

## 2023-10-13 RX ADMIN — SODIUM CHLORIDE, PRESERVATIVE FREE 10 ML: 5 INJECTION INTRAVENOUS at 08:56

## 2023-10-13 RX ADMIN — POTASSIUM CHLORIDE 20 MEQ: 1500 TABLET, EXTENDED RELEASE ORAL at 20:16

## 2023-10-13 RX ADMIN — MESALAMINE 400 MG: 400 CAPSULE, DELAYED RELEASE ORAL at 20:15

## 2023-10-13 RX ADMIN — SODIUM CHLORIDE, POTASSIUM CHLORIDE, SODIUM LACTATE AND CALCIUM CHLORIDE: 600; 310; 30; 20 INJECTION, SOLUTION INTRAVENOUS at 10:19

## 2023-10-13 RX ADMIN — COLLAGENASE SANTYL: 250 OINTMENT TOPICAL at 08:55

## 2023-10-13 RX ADMIN — VANCOMYCIN HYDROCHLORIDE 125 MG: 125 CAPSULE ORAL at 20:15

## 2023-10-13 RX ADMIN — ASPIRIN 81 MG: 81 TABLET, CHEWABLE ORAL at 20:16

## 2023-10-13 RX ADMIN — TIMOLOL MALEATE 1 DROP: 5 SOLUTION OPHTHALMIC at 08:55

## 2023-10-13 RX ADMIN — MESALAMINE 400 MG: 400 CAPSULE, DELAYED RELEASE ORAL at 08:55

## 2023-10-13 RX ADMIN — HYDROCODONE BITARTRATE AND ACETAMINOPHEN 1 TABLET: 10; 325 TABLET ORAL at 09:03

## 2023-10-13 RX ADMIN — DAKIN'S SOLUTION 0.125% (QUARTER STRENGTH): 0.12 SOLUTION at 09:03

## 2023-10-13 RX ADMIN — ALLOPURINOL 300 MG: 100 TABLET ORAL at 08:55

## 2023-10-13 RX ADMIN — VANCOMYCIN HYDROCHLORIDE 125 MG: 125 CAPSULE ORAL at 13:39

## 2023-10-13 RX ADMIN — GABAPENTIN 600 MG: 300 CAPSULE ORAL at 13:39

## 2023-10-13 RX ADMIN — FOLIC ACID TAB 400 MCG 600 MCG: 400 TAB at 08:55

## 2023-10-13 RX ADMIN — GABAPENTIN 300 MG: 300 CAPSULE ORAL at 08:55

## 2023-10-13 RX ADMIN — MULTIPLE VITAMINS W/ MINERALS TAB 1 TABLET: TAB at 08:55

## 2023-10-13 RX ADMIN — PANTOPRAZOLE SODIUM 40 MG: 40 TABLET, DELAYED RELEASE ORAL at 08:56

## 2023-10-13 RX ADMIN — HYDROCODONE BITARTRATE AND ACETAMINOPHEN 1 TABLET: 10; 325 TABLET ORAL at 16:54

## 2023-10-13 RX ADMIN — Medication 1 TABLET: at 08:56

## 2023-10-13 RX ADMIN — VANCOMYCIN HYDROCHLORIDE 125 MG: 125 CAPSULE ORAL at 08:56

## 2023-10-13 ASSESSMENT — PAIN DESCRIPTION - DESCRIPTORS
DESCRIPTORS: ACHING

## 2023-10-13 ASSESSMENT — PAIN SCALES - GENERAL
PAINLEVEL_OUTOF10: 6
PAINLEVEL_OUTOF10: 7
PAINLEVEL_OUTOF10: 5

## 2023-10-13 ASSESSMENT — PAIN DESCRIPTION - LOCATION
LOCATION: GENERALIZED

## 2023-10-13 NOTE — PROGRESS NOTES
Pharmacy Adjustment per [facility] protocol    Wicho Herring is a 77 y.o. female. Pharmacy has adjusted medications per [facility] protocol. Recent Labs     10/12/23  0454 10/13/23  0429   BUN 25* 17       Recent Labs     10/12/23  0454 10/13/23  0429   CREATININE 0.8 0.6       Estimated Creatinine Clearance: 66 mL/min (based on SCr of 0.6 mg/dL). Height:   Ht Readings from Last 1 Encounters:   10/10/23 5' 4\" (1.626 m)     Weight:  Wt Readings from Last 1 Encounters:   10/11/23 137 lb 6.4 oz (62.3 kg)         Plan: Adjust the following medications based on [facility] protocol: Increase gabapentin back to 600mg tid.     TOBY SALOMON, PHARM D, 10/13/2023, 1:23 PM

## 2023-10-13 NOTE — PROGRESS NOTES
Comprehensive Nutrition Assessment    Type and Reason for Visit:  Initial, Positive Nutrition Screen    Nutrition Recommendations/Plan:   Start ONS--Wolf BID     Malnutrition Assessment:  Malnutrition Status: At risk for malnutrition (Comment) (10/13/23 1418)    Context:  Acute Illness     Findings of the 6 clinical characteristics of malnutrition:  Energy Intake:  Mild decrease in energy intake (Comment)  Weight Loss:  No significant weight loss     Body Fat Loss:  No significant body fat loss     Muscle Mass Loss:  No significant muscle mass loss    Fluid Accumulation:  Mild Extremities   Strength:  Not Performed    Nutrition Assessment:    +S for decreased po intake and wounds. PO intake improved today with breakfast intake 80% and pt still eating lunch at time of visit. Current weight has increased since July 2023. Will try Wolf in diet lemonade at [facility] for wound healing. Nutrition Related Findings:    trace Gene LE edema Wound Type: Multiple, Stage II, Unstageable, Wound Vac       Current Nutrition Intake & Therapies:    Average Meal Intake: 1-25%, %  Average Supplements Intake: None Ordered  ADULT DIET; Regular; 4 carb choices (60 gm/meal); Low Fat/Low Chol/High Fiber/2 gm Na    Anthropometric Measures:  Height: 5' 4\" (162.6 cm)  Ideal Body Weight (IBW): 120 lbs (55 kg)    Admission Body Weight: 131 lb (59.4 kg) (stated)  Current Body Weight: 137 lb 6.4 oz (62.3 kg), 114.5 % IBW.  Weight Source: Bed Scale  Current BMI (kg/m2): 23.6  Usual Body Weight: 126 lb (57.2 kg) (7/2023)  % Weight Change (Calculated): 9  BMI Categories: Normal Weight (BMI 22.0 to 24.9) age over 72    Estimated Daily Nutrient Needs:  Energy Requirements Based On: Kcal/kg  Weight Used for Energy Requirements: Current  Energy (kcal/day): 6059-3068 kcals (25-30 kcals )  Weight Used for Protein Requirements: Current  Protein (g/day): 62-125g  Method Used for Fluid Requirements: 1 ml/kcal  Fluid (ml/day): 0700-8219

## 2023-10-13 NOTE — PLAN OF CARE
Problem: Discharge Planning  Goal: Discharge to home or other facility with appropriate resources  10/13/2023 0220 by Phil Nixon RN  Outcome: Progressing  10/12/2023 1627 by Cass Viveros RN  Outcome: Progressing     Problem: Safety - Adult  Goal: Free from fall injury  10/13/2023 0220 by Phil Nixon RN  Outcome: Progressing  10/12/2023 1627 by Cass Viveros RN  Outcome: Progressing     Problem: ABCDS Injury Assessment  Goal: Absence of physical injury  10/13/2023 0220 by Phil Nixon RN  Outcome: Progressing  10/12/2023 1627 by Cass Viveros RN  Outcome: Progressing     Problem: Pain  Goal: Verbalizes/displays adequate comfort level or baseline comfort level  10/13/2023 0220 by Phil Nixon RN  Outcome: Progressing  10/12/2023 1627 by Cass Viveros RN  Outcome: Progressing     Problem: Skin/Tissue Integrity  Goal: Absence of new skin breakdown  Description: 1. Monitor for areas of redness and/or skin breakdown  2. Assess vascular access sites hourly  3. Every 4-6 hours minimum:  Change oxygen saturation probe site  4. Every 4-6 hours:  If on nasal continuous positive airway pressure, respiratory therapy assess nares and determine need for appliance change or resting period.   10/13/2023 0220 by Phil Nixon RN  Outcome: Progressing  10/12/2023 1627 by Cass Viveros RN  Outcome: Progressing

## 2023-10-13 NOTE — PROGRESS NOTES
Date:10/13/2023  Patient: Corinne Dawson  : 815  DIJ:[id number]  CODE:                                                                        Chapin Nguyen DO    Admit Date: 10/10/2023  4:11 PM   LOS: 3 days     Hospital course : The patient is a 77 y.o. female who presented to [facility] ED for evaluation of fever. 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 Pt is here with her daughter who assists with history of present illness. Pt has had fever of 101.6 today at home associated with low blood pressure reading by home health. She has chronic wounds to sacral area and foot followed by wound care having recently finished antibiotic treatment for wound infection. Daughter relates that the wound to pt's heel has had increased drainage from baselines. She had reported that sacral wound has been healing with use of wound vac. Pt has had no abdominal pain or vomiting. She has had episodes of diarrhea over past few days. In ED, CT abdomen/pelvis-Moderate to severe bilateral hydronephrosis and hydroureter, similar to the prior study. urine culture-tntc wbc, 6-10 rbc, 4+bacteria, cxr- Cardiomegaly and atherosclerosis. Subtle patchy densities in the lower lung zones bilaterally could represent pneumonia, atelectasis or scarring. These were not present previously. Resp pcr panel negative, righ tfoot imaging No clear signs of osteomyelitis radiographically. Wbc 12.8k, hgb 7.9, platelets 550M, creatinine 1.8/bun 53, calcium 8.3, total protein 6.4, lactic acid 0.9. Pt is admitted inpatient to hospitalist.     10/11 She did well overnight. Hemodynamics are stable. GFR is improving. Her stool has tested positive for C. difficile. I feel she is stable for transfer out of ICU.     Subjective: 10/12 seen and evaluated at bedside in the presence of her , RN, wound care, discussed

## 2023-10-14 LAB
ANION GAP SERPL CALCULATED.3IONS-SCNC: 4 MMOL/L (ref 7–19)
BASOPHILS # BLD: 0 K/UL (ref 0–0.2)
BASOPHILS NFR BLD: 0.3 % (ref 0–1)
BUN SERPL-MCNC: 15 MG/DL (ref 8–23)
CALCIUM SERPL-MCNC: 8.2 MG/DL (ref 8.8–10.2)
CHLORIDE SERPL-SCNC: 106 MMOL/L (ref 98–111)
CO2 SERPL-SCNC: 31 MMOL/L (ref 22–29)
CREAT SERPL-MCNC: 0.5 MG/DL (ref 0.5–0.9)
EOSINOPHIL # BLD: 0.4 K/UL (ref 0–0.6)
EOSINOPHIL NFR BLD: 2.5 % (ref 0–5)
ERYTHROCYTE [DISTWIDTH] IN BLOOD BY AUTOMATED COUNT: 17.9 % (ref 11.5–14.5)
GLUCOSE SERPL-MCNC: 99 MG/DL (ref 74–109)
HCT VFR BLD AUTO: 24.4 % (ref 37–47)
HGB BLD-MCNC: 7.2 G/DL (ref 12–16)
IMM GRANULOCYTES # BLD: 0.3 K/UL
LYMPHOCYTES # BLD: 1.1 K/UL (ref 1.1–4.5)
LYMPHOCYTES NFR BLD: 7.4 % (ref 20–40)
MAGNESIUM SERPL-MCNC: 1.7 MG/DL (ref 1.6–2.4)
MCH RBC QN AUTO: 24 PG (ref 27–31)
MCHC RBC AUTO-ENTMCNC: 29.5 G/DL (ref 33–37)
MCV RBC AUTO: 81.3 FL (ref 81–99)
MONOCYTES # BLD: 1.7 K/UL (ref 0–0.9)
MONOCYTES NFR BLD: 11.5 % (ref 0–10)
NEUTROPHILS # BLD: 11.1 K/UL (ref 1.5–7.5)
NEUTS SEG NFR BLD: 76.4 % (ref 50–65)
PLATELET # BLD AUTO: 280 K/UL (ref 130–400)
PMV BLD AUTO: 9.9 FL (ref 9.4–12.3)
POTASSIUM SERPL-SCNC: 3.4 MMOL/L (ref 3.5–5)
RBC # BLD AUTO: 3 M/UL (ref 4.2–5.4)
SODIUM SERPL-SCNC: 141 MMOL/L (ref 136–145)
WBC # BLD AUTO: 14.5 K/UL (ref 4.8–10.8)

## 2023-10-14 PROCEDURE — 6370000000 HC RX 637 (ALT 250 FOR IP): Performed by: NURSE PRACTITIONER

## 2023-10-14 PROCEDURE — 6370000000 HC RX 637 (ALT 250 FOR IP): Performed by: INTERNAL MEDICINE

## 2023-10-14 PROCEDURE — 83735 ASSAY OF MAGNESIUM: CPT

## 2023-10-14 PROCEDURE — 6360000002 HC RX W HCPCS: Performed by: INTERNAL MEDICINE

## 2023-10-14 PROCEDURE — 80048 BASIC METABOLIC PNL TOTAL CA: CPT

## 2023-10-14 PROCEDURE — 2580000003 HC RX 258: Performed by: INTERNAL MEDICINE

## 2023-10-14 PROCEDURE — 36415 COLL VENOUS BLD VENIPUNCTURE: CPT

## 2023-10-14 PROCEDURE — 1210000000 HC MED SURG R&B

## 2023-10-14 PROCEDURE — 94760 N-INVAS EAR/PLS OXIMETRY 1: CPT

## 2023-10-14 PROCEDURE — 85025 COMPLETE CBC W/AUTO DIFF WBC: CPT

## 2023-10-14 RX ORDER — HYDROCORTISONE 25 MG/G
CREAM TOPICAL 2 TIMES DAILY
Status: DISCONTINUED | OUTPATIENT
Start: 2023-10-14 | End: 2023-10-21 | Stop reason: HOSPADM

## 2023-10-14 RX ADMIN — MESALAMINE 400 MG: 400 CAPSULE, DELAYED RELEASE ORAL at 14:42

## 2023-10-14 RX ADMIN — VANCOMYCIN HYDROCHLORIDE 125 MG: 125 CAPSULE ORAL at 18:00

## 2023-10-14 RX ADMIN — Medication 1 TABLET: at 09:45

## 2023-10-14 RX ADMIN — CEFTRIAXONE 1000 MG: 1 INJECTION, POWDER, FOR SOLUTION INTRAMUSCULAR; INTRAVENOUS at 09:43

## 2023-10-14 RX ADMIN — MULTIPLE VITAMINS W/ MINERALS TAB 1 TABLET: TAB at 09:45

## 2023-10-14 RX ADMIN — VANCOMYCIN HYDROCHLORIDE 125 MG: 125 CAPSULE ORAL at 21:12

## 2023-10-14 RX ADMIN — SODIUM CHLORIDE, POTASSIUM CHLORIDE, SODIUM LACTATE AND CALCIUM CHLORIDE: 600; 310; 30; 20 INJECTION, SOLUTION INTRAVENOUS at 16:58

## 2023-10-14 RX ADMIN — VANCOMYCIN HYDROCHLORIDE 125 MG: 125 CAPSULE ORAL at 09:44

## 2023-10-14 RX ADMIN — GABAPENTIN 600 MG: 300 CAPSULE ORAL at 09:44

## 2023-10-14 RX ADMIN — HYDROCORTISONE: 25 CREAM TOPICAL at 19:38

## 2023-10-14 RX ADMIN — ALLOPURINOL 300 MG: 100 TABLET ORAL at 09:43

## 2023-10-14 RX ADMIN — VANCOMYCIN HYDROCHLORIDE 125 MG: 125 CAPSULE ORAL at 14:42

## 2023-10-14 RX ADMIN — GABAPENTIN 600 MG: 300 CAPSULE ORAL at 21:12

## 2023-10-14 RX ADMIN — FENOFIBRATE 160 MG: 160 TABLET ORAL at 09:44

## 2023-10-14 RX ADMIN — TIMOLOL MALEATE 1 DROP: 5 SOLUTION OPHTHALMIC at 21:13

## 2023-10-14 RX ADMIN — FOLIC ACID TAB 400 MCG 600 MCG: 400 TAB at 09:44

## 2023-10-14 RX ADMIN — ENOXAPARIN SODIUM 40 MG: 100 INJECTION SUBCUTANEOUS at 21:13

## 2023-10-14 RX ADMIN — POTASSIUM CHLORIDE 20 MEQ: 1500 TABLET, EXTENDED RELEASE ORAL at 21:12

## 2023-10-14 RX ADMIN — GABAPENTIN 600 MG: 300 CAPSULE ORAL at 14:42

## 2023-10-14 RX ADMIN — DAKIN'S SOLUTION 0.125% (QUARTER STRENGTH): 0.12 SOLUTION at 12:04

## 2023-10-14 RX ADMIN — SODIUM CHLORIDE, PRESERVATIVE FREE 10 ML: 5 INJECTION INTRAVENOUS at 10:09

## 2023-10-14 RX ADMIN — COLLAGENASE SANTYL: 250 OINTMENT TOPICAL at 12:03

## 2023-10-14 RX ADMIN — PANTOPRAZOLE SODIUM 40 MG: 40 TABLET, DELAYED RELEASE ORAL at 05:46

## 2023-10-14 RX ADMIN — HYDROCODONE BITARTRATE AND ACETAMINOPHEN 1 TABLET: 10; 325 TABLET ORAL at 14:43

## 2023-10-14 RX ADMIN — HYDROCODONE BITARTRATE AND ACETAMINOPHEN 1 TABLET: 10; 325 TABLET ORAL at 05:46

## 2023-10-14 RX ADMIN — ASPIRIN 81 MG: 81 TABLET, CHEWABLE ORAL at 21:12

## 2023-10-14 RX ADMIN — MESALAMINE 400 MG: 400 CAPSULE, DELAYED RELEASE ORAL at 21:12

## 2023-10-14 RX ADMIN — HYDROCODONE BITARTRATE AND ACETAMINOPHEN 1 TABLET: 10; 325 TABLET ORAL at 22:54

## 2023-10-14 RX ADMIN — TIMOLOL MALEATE 1 DROP: 5 SOLUTION OPHTHALMIC at 09:45

## 2023-10-14 RX ADMIN — POTASSIUM CHLORIDE 20 MEQ: 1500 TABLET, EXTENDED RELEASE ORAL at 11:54

## 2023-10-14 RX ADMIN — MESALAMINE 400 MG: 400 CAPSULE, DELAYED RELEASE ORAL at 09:45

## 2023-10-14 ASSESSMENT — PAIN SCALES - GENERAL
PAINLEVEL_OUTOF10: 7
PAINLEVEL_OUTOF10: 7
PAINLEVEL_OUTOF10: 0

## 2023-10-14 ASSESSMENT — PAIN DESCRIPTION - LOCATION
LOCATION: LEG;BACK
LOCATION: BACK;LEG

## 2023-10-14 NOTE — PROGRESS NOTES
Date:10/14/2023  Patient: Margie Diop  : [id number]  [facility]  CODE:                                                                        Mitchel Bailey DO    Admit Date: 10/10/2023  4:11 PM   LOS: 4 days     Hospital course : The patient is a 77 y.o. female who presented to [facility] ED for evaluation of fever. 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 Pt is here with her daughter who assists with history of present illness. Pt has had fever of 101.6 today at home associated with low blood pressure reading by home health. She has chronic wounds to sacral area and foot followed by wound care having recently finished antibiotic treatment for wound infection. Daughter relates that the wound to pt's heel has had increased drainage from baselines. She had reported that sacral wound has been healing with use of wound vac. Pt has had no abdominal pain or vomiting. She has had episodes of diarrhea over past few days. In ED, CT abdomen/pelvis-Moderate to severe bilateral hydronephrosis and hydroureter, similar to the prior study. urine culture-tntc wbc, 6-10 rbc, 4+bacteria, cxr- Cardiomegaly and atherosclerosis. Subtle patchy densities in the lower lung zones bilaterally could represent pneumonia, atelectasis or scarring. These were not present previously. Initially the patient admitted in ICU and after being hemodynamically stable, GFR improving downgrade from ICU on 10/11  Diagnosed with C. difficile colitis initially on rectal tube that is discontinued started on Vanco p.o. on 10/12  Urology follow-up presently being treated for sepsis from UTI and ileal conduit, no pyelonephritis detected    Subjective:   10/14 today the rectal tube is discontinued and had 2 bowel movement although watery diarrhea.   The patient used to be on colestipol before for watery diarrhea which is on

## 2023-10-14 NOTE — PROGRESS NOTES
over the sacrum. IMPRESSION:  1. Moderate to severe bilateral hydroureteronephrosis is again identified. 2. The stenosis is suspected of the mid to distal left ureter in the left pelvis, possibly related to extrinsic compression. 3. The distal right ureter appears to be decompressed into the right pelvic side wall, forming a small urinoma, as described above. 4. Further evaluation is limited due to the lack of intraureteral contrast on delayed imaging. 5. A sacral decubitus ulcer is suspected. All CT scans are performed using dose optimization techniques as appropriate to the performed exam and include   at least one of the following: Automated exposure control, adjustment of the mA and/or kV according to size, and the use of iterative reconstruction technique. ______________________________________   Electronically signed by: Bam Pruitt M.D. Date:     10/12/2023         Imaging: as above reviewed  Assessment & Plan:       Impression/Plan: History of radical cystectomy for squamous cell carcinoma in an outside facility. Admitted with urinary sepsis with bilateral hydroureteronephrosis upto the conduit. On ceftriaxone. Serum creatinine normal and no flank pain. She has been observed, antegrade or retrograde stenting was considered. Follow-up with Dr. Maxine Meléndez in 2 to 4 weeks with serum creatinine. Sacral decubitus wounds, on wound VAC  Hemorrhoids - no bleeding  CYNTHIA - improving  C.  Diff Colitis - on therapy  Anemia - likely from sepsis      Deandra Hyman MD

## 2023-10-15 LAB
BACTERIA BLD CULT ORG #2: NORMAL
BACTERIA BLD CULT: NORMAL

## 2023-10-15 PROCEDURE — 6370000000 HC RX 637 (ALT 250 FOR IP): Performed by: NURSE PRACTITIONER

## 2023-10-15 PROCEDURE — 6370000000 HC RX 637 (ALT 250 FOR IP): Performed by: INTERNAL MEDICINE

## 2023-10-15 PROCEDURE — 2580000003 HC RX 258: Performed by: INTERNAL MEDICINE

## 2023-10-15 PROCEDURE — 6360000002 HC RX W HCPCS: Performed by: INTERNAL MEDICINE

## 2023-10-15 PROCEDURE — 94760 N-INVAS EAR/PLS OXIMETRY 1: CPT

## 2023-10-15 PROCEDURE — 1210000000 HC MED SURG R&B

## 2023-10-15 RX ADMIN — ALLOPURINOL 300 MG: 100 TABLET ORAL at 08:54

## 2023-10-15 RX ADMIN — MESALAMINE 400 MG: 400 CAPSULE, DELAYED RELEASE ORAL at 08:54

## 2023-10-15 RX ADMIN — HYDROCORTISONE: 25 CREAM TOPICAL at 11:13

## 2023-10-15 RX ADMIN — HYDROCORTISONE: 25 CREAM TOPICAL at 22:00

## 2023-10-15 RX ADMIN — GABAPENTIN 600 MG: 300 CAPSULE ORAL at 08:54

## 2023-10-15 RX ADMIN — Medication 1 TABLET: at 08:54

## 2023-10-15 RX ADMIN — HYDROCODONE BITARTRATE AND ACETAMINOPHEN 1 TABLET: 10; 325 TABLET ORAL at 08:54

## 2023-10-15 RX ADMIN — TIMOLOL MALEATE 1 DROP: 5 SOLUTION OPHTHALMIC at 22:00

## 2023-10-15 RX ADMIN — SODIUM CHLORIDE, PRESERVATIVE FREE 10 ML: 5 INJECTION INTRAVENOUS at 21:58

## 2023-10-15 RX ADMIN — HYDROCODONE BITARTRATE AND ACETAMINOPHEN 1 TABLET: 10; 325 TABLET ORAL at 18:13

## 2023-10-15 RX ADMIN — FENOFIBRATE 160 MG: 160 TABLET ORAL at 08:54

## 2023-10-15 RX ADMIN — MESALAMINE 400 MG: 400 CAPSULE, DELAYED RELEASE ORAL at 21:59

## 2023-10-15 RX ADMIN — GABAPENTIN 600 MG: 300 CAPSULE ORAL at 21:59

## 2023-10-15 RX ADMIN — VANCOMYCIN HYDROCHLORIDE 125 MG: 125 CAPSULE ORAL at 21:59

## 2023-10-15 RX ADMIN — GABAPENTIN 600 MG: 300 CAPSULE ORAL at 13:35

## 2023-10-15 RX ADMIN — SODIUM CHLORIDE, POTASSIUM CHLORIDE, SODIUM LACTATE AND CALCIUM CHLORIDE: 600; 310; 30; 20 INJECTION, SOLUTION INTRAVENOUS at 00:59

## 2023-10-15 RX ADMIN — MULTIPLE VITAMINS W/ MINERALS TAB 1 TABLET: TAB at 08:54

## 2023-10-15 RX ADMIN — VANCOMYCIN HYDROCHLORIDE 125 MG: 125 CAPSULE ORAL at 13:35

## 2023-10-15 RX ADMIN — FOLIC ACID TAB 400 MCG 600 MCG: 400 TAB at 08:54

## 2023-10-15 RX ADMIN — TIMOLOL MALEATE 1 DROP: 5 SOLUTION OPHTHALMIC at 08:59

## 2023-10-15 RX ADMIN — COLLAGENASE SANTYL: 250 OINTMENT TOPICAL at 11:12

## 2023-10-15 RX ADMIN — MESALAMINE 400 MG: 400 CAPSULE, DELAYED RELEASE ORAL at 13:35

## 2023-10-15 RX ADMIN — ASPIRIN 81 MG: 81 TABLET, CHEWABLE ORAL at 21:59

## 2023-10-15 RX ADMIN — PANTOPRAZOLE SODIUM 40 MG: 40 TABLET, DELAYED RELEASE ORAL at 06:25

## 2023-10-15 RX ADMIN — VANCOMYCIN HYDROCHLORIDE 125 MG: 125 CAPSULE ORAL at 08:54

## 2023-10-15 RX ADMIN — DAKIN'S SOLUTION 0.125% (QUARTER STRENGTH): 0.12 SOLUTION at 11:13

## 2023-10-15 RX ADMIN — VANCOMYCIN HYDROCHLORIDE 125 MG: 125 CAPSULE ORAL at 17:22

## 2023-10-15 RX ADMIN — ENOXAPARIN SODIUM 40 MG: 100 INJECTION SUBCUTANEOUS at 21:58

## 2023-10-15 ASSESSMENT — PAIN SCALES - GENERAL
PAINLEVEL_OUTOF10: 7
PAINLEVEL_OUTOF10: 0
PAINLEVEL_OUTOF10: 4
PAINLEVEL_OUTOF10: 2

## 2023-10-15 ASSESSMENT — PAIN DESCRIPTION - LOCATION
LOCATION: BACK;BUTTOCKS;LEG
LOCATION: BUTTOCKS

## 2023-10-15 NOTE — PLAN OF CARE
Problem: Discharge Planning  Goal: Discharge to home or other facility with appropriate resources  10/15/2023 0318 by Rosa Cole LPN  Outcome: Progressing  Flowsheets (Taken 10/14/2023 2346)  Discharge to home or other facility with appropriate resources:   Identify barriers to discharge with patient and caregiver   Arrange for needed discharge resources and transportation as appropriate   Identify discharge learning needs (meds, wound care, etc)   Refer to discharge planning if patient needs post-hospital services based on physician order or complex needs related to functional status, cognitive ability or social support system  10/14/2023 1628 by Rj Conway RN  Outcome: Progressing     Problem: Safety - Adult  Goal: Free from fall injury  10/15/2023 0318 by Rosa Cole LPN  Outcome: Progressing  Flowsheets (Taken 10/15/2023 0010)  Free From Fall Injury: Instruct family/caregiver on patient safety  10/14/2023 1628 by Rj Conway RN  Outcome: Progressing     Problem: ABCDS Injury Assessment  Goal: Absence of physical injury  10/15/2023 0318 by Rosa Cole LPN  Outcome: Progressing  Flowsheets (Taken 10/15/2023 0010)  Absence of Physical Injury: Implement safety measures based on patient assessment  10/14/2023 1628 by Rj Conway RN  Outcome: Progressing     Problem: Pain  Goal: Verbalizes/displays adequate comfort level or baseline comfort level  10/15/2023 0318 by Rosa Cole LPN  Outcome: Progressing  Flowsheets (Taken 10/14/2023 2354)  Verbalizes/displays adequate comfort level or baseline comfort level:   Encourage patient to monitor pain and request assistance   Assess pain using appropriate pain scale   Administer analgesics based on type and severity of pain and evaluate response   Implement non-pharmacological measures as appropriate and evaluate response   Consider cultural and social influences on pain and pain management   Notify Licensed Independent Practitioner

## 2023-10-15 NOTE — PLAN OF CARE
Problem: Discharge Planning  Goal: Discharge to home or other facility with appropriate resources  10/15/2023 1212 by Johnie Wilkins RN  Outcome: Progressing  10/15/2023 0318 by Nils Kumar, BEBETO  Outcome: Progressing  Flowsheets (Taken 10/14/2023 2346)  Discharge to home or other facility with appropriate resources:   Identify barriers to discharge with patient and caregiver   Arrange for needed discharge resources and transportation as appropriate   Identify discharge learning needs (meds, wound care, etc)   Refer to discharge planning if patient needs post-hospital services based on physician order or complex needs related to functional status, cognitive ability or social support system     Problem: Safety - Adult  Goal: Free from fall injury  10/15/2023 1212 by Johnie Wilkins RN  Outcome: Progressing  10/15/2023 0318 by Nils Kumar LPN  Outcome: Progressing  Flowsheets (Taken 10/15/2023 0010)  Free From Fall Injury: Instruct family/caregiver on patient safety     Problem: ABCDS Injury Assessment  Goal: Absence of physical injury  10/15/2023 1212 by Johnie Wilkins RN  Outcome: Progressing  10/15/2023 0318 by Nils Kumar, LPN  Outcome: Progressing  Flowsheets (Taken 10/15/2023 0010)  Absence of Physical Injury: Implement safety measures based on patient assessment     Problem: Pain  Goal: Verbalizes/displays adequate comfort level or baseline comfort level  10/15/2023 1212 by Johnie Wilkins RN  Outcome: Progressing  10/15/2023 0318 by Nils Kumar, LPN  Outcome: Progressing  Flowsheets (Taken 10/14/2023 2354)  Verbalizes/displays adequate comfort level or baseline comfort level:   Encourage patient to monitor pain and request assistance   Assess pain using appropriate pain scale   Administer analgesics based on type and severity of pain and evaluate response   Implement non-pharmacological measures as appropriate and evaluate response   Consider cultural and social influences on pain and pain management   Notify Licensed Independent Practitioner if interventions unsuccessful or patient reports new pain     Problem: Skin/Tissue Integrity  Goal: Absence of new skin breakdown  Description: 1. Monitor for areas of redness and/or skin breakdown  2. Every 4-6 hours minimum:  Change oxygen saturation probe site  3. Every 4-6 hours:  If on nasal continuous positive airway pressure, respiratory therapy assess nares and determine need for appliance change or resting period.   10/15/2023 1212 by Tru Carr RN  Outcome: Progressing  10/15/2023 0318 by Jose M Gonzalez LPN  Outcome: Progressing

## 2023-10-16 LAB
ANION GAP SERPL CALCULATED.3IONS-SCNC: 7 MMOL/L (ref 7–19)
ANISOCYTOSIS BLD QL SMEAR: ABNORMAL
BASOPHILS # BLD: 0 K/UL (ref 0–0.2)
BASOPHILS NFR BLD: 0 % (ref 0–1)
BUN SERPL-MCNC: 22 MG/DL (ref 8–23)
CALCIUM SERPL-MCNC: 8.6 MG/DL (ref 8.8–10.2)
CHLORIDE SERPL-SCNC: 105 MMOL/L (ref 98–111)
CO2 SERPL-SCNC: 29 MMOL/L (ref 22–29)
CREAT SERPL-MCNC: 0.5 MG/DL (ref 0.5–0.9)
EOSINOPHIL # BLD: 0.78 K/UL (ref 0–0.6)
EOSINOPHIL NFR BLD: 5 % (ref 0–5)
ERYTHROCYTE [DISTWIDTH] IN BLOOD BY AUTOMATED COUNT: 18.7 % (ref 11.5–14.5)
GLUCOSE SERPL-MCNC: 95 MG/DL (ref 74–109)
HCT VFR BLD AUTO: 26.9 % (ref 37–47)
HGB BLD-MCNC: 7.9 G/DL (ref 12–16)
HYPOCHROMIA BLD QL SMEAR: ABNORMAL
IMM GRANULOCYTES # BLD: 0.7 K/UL
LYMPHOCYTES # BLD: 1.2 K/UL (ref 1.1–4.5)
LYMPHOCYTES NFR BLD: 8 % (ref 20–40)
MAGNESIUM SERPL-MCNC: 1.6 MG/DL (ref 1.6–2.4)
MCH RBC QN AUTO: 24.5 PG (ref 27–31)
MCHC RBC AUTO-ENTMCNC: 29.4 G/DL (ref 33–37)
MCV RBC AUTO: 83.3 FL (ref 81–99)
MONOCYTES # BLD: 3.7 K/UL (ref 0–0.9)
MONOCYTES NFR BLD: 24 % (ref 0–10)
NEUTROPHILS # BLD: 9.8 K/UL (ref 1.5–7.5)
NEUTS BAND NFR BLD MANUAL: 1 % (ref 0–5)
NEUTS SEG NFR BLD: 62 % (ref 50–65)
PLATELET # BLD AUTO: 303 K/UL (ref 130–400)
PLATELET SLIDE REVIEW: ADEQUATE
PMV BLD AUTO: 9.5 FL (ref 9.4–12.3)
POLYCHROMASIA BLD QL SMEAR: ABNORMAL
POTASSIUM SERPL-SCNC: 3.8 MMOL/L (ref 3.5–5)
RBC # BLD AUTO: 3.23 M/UL (ref 4.2–5.4)
SODIUM SERPL-SCNC: 141 MMOL/L (ref 136–145)
WBC # BLD AUTO: 15.6 K/UL (ref 4.8–10.8)

## 2023-10-16 PROCEDURE — 1210000000 HC MED SURG R&B

## 2023-10-16 PROCEDURE — 36415 COLL VENOUS BLD VENIPUNCTURE: CPT

## 2023-10-16 PROCEDURE — 94760 N-INVAS EAR/PLS OXIMETRY 1: CPT

## 2023-10-16 PROCEDURE — 6370000000 HC RX 637 (ALT 250 FOR IP): Performed by: INTERNAL MEDICINE

## 2023-10-16 PROCEDURE — 97530 THERAPEUTIC ACTIVITIES: CPT

## 2023-10-16 PROCEDURE — 97535 SELF CARE MNGMENT TRAINING: CPT

## 2023-10-16 PROCEDURE — 83735 ASSAY OF MAGNESIUM: CPT

## 2023-10-16 PROCEDURE — 2580000003 HC RX 258: Performed by: INTERNAL MEDICINE

## 2023-10-16 PROCEDURE — 99232 SBSQ HOSP IP/OBS MODERATE 35: CPT | Performed by: NURSE PRACTITIONER

## 2023-10-16 PROCEDURE — 6370000000 HC RX 637 (ALT 250 FOR IP): Performed by: NURSE PRACTITIONER

## 2023-10-16 PROCEDURE — 80048 BASIC METABOLIC PNL TOTAL CA: CPT

## 2023-10-16 PROCEDURE — 85025 COMPLETE CBC W/AUTO DIFF WBC: CPT

## 2023-10-16 PROCEDURE — 6360000002 HC RX W HCPCS: Performed by: INTERNAL MEDICINE

## 2023-10-16 RX ORDER — POTASSIUM CHLORIDE 20 MEQ/1
40 TABLET, EXTENDED RELEASE ORAL ONCE
Status: COMPLETED | OUTPATIENT
Start: 2023-10-16 | End: 2023-10-16

## 2023-10-16 RX ORDER — MAGNESIUM SULFATE IN WATER 40 MG/ML
2000 INJECTION, SOLUTION INTRAVENOUS ONCE
Status: COMPLETED | OUTPATIENT
Start: 2023-10-16 | End: 2023-10-16

## 2023-10-16 RX ADMIN — ENOXAPARIN SODIUM 40 MG: 100 INJECTION SUBCUTANEOUS at 20:40

## 2023-10-16 RX ADMIN — TIMOLOL MALEATE 1 DROP: 5 SOLUTION OPHTHALMIC at 20:40

## 2023-10-16 RX ADMIN — MAGNESIUM SULFATE HEPTAHYDRATE 2000 MG: 2 INJECTION, SOLUTION INTRAVENOUS at 09:48

## 2023-10-16 RX ADMIN — GABAPENTIN 600 MG: 300 CAPSULE ORAL at 20:40

## 2023-10-16 RX ADMIN — FENOFIBRATE 160 MG: 160 TABLET ORAL at 09:37

## 2023-10-16 RX ADMIN — HYDROCODONE BITARTRATE AND ACETAMINOPHEN 1 TABLET: 10; 325 TABLET ORAL at 23:32

## 2023-10-16 RX ADMIN — ASPIRIN 81 MG: 81 TABLET, CHEWABLE ORAL at 20:40

## 2023-10-16 RX ADMIN — VANCOMYCIN HYDROCHLORIDE 125 MG: 125 CAPSULE ORAL at 09:37

## 2023-10-16 RX ADMIN — DAKIN'S SOLUTION 0.125% (QUARTER STRENGTH): 0.12 SOLUTION at 13:05

## 2023-10-16 RX ADMIN — MULTIPLE VITAMINS W/ MINERALS TAB 1 TABLET: TAB at 09:38

## 2023-10-16 RX ADMIN — HYDROCORTISONE: 25 CREAM TOPICAL at 20:40

## 2023-10-16 RX ADMIN — COLLAGENASE SANTYL: 250 OINTMENT TOPICAL at 13:04

## 2023-10-16 RX ADMIN — TIMOLOL MALEATE 1 DROP: 5 SOLUTION OPHTHALMIC at 09:41

## 2023-10-16 RX ADMIN — Medication 1 TABLET: at 09:38

## 2023-10-16 RX ADMIN — ALLOPURINOL 300 MG: 100 TABLET ORAL at 09:38

## 2023-10-16 RX ADMIN — POTASSIUM CHLORIDE 40 MEQ: 1500 TABLET, EXTENDED RELEASE ORAL at 09:44

## 2023-10-16 RX ADMIN — SODIUM CHLORIDE, PRESERVATIVE FREE 10 ML: 5 INJECTION INTRAVENOUS at 09:39

## 2023-10-16 RX ADMIN — MESALAMINE 400 MG: 400 CAPSULE, DELAYED RELEASE ORAL at 20:40

## 2023-10-16 RX ADMIN — HYDROCORTISONE: 25 CREAM TOPICAL at 13:04

## 2023-10-16 RX ADMIN — GABAPENTIN 600 MG: 300 CAPSULE ORAL at 15:08

## 2023-10-16 RX ADMIN — HYDROCODONE BITARTRATE AND ACETAMINOPHEN 1 TABLET: 10; 325 TABLET ORAL at 03:46

## 2023-10-16 RX ADMIN — VANCOMYCIN HYDROCHLORIDE 125 MG: 125 CAPSULE ORAL at 20:40

## 2023-10-16 RX ADMIN — PANTOPRAZOLE SODIUM 40 MG: 40 TABLET, DELAYED RELEASE ORAL at 06:19

## 2023-10-16 RX ADMIN — VANCOMYCIN HYDROCHLORIDE 125 MG: 125 CAPSULE ORAL at 17:22

## 2023-10-16 RX ADMIN — VANCOMYCIN HYDROCHLORIDE 125 MG: 125 CAPSULE ORAL at 15:08

## 2023-10-16 RX ADMIN — GABAPENTIN 600 MG: 300 CAPSULE ORAL at 09:37

## 2023-10-16 RX ADMIN — FOLIC ACID TAB 400 MCG 600 MCG: 400 TAB at 09:38

## 2023-10-16 RX ADMIN — HYDROCODONE BITARTRATE AND ACETAMINOPHEN 1 TABLET: 10; 325 TABLET ORAL at 15:23

## 2023-10-16 RX ADMIN — MESALAMINE 400 MG: 400 CAPSULE, DELAYED RELEASE ORAL at 09:37

## 2023-10-16 RX ADMIN — MESALAMINE 400 MG: 400 CAPSULE, DELAYED RELEASE ORAL at 15:08

## 2023-10-16 RX ADMIN — SODIUM CHLORIDE, PRESERVATIVE FREE 10 ML: 5 INJECTION INTRAVENOUS at 20:40

## 2023-10-16 ASSESSMENT — PAIN SCALES - GENERAL
PAINLEVEL_OUTOF10: 7
PAINLEVEL_OUTOF10: 7
PAINLEVEL_OUTOF10: 0
PAINLEVEL_OUTOF10: 8

## 2023-10-16 ASSESSMENT — PAIN DESCRIPTION - ORIENTATION
ORIENTATION: POSTERIOR
ORIENTATION: RIGHT;LEFT

## 2023-10-16 ASSESSMENT — PAIN DESCRIPTION - LOCATION
LOCATION: BACK;BUTTOCKS
LOCATION: BUTTOCKS
LOCATION: BACK;BUTTOCKS;LEG

## 2023-10-16 ASSESSMENT — PAIN DESCRIPTION - FREQUENCY: FREQUENCY: CONTINUOUS

## 2023-10-16 ASSESSMENT — PAIN DESCRIPTION - DESCRIPTORS
DESCRIPTORS: ACHING
DESCRIPTORS: THROBBING;ACHING

## 2023-10-16 ASSESSMENT — PAIN DESCRIPTION - ONSET: ONSET: ON-GOING

## 2023-10-16 ASSESSMENT — PAIN - FUNCTIONAL ASSESSMENT: PAIN_FUNCTIONAL_ASSESSMENT: PREVENTS OR INTERFERES SOME ACTIVE ACTIVITIES AND ADLS

## 2023-10-16 ASSESSMENT — PAIN DESCRIPTION - PAIN TYPE: TYPE: CHRONIC PAIN

## 2023-10-16 ASSESSMENT — PAIN DESCRIPTION - DIRECTION: RADIATING_TOWARDS: YES

## 2023-10-16 NOTE — CARE COORDINATION
Case Management Assessment  Initial Evaluation    Date/Time of Evaluation: 10/16/2023 1:27 PM  Assessment Completed by: NEVIN Courtney    If patient is discharged prior to next notation, then this note serves as note for discharge by case management. Patient Name: Fidencio Cabrera                   YOB: 1945  Diagnosis: Bacteria in urine [R82.71]  Acute kidney injury ([address]) [N17.9]  Sepsis ([address]) [A41.9]                   Date / Time: 10/10/2023  4:11 PM    Patient Admission Status: Inpatient   Readmission Risk (Low < 19, Mod (19-27), High > 27): Readmission Risk Score: 19.6    Current PCP: Irma Vail, DO  PCP verified by CM? Yes    Chart Reviewed: Yes      History Provided by: Patient  Patient Orientation: Alert and Oriented, Person, Place, Situation, Self    Patient Cognition: Alert    Hospitalization in the last 30 days (Readmission):  No    If yes, Readmission Assessment in CM Navigator will be completed. Advance Directives:      Code Status: Full Code   Patient's Primary Decision Maker is: Legal Next of Kin    Primary Decision MakerJaky Rivera Child - [phone]    Secondary Decision Maker: Koritalha Rose - Child - [phone]    Secondary Decision Maker: Valencia Curry - Child - [phone]    Discharge Planning:    Patient lives with: Children Type of Home: House  Primary Care Giver: Self (daughter lives with pt and is established with [address] [place] [address],[address])  Patient Support Systems include: Children, Family Members, Home Care Staff   Current Financial resources: Medicare  Current community resources: None  Current services prior to admission: Home Care            Current DME:              Type of Home Care services:  Nursing Services, PT, [place]    ADLS  Prior functional level: Independent in ADLs/IADLs, Assistance with the following:, Shopping, Housework  Current functional level:  Independent in ADLs/IADLs, Assistance with the following:, Shopping, Housework    PT AM-PAC:   /24  OT

## 2023-10-16 NOTE — PLAN OF CARE
Problem: Discharge Planning  Goal: Discharge to home or other facility with appropriate resources  Outcome: Progressing  Flowsheets (Taken 10/15/2023 2158)  Discharge to home or other facility with appropriate resources:   Identify barriers to discharge with patient and caregiver   Arrange for needed discharge resources and transportation as appropriate   Identify discharge learning needs (meds, wound care, etc)   Refer to discharge planning if patient needs post-hospital services based on physician order or complex needs related to functional status, cognitive ability or social support system     Problem: Safety - Adult  Goal: Free from fall injury  Outcome: Progressing  Flowsheets (Taken 10/16/2023 0104)  Free From Fall Injury: Instruct family/caregiver on patient safety     Problem: ABCDS Injury Assessment  Goal: Absence of physical injury  Outcome: Progressing  Flowsheets (Taken 10/16/2023 0104)  Absence of Physical Injury: Implement safety measures based on patient assessment     Problem: Skin/Tissue Integrity  Goal: Absence of new skin breakdown  Description: 1. Monitor for areas of redness and/or skin breakdown  2. Every 4-6 hours minimum:  Change oxygen saturation probe site  3. Every 4-6 hours:  If on nasal continuous positive airway pressure, respiratory therapy assess nares and determine need for appliance change or resting period.   Outcome: Progressing

## 2023-10-16 NOTE — PROGRESS NOTES
Occupational Therapy     10/16/23 1404   Subjective   Subjective Pt in bed upon arrival and states \"I need to be cleaned up\". PCA states she is having frequent bowel movements. Cognition   Overall Cognitive Status WNL   Orientation   Overall Orientation Status WNL   Bed Mobility Training   Bed Mobility Training Yes   Overall Level of Assistance Moderate assistance;Maximum assistance   Interventions Verbal cues; Tactile cues;Manual cues   Rolling Moderate assistance   Supine to Sit Moderate assistance;Maximum assistance   Sit to Supine Maximum assistance   Scooting Moderate assistance;Maximum assistance   Transfer Training   Transfer Training Yes   Overall Level of Assistance Minimum assistance  (SS)   Interventions Verbal cues; Tactile cues;Manual cues   Sit to Stand Minimum assistance  (SS)   Stand to Sit Minimum assistance   Toilet Transfer Minimum assistance; Moderate assistance  (increased assistance with lower surface)   Balance   Sitting Impaired   Sitting - Static Poor (constant support); Occasional   Sitting - Dynamic Poor (constant support)   Standing With support   ADL   Feeding Setup; Independent   Grooming Setup; Independent   UE Bathing Moderate assistance   LE Bathing Maximum assistance   UE Dressing Moderate assistance   LE Dressing Maximum assistance;Dependent/Total   Toileting Maximum assistance   Additional Comments Joanne Emmanuel for transfers. Assessment   Assessment Tx focused on bed mobility during clean up, sitting EOB in preparation for standing/ transfer in Joanne Emmanuel for toileting at bedside commode level and return to bed. Pt states she is weaker and used zackery stedy for safety.    Activity Tolerance Patient limited by fatigue;Patient limited by endurance   Discharge Recommendations Patient would benefit from continued therapy after discharge;24 hour supervision or assist   Occupational Therapy Plan   Times Per Week 3-5   Times Per Day Once a day   OT Plan of Care   Monday X     Electronically

## 2023-10-16 NOTE — PROGRESS NOTES
10/16/23 1143   Negative Pressure Wound Therapy Sacrum   Placement Date/Time: 10/12/23 1300   Present on Admission/Arrival: Yes  Location: Sacrum   $ Standard NPWT <=50 sq cm PER TX $ Yes   Wound Type Pressure ulcer: Stage IV   Unit Type 3M VAC Ulta   Dressing Type Black Foam   Number of pieces used 2   Number of pieces removed 1   Cycle Continuous   Target Pressure (mmHg) 125   Canister changed? No   Dressing Status New dressing applied   Dressing Changed Changed/New   Drainage Amount Small   Drainage Description Serous   Dressing Change Due 10/18/23   Wound Assessment Exposed structure muscle; Exposed structure tendon   Lara-wound Assessment Blanchable erythema   Odor None       Wound vac dressing to sacrum changed. Wound and periwound skin cleaned with NS. Wound appearance is unchanged from prior assessment on Friday. Black foam placed to wound bed and bridged over vac drape to left flank for track pad. All sealed with drape and -125 mmHg applied. No leaks or alarms on VAC unit. Patient tolerated well with no expressions of pain or discomfort.     Electronically signed by Maribel Flaherty RN, [address] [place] [address] on 10/16/2023 at 11:57 AM

## 2023-10-17 LAB
ANION GAP SERPL CALCULATED.3IONS-SCNC: 8 MMOL/L (ref 7–19)
ANISOCYTOSIS BLD QL SMEAR: ABNORMAL
BASOPHILS # BLD: 0 K/UL (ref 0–0.2)
BASOPHILS NFR BLD: 0 % (ref 0–1)
BUN SERPL-MCNC: 26 MG/DL (ref 8–23)
CALCIUM SERPL-MCNC: 8.6 MG/DL (ref 8.8–10.2)
CHLORIDE SERPL-SCNC: 104 MMOL/L (ref 98–111)
CO2 SERPL-SCNC: 27 MMOL/L (ref 22–29)
CREAT SERPL-MCNC: 0.7 MG/DL (ref 0.5–0.9)
DACRYOCYTES BLD QL SMEAR: ABNORMAL
EOSINOPHIL # BLD: 0.66 K/UL (ref 0–0.6)
EOSINOPHIL NFR BLD: 4 % (ref 0–5)
ERYTHROCYTE [DISTWIDTH] IN BLOOD BY AUTOMATED COUNT: 19.3 % (ref 11.5–14.5)
GLUCOSE SERPL-MCNC: 106 MG/DL (ref 74–109)
HCT VFR BLD AUTO: 27.7 % (ref 37–47)
HGB BLD-MCNC: 8.1 G/DL (ref 12–16)
HYPOCHROMIA BLD QL SMEAR: ABNORMAL
IMM GRANULOCYTES # BLD: 0.7 K/UL
LYMPHOCYTES # BLD: 1.7 K/UL (ref 1.1–4.5)
LYMPHOCYTES NFR BLD: 9 % (ref 20–40)
MAGNESIUM SERPL-MCNC: 1.9 MG/DL (ref 1.6–2.4)
MCH RBC QN AUTO: 24.3 PG (ref 27–31)
MCHC RBC AUTO-ENTMCNC: 29.2 G/DL (ref 33–37)
MCV RBC AUTO: 83.2 FL (ref 81–99)
METAMYELOCYTES NFR BLD MANUAL: 2 %
MONOCYTES # BLD: 2 K/UL (ref 0–0.9)
MONOCYTES NFR BLD: 12 % (ref 0–10)
MYELOCYTES NFR BLD MANUAL: 3 %
NEUTROPHILS # BLD: 12.3 K/UL (ref 1.5–7.5)
NEUTS BAND NFR BLD MANUAL: 1 % (ref 0–5)
NEUTS SEG NFR BLD: 68 % (ref 50–65)
PLATELET # BLD AUTO: 318 K/UL (ref 130–400)
PLATELET SLIDE REVIEW: ADEQUATE
PMV BLD AUTO: 9.6 FL (ref 9.4–12.3)
POLYCHROMASIA BLD QL SMEAR: ABNORMAL
POTASSIUM SERPL-SCNC: 4.4 MMOL/L (ref 3.5–5)
RBC # BLD AUTO: 3.33 M/UL (ref 4.2–5.4)
SMUDGE CELLS BLD QL SMEAR: ABNORMAL
SODIUM SERPL-SCNC: 139 MMOL/L (ref 136–145)
TARGETS BLD QL SMEAR: ABNORMAL
VARIANT LYMPHS NFR BLD: 1 % (ref 0–8)
WBC # BLD AUTO: 16.6 K/UL (ref 4.8–10.8)

## 2023-10-17 PROCEDURE — 6370000000 HC RX 637 (ALT 250 FOR IP): Performed by: INTERNAL MEDICINE

## 2023-10-17 PROCEDURE — 94760 N-INVAS EAR/PLS OXIMETRY 1: CPT

## 2023-10-17 PROCEDURE — 97116 GAIT TRAINING THERAPY: CPT

## 2023-10-17 PROCEDURE — 6360000002 HC RX W HCPCS: Performed by: INTERNAL MEDICINE

## 2023-10-17 PROCEDURE — 2580000003 HC RX 258: Performed by: INTERNAL MEDICINE

## 2023-10-17 PROCEDURE — 80048 BASIC METABOLIC PNL TOTAL CA: CPT

## 2023-10-17 PROCEDURE — 97530 THERAPEUTIC ACTIVITIES: CPT

## 2023-10-17 PROCEDURE — 6370000000 HC RX 637 (ALT 250 FOR IP): Performed by: NURSE PRACTITIONER

## 2023-10-17 PROCEDURE — 1210000000 HC MED SURG R&B

## 2023-10-17 PROCEDURE — 85025 COMPLETE CBC W/AUTO DIFF WBC: CPT

## 2023-10-17 PROCEDURE — 97110 THERAPEUTIC EXERCISES: CPT

## 2023-10-17 PROCEDURE — 36415 COLL VENOUS BLD VENIPUNCTURE: CPT

## 2023-10-17 PROCEDURE — 83735 ASSAY OF MAGNESIUM: CPT

## 2023-10-17 RX ORDER — DIPHENOXYLATE HYDROCHLORIDE AND ATROPINE SULFATE 2.5; .025 MG/1; MG/1
1 TABLET ORAL 2 TIMES DAILY
Status: DISCONTINUED | OUTPATIENT
Start: 2023-10-17 | End: 2023-10-18

## 2023-10-17 RX ADMIN — ALLOPURINOL 300 MG: 100 TABLET ORAL at 08:29

## 2023-10-17 RX ADMIN — TIMOLOL MALEATE 1 DROP: 5 SOLUTION OPHTHALMIC at 08:30

## 2023-10-17 RX ADMIN — DIPHENOXYLATE HYDROCHLORIDE AND ATROPINE SULFATE 1 TABLET: 2.5; .025 TABLET ORAL at 20:00

## 2023-10-17 RX ADMIN — HYDROCORTISONE: 25 CREAM TOPICAL at 20:00

## 2023-10-17 RX ADMIN — MESALAMINE 400 MG: 400 CAPSULE, DELAYED RELEASE ORAL at 08:29

## 2023-10-17 RX ADMIN — SODIUM CHLORIDE, PRESERVATIVE FREE 10 ML: 5 INJECTION INTRAVENOUS at 20:00

## 2023-10-17 RX ADMIN — DAKIN'S SOLUTION 0.125% (QUARTER STRENGTH): 0.12 SOLUTION at 14:09

## 2023-10-17 RX ADMIN — ENOXAPARIN SODIUM 40 MG: 100 INJECTION SUBCUTANEOUS at 20:00

## 2023-10-17 RX ADMIN — TIZANIDINE 4 MG: 4 TABLET ORAL at 20:01

## 2023-10-17 RX ADMIN — HYDROCODONE BITARTRATE AND ACETAMINOPHEN 1 TABLET: 10; 325 TABLET ORAL at 08:29

## 2023-10-17 RX ADMIN — SODIUM CHLORIDE, PRESERVATIVE FREE 10 ML: 5 INJECTION INTRAVENOUS at 08:30

## 2023-10-17 RX ADMIN — Medication 1 TABLET: at 08:29

## 2023-10-17 RX ADMIN — VANCOMYCIN HYDROCHLORIDE 125 MG: 125 CAPSULE ORAL at 17:43

## 2023-10-17 RX ADMIN — MULTIPLE VITAMINS W/ MINERALS TAB 1 TABLET: TAB at 08:30

## 2023-10-17 RX ADMIN — HYDROCORTISONE: 25 CREAM TOPICAL at 08:32

## 2023-10-17 RX ADMIN — MESALAMINE 400 MG: 400 CAPSULE, DELAYED RELEASE ORAL at 20:00

## 2023-10-17 RX ADMIN — GABAPENTIN 600 MG: 300 CAPSULE ORAL at 20:00

## 2023-10-17 RX ADMIN — GABAPENTIN 600 MG: 300 CAPSULE ORAL at 14:09

## 2023-10-17 RX ADMIN — COLLAGENASE SANTYL: 250 OINTMENT TOPICAL at 14:10

## 2023-10-17 RX ADMIN — FENOFIBRATE 160 MG: 160 TABLET ORAL at 08:29

## 2023-10-17 RX ADMIN — HYDROCODONE BITARTRATE AND ACETAMINOPHEN 1 TABLET: 10; 325 TABLET ORAL at 18:02

## 2023-10-17 RX ADMIN — VANCOMYCIN HYDROCHLORIDE 125 MG: 125 CAPSULE ORAL at 08:30

## 2023-10-17 RX ADMIN — VANCOMYCIN HYDROCHLORIDE 125 MG: 125 CAPSULE ORAL at 20:00

## 2023-10-17 RX ADMIN — VANCOMYCIN HYDROCHLORIDE 125 MG: 125 CAPSULE ORAL at 14:09

## 2023-10-17 RX ADMIN — MESALAMINE 400 MG: 400 CAPSULE, DELAYED RELEASE ORAL at 14:09

## 2023-10-17 RX ADMIN — TIMOLOL MALEATE 1 DROP: 5 SOLUTION OPHTHALMIC at 20:01

## 2023-10-17 RX ADMIN — PANTOPRAZOLE SODIUM 40 MG: 40 TABLET, DELAYED RELEASE ORAL at 08:29

## 2023-10-17 RX ADMIN — FOLIC ACID TAB 400 MCG 600 MCG: 400 TAB at 08:29

## 2023-10-17 RX ADMIN — DIPHENOXYLATE HYDROCHLORIDE AND ATROPINE SULFATE 1 TABLET: 2.5; .025 TABLET ORAL at 11:30

## 2023-10-17 RX ADMIN — ASPIRIN 81 MG: 81 TABLET, CHEWABLE ORAL at 20:01

## 2023-10-17 RX ADMIN — GABAPENTIN 600 MG: 300 CAPSULE ORAL at 08:29

## 2023-10-17 ASSESSMENT — PAIN DESCRIPTION - LOCATION: LOCATION: BUTTOCKS

## 2023-10-17 ASSESSMENT — PAIN SCALES - GENERAL
PAINLEVEL_OUTOF10: 7

## 2023-10-17 NOTE — PLAN OF CARE
Problem: Discharge Planning  Goal: Discharge to home or other facility with appropriate resources  Outcome: Progressing  Flowsheets (Taken 10/16/2023 2043 by Genesis Mattson, RN)  Discharge to home or other facility with appropriate resources: Identify barriers to discharge with patient and caregiver     Problem: Safety - Adult  Goal: Free from fall injury  Outcome: Progressing  Flowsheets (Taken 10/17/2023 0030 by Genesis Mattson RN)  Free From Fall Injury: Instruct family/caregiver on patient safety     Problem: ABCDS Injury Assessment  Goal: Absence of physical injury  Outcome: Progressing  Flowsheets (Taken 10/17/2023 0030 by Genesis Mattson RN)  Absence of Physical Injury: Implement safety measures based on patient assessment     Problem: Pain  Goal: Verbalizes/displays adequate comfort level or baseline comfort level  Outcome: Progressing     Problem: Skin/Tissue Integrity  Goal: Absence of new skin breakdown  Description: 1. Monitor for areas of redness and/or skin breakdown  2. Every 4-6 hours minimum:  Change oxygen saturation probe site  3. Every 4-6 hours:  If on nasal continuous positive airway pressure, respiratory therapy assess nares and determine need for appliance change or resting period.   Outcome: Progressing

## 2023-10-17 NOTE — PROGRESS NOTES
Dressing to RLE noted to have moderate amount of drainage from wound. Wound care to RLE provided as ordered. Pt tolerated well.    Electronically signed by Ramon Halsted, RN on 10/17/2023 at 12:29 AM

## 2023-10-17 NOTE — PROGRESS NOTES
Nutrition Assessment     Type and Reason for Visit: Reassess    Nutrition Recommendations/Plan:   Continue current POC     Malnutrition Assessment:  Malnutrition Status: At risk for malnutrition (Comment)    Nutrition Assessment:  PO intake remains good with intake ranging %. New wt NA. Taking some of ONS    Estimated Daily Nutrient Needs:  Energy (kcal):  2441-4249 kcals (25-30 kcals ) Weight Used for Energy Requirements: Current     Protein (g):  62-125g Weight Used for Protein Requirements: Current        Fluid (ml/day):  5091-8611 ml Method Used for Fluid Requirements: 1 ml/kcal    Nutrition Related Findings:   trace Gene LE edema,  nonpitting LUE edema Wound Type: Multiple, Stage II, Wound Vac    Current Nutrition Therapies:    ADULT DIET; Regular; 4 carb choices (60 gm/meal); Low Fat/Low Chol/High Fiber/2 gm Na  ADULT ORAL NUTRITION SUPPLEMENT; Lunch, Dinner;  Wound Healing Oral Supplement    Anthropometric Measures:  Height: 5' 4\" (162.6 cm)  Current Body Wt: 137 lb 6.4 oz (62.3 kg)   BMI: 23.6    Nutrition Diagnosis:   Increased nutrient needs related to increase demand for energy/nutrients as evidenced by wounds    Nutrition Interventions:   Food and/or Nutrient Delivery: Continue Current Diet, Continue Oral Nutrition Supplement  Nutrition Education/Counseling: No recommendation at this time  Coordination of Nutrition Care: Continue to monitor while inpatient  Plan of Care discussed with: pt    Goals:  Previous Goal Met: Progressing toward Goal(s)  Goals: Meet at least 75% of estimated needs, PO intake 50% or greater       Nutrition Monitoring and Evaluation:   Behavioral-Environmental Outcomes: None Identified  Food/Nutrient Intake Outcomes: Food and Nutrient Intake, Supplement Intake  Physical Signs/Symptoms Outcomes: Biochemical Data, Weight, Skin, Fluid Status or Edema    Discharge Planning:    Continue current diet     Ryanne Moreno, MS, RD, LD  Contact: [phone]

## 2023-10-17 NOTE — PROGRESS NOTES
10/17/23 1700   Subjective   Subjective Patient in bed agrees to therapy. (Gait belt high pt has urostomy. Pt also has [place] & on air matrress.)   Vitals   O2 Device None (Room air)   Bed Mobility Training   Bed Mobility Training Yes   Rolling Moderate assistance  (Mutiple rolls to replace soiled pad.)   Supine to Sit Moderate assistance   Sit to Supine Moderate assistance   Scooting Minimum assistance  (To EOB)   Balance   Sitting Intact  (Air mattress firmed up for bed mobility & EOB SBA/CGAsitting. Sat EOB 20-25 minutes for EX & STS.)   Transfer Training   Transfer Training Yes   Sit to Stand Moderate assistance  (Pt stood BS pushed up from BR then held therapists forearmse took steps BS)   Stand to Sit Minimum assistance   Gait Training   Gait Training Yes   Gait   Overall Level of Assistance Maximum assistance   Interventions Demonstration;Manual cues; Safety awareness training; Tactile cues; Verbal cues; Weight shifting training/pressure relief   Speed/Irene Slow;Shuffled   Distance (ft) 2 Feet  (Shuffled steps BS assist for wieght shifting)   Assistive Device   (Held therapists forearms)   PT Exercises   A/AROM Exercises BL LE EX in sitting x 20 reps   Patient Education   Education Given To Patient; Family   Education Provided Plan of Care;Transfer Training; Fall Prevention Strategies   Education Provided Comments Floating heels for pressure relief. Education Method Demonstration;Verbal   Barriers to Learning None   Education Outcome Verbalized understanding;Demonstrated understanding;Continued education needed   Other Specialty Interventions   Other Treatments/Modalities BTB alarm set call light all needs in reach.    Assessment   Activity Tolerance Patient tolerated treatment well   PT Plan of Care   Tuesday X       Electronically signed by Silvano Holcomb PTA on 10/17/2023 at 5:59 PM

## 2023-10-17 NOTE — PLAN OF CARE
Nutrition Problem #1: Increased nutrient needs  Intervention: Food and/or Nutrient Delivery: Continue Current Diet, Continue Oral Nutrition Supplement  Nutritional

## 2023-10-18 LAB
ANION GAP SERPL CALCULATED.3IONS-SCNC: 9 MMOL/L (ref 7–19)
ANISOCYTOSIS BLD QL SMEAR: ABNORMAL
BASOPHILS # BLD: 0 K/UL (ref 0–0.2)
BASOPHILS NFR BLD: 0 % (ref 0–1)
BUN SERPL-MCNC: 31 MG/DL (ref 8–23)
CALCIUM SERPL-MCNC: 8.4 MG/DL (ref 8.8–10.2)
CHLORIDE SERPL-SCNC: 103 MMOL/L (ref 98–111)
CO2 SERPL-SCNC: 27 MMOL/L (ref 22–29)
CREAT SERPL-MCNC: 0.6 MG/DL (ref 0.5–0.9)
DACRYOCYTES BLD QL SMEAR: ABNORMAL
EOSINOPHIL # BLD: 0.56 K/UL (ref 0–0.6)
EOSINOPHIL NFR BLD: 4 % (ref 0–5)
ERYTHROCYTE [DISTWIDTH] IN BLOOD BY AUTOMATED COUNT: 20.3 % (ref 11.5–14.5)
GLUCOSE SERPL-MCNC: 104 MG/DL (ref 74–109)
HCT VFR BLD AUTO: 28.6 % (ref 37–47)
HGB BLD-MCNC: 8.3 G/DL (ref 12–16)
HYPOCHROMIA BLD QL SMEAR: ABNORMAL
IMM GRANULOCYTES # BLD: 0.4 K/UL
LYMPHOCYTES # BLD: 2.5 K/UL (ref 1.1–4.5)
LYMPHOCYTES NFR BLD: 13 % (ref 20–40)
MAGNESIUM SERPL-MCNC: 1.8 MG/DL (ref 1.6–2.4)
MCH RBC QN AUTO: 24.3 PG (ref 27–31)
MCHC RBC AUTO-ENTMCNC: 29 G/DL (ref 33–37)
MCV RBC AUTO: 83.6 FL (ref 81–99)
METAMYELOCYTES NFR BLD MANUAL: 1 %
MONOCYTES # BLD: 1.5 K/UL (ref 0–0.9)
MONOCYTES NFR BLD: 11 % (ref 0–10)
MYELOCYTES NFR BLD MANUAL: 2 %
NEUTROPHILS # BLD: 9.4 K/UL (ref 1.5–7.5)
NEUTS SEG NFR BLD: 64 % (ref 50–65)
PLATELET # BLD AUTO: 295 K/UL (ref 130–400)
PLATELET SLIDE REVIEW: ADEQUATE
PMV BLD AUTO: 9.5 FL (ref 9.4–12.3)
POIKILOCYTOSIS BLD QL SMEAR: ABNORMAL
POTASSIUM SERPL-SCNC: 4.4 MMOL/L (ref 3.5–5)
RBC # BLD AUTO: 3.42 M/UL (ref 4.2–5.4)
SODIUM SERPL-SCNC: 139 MMOL/L (ref 136–145)
TARGETS BLD QL SMEAR: ABNORMAL
TOXIC GRANULATION: ABNORMAL
VARIANT LYMPHS NFR BLD: 5 % (ref 0–8)
WBC # BLD AUTO: 14 K/UL (ref 4.8–10.8)

## 2023-10-18 PROCEDURE — 6370000000 HC RX 637 (ALT 250 FOR IP): Performed by: INTERNAL MEDICINE

## 2023-10-18 PROCEDURE — 6370000000 HC RX 637 (ALT 250 FOR IP): Performed by: NURSE PRACTITIONER

## 2023-10-18 PROCEDURE — 36415 COLL VENOUS BLD VENIPUNCTURE: CPT

## 2023-10-18 PROCEDURE — 2580000003 HC RX 258: Performed by: INTERNAL MEDICINE

## 2023-10-18 PROCEDURE — 1210000000 HC MED SURG R&B

## 2023-10-18 PROCEDURE — 6360000002 HC RX W HCPCS: Performed by: INTERNAL MEDICINE

## 2023-10-18 PROCEDURE — 80048 BASIC METABOLIC PNL TOTAL CA: CPT

## 2023-10-18 PROCEDURE — 83735 ASSAY OF MAGNESIUM: CPT

## 2023-10-18 PROCEDURE — 85025 COMPLETE CBC W/AUTO DIFF WBC: CPT

## 2023-10-18 RX ORDER — DIPHENOXYLATE HYDROCHLORIDE AND ATROPINE SULFATE 2.5; .025 MG/1; MG/1
1 TABLET ORAL NIGHTLY
Status: DISCONTINUED | OUTPATIENT
Start: 2023-10-18 | End: 2023-10-20

## 2023-10-18 RX ORDER — PETROLATUM,WHITE/LANOLIN
OINTMENT (GRAM) TOPICAL DAILY
Status: DISCONTINUED | OUTPATIENT
Start: 2023-10-18 | End: 2023-10-21 | Stop reason: HOSPADM

## 2023-10-18 RX ORDER — MAGNESIUM SULFATE IN WATER 40 MG/ML
2000 INJECTION, SOLUTION INTRAVENOUS ONCE
Status: COMPLETED | OUTPATIENT
Start: 2023-10-18 | End: 2023-10-18

## 2023-10-18 RX ORDER — SODIUM CHLORIDE 9 MG/ML
INJECTION, SOLUTION INTRAVENOUS CONTINUOUS
Status: ACTIVE | OUTPATIENT
Start: 2023-10-18 | End: 2023-10-18

## 2023-10-18 RX ADMIN — MULTIPLE VITAMINS W/ MINERALS TAB 1 TABLET: TAB at 11:00

## 2023-10-18 RX ADMIN — ALLOPURINOL 300 MG: 100 TABLET ORAL at 10:59

## 2023-10-18 RX ADMIN — SODIUM CHLORIDE, PRESERVATIVE FREE 10 ML: 5 INJECTION INTRAVENOUS at 20:23

## 2023-10-18 RX ADMIN — DIPHENOXYLATE HYDROCHLORIDE AND ATROPINE SULFATE 1 TABLET: 2.5; .025 TABLET ORAL at 20:23

## 2023-10-18 RX ADMIN — DAKIN'S SOLUTION 0.125% (QUARTER STRENGTH): 0.12 SOLUTION at 11:06

## 2023-10-18 RX ADMIN — HYDROCORTISONE: 25 CREAM TOPICAL at 11:05

## 2023-10-18 RX ADMIN — GABAPENTIN 600 MG: 300 CAPSULE ORAL at 14:15

## 2023-10-18 RX ADMIN — Medication 1 TABLET: at 11:01

## 2023-10-18 RX ADMIN — TIMOLOL MALEATE 1 DROP: 5 SOLUTION OPHTHALMIC at 20:23

## 2023-10-18 RX ADMIN — ERGOCALCIFEROL 50000 UNITS: 1.25 CAPSULE ORAL at 10:58

## 2023-10-18 RX ADMIN — SODIUM CHLORIDE, PRESERVATIVE FREE 10 ML: 5 INJECTION INTRAVENOUS at 10:50

## 2023-10-18 RX ADMIN — HYDROCODONE BITARTRATE AND ACETAMINOPHEN 1 TABLET: 10; 325 TABLET ORAL at 01:44

## 2023-10-18 RX ADMIN — HYDROCORTISONE: 25 CREAM TOPICAL at 20:23

## 2023-10-18 RX ADMIN — FOLIC ACID TAB 400 MCG 600 MCG: 400 TAB at 11:01

## 2023-10-18 RX ADMIN — ENOXAPARIN SODIUM 40 MG: 100 INJECTION SUBCUTANEOUS at 20:22

## 2023-10-18 RX ADMIN — ASPIRIN 81 MG: 81 TABLET, CHEWABLE ORAL at 20:23

## 2023-10-18 RX ADMIN — GABAPENTIN 600 MG: 300 CAPSULE ORAL at 11:04

## 2023-10-18 RX ADMIN — EPOETIN ALFA-EPBX 10000 UNITS: 10000 INJECTION, SOLUTION INTRAVENOUS; SUBCUTANEOUS at 11:07

## 2023-10-18 RX ADMIN — MESALAMINE 400 MG: 400 CAPSULE, DELAYED RELEASE ORAL at 11:04

## 2023-10-18 RX ADMIN — VANCOMYCIN HYDROCHLORIDE 125 MG: 125 CAPSULE ORAL at 11:00

## 2023-10-18 RX ADMIN — COLLAGENASE SANTYL: 250 OINTMENT TOPICAL at 11:06

## 2023-10-18 RX ADMIN — SODIUM CHLORIDE: 9 INJECTION, SOLUTION INTRAVENOUS at 10:53

## 2023-10-18 RX ADMIN — VANCOMYCIN HYDROCHLORIDE 125 MG: 125 CAPSULE ORAL at 20:23

## 2023-10-18 RX ADMIN — VANCOMYCIN HYDROCHLORIDE 125 MG: 125 CAPSULE ORAL at 12:49

## 2023-10-18 RX ADMIN — PANTOPRAZOLE SODIUM 40 MG: 40 TABLET, DELAYED RELEASE ORAL at 11:03

## 2023-10-18 RX ADMIN — MESALAMINE 400 MG: 400 CAPSULE, DELAYED RELEASE ORAL at 14:15

## 2023-10-18 RX ADMIN — FENOFIBRATE 160 MG: 160 TABLET ORAL at 11:00

## 2023-10-18 RX ADMIN — VANCOMYCIN HYDROCHLORIDE 125 MG: 125 CAPSULE ORAL at 18:02

## 2023-10-18 RX ADMIN — MESALAMINE 400 MG: 400 CAPSULE, DELAYED RELEASE ORAL at 20:23

## 2023-10-18 RX ADMIN — TIMOLOL MALEATE 1 DROP: 5 SOLUTION OPHTHALMIC at 11:05

## 2023-10-18 RX ADMIN — HYDROCODONE BITARTRATE AND ACETAMINOPHEN 1 TABLET: 10; 325 TABLET ORAL at 20:23

## 2023-10-18 RX ADMIN — MAGNESIUM SULFATE HEPTAHYDRATE 2000 MG: 2 INJECTION, SOLUTION INTRAVENOUS at 10:56

## 2023-10-18 RX ADMIN — GABAPENTIN 600 MG: 300 CAPSULE ORAL at 20:23

## 2023-10-18 RX ADMIN — ANORECTAL OINTMENT: 15.7; .44; 24; 20.6 OINTMENT TOPICAL at 18:02

## 2023-10-18 ASSESSMENT — PAIN DESCRIPTION - LOCATION
LOCATION: BACK
LOCATION: BACK;BUTTOCKS

## 2023-10-18 ASSESSMENT — PAIN SCALES - GENERAL
PAINLEVEL_OUTOF10: 6
PAINLEVEL_OUTOF10: 8

## 2023-10-18 NOTE — PROGRESS NOTES
Occupational Therapy  Pt in bed upon arrival for therapy. Pt in bed with daughter present. Daughter states that they are waiting for Guero from wound care and she does not want her mom to get out of bed at this time. Daughter is concerned that patient's heal needs to be addressed before any type of therapy. Nursing notified. Will continue to follow.  Electronically signed by ANGELO Aviles on 10/18/2023 at 1:02 PM

## 2023-10-18 NOTE — CARE COORDINATION
Sw spoke to Pt and daughter Ernesto Carcamo at bedside. Pt would like a referral to the following SNFs.      [facility] [address] [place]: [address]  Ph: [phone]  Fa: [phone]    [facility]  P: [phone]  F: [phone]  Awaiting approval/ denial  Electronically signed by ELSY Carrillo on 10/18/2023 at 9:52 AM

## 2023-10-18 NOTE — DISCHARGE INSTRUCTIONS
Wound Care:    Place patient on low air loss mattress. Apply Calmoseptine to buttocks, perineum, inner thighs, groins 2 times per shift and after bathing or soiling. Avoid applying near wound vac dressing. Do not try to scrub zinc-based barrier creams off skin with soap and water. Clean the soil from the outer layer and then reapply. Only remove from skin with baby oil. RIGHT LATERAL ANKLE, RIGHT HEEL, & RIGHT MEDIAL FOOT WOUNDS:  Clean with soap and water. Pat dry. Apply barrier film wipe (skin prep) to periwound skin. Apply Santyl the thickness of a nickel to the wound beds. Cover with Dakin's moistened gauze, dry gauze, and secure with roll gauze. Change daily and if dressing becomes soiled, saturated, or dislodged. RIGHT POSTERIOR LOWER LEG WOUND: Clean with soap and water. Pat dry. Apply moisturizer to intact periwound skin. Cover wound with petrolatum (Vaseline) gauze, cover with dry gauze, and secure with roll gauze. Change daily and if dressing becomes soiled, saturated, or dislodged. Moisturize bilateral lower legs daily after washing with soap and water. Negative Pressure Wound Therapy:  Wound Location: Sacrum  Last Changed: ***  Clean: Wash wound bed and at least 4 inches of surrounding skin with soap and water. Flush wound with NS. Pat dry. Periwound: Apply Skin Prep to periwound skin and under all drape. Window-pane surrounding area with vac drape. May use hydrocolloid instead of drape. If skin becomes red due to tape irritation, apply light dusting of antifungal powder to periwound skin, then cover with skin prep to form crusting. May repeat steps until proper crusting is made to protect skin. Dressing Application: DO NOT PUT FOAM ON GOOD SKIN. Apply black foam to wound bed. Bridge over to right or left flank for the tracpad. NPWT Settings: Set wound vac to 125 mmHg. Continuous. Change Frequency: Change wound vac dressing 3 times per week (MWF or TTS).  Reapply vac dressing if NPWT system stops working or dressing begins to leak or cannot be reinforced. Alternative dressing: If unable to maintain NPWT, remove vac dressing. Wash with soap and water. Apply 1/4 strength Dakin's moistened gauze to wound bed. Cover with dry dressing. Secure with medipore tape. Change twice daily.

## 2023-10-18 NOTE — PROGRESS NOTES
Hospitalist Progress Note        PCP: Flakita Walker DO    Date of Admission: 10/10/2023    Length of Stay: 8    Chief Complaint: 77 y.o. female who presented to [address] [facility] ED for evaluation of fever. 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 Subjective:     Diarrhea continues to slow. No abd pain. Urine remains clear and output per urostomy actually has been excellent  - 4.2L in the last 24hrs. WBC down trending, though some atypical cells on differential are concerning. Afebrile. Did better with therapy yesterday, but still very weak.            Medications:  Reviewed    Infusion Medications    sodium chloride 100 mL/hr at 10/18/23 1053    sodium chloride       Scheduled Medications    diphenoxylate-atropine  1 tablet Oral Nightly    menthol-zinc oxide   Topical 4x Daily    vitamin A & D   Topical Daily    hydrocortisone   Rectal BID    gabapentin  600 mg Oral TID    vancomycin  125 mg Oral 4x Daily    sodium hypochlorite   Irrigation Daily    enoxaparin  40 mg SubCUTAneous Daily    vitamin D  50,000 Units Oral Weekly    collagenase   Topical Daily    epoetin kavita-epbx  10,000 Units SubCUTAneous Q7 Days    sodium chloride flush  5-40 mL IntraVENous 2 times per day    allopurinol  300 mg Oral Daily    aspirin  81 mg Oral Nightly    oyster shell calcium w/D  1 tablet Oral Daily    fenofibrate  160 mg Oral Daily    folic acid  775 mcg Oral Daily    mesalamine  400 mg Oral TID    therapeutic multivitamin-minerals  1 tablet Oral Daily    pantoprazole  40 mg Oral QAM AC    timolol  1 drop Both Eyes BID     PRN Meds: sodium chloride flush, sodium chloride, acetaminophen **OR** acetaminophen, HYDROcodone-acetaminophen, tiZANidine      Intake/Output Summary (Last 24 hours) at 10/18/2023 1617  Last data filed at 10/18/2023 1440  Gross per 24 hour   Intake 570 ml   Output 4150 ml   Net

## 2023-10-18 NOTE — PLAN OF CARE
Problem: Discharge Planning  Goal: Discharge to home or other facility with appropriate resources  Outcome: Progressing     Problem: Safety - Adult  Goal: Free from fall injury  Outcome: Progressing  Flowsheets (Taken 10/17/2023 2050 by Cat Caraballo RN)  Free From Fall Injury: Instruct family/caregiver on patient safety

## 2023-10-19 LAB
ALBUMIN SERPL-MCNC: 2.9 G/DL (ref 3.5–5.2)
ALP SERPL-CCNC: 69 U/L (ref 35–104)
ALT SERPL-CCNC: 9 U/L (ref 5–33)
ANION GAP SERPL CALCULATED.3IONS-SCNC: 4 MMOL/L (ref 7–19)
AST SERPL-CCNC: 16 U/L (ref 5–32)
BASOPHILS # BLD: 0.4 K/UL (ref 0–0.2)
BASOPHILS NFR BLD: 3 % (ref 0–1)
BILIRUB DIRECT SERPL-MCNC: 0.2 MG/DL (ref 0–0.3)
BILIRUB INDIRECT SERPL-MCNC: 0.1 MG/DL (ref 0.1–1)
BILIRUB SERPL-MCNC: 0.3 MG/DL (ref 0.2–1.2)
BUN SERPL-MCNC: 29 MG/DL (ref 8–23)
CALCIUM SERPL-MCNC: 9 MG/DL (ref 8.8–10.2)
CHLORIDE SERPL-SCNC: 105 MMOL/L (ref 98–111)
CO2 SERPL-SCNC: 29 MMOL/L (ref 22–29)
CREAT SERPL-MCNC: 0.5 MG/DL (ref 0.5–0.9)
EOSINOPHIL # BLD: 0.8 K/UL (ref 0–0.6)
EOSINOPHIL NFR BLD: 6 % (ref 0–5)
ERYTHROCYTE [DISTWIDTH] IN BLOOD BY AUTOMATED COUNT: 21.4 % (ref 11.5–14.5)
GLUCOSE SERPL-MCNC: 96 MG/DL (ref 74–109)
HCT VFR BLD AUTO: 29.3 % (ref 37–47)
HGB BLD-MCNC: 8.7 G/DL (ref 12–16)
HYPOCHROMIA BLD QL SMEAR: ABNORMAL
IMM GRANULOCYTES # BLD: 0.2 K/UL
LYMPHOCYTES # BLD: 0.9 K/UL (ref 1.1–4.5)
LYMPHOCYTES NFR BLD: 7 % (ref 20–40)
MAGNESIUM SERPL-MCNC: 2.1 MG/DL (ref 1.6–2.4)
MCH RBC QN AUTO: 24.9 PG (ref 27–31)
MCHC RBC AUTO-ENTMCNC: 29.7 G/DL (ref 33–37)
MCV RBC AUTO: 83.7 FL (ref 81–99)
MONOCYTES # BLD: 1.1 K/UL (ref 0–0.9)
MONOCYTES NFR BLD: 8 % (ref 0–10)
NEUTROPHILS # BLD: 10.2 K/UL (ref 1.5–7.5)
NEUTS SEG NFR BLD: 76 % (ref 50–65)
PHOSPHATE SERPL-MCNC: 3 MG/DL (ref 2.5–4.5)
PLATELET # BLD AUTO: 264 K/UL (ref 130–400)
PLATELET SLIDE REVIEW: ADEQUATE
PMV BLD AUTO: 8.7 FL (ref 9.4–12.3)
POLYCHROMASIA BLD QL SMEAR: ABNORMAL
POTASSIUM SERPL-SCNC: 4.4 MMOL/L (ref 3.5–5)
PROT SERPL-MCNC: 5.7 G/DL (ref 6.6–8.7)
RBC # BLD AUTO: 3.5 M/UL (ref 4.2–5.4)
SODIUM SERPL-SCNC: 138 MMOL/L (ref 136–145)
WBC # BLD AUTO: 13.4 K/UL (ref 4.8–10.8)

## 2023-10-19 PROCEDURE — 2580000003 HC RX 258: Performed by: INTERNAL MEDICINE

## 2023-10-19 PROCEDURE — 80076 HEPATIC FUNCTION PANEL: CPT

## 2023-10-19 PROCEDURE — 94760 N-INVAS EAR/PLS OXIMETRY 1: CPT

## 2023-10-19 PROCEDURE — 87449 NOS EACH ORGANISM AG IA: CPT

## 2023-10-19 PROCEDURE — 85025 COMPLETE CBC W/AUTO DIFF WBC: CPT

## 2023-10-19 PROCEDURE — 87324 CLOSTRIDIUM AG IA: CPT

## 2023-10-19 PROCEDURE — 84100 ASSAY OF PHOSPHORUS: CPT

## 2023-10-19 PROCEDURE — 6370000000 HC RX 637 (ALT 250 FOR IP): Performed by: INTERNAL MEDICINE

## 2023-10-19 PROCEDURE — 6370000000 HC RX 637 (ALT 250 FOR IP): Performed by: NURSE PRACTITIONER

## 2023-10-19 PROCEDURE — 80048 BASIC METABOLIC PNL TOTAL CA: CPT

## 2023-10-19 PROCEDURE — 97110 THERAPEUTIC EXERCISES: CPT

## 2023-10-19 PROCEDURE — 36415 COLL VENOUS BLD VENIPUNCTURE: CPT

## 2023-10-19 PROCEDURE — 97116 GAIT TRAINING THERAPY: CPT

## 2023-10-19 PROCEDURE — 83735 ASSAY OF MAGNESIUM: CPT

## 2023-10-19 PROCEDURE — 6360000002 HC RX W HCPCS: Performed by: INTERNAL MEDICINE

## 2023-10-19 PROCEDURE — 1210000000 HC MED SURG R&B

## 2023-10-19 RX ORDER — SODIUM CHLORIDE 9 MG/ML
INJECTION, SOLUTION INTRAVENOUS CONTINUOUS
Status: ACTIVE | OUTPATIENT
Start: 2023-10-19 | End: 2023-10-19

## 2023-10-19 RX ADMIN — TIZANIDINE 4 MG: 4 TABLET ORAL at 22:18

## 2023-10-19 RX ADMIN — HYDROCODONE BITARTRATE AND ACETAMINOPHEN 1 TABLET: 10; 325 TABLET ORAL at 16:19

## 2023-10-19 RX ADMIN — GABAPENTIN 600 MG: 300 CAPSULE ORAL at 07:58

## 2023-10-19 RX ADMIN — DIPHENOXYLATE HYDROCHLORIDE AND ATROPINE SULFATE 1 TABLET: 2.5; .025 TABLET ORAL at 21:17

## 2023-10-19 RX ADMIN — FOLIC ACID TAB 400 MCG 600 MCG: 400 TAB at 07:59

## 2023-10-19 RX ADMIN — VANCOMYCIN HYDROCHLORIDE 125 MG: 125 CAPSULE ORAL at 14:07

## 2023-10-19 RX ADMIN — ANORECTAL OINTMENT: 15.7; .44; 24; 20.6 OINTMENT TOPICAL at 16:19

## 2023-10-19 RX ADMIN — MESALAMINE 400 MG: 400 CAPSULE, DELAYED RELEASE ORAL at 07:58

## 2023-10-19 RX ADMIN — HYDROCODONE BITARTRATE AND ACETAMINOPHEN 1 TABLET: 10; 325 TABLET ORAL at 07:58

## 2023-10-19 RX ADMIN — VANCOMYCIN HYDROCHLORIDE 125 MG: 125 CAPSULE ORAL at 16:18

## 2023-10-19 RX ADMIN — ANORECTAL OINTMENT: 15.7; .44; 24; 20.6 OINTMENT TOPICAL at 21:19

## 2023-10-19 RX ADMIN — SODIUM CHLORIDE, PRESERVATIVE FREE 10 ML: 5 INJECTION INTRAVENOUS at 07:59

## 2023-10-19 RX ADMIN — PANTOPRAZOLE SODIUM 40 MG: 40 TABLET, DELAYED RELEASE ORAL at 07:59

## 2023-10-19 RX ADMIN — Medication 1 TABLET: at 07:59

## 2023-10-19 RX ADMIN — VANCOMYCIN HYDROCHLORIDE 125 MG: 125 CAPSULE ORAL at 21:17

## 2023-10-19 RX ADMIN — GABAPENTIN 600 MG: 300 CAPSULE ORAL at 21:17

## 2023-10-19 RX ADMIN — ENOXAPARIN SODIUM 40 MG: 100 INJECTION SUBCUTANEOUS at 21:17

## 2023-10-19 RX ADMIN — TIMOLOL MALEATE 1 DROP: 5 SOLUTION OPHTHALMIC at 08:01

## 2023-10-19 RX ADMIN — ANORECTAL OINTMENT: 15.7; .44; 24; 20.6 OINTMENT TOPICAL at 08:01

## 2023-10-19 RX ADMIN — SODIUM CHLORIDE: 9 INJECTION, SOLUTION INTRAVENOUS at 09:57

## 2023-10-19 RX ADMIN — HYDROCORTISONE: 25 CREAM TOPICAL at 21:19

## 2023-10-19 RX ADMIN — VANCOMYCIN HYDROCHLORIDE 125 MG: 125 CAPSULE ORAL at 07:59

## 2023-10-19 RX ADMIN — GABAPENTIN 600 MG: 300 CAPSULE ORAL at 14:08

## 2023-10-19 RX ADMIN — MESALAMINE 400 MG: 400 CAPSULE, DELAYED RELEASE ORAL at 21:16

## 2023-10-19 RX ADMIN — DAKIN'S SOLUTION 0.125% (QUARTER STRENGTH): 0.12 SOLUTION at 08:01

## 2023-10-19 RX ADMIN — MESALAMINE 400 MG: 400 CAPSULE, DELAYED RELEASE ORAL at 14:08

## 2023-10-19 RX ADMIN — VITAMIN A AND VITAMIN D: 929.3 OINTMENT TOPICAL at 11:17

## 2023-10-19 RX ADMIN — ASPIRIN 81 MG: 81 TABLET, CHEWABLE ORAL at 21:17

## 2023-10-19 RX ADMIN — COLLAGENASE SANTYL: 250 OINTMENT TOPICAL at 08:01

## 2023-10-19 RX ADMIN — ANORECTAL OINTMENT: 15.7; .44; 24; 20.6 OINTMENT TOPICAL at 14:09

## 2023-10-19 RX ADMIN — ALLOPURINOL 300 MG: 100 TABLET ORAL at 07:59

## 2023-10-19 RX ADMIN — TIMOLOL MALEATE 1 DROP: 5 SOLUTION OPHTHALMIC at 21:20

## 2023-10-19 RX ADMIN — MULTIPLE VITAMINS W/ MINERALS TAB 1 TABLET: TAB at 07:59

## 2023-10-19 RX ADMIN — HYDROCORTISONE: 25 CREAM TOPICAL at 08:01

## 2023-10-19 RX ADMIN — FENOFIBRATE 160 MG: 160 TABLET ORAL at 07:59

## 2023-10-19 ASSESSMENT — PAIN SCALES - GENERAL
PAINLEVEL_OUTOF10: 7
PAINLEVEL_OUTOF10: 8

## 2023-10-19 ASSESSMENT — PAIN DESCRIPTION - LOCATION
LOCATION: ABDOMEN
LOCATION: BUTTOCKS

## 2023-10-19 ASSESSMENT — PAIN DESCRIPTION - DESCRIPTORS
DESCRIPTORS: ACHING
DESCRIPTORS: SHARP

## 2023-10-19 NOTE — CARE COORDINATION
Yari spoke to [facility] Pt daughter who reports she would know like Steph  [address] [address] [place]: [phone]  F: [address] in [facility]  Ph: [phone]  Referral Fa: [phone]    [facility]: [phone] has offered the pt a bed. Damiánmarivel  Ph: [phone]  Fax: [phone]  Still pending    Electronically signed by ELSY Zarate on 10/19/2023 at 8:16 AM  Yari spoke to pt and pt daughter.  They would [facility]  . [facility]  Electronically signed by ELSY Zarate on 10/19/2023 at 2:18 PM

## 2023-10-19 NOTE — PROGRESS NOTES
This  visited with pt to provide spiritual care. Pt says she is better but she needs rehab for strengthening. She says she and her daughter are looking at several rehab facilities to decide. This  provided spiritual care with sustaining presence, nurtured hope, and prayer. Pt expressed gratitude for spiritual care.      Electronically signed by Ghada Avila on 10/19/2023 at 11:30 AM

## 2023-10-19 NOTE — PROGRESS NOTES
Nutrition Assessment     Type and Reason for Visit: Reassess    Nutrition Recommendations/Plan:   Continue current nutritional interventions     Malnutrition Assessment:  Malnutrition Status: At risk for malnutrition (Comment)    Nutrition Assessment:  PO intake remains good with intake ranging %. Asking for yogurt on trays--has been noted for FS to send. Also taking Wolf in lemonade well. \"I new it had to be something I needed so I've been drinking that first, even before I start my meals. \"  No new wt available    Estimated Daily Nutrient Needs:  Energy (kcal):  6436-5687 kcals (25-30 kcals ) Weight Used for Energy Requirements: Current     Protein (g):  62-125g Weight Used for Protein Requirements: Current        Fluid (ml/day):  9420-8165 ml Method Used for Fluid Requirements: 1 ml/kcal    Nutrition Related Findings:   trace Gene LE edema,  nonpitting LUE edema Wound Type: Multiple, Stage II, Unstageable, Wound Vac    Current Nutrition Therapies:    ADULT ORAL NUTRITION SUPPLEMENT; Lunch, Dinner; Wound Healing Oral Supplement  ADULT ORAL NUTRITION SUPPLEMENT; Breakfast; Other Oral Supplement; pt ok to order yogurt as desired  ADULT DIET;  Regular    Anthropometric Measures:  Height: 162.6 cm (5' 4\")  Current Body Wt: 62.3 kg (137 lb 6.4 oz)   BMI: 23.6    Nutrition Diagnosis:   Increased nutrient needs related to increase demand for energy/nutrients as evidenced by wounds    Nutrition Interventions:   Food and/or Nutrient Delivery: Continue Current Diet, Continue Oral Nutrition Supplement  Nutrition Education/Counseling: No recommendation at this time  Coordination of Nutrition Care: Continue to monitor while inpatient  Plan of Care discussed with: pt    Goals:  Previous Goal Met: Goal(s) Achieved  Goals: Meet at least 75% of estimated needs, PO intake 50% or greater       Nutrition Monitoring and Evaluation:   Behavioral-Environmental Outcomes: None Identified  Food/Nutrient Intake Outcomes: Food and

## 2023-10-19 NOTE — PLAN OF CARE
Problem: Nutrition Deficit:  Goal: Optimize nutritional status  Outcome: Progressing  Flowsheets (Taken 10/19/2023 1233)  Nutrient intake appropriate for improving, restoring, or maintaining nutritional needs:   Assess nutritional status and recommend course of action   Monitor oral intake, labs, and treatment plans   Recommend appropriate diets, oral nutritional supplements, and vitamin/mineral supplements

## 2023-10-20 LAB
ALBUMIN SERPL-MCNC: 2.6 G/DL (ref 3.5–5.2)
ANION GAP SERPL CALCULATED.3IONS-SCNC: 6 MMOL/L (ref 7–19)
ANISOCYTOSIS BLD QL SMEAR: ABNORMAL
BASO STIPL BLD QL SMEAR: ABNORMAL
BASOPHILS # BLD: 0.1 K/UL (ref 0–0.2)
BASOPHILS NFR BLD: 1 % (ref 0–1)
BUN SERPL-MCNC: 36 MG/DL (ref 8–23)
C DIFF TOX A+B STL QL IA: NORMAL
CALCIUM SERPL-MCNC: 8.6 MG/DL (ref 8.8–10.2)
CHLORIDE SERPL-SCNC: 105 MMOL/L (ref 98–111)
CO2 SERPL-SCNC: 28 MMOL/L (ref 22–29)
CREAT SERPL-MCNC: 0.6 MG/DL (ref 0.5–0.9)
DACRYOCYTES BLD QL SMEAR: ABNORMAL
EOSINOPHIL # BLD: 0.47 K/UL (ref 0–0.6)
EOSINOPHIL NFR BLD: 4 % (ref 0–5)
ERYTHROCYTE [DISTWIDTH] IN BLOOD BY AUTOMATED COUNT: 22.5 % (ref 11.5–14.5)
GLUCOSE SERPL-MCNC: 90 MG/DL (ref 74–109)
HCT VFR BLD AUTO: 26.9 % (ref 37–47)
HGB BLD-MCNC: 7.8 G/DL (ref 12–16)
HYPOCHROMIA BLD QL SMEAR: ABNORMAL
IMM GRANULOCYTES # BLD: 0.1 K/UL
LYMPHOCYTES # BLD: 1.4 K/UL (ref 1.1–4.5)
LYMPHOCYTES NFR BLD: 12 % (ref 20–40)
MCH RBC QN AUTO: 24.8 PG (ref 27–31)
MCHC RBC AUTO-ENTMCNC: 29 G/DL (ref 33–37)
MCV RBC AUTO: 85.4 FL (ref 81–99)
MONOCYTES # BLD: 0.6 K/UL (ref 0–0.9)
MONOCYTES NFR BLD: 5 % (ref 0–10)
MYELOCYTES NFR BLD MANUAL: 1 %
NEUTROPHILS # BLD: 9.2 K/UL (ref 1.5–7.5)
NEUTS SEG NFR BLD: 77 % (ref 50–65)
OVALOCYTES BLD QL SMEAR: ABNORMAL
PHOSPHATE SERPL-MCNC: 3.2 MG/DL (ref 2.5–4.5)
PLATELET # BLD AUTO: 231 K/UL (ref 130–400)
PLATELET SLIDE REVIEW: ADEQUATE
PMV BLD AUTO: 9.9 FL (ref 9.4–12.3)
POTASSIUM SERPL-SCNC: 4.2 MMOL/L (ref 3.5–5)
RBC # BLD AUTO: 3.15 M/UL (ref 4.2–5.4)
SODIUM SERPL-SCNC: 139 MMOL/L (ref 136–145)
WBC # BLD AUTO: 11.8 K/UL (ref 4.8–10.8)

## 2023-10-20 PROCEDURE — 1210000000 HC MED SURG R&B

## 2023-10-20 PROCEDURE — 2580000003 HC RX 258: Performed by: INTERNAL MEDICINE

## 2023-10-20 PROCEDURE — 6360000002 HC RX W HCPCS: Performed by: INTERNAL MEDICINE

## 2023-10-20 PROCEDURE — 6370000000 HC RX 637 (ALT 250 FOR IP): Performed by: INTERNAL MEDICINE

## 2023-10-20 PROCEDURE — 6370000000 HC RX 637 (ALT 250 FOR IP): Performed by: NURSE PRACTITIONER

## 2023-10-20 PROCEDURE — 80069 RENAL FUNCTION PANEL: CPT

## 2023-10-20 PROCEDURE — 94760 N-INVAS EAR/PLS OXIMETRY 1: CPT

## 2023-10-20 PROCEDURE — 36415 COLL VENOUS BLD VENIPUNCTURE: CPT

## 2023-10-20 PROCEDURE — 85025 COMPLETE CBC W/AUTO DIFF WBC: CPT

## 2023-10-20 RX ORDER — DIPHENOXYLATE HYDROCHLORIDE AND ATROPINE SULFATE 2.5; .025 MG/1; MG/1
1 TABLET ORAL 3 TIMES DAILY
Status: DISCONTINUED | OUTPATIENT
Start: 2023-10-20 | End: 2023-10-21 | Stop reason: HOSPADM

## 2023-10-20 RX ORDER — CHOLESTYRAMINE LIGHT 4 G/5.7G
4 POWDER, FOR SUSPENSION ORAL 2 TIMES DAILY
Status: DISCONTINUED | OUTPATIENT
Start: 2023-10-20 | End: 2023-10-21 | Stop reason: HOSPADM

## 2023-10-20 RX ORDER — DIPHENOXYLATE HYDROCHLORIDE AND ATROPINE SULFATE 2.5; .025 MG/1; MG/1
1 TABLET ORAL 2 TIMES DAILY
Status: DISCONTINUED | OUTPATIENT
Start: 2023-10-20 | End: 2023-10-20

## 2023-10-20 RX ORDER — GABAPENTIN 600 MG/1
600 TABLET ORAL 3 TIMES DAILY
Status: DISCONTINUED | OUTPATIENT
Start: 2023-10-20 | End: 2023-10-21 | Stop reason: HOSPADM

## 2023-10-20 RX ORDER — SODIUM CHLORIDE 9 MG/ML
INJECTION, SOLUTION INTRAVENOUS CONTINUOUS
Status: DISCONTINUED | OUTPATIENT
Start: 2023-10-20 | End: 2023-10-21 | Stop reason: HOSPADM

## 2023-10-20 RX ADMIN — FENOFIBRATE 160 MG: 160 TABLET ORAL at 08:59

## 2023-10-20 RX ADMIN — ANORECTAL OINTMENT: 15.7; .44; 24; 20.6 OINTMENT TOPICAL at 21:58

## 2023-10-20 RX ADMIN — HYDROCODONE BITARTRATE AND ACETAMINOPHEN 1 TABLET: 10; 325 TABLET ORAL at 00:03

## 2023-10-20 RX ADMIN — GABAPENTIN 600 MG: 300 CAPSULE ORAL at 08:58

## 2023-10-20 RX ADMIN — VANCOMYCIN HYDROCHLORIDE 125 MG: 125 CAPSULE ORAL at 22:05

## 2023-10-20 RX ADMIN — VANCOMYCIN HYDROCHLORIDE 125 MG: 125 CAPSULE ORAL at 18:17

## 2023-10-20 RX ADMIN — ANORECTAL OINTMENT: 15.7; .44; 24; 20.6 OINTMENT TOPICAL at 18:16

## 2023-10-20 RX ADMIN — Medication 1 TABLET: at 08:59

## 2023-10-20 RX ADMIN — DIPHENOXYLATE HYDROCHLORIDE AND ATROPINE SULFATE 1 TABLET: 2.5; .025 TABLET ORAL at 22:06

## 2023-10-20 RX ADMIN — CHOLESTYRAMINE 4 G: 4 POWDER, FOR SUSPENSION ORAL at 22:06

## 2023-10-20 RX ADMIN — MESALAMINE 400 MG: 400 CAPSULE, DELAYED RELEASE ORAL at 08:58

## 2023-10-20 RX ADMIN — HYDROCODONE BITARTRATE AND ACETAMINOPHEN 1 TABLET: 10; 325 TABLET ORAL at 08:58

## 2023-10-20 RX ADMIN — SODIUM CHLORIDE, PRESERVATIVE FREE 10 ML: 5 INJECTION INTRAVENOUS at 09:00

## 2023-10-20 RX ADMIN — DIPHENOXYLATE HYDROCHLORIDE AND ATROPINE SULFATE 1 TABLET: 2.5; .025 TABLET ORAL at 14:02

## 2023-10-20 RX ADMIN — MESALAMINE 400 MG: 400 CAPSULE, DELAYED RELEASE ORAL at 13:42

## 2023-10-20 RX ADMIN — GABAPENTIN 600 MG: 600 TABLET, FILM COATED ORAL at 22:06

## 2023-10-20 RX ADMIN — GABAPENTIN 600 MG: 600 TABLET, FILM COATED ORAL at 13:42

## 2023-10-20 RX ADMIN — HYDROCORTISONE: 25 CREAM TOPICAL at 08:59

## 2023-10-20 RX ADMIN — ANORECTAL OINTMENT: 15.7; .44; 24; 20.6 OINTMENT TOPICAL at 13:43

## 2023-10-20 RX ADMIN — ANORECTAL OINTMENT: 15.7; .44; 24; 20.6 OINTMENT TOPICAL at 09:00

## 2023-10-20 RX ADMIN — ENOXAPARIN SODIUM 40 MG: 100 INJECTION SUBCUTANEOUS at 22:06

## 2023-10-20 RX ADMIN — COLLAGENASE SANTYL: 250 OINTMENT TOPICAL at 09:07

## 2023-10-20 RX ADMIN — DIPHENOXYLATE HYDROCHLORIDE AND ATROPINE SULFATE 1 TABLET: 2.5; .025 TABLET ORAL at 09:19

## 2023-10-20 RX ADMIN — VANCOMYCIN HYDROCHLORIDE 125 MG: 125 CAPSULE ORAL at 13:42

## 2023-10-20 RX ADMIN — PANTOPRAZOLE SODIUM 40 MG: 40 TABLET, DELAYED RELEASE ORAL at 08:58

## 2023-10-20 RX ADMIN — CHOLESTYRAMINE 4 G: 4 POWDER, FOR SUSPENSION ORAL at 14:02

## 2023-10-20 RX ADMIN — HYDROCODONE BITARTRATE AND ACETAMINOPHEN 1 TABLET: 10; 325 TABLET ORAL at 18:11

## 2023-10-20 RX ADMIN — FOLIC ACID TAB 400 MCG 600 MCG: 400 TAB at 08:58

## 2023-10-20 RX ADMIN — HYDROCORTISONE: 25 CREAM TOPICAL at 21:58

## 2023-10-20 RX ADMIN — TIMOLOL MALEATE 1 DROP: 5 SOLUTION OPHTHALMIC at 09:00

## 2023-10-20 RX ADMIN — ASPIRIN 81 MG: 81 TABLET, CHEWABLE ORAL at 22:05

## 2023-10-20 RX ADMIN — SODIUM CHLORIDE: 9 INJECTION, SOLUTION INTRAVENOUS at 09:12

## 2023-10-20 RX ADMIN — ALLOPURINOL 300 MG: 100 TABLET ORAL at 08:58

## 2023-10-20 RX ADMIN — MESALAMINE 400 MG: 400 CAPSULE, DELAYED RELEASE ORAL at 22:06

## 2023-10-20 RX ADMIN — TIZANIDINE 4 MG: 4 TABLET ORAL at 22:04

## 2023-10-20 RX ADMIN — DAKIN'S SOLUTION 0.125% (QUARTER STRENGTH): 0.12 SOLUTION at 09:07

## 2023-10-20 RX ADMIN — VITAMIN A AND VITAMIN D: 929.3 OINTMENT TOPICAL at 09:07

## 2023-10-20 RX ADMIN — MULTIPLE VITAMINS W/ MINERALS TAB 1 TABLET: TAB at 08:59

## 2023-10-20 RX ADMIN — TIMOLOL MALEATE 1 DROP: 5 SOLUTION OPHTHALMIC at 22:07

## 2023-10-20 RX ADMIN — VANCOMYCIN HYDROCHLORIDE 125 MG: 125 CAPSULE ORAL at 08:58

## 2023-10-20 ASSESSMENT — PAIN DESCRIPTION - LOCATION: LOCATION: BACK

## 2023-10-20 ASSESSMENT — PAIN SCALES - GENERAL
PAINLEVEL_OUTOF10: 6
PAINLEVEL_OUTOF10: 8
PAINLEVEL_OUTOF10: 4

## 2023-10-20 ASSESSMENT — PAIN DESCRIPTION - DESCRIPTORS: DESCRIPTORS: ACHING

## 2023-10-20 NOTE — CARE COORDINATION
Pre-cert has been approved for Pt [facility] [address] [place]: [phone] X:[phone]. [facility] pharmacy has been set. Pt can d/c once medically stable.  Pt pre-cert has been approved 10/20 though 10/25  Electronically signed by ELSY Parada on 10/20/2023 at 2:23 PM

## 2023-10-20 NOTE — CARE COORDINATION
10/20/23 1356   IMM Letter   IMM Letter given to Patient/Family/Significant other/Guardian/POA/by: ede wei presented pt with second imm   IMM Letter date given: 10/20/23   IMM Letter time given: 0     Second IMM given to patient and explained with patient verbalizing understanding. All questions and concerns addressed     Signed letter placed in pt soft chart  Patient declined waiting 4 hr period prior to discharge.   Electronically signed by ELSY Mcclure on 10/20/2023 at 1:57 PM

## 2023-10-20 NOTE — PROGRESS NOTES
Physician Progress Note      Harry Flynn  CSN #:                  [id number]  :                       1945  ADMIT DATE:       10/10/2023 4:11 PM  [facility] DATE:  RESPONDING  PROVIDER #:        Kristine Dixon MD          QUERY TEXT:    The patient is admitted with Sepsis and noted to have multiple pressure   wounds. Based on documentation by wound care and nursing staff, the patient   has \"stage 4 Sacrum Pressure wound and Right Heel Pressure wound Stage 4\". If   possible, please document in the progress notes and discharge summary if   pressure wounds were:[[The patient has stage 4 Sacrum Pressure wound and Right   Heel Pressure wound Stage 4, both POA[de-identified] After study, this patient has stage 4   Sacrum Pressure wound and Right Heel Pressure wound 4, both POA. \"    The medical record reflects the following:  Risk Factors: uterine Cancer, Sepsis, PVD  Clinical Indicators: Multiple wounds: Stage 4 Sacrum Pressure wound,   Unstagable Right Lateral Ankle  Right medial Foot, unstagable, Right Heel Pressure Stage 4  Treatment: Wound Care, Rocephin IV, Dressing Changes,  Dietary Consult    Daphne Yang RN-BSN, [facility]  Clinical   O. [phone]. [email or web address]  [facility]  Options provided:  -- Respond - Create new note now  -- Disagree - Not applicable / Not valid  -- Disagree - Clinically unable to determine / Unknown  -- Refer to Clinical Documentation Reviewer    PROVIDER RESPONSE TEXT:    Stage IV sacral decubitus wound - POA - wound care involved. Wound vac in   place. Stage IV right heel wound POA - wound care involved. Offloading and heel   protection and dressing changes continued. Wound care involved.     Query created by: Waynard Rubinstein on 10/18/2023 2:35 PM      Electronically signed by:  Kristine Dixon MD 10/20/2023 4:23 PM

## 2023-10-20 NOTE — PLAN OF CARE
10/20/23 1123   Negative Pressure Wound Therapy Sacrum   Placement Date/Time: 10/12/23 1300   Present on Admission/Arrival: Yes  Location: Sacrum   $ Standard NPWT <=50 sq cm PER TX $ Yes   Wound Type Pressure ulcer: Stage IV   Unit Type 3M VAC Ulta   Dressing Type Black Foam   Number of pieces used 3   Number of pieces removed 3   Cycle Continuous   Target Pressure (mmHg) 125   Canister changed? No   Dressing Status New dressing applied   Dressing Changed Changed/New   Drainage Amount Small   Drainage Description Serous   Dressing Change Due 10/23/23   Wound Assessment Granulation tissue; Exposed structure tendon   Lara-wound Assessment Blanchable erythema   Odor None       NPWT dressing to sacrum changed. Old dressing removed and wound bed and periwound skin cleaned with NS. Wound bed has good granulation at this change; overall improved in appearance. Small 0.5x0.5 dusky area noted at the wound edge at 2 clock concerning for ongoing pressure to dressing. Skin prep applied to periwound skin and under all vac drape. Black foam placed to wound bed and bridged over vac drape to left lateral upper leg for tracpad. Patient tolerated well without expressions of pain or discomfort. Irritant contact dermatitis to buttocks, perineum, and upper thighs is improved at this assessment. Patient cleaned of small amount of stool and Calmoseptine applied. Educated patient on importance of not lying flat on back as she was flat on back when entering room. Patient acknowledged understanding and stated it had only been for a short period while eating breakfast and staff had not come yet to reposition. After dressing changed patient repositioned to 30 degree left lateral position. Counseled nurse on importance of keep patient turned off the sacral wound.     Electronically signed by Jasen Rod RN, [address] [place] [address] on 10/20/2023 at 11:33 AM

## 2023-10-20 NOTE — PLAN OF CARE
Problem: Discharge Planning  Goal: Discharge to home or other facility with appropriate resources  Outcome: Progressing     Problem: Safety - Adult  Goal: Free from fall injury  Outcome: Progressing  Flowsheets (Taken 10/20/2023 1105)  Free From Fall Injury: Instruct family/caregiver on patient safety     Problem: ABCDS Injury Assessment  Goal: Absence of physical injury  Outcome: Progressing  Flowsheets (Taken 10/20/2023 1105)  Absence of Physical Injury: Implement safety measures based on patient assessment     Problem: Pain  Goal: Verbalizes/displays adequate comfort level or baseline comfort level  Outcome: Progressing     Problem: Skin/Tissue Integrity  Goal: Absence of new skin breakdown  Description: 1. Monitor for areas of redness and/or skin breakdown  2. Every 4-6 hours minimum:  Change oxygen saturation probe site  3. Every 4-6 hours:  If on nasal continuous positive airway pressure, respiratory therapy assess nares and determine need for appliance change or resting period.   Outcome: Progressing  Goal: Absence of new pressure ulcer  Description: Absence of new pressure ulcer  Outcome: Progressing     Problem: Nutrition Deficit:  Goal: Optimize nutritional status  Outcome: Progressing

## 2023-10-20 NOTE — PROGRESS NOTES
Final Result   1. Moderate to severe bilateral hydroureteronephrosis is again identified. 2. The stenosis is suspected of the mid to distal left ureter in the left pelvis, possibly related to extrinsic compression. 3. The distal right ureter appears to be decompressed into the right pelvic side wall, forming a small urinoma, as described above. 4. Further evaluation is limited due to the lack of intraureteral contrast on delayed imaging. 5. A sacral decubitus ulcer is suspected. All CT scans are performed using dose optimization techniques as appropriate to the performed exam and include    at least one of the following: Automated exposure control, adjustment of the mA and/or kV according to size, and the use of iterative reconstruction technique. ______________________________________    Electronically signed by: Toni Nuñez M.D. Date:     10/12/2023   Time:    12:04       US RENAL COMPLETE   Final Result   Stable bilateral moderate to severe hydronephrosis is identified. An obstructing lesion is not identified. This appears grossly unchanged compared to previous exams. ______________________________________    Electronically signed by: Toni Nuñez M.D. Date:     10/11/2023   Time:    14:55       CT ABDOMEN PELVIS WO CONTRAST Additional Contrast? None   Final Result   Impression:   1. Moderate to severe bilateral hydronephrosis and hydroureter, similar to the prior study. 2.  Previous cystectomy with urinary diversion and ileal conduit. 3.  Soft tissue wound of the posterior sacrum with no gucci osteomyelitis        All CT scans are performed using dose optimization techniques as appropriate to the performed exam and include    at least one of the following: Automated exposure control, adjustment of the mA and/or kV according to size, and the use of iterative reconstruction technique.         ______________________________________    Electronically signed Acute on Chronic Anemia - YONATAN. Hgb 6.6 - s/p 1 unit pRBC. No signs of active hemorrhage. Hgb slowly improving. Sacral Decubitus/Chronic Wounds POA. Wound vac in place. Wound care involved - assistance much appreciated. Obstructive uropathy - complex Hx of cystectomy, hysterectomy, ileal conduit, bilateral hydronephrosis. Discussed with our urology team - they discussed with Dr Lul Cardoso - pt's urology surgeon at [facility] in [place]. At this time her Cr remains normal,  her output was over 3L day prior and 4.2L in thepast 24hrs and she shows continued clinical improvement. - if she were to show signs of recurrent sepsis or worsening infection, I would plan to repeat CT abd pelvis and also UA and culture, resume systemic antibiotics, and request transfer to [facility] at that point for percutaneous antegrade nephroureteral stents which is beyond the scope of practice of our facility as per urology team recommendations. - I suspect she was in polyuric stage of recovery  - will repeat small bolus of NS IVF over 5 hrs today and encourage PO intake. DVT Prophylaxis: lovenox  Diet: ADULT ORAL NUTRITION SUPPLEMENT; Lunch, Dinner; Wound Healing Oral Supplement  ADULT ORAL NUTRITION SUPPLEMENT; Breakfast; Other Oral Supplement; pt ok to order yogurt as desired  ADULT DIET; Regular  Code Status: Full Code      Dispo - cont PTOT. Pending ECF.           Reymundo Becker MD

## 2023-10-21 VITALS
DIASTOLIC BLOOD PRESSURE: 46 MMHG | WEIGHT: 110.44 LBS | SYSTOLIC BLOOD PRESSURE: 121 MMHG | BODY MASS INDEX: 18.85 KG/M2 | HEIGHT: 64 IN | RESPIRATION RATE: 18 BRPM | TEMPERATURE: 98.7 F | OXYGEN SATURATION: 91 % | HEART RATE: 84 BPM

## 2023-10-21 LAB
ALBUMIN SERPL-MCNC: 2.3 G/DL (ref 3.5–5.2)
ANION GAP SERPL CALCULATED.3IONS-SCNC: 8 MMOL/L (ref 7–19)
ANISOCYTOSIS BLD QL SMEAR: ABNORMAL
BASOPHILS # BLD: 0.1 K/UL (ref 0–0.2)
BASOPHILS NFR BLD: 1 % (ref 0–1)
BUN SERPL-MCNC: 35 MG/DL (ref 8–23)
CALCIUM SERPL-MCNC: 7.9 MG/DL (ref 8.8–10.2)
CHLORIDE SERPL-SCNC: 107 MMOL/L (ref 98–111)
CO2 SERPL-SCNC: 24 MMOL/L (ref 22–29)
CREAT SERPL-MCNC: 0.6 MG/DL (ref 0.5–0.9)
DACRYOCYTES BLD QL SMEAR: ABNORMAL
EOSINOPHIL # BLD: 0.34 K/UL (ref 0–0.6)
EOSINOPHIL NFR BLD: 3 % (ref 0–5)
ERYTHROCYTE [DISTWIDTH] IN BLOOD BY AUTOMATED COUNT: 22.7 % (ref 11.5–14.5)
GLUCOSE BLD-MCNC: 101 MG/DL (ref 70–99)
GLUCOSE SERPL-MCNC: 92 MG/DL (ref 74–109)
HCT VFR BLD AUTO: 25.4 % (ref 37–47)
HGB BLD-MCNC: 7.3 G/DL (ref 12–16)
HYPOCHROMIA BLD QL SMEAR: ABNORMAL
IMM GRANULOCYTES # BLD: 0.1 K/UL
LYMPHOCYTES # BLD: 1.5 K/UL (ref 1.1–4.5)
LYMPHOCYTES NFR BLD: 13 % (ref 20–40)
MACROCYTES BLD QL SMEAR: ABNORMAL
MCH RBC QN AUTO: 24.7 PG (ref 27–31)
MCHC RBC AUTO-ENTMCNC: 28.7 G/DL (ref 33–37)
MCV RBC AUTO: 85.8 FL (ref 81–99)
MICROCYTES BLD QL SMEAR: ABNORMAL
MONOCYTES # BLD: 1 K/UL (ref 0–0.9)
MONOCYTES NFR BLD: 9 % (ref 0–10)
NEUTROPHILS # BLD: 8.4 K/UL (ref 1.5–7.5)
NEUTS SEG NFR BLD: 74 % (ref 50–65)
OVALOCYTES BLD QL SMEAR: ABNORMAL
PERFORMED ON: ABNORMAL
PHOSPHATE SERPL-MCNC: 2.8 MG/DL (ref 2.5–4.5)
PLATELET # BLD AUTO: 203 K/UL (ref 130–400)
PLATELET SLIDE REVIEW: ADEQUATE
PMV BLD AUTO: 9.4 FL (ref 9.4–12.3)
POIKILOCYTOSIS BLD QL SMEAR: ABNORMAL
POTASSIUM SERPL-SCNC: 4 MMOL/L (ref 3.5–5)
RBC # BLD AUTO: 2.96 M/UL (ref 4.2–5.4)
SODIUM SERPL-SCNC: 139 MMOL/L (ref 136–145)
WBC # BLD AUTO: 11.3 K/UL (ref 4.8–10.8)

## 2023-10-21 PROCEDURE — 2580000003 HC RX 258: Performed by: INTERNAL MEDICINE

## 2023-10-21 PROCEDURE — 94760 N-INVAS EAR/PLS OXIMETRY 1: CPT

## 2023-10-21 PROCEDURE — 80069 RENAL FUNCTION PANEL: CPT

## 2023-10-21 PROCEDURE — 2700000000 HC OXYGEN THERAPY PER DAY

## 2023-10-21 PROCEDURE — 6370000000 HC RX 637 (ALT 250 FOR IP): Performed by: INTERNAL MEDICINE

## 2023-10-21 PROCEDURE — 6370000000 HC RX 637 (ALT 250 FOR IP): Performed by: NURSE PRACTITIONER

## 2023-10-21 PROCEDURE — 82962 GLUCOSE BLOOD TEST: CPT

## 2023-10-21 PROCEDURE — 85025 COMPLETE CBC W/AUTO DIFF WBC: CPT

## 2023-10-21 PROCEDURE — 36415 COLL VENOUS BLD VENIPUNCTURE: CPT

## 2023-10-21 RX ORDER — VANCOMYCIN HYDROCHLORIDE 125 MG/1
125 CAPSULE ORAL 4 TIMES DAILY
Qty: 28 CAPSULE | Refills: 0 | Status: SHIPPED | OUTPATIENT
Start: 2023-10-21 | End: 2023-10-28

## 2023-10-21 RX ORDER — FERROUS SULFATE 325(65) MG
325 TABLET ORAL 2 TIMES DAILY WITH MEALS
Qty: 30 TABLET | Refills: 3 | Status: SHIPPED | OUTPATIENT
Start: 2023-10-21

## 2023-10-21 RX ORDER — CHOLESTYRAMINE LIGHT 4 G/5.7G
4 POWDER, FOR SUSPENSION ORAL 2 TIMES DAILY
Qty: 60 PACKET | Refills: 3 | Status: SHIPPED | OUTPATIENT
Start: 2023-10-21

## 2023-10-21 RX ORDER — HYDROCODONE BITARTRATE AND ACETAMINOPHEN 10; 325 MG/1; MG/1
1 TABLET ORAL EVERY 8 HOURS PRN
Qty: 90 TABLET | Refills: 0 | Status: SHIPPED | OUTPATIENT
Start: 2023-10-21 | End: 2023-11-20

## 2023-10-21 RX ORDER — MAGNESIUM CHLORIDE 64 MG
2 TABLET, DELAYED RELEASE (ENTERIC COATED) ORAL
Qty: 60 TABLET | Refills: 1 | Status: SHIPPED | OUTPATIENT
Start: 2023-10-21

## 2023-10-21 RX ORDER — GABAPENTIN 600 MG/1
TABLET ORAL
Qty: 270 TABLET | Refills: 0 | Status: SHIPPED | OUTPATIENT
Start: 2023-10-21 | End: 2023-11-20

## 2023-10-21 RX ORDER — DIPHENOXYLATE HYDROCHLORIDE AND ATROPINE SULFATE 2.5; .025 MG/1; MG/1
1 TABLET ORAL 2 TIMES DAILY
Qty: 60 TABLET | Refills: 1 | Status: SHIPPED | OUTPATIENT
Start: 2023-10-21 | End: 2023-12-20

## 2023-10-21 RX ORDER — FERROUS SULFATE 325(65) MG
325 TABLET ORAL 2 TIMES DAILY WITH MEALS
Status: DISCONTINUED | OUTPATIENT
Start: 2023-10-21 | End: 2023-10-21 | Stop reason: HOSPADM

## 2023-10-21 RX ORDER — ERGOCALCIFEROL 1.25 MG/1
50000 CAPSULE ORAL WEEKLY
Qty: 5 CAPSULE | Refills: 0 | Status: SHIPPED | OUTPATIENT
Start: 2023-10-25

## 2023-10-21 RX ADMIN — GABAPENTIN 600 MG: 600 TABLET, FILM COATED ORAL at 12:51

## 2023-10-21 RX ADMIN — COLLAGENASE SANTYL: 250 OINTMENT TOPICAL at 07:42

## 2023-10-21 RX ADMIN — FOLIC ACID TAB 400 MCG 600 MCG: 400 TAB at 07:37

## 2023-10-21 RX ADMIN — PANTOPRAZOLE SODIUM 40 MG: 40 TABLET, DELAYED RELEASE ORAL at 07:38

## 2023-10-21 RX ADMIN — FERROUS SULFATE TAB 325 MG (65 MG ELEMENTAL FE) 325 MG: 325 (65 FE) TAB at 07:47

## 2023-10-21 RX ADMIN — HYDROCODONE BITARTRATE AND ACETAMINOPHEN 1 TABLET: 10; 325 TABLET ORAL at 07:38

## 2023-10-21 RX ADMIN — VANCOMYCIN HYDROCHLORIDE 125 MG: 125 CAPSULE ORAL at 12:51

## 2023-10-21 RX ADMIN — TIMOLOL MALEATE 1 DROP: 5 SOLUTION OPHTHALMIC at 07:37

## 2023-10-21 RX ADMIN — DIPHENOXYLATE HYDROCHLORIDE AND ATROPINE SULFATE 1 TABLET: 2.5; .025 TABLET ORAL at 07:47

## 2023-10-21 RX ADMIN — FENOFIBRATE 160 MG: 160 TABLET ORAL at 07:38

## 2023-10-21 RX ADMIN — HYDROCORTISONE: 25 CREAM TOPICAL at 07:41

## 2023-10-21 RX ADMIN — ANORECTAL OINTMENT: 15.7; .44; 24; 20.6 OINTMENT TOPICAL at 07:40

## 2023-10-21 RX ADMIN — ALLOPURINOL 300 MG: 100 TABLET ORAL at 07:39

## 2023-10-21 RX ADMIN — ANORECTAL OINTMENT: 15.7; .44; 24; 20.6 OINTMENT TOPICAL at 12:51

## 2023-10-21 RX ADMIN — VANCOMYCIN HYDROCHLORIDE 125 MG: 125 CAPSULE ORAL at 07:39

## 2023-10-21 RX ADMIN — MESALAMINE 400 MG: 400 CAPSULE, DELAYED RELEASE ORAL at 07:38

## 2023-10-21 RX ADMIN — MESALAMINE 400 MG: 400 CAPSULE, DELAYED RELEASE ORAL at 12:51

## 2023-10-21 RX ADMIN — MULTIPLE VITAMINS W/ MINERALS TAB 1 TABLET: TAB at 07:39

## 2023-10-21 RX ADMIN — CHOLESTYRAMINE 4 G: 4 POWDER, FOR SUSPENSION ORAL at 07:42

## 2023-10-21 RX ADMIN — SODIUM CHLORIDE, PRESERVATIVE FREE 10 ML: 5 INJECTION INTRAVENOUS at 07:41

## 2023-10-21 RX ADMIN — DAKIN'S SOLUTION 0.125% (QUARTER STRENGTH): 0.12 SOLUTION at 08:00

## 2023-10-21 RX ADMIN — GABAPENTIN 600 MG: 600 TABLET, FILM COATED ORAL at 09:19

## 2023-10-21 RX ADMIN — Medication 1 TABLET: at 07:37

## 2023-10-21 ASSESSMENT — PAIN DESCRIPTION - ORIENTATION: ORIENTATION: MID

## 2023-10-21 ASSESSMENT — PAIN DESCRIPTION - ONSET: ONSET: ON-GOING

## 2023-10-21 ASSESSMENT — PAIN SCALES - GENERAL
PAINLEVEL_OUTOF10: 7
PAINLEVEL_OUTOF10: 3

## 2023-10-21 ASSESSMENT — PAIN DESCRIPTION - LOCATION: LOCATION: SACRUM

## 2023-10-21 ASSESSMENT — PAIN DESCRIPTION - DESCRIPTORS: DESCRIPTORS: THROBBING;PRESSURE

## 2023-10-21 NOTE — PLAN OF CARE
Problem: Discharge Planning  Goal: Discharge to home or other facility with appropriate resources  Outcome: Progressing  Flowsheets (Taken 10/20/2023 2355 by Matias Santillan, RN)  Discharge to home or other facility with appropriate resources: Refer to discharge planning if patient needs post-hospital services based on physician order or complex needs related to functional status, cognitive ability or social support system     Problem: Safety - Adult  Goal: Free from fall injury  Outcome: Progressing     Problem: ABCDS Injury Assessment  Goal: Absence of physical injury  Outcome: Progressing     Problem: Pain  Goal: Verbalizes/displays adequate comfort level or baseline comfort level  Outcome: Progressing     Problem: Skin/Tissue Integrity  Goal: Absence of new skin breakdown  Description: 1. Monitor for areas of redness and/or skin breakdown  2. Every 4-6 hours minimum:  Change oxygen saturation probe site  3. Every 4-6 hours:  If on nasal continuous positive airway pressure, respiratory therapy assess nares and determine need for appliance change or resting period.   Outcome: Progressing  Goal: Absence of new pressure ulcer  Description: Absence of new pressure ulcer  Outcome: Progressing     Problem: Nutrition Deficit:  Goal: Optimize nutritional status  Outcome: Progressing     Problem: Discharge Planning  Goal: Discharge to home or other facility with appropriate resources  10/21/2023 1004 by Ekaterina Hillman RN  Outcome: Adequate for Discharge  10/21/2023 1004 by Ekaterina Hillman RN  Outcome: Adequate for Discharge  10/21/2023 1004 by Ekaterina Hillman RN  Outcome: Veronica Confucianism (Taken 10/20/2023 3412 by Matias Santillan, RN)  Discharge to home or other facility with appropriate resources: Refer to discharge planning if patient needs post-hospital services based on physician order or complex needs related to functional status, cognitive ability or social support system     Problem: Safety - Progressing

## 2023-10-21 NOTE — PROGRESS NOTES
Requested from ER to get orthopedic sandal for pt.'s right foot. Received orthopedic sandal for patient that will go with pt. At discharge.

## 2023-10-21 NOTE — PROGRESS NOTES
[facility] has been notified that the pt. Will be discharging to [facility] today to resume home health care services.

## 2023-10-26 ENCOUNTER — TELEPHONE (OUTPATIENT)
Dept: GASTROENTEROLOGY | Facility: CLINIC | Age: 78
End: 2023-10-26
Payer: MEDICARE

## 2023-10-26 NOTE — TELEPHONE ENCOUNTER
Luma lane from [facility] called [phone] stating patient has been admitted for rehab.she was in [facility] positive c-diff.she is still having diarrhea thinks her crohn's is flared up also and was wondering about her being put on steroids?

## 2023-10-27 NOTE — PROGRESS NOTES
Harjit Rivera (:  1945) is a 77 y.o. female,Established patient, here for evaluation of the following chief complaint(s):  Follow-up (Appt follow up prior to vascular testing. )            SUBJECTIVE/OBJECTIVE:  Alton Torres has a history of peripheral vascular disease of the lower extremities. She has had this for 1 - 5 years. Current treatment includes ASA EC daily. Alton Torres has new wounds. She is going to wound care. She is not walking at present and is brought in here on a stretcher. No fever or chills. No rest pain.       I have personally reviewed the following: problem list, current meds, allergies, PMH, PSH, family hx, and social hx  Harjit Rivera is a 77 y.o. 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 layer exposed ([address]) 10/30/2023    Pressure injury of sacral region, stage 4 ([address]) 10/30/2023    Palliative care patient 10/11/2023    Severe sepsis ([address]) 10/10/2023    Chronic wound 10/10/2023    History of urostomy 10/10/2023    Bilateral hydronephrosis 10/10/2023    CYNTHIA (acute kidney injury) ([address]) 10/10/2023    Hematuria 2023    Sepsis secondary to UTI ([address]) 2023    Multiple drug resistant organism (MDRO) culture positive 2023    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

## 2023-10-30 ENCOUNTER — HOSPITAL ENCOUNTER (OUTPATIENT)
Dept: WOUND CARE | Age: 78
Discharge: HOME | End: 2023-10-30
Payer: MEDICARE

## 2023-10-30 ENCOUNTER — LAB REQUISITION (OUTPATIENT)
Dept: LAB | Facility: HOSPITAL | Age: 78
End: 2023-10-30
Payer: MEDICARE

## 2023-10-30 VITALS
WEIGHT: 110 LBS | DIASTOLIC BLOOD PRESSURE: 56 MMHG | TEMPERATURE: 97.1 F | RESPIRATION RATE: 18 BRPM | HEART RATE: 98 BPM | BODY MASS INDEX: 18.78 KG/M2 | SYSTOLIC BLOOD PRESSURE: 124 MMHG | HEIGHT: 64 IN

## 2023-10-30 DIAGNOSIS — L97.412 ULCER OF RIGHT HEEL, WITH FAT LAYER EXPOSED (HCC): Chronic | ICD-10-CM

## 2023-10-30 DIAGNOSIS — L97.312 NON-PRESSURE CHRONIC ULCER OF RIGHT ANKLE WITH FAT LAYER EXPOSED (HCC): Chronic | ICD-10-CM

## 2023-10-30 DIAGNOSIS — L97.212 NON-PRESSURE CHRONIC ULCER OF RIGHT CALF WITH FAT LAYER EXPOSED (HCC): ICD-10-CM

## 2023-10-30 DIAGNOSIS — L89.154 PRESSURE INJURY OF SACRAL REGION, STAGE 4 (HCC): Primary | ICD-10-CM

## 2023-10-30 PROBLEM — L89.153 PRESSURE INJURY OF SACRAL REGION, STAGE 3 (HCC): Chronic | Status: ACTIVE | Noted: 2023-10-30

## 2023-10-30 LAB
ANION GAP SERPL CALCULATED.3IONS-SCNC: 8 MMOL/L (ref 5–15)
BASOPHILS # BLD AUTO: 0.06 10*3/MM3 (ref 0–0.2)
BASOPHILS NFR BLD AUTO: 0.6 % (ref 0–1.5)
BUN SERPL-MCNC: 20 MG/DL (ref 8–23)
BUN/CREAT SERPL: 47.6 (ref 7–25)
CALCIUM SPEC-SCNC: 9.3 MG/DL (ref 8.6–10.5)
CHLORIDE SERPL-SCNC: 105 MMOL/L (ref 98–107)
CO2 SERPL-SCNC: 29 MMOL/L (ref 22–29)
CREAT SERPL-MCNC: 0.42 MG/DL (ref 0.57–1)
DEPRECATED RDW RBC AUTO: 63.3 FL (ref 37–54)
EGFRCR SERPLBLD CKD-EPI 2021: 99.6 ML/MIN/1.73
EOSINOPHIL # BLD AUTO: 0.3 10*3/MM3 (ref 0–0.4)
EOSINOPHIL NFR BLD AUTO: 3.1 % (ref 0.3–6.2)
ERYTHROCYTE [DISTWIDTH] IN BLOOD BY AUTOMATED COUNT: 20.7 % (ref 12.3–15.4)
GLUCOSE SERPL-MCNC: 94 MG/DL (ref 65–99)
HCT VFR BLD AUTO: 34.5 % (ref 34–46.6)
HGB BLD-MCNC: 9.5 G/DL (ref 12–15.9)
IMM GRANULOCYTES # BLD AUTO: 0.03 10*3/MM3 (ref 0–0.05)
IMM GRANULOCYTES NFR BLD AUTO: 0.3 % (ref 0–0.5)
LYMPHOCYTES # BLD AUTO: 1.15 10*3/MM3 (ref 0.7–3.1)
LYMPHOCYTES NFR BLD AUTO: 11.7 % (ref 19.6–45.3)
MCH RBC QN AUTO: 23.5 PG (ref 26.6–33)
MCHC RBC AUTO-ENTMCNC: 27.5 G/DL (ref 31.5–35.7)
MCV RBC AUTO: 85.4 FL (ref 79–97)
MONOCYTES # BLD AUTO: 1.72 10*3/MM3 (ref 0.1–0.9)
MONOCYTES NFR BLD AUTO: 17.6 % (ref 5–12)
NEUTROPHILS NFR BLD AUTO: 6.53 10*3/MM3 (ref 1.7–7)
NEUTROPHILS NFR BLD AUTO: 66.7 % (ref 42.7–76)
NRBC BLD AUTO-RTO: 0 /100 WBC (ref 0–0.2)
PLATELET # BLD AUTO: 414 10*3/MM3 (ref 140–450)
PMV BLD AUTO: 10 FL (ref 6–12)
POTASSIUM SERPL-SCNC: 4.4 MMOL/L (ref 3.5–5.2)
RBC # BLD AUTO: 4.04 10*6/MM3 (ref 3.77–5.28)
SODIUM SERPL-SCNC: 142 MMOL/L (ref 136–145)
WBC NRBC COR # BLD: 9.79 10*3/MM3 (ref 3.4–10.8)

## 2023-10-30 PROCEDURE — 97597 DBRDMT OPN WND 1ST 20 CM/<: CPT | Performed by: SURGERY

## 2023-10-30 PROCEDURE — 97597 DBRDMT OPN WND 1ST 20 CM/<: CPT

## 2023-10-30 PROCEDURE — 85025 COMPLETE CBC W/AUTO DIFF WBC: CPT | Performed by: NURSE PRACTITIONER

## 2023-10-30 PROCEDURE — 36415 COLL VENOUS BLD VENIPUNCTURE: CPT | Performed by: NURSE PRACTITIONER

## 2023-10-30 PROCEDURE — 80048 BASIC METABOLIC PNL TOTAL CA: CPT | Performed by: NURSE PRACTITIONER

## 2023-10-30 PROCEDURE — 99204 OFFICE O/P NEW MOD 45 MIN: CPT | Performed by: SURGERY

## 2023-10-30 PROCEDURE — 99215 OFFICE O/P EST HI 40 MIN: CPT

## 2023-10-30 RX ORDER — BETAMETHASONE DIPROPIONATE 0.05 %
OINTMENT (GRAM) TOPICAL ONCE
OUTPATIENT
Start: 2023-10-30 | End: 2023-10-30

## 2023-10-30 RX ORDER — TRIAMCINOLONE ACETONIDE 1 MG/G
OINTMENT TOPICAL ONCE
OUTPATIENT
Start: 2023-10-30 | End: 2023-10-30

## 2023-10-30 RX ORDER — GENTAMICIN SULFATE 1 MG/G
OINTMENT TOPICAL ONCE
OUTPATIENT
Start: 2023-10-30 | End: 2023-10-30

## 2023-10-30 RX ORDER — IBUPROFEN 200 MG
TABLET ORAL ONCE
OUTPATIENT
Start: 2023-10-30 | End: 2023-10-30

## 2023-10-30 RX ORDER — BACITRACIN ZINC AND POLYMYXIN B SULFATE 500; 1000 [USP'U]/G; [USP'U]/G
OINTMENT TOPICAL ONCE
OUTPATIENT
Start: 2023-10-30 | End: 2023-10-30

## 2023-10-30 RX ORDER — CLOBETASOL PROPIONATE 0.5 MG/G
OINTMENT TOPICAL ONCE
OUTPATIENT
Start: 2023-10-30 | End: 2023-10-30

## 2023-10-30 RX ORDER — GINSENG 100 MG
CAPSULE ORAL ONCE
OUTPATIENT
Start: 2023-10-30 | End: 2023-10-30

## 2023-10-30 RX ORDER — SODIUM CHLOR/HYPOCHLOROUS ACID 0.033 %
SOLUTION, IRRIGATION IRRIGATION ONCE
OUTPATIENT
Start: 2023-10-30 | End: 2023-10-30

## 2023-10-30 RX ORDER — LIDOCAINE HYDROCHLORIDE 20 MG/ML
JELLY TOPICAL ONCE
OUTPATIENT
Start: 2023-10-30 | End: 2023-10-30

## 2023-10-30 NOTE — PROGRESS NOTES
(cm^3) 0.15 cm^3 10/30/23 1614   Wound Healing % 47 10/30/23 1614   Post-Procedure Length (cm) 1.5 cm 10/30/23 1636   Post-Procedure Width (cm) 1 cm 10/30/23 1636   Post-Procedure Depth (cm) 0.1 cm 10/30/23 1636   Post-Procedure Surface Area (cm^2) 1.5 cm^2 10/30/23 1636   Post-Procedure Volume (cm^3) 0.15 cm^3 10/30/23 1636   Distance Tunneling (cm) 0 cm 10/30/23 1614   Tunneling Position ___ O'Clock 0 10/30/23 1614   Undermining Starts ___ O'Clock 0 10/30/23 1614   Undermining Ends___ O'Clock 0 10/30/23 1614   Undermining Maxium Distance (cm) 0 10/30/23 1614   Wound Assessment Eschar dry 10/30/23 1614   Drainage Amount None (dry) 10/30/23 1614   Odor None 10/30/23 1614   Lara-wound Assessment Hyperkeratosis (callous) 10/30/23 1614   Margins Attached edges; Defined edges 10/30/23 1614   Number of days: 18       Wound 10/12/23 Heel Right Wound #2, right heel, pressure injury (Active)   Wound Image   10/30/23 1614   Wound Etiology Pressure Stage 4 10/30/23 1614   Dressing Status Old drainage noted 10/30/23 1614   Wound Cleansed Soap and water 10/30/23 1614   Dressing/Treatment Pharmaceutical agent (see MAR); Gauze dressing/dressing sponge;Roll gauze;Tape/Soft cloth adhesive tape 10/21/23 0750   Dressing Change Due 10/19/23 10/18/23 1231   Wound Length (cm) 1.6 cm 10/30/23 1614   Wound Width (cm) 3 cm 10/30/23 1614   Wound Depth (cm) 0.2 cm 10/30/23 1614   Wound Surface Area (cm^2) 4.8 cm^2 10/30/23 1614   Change in Wound Size % (l*w) 65.91 10/30/23 1614   Wound Volume (cm^3) 0.96 cm^3 10/30/23 1614   Wound Healing % 93 10/30/23 1614   Post-Procedure Length (cm) 1.6 cm 10/30/23 1636   Post-Procedure Width (cm) 3 cm 10/30/23 1636   Post-Procedure Depth (cm) 0.2 cm 10/30/23 1636   Post-Procedure Surface Area (cm^2) 4.8 cm^2 10/30/23 1636   Post-Procedure Volume (cm^3) 0.96 cm^3 10/30/23 1636   Distance Tunneling (cm) 0 cm 10/30/23 1614   Tunneling Position ___ O'Clock 0 10/30/23 1614   Undermining Starts ___ O'Clock 0

## 2023-10-30 NOTE — PATIENT INSTRUCTIONS
[address] and Hyperbaric Oxygen Therapy   Physician Orders and Discharge Instructions  [address] [facility],[address] [facility]1 [facility], [address]  Telephone: [phone]    NAME:  Milton Hanley  YOB: 1945  MEDICAL RECORD NUMBER:  [id number]  DATE:  10/30/2023    Discharge condition: Stable    Discharge to: Home    Left via:ambulance    Accompanied by: Daughter     ECF/HHA: [facility]    Dressing Orders: Right Foot, Ankle and Heel Wounds   Soap and water wash  Santyl Ointment to open areas, cover with Saline moist gauze, dry gauze, roll gauze and medipore tape, change twice daily   Wear heel lift boots except during rehab    Dressing Orders: Sacrum Wound   Soap and water wash (apply promogran to any exposed structure in the wound bed   Wash with soap and water  Apply wound vac as follows: Apply Skin Prep to periwound. Window pane surrounding area (periwound) with duroderm  Then apply drape to periwound. DO NOT PUT FOAM ON GOOD SKIN. Apply black foam to wound bed. BRIDGE suction pad off wound, Set wound vac to 125 mmHG, continuous. Change wound vac dressing 3 times per week (MWF or TTS)  Reapply vac dressing if vac stops working or dressing begins to leak or cannot be reinforced. Alternative dressing: Wash with soap and water. Apply Saline or 1/4 STR Dakins moistened gauze to wound bed. Cover with gauze. Secure with roll gauze and medipore tape. Change twice daily . Treatment Orders:  Heel Lift Boots, Roho Cushion, Low Air Loss Bed. Turn every 2 hours, Limit time in chair to 2 hours at a time   High Protein Diet unless restricted by your Family Practitioner     [address] [facility] follow up visit ___________3 weeks__________________  (Please note your next appointment above and if you are unable to keep, kindly give a 24 hour notice.  Thank you.)    If you experience any of the following, please call the [address] [place] [address] during business

## 2023-10-31 NOTE — PLAN OF CARE
Problem: Wound:  Goal: Will show signs of wound healing; wound closure and no evidence of infection  Description: Will show signs of wound healing; wound closure and no evidence of infection  Outcome: Progressing     Problem: Pressure Ulcer:  Goal: Signs of wound healing will improve  Description: Signs of wound healing will improve  Outcome: Progressing  Goal: Absence of new pressure ulcer  Description: Absence of new pressure ulcer  Outcome: Progressing  Goal: Will show no infection signs and symptoms  Description: Will show no infection signs and symptoms  Outcome: Progressing     Problem: Weight control:  Goal: Ability to maintain an optimal weight for height and age will be supported  Description: Ability to maintain an optimal weight for height and age will be supported  Outcome: Progressing     Problem: Falls - Risk of:  Goal: Will remain free from falls  Description: Will remain free from falls  Outcome: Progressing

## 2023-11-02 ENCOUNTER — HOSPITAL ENCOUNTER (OUTPATIENT)
Dept: NON INVASIVE DIAGNOSTICS | Age: 78
Discharge: HOME | End: 2023-11-02
Payer: MEDICARE

## 2023-11-02 ENCOUNTER — OFFICE VISIT (OUTPATIENT)
Dept: VASCULAR SURGERY | Age: 78
End: 2023-11-02
Payer: MEDICARE

## 2023-11-02 VITALS
DIASTOLIC BLOOD PRESSURE: 71 MMHG | TEMPERATURE: 98.7 F | SYSTOLIC BLOOD PRESSURE: 140 MMHG | OXYGEN SATURATION: 97 % | HEART RATE: 74 BPM

## 2023-11-02 DIAGNOSIS — I70.234 ATHEROSCLEROSIS OF NATIVE ARTERY OF RIGHT LOWER EXTREMITY WITH ULCERATION OF HEEL (HCC): ICD-10-CM

## 2023-11-02 DIAGNOSIS — I70.234 ATHEROSCLEROSIS OF NATIVE ARTERY OF RIGHT LOWER EXTREMITY WITH ULCERATION OF HEEL (HCC): Primary | ICD-10-CM

## 2023-11-02 PROCEDURE — 99214 OFFICE O/P EST MOD 30 MIN: CPT | Performed by: NURSE PRACTITIONER

## 2023-11-02 PROCEDURE — 1123F ACP DISCUSS/DSCN MKR DOCD: CPT | Performed by: NURSE PRACTITIONER

## 2023-11-02 PROCEDURE — 93922 UPR/L XTREMITY ART 2 LEVELS: CPT

## 2023-11-02 PROCEDURE — 93926 LOWER EXTREMITY STUDY: CPT

## 2023-11-03 ENCOUNTER — TELEPHONE (OUTPATIENT)
Dept: VASCULAR SURGERY | Age: 78
End: 2023-11-03

## 2023-11-03 DIAGNOSIS — I70.234 ATHEROSCLEROSIS OF NATIVE ARTERY OF RIGHT LOWER EXTREMITY WITH ULCERATION OF HEEL (HCC): Primary | ICD-10-CM

## 2023-11-03 NOTE — TELEPHONE ENCOUNTER
Called and spoke to the patient's nurse to let her know that everything looked good on the patient's scan. We will follow up with her in 3 months. She acknowledged.          ----- Message from ENZO Tierney sent at 11/2/2023  2:13 PM CDT -----  Please call me with her study results

## 2023-11-07 ENCOUNTER — HOSPITAL ENCOUNTER (OUTPATIENT)
Dept: WOUND CARE | Age: 78
Discharge: HOME | End: 2023-11-07
Payer: MEDICARE

## 2023-11-07 VITALS
HEIGHT: 64 IN | SYSTOLIC BLOOD PRESSURE: 92 MMHG | TEMPERATURE: 98.3 F | RESPIRATION RATE: 16 BRPM | HEART RATE: 73 BPM | DIASTOLIC BLOOD PRESSURE: 46 MMHG | BODY MASS INDEX: 18.78 KG/M2 | WEIGHT: 110 LBS

## 2023-11-07 DIAGNOSIS — L97.212 NON-PRESSURE CHRONIC ULCER OF RIGHT CALF WITH FAT LAYER EXPOSED (HCC): ICD-10-CM

## 2023-11-07 DIAGNOSIS — L97.412 ULCER OF RIGHT HEEL, WITH FAT LAYER EXPOSED (HCC): ICD-10-CM

## 2023-11-07 DIAGNOSIS — L97.312 NON-PRESSURE CHRONIC ULCER OF RIGHT ANKLE WITH FAT LAYER EXPOSED (HCC): ICD-10-CM

## 2023-11-07 DIAGNOSIS — L89.154 PRESSURE INJURY OF SACRAL REGION, STAGE 4 (HCC): Primary | ICD-10-CM

## 2023-11-07 PROCEDURE — 11045 DBRDMT SUBQ TISS EACH ADDL: CPT | Performed by: SURGERY

## 2023-11-07 PROCEDURE — 11042 DBRDMT SUBQ TIS 1ST 20SQCM/<: CPT

## 2023-11-07 PROCEDURE — 11045 DBRDMT SUBQ TISS EACH ADDL: CPT

## 2023-11-07 PROCEDURE — 97597 DBRDMT OPN WND 1ST 20 CM/<: CPT | Performed by: SURGERY

## 2023-11-07 PROCEDURE — 11042 DBRDMT SUBQ TIS 1ST 20SQCM/<: CPT | Performed by: SURGERY

## 2023-11-07 PROCEDURE — 97597 DBRDMT OPN WND 1ST 20 CM/<: CPT

## 2023-11-07 RX ORDER — TRIAMCINOLONE ACETONIDE 1 MG/G
OINTMENT TOPICAL ONCE
OUTPATIENT
Start: 2023-11-07 | End: 2023-11-07

## 2023-11-07 RX ORDER — LIDOCAINE HYDROCHLORIDE 20 MG/ML
JELLY TOPICAL ONCE
OUTPATIENT
Start: 2023-11-07 | End: 2023-11-07

## 2023-11-07 RX ORDER — BACITRACIN ZINC AND POLYMYXIN B SULFATE 500; 1000 [USP'U]/G; [USP'U]/G
OINTMENT TOPICAL ONCE
OUTPATIENT
Start: 2023-11-07 | End: 2023-11-07

## 2023-11-07 RX ORDER — GENTAMICIN SULFATE 1 MG/G
OINTMENT TOPICAL ONCE
OUTPATIENT
Start: 2023-11-07 | End: 2023-11-07

## 2023-11-07 RX ORDER — BETAMETHASONE DIPROPIONATE 0.05 %
OINTMENT (GRAM) TOPICAL ONCE
OUTPATIENT
Start: 2023-11-07 | End: 2023-11-07

## 2023-11-07 RX ORDER — SODIUM CHLOR/HYPOCHLOROUS ACID 0.033 %
SOLUTION, IRRIGATION IRRIGATION ONCE
OUTPATIENT
Start: 2023-11-07 | End: 2023-11-07

## 2023-11-07 RX ORDER — LIDOCAINE HYDROCHLORIDE 20 MG/ML
JELLY TOPICAL ONCE
Status: COMPLETED | OUTPATIENT
Start: 2023-11-07 | End: 2023-11-07

## 2023-11-07 RX ORDER — IBUPROFEN 200 MG
TABLET ORAL ONCE
OUTPATIENT
Start: 2023-11-07 | End: 2023-11-07

## 2023-11-07 RX ORDER — GINSENG 100 MG
CAPSULE ORAL ONCE
OUTPATIENT
Start: 2023-11-07 | End: 2023-11-07

## 2023-11-07 RX ORDER — CLOBETASOL PROPIONATE 0.5 MG/G
OINTMENT TOPICAL ONCE
OUTPATIENT
Start: 2023-11-07 | End: 2023-11-07

## 2023-11-07 RX ADMIN — LIDOCAINE HYDROCHLORIDE: 20 JELLY TOPICAL at 14:17

## 2023-11-07 ASSESSMENT — PAIN DESCRIPTION - LOCATION: LOCATION: SACRUM

## 2023-11-07 ASSESSMENT — PAIN DESCRIPTION - ONSET: ONSET: ON-GOING

## 2023-11-07 ASSESSMENT — PAIN - FUNCTIONAL ASSESSMENT: PAIN_FUNCTIONAL_ASSESSMENT: PREVENTS OR INTERFERES SOME ACTIVE ACTIVITIES AND ADLS

## 2023-11-07 ASSESSMENT — PAIN DESCRIPTION - FREQUENCY: FREQUENCY: CONTINUOUS

## 2023-11-07 ASSESSMENT — PAIN DESCRIPTION - PAIN TYPE: TYPE: CHRONIC PAIN

## 2023-11-07 ASSESSMENT — PAIN SCALES - GENERAL: PAINLEVEL_OUTOF10: 6

## 2023-11-07 ASSESSMENT — PAIN DESCRIPTION - DESCRIPTORS: DESCRIPTORS: ACHING;SORE;TENDER

## 2023-11-07 NOTE — PLAN OF CARE
Problem: Pain  Goal: Verbalizes/displays adequate comfort level or baseline comfort level  Outcome: Progressing     Problem: Wound:  Goal: Will show signs of wound healing; wound closure and no evidence of infection  Description: Will show signs of wound healing; wound closure and no evidence of infection  Outcome: Progressing     Problem: Pressure Ulcer:  Goal: Signs of wound healing will improve  Description: Signs of wound healing will improve  Outcome: Progressing  Goal: Absence of new pressure ulcer  Description: Absence of new pressure ulcer  Outcome: Progressing  Goal: Will show no infection signs and symptoms  Description: Will show no infection signs and symptoms  Outcome: Progressing     Problem: Weight control:  Goal: Ability to maintain an optimal weight for height and age will be supported  Description: Ability to maintain an optimal weight for height and age will be supported  Outcome: Progressing     Problem: Falls - Risk of:  Goal: Will remain free from falls  Description: Will remain free from falls  Outcome: Progressing
DIANN Hackett on 11/7/2023 at 4:10 PM

## 2023-11-07 NOTE — PATIENT INSTRUCTIONS
[address] and Hyperbaric Oxygen Therapy   Physician Orders and Discharge Instructions  1830 [facility],[address] [facility]1 [facility], [address]  Telephone: [phone]    NAME:  Mustapha Carreon  YOB: 1945  MEDICAL RECORD NUMBER:  [id number]  DATE:  11/7/2023    Discharge condition: Stable    Discharge to: Home    Left via:Private automobile    Accompanied by: Daughter    ECF/H[facility]: [facility]    Dressing Orders: Right Foot, Ankle and Heel Wounds  Soap and water wash  Santyl Ointment to open areas, cover with Saline moist gauze, dry gauze, roll gauze  and medipore tape, change twice daily  Wear heel lift boots except during rehab    Dressing Orders: Sacrum Wound  Soap and water wash (apply promogran to any exposed structure in the wound bed  Wash with soap and water  Apply wound vac as follows: Apply Skin Prep to periwound. Window pane  surrounding area (periwound) with duroderm  Then apply drape to periwound. DO NOT PUT FOAM ON GOOD SKIN. Apply black foam to wound bed. (Be sure to tuck foam into all undermining)   BRIDGE suction pad off wound, Set wound vac to 125 mmHG, continuous. Change wound vac  dressing 3 times per week (MWF or TTS)    [facility] follow up visit ___________2 weeks__________________  (Please note your next appointment above and if you are unable to keep, kindly give a 24 hour notice. Thank you.)    If you experience any of the following, please call the [facility] during business hours:    * Increase in Pain  * Temperature over 101  * Increase in drainage from your wound  * Drainage with a foul odor  * Bleeding  * Increase in swelling  * Need for compression bandage changes due to slippage, breakthrough drainage. If you need medical attention outside of the business hours of the [facility] please contact your PCP or go to the nearest emergency room.

## 2023-11-07 NOTE — PROGRESS NOTES
[address]   Progress Note and Procedure Note      404 [facility] RECORD NUMBER:  [id number]  AGE: 77 y.o. GENDER: female  : 1945  EPISODE DATE:  2023    Subjective:     Chief Complaint   Patient presents with    Wound Check     Patient presents today for recheck right foot and sacral wound. HISTORY of PRESENT ILLNESS DANIA Lopez is a 77 y.o. female who presents today for wound/ulcer evaluation.    Wound Context: Pt with sacral and RLE/Foot wounds here for eval/treat  Wound/Ulcer Pain Timing/Severity: none  Quality of pain: N/A  Severity:  0 / 10   Modifying Factors: None  Associated Signs/Symptoms: none    Ulcer Identification:  Ulcer Type: pressure  Contributing Factors: chronic pressure    Wound:  pressure        PAST MEDICAL HISTORY        Diagnosis Date    Arthritis     Cancer ([address])     endometrial cancer, bladder    Cataract     CIDP (chronic inflammatory demyelinating polyneuropathy) (HCC)     Crohn's disease (HCC)     GERD (gastroesophageal reflux disease)     History of blood transfusion     Hypertension     Macular degeneration     Neuropathy     Palliative care patient 10/11/2023    Perineal cyst in female     PVD (peripheral vascular disease) (HCC)     Radiation     Urinary incontinence        PAST SURGICAL HISTORY    Past Surgical History:   Procedure Laterality Date    BACK SURGERY      lumbar    BLADDER REMOVAL  2023    urostomy    CARPAL TUNNEL RELEASE Bilateral     CATARACT REMOVAL Bilateral     CERVICAL SPINE SURGERY      metal in neck    CHOLECYSTECTOMY, LAPAROSCOPIC      HAMMER TOE SURGERY Left     HX VASCULAR ANGIOPLASTY      & STENT    HYSTERECTOMY (CERVIX STATUS UNKNOWN)      KNEE ARTHROSCOPY Right     LUMBAR FUSION      RECTAL SURGERY N/A 2022    PERINEAL CYST EXCISION performed by Raul Montejo DO at [facility] N/A 2023    DEBRIDEMENT OF SACRAL DECUBITUS ULCER TO THE LEVEL OF

## 2023-11-09 PROBLEM — N39.0 UTI (URINARY TRACT INFECTION): Status: RESOLVED | Noted: 2020-11-13 | Resolved: 2023-11-09

## 2023-11-21 ENCOUNTER — HOSPITAL ENCOUNTER (OUTPATIENT)
Dept: WOUND CARE | Age: 78
Discharge: HOME | End: 2023-11-21
Payer: MEDICARE

## 2023-11-21 VITALS
TEMPERATURE: 98.3 F | DIASTOLIC BLOOD PRESSURE: 65 MMHG | SYSTOLIC BLOOD PRESSURE: 110 MMHG | HEART RATE: 93 BPM | RESPIRATION RATE: 16 BRPM

## 2023-11-21 DIAGNOSIS — L97.312 NON-PRESSURE CHRONIC ULCER OF RIGHT ANKLE WITH FAT LAYER EXPOSED (HCC): ICD-10-CM

## 2023-11-21 DIAGNOSIS — L89.154 PRESSURE INJURY OF SACRAL REGION, STAGE 4 (HCC): ICD-10-CM

## 2023-11-21 DIAGNOSIS — L97.212 NON-PRESSURE CHRONIC ULCER OF RIGHT CALF WITH FAT LAYER EXPOSED (HCC): ICD-10-CM

## 2023-11-21 DIAGNOSIS — L97.412 ULCER OF RIGHT HEEL, WITH FAT LAYER EXPOSED (HCC): Primary | ICD-10-CM

## 2023-11-21 PROCEDURE — 97597 DBRDMT OPN WND 1ST 20 CM/<: CPT

## 2023-11-21 PROCEDURE — 97606 NEG PRS WND THER DME>50 SQCM: CPT

## 2023-11-21 PROCEDURE — 11045 DBRDMT SUBQ TISS EACH ADDL: CPT

## 2023-11-21 PROCEDURE — 11042 DBRDMT SUBQ TIS 1ST 20SQCM/<: CPT

## 2023-11-21 RX ORDER — GENTAMICIN SULFATE 1 MG/G
OINTMENT TOPICAL ONCE
OUTPATIENT
Start: 2023-11-21 | End: 2023-11-21

## 2023-11-21 RX ORDER — LIDOCAINE HYDROCHLORIDE 20 MG/ML
JELLY TOPICAL ONCE
OUTPATIENT
Start: 2023-11-21 | End: 2023-11-21

## 2023-11-21 RX ORDER — LIDOCAINE HYDROCHLORIDE 20 MG/ML
JELLY TOPICAL ONCE
Status: COMPLETED | OUTPATIENT
Start: 2023-11-21 | End: 2023-11-21

## 2023-11-21 RX ORDER — GINSENG 100 MG
CAPSULE ORAL ONCE
OUTPATIENT
Start: 2023-11-21 | End: 2023-11-21

## 2023-11-21 RX ORDER — CLOBETASOL PROPIONATE 0.5 MG/G
OINTMENT TOPICAL ONCE
OUTPATIENT
Start: 2023-11-21 | End: 2023-11-21

## 2023-11-21 RX ORDER — TRIAMCINOLONE ACETONIDE 1 MG/G
OINTMENT TOPICAL ONCE
OUTPATIENT
Start: 2023-11-21 | End: 2023-11-21

## 2023-11-21 RX ORDER — SODIUM CHLOR/HYPOCHLOROUS ACID 0.033 %
SOLUTION, IRRIGATION IRRIGATION ONCE
OUTPATIENT
Start: 2023-11-21 | End: 2023-11-21

## 2023-11-21 RX ORDER — BETAMETHASONE DIPROPIONATE 0.05 %
OINTMENT (GRAM) TOPICAL ONCE
OUTPATIENT
Start: 2023-11-21 | End: 2023-11-21

## 2023-11-21 RX ORDER — IBUPROFEN 200 MG
TABLET ORAL ONCE
OUTPATIENT
Start: 2023-11-21 | End: 2023-11-21

## 2023-11-21 RX ORDER — BACITRACIN ZINC AND POLYMYXIN B SULFATE 500; 1000 [USP'U]/G; [USP'U]/G
OINTMENT TOPICAL ONCE
OUTPATIENT
Start: 2023-11-21 | End: 2023-11-21

## 2023-11-21 RX ADMIN — LIDOCAINE HYDROCHLORIDE: 20 JELLY TOPICAL at 13:27

## 2023-11-21 NOTE — PATIENT INSTRUCTIONS
[address] and Hyperbaric Oxygen Therapy   Physician Orders and Discharge Instructions  [address]  [place], [place]  Telephone: [phone]    NAME:  Yogi Ortiz  YOB: 1945  MEDICAL RECORD NUMBER:  [id number]  DATE:  11/21/2023    Discharge condition: Stable    Discharge to: Home    Left via:Private automobile    Accompanied by: Daughter    ECF/HHA: [facility]    Right Ankle and Heel Wounds- Healed, Moisturize and Protect     Dressing Orders: Right Foot  Soap and water wash  Santyl Ointment to open areas, cover with Saline moist gauze, dry gauze, roll gauze  and medipore tape, change twice daily  Wear heel lift boots except during rehab    Dressing Orders: Sacrum Wound  Soap and water wash (apply promogran to any exposed structure in the wound bed  Wash with soap and water  Apply wound vac as follows: Apply Skin Prep to periwound. Window pane  surrounding area (periwound) with duroderm  Then apply drape to periwound. DO NOT PUT FOAM ON GOOD SKIN. Apply black foam to wound bed. (Be sure to tuck foam into all undermining)  BRIDGE suction pad off wound, Set wound vac to 125 mmHG, continuous. Change wound vac  dressing 3 times per week (MWF or TTS)    [facility] follow up visit _______2 weeks_______________  (Please note your next appointment above and if you are unable to keep, kindly give a 24 hour notice. Thank you.)    If you experience any of the following, please call the [facility] during business hours:    * Increase in Pain  * Temperature over 101  * Increase in drainage from your wound  * Drainage with a foul odor  * Bleeding  * Increase in swelling  * Need for compression bandage changes due to slippage, breakthrough drainage. If you need medical attention outside of the business hours of the [facility] please contact your PCP or go to the nearest emergency room.

## 2023-11-21 NOTE — PROGRESS NOTES
Negative Pressure Wound Therapy    NAME:  Liberty Lamas  YOB: 1945  MEDICAL RECORD NUMBER:  [id number]  DATE:  11/21/2023    Applied Negative Pressure to Sacrum wound(s)/ulcer(s). [x] Applied skin barrier prep to dale-wound. [x] Cut strips of plastic drape to picture frame wound so that dale-wound is     covered with the drape. [x] If bridging dressing to less prominent site, cover any intact skin that will come in contact with the Negative Pressure Therapy sponge, gauze or channel drain with plastic drape. The sponge should never touch intact skin. [x] Cut sponge, gauze or channel drain to size which will fit into the wound/ulcer bed without being forced. [x] Be sure the sponge is large enough to hold the entire round plastic flange which is attached to the tubing. Never allow flange to be larger than the sponge or it will produce suction damaging intact skin. Total number of individual pieces of foam used within the wound bed: 1    [x] If bridging the dressing away from the primary site, be sure the bridge leads to a piece of sponge large enough to hold the entire flange without allowing any of the flange to overlap onto intact skin. [x] Covered sponge, gauze or channel drain with plastic drape. [x] Cut a hole in this plastic drape directly over the sponge the same size as the plastic drain tubing. [x] Removed plastic liner from flange and apply it directly over the hole you cut. [x] Removed the plastic cover from the flange. [x] Attached the tubing to the wound/ulcer Negative Pressure Therapy and turn it on to be sure a vacuum is created and that there are no leaks. [x] If air leaks occur, use plastic drape to patch them. [] Secured Negative Pressure Therapy dressing with ace wrap loosely if located on an extremity. Maintain tubing outside of ace wrap. Tubing must not exert pressure on intact skin.     Applied per  Guidelines      Electronically signed by [facility]

## 2023-11-21 NOTE — PROGRESS NOTES
[address]   Progress Note and Procedure Note      404 [facility] RECORD NUMBER:  [id number]  AGE: 77 y.o. GENDER: female  : 1945  EPISODE DATE:  2023    Subjective:     Chief Complaint   Patient presents with    Wound Check     Sacrum, right foot, right ankle wounds         HISTORY of PRESENT ILLNESS HPI     Kushal Olivas is a 77 y.o. female who presents today for wound/ulcer evaluation.    Wound Context: Pt with sacral and RLE wounds here for eval/treat  Wound/Ulcer Pain Timing/Severity: none  Quality of pain: N/A  Severity:  0 / 10   Modifying Factors: None  Associated Signs/Symptoms: none    Ulcer Identification:  Ulcer Type: pressure  Contributing Factors: chronic pressure    Wound:  pressure        PAST MEDICAL HISTORY        Diagnosis Date    Arthritis     Cancer ([address])     endometrial cancer, bladder    Cataract     CIDP (chronic inflammatory demyelinating polyneuropathy) ([facility])     Crohn's disease (HCC)     GERD (gastroesophageal reflux disease)     History of blood transfusion     Hypertension     Macular degeneration     Neuropathy     Palliative care patient 10/11/2023    Perineal cyst in female     PVD (peripheral vascular disease) ([facility])     Radiation     Urinary incontinence        PAST SURGICAL HISTORY    Past Surgical History:   Procedure Laterality Date    BACK SURGERY      lumbar    BLADDER REMOVAL  2023    urostomy    CARPAL TUNNEL RELEASE Bilateral     CATARACT REMOVAL Bilateral     CERVICAL SPINE SURGERY      metal in neck    CHOLECYSTECTOMY, LAPAROSCOPIC      HAMMER TOE SURGERY Left     HX VASCULAR ANGIOPLASTY      & STENT    HYSTERECTOMY (CERVIX STATUS UNKNOWN)      KNEE ARTHROSCOPY Right     LUMBAR FUSION      RECTAL SURGERY N/A 2022    PERINEAL CYST EXCISION performed by Antonio Bennett DO at [facility] N/A 2023    DEBRIDEMENT OF SACRAL DECUBITUS ULCER TO THE LEVEL OF BONE performed by Ulysses Boas A

## 2023-12-02 ENCOUNTER — APPOINTMENT (OUTPATIENT)
Dept: GENERAL RADIOLOGY | Age: 78
DRG: 871 | End: 2023-12-02
Payer: MEDICARE

## 2023-12-02 ENCOUNTER — HOSPITAL ENCOUNTER (INPATIENT)
Age: 78
LOS: 3 days | Discharge: OTHER INSTITUTION | DRG: 871 | End: 2023-12-07
Attending: HOSPITALIST | Admitting: STUDENT IN AN ORGANIZED HEALTH CARE EDUCATION/TRAINING PROGRAM
Payer: MEDICARE

## 2023-12-02 DIAGNOSIS — N39.0 RECURRENT UTI: ICD-10-CM

## 2023-12-02 DIAGNOSIS — Z98.890 HISTORY OF UROSTOMY: Primary | ICD-10-CM

## 2023-12-02 PROBLEM — G93.40 ACUTE ENCEPHALOPATHY: Status: ACTIVE | Noted: 2023-12-02

## 2023-12-02 LAB
ALBUMIN SERPL-MCNC: 2.6 G/DL (ref 3.5–5.2)
ALP SERPL-CCNC: 61 U/L (ref 35–104)
ALT SERPL-CCNC: <5 U/L (ref 5–33)
ANION GAP SERPL CALCULATED.3IONS-SCNC: 7 MMOL/L (ref 7–19)
ANISOCYTOSIS BLD QL SMEAR: ABNORMAL
AST SERPL-CCNC: 11 U/L (ref 5–32)
BACTERIA URNS QL MICRO: ABNORMAL /HPF
BASOPHILS # BLD: 0 K/UL (ref 0–0.2)
BASOPHILS NFR BLD: 0 % (ref 0–1)
BILIRUB SERPL-MCNC: <0.2 MG/DL (ref 0.2–1.2)
BILIRUB UR QL STRIP: NEGATIVE
BUN SERPL-MCNC: 26 MG/DL (ref 8–23)
CALCIUM SERPL-MCNC: 8.6 MG/DL (ref 8.8–10.2)
CHLORIDE SERPL-SCNC: 103 MMOL/L (ref 98–111)
CLARITY UR: ABNORMAL
CO2 SERPL-SCNC: 26 MMOL/L (ref 22–29)
COLOR UR: YELLOW
CREAT SERPL-MCNC: 0.7 MG/DL (ref 0.5–0.9)
CRYSTALS URNS MICRO: ABNORMAL /HPF
EOSINOPHIL # BLD: 0.24 K/UL (ref 0–0.6)
EOSINOPHIL NFR BLD: 2 % (ref 0–5)
EPI CELLS #/AREA URNS AUTO: 0 /HPF (ref 0–5)
ERYTHROCYTE [DISTWIDTH] IN BLOOD BY AUTOMATED COUNT: 18.7 % (ref 11.5–14.5)
GLUCOSE SERPL-MCNC: 104 MG/DL (ref 74–109)
GLUCOSE UR STRIP.AUTO-MCNC: NEGATIVE MG/DL
HCT VFR BLD AUTO: 29.3 % (ref 37–47)
HGB BLD-MCNC: 8.6 G/DL (ref 12–16)
HGB UR STRIP.AUTO-MCNC: ABNORMAL MG/L
HYALINE CASTS #/AREA URNS AUTO: 6 /HPF (ref 0–8)
HYPOCHROMIA BLD QL SMEAR: ABNORMAL
IMM GRANULOCYTES # BLD: 0.1 K/UL
KETONES UR STRIP.AUTO-MCNC: NEGATIVE MG/DL
LACTATE BLDV-SCNC: 0.7 MG/DL (ref 0.5–1.9)
LACTATE BLDV-SCNC: 0.8 MG/DL (ref 0.5–1.9)
LEUKOCYTE ESTERASE UR QL STRIP.AUTO: ABNORMAL
LYMPHOCYTES # BLD: 1.6 K/UL (ref 1.1–4.5)
LYMPHOCYTES NFR BLD: 11 % (ref 20–40)
MCH RBC QN AUTO: 23.8 PG (ref 27–31)
MCHC RBC AUTO-ENTMCNC: 29.4 G/DL (ref 33–37)
MCV RBC AUTO: 80.9 FL (ref 81–99)
MONOCYTES # BLD: 1.3 K/UL (ref 0–0.9)
MONOCYTES NFR BLD: 11 % (ref 0–10)
NEUTROPHILS # BLD: 9 K/UL (ref 1.5–7.5)
NEUTS SEG NFR BLD: 74 % (ref 50–65)
NITRITE UR QL STRIP.AUTO: NEGATIVE
PH UR STRIP.AUTO: 5.5 [PH] (ref 5–8)
PLATELET # BLD AUTO: 320 K/UL (ref 130–400)
PLATELET SLIDE REVIEW: ADEQUATE
PMV BLD AUTO: 9.1 FL (ref 9.4–12.3)
POTASSIUM SERPL-SCNC: 4.3 MMOL/L (ref 3.5–5)
PROT SERPL-MCNC: 6.4 G/DL (ref 6.6–8.7)
PROT UR STRIP.AUTO-MCNC: 30 MG/DL
RBC # BLD AUTO: 3.62 M/UL (ref 4.2–5.4)
RBC #/AREA URNS AUTO: 9 /HPF (ref 0–4)
SODIUM SERPL-SCNC: 136 MMOL/L (ref 136–145)
SP GR UR STRIP.AUTO: 1.01 (ref 1–1.03)
UROBILINOGEN UR STRIP.AUTO-MCNC: 0.2 E.U./DL
VARIANT LYMPHS NFR BLD: 2 % (ref 0–8)
WBC # BLD AUTO: 12.2 K/UL (ref 4.8–10.8)
WBC #/AREA URNS AUTO: 546 /HPF (ref 0–5)

## 2023-12-02 PROCEDURE — 96365 THER/PROPH/DIAG IV INF INIT: CPT

## 2023-12-02 PROCEDURE — 99285 EMERGENCY DEPT VISIT HI MDM: CPT

## 2023-12-02 PROCEDURE — G0378 HOSPITAL OBSERVATION PER HR: HCPCS

## 2023-12-02 PROCEDURE — 71045 X-RAY EXAM CHEST 1 VIEW: CPT

## 2023-12-02 PROCEDURE — 87186 SC STD MICRODIL/AGAR DIL: CPT

## 2023-12-02 PROCEDURE — 87086 URINE CULTURE/COLONY COUNT: CPT

## 2023-12-02 PROCEDURE — 6360000002 HC RX W HCPCS: Performed by: NURSE PRACTITIONER

## 2023-12-02 PROCEDURE — 2580000003 HC RX 258: Performed by: NURSE PRACTITIONER

## 2023-12-02 PROCEDURE — 87040 BLOOD CULTURE FOR BACTERIA: CPT

## 2023-12-02 PROCEDURE — 83605 ASSAY OF LACTIC ACID: CPT

## 2023-12-02 PROCEDURE — 85025 COMPLETE CBC W/AUTO DIFF WBC: CPT

## 2023-12-02 PROCEDURE — 93005 ELECTROCARDIOGRAM TRACING: CPT | Performed by: EMERGENCY MEDICINE

## 2023-12-02 PROCEDURE — 81001 URINALYSIS AUTO W/SCOPE: CPT

## 2023-12-02 PROCEDURE — 80053 COMPREHEN METABOLIC PANEL: CPT

## 2023-12-02 PROCEDURE — 36415 COLL VENOUS BLD VENIPUNCTURE: CPT

## 2023-12-02 RX ORDER — ENOXAPARIN SODIUM 100 MG/ML
40 INJECTION SUBCUTANEOUS DAILY
Status: DISCONTINUED | OUTPATIENT
Start: 2023-12-03 | End: 2023-12-07 | Stop reason: HOSPADM

## 2023-12-02 RX ORDER — ONDANSETRON 2 MG/ML
4 INJECTION INTRAMUSCULAR; INTRAVENOUS EVERY 6 HOURS PRN
Status: DISCONTINUED | OUTPATIENT
Start: 2023-12-02 | End: 2023-12-07 | Stop reason: HOSPADM

## 2023-12-02 RX ORDER — ACETAMINOPHEN 650 MG/1
650 SUPPOSITORY RECTAL EVERY 6 HOURS PRN
Status: DISCONTINUED | OUTPATIENT
Start: 2023-12-02 | End: 2023-12-07 | Stop reason: HOSPADM

## 2023-12-02 RX ORDER — MAGNESIUM SULFATE IN WATER 40 MG/ML
2000 INJECTION, SOLUTION INTRAVENOUS PRN
Status: DISCONTINUED | OUTPATIENT
Start: 2023-12-02 | End: 2023-12-07 | Stop reason: HOSPADM

## 2023-12-02 RX ORDER — SODIUM CHLORIDE 0.9 % (FLUSH) 0.9 %
5-40 SYRINGE (ML) INJECTION EVERY 12 HOURS SCHEDULED
Status: DISCONTINUED | OUTPATIENT
Start: 2023-12-02 | End: 2023-12-07 | Stop reason: HOSPADM

## 2023-12-02 RX ORDER — POTASSIUM CHLORIDE 20 MEQ/1
40 TABLET, EXTENDED RELEASE ORAL PRN
Status: DISCONTINUED | OUTPATIENT
Start: 2023-12-02 | End: 2023-12-07 | Stop reason: HOSPADM

## 2023-12-02 RX ORDER — ONDANSETRON 4 MG/1
4 TABLET, ORALLY DISINTEGRATING ORAL EVERY 8 HOURS PRN
Status: DISCONTINUED | OUTPATIENT
Start: 2023-12-02 | End: 2023-12-07 | Stop reason: HOSPADM

## 2023-12-02 RX ORDER — POLYETHYLENE GLYCOL 3350 17 G/17G
17 POWDER, FOR SOLUTION ORAL DAILY PRN
Status: DISCONTINUED | OUTPATIENT
Start: 2023-12-02 | End: 2023-12-07 | Stop reason: HOSPADM

## 2023-12-02 RX ORDER — POTASSIUM CHLORIDE 7.45 MG/ML
10 INJECTION INTRAVENOUS PRN
Status: DISCONTINUED | OUTPATIENT
Start: 2023-12-02 | End: 2023-12-07 | Stop reason: HOSPADM

## 2023-12-02 RX ORDER — SODIUM CHLORIDE 9 MG/ML
INJECTION, SOLUTION INTRAVENOUS PRN
Status: DISCONTINUED | OUTPATIENT
Start: 2023-12-02 | End: 2023-12-07 | Stop reason: HOSPADM

## 2023-12-02 RX ORDER — SODIUM CHLORIDE 0.9 % (FLUSH) 0.9 %
5-40 SYRINGE (ML) INJECTION PRN
Status: DISCONTINUED | OUTPATIENT
Start: 2023-12-02 | End: 2023-12-07 | Stop reason: HOSPADM

## 2023-12-02 RX ORDER — ACETAMINOPHEN 325 MG/1
650 TABLET ORAL EVERY 6 HOURS PRN
Status: DISCONTINUED | OUTPATIENT
Start: 2023-12-02 | End: 2023-12-07 | Stop reason: HOSPADM

## 2023-12-02 RX ADMIN — VANCOMYCIN HYDROCHLORIDE 1250 MG: 10 INJECTION, POWDER, LYOPHILIZED, FOR SOLUTION INTRAVENOUS at 21:23

## 2023-12-02 ASSESSMENT — PAIN DESCRIPTION - DESCRIPTORS: DESCRIPTORS: DISCOMFORT;ACHING

## 2023-12-02 ASSESSMENT — PAIN SCALES - GENERAL: PAINLEVEL_OUTOF10: 2

## 2023-12-02 ASSESSMENT — PAIN DESCRIPTION - ONSET: ONSET: ON-GOING

## 2023-12-02 ASSESSMENT — PAIN - FUNCTIONAL ASSESSMENT: PAIN_FUNCTIONAL_ASSESSMENT: PREVENTS OR INTERFERES SOME ACTIVE ACTIVITIES AND ADLS

## 2023-12-02 ASSESSMENT — PAIN DESCRIPTION - PAIN TYPE: TYPE: CHRONIC PAIN

## 2023-12-02 ASSESSMENT — PAIN DESCRIPTION - ORIENTATION: ORIENTATION: MID

## 2023-12-02 ASSESSMENT — PAIN DESCRIPTION - FREQUENCY: FREQUENCY: CONTINUOUS

## 2023-12-02 ASSESSMENT — PAIN DESCRIPTION - LOCATION: LOCATION: SACRUM

## 2023-12-03 ENCOUNTER — APPOINTMENT (OUTPATIENT)
Dept: GENERAL RADIOLOGY | Age: 78
DRG: 871 | End: 2023-12-03
Payer: MEDICARE

## 2023-12-03 LAB
25(OH)D3 SERPL-MCNC: 31.1 NG/ML
ALBUMIN SERPL-MCNC: 2.4 G/DL (ref 3.5–5.2)
ALP SERPL-CCNC: 55 U/L (ref 35–104)
ALT SERPL-CCNC: <5 U/L (ref 5–33)
ANION GAP SERPL CALCULATED.3IONS-SCNC: 10 MMOL/L (ref 7–19)
ANISOCYTOSIS BLD QL SMEAR: ABNORMAL
AST SERPL-CCNC: 7 U/L (ref 5–32)
BASOPHILS # BLD: 0.1 K/UL (ref 0–0.2)
BASOPHILS NFR BLD: 1 % (ref 0–1)
BILIRUB SERPL-MCNC: <0.2 MG/DL (ref 0.2–1.2)
BUN SERPL-MCNC: 25 MG/DL (ref 8–23)
CALCIUM SERPL-MCNC: 8.6 MG/DL (ref 8.8–10.2)
CHLORIDE SERPL-SCNC: 105 MMOL/L (ref 98–111)
CO2 SERPL-SCNC: 24 MMOL/L (ref 22–29)
CREAT SERPL-MCNC: 0.6 MG/DL (ref 0.5–0.9)
EOSINOPHIL # BLD: 0.21 K/UL (ref 0–0.6)
EOSINOPHIL NFR BLD: 2 % (ref 0–5)
ERYTHROCYTE [DISTWIDTH] IN BLOOD BY AUTOMATED COUNT: 18.6 % (ref 11.5–14.5)
FERRITIN SERPL-MCNC: 397.2 NG/ML (ref 13–150)
FOLATE SERPL-MCNC: >20 NG/ML (ref 4.8–37.3)
GLUCOSE SERPL-MCNC: 101 MG/DL (ref 74–109)
HCT VFR BLD AUTO: 28.1 % (ref 37–47)
HGB BLD-MCNC: 8 G/DL (ref 12–16)
HYPOCHROMIA BLD QL SMEAR: ABNORMAL
IMM GRANULOCYTES # BLD: 0 K/UL
IRON SATN MFR SERPL: 7 % (ref 14–50)
IRON SERPL-MCNC: 14 UG/DL (ref 37–145)
LYMPHOCYTES # BLD: 1.1 K/UL (ref 1.1–4.5)
LYMPHOCYTES NFR BLD: 11 % (ref 20–40)
MAGNESIUM SERPL-MCNC: 1.7 MG/DL (ref 1.6–2.4)
MCH RBC QN AUTO: 23.7 PG (ref 27–31)
MCHC RBC AUTO-ENTMCNC: 28.5 G/DL (ref 33–37)
MCV RBC AUTO: 83.4 FL (ref 81–99)
MONOCYTES # BLD: 1.6 K/UL (ref 0–0.9)
MONOCYTES NFR BLD: 15 % (ref 0–10)
NEUTROPHILS # BLD: 7.4 K/UL (ref 1.5–7.5)
NEUTS SEG NFR BLD: 71 % (ref 50–65)
PLATELET # BLD AUTO: 303 K/UL (ref 130–400)
PLATELET SLIDE REVIEW: ADEQUATE
PMV BLD AUTO: 9.6 FL (ref 9.4–12.3)
POTASSIUM SERPL-SCNC: 4.1 MMOL/L (ref 3.5–5)
PROT SERPL-MCNC: 6.1 G/DL (ref 6.6–8.7)
RBC # BLD AUTO: 3.37 M/UL (ref 4.2–5.4)
SODIUM SERPL-SCNC: 139 MMOL/L (ref 136–145)
TIBC SERPL-MCNC: 191 UG/DL (ref 250–400)
TSH SERPL DL<=0.005 MIU/L-ACNC: 1.02 UIU/ML (ref 0.35–5.5)
URATE SERPL-MCNC: 5.1 MG/DL (ref 2.4–5.7)
VIT B12 SERPL-MCNC: 443 PG/ML (ref 211–946)
WBC # BLD AUTO: 10.4 K/UL (ref 4.8–10.8)

## 2023-12-03 PROCEDURE — 6370000000 HC RX 637 (ALT 250 FOR IP): Performed by: HOSPITALIST

## 2023-12-03 PROCEDURE — 6360000002 HC RX W HCPCS

## 2023-12-03 PROCEDURE — 82607 VITAMIN B-12: CPT

## 2023-12-03 PROCEDURE — 97530 THERAPEUTIC ACTIVITIES: CPT

## 2023-12-03 PROCEDURE — 83550 IRON BINDING TEST: CPT

## 2023-12-03 PROCEDURE — 6370000000 HC RX 637 (ALT 250 FOR IP)

## 2023-12-03 PROCEDURE — G0378 HOSPITAL OBSERVATION PER HR: HCPCS

## 2023-12-03 PROCEDURE — 96372 THER/PROPH/DIAG INJ SC/IM: CPT

## 2023-12-03 PROCEDURE — 6360000002 HC RX W HCPCS: Performed by: HOSPITALIST

## 2023-12-03 PROCEDURE — 82746 ASSAY OF FOLIC ACID SERUM: CPT

## 2023-12-03 PROCEDURE — 85025 COMPLETE CBC W/AUTO DIFF WBC: CPT

## 2023-12-03 PROCEDURE — 6360000002 HC RX W HCPCS: Performed by: NURSE PRACTITIONER

## 2023-12-03 PROCEDURE — 74018 RADEX ABDOMEN 1 VIEW: CPT

## 2023-12-03 PROCEDURE — 96375 TX/PRO/DX INJ NEW DRUG ADDON: CPT

## 2023-12-03 PROCEDURE — 84550 ASSAY OF BLOOD/URIC ACID: CPT

## 2023-12-03 PROCEDURE — 80053 COMPREHEN METABOLIC PANEL: CPT

## 2023-12-03 PROCEDURE — 84443 ASSAY THYROID STIM HORMONE: CPT

## 2023-12-03 PROCEDURE — 2580000003 HC RX 258: Performed by: NURSE PRACTITIONER

## 2023-12-03 PROCEDURE — 83540 ASSAY OF IRON: CPT

## 2023-12-03 PROCEDURE — 2580000003 HC RX 258: Performed by: HOSPITALIST

## 2023-12-03 PROCEDURE — 83735 ASSAY OF MAGNESIUM: CPT

## 2023-12-03 PROCEDURE — 36415 COLL VENOUS BLD VENIPUNCTURE: CPT

## 2023-12-03 PROCEDURE — 82728 ASSAY OF FERRITIN: CPT

## 2023-12-03 PROCEDURE — 6370000000 HC RX 637 (ALT 250 FOR IP): Performed by: STUDENT IN AN ORGANIZED HEALTH CARE EDUCATION/TRAINING PROGRAM

## 2023-12-03 PROCEDURE — 97161 PT EVAL LOW COMPLEX 20 MIN: CPT

## 2023-12-03 PROCEDURE — 94760 N-INVAS EAR/PLS OXIMETRY 1: CPT

## 2023-12-03 PROCEDURE — 87507 IADNA-DNA/RNA PROBE TQ 12-25: CPT

## 2023-12-03 PROCEDURE — 96366 THER/PROPH/DIAG IV INF ADDON: CPT

## 2023-12-03 PROCEDURE — 82306 VITAMIN D 25 HYDROXY: CPT

## 2023-12-03 PROCEDURE — 2580000003 HC RX 258

## 2023-12-03 RX ORDER — FENOFIBRATE 54 MG/1
54 TABLET ORAL DAILY
Status: DISCONTINUED | OUTPATIENT
Start: 2023-12-03 | End: 2023-12-07 | Stop reason: HOSPADM

## 2023-12-03 RX ORDER — MESALAMINE 400 MG/1
400 CAPSULE, DELAYED RELEASE ORAL 3 TIMES DAILY
Status: DISCONTINUED | OUTPATIENT
Start: 2023-12-03 | End: 2023-12-07 | Stop reason: HOSPADM

## 2023-12-03 RX ORDER — ALLOPURINOL 100 MG/1
100 TABLET ORAL DAILY
Status: DISCONTINUED | OUTPATIENT
Start: 2023-12-03 | End: 2023-12-07 | Stop reason: HOSPADM

## 2023-12-03 RX ORDER — PANTOPRAZOLE SODIUM 40 MG/1
40 TABLET, DELAYED RELEASE ORAL
Status: DISCONTINUED | OUTPATIENT
Start: 2023-12-03 | End: 2023-12-07 | Stop reason: HOSPADM

## 2023-12-03 RX ORDER — FERROUS SULFATE 325(65) MG
325 TABLET ORAL 2 TIMES DAILY WITH MEALS
Status: DISCONTINUED | OUTPATIENT
Start: 2023-12-03 | End: 2023-12-06

## 2023-12-03 RX ORDER — ASPIRIN 81 MG/1
81 TABLET ORAL NIGHTLY
Status: DISCONTINUED | OUTPATIENT
Start: 2023-12-03 | End: 2023-12-07 | Stop reason: HOSPADM

## 2023-12-03 RX ORDER — FENOFIBRATE 160 MG/1
160 TABLET ORAL DAILY
Status: DISCONTINUED | OUTPATIENT
Start: 2023-12-03 | End: 2023-12-03

## 2023-12-03 RX ORDER — GABAPENTIN 600 MG/1
300 TABLET ORAL 3 TIMES DAILY
Status: DISCONTINUED | OUTPATIENT
Start: 2023-12-03 | End: 2023-12-07 | Stop reason: HOSPADM

## 2023-12-03 RX ORDER — HYDROCODONE BITARTRATE AND ACETAMINOPHEN 10; 325 MG/1; MG/1
1 TABLET ORAL EVERY 6 HOURS PRN
Status: DISCONTINUED | OUTPATIENT
Start: 2023-12-03 | End: 2023-12-07 | Stop reason: HOSPADM

## 2023-12-03 RX ORDER — FOLIC ACID 1 MG/1
1 TABLET ORAL DAILY
Status: DISCONTINUED | OUTPATIENT
Start: 2023-12-03 | End: 2023-12-07 | Stop reason: HOSPADM

## 2023-12-03 RX ORDER — CRANBERRY FRUIT 500 MG
36 TABLET,CHEWABLE ORAL DAILY
Status: DISCONTINUED | OUTPATIENT
Start: 2023-12-03 | End: 2023-12-07 | Stop reason: HOSPADM

## 2023-12-03 RX ORDER — CHLORAL HYDRATE 500 MG
1 CAPSULE ORAL 2 TIMES DAILY
Status: DISCONTINUED | OUTPATIENT
Start: 2023-12-03 | End: 2023-12-03

## 2023-12-03 RX ORDER — CHOLESTYRAMINE LIGHT 4 G/5.7G
4 POWDER, FOR SUSPENSION ORAL 2 TIMES DAILY
Status: DISCONTINUED | OUTPATIENT
Start: 2023-12-03 | End: 2023-12-07 | Stop reason: HOSPADM

## 2023-12-03 RX ORDER — SODIUM CHLORIDE 9 MG/ML
INJECTION, SOLUTION INTRAVENOUS CONTINUOUS
Status: DISCONTINUED | OUTPATIENT
Start: 2023-12-03 | End: 2023-12-04

## 2023-12-03 RX ORDER — HYDROCODONE BITARTRATE AND ACETAMINOPHEN 10; 325 MG/1; MG/1
1 TABLET ORAL EVERY 6 HOURS PRN
COMMUNITY

## 2023-12-03 RX ORDER — M-VIT,TX,IRON,MINS/CALC/FOLIC 27MG-0.4MG
1 TABLET ORAL DAILY
Status: DISCONTINUED | OUTPATIENT
Start: 2023-12-03 | End: 2023-12-07 | Stop reason: HOSPADM

## 2023-12-03 RX ORDER — ALLOPURINOL 300 MG/1
300 TABLET ORAL DAILY
Status: DISCONTINUED | OUTPATIENT
Start: 2023-12-03 | End: 2023-12-03

## 2023-12-03 RX ORDER — ERGOCALCIFEROL 1.25 MG/1
50000 CAPSULE ORAL WEEKLY
Status: DISCONTINUED | OUTPATIENT
Start: 2023-12-03 | End: 2023-12-07 | Stop reason: HOSPADM

## 2023-12-03 RX ADMIN — MESALAMINE 400 MG: 400 CAPSULE, DELAYED RELEASE ORAL at 20:25

## 2023-12-03 RX ADMIN — CHOLESTYRAMINE 4 G: 4 POWDER, FOR SUSPENSION ORAL at 08:51

## 2023-12-03 RX ADMIN — SODIUM CHLORIDE: 9 INJECTION, SOLUTION INTRAVENOUS at 20:30

## 2023-12-03 RX ADMIN — FENOFIBRATE 54 MG: 54 TABLET, FILM COATED ORAL at 08:52

## 2023-12-03 RX ADMIN — GABAPENTIN 300 MG: 600 TABLET, FILM COATED ORAL at 08:51

## 2023-12-03 RX ADMIN — ACETAMINOPHEN 650 MG: 325 TABLET ORAL at 00:53

## 2023-12-03 RX ADMIN — FOLIC ACID 1 MG: 1 TABLET ORAL at 08:52

## 2023-12-03 RX ADMIN — MESALAMINE 400 MG: 400 CAPSULE, DELAYED RELEASE ORAL at 13:21

## 2023-12-03 RX ADMIN — ERGOCALCIFEROL 50000 UNITS: 1.25 CAPSULE ORAL at 08:52

## 2023-12-03 RX ADMIN — GABAPENTIN 300 MG: 600 TABLET, FILM COATED ORAL at 20:25

## 2023-12-03 RX ADMIN — VANCOMYCIN HYDROCHLORIDE 1000 MG: 10 INJECTION, POWDER, LYOPHILIZED, FOR SOLUTION INTRAVENOUS at 08:42

## 2023-12-03 RX ADMIN — WATER 1000 MG: 1 INJECTION INTRAMUSCULAR; INTRAVENOUS; SUBCUTANEOUS at 00:53

## 2023-12-03 RX ADMIN — ENOXAPARIN SODIUM 40 MG: 100 INJECTION SUBCUTANEOUS at 08:43

## 2023-12-03 RX ADMIN — ASPIRIN 81 MG: 81 TABLET, COATED ORAL at 00:53

## 2023-12-03 RX ADMIN — ALLOPURINOL 100 MG: 100 TABLET ORAL at 08:52

## 2023-12-03 RX ADMIN — FERROUS SULFATE TAB 325 MG (65 MG ELEMENTAL FE) 325 MG: 325 (65 FE) TAB at 08:52

## 2023-12-03 RX ADMIN — MULTIPLE VITAMINS W/ MINERALS TAB 1 TABLET: TAB at 08:52

## 2023-12-03 RX ADMIN — CHOLESTYRAMINE 4 G: 4 POWDER, FOR SUSPENSION ORAL at 00:53

## 2023-12-03 RX ADMIN — Medication 1 TABLET: at 08:52

## 2023-12-03 RX ADMIN — HYDROCODONE BITARTRATE AND ACETAMINOPHEN 1 TABLET: 10; 325 TABLET ORAL at 18:02

## 2023-12-03 RX ADMIN — PANTOPRAZOLE SODIUM 40 MG: 40 TABLET, DELAYED RELEASE ORAL at 08:43

## 2023-12-03 RX ADMIN — CHOLESTYRAMINE 4 G: 4 POWDER, FOR SUSPENSION ORAL at 21:31

## 2023-12-03 RX ADMIN — SODIUM CHLORIDE: 9 INJECTION, SOLUTION INTRAVENOUS at 08:42

## 2023-12-03 RX ADMIN — ASPIRIN 81 MG: 81 TABLET, COATED ORAL at 20:25

## 2023-12-03 RX ADMIN — FERROUS SULFATE TAB 325 MG (65 MG ELEMENTAL FE) 325 MG: 325 (65 FE) TAB at 17:36

## 2023-12-03 RX ADMIN — MESALAMINE 400 MG: 400 CAPSULE, DELAYED RELEASE ORAL at 08:52

## 2023-12-03 RX ADMIN — Medication 10 ML: at 00:56

## 2023-12-03 RX ADMIN — GABAPENTIN 300 MG: 600 TABLET, FILM COATED ORAL at 13:21

## 2023-12-03 ASSESSMENT — ENCOUNTER SYMPTOMS
DIARRHEA: 0
ABDOMINAL DISTENTION: 0
SORE THROAT: 0
BLOOD IN STOOL: 0
WHEEZING: 0
CONSTIPATION: 0
VOMITING: 0
COUGH: 0
SHORTNESS OF BREATH: 0
TROUBLE SWALLOWING: 0
ABDOMINAL PAIN: 0
RESPIRATORY NEGATIVE: 1
GASTROINTESTINAL NEGATIVE: 1
NAUSEA: 0
SINUS PAIN: 0

## 2023-12-03 ASSESSMENT — PAIN DESCRIPTION - DESCRIPTORS: DESCRIPTORS: ACHING;SHARP

## 2023-12-03 ASSESSMENT — PAIN DESCRIPTION - ORIENTATION: ORIENTATION: MID

## 2023-12-03 ASSESSMENT — PAIN - FUNCTIONAL ASSESSMENT: PAIN_FUNCTIONAL_ASSESSMENT: PREVENTS OR INTERFERES SOME ACTIVE ACTIVITIES AND ADLS

## 2023-12-03 ASSESSMENT — PAIN DESCRIPTION - LOCATION: LOCATION: BACK;BUTTOCKS

## 2023-12-03 ASSESSMENT — PAIN SCALES - GENERAL: PAINLEVEL_OUTOF10: 8

## 2023-12-03 NOTE — ED NOTES
Patient placed on cardiac monitor, continuous pulse oximeter, and NIBP monitor. Monitor alarms on.          Nancie Badillo RN  12/02/23 6823

## 2023-12-03 NOTE — ED PROVIDER NOTES
[facility] 3 PAULY/VAS/MED  eMERGENCY dEPARTMENT eNCOUnter      Pt Name: Maribel Flynn  MRN: [id number]  [address] [facility] 1945  Date of evaluation: 12/2/2023  Provider: ENZO Bruno    CHIEF COMPLAINT       Chief Complaint   Patient presents with    Fever    Urinary Tract Infection     Pt from [facility], recent UTI diagnosis, taking oral antibiotics but not getting better         HISTORY OF PRESENT ILLNESS   (Location/Symptom, Timing/Onset,Context/Setting, Quality, Duration, Modifying Factors, Severity)  Note limiting factors. Maribel Flynn is a 77 y.o. female who presents to the emergency department from [address] [place]. Pt has a urostomy after bladder cancer and has not been eating or interacting like usual.  Family has noted some confusion. Was started on macrobid on wed. 68yo woman Hx of CIDP on monthly IVIG, Crohn's Disease on mesalamine, bladder and endometrial malignancy Ca s/p hysterectomy as well as cystectomy, ileal conduit and urostomy creation Dr Flavia Michaels [facility] [place]), chronic pressure wounds has chronic wound vac to sacral decubitus, (sees wound care here)  presented from NH with decreased appetite and activity and some confusion. started on macrobid 3 days ago for a UTI    The history is provided by the patient and a relative. NursingNotes were reviewed. REVIEW OF SYSTEMS    (2-9 systems for level 4, 10 or more for level 5)     Review of Systems   Constitutional:  Positive for activity change, appetite change and fatigue. Negative for fever. Neurological:  Positive for weakness. Psychiatric/Behavioral:  Positive for confusion. Except as noted above the remainder of the review of systems was reviewed and negative.        PAST MEDICAL HISTORY     Past Medical History:   Diagnosis Date    Arthritis     Cancer [facility])     endometrial cancer, bladder    Cataract     CIDP (chronic inflammatory demyelinating polyneuropathy) (HCC)     Crohn's disease (HCC)     GERD (gastroesophageal

## 2023-12-03 NOTE — H&P
[facility] - History & Physical      PCP: Fernando Clark DO    Date of Admission: 12/2/2023    Date of Service: 12/2/2023    Chief Complaint: Altered mental status  History Of Present Illness: The patient is a 77 y.o. female with bladder, endometrial cancer, CIDP on monthly IVIG, Crohn's, GERD, HTN, C. difficile colitis, macular degeneration, PVD, s/p hysterectomy, cystectomy, ileal conduit and urostomy creation, chronic pressure wounds, wound VAC to sacral decubitus, complaining of fever. Patient is unable to provide HPI obtained from ER documentation. Patient was admitted in October with sepsis due to UTI. Initially concerned regarding patient significant past medical history however case has been discussed with urology along with patient's urology surgeon at [facility] at that time her output was significant and creatinine remains normal.  She received IV antibiotics and was discharged to SNF in stable condition. Patient returns to [address] [facility] ER today due to altered mental status. Patient has had decreased appetite and oral intake. She was started on Macrobid on Wednesday and today family noted some confusion. She presented for further evaluation. Currently she is alert and oriented x4 and is able to answer questions appropriately. She denies fever, chills, nausea, vomiting, abdominal pain, shortness of breath and chest pain. States wound VAC last changed on Wednesday. Work-up in ER CXR no acute cardiopulmonary process, WBC 12.2 Hgb 8.6 HCT 29.3, urinalysis large leukocyte +1 bacteria , lactic acid 0.8 and blood cultures pending. Patient is to be observed by hospitalist service.   Past Medical History:        Diagnosis Date    Arthritis     Cancer ([address])     endometrial cancer, bladder    Cataract     CIDP (chronic inflammatory demyelinating polyneuropathy) (HCC)     Crohn's disease (HCC)     GERD (gastroesophageal reflux disease)     History of blood transfusion tenderness. Abdominal:      General: Bowel sounds are normal. There is no distension. Palpations: Abdomen is soft. Tenderness: There is no abdominal tenderness. There is no guarding or rebound. Comments: ileal conduit with cloudy yellow urine   Musculoskeletal:         General: No swelling. Normal range of motion. Cervical back: Normal range of motion and neck supple. No rigidity or tenderness. Right lower leg: No edema. Left lower leg: No edema. Skin:     General: Skin is warm and dry. Capillary Refill: Capillary refill takes less than 2 seconds. Coloration: Skin is pale. Comments: Sacral wound with wound vac in place    Neurological:      General: No focal deficit present. Mental Status: She is alert and oriented to person, place, and time. Cranial Nerves: No cranial nerve deficit. Motor: Weakness present. Psychiatric:         Mood and Affect: Mood normal.         Behavior: Behavior normal.          Diagnostic Data:  CBC:  Recent Labs     12/02/23 1915   WBC 12.2*   HGB 8.6*   HCT 29.3*        BMP:  Recent Labs     12/02/23 1915      K 4.3      CO2 26   BUN 26*   CREATININE 0.7   CALCIUM 8.6*     Recent Labs     12/02/23 1915   AST 11   ALT <5*   BILITOT <0.2   ALKPHOS 61       Urinalysis:  Lab Results   Component Value Date/Time    NITRU Negative 12/02/2023 07:15 PM    MOVEU 931 12/02/2023 07:15 PM    BACTERIA 1+ 12/02/2023 07:15 PM    RBCUA 9 12/02/2023 07:15 PM    BLOODU MODERATE 12/02/2023 07:15 PM    SPECGRAV 1.009 12/02/2023 07:15 PM    GLUCOSEU Negative 12/02/2023 07:15 PM     XR CHEST PORTABLE    Result Date: 12/2/2023  EXAM: CHEST RADIOGRAPH  TECHNIQUE: Single frontal chest radiograph. HISTORY: Altered mental status. COMPARISON: 10/10/2023  FINDINGS:  EKG leads project over the chest. No pulmonary infiltrate is identified. No pleural effusion or pneumothorax is seen.  Heart size is normal. Mild scoliosis of the

## 2023-12-03 NOTE — PROGRESS NOTES
PHARMACY NOTE  Fidencio Cabrera was ordered Fish Oil capsules. Per the [facility] Formulary Committee, this medication is non-formulary and not stocked by pharmacy. It has been discontinued. The medication can be reordered at discharge.      Electronically signed by Zach Medrano [facility] on 12/3/2023 at 12:21 AM

## 2023-12-03 NOTE — PROGRESS NOTES
Physical Therapy  Facility/Department: [facility] 3 PAULY/VAS/MED  Physical Therapy Initial Assessment    Name: Edwardo Hernández  :   MRN: [id number]  Date of Service: 12/3/2023    Discharge Recommendations:  [address], [address] with PT, Patient would benefit from continued therapy after discharge          Patient Diagnosis(es): The primary encounter diagnosis was History of urostomy. A diagnosis of Recurrent UTI was also pertinent to this visit. Past Medical History:  has a past medical history of Arthritis, Cancer ([address]), Cataract, CIDP (chronic inflammatory demyelinating polyneuropathy) ([address]), Crohn's disease ([address]), GERD (gastroesophageal reflux disease), History of blood transfusion, Hypertension, Macular degeneration, Neuropathy, Palliative care patient, Perineal cyst in female, PVD (peripheral vascular disease) ([address]), Radiation, and Urinary incontinence. Past Surgical History:  has a past surgical history that includes Hysterectomy (); Carpal tunnel release (Bilateral, ); Knee arthroscopy (Right); back surgery; Cataract removal (Bilateral); Hammer toe surgery (Left); lumbar fusion; Cholecystectomy, laparoscopic; hx vascular angioplasty; vascular surgery (2022); Rectal surgery (N/A, 2022); Cervical spine surgery; Bladder removal (2023); vascular surgery (2023); and Skin lesion excision (N/A, 2023).     Assessment   Body Structures, Functions, Activity Limitations Requiring Skilled Therapeutic Intervention: Decreased functional mobility   Assessment: Patient will benefit from continuing skilled physical therapy to improve mobility  Treatment Diagnosis: Decline in mobility  Therapy Prognosis: Good  Decision Making: Low Complexity  Requires PT Follow-Up: Yes  Activity Tolerance  Activity Tolerance: Patient limited by fatigue     Plan   Physical Therapy Plan  Days Per Week: 7 Days  Therapy Duration: 2 Weeks  Current Treatment Recommendations:

## 2023-12-03 NOTE — PLAN OF CARE
Problem: Discharge Planning  Goal: Discharge to home or other facility with appropriate resources  Outcome: Progressing  Flowsheets  Taken 12/3/2023 0100  Discharge to home or other facility with appropriate resources:   Identify barriers to discharge with patient and caregiver   Arrange for needed discharge resources and transportation as appropriate   Identify discharge learning needs (meds, wound care, etc)   Arrange for interpreters to assist at discharge as needed   Refer to discharge planning if patient needs post-hospital services based on physician order or complex needs related to functional status, cognitive ability or social support system  Taken 12/2/2023 2201  Discharge to home or other facility with appropriate resources:   Identify barriers to discharge with patient and caregiver   Arrange for needed discharge resources and transportation as appropriate   Identify discharge learning needs (meds, wound care, etc)   Arrange for interpreters to assist at discharge as needed   Refer to discharge planning if patient needs post-hospital services based on physician order or complex needs related to functional status, cognitive ability or social support system     Problem: Skin/Tissue Integrity  Goal: Absence of new skin breakdown  Description: 1. Monitor for areas of redness and/or skin breakdown  2. Assess vascular access sites hourly  3. Every 4-6 hours minimum:  Change oxygen saturation probe site  4. Every 4-6 hours:  If on nasal continuous positive airway pressure, respiratory therapy assess nares and determine need for appliance change or resting period.   Outcome: Progressing     Problem: ABCDS Injury Assessment  Goal: Absence of physical injury  Outcome: Progressing  Flowsheets (Taken 12/2/2023 2201)  Absence of Physical Injury: Implement safety measures based on patient assessment     Problem: Safety - Adult  Goal: Free from fall injury  Outcome: Progressing     Problem: Pain  Goal: Verbalizes/displays adequate comfort level or baseline comfort level  Outcome: Progressing  Flowsheets (Taken 12/2/2023 2201)  Verbalizes/displays adequate comfort level or baseline comfort level:   Encourage patient to monitor pain and request assistance   Assess pain using appropriate pain scale   Administer analgesics based on type and severity of pain and evaluate response   Implement non-pharmacological measures as appropriate and evaluate response   Consider cultural and social influences on pain and pain management     Problem: Skin/Tissue Integrity - Adult  Goal: Skin integrity remains intact  Outcome: Progressing  Flowsheets  Taken 12/3/2023 0100  Skin Integrity Remains Intact: Monitor for areas of redness and/or skin breakdown  Taken 12/2/2023 2201  Skin Integrity Remains Intact:   Monitor for areas of redness and/or skin breakdown   Assess vascular access sites hourly  Goal: Incisions, wounds, or drain sites healing without S/S of infection  Outcome: Progressing  Goal: Oral mucous membranes remain intact  Outcome: Progressing     Problem: Musculoskeletal - Adult  Goal: Return mobility to safest level of function  Outcome: Progressing  Goal: Maintain proper alignment of affected body part  Outcome: Progressing  Goal: Return ADL status to a safe level of function  Outcome: Progressing     Problem: Genitourinary - Adult  Goal: Absence of urinary retention  Outcome: Progressing  Goal: Urinary catheter remains patent  Outcome: Progressing     Problem: Infection - Adult  Goal: Absence of infection at discharge  Outcome: Progressing  Goal: Absence of infection during hospitalization  Outcome: Progressing  Goal: Absence of fever/infection during anticipated neutropenic period  Outcome: Progressing     Problem: Metabolic/Fluid and Electrolytes - Adult  Goal: Electrolytes maintained within normal limits  Outcome: Progressing  Goal: Hemodynamic stability and optimal renal function maintained  Outcome:

## 2023-12-03 NOTE — PROGRESS NOTES
PHYSICAL THERAPY  Daily Treatment Note    DATE:  12/3/2023  NAME:  Margie Menezes  :  1945  (78 y.o.,female)  MRN:  [id number]      Subjective  General  Chart Reviewed: Yes  Patient assessed for rehabilitation services?: Yes  Diagnosis: Encephalopathy  Follows Commands: Within Functional Limits  Subjective  Subjective: Agrees to work with therapy          PAIN    [x] No Pain    [] Patient rates pain at ___ / 10 - Nursing was notified    HOME LIVING:       RESTRICTIONS/PRECAUTIONS:         OVERALL  ORIENTATION STATUS:  Overall Orientation Status: Within Functional Limits    STRENGTH  Strength RLE  Comment: Grossly 3+/5  Strength LLE  Comment: Grossly 3+/5    ROM   PROM RLE (degrees)  RLE PROM: WFL  PROM LLE (degrees)  LLE PROM: WFL     BALANCE  Balance  Posture: Fair  Sitting - Static: Good  Sitting - Dynamic: Good  Standing - Static: Poor  Standing - Dynamic: Poor    BED MOBILITY  Bed Mobility  Scooting: Moderate assistance    TRANSFERS  Transfers  Sit to Stand: Maximum Assistance  Stand to Sit: Moderate Assistance  Bed to Chair: Moderate assistance  Stand Pivot Transfers:  Moderate Assistance      AMBULATION  Device: Hand-Held Assist  Assistance: Dependent/Total  Distance: Transfer only    STAIRS       TREATMENT FOCUS THIS VISIT    [] Gait training  [x] Transfer training  [x] Bed mobility  [] Lower extremity exercise  [x] Safety and education    COMMENTS:  Returned to bed at patient request  Call light within reach  Bed alarm activated  Patient instructed to request assistance before getting up  Nursing updated        Electronically signed by Casper Morgan PT on 12/3/2023 at 10:29 AM

## 2023-12-03 NOTE — ED NOTES
Pt from NH. Staff states that pt had increased confusion today. Pt has new urostomy. Bag changed this afternoon. Family states that pt has has significant history of UTI's. Pt also has history of C-diff and sepsis. Pt alert and oriented at this time. Pt has wound to right heel and wound to buttock. Buttock wound has wound vac. Pt is followed by wound care for wound management .      Claribel Carbajal RN  12/02/23 40[address] [facility] DIANN Carrasco  12/02/23 6611

## 2023-12-04 PROBLEM — E43 SEVERE MALNUTRITION (HCC): Status: ACTIVE | Noted: 2023-12-04

## 2023-12-04 PROBLEM — A04.72 C. DIFFICILE DIARRHEA: Status: ACTIVE | Noted: 2023-12-04

## 2023-12-04 PROBLEM — R41.82 AMS (ALTERED MENTAL STATUS): Status: ACTIVE | Noted: 2023-12-04

## 2023-12-04 LAB
ADV 40+41 DNA STL QL NAA+NON-PROBE: NOT DETECTED
ALBUMIN SERPL-MCNC: 2.4 G/DL (ref 3.5–5.2)
ALP SERPL-CCNC: 58 U/L (ref 35–104)
ALT SERPL-CCNC: 6 U/L (ref 5–33)
ANION GAP SERPL CALCULATED.3IONS-SCNC: 12 MMOL/L (ref 7–19)
ANISOCYTOSIS BLD QL SMEAR: ABNORMAL
AST SERPL-CCNC: 16 U/L (ref 5–32)
BASOPHILS # BLD: 0.1 K/UL (ref 0–0.2)
BASOPHILS NFR BLD: 1.1 % (ref 0–1)
BILIRUB SERPL-MCNC: <0.2 MG/DL (ref 0.2–1.2)
BUN SERPL-MCNC: 36 MG/DL (ref 8–23)
C CAYETANENSIS DNA STL QL NAA+NON-PROBE: NOT DETECTED
C COLI+JEJ+UPSA DNA STL QL NAA+NON-PROBE: NOT DETECTED
C DIF TOX TCDA+TCDB STL QL NAA+NON-PROBE: DETECTED
CALCIUM SERPL-MCNC: 8 MG/DL (ref 8.8–10.2)
CHLORIDE SERPL-SCNC: 111 MMOL/L (ref 98–111)
CO2 SERPL-SCNC: 18 MMOL/L (ref 22–29)
CREAT SERPL-MCNC: 0.7 MG/DL (ref 0.5–0.9)
CRYPTOSP DNA STL QL NAA+NON-PROBE: NOT DETECTED
E HISTOLYT DNA STL QL NAA+NON-PROBE: NOT DETECTED
EAEC PAA PLAS AGGR+AATA ST NAA+NON-PRB: NOT DETECTED
EC STX1+STX2 GENES STL QL NAA+NON-PROBE: NOT DETECTED
EKG P AXIS: 55 DEGREES
EKG P-R INTERVAL: 148 MS
EKG Q-T INTERVAL: 342 MS
EKG QRS DURATION: 84 MS
EKG QTC CALCULATION (BAZETT): 412 MS
EKG T AXIS: 42 DEGREES
EOSINOPHIL # BLD: 0.2 K/UL (ref 0–0.6)
EOSINOPHIL NFR BLD: 2.3 % (ref 0–5)
EPEC EAE GENE STL QL NAA+NON-PROBE: NOT DETECTED
ERYTHROCYTE [DISTWIDTH] IN BLOOD BY AUTOMATED COUNT: 19.2 % (ref 11.5–14.5)
ETEC LTA+ST1A+ST1B TOX ST NAA+NON-PROBE: NOT DETECTED
G LAMBLIA DNA STL QL NAA+NON-PROBE: NOT DETECTED
GI PATH DNA+RNA PNL STL NAA+NON-PROBE: NOT DETECTED
GLUCOSE SERPL-MCNC: 92 MG/DL (ref 74–109)
HCT VFR BLD AUTO: 34.3 % (ref 37–47)
HGB BLD-MCNC: 8.6 G/DL (ref 12–16)
HYPOCHROMIA BLD QL SMEAR: ABNORMAL
IMM GRANULOCYTES # BLD: 0.1 K/UL
LYMPHOCYTES # BLD: 0.9 K/UL (ref 1.1–4.5)
LYMPHOCYTES NFR BLD: 9.7 % (ref 20–40)
MCH RBC QN AUTO: 24.2 PG (ref 27–31)
MCHC RBC AUTO-ENTMCNC: 25.1 G/DL (ref 33–37)
MCV RBC AUTO: 96.3 FL (ref 81–99)
MONOCYTES # BLD: 1.9 K/UL (ref 0–0.9)
MONOCYTES NFR BLD: 20.4 % (ref 0–10)
NEUTROPHILS # BLD: 6.1 K/UL (ref 1.5–7.5)
NEUTS SEG NFR BLD: 66 % (ref 50–65)
NOROVIRUS GI+II RNA STL QL NAA+NON-PROBE: NOT DETECTED
P SHIGELLOIDES DNA STL QL NAA+NON-PROBE: NOT DETECTED
PLATELET # BLD AUTO: 214 K/UL (ref 130–400)
PLATELET SLIDE REVIEW: ADEQUATE
PMV BLD AUTO: 8.8 FL (ref 9.4–12.3)
POTASSIUM SERPL-SCNC: 4.1 MMOL/L (ref 3.5–5)
PROT SERPL-MCNC: 5.3 G/DL (ref 6.6–8.7)
RBC # BLD AUTO: 3.56 M/UL (ref 4.2–5.4)
RVA RNA STL QL NAA+NON-PROBE: NOT DETECTED
S ENT+BONG DNA STL QL NAA+NON-PROBE: NOT DETECTED
SAPO I+II+IV+V RNA STL QL NAA+NON-PROBE: NOT DETECTED
SHIGELLA SP+EIEC IPAH ST NAA+NON-PROBE: NOT DETECTED
SODIUM SERPL-SCNC: 141 MMOL/L (ref 136–145)
STOMATOCYTES BLD QL SMEAR: ABNORMAL
V CHOL+PARA+VUL DNA STL QL NAA+NON-PROBE: NOT DETECTED
V CHOLERAE DNA STL QL NAA+NON-PROBE: NOT DETECTED
WBC # BLD AUTO: 9.2 K/UL (ref 4.8–10.8)
Y ENTEROCOL DNA STL QL NAA+NON-PROBE: NOT DETECTED

## 2023-12-04 PROCEDURE — 96376 TX/PRO/DX INJ SAME DRUG ADON: CPT

## 2023-12-04 PROCEDURE — 6360000002 HC RX W HCPCS

## 2023-12-04 PROCEDURE — 2580000003 HC RX 258: Performed by: HOSPITALIST

## 2023-12-04 PROCEDURE — 6360000002 HC RX W HCPCS: Performed by: NURSE PRACTITIONER

## 2023-12-04 PROCEDURE — 93010 ELECTROCARDIOGRAM REPORT: CPT | Performed by: INTERNAL MEDICINE

## 2023-12-04 PROCEDURE — 2580000003 HC RX 258: Performed by: NURSE PRACTITIONER

## 2023-12-04 PROCEDURE — 2580000003 HC RX 258

## 2023-12-04 PROCEDURE — 6370000000 HC RX 637 (ALT 250 FOR IP)

## 2023-12-04 PROCEDURE — 6370000000 HC RX 637 (ALT 250 FOR IP): Performed by: NURSE PRACTITIONER

## 2023-12-04 PROCEDURE — 80053 COMPREHEN METABOLIC PANEL: CPT

## 2023-12-04 PROCEDURE — 94760 N-INVAS EAR/PLS OXIMETRY 1: CPT

## 2023-12-04 PROCEDURE — 96372 THER/PROPH/DIAG INJ SC/IM: CPT

## 2023-12-04 PROCEDURE — 6360000002 HC RX W HCPCS: Performed by: HOSPITALIST

## 2023-12-04 PROCEDURE — 36415 COLL VENOUS BLD VENIPUNCTURE: CPT

## 2023-12-04 PROCEDURE — 1210000000 HC MED SURG R&B

## 2023-12-04 PROCEDURE — 6370000000 HC RX 637 (ALT 250 FOR IP): Performed by: STUDENT IN AN ORGANIZED HEALTH CARE EDUCATION/TRAINING PROGRAM

## 2023-12-04 PROCEDURE — 94150 VITAL CAPACITY TEST: CPT

## 2023-12-04 PROCEDURE — 6370000000 HC RX 637 (ALT 250 FOR IP): Performed by: HOSPITALIST

## 2023-12-04 PROCEDURE — 85025 COMPLETE CBC W/AUTO DIFF WBC: CPT

## 2023-12-04 PROCEDURE — 96366 THER/PROPH/DIAG IV INF ADDON: CPT

## 2023-12-04 RX ORDER — SODIUM CHLORIDE, SODIUM LACTATE, POTASSIUM CHLORIDE, AND CALCIUM CHLORIDE .6; .31; .03; .02 G/100ML; G/100ML; G/100ML; G/100ML
250 INJECTION, SOLUTION INTRAVENOUS ONCE
Status: DISCONTINUED | OUTPATIENT
Start: 2023-12-04 | End: 2023-12-04

## 2023-12-04 RX ORDER — IPRATROPIUM BROMIDE AND ALBUTEROL SULFATE 2.5; .5 MG/3ML; MG/3ML
1 SOLUTION RESPIRATORY (INHALATION)
Status: DISCONTINUED | OUTPATIENT
Start: 2023-12-04 | End: 2023-12-04

## 2023-12-04 RX ORDER — VANCOMYCIN HYDROCHLORIDE 125 MG/1
125 CAPSULE ORAL EVERY 6 HOURS SCHEDULED
Status: DISCONTINUED | OUTPATIENT
Start: 2023-12-04 | End: 2023-12-07 | Stop reason: HOSPADM

## 2023-12-04 RX ADMIN — PANTOPRAZOLE SODIUM 40 MG: 40 TABLET, DELAYED RELEASE ORAL at 05:39

## 2023-12-04 RX ADMIN — MULTIPLE VITAMINS W/ MINERALS TAB 1 TABLET: TAB at 08:58

## 2023-12-04 RX ADMIN — HYDROCODONE BITARTRATE AND ACETAMINOPHEN 1 TABLET: 10; 325 TABLET ORAL at 10:59

## 2023-12-04 RX ADMIN — CHOLESTYRAMINE 4 G: 4 POWDER, FOR SUSPENSION ORAL at 08:57

## 2023-12-04 RX ADMIN — GABAPENTIN 300 MG: 600 TABLET, FILM COATED ORAL at 14:38

## 2023-12-04 RX ADMIN — CHOLESTYRAMINE 4 G: 4 POWDER, FOR SUSPENSION ORAL at 21:50

## 2023-12-04 RX ADMIN — SODIUM CHLORIDE: 9 INJECTION, SOLUTION INTRAVENOUS at 05:39

## 2023-12-04 RX ADMIN — MESALAMINE 400 MG: 400 CAPSULE, DELAYED RELEASE ORAL at 14:38

## 2023-12-04 RX ADMIN — ALLOPURINOL 100 MG: 100 TABLET ORAL at 08:58

## 2023-12-04 RX ADMIN — GABAPENTIN 300 MG: 600 TABLET, FILM COATED ORAL at 21:46

## 2023-12-04 RX ADMIN — WATER 1000 MG: 1 INJECTION INTRAMUSCULAR; INTRAVENOUS; SUBCUTANEOUS at 00:38

## 2023-12-04 RX ADMIN — MESALAMINE 400 MG: 400 CAPSULE, DELAYED RELEASE ORAL at 21:44

## 2023-12-04 RX ADMIN — VANCOMYCIN HYDROCHLORIDE 1000 MG: 10 INJECTION, POWDER, LYOPHILIZED, FOR SOLUTION INTRAVENOUS at 03:45

## 2023-12-04 RX ADMIN — FOLIC ACID 1 MG: 1 TABLET ORAL at 08:58

## 2023-12-04 RX ADMIN — MESALAMINE 400 MG: 400 CAPSULE, DELAYED RELEASE ORAL at 08:57

## 2023-12-04 RX ADMIN — FERROUS SULFATE TAB 325 MG (65 MG ELEMENTAL FE) 325 MG: 325 (65 FE) TAB at 08:58

## 2023-12-04 RX ADMIN — Medication 10 ML: at 08:58

## 2023-12-04 RX ADMIN — ENOXAPARIN SODIUM 40 MG: 100 INJECTION SUBCUTANEOUS at 08:58

## 2023-12-04 RX ADMIN — FERROUS SULFATE TAB 325 MG (65 MG ELEMENTAL FE) 325 MG: 325 (65 FE) TAB at 17:11

## 2023-12-04 RX ADMIN — ASPIRIN 81 MG: 81 TABLET, COATED ORAL at 21:44

## 2023-12-04 RX ADMIN — HYDROCODONE BITARTRATE AND ACETAMINOPHEN 1 TABLET: 10; 325 TABLET ORAL at 02:35

## 2023-12-04 RX ADMIN — HYDROCODONE BITARTRATE AND ACETAMINOPHEN 1 TABLET: 10; 325 TABLET ORAL at 17:13

## 2023-12-04 RX ADMIN — GABAPENTIN 300 MG: 600 TABLET, FILM COATED ORAL at 08:58

## 2023-12-04 RX ADMIN — Medication 1 TABLET: at 08:58

## 2023-12-04 RX ADMIN — VANCOMYCIN HYDROCHLORIDE 125 MG: 125 CAPSULE ORAL at 14:38

## 2023-12-04 RX ADMIN — FENOFIBRATE 54 MG: 54 TABLET, FILM COATED ORAL at 08:58

## 2023-12-04 ASSESSMENT — PAIN SCALES - GENERAL
PAINLEVEL_OUTOF10: 8
PAINLEVEL_OUTOF10: 8
PAINLEVEL_OUTOF10: 6
PAINLEVEL_OUTOF10: 4

## 2023-12-04 ASSESSMENT — PAIN DESCRIPTION - LOCATION
LOCATION: SACRUM;COCCYX
LOCATION: GENERALIZED
LOCATION: GENERALIZED

## 2023-12-04 ASSESSMENT — PAIN DESCRIPTION - ORIENTATION: ORIENTATION: LEFT

## 2023-12-04 ASSESSMENT — PAIN DESCRIPTION - DESCRIPTORS: DESCRIPTORS: DISCOMFORT

## 2023-12-04 NOTE — PROGRESS NOTES
Comprehensive Nutrition Assessment    Type and Reason for Visit:  Initial, Positive Nutrition Screen    Nutrition Recommendations/Plan:   Modify ONS     Malnutrition Assessment:  Malnutrition Status:  Severe malnutrition (12/04/23 0914)    Context:  Acute Illness     Findings of the 6 clinical characteristics of malnutrition:  Energy Intake:  50% or less of estimated energy requirements for 5 or more days  Weight Loss:  Greater than 7.5% over 3 months     Body Fat Loss:  Mild body fat loss Buccal region, Orbital   Muscle Mass Loss:  No significant muscle mass loss    Fluid Accumulation:  No significant fluid accumulation Extremities   Strength:  Not Performed    Nutrition Assessment:    +NS for decreased po intake and wounds. PO intake decreased from usual intake. Intake ranging 0-50%. Current weight is 17# or 12.5% less than UBW since 10/2023. Modifying Wolf /wound healing supplement order to BID per  manufacters recommendations for dosage. Also send yogurt per pt request.    Nutrition Related Findings:    No edema Wound Type: Pressure Injury, Stage II, Multiple, Stage III       Current Nutrition Intake & Therapies:    Average Meal Intake: 1-25%, 26-50%  Average Supplements Intake: 51-75%, %  ADULT ORAL NUTRITION SUPPLEMENT; Lunch, Dinner; Wound Healing Oral Supplement  ADULT DIET; Regular    Anthropometric Measures:  Height: 162.6 cm (5' 4.02\")  Ideal Body Weight (IBW): 120 lbs (55 kg)    Admission Body Weight: 54.4 kg (120 lb)  Current Body Weight: 54.4 kg (119 lb 14.9 oz), 99.9 % IBW. Weight Source:  Other (Comment) (est)  Current BMI (kg/m2): 20.6  Usual Body Weight: 62.3 kg (137 lb 6.4 oz)  % Weight Change (Calculated): -12.7  BMI Categories: Underweight (BMI less than 22) age over 72    Estimated Daily Nutrient Needs:  Energy Requirements Based On: Kcal/kg  Weight Used for Energy Requirements: Current  Energy (kcal/day): 1291-9406 kcals (25-30 kcals/kg)  Weight Used for Protein Requirements: Current  Protein (g/day): 54-109g  Method Used for Fluid Requirements: 1 ml/kcal  Fluid (ml/day): 7984-0597 ml    Nutrition Diagnosis:   Inadequate oral intake related to acute injury/trauma, cognitive or neurological impairment, increase demand for energy/nutrients as evidenced by intake 0-25%, intake 26-50%, poor intake prior to admission, wounds, weight loss 7.5% in 3 months, mild muscle loss    Nutrition Interventions:   Food and/or Nutrient Delivery: Continue Current Diet, Modify Oral Nutrition Supplement  Nutrition Education/Counseling: No recommendation at this time  Coordination of Nutrition Care: Continue to monitor while inpatient  Plan of Care discussed with: pt    Goals:     Goals: Meet at least 75% of estimated needs, PO intake 50% or greater       Nutrition Monitoring and Evaluation:   Behavioral-Environmental Outcomes: None Identified  Food/Nutrient Intake Outcomes: Food and Nutrient Intake, Supplement Intake  Physical Signs/Symptoms Outcomes: Biochemical Data, Weight, Skin, Fluid Status or Edema    Discharge Planning:    Continue current diet, Continue Oral Nutrition Supplement     Isiah Hannah, MS, RD, LD  Contact: [phone]

## 2023-12-04 NOTE — CARE COORDINATION
Zoë Hernandez met with Pt at bedside to discuss the Pt IMM (1st) letter Pt was upgraded from observation to inpatient and to answer any questions/concerns at that time. The Pt did not have any questions/concerns at that time and waived the four hour wait period. The Pt signed the IMM (1st) letter and it was filed in the Pt soft chart.     12/04/23 1440   IMM Letter   IMM Letter given to Patient/Family/Significant other/Guardian/POA/by: Lázaro Preston    IMM Letter date given: 12/04/23   IMM Letter time given: 1431

## 2023-12-04 NOTE — PROGRESS NOTES
Palliative Care/Spiritual Care: Met with pt to initiate palliative care. Pt says she had a fever and a UTI. She also said they busted her urostomy; she says she was upset over it. Pt's nurse Claribel Carson came in during visit to look at what was needed to take care of it. Pt has other diagnoses in past medical history and pt is known to palliative care. Advance Directives:Pt has a LW. Her daughter Db Barone is her primary decision maker and her sons Priyanka Randolph and Gracie Cerda are secondary/alternate decision makers. Pt is full code but she says she wants CPR but does not want to be put on a ventilator. Pt's nurse Claribel Carson was not available but this  will follow up to notify her about pt's wishes for no ventilator. Pt's nurse Claribel Carson has been notified and will inform pt's doctor. SEE ACP NOTE. Pain/other symptoms: Pt says she is having pain in the wound on her back and is being given pain medication. Social/Spiritual: Pt says she attended a [address].         Pt/family discussion r/t goals: Pt is at [facility] for rehab and therapy. She says she was using a walker to ambulate. She currently has a urostomy. She says she was doing well and her goal is to return to [facility] and continue with rehab. Provided spiritual care with sustaining presence, nurtured hope, and prayer. Pt expressed gratitude for spiritual care.      Electronically signed by Key Deng on 12/4/2023 at 11:05 AM

## 2023-12-04 NOTE — PROGRESS NOTES
[facility]  Nurse  Consult Note       NAME:  Corinne Dawson  MEDICAL RECORD NUMBER:  [id number]  AGE: 77 y.o.    GENDER: female  : 1945  TODAY'S DATE:  2023    Subjective   Reason for Wound Nurse Evaluation and Assessment: NPWT Sacrum, Right foot, ankle, and heel      Corinne Dawson is a 77 y.o. female referred by:   [] Physician  [x] Nursing  [] Other:     Wound Identification:  Wound Type: pressure  Contributing Factors: chronic pressure and decreased mobility    Wound History: First documented 10/12/23  Current Wound Care Treatment:  NPWT to Sacrum, Santyl to R medial foot    Patient Goal of Care:  [x] Wound Healing  [] Odor Control  [] Palliative Care  [] Pain Control   [] Other:         PAST MEDICAL HISTORY        Diagnosis Date    Arthritis     Cancer ([address])     endometrial cancer, bladder    Cataract     CIDP (chronic inflammatory demyelinating polyneuropathy) ([facility])     Crohn's disease (HCC)     GERD (gastroesophageal reflux disease)     History of blood transfusion     Hypertension     Macular degeneration     Neuropathy     Palliative care patient 10/11/2023    Perineal cyst in female     PVD (peripheral vascular disease) ([address])     Radiation     Urinary incontinence        PAST SURGICAL HISTORY    Past Surgical History:   Procedure Laterality Date    BACK SURGERY      lumbar    BLADDER REMOVAL  2023    urostomy    CARPAL TUNNEL RELEASE Bilateral     CATARACT REMOVAL Bilateral     CERVICAL SPINE SURGERY      metal in neck    CHOLECYSTECTOMY, LAPAROSCOPIC      HAMMER TOE SURGERY Left     HX VASCULAR ANGIOPLASTY      & STENT    HYSTERECTOMY (CERVIX STATUS UNKNOWN)      KNEE ARTHROSCOPY Right     LUMBAR FUSION      RECTAL SURGERY N/A 2022    PERINEAL CYST EXCISION performed by Cheko Felix DO at [facility] N/A 2023    DEBRIDEMENT OF SACRAL DECUBITUS ULCER TO THE LEVEL OF BONE performed by Cheko Felix DO at [facility]  2022 Assessment:  Severity:  0 / 10  Quality of pain: N/A  Wound Pain Timing/Severity: none  Premedicated: N/A    Plan   Plan of Care: Wound 10/12/23 Sacrum Wound #1, sacrum, pressure injury-Dressing/Treatment: Negative pressure wound therapy  Wound 10/12/23 Ankle Lateral;Right Wound #4, right lateral ankle, pressure injury-Dressing/Treatment: Silicone border  Wound 10/12/23 Foot Medial;Right Wound #3, right medial foot, pressure injury-Dressing/Treatment: Pharmaceutical agent (see MAR), Moist to dry  Wound 10/12/23 Heel Right Wound #2, right heel, pressure injury-Dressing/Treatment: Silicone border    Specialty Bed Required : Yes   [x] Low Air Loss - on Dolphin mattress  [] Pressure Redistribution  [] Fluid Immersion  [] Bariatric  [] Total Pressure Relief  [] Other:     Current Diet: ADULT ORAL NUTRITION SUPPLEMENT; Lunch, Dinner; Wound Healing Oral Supplement  ADULT DIET; Regular  Dietician consult:  Yes    Discharge Plan:  Placement for patient upon discharge: skilled nursing    Patient appropriate for Outpatient [address]: Yes    Referrals:  []   [] [facility]  [] Supplies  [] Other    Patient/Caregiver Teaching:  Level of patient/caregiver understanding able to:   [] Indicates understanding       [] Needs reinforcement  [] Unsuccessful      [] Verbal Understanding  [] Demonstrated understanding       [] No evidence of learning  [] Refused teaching         [x] N/A       Patient is known to wound care from prior admissions. Patient has a stage 4 pressure injury at the sacrum that she has been following up at [facility] and receiving NPWT at the SNF. Patient has pressure injury to the right lateral ankle that is now closed and pressure injury to the right heel that is now closed. She has a stage 3 pressure injury to the right medial foot. Patient also has a prexisting urostomy. Recommendations:    Turn and position patient to the right and left lateral sides every 2 hours.  Do not lay

## 2023-12-04 NOTE — CARE COORDINATION
Pt is from [facility],  does not have a bedhold. Will require a precert to return.   [facility]  [phone]  Electronically signed by Nancy Ward RN on 12/4/2023 at 11:35 AM

## 2023-12-04 NOTE — PROGRESS NOTES
Attempted initial evaluation at 1400 pm. Patient with wound care at this time. Will continue to follow.       Electronically signed by MEMO Smith on 12/4/2023 at 2:01 PM

## 2023-12-04 NOTE — CONSULTS
Consult received. Patient known to Palliative care. Will place Palliative care evaluation and Palliative RN/ will initiate discussions. We will follow along and assist as needed. Thank you for allowing us to participate in the care of this patient.     Hector Crawley PA-C

## 2023-12-04 NOTE — ACP (ADVANCE CARE PLANNING)
Advance Care Planning     Advance Care Planning Inpatient Note  [facility] Department    Today's Date: 12/4/2023  Unit: [facility] PAULY/VAS/MED    Received request from Other: Palliative care . Upon review of chart and communication with care team, patient's decision making abilities are not in question. . Patient was/were present in the room during visit. Goals of ACP Conversation:  Discuss advance care planning documents    Health Care Decision Makers:       Primary Decision Maker: Cristel Fulton Child - [phone]    Secondary Decision Maker: Mariano Johnson - Child - [phone]    Secondary Decision Maker: Obinna Morel - Child - [phone]  Summary:  Verified Documents    Advance Care Planning Documents (Patient Wishes):  Living Will/Advance Directive     Assessment:  Pt has been a resident at [facility] for rehab and therapy. She says she was doing well. She also has a document an AD/LW with her decision makers named. Pt's goal is to return to [facility] to continue rehab and therapy. Interventions:  Confirmed pt's document, code status, and decision makers. Care Preferences Communicated:     Hospitalization:  If the patient's health worsens and it becomes clear that the chance of recovery is unlikely,     the patient wants hospitalization. Ventilation:   If the patient, in their present state of health, suddenly became very ill and unable to breathe on their own,     the patient would NOT desire the use of a ventilator (breathing machine). If their health worsens and it becomes clear that the change of recovery is unlikely,     the patient would NOT desire the use of a ventilator (breathing machine). Resuscitation:  In the event the patient's heart stopped as a result of an underlying serious health condition, the patient communicates a preference for      resuscitative attempts (CPR).     Outcomes/Plan:  ACP Discussion: Completed    Electronically signed by Chaplain Benjamin on

## 2023-12-04 NOTE — PROGRESS NOTES
[facility]      [facility] Note    Patient:  Milton Hanley  YOB: 1945  Date of Service: 12/4/2023  MRN: [id number]   Acct: [de-identified]   Primary Care Physician: Feroz Ontiveros DO  Advance Directive: Full Code  Admit Date: 12/2/2023       Hospital Day: 0    Portions of this note have been copied forward, however, updated to reflect the most current clinical status of this patient. CHIEF COMPLAINT AMS    SUBJECTIVE:  No new complaints      CUMULATIVE HOSPITAL COURSE:    The patient is a 77 y.o. 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 difficile colitis, macular degeneration, PVD, s/p hysterectomy, cystectomy, ileal conduit and urostomy creation, chronic pressure wounds, wound VAC to sacral decubitus who presented to [facility] ED from SNF with complaints of fever. Stated she has had decreased appetite and oral intake. Reported she was started on Macrobid on Wednesday. Family reported patient having confusion. Denied fever, chills, nausea, vomiting, abdominal pain, shortness of breath or chest pain. Stated wound vac was changed on Wednesday. Of note, patient was admitted in October with sepsis due to UTI. Initially concerned regarding patient significant past medical history however case has been discussed with urology along with patient's urology surgeon at [facility] at that time her output was significant and creatinine remains normal.  She received IV antibiotics and was discharged to SNF in stable condition. Workup in ED revealed unremarkable chemistry, WBC 12.2, Hgb 8.6, Hct 29.3, urinalysis indicated large leukocytes, +1 bacteria, . Cxray unremarkable. Patient was admitted to hospital medicine for further evaluation. IV Vancomycin and Rocephin was initiated based on urine cultures in the past. Urine culture showed klebsiella. IV vanc dc'd ad GI panel positive for C-diff.   Oral vanc protocol initiated   Wound care ______________________________________ Electronically signed by: Alice Vera M.D. Date:     12/04/2023 Time:    12:40     XR CHEST PORTABLE    Result Date: 12/2/2023  EXAM: CHEST RADIOGRAPH  TECHNIQUE: Single frontal chest radiograph. HISTORY: Altered mental status. COMPARISON: 10/10/2023  FINDINGS:  EKG leads project over the chest. No pulmonary infiltrate is identified. No pleural effusion or pneumothorax is seen. Heart size is normal. Mild scoliosis of the thoracolumbar spine, convex to the left, again noted. No acute displaced rib fractures are identified. 3.8 cm lesion with a thin sclerotic margin and paracentral ground-glass of the proximal left humerus, again noted, potentially fibrous dysplasia. Surgical fixation hardware of the cervical spine and lumbar spine, again noted. Surgical clips in the right upper quadrant. 1.  No acute findings in the chest.    ______________________________________ Electronically signed by: Yunior Chicas M.D. Date:     12/02/2023 Time:    20:28             Assessment/Plan   Principal Problem:    Acute encephalopathy  Active Problems:    Recurrent UTI    Palliative care patient    Pressure injury of sacral region, stage 4 (HCC)    Severe malnutrition (HCC)    AMS (altered mental status)  Resolved Problems:    * No resolved hospital problems. *    Principal Problem:    Acute encephalopathy   Secondary to UTI   Follow labs   Moniter blood glucose   Pt/Ot   Neuro checks  Active Problems:    Recurrent UTI   Klebsiella pneumonia    Vanc dc;d    Cont rocephin  C-Diff diarrhea   Vanc 125 mg po qid    Palliative care patient   note    Pressure injury of sacral region, stage 4 (HCC)   Wound care following    Severe malnutrition (HCC)   Dietary consult  Resolved Problems:    * No resolved hospital problems.  *      Antibiotic: oral vanc  rocephin     DVT Prophylaxis: Lovenox     GI prophylaxis:  protonix     Discharge planning: TBD       Further Orders per Clinical

## 2023-12-05 LAB
ALBUMIN SERPL-MCNC: 2.2 G/DL (ref 3.5–5.2)
ALP SERPL-CCNC: 53 U/L (ref 35–104)
ALT SERPL-CCNC: <5 U/L (ref 5–33)
ANION GAP SERPL CALCULATED.3IONS-SCNC: 9 MMOL/L (ref 7–19)
AST SERPL-CCNC: 8 U/L (ref 5–32)
BACTERIA UR CULT: ABNORMAL
BACTERIA UR CULT: ABNORMAL
BASOPHILS # BLD: 0.1 K/UL (ref 0–0.2)
BASOPHILS NFR BLD: 0.7 % (ref 0–1)
BILIRUB SERPL-MCNC: <0.2 MG/DL (ref 0.2–1.2)
BUN SERPL-MCNC: 28 MG/DL (ref 8–23)
CALCIUM SERPL-MCNC: 8.2 MG/DL (ref 8.8–10.2)
CHLORIDE SERPL-SCNC: 109 MMOL/L (ref 98–111)
CO2 SERPL-SCNC: 23 MMOL/L (ref 22–29)
CREAT SERPL-MCNC: 0.7 MG/DL (ref 0.5–0.9)
EOSINOPHIL # BLD: 0.3 K/UL (ref 0–0.6)
EOSINOPHIL NFR BLD: 3.4 % (ref 0–5)
ERYTHROCYTE [DISTWIDTH] IN BLOOD BY AUTOMATED COUNT: 18.5 % (ref 11.5–14.5)
GLUCOSE SERPL-MCNC: 88 MG/DL (ref 74–109)
HCT VFR BLD AUTO: 26.6 % (ref 37–47)
HGB BLD-MCNC: 7.8 G/DL (ref 12–16)
IMM GRANULOCYTES # BLD: 0 K/UL
LYMPHOCYTES # BLD: 1.1 K/UL (ref 1.1–4.5)
LYMPHOCYTES NFR BLD: 13.1 % (ref 20–40)
MCH RBC QN AUTO: 23.9 PG (ref 27–31)
MCHC RBC AUTO-ENTMCNC: 29.3 G/DL (ref 33–37)
MCV RBC AUTO: 81.6 FL (ref 81–99)
MONOCYTES # BLD: 1.4 K/UL (ref 0–0.9)
MONOCYTES NFR BLD: 17.5 % (ref 0–10)
NEUTROPHILS # BLD: 5.2 K/UL (ref 1.5–7.5)
NEUTS SEG NFR BLD: 64.8 % (ref 50–65)
ORGANISM: ABNORMAL
PLATELET # BLD AUTO: 286 K/UL (ref 130–400)
PMV BLD AUTO: 9.2 FL (ref 9.4–12.3)
POTASSIUM SERPL-SCNC: 3.9 MMOL/L (ref 3.5–5)
PROT SERPL-MCNC: 5.4 G/DL (ref 6.6–8.7)
RBC # BLD AUTO: 3.26 M/UL (ref 4.2–5.4)
SODIUM SERPL-SCNC: 141 MMOL/L (ref 136–145)
WBC # BLD AUTO: 8 K/UL (ref 4.8–10.8)

## 2023-12-05 PROCEDURE — 6360000002 HC RX W HCPCS

## 2023-12-05 PROCEDURE — 6370000000 HC RX 637 (ALT 250 FOR IP)

## 2023-12-05 PROCEDURE — 80053 COMPREHEN METABOLIC PANEL: CPT

## 2023-12-05 PROCEDURE — 6370000000 HC RX 637 (ALT 250 FOR IP): Performed by: NURSE PRACTITIONER

## 2023-12-05 PROCEDURE — 6370000000 HC RX 637 (ALT 250 FOR IP): Performed by: HOSPITALIST

## 2023-12-05 PROCEDURE — 6370000000 HC RX 637 (ALT 250 FOR IP): Performed by: STUDENT IN AN ORGANIZED HEALTH CARE EDUCATION/TRAINING PROGRAM

## 2023-12-05 PROCEDURE — 85025 COMPLETE CBC W/AUTO DIFF WBC: CPT

## 2023-12-05 PROCEDURE — 2580000003 HC RX 258: Performed by: HOSPITALIST

## 2023-12-05 PROCEDURE — 36415 COLL VENOUS BLD VENIPUNCTURE: CPT

## 2023-12-05 PROCEDURE — 6360000002 HC RX W HCPCS: Performed by: HOSPITALIST

## 2023-12-05 PROCEDURE — 94760 N-INVAS EAR/PLS OXIMETRY 1: CPT

## 2023-12-05 PROCEDURE — 2580000003 HC RX 258

## 2023-12-05 PROCEDURE — 1210000000 HC MED SURG R&B

## 2023-12-05 RX ADMIN — ALLOPURINOL 100 MG: 100 TABLET ORAL at 08:47

## 2023-12-05 RX ADMIN — WATER 1000 MG: 1 INJECTION INTRAMUSCULAR; INTRAVENOUS; SUBCUTANEOUS at 00:10

## 2023-12-05 RX ADMIN — VANCOMYCIN HYDROCHLORIDE 125 MG: 125 CAPSULE ORAL at 00:11

## 2023-12-05 RX ADMIN — MESALAMINE 400 MG: 400 CAPSULE, DELAYED RELEASE ORAL at 21:57

## 2023-12-05 RX ADMIN — GABAPENTIN 300 MG: 600 TABLET, FILM COATED ORAL at 21:57

## 2023-12-05 RX ADMIN — Medication 1 TABLET: at 08:47

## 2023-12-05 RX ADMIN — GABAPENTIN 300 MG: 600 TABLET, FILM COATED ORAL at 08:47

## 2023-12-05 RX ADMIN — Medication 10 ML: at 08:48

## 2023-12-05 RX ADMIN — VANCOMYCIN HYDROCHLORIDE 125 MG: 125 CAPSULE ORAL at 16:36

## 2023-12-05 RX ADMIN — PANTOPRAZOLE SODIUM 40 MG: 40 TABLET, DELAYED RELEASE ORAL at 06:31

## 2023-12-05 RX ADMIN — CHOLESTYRAMINE 4 G: 4 POWDER, FOR SUSPENSION ORAL at 21:57

## 2023-12-05 RX ADMIN — VANCOMYCIN HYDROCHLORIDE 125 MG: 125 CAPSULE ORAL at 22:05

## 2023-12-05 RX ADMIN — COLLAGENASE SANTYL: 250 OINTMENT TOPICAL at 08:47

## 2023-12-05 RX ADMIN — FOLIC ACID 1 MG: 1 TABLET ORAL at 08:47

## 2023-12-05 RX ADMIN — FERROUS SULFATE TAB 325 MG (65 MG ELEMENTAL FE) 325 MG: 325 (65 FE) TAB at 16:36

## 2023-12-05 RX ADMIN — VANCOMYCIN HYDROCHLORIDE 125 MG: 125 CAPSULE ORAL at 11:47

## 2023-12-05 RX ADMIN — ASPIRIN 81 MG: 81 TABLET, COATED ORAL at 21:58

## 2023-12-05 RX ADMIN — HYDROCODONE BITARTRATE AND ACETAMINOPHEN 1 TABLET: 10; 325 TABLET ORAL at 22:46

## 2023-12-05 RX ADMIN — ENOXAPARIN SODIUM 40 MG: 100 INJECTION SUBCUTANEOUS at 08:47

## 2023-12-05 RX ADMIN — FENOFIBRATE 54 MG: 54 TABLET, FILM COATED ORAL at 08:47

## 2023-12-05 RX ADMIN — HYDROCODONE BITARTRATE AND ACETAMINOPHEN 1 TABLET: 10; 325 TABLET ORAL at 08:51

## 2023-12-05 RX ADMIN — MESALAMINE 400 MG: 400 CAPSULE, DELAYED RELEASE ORAL at 08:47

## 2023-12-05 RX ADMIN — FERROUS SULFATE TAB 325 MG (65 MG ELEMENTAL FE) 325 MG: 325 (65 FE) TAB at 08:47

## 2023-12-05 RX ADMIN — CHOLESTYRAMINE 4 G: 4 POWDER, FOR SUSPENSION ORAL at 08:47

## 2023-12-05 RX ADMIN — MESALAMINE 400 MG: 400 CAPSULE, DELAYED RELEASE ORAL at 13:23

## 2023-12-05 RX ADMIN — GABAPENTIN 300 MG: 600 TABLET, FILM COATED ORAL at 13:23

## 2023-12-05 RX ADMIN — HYDROCODONE BITARTRATE AND ACETAMINOPHEN 1 TABLET: 10; 325 TABLET ORAL at 16:36

## 2023-12-05 RX ADMIN — MULTIPLE VITAMINS W/ MINERALS TAB 1 TABLET: TAB at 08:47

## 2023-12-05 RX ADMIN — WATER 1000 MG: 1 INJECTION INTRAMUSCULAR; INTRAVENOUS; SUBCUTANEOUS at 21:57

## 2023-12-05 RX ADMIN — VANCOMYCIN HYDROCHLORIDE 125 MG: 125 CAPSULE ORAL at 06:31

## 2023-12-05 RX ADMIN — Medication 10 ML: at 21:58

## 2023-12-05 ASSESSMENT — PAIN - FUNCTIONAL ASSESSMENT
PAIN_FUNCTIONAL_ASSESSMENT: PREVENTS OR INTERFERES SOME ACTIVE ACTIVITIES AND ADLS
PAIN_FUNCTIONAL_ASSESSMENT: PREVENTS OR INTERFERES WITH MANY ACTIVE NOT PASSIVE ACTIVITIES
PAIN_FUNCTIONAL_ASSESSMENT: PREVENTS OR INTERFERES SOME ACTIVE ACTIVITIES AND ADLS

## 2023-12-05 ASSESSMENT — PAIN DESCRIPTION - DESCRIPTORS
DESCRIPTORS: ACHING;PRESSURE
DESCRIPTORS: ACHING;SHOOTING
DESCRIPTORS: THROBBING

## 2023-12-05 ASSESSMENT — PAIN SCALES - GENERAL
PAINLEVEL_OUTOF10: 4
PAINLEVEL_OUTOF10: 7
PAINLEVEL_OUTOF10: 8
PAINLEVEL_OUTOF10: 8

## 2023-12-05 ASSESSMENT — PAIN DESCRIPTION - LOCATION
LOCATION: BACK;BUTTOCKS
LOCATION: BUTTOCKS
LOCATION: BACK;BUTTOCKS

## 2023-12-05 ASSESSMENT — PAIN DESCRIPTION - ORIENTATION
ORIENTATION: MID;LOWER
ORIENTATION: DISTAL;LOWER

## 2023-12-05 NOTE — PLAN OF CARE
Problem: Discharge Planning  Goal: Discharge to home or other facility with appropriate resources  Outcome: Progressing  Flowsheets (Taken 12/4/2023 1950)  Discharge to home or other facility with appropriate resources:   Identify barriers to discharge with patient and caregiver   Arrange for needed discharge resources and transportation as appropriate   Identify discharge learning needs (meds, wound care, etc)     Problem: Skin/Tissue Integrity  Goal: Absence of new skin breakdown  Description: 1. Monitor for areas of redness and/or skin breakdown  2. Assess vascular access sites hourly  3. Every 4-6 hours minimum:  Change oxygen saturation probe site  4. Every 4-6 hours:  If on nasal continuous positive airway pressure, respiratory therapy assess nares and determine need for appliance change or resting period.   Outcome: Progressing     Problem: ABCDS Injury Assessment  Goal: Absence of physical injury  Outcome: Progressing     Problem: Safety - Adult  Goal: Free from fall injury  Outcome: Progressing     Problem: Pain  Goal: Verbalizes/displays adequate comfort level or baseline comfort level  Outcome: Progressing

## 2023-12-05 NOTE — PROGRESS NOTES
Physician Progress Note      Wally Casas  CSN #:                  [id number]  :                       1945  ADMIT DATE:       2023 5:57 PM  [address] [facility] DATE:  [place]  PROVIDER #:        Ross Bass MD          QUERY TEXT:    Pt admitted with fever, recent UTI and found to have continued UTi, C.DIff   positive. Pt noted to have elevated temp, elevated heart rate, elevated Resp. Rate, elevated WB on admission. If possible, please document in the progress   notes and discharge summary if you are evaluating and /or treating any of the   following: The medical record reflects the following:  Risk Factors: UTI, C.DIFF  Clinical Indicators: T: 99.3, 100.0  HR: 107 and RR; 18, WBC: 12.2, Lactic   0.8, No CRP, No Procalcitonin, Urine Culture is + for Klebsiella Pneumoniae  Treatment: Rocephin IV, Urine Culture, Blood Culture,      Haroldo Aranda RN-BSN, [facility]  Clinical   O. [email or web address]  [employer] [employer]  Options provided:  -- Sepsis, present on admission  -- UTI without Sepsis  -- SIRS Only  -- Other - I will add my own diagnosis  -- Disagree - Not applicable / Not valid  -- Disagree - Clinically unable to determine / Unknown  -- Refer to Clinical Documentation Reviewer    PROVIDER RESPONSE TEXT:    This patient has sepsis which was present on admission.     Query created by: Brenda Mclean on 2023 4:47 PM      Electronically signed by:  Ross Bass MD 2023 3:18 PM

## 2023-12-06 PROBLEM — E44.0 MODERATE MALNUTRITION (HCC): Status: ACTIVE | Noted: 2023-12-06

## 2023-12-06 PROBLEM — E43 SEVERE MALNUTRITION (HCC): Status: RESOLVED | Noted: 2023-12-04 | Resolved: 2023-12-06

## 2023-12-06 LAB
ALBUMIN SERPL-MCNC: 2.4 G/DL (ref 3.5–5.2)
ALP SERPL-CCNC: 54 U/L (ref 35–104)
ALT SERPL-CCNC: <5 U/L (ref 5–33)
ANION GAP SERPL CALCULATED.3IONS-SCNC: 8 MMOL/L (ref 7–19)
ANISOCYTOSIS BLD QL SMEAR: ABNORMAL
AST SERPL-CCNC: 9 U/L (ref 5–32)
BASOPHILS # BLD: 0.1 K/UL (ref 0–0.2)
BASOPHILS NFR BLD: 0.7 % (ref 0–1)
BILIRUB SERPL-MCNC: <0.2 MG/DL (ref 0.2–1.2)
BUN SERPL-MCNC: 28 MG/DL (ref 8–23)
CALCIUM SERPL-MCNC: 8.1 MG/DL (ref 8.8–10.2)
CHLORIDE SERPL-SCNC: 110 MMOL/L (ref 98–111)
CO2 SERPL-SCNC: 24 MMOL/L (ref 22–29)
CREAT SERPL-MCNC: 0.5 MG/DL (ref 0.5–0.9)
EOSINOPHIL # BLD: 0.2 K/UL (ref 0–0.6)
EOSINOPHIL NFR BLD: 3 % (ref 0–5)
ERYTHROCYTE [DISTWIDTH] IN BLOOD BY AUTOMATED COUNT: 18.7 % (ref 11.5–14.5)
GLUCOSE SERPL-MCNC: 96 MG/DL (ref 74–109)
HCT VFR BLD AUTO: 26.9 % (ref 37–47)
HCT VFR BLD AUTO: 34.1 % (ref 37–47)
HEMOCCULT SP1 STL QL: NORMAL
HGB BLD-MCNC: 7.6 G/DL (ref 12–16)
HGB BLD-MCNC: 9 G/DL (ref 12–16)
HYPOCHROMIA BLD QL SMEAR: ABNORMAL
IMM GRANULOCYTES # BLD: 0 K/UL
LYMPHOCYTES # BLD: 1.1 K/UL (ref 1.1–4.5)
LYMPHOCYTES NFR BLD: 15 % (ref 20–40)
MCH RBC QN AUTO: 23.4 PG (ref 27–31)
MCHC RBC AUTO-ENTMCNC: 28.3 G/DL (ref 33–37)
MCV RBC AUTO: 82.8 FL (ref 81–99)
MONOCYTES # BLD: 1.3 K/UL (ref 0–0.9)
MONOCYTES NFR BLD: 17.2 % (ref 0–10)
NEUTROPHILS # BLD: 4.6 K/UL (ref 1.5–7.5)
NEUTS SEG NFR BLD: 63.7 % (ref 50–65)
PLATELET # BLD AUTO: 248 K/UL (ref 130–400)
PLATELET SLIDE REVIEW: ADEQUATE
PMV BLD AUTO: 8.7 FL (ref 9.4–12.3)
POTASSIUM SERPL-SCNC: 3.6 MMOL/L (ref 3.5–5)
PROT SERPL-MCNC: 5.4 G/DL (ref 6.6–8.7)
RBC # BLD AUTO: 3.25 M/UL (ref 4.2–5.4)
SODIUM SERPL-SCNC: 142 MMOL/L (ref 136–145)
STOMATOCYTES BLD QL SMEAR: ABNORMAL
WBC # BLD AUTO: 7.3 K/UL (ref 4.8–10.8)

## 2023-12-06 PROCEDURE — 6360000002 HC RX W HCPCS: Performed by: HOSPITALIST

## 2023-12-06 PROCEDURE — 36415 COLL VENOUS BLD VENIPUNCTURE: CPT

## 2023-12-06 PROCEDURE — 85018 HEMOGLOBIN: CPT

## 2023-12-06 PROCEDURE — 1210000000 HC MED SURG R&B

## 2023-12-06 PROCEDURE — 2580000003 HC RX 258: Performed by: NURSE PRACTITIONER

## 2023-12-06 PROCEDURE — 6370000000 HC RX 637 (ALT 250 FOR IP)

## 2023-12-06 PROCEDURE — 6370000000 HC RX 637 (ALT 250 FOR IP): Performed by: STUDENT IN AN ORGANIZED HEALTH CARE EDUCATION/TRAINING PROGRAM

## 2023-12-06 PROCEDURE — 6360000002 HC RX W HCPCS: Performed by: NURSE PRACTITIONER

## 2023-12-06 PROCEDURE — 80053 COMPREHEN METABOLIC PANEL: CPT

## 2023-12-06 PROCEDURE — 82270 OCCULT BLOOD FECES: CPT

## 2023-12-06 PROCEDURE — 2580000003 HC RX 258: Performed by: HOSPITALIST

## 2023-12-06 PROCEDURE — 6370000000 HC RX 637 (ALT 250 FOR IP): Performed by: HOSPITALIST

## 2023-12-06 PROCEDURE — 85014 HEMATOCRIT: CPT

## 2023-12-06 PROCEDURE — 6370000000 HC RX 637 (ALT 250 FOR IP): Performed by: NURSE PRACTITIONER

## 2023-12-06 PROCEDURE — 85025 COMPLETE CBC W/AUTO DIFF WBC: CPT

## 2023-12-06 RX ADMIN — GABAPENTIN 300 MG: 600 TABLET, FILM COATED ORAL at 20:11

## 2023-12-06 RX ADMIN — CHOLESTYRAMINE 4 G: 4 POWDER, FOR SUSPENSION ORAL at 07:48

## 2023-12-06 RX ADMIN — MULTIPLE VITAMINS W/ MINERALS TAB 1 TABLET: TAB at 07:48

## 2023-12-06 RX ADMIN — FENOFIBRATE 54 MG: 54 TABLET, FILM COATED ORAL at 07:47

## 2023-12-06 RX ADMIN — ENOXAPARIN SODIUM 40 MG: 100 INJECTION SUBCUTANEOUS at 07:57

## 2023-12-06 RX ADMIN — FERROUS SULFATE TAB 325 MG (65 MG ELEMENTAL FE) 325 MG: 325 (65 FE) TAB at 07:47

## 2023-12-06 RX ADMIN — HYDROCODONE BITARTRATE AND ACETAMINOPHEN 1 TABLET: 10; 325 TABLET ORAL at 21:59

## 2023-12-06 RX ADMIN — ALLOPURINOL 100 MG: 100 TABLET ORAL at 07:47

## 2023-12-06 RX ADMIN — MESALAMINE 400 MG: 400 CAPSULE, DELAYED RELEASE ORAL at 07:47

## 2023-12-06 RX ADMIN — GABAPENTIN 300 MG: 600 TABLET, FILM COATED ORAL at 07:47

## 2023-12-06 RX ADMIN — VANCOMYCIN HYDROCHLORIDE 125 MG: 125 CAPSULE ORAL at 18:34

## 2023-12-06 RX ADMIN — HYDROCODONE BITARTRATE AND ACETAMINOPHEN 1 TABLET: 10; 325 TABLET ORAL at 07:47

## 2023-12-06 RX ADMIN — FOLIC ACID 1 MG: 1 TABLET ORAL at 07:47

## 2023-12-06 RX ADMIN — MESALAMINE 400 MG: 400 CAPSULE, DELAYED RELEASE ORAL at 15:07

## 2023-12-06 RX ADMIN — VANCOMYCIN HYDROCHLORIDE 125 MG: 125 CAPSULE ORAL at 06:32

## 2023-12-06 RX ADMIN — SODIUM CHLORIDE 400 MG: 9 INJECTION, SOLUTION INTRAVENOUS at 10:56

## 2023-12-06 RX ADMIN — CHOLESTYRAMINE 4 G: 4 POWDER, FOR SUSPENSION ORAL at 20:11

## 2023-12-06 RX ADMIN — Medication 10 ML: at 20:11

## 2023-12-06 RX ADMIN — ASPIRIN 81 MG: 81 TABLET, COATED ORAL at 20:11

## 2023-12-06 RX ADMIN — Medication 1 TABLET: at 07:47

## 2023-12-06 RX ADMIN — Medication 10 ML: at 07:49

## 2023-12-06 RX ADMIN — GABAPENTIN 300 MG: 600 TABLET, FILM COATED ORAL at 15:07

## 2023-12-06 RX ADMIN — PANTOPRAZOLE SODIUM 40 MG: 40 TABLET, DELAYED RELEASE ORAL at 06:32

## 2023-12-06 RX ADMIN — MESALAMINE 400 MG: 400 CAPSULE, DELAYED RELEASE ORAL at 20:11

## 2023-12-06 RX ADMIN — COLLAGENASE SANTYL: 250 OINTMENT TOPICAL at 07:58

## 2023-12-06 RX ADMIN — VANCOMYCIN HYDROCHLORIDE 125 MG: 125 CAPSULE ORAL at 12:01

## 2023-12-06 ASSESSMENT — PAIN DESCRIPTION - DESCRIPTORS
DESCRIPTORS: ACHING
DESCRIPTORS: SORE

## 2023-12-06 ASSESSMENT — ENCOUNTER SYMPTOMS
RESPIRATORY NEGATIVE: 1
ALLERGIC/IMMUNOLOGIC NEGATIVE: 1
EYES NEGATIVE: 1
DIARRHEA: 1

## 2023-12-06 ASSESSMENT — PAIN SCALES - GENERAL
PAINLEVEL_OUTOF10: 4
PAINLEVEL_OUTOF10: 8
PAINLEVEL_OUTOF10: 8

## 2023-12-06 ASSESSMENT — PAIN - FUNCTIONAL ASSESSMENT
PAIN_FUNCTIONAL_ASSESSMENT: ACTIVITIES ARE NOT PREVENTED
PAIN_FUNCTIONAL_ASSESSMENT: PREVENTS OR INTERFERES SOME ACTIVE ACTIVITIES AND ADLS

## 2023-12-06 ASSESSMENT — PAIN DESCRIPTION - LOCATION
LOCATION: COCCYX
LOCATION: SACRUM

## 2023-12-06 ASSESSMENT — PAIN DESCRIPTION - ORIENTATION: ORIENTATION: MID

## 2023-12-06 NOTE — PROGRESS NOTES
Comprehensive Nutrition Assessment    Type and Reason for Visit:  Reassess    Nutrition Recommendations/Plan:   Continue current nutritional interventions     Malnutrition Assessment:  Malnutrition Status: Moderate malnutrition (12/06/23 1016)    Context:  Acute Illness     Findings of the 6 clinical characteristics of malnutrition:  Energy Intake:  Mild decrease in energy intake (Comment)  Weight Loss:  Greater than 7.5% over 3 months     Body Fat Loss:  Mild body fat loss Buccal region   Muscle Mass Loss:  No significant muscle mass loss    Fluid Accumulation:  Mild Extremities, Generalized   Strength:  Not Performed    Nutrition Assessment:    PO intake has improved with intake now ranging %. Is taking Wolf. New wt NA. Pt has improved to Moderate Malnutrition from Severe Malnutrition. Nutrition Related Findings:    +1 generalized edema Wound Type: Pressure Injury, Stage II, Multiple, Stage III       Current Nutrition Intake & Therapies:    Average Meal Intake: 51-75%, %  Average Supplements Intake: 51-75%, %  ADULT ORAL NUTRITION SUPPLEMENT; Lunch, Dinner; Wound Healing Oral Supplement  ADULT DIET; Regular    Anthropometric Measures:  Height: 162.6 cm (5' 4.02\")  Ideal Body Weight (IBW): 120 lbs (55 kg)    Admission Body Weight: 54.4 kg (120 lb)  Current Body Weight: 54.4 kg (119 lb 14.9 oz), 99.9 % IBW. Weight Source:  Other (Comment) (est)  Current BMI (kg/m2): 20.6  Usual Body Weight: 62.3 kg (137 lb 6.4 oz)  % Weight Change (Calculated): -12.7  BMI Categories: Underweight (BMI less than 22) age over 72    Estimated Daily Nutrient Needs:  Energy Requirements Based On: Kcal/kg  Weight Used for Energy Requirements: Current  Energy (kcal/day): 4542-8914 kcals (25-30 kcals/kg)  Weight Used for Protein Requirements: Current  Protein (g/day): 54-109g  Method Used for Fluid Requirements: 1 ml/kcal  Fluid (ml/day): 0532-1707 ml    Nutrition Diagnosis:   Increased nutrient needs related to

## 2023-12-06 NOTE — PLAN OF CARE
Problem: Discharge Planning  Goal: Discharge to home or other facility with appropriate resources  Outcome: Progressing  Flowsheets (Taken 12/5/2023 2130)  Discharge to home or other facility with appropriate resources: Identify barriers to discharge with patient and caregiver     Problem: Skin/Tissue Integrity  Goal: Absence of new skin breakdown  Description: 1. Monitor for areas of redness and/or skin breakdown  2. Assess vascular access sites hourly  3. Every 4-6 hours minimum:  Change oxygen saturation probe site  4. Every 4-6 hours:  If on nasal continuous positive airway pressure, respiratory therapy assess nares and determine need for appliance change or resting period.   Outcome: Progressing     Problem: ABCDS Injury Assessment  Goal: Absence of physical injury  Outcome: Progressing     Problem: Safety - Adult  Goal: Free from fall injury  Outcome: Progressing     Problem: Pain  Goal: Verbalizes/displays adequate comfort level or baseline comfort level  Outcome: Progressing     Problem: Skin/Tissue Integrity - Adult  Goal: Skin integrity remains intact  Outcome: Progressing  Flowsheets (Taken 12/5/2023 2130)  Skin Integrity Remains Intact: Monitor for areas of redness and/or skin breakdown  Goal: Incisions, wounds, or drain sites healing without S/S of infection  Outcome: Progressing  Flowsheets (Taken 12/5/2023 2130)  Incisions, Wounds, or Drain Sites Healing Without Sign and Symptoms of Infection: TWICE DAILY: Assess and document skin integrity  Goal: Oral mucous membranes remain intact  Outcome: Progressing  Flowsheets (Taken 12/5/2023 2130)  Oral Mucous Membranes Remain Intact: Assess oral mucosa and hygiene practices     Problem: Musculoskeletal - Adult  Goal: Return mobility to safest level of function  Outcome: Progressing  Flowsheets (Taken 12/5/2023 2130)  Return Mobility to Safest Level of Function: Assess patient stability and activity tolerance for standing, transferring and ambulating with

## 2023-12-06 NOTE — PLAN OF CARE
Problem: Discharge Planning  Goal: Discharge to home or other facility with appropriate resources  12/6/2023 1101 by Chano Chin RN  Outcome: Progressing  12/6/2023 0325 by Rekha Logan RN  Outcome: Progressing  Flowsheets (Taken 12/5/2023 2130)  Discharge to home or other facility with appropriate resources: Identify barriers to discharge with patient and caregiver     Problem: Skin/Tissue Integrity  Goal: Absence of new skin breakdown  Description: 1. Monitor for areas of redness and/or skin breakdown  2. Assess vascular access sites hourly  3. Every 4-6 hours minimum:  Change oxygen saturation probe site  4. Every 4-6 hours:  If on nasal continuous positive airway pressure, respiratory therapy assess nares and determine need for appliance change or resting period.   12/6/2023 1101 by Chano Chin RN  Outcome: Progressing  12/6/2023 0325 by Rekha Logan RN  Outcome: Progressing     Problem: ABCDS Injury Assessment  Goal: Absence of physical injury  12/6/2023 1101 by Chano Chin RN  Outcome: Progressing  12/6/2023 0325 by Rekha Logan RN  Outcome: Progressing     Problem: Safety - Adult  Goal: Free from fall injury  12/6/2023 1101 by Chano Chin RN  Outcome: Progressing  12/6/2023 0325 by Rekha Logan RN  Outcome: Progressing     Problem: Pain  Goal: Verbalizes/displays adequate comfort level or baseline comfort level  12/6/2023 1101 by Chano Chin RN  Outcome: Progressing  12/6/2023 0325 by Rekha Logan RN  Outcome: Progressing     Problem: Skin/Tissue Integrity - Adult  Goal: Skin integrity remains intact  12/6/2023 1101 by Chano Chin RN  Outcome: Progressing  12/6/2023 0325 by Rekha Logan RN  Outcome: Progressing  Flowsheets (Taken 12/5/2023 2130)  Skin Integrity Remains Intact: Monitor for areas of redness and/or skin breakdown  Goal: Incisions, wounds, or drain sites healing without S/S of infection  12/6/2023 1101 by Domonique Baez

## 2023-12-06 NOTE — PROGRESS NOTES
[facility]      Progress Note    Patient:  Marilyn Stern  YOB: 1945  Date of Service: 12/6/2023  MRN: [id number]   Acct: [de-identified]   Primary Care Physician: Mindy Perea DO  Advance Directive: Full Code  Admit Date: 12/2/2023       Hospital Day: 3    Portions of this note have been copied forward, however, updated to reflect the most current clinical status of this patient. CHIEF COMPLAINT AMS    SUBJECTIVE:  No new complaints      CUMULATIVE HOSPITAL COURSE:    The patient is a 77 y.o. 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 difficile colitis, macular degeneration, PVD, s/p hysterectomy, cystectomy, ileal conduit and urostomy creation, chronic pressure wounds, wound VAC to sacral decubitus who presented to [facility] ED from SNF with complaints of fever. Stated she has had decreased appetite and oral intake. Reported she was started on Macrobid on Wednesday. Family reported patient having confusion. Denied fever, chills, nausea, vomiting, abdominal pain, shortness of breath or chest pain. Stated wound vac was changed on Wednesday. Of note, patient was admitted in October with sepsis due to UTI. Initially concerned regarding patient significant past medical history however case has been discussed with urology along with patient's urology surgeon at [facility] at that time her output was significant and creatinine remains normal.  She received IV antibiotics and was discharged to SNF in stable condition. Workup in ED revealed unremarkable chemistry, WBC 12.2, Hgb 8.6, Hct 29.3, urinalysis indicated large leukocytes, +1 bacteria, . Cxray unremarkable. Patient was admitted to hospital medicine for further evaluation. IV Vancomycin and Rocephin was initiated based on urine cultures in the past. Urine culture showed klebsiella. IV vanc dc'd ad GI panel positive for C-diff.   Oral vanc protocol initiated   Wound care

## 2023-12-06 NOTE — PLAN OF CARE
Problem: Nutrition Deficit:  Goal: Optimize nutritional status  12/6/2023 1037 by Keith Ball MS, RD, LD  Outcome: Progressing  Flowsheets (Taken 12/6/2023 1011)  Nutrient intake appropriate for improving, restoring, or maintaining nutritional needs:   Assess nutritional status and recommend course of action   Monitor oral intake, labs, and treatment plans   Recommend appropriate diets, oral nutritional supplements, and vitamin/mineral supplements  12/6/2023 0325 by Rekha Logan RN  Outcome: Progressing

## 2023-12-06 NOTE — PROGRESS NOTES
This  visited with pt to provide support and spiritual care. Pt says she is doing better and she was suppose to discharge today but had several BM's. She said the provider said they will wait on the discharge, maybe tomorrow. Pt will discharge to the SNF. Provided spiritual care with sustaining presence, nurtured hope, and prayer. Pt expressed gratitude for spiritual care.      Electronically signed by Key Deng on 12/6/2023 at 11:43 AM

## 2023-12-07 VITALS
RESPIRATION RATE: 18 BRPM | HEIGHT: 64 IN | HEART RATE: 84 BPM | SYSTOLIC BLOOD PRESSURE: 131 MMHG | OXYGEN SATURATION: 94 % | WEIGHT: 120 LBS | DIASTOLIC BLOOD PRESSURE: 55 MMHG | BODY MASS INDEX: 20.49 KG/M2 | TEMPERATURE: 98.1 F

## 2023-12-07 LAB
ALBUMIN SERPL-MCNC: 2.5 G/DL (ref 3.5–5.2)
ALP SERPL-CCNC: 61 U/L (ref 35–104)
ALT SERPL-CCNC: 6 U/L (ref 5–33)
ANION GAP SERPL CALCULATED.3IONS-SCNC: 5 MMOL/L (ref 7–19)
AST SERPL-CCNC: 13 U/L (ref 5–32)
BACTERIA BLD CULT ORG #2: NORMAL
BACTERIA BLD CULT: NORMAL
BASOPHILS # BLD: 0 K/UL (ref 0–0.2)
BASOPHILS NFR BLD: 0.5 % (ref 0–1)
BILIRUB SERPL-MCNC: <0.2 MG/DL (ref 0.2–1.2)
BUN SERPL-MCNC: 30 MG/DL (ref 8–23)
CALCIUM SERPL-MCNC: 8.7 MG/DL (ref 8.8–10.2)
CHLORIDE SERPL-SCNC: 110 MMOL/L (ref 98–111)
CO2 SERPL-SCNC: 27 MMOL/L (ref 22–29)
CREAT SERPL-MCNC: 0.5 MG/DL (ref 0.5–0.9)
EOSINOPHIL # BLD: 0.2 K/UL (ref 0–0.6)
EOSINOPHIL NFR BLD: 1.8 % (ref 0–5)
ERYTHROCYTE [DISTWIDTH] IN BLOOD BY AUTOMATED COUNT: 18.8 % (ref 11.5–14.5)
GLUCOSE SERPL-MCNC: 88 MG/DL (ref 74–109)
HCT VFR BLD AUTO: 28.6 % (ref 37–47)
HGB BLD-MCNC: 8.3 G/DL (ref 12–16)
IMM GRANULOCYTES # BLD: 0 K/UL
LYMPHOCYTES # BLD: 1.1 K/UL (ref 1.1–4.5)
LYMPHOCYTES NFR BLD: 12.8 % (ref 20–40)
MCH RBC QN AUTO: 23.9 PG (ref 27–31)
MCHC RBC AUTO-ENTMCNC: 29 G/DL (ref 33–37)
MCV RBC AUTO: 82.4 FL (ref 81–99)
MONOCYTES # BLD: 1.5 K/UL (ref 0–0.9)
MONOCYTES NFR BLD: 17.2 % (ref 0–10)
NEUTROPHILS # BLD: 5.8 K/UL (ref 1.5–7.5)
NEUTS SEG NFR BLD: 67.2 % (ref 50–65)
PLATELET # BLD AUTO: 311 K/UL (ref 130–400)
PMV BLD AUTO: 9.7 FL (ref 9.4–12.3)
POTASSIUM SERPL-SCNC: 4 MMOL/L (ref 3.5–5)
PROT SERPL-MCNC: 6 G/DL (ref 6.6–8.7)
RBC # BLD AUTO: 3.47 M/UL (ref 4.2–5.4)
SODIUM SERPL-SCNC: 142 MMOL/L (ref 136–145)
WBC # BLD AUTO: 8.7 K/UL (ref 4.8–10.8)

## 2023-12-07 PROCEDURE — 80053 COMPREHEN METABOLIC PANEL: CPT

## 2023-12-07 PROCEDURE — 94760 N-INVAS EAR/PLS OXIMETRY 1: CPT

## 2023-12-07 PROCEDURE — 85025 COMPLETE CBC W/AUTO DIFF WBC: CPT

## 2023-12-07 PROCEDURE — 97530 THERAPEUTIC ACTIVITIES: CPT

## 2023-12-07 PROCEDURE — 2580000003 HC RX 258

## 2023-12-07 PROCEDURE — 6370000000 HC RX 637 (ALT 250 FOR IP)

## 2023-12-07 PROCEDURE — 6360000002 HC RX W HCPCS: Performed by: NURSE PRACTITIONER

## 2023-12-07 PROCEDURE — 2580000003 HC RX 258: Performed by: NURSE PRACTITIONER

## 2023-12-07 PROCEDURE — 6370000000 HC RX 637 (ALT 250 FOR IP): Performed by: NURSE PRACTITIONER

## 2023-12-07 PROCEDURE — 6370000000 HC RX 637 (ALT 250 FOR IP): Performed by: STUDENT IN AN ORGANIZED HEALTH CARE EDUCATION/TRAINING PROGRAM

## 2023-12-07 PROCEDURE — 2580000003 HC RX 258: Performed by: HOSPITALIST

## 2023-12-07 PROCEDURE — 36415 COLL VENOUS BLD VENIPUNCTURE: CPT

## 2023-12-07 PROCEDURE — 6360000002 HC RX W HCPCS

## 2023-12-07 PROCEDURE — 6370000000 HC RX 637 (ALT 250 FOR IP): Performed by: HOSPITALIST

## 2023-12-07 PROCEDURE — 6360000002 HC RX W HCPCS: Performed by: HOSPITALIST

## 2023-12-07 RX ORDER — VANCOMYCIN HYDROCHLORIDE 125 MG/1
125 CAPSULE ORAL 4 TIMES DAILY
Qty: 30 CAPSULE | Refills: 0 | Status: SHIPPED | OUTPATIENT
Start: 2023-12-07 | End: 2023-12-07 | Stop reason: SDUPTHER

## 2023-12-07 RX ORDER — VANCOMYCIN HYDROCHLORIDE 125 MG/1
125 CAPSULE ORAL 4 TIMES DAILY
Qty: 30 CAPSULE | Refills: 0 | Status: SHIPPED | OUTPATIENT
Start: 2023-12-07 | End: 2023-12-15

## 2023-12-07 RX ORDER — ALLOPURINOL 100 MG/1
100 TABLET ORAL DAILY
Qty: 30 TABLET | Refills: 3 | Status: SHIPPED | OUTPATIENT
Start: 2023-12-08

## 2023-12-07 RX ORDER — HYDROCORTISONE 25 MG/G
CREAM TOPICAL 2 TIMES DAILY PRN
Status: DISCONTINUED | OUTPATIENT
Start: 2023-12-07 | End: 2023-12-07 | Stop reason: HOSPADM

## 2023-12-07 RX ADMIN — VANCOMYCIN HYDROCHLORIDE 125 MG: 125 CAPSULE ORAL at 06:19

## 2023-12-07 RX ADMIN — GABAPENTIN 300 MG: 600 TABLET, FILM COATED ORAL at 14:46

## 2023-12-07 RX ADMIN — GABAPENTIN 300 MG: 600 TABLET, FILM COATED ORAL at 08:47

## 2023-12-07 RX ADMIN — MESALAMINE 400 MG: 400 CAPSULE, DELAYED RELEASE ORAL at 08:30

## 2023-12-07 RX ADMIN — WATER 1000 MG: 1 INJECTION INTRAMUSCULAR; INTRAVENOUS; SUBCUTANEOUS at 02:15

## 2023-12-07 RX ADMIN — Medication 10 ML: at 08:31

## 2023-12-07 RX ADMIN — ALLOPURINOL 100 MG: 100 TABLET ORAL at 08:31

## 2023-12-07 RX ADMIN — Medication 1 TABLET: at 08:31

## 2023-12-07 RX ADMIN — MULTIPLE VITAMINS W/ MINERALS TAB 1 TABLET: TAB at 08:30

## 2023-12-07 RX ADMIN — COLLAGENASE SANTYL: 250 OINTMENT TOPICAL at 08:46

## 2023-12-07 RX ADMIN — VANCOMYCIN HYDROCHLORIDE 125 MG: 125 CAPSULE ORAL at 12:32

## 2023-12-07 RX ADMIN — VANCOMYCIN HYDROCHLORIDE 125 MG: 125 CAPSULE ORAL at 02:15

## 2023-12-07 RX ADMIN — SODIUM CHLORIDE 300 MG: 9 INJECTION, SOLUTION INTRAVENOUS at 06:20

## 2023-12-07 RX ADMIN — ENOXAPARIN SODIUM 40 MG: 100 INJECTION SUBCUTANEOUS at 08:30

## 2023-12-07 RX ADMIN — PANTOPRAZOLE SODIUM 40 MG: 40 TABLET, DELAYED RELEASE ORAL at 06:19

## 2023-12-07 RX ADMIN — FENOFIBRATE 54 MG: 54 TABLET, FILM COATED ORAL at 08:31

## 2023-12-07 RX ADMIN — CHOLESTYRAMINE 4 G: 4 POWDER, FOR SUSPENSION ORAL at 08:33

## 2023-12-07 RX ADMIN — HYDROCODONE BITARTRATE AND ACETAMINOPHEN 1 TABLET: 10; 325 TABLET ORAL at 06:42

## 2023-12-07 RX ADMIN — FOLIC ACID 1 MG: 1 TABLET ORAL at 08:31

## 2023-12-07 RX ADMIN — MESALAMINE 400 MG: 400 CAPSULE, DELAYED RELEASE ORAL at 14:46

## 2023-12-07 ASSESSMENT — PAIN DESCRIPTION - DESCRIPTORS: DESCRIPTORS: ACHING;SORE

## 2023-12-07 ASSESSMENT — PAIN - FUNCTIONAL ASSESSMENT: PAIN_FUNCTIONAL_ASSESSMENT: PREVENTS OR INTERFERES SOME ACTIVE ACTIVITIES AND ADLS

## 2023-12-07 ASSESSMENT — PAIN SCALES - GENERAL
PAINLEVEL_OUTOF10: 4
PAINLEVEL_OUTOF10: 8

## 2023-12-07 ASSESSMENT — PAIN DESCRIPTION - LOCATION: LOCATION: COCCYX

## 2023-12-07 ASSESSMENT — PAIN DESCRIPTION - ORIENTATION: ORIENTATION: MID

## 2023-12-07 NOTE — DISCHARGE SUMMARY
Discharge Summary    NAME: Jennifer Ritchie  :    MRN:  [id number]    Admit date:  2023  Discharge date:      Admitting Physician:  Dario Rivera MD    Advance Directive: Full Code    Consults: none    Primary Care Physician:  Fernando Clark DO    Discharge Diagnoses:  Principal Problem:    Acute encephalopathy  Active Problems:    Recurrent UTI    Palliative care patient    Pressure injury of sacral region, stage 4 (HCC)    AMS (altered mental status)    C. difficile diarrhea    Moderate malnutrition ([address])  Resolved Problems:    Severe malnutrition ([address])      Significant Diagnostic Studies:   XR ABDOMEN (KUB) (SINGLE AP VIEW)    Result Date: 2023  EXAM:  ABDOMEN  HISTORY:  Abdominal pain  COMPARISON:  CT scan 10/10/2023  FINDINGS:  Single view of the abdomen is obtained. There is a nonobstructive nonspecific bowel gas pattern. There is no soft tissue mass. Surgical sutures are present in the right lower quadrant. Surgical clips are right upper quadrant from cholecystectomy. Lateral and posterior instrumented fusion is noted with hardware to the right pedicles of L1-L2 at L3. Interbody disc space device is are present at L1-L2,, L2- L3 ,3-L4 and L4-L5      Nonobstructive bowel gas pattern. 2.  Postsurgical change in the lumbar spine as described above.   ______________________________________ Electronically signed by: Mary Lou Johnson M.D. Date:     2023 Time:    12:40     XR CHEST PORTABLE    Result Date: 2023  EXAM: CHEST RADIOGRAPH  TECHNIQUE: Single frontal chest radiograph. HISTORY: Altered mental status. COMPARISON: 10/10/2023  FINDINGS:  EKG leads project over the chest. No pulmonary infiltrate is identified. No pleural effusion or pneumothorax is seen. Heart size is normal. Mild scoliosis of the thoracolumbar spine, convex to the left, again noted. No acute displaced rib fractures are identified.   3.8 cm lesion with a thin sclerotic margin and paracentral ground-glass of the proximal left humerus, again noted, potentially fibrous dysplasia. Surgical fixation hardware of the cervical spine and lumbar spine, again noted. Surgical clips in the right upper quadrant. 1.  No acute findings in the chest.    ______________________________________ Electronically signed by: Yani Bell M.D. Date:     12/02/2023 Time:    20:28       Pertinent Labs:   CBC:   Recent Labs     12/05/23  0242 12/06/23  0344 12/06/23  1158 12/07/23  0652   WBC 8.0 7.3  --  8.7   HGB 7.8* 7.6* 9.0* 8.3*    248  --  311     BMP:    Recent Labs     12/05/23  0242 12/06/23  0344 12/07/23  0652    142 142   K 3.9 3.6 4.0    110 110   CO2 23 24 27   BUN 28* 28* 30*   CREATININE 0.7 0.5 0.5   GLUCOSE 88 96 88     INR: No results for input(s): \"INR\" in the last 72 hours. Lipids: No results for input(s): \"CHOL\", \"HDL\" in the last 72 hours. Invalid input(s): \"LDLCALCU\"  ABGs:No results for input(s): \"PHART\", \"PZQ8IVO\", \"PO2ART\", \"FJG9NIC\", \"BEART\", \"HGBAE\", \"X3VWJWWZ\", \"CARBOXHGBART\", \"02THERAPY\" in the last 72 hours. HgBA1c:  No results for input(s): \"LABA1C\" in the last 72 hours. Procedures:   Hospital Course: The patient is a 77 y.o. 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 difficile colitis, macular degeneration, PVD, s/p hysterectomy, cystectomy, ileal conduit and urostomy creation, chronic pressure wounds, wound VAC to sacral decubitus who presented to [address] [place] [address] ED from [facility] with complaints of fever. Stated she has had decreased appetite and oral intake. Reported she was started on Macrobid on Wednesday. Family reported patient having confusion. Denied fever, chills, nausea, vomiting, abdominal pain, shortness of breath or chest pain. Stated wound vac was changed on Wednesday. Of note, patient was admitted in October with sepsis due to UTI.   Initially concerned regarding patient significant past medical history however case

## 2023-12-07 NOTE — CARE COORDINATION
Important Message from [facility] letter given to  Chanel Jen  by Oscar Parsons. All questions answered,  Chanel Jen  voiced understanding. Signed copy of IMM placed in patient's soft chart. Chanel Jen given a copy of the IMM.  2nd IMM     12/07/23 1445   IMM Letter   IMM Letter given to Patient/Family/Significant other/Guardian/POA/by: Oscar Parosns   IMM Letter date given: 12/07/23   IMM Letter time given: 0915

## 2023-12-07 NOTE — CARE COORDINATION
Per Effie Hemphill at [facility], precert is good thru 36/06- pt can return today.   [facility]  [phone]  Electronically signed by Vivian Mishra RN on 12/7/2023 at 12:22 PM

## 2023-12-07 NOTE — PROGRESS NOTES
Physical Therapy  [facility]  [id number]       12/07/23 1045   Restrictions/Precautions   Restrictions/Precautions Contact Precautions  (C-diff)   Required Braces or Orthoses? No   Position Activity Restriction   Other position/activity restrictions Urostomy, sacral wound vac,   General   Chart Reviewed Yes   Subjective   Subjective Ready to participate. Pt c/o diarrhea and notes urostomy   Pain Assessment   Pain Assessment None - Denies Pain   Bed Mobility   Bridging Stand by assistance   Rolling Stand by assistance;Supervision   Supine to Sit Minimal assistance;Contact guard assistance   Sit to Supine Minimal assistance  (for BLE into bed)   Scooting Minimal assistance;Contact guard assistance  (with extra time)   Comment scooting difficult due to air mattress   Transfers   Sit to Stand Moderate Assistance;Minimal Assistance   Stand to Sit Contact guard assistance   Balance   Posture Fair   Sitting - Static Good   Sitting - Dynamic Good   Standing - Static Fair;-   Comments Pt able to stand 2 minutes at time with support of bed rail and back of upright chair. Short Term Goals   Time Frame for Short Term Goals 2 weeks   Short Term Goal 1 Independent with bed mobility   Short Term Goal 2 Minimal assist with transfers   Short Term Goal 3 Ambulate 10 feet with moderate assist with or without assistive device   Conditions Requiring Skilled Therapeutic Intervention   Body Structures, Functions, Activity Limitations Requiring Skilled Therapeutic Intervention Decreased functional mobility ; Decreased endurance;Decreased balance;Decreased posture;Decreased strength   Assessment Pt demonstrated improved bed mobility, sit to stand transfers, and standing balance today. She participates with good effort but unable to progress stepping/ bed<>chair transfers today due to diarrhea. Pt would benefit from continued physical therapy to progress mobility and function.    Discharge Recommendations Continue to assess pending

## 2023-12-07 NOTE — DISCHARGE INSTRUCTIONS
Wound Care:    Negative Pressure Wound Therapy:  Wound Location: Sacral Wound  Clean: Wash wound bed and at least 4 inches of surrounding skin with soap and water. Flush wound with NS. Pat dry. Periwound: Apply Skin Prep to periwound skin and under all drape. Window-pane surrounding area with vac drape. May use hydrocolloid instead of drape. If skin becomes red due to tape irritation, apply light dusting of antifungal powder to periwound skin, then cover with skin prep to form crusting. May repeat steps until proper crusting is made to protect skin. Dressing Application: DO NOT PUT FOAM ON GOOD SKIN. Apply black foam to wound bed. NPWT Settings: Set wound vac to 125 mmHg. Continuous. Change Frequency: Change wound vac dressing 3 times per week (MWF or TTS). Reapply vac dressing if NPWT system stops working or dressing begins to leak or cannot be reinforced. Alternative dressing: If unable to maintain NPWT, remove vac dressing. Wash with soap and water. Apply 1/4 strength Dakin's moistened gauze to wound bed. Cover with dry dressing. Secure with medipore tape. Change twice daily. RIGHT HEEL AND RIGHT ANKLE: Moisturize daily and protect from re-injury. RIGHT MEDIAL FOOT: Clean wound and surrounding skin with soap and water. Pat dry. Apply barrier film wipe to periwound skin to protect from moisture. Apply Santyl to the wound bed and cover with saline moistened 2x2 gauze, cover with dry gauze and secure with tape. Change daily and if dressing becomes soiled, saturated, or dislodged.

## 2023-12-13 ENCOUNTER — TELEPHONE (OUTPATIENT)
Dept: WOUND CARE | Age: 78
End: 2023-12-13

## 2023-12-13 NOTE — TELEPHONE ENCOUNTER
Call was received from [facility] from [address] has C. Diff currently under treatment for this SW did not know the name of the medications at this time. This was discussed with Dr. Garett Cazares and patient has been rescheduled, patient was en route at the time and [facility] was notified of  the appointment being rescheduled. Phan Lou stated patient does have wound vac and it is currently on patient, stated they did have some issues with the cord initially and had to use wet to dry dressings, but this problem has been resolved.

## 2023-12-27 ENCOUNTER — LAB REQUISITION (OUTPATIENT)
Dept: LAB | Facility: HOSPITAL | Age: 78
End: 2023-12-27
Payer: MEDICARE

## 2023-12-27 ENCOUNTER — HOSPITAL ENCOUNTER (OUTPATIENT)
Dept: WOUND CARE | Age: 78
Discharge: HOME | End: 2023-12-27
Payer: MEDICARE

## 2023-12-27 ENCOUNTER — HOSPITAL ENCOUNTER (OUTPATIENT)
Dept: WOUND CARE | Age: 78
DRG: 853 | End: 2023-12-27
Payer: MEDICARE

## 2023-12-27 VITALS
TEMPERATURE: 98.5 F | DIASTOLIC BLOOD PRESSURE: 46 MMHG | SYSTOLIC BLOOD PRESSURE: 105 MMHG | RESPIRATION RATE: 18 BRPM | HEART RATE: 91 BPM

## 2023-12-27 DIAGNOSIS — L97.212 NON-PRESSURE CHRONIC ULCER OF RIGHT CALF WITH FAT LAYER EXPOSED (HCC): ICD-10-CM

## 2023-12-27 DIAGNOSIS — L97.312 NON-PRESSURE CHRONIC ULCER OF RIGHT ANKLE WITH FAT LAYER EXPOSED (HCC): ICD-10-CM

## 2023-12-27 DIAGNOSIS — L97.412 ULCER OF RIGHT HEEL, WITH FAT LAYER EXPOSED (HCC): ICD-10-CM

## 2023-12-27 DIAGNOSIS — R82.90 UNSPECIFIED ABNORMAL FINDINGS IN URINE: ICD-10-CM

## 2023-12-27 DIAGNOSIS — L89.154 PRESSURE INJURY OF SACRAL REGION, STAGE 4 (HCC): Primary | Chronic | ICD-10-CM

## 2023-12-27 PROCEDURE — 87186 SC STD MICRODIL/AGAR DIL: CPT | Performed by: NURSE PRACTITIONER

## 2023-12-27 PROCEDURE — 11042 DBRDMT SUBQ TIS 1ST 20SQCM/<: CPT

## 2023-12-27 PROCEDURE — 97605 NEG PRS WND THER DME<=50SQCM: CPT

## 2023-12-27 PROCEDURE — 11045 DBRDMT SUBQ TISS EACH ADDL: CPT

## 2023-12-27 PROCEDURE — 11045 DBRDMT SUBQ TISS EACH ADDL: CPT | Performed by: SURGERY

## 2023-12-27 PROCEDURE — 87086 URINE CULTURE/COLONY COUNT: CPT | Performed by: NURSE PRACTITIONER

## 2023-12-27 PROCEDURE — 87077 CULTURE AEROBIC IDENTIFY: CPT | Performed by: NURSE PRACTITIONER

## 2023-12-27 PROCEDURE — 11042 DBRDMT SUBQ TIS 1ST 20SQCM/<: CPT | Performed by: SURGERY

## 2023-12-27 PROCEDURE — 0JB70ZZ EXCISION OF BACK SUBCUTANEOUS TISSUE AND FASCIA, OPEN APPROACH: ICD-10-PCS | Performed by: SURGERY

## 2023-12-27 RX ORDER — IBUPROFEN 200 MG
TABLET ORAL ONCE
OUTPATIENT
Start: 2023-12-27 | End: 2023-12-27

## 2023-12-27 RX ORDER — BETAMETHASONE DIPROPIONATE 0.05 %
OINTMENT (GRAM) TOPICAL ONCE
OUTPATIENT
Start: 2023-12-27 | End: 2023-12-27

## 2023-12-27 RX ORDER — GINSENG 100 MG
CAPSULE ORAL ONCE
OUTPATIENT
Start: 2023-12-27 | End: 2023-12-27

## 2023-12-27 RX ORDER — LIDOCAINE HYDROCHLORIDE 20 MG/ML
JELLY TOPICAL ONCE
OUTPATIENT
Start: 2023-12-27 | End: 2023-12-27

## 2023-12-27 RX ORDER — SODIUM CHLOR/HYPOCHLOROUS ACID 0.033 %
SOLUTION, IRRIGATION IRRIGATION ONCE
OUTPATIENT
Start: 2023-12-27 | End: 2023-12-27

## 2023-12-27 RX ORDER — GENTAMICIN SULFATE 1 MG/G
OINTMENT TOPICAL ONCE
OUTPATIENT
Start: 2023-12-27 | End: 2023-12-27

## 2023-12-27 RX ORDER — TRIAMCINOLONE ACETONIDE 1 MG/G
OINTMENT TOPICAL ONCE
OUTPATIENT
Start: 2023-12-27 | End: 2023-12-27

## 2023-12-27 RX ORDER — CLOBETASOL PROPIONATE 0.5 MG/G
OINTMENT TOPICAL ONCE
OUTPATIENT
Start: 2023-12-27 | End: 2023-12-27

## 2023-12-27 RX ORDER — LIDOCAINE HYDROCHLORIDE 20 MG/ML
JELLY TOPICAL ONCE
Status: COMPLETED | OUTPATIENT
Start: 2023-12-27 | End: 2023-12-27

## 2023-12-27 RX ORDER — BACITRACIN ZINC AND POLYMYXIN B SULFATE 500; 1000 [USP'U]/G; [USP'U]/G
OINTMENT TOPICAL ONCE
OUTPATIENT
Start: 2023-12-27 | End: 2023-12-27

## 2023-12-27 RX ADMIN — LIDOCAINE HYDROCHLORIDE: 20 JELLY TOPICAL at 10:59

## 2023-12-27 NOTE — PROGRESS NOTES
[address]   Progress Note and Procedure Note      [facility] RECORD NUMBER:  [id number]  AGE: 77 y.o. GENDER: female  : 1945  EPISODE DATE:  2023    Subjective:     Chief Complaint   Patient presents with    Wound Check     Sacrum wound with wound vac, right heel, right foot wounds         HISTORY of PRESENT ILLNESS HPI     Kendra Bishop is a 77 y.o. female who presents today for wound/ulcer evaluation.    Wound Context: Pt with chronic sacral wound here for eval/treat  Wound/Ulcer Pain Timing/Severity: none  Quality of pain: N/A  Severity:  0 / 10   Modifying Factors: None  Associated Signs/Symptoms: none    Ulcer Identification:  Ulcer Type: pressure  Contributing Factors: chronic pressure and decreased mobility    Wound:  pressure        PAST MEDICAL HISTORY        Diagnosis Date    Arthritis     Cancer ([address])     endometrial cancer, bladder    Cataract     CIDP (chronic inflammatory demyelinating polyneuropathy) (HCC)     Crohn's disease (HCC)     GERD (gastroesophageal reflux disease)     History of blood transfusion     Hypertension     Macular degeneration     Neuropathy     Palliative care patient 10/11/2023    Perineal cyst in female     PVD (peripheral vascular disease) ([facility])     Radiation     Urinary incontinence        PAST SURGICAL HISTORY    Past Surgical History:   Procedure Laterality Date    BACK SURGERY      lumbar    BLADDER REMOVAL  2023    urostomy    CARPAL TUNNEL RELEASE Bilateral     CATARACT REMOVAL Bilateral     CERVICAL SPINE SURGERY      metal in neck    CHOLECYSTECTOMY, LAPAROSCOPIC      HAMMER TOE SURGERY Left     HX VASCULAR ANGIOPLASTY      & STENT    HYSTERECTOMY (CERVIX STATUS UNKNOWN)      KNEE ARTHROSCOPY Right     LUMBAR FUSION      RECTAL SURGERY N/A 2022    PERINEAL CYST EXCISION performed by Yolanda Jordan DO at [facility] N/A 2023    DEBRIDEMENT OF SACRAL DECUBITUS ULCER TO

## 2023-12-27 NOTE — PROGRESS NOTES
Negative Pressure Wound Therapy    NAME:  Aarti Desai  YOB: 1945  MEDICAL RECORD NUMBER:  [id number]  DATE:  12/27/2023    Applied Negative Pressure to Sacrum wound(s)/ulcer(s). [x] Applied skin barrier prep to dale-wound. [x] Cut strips of plastic drape to picture frame wound so that dale-wound is     covered with the drape. [x] If bridging dressing to less prominent site, cover any intact skin that will come in contact with the Negative Pressure Therapy sponge, gauze or channel drain with plastic drape. The sponge should never touch intact skin. [x] Cut sponge, gauze or channel drain to size which will fit into the wound/ulcer bed without being forced. [x] Be sure the sponge is large enough to hold the entire round plastic flange which is attached to the tubing. Never allow flange to be larger than the sponge or it will produce suction damaging intact skin. Total number of individual pieces of foam used within the wound bed: 2    [x] If bridging the dressing away from the primary site, be sure the bridge leads to a piece of sponge large enough to hold the entire flange without allowing any of the flange to overlap onto intact skin. [x] Covered sponge, gauze or channel drain with plastic drape. [x] Cut a hole in this plastic drape directly over the sponge the same size as the plastic drain tubing. [x] Removed plastic liner from flange and apply it directly over the hole you cut. [x] Removed the plastic cover from the flange. [x] Attached the tubing to the wound/ulcer Negative Pressure Therapy and turn it on to be sure a vacuum is created and that there are no leaks. [x] If air leaks occur, use plastic drape to patch them. [] Secured Negative Pressure Therapy dressing with ace wrap loosely if located on an extremity. Maintain tubing outside of ace wrap. Tubing must not exert pressure on intact skin.     Applied per  Guidelines      Electronically signed by UCHE

## 2023-12-27 NOTE — PATIENT INSTRUCTIONS
[address] and Hyperbaric Oxygen Therapy   Physician Orders and Discharge Instructions  [address] [facility],[address] [place] [address] Blvd, [address]  Telephone: [phone]    NAME:  Yogi Ortiz  YOB: 1945  MEDICAL RECORD NUMBER:  [id number]  DATE:  12/27/2023    Discharge condition: Stable    Discharge to: Home    Left via:Private automobile    Accompanied by: Daughter    ECF/HHA: [facility]    Right Foot, Ankle and Heel Wounds- Healed, Moisturize and Protect  Wear heel lift boots except during rehab    Dressing Orders: Sacrum Wound  Soap and water wash (apply promogran to any exposed structure in the wound bed  Wash with soap and water  Apply wound vac as follows: Apply Skin Prep to periwound. Window pane  surrounding area (periwound) with duroderm  Then apply drape to periwound. DO NOT PUT FOAM ON GOOD SKIN. Apply black foam to wound bed. (Be sure to tuck foam into all undermining)  BRIDGE suction pad off wound, Set wound vac to 125 mmHG, continuous. Change wound vac  dressing 3 times per week (MWF or TTS)  Use barrier cream where needed   Limit pressure to area, turn often, use JOCELYN Bed and Roho cushion   High protein diet as tolerated     [facility] follow up visit ___________3 weeks__________________  (Please note your next appointment above and if you are unable to keep, kindly give a 24 hour notice. Thank you.)    If you experience any of the following, please call the [facility] during business hours:    * Increase in Pain  * Temperature over 101  * Increase in drainage from your wound  * Drainage with a foul odor  * Bleeding  * Increase in swelling  * Need for compression bandage changes due to slippage, breakthrough drainage. If you need medical attention outside of the business hours of the [facility] please contact your PCP or go to the nearest emergency room.

## 2023-12-27 NOTE — PLAN OF CARE
Problem: Pain  Goal: Verbalizes/displays adequate comfort level or baseline comfort level  Outcome: Progressing     Problem: Wound:  Goal: Will show signs of wound healing; wound closure and no evidence of infection  Description: Will show signs of wound healing; wound closure and no evidence of infection  Outcome: Progressing     Problem: Pressure Ulcer:  Goal: Signs of wound healing will improve  Description: Signs of wound healing will improve  Outcome: Progressing  Goal: Absence of new pressure ulcer  Description: Absence of new pressure ulcer  Outcome: Progressing  Goal: Will show no infection signs and symptoms  Description: Will show no infection signs and symptoms  Outcome: Progressing     Problem: Falls - Risk of:  Goal: Will remain free from falls  Description: Will remain free from falls  Outcome: Progressing     Problem: Pain  Goal: Verbalizes/displays adequate comfort level or baseline comfort level  Outcome: Progressing

## 2023-12-30 ENCOUNTER — HOSPITAL ENCOUNTER (INPATIENT)
Age: 78
LOS: 6 days | Discharge: HOSPICE | DRG: 853 | End: 2024-01-05
Attending: INTERNAL MEDICINE
Payer: MEDICARE

## 2023-12-30 ENCOUNTER — APPOINTMENT (OUTPATIENT)
Dept: CT IMAGING | Age: 78
DRG: 853 | End: 2023-12-30
Payer: MEDICARE

## 2023-12-30 ENCOUNTER — APPOINTMENT (OUTPATIENT)
Dept: GENERAL RADIOLOGY | Age: 78
DRG: 853 | End: 2023-12-30
Payer: MEDICARE

## 2023-12-30 DIAGNOSIS — J18.9 PNEUMONIA OF RIGHT LUNG DUE TO INFECTIOUS ORGANISM, UNSPECIFIED PART OF LUNG: ICD-10-CM

## 2023-12-30 DIAGNOSIS — N39.0 URINARY TRACT INFECTION WITHOUT HEMATURIA, SITE UNSPECIFIED: Primary | ICD-10-CM

## 2023-12-30 PROBLEM — G93.41 METABOLIC ENCEPHALOPATHY: Status: ACTIVE | Noted: 2023-12-30

## 2023-12-30 PROBLEM — A41.9 SEPSIS (HCC): Status: ACTIVE | Noted: 2023-12-30

## 2023-12-30 LAB
ALBUMIN SERPL-MCNC: 2.3 G/DL (ref 3.5–5.2)
ALP SERPL-CCNC: 69 U/L (ref 35–104)
ALT SERPL-CCNC: 8 U/L (ref 5–33)
ANION GAP SERPL CALCULATED.3IONS-SCNC: 9 MMOL/L (ref 7–19)
AST SERPL-CCNC: 10 U/L (ref 5–32)
B PARAP IS1001 DNA NPH QL NAA+NON-PROBE: NOT DETECTED
B PERT.PT PRMT NPH QL NAA+NON-PROBE: NOT DETECTED
BACTERIA #/AREA URNS HPF: ABNORMAL /HPF
BACTERIA SPEC AEROBE CULT: ABNORMAL
BASOPHILS # BLD: 0 K/UL (ref 0–0.2)
BASOPHILS NFR BLD: 0.3 % (ref 0–1)
BILIRUB SERPL-MCNC: 0.3 MG/DL (ref 0.2–1.2)
BILIRUB UR QL STRIP: NEGATIVE
BUN SERPL-MCNC: 21 MG/DL (ref 8–23)
C PNEUM DNA NPH QL NAA+NON-PROBE: NOT DETECTED
CALCIUM SERPL-MCNC: 8.1 MG/DL (ref 8.8–10.2)
CHLORIDE SERPL-SCNC: 105 MMOL/L (ref 98–111)
CLARITY UR: ABNORMAL
CO2 SERPL-SCNC: 20 MMOL/L (ref 22–29)
COLOR UR: YELLOW
CREAT SERPL-MCNC: 0.7 MG/DL (ref 0.5–0.9)
EOSINOPHIL # BLD: 0 K/UL (ref 0–0.6)
EOSINOPHIL NFR BLD: 0.3 % (ref 0–5)
ERYTHROCYTE [DISTWIDTH] IN BLOOD BY AUTOMATED COUNT: 19.6 % (ref 11.5–14.5)
FLUAV RNA NPH QL NAA+NON-PROBE: NOT DETECTED
FLUBV RNA NPH QL NAA+NON-PROBE: NOT DETECTED
GLUCOSE SERPL-MCNC: 120 MG/DL (ref 74–109)
GLUCOSE UR STRIP.AUTO-MCNC: NEGATIVE MG/DL
HADV DNA NPH QL NAA+NON-PROBE: NOT DETECTED
HCOV 229E RNA NPH QL NAA+NON-PROBE: NOT DETECTED
HCOV HKU1 RNA NPH QL NAA+NON-PROBE: NOT DETECTED
HCOV NL63 RNA NPH QL NAA+NON-PROBE: NOT DETECTED
HCOV OC43 RNA NPH QL NAA+NON-PROBE: NOT DETECTED
HCT VFR BLD AUTO: 28.9 % (ref 37–47)
HGB BLD-MCNC: 8.8 G/DL (ref 12–16)
HGB UR STRIP.AUTO-MCNC: ABNORMAL MG/L
HMPV RNA NPH QL NAA+NON-PROBE: NOT DETECTED
HPIV1 RNA NPH QL NAA+NON-PROBE: NOT DETECTED
HPIV2 RNA NPH QL NAA+NON-PROBE: NOT DETECTED
HPIV3 RNA NPH QL NAA+NON-PROBE: NOT DETECTED
HPIV4 RNA NPH QL NAA+NON-PROBE: NOT DETECTED
IMM GRANULOCYTES # BLD: 0.1 K/UL
KETONES UR STRIP.AUTO-MCNC: NEGATIVE MG/DL
LACTATE BLDV-SCNC: 1.4 MG/DL (ref 0.5–1.9)
LACTATE BLDV-SCNC: 1.8 MG/DL (ref 0.5–1.9)
LEGIONELLA AG UR QL: NORMAL
LEUKOCYTE ESTERASE UR QL STRIP.AUTO: ABNORMAL
LYMPHOCYTES # BLD: 0.8 K/UL (ref 1.1–4.5)
LYMPHOCYTES NFR BLD: 6.2 % (ref 20–40)
M PNEUMO DNA NPH QL NAA+NON-PROBE: NOT DETECTED
MCH RBC QN AUTO: 24.5 PG (ref 27–31)
MCHC RBC AUTO-ENTMCNC: 30.4 G/DL (ref 33–37)
MCV RBC AUTO: 80.5 FL (ref 81–99)
MONOCYTES # BLD: 1.8 K/UL (ref 0–0.9)
MONOCYTES NFR BLD: 13.5 % (ref 0–10)
NEUTROPHILS # BLD: 10.7 K/UL (ref 1.5–7.5)
NEUTS SEG NFR BLD: 79 % (ref 50–65)
NITRITE UR QL STRIP.AUTO: NEGATIVE
PH UR STRIP.AUTO: 6 [PH] (ref 5–8)
PLATELET # BLD AUTO: 180 K/UL (ref 130–400)
PMV BLD AUTO: 9.1 FL (ref 9.4–12.3)
POTASSIUM SERPL-SCNC: 3.8 MMOL/L (ref 3.5–5)
PROCALCITONIN: 0.27 NG/ML (ref 0–0.09)
PROT SERPL-MCNC: 5.8 G/DL (ref 6.6–8.7)
PROT UR STRIP.AUTO-MCNC: 30 MG/DL
RBC # BLD AUTO: 3.59 M/UL (ref 4.2–5.4)
RBC #/AREA URNS HPF: ABNORMAL /HPF (ref 0–2)
RSV RNA NPH QL NAA+NON-PROBE: NOT DETECTED
RV+EV RNA NPH QL NAA+NON-PROBE: NOT DETECTED
S PNEUM AG SPEC QL: NORMAL
SARS-COV-2 RNA NPH QL NAA+NON-PROBE: NOT DETECTED
SODIUM SERPL-SCNC: 134 MMOL/L (ref 136–145)
SP GR UR STRIP.AUTO: 1.01 (ref 1–1.03)
SQUAMOUS #/AREA URNS HPF: ABNORMAL /HPF
UROBILINOGEN UR STRIP.AUTO-MCNC: 0.2 E.U./DL
WBC # BLD AUTO: 13.6 K/UL (ref 4.8–10.8)
WBC #/AREA URNS HPF: ABNORMAL /HPF (ref 0–5)

## 2023-12-30 PROCEDURE — 86403 PARTICLE AGGLUT ANTBDY SCRN: CPT

## 2023-12-30 PROCEDURE — 6370000000 HC RX 637 (ALT 250 FOR IP): Performed by: NURSE PRACTITIONER

## 2023-12-30 PROCEDURE — 6360000002 HC RX W HCPCS: Performed by: NURSE PRACTITIONER

## 2023-12-30 PROCEDURE — 83605 ASSAY OF LACTIC ACID: CPT

## 2023-12-30 PROCEDURE — 2580000003 HC RX 258: Performed by: NURSE PRACTITIONER

## 2023-12-30 PROCEDURE — 80053 COMPREHEN METABOLIC PANEL: CPT

## 2023-12-30 PROCEDURE — 70450 CT HEAD/BRAIN W/O DYE: CPT

## 2023-12-30 PROCEDURE — 1210000000 HC MED SURG R&B

## 2023-12-30 PROCEDURE — 81001 URINALYSIS AUTO W/SCOPE: CPT

## 2023-12-30 PROCEDURE — 71045 X-RAY EXAM CHEST 1 VIEW: CPT

## 2023-12-30 PROCEDURE — 0202U NFCT DS 22 TRGT SARS-COV-2: CPT

## 2023-12-30 PROCEDURE — 87086 URINE CULTURE/COLONY COUNT: CPT

## 2023-12-30 PROCEDURE — 36415 COLL VENOUS BLD VENIPUNCTURE: CPT

## 2023-12-30 PROCEDURE — 99285 EMERGENCY DEPT VISIT HI MDM: CPT

## 2023-12-30 PROCEDURE — 85025 COMPLETE CBC W/AUTO DIFF WBC: CPT

## 2023-12-30 PROCEDURE — 87449 NOS EACH ORGANISM AG IA: CPT

## 2023-12-30 PROCEDURE — 87040 BLOOD CULTURE FOR BACTERIA: CPT

## 2023-12-30 PROCEDURE — 84145 PROCALCITONIN (PCT): CPT

## 2023-12-30 PROCEDURE — 96374 THER/PROPH/DIAG INJ IV PUSH: CPT

## 2023-12-30 PROCEDURE — 87186 SC STD MICRODIL/AGAR DIL: CPT

## 2023-12-30 RX ORDER — MESALAMINE 400 MG/1
800 CAPSULE, DELAYED RELEASE ORAL 3 TIMES DAILY
Status: DISCONTINUED | OUTPATIENT
Start: 2023-12-30 | End: 2024-01-05 | Stop reason: HOSPADM

## 2023-12-30 RX ORDER — NALOXONE HYDROCHLORIDE 0.4 MG/ML
0.4 INJECTION, SOLUTION INTRAMUSCULAR; INTRAVENOUS; SUBCUTANEOUS PRN
Status: DISCONTINUED | OUTPATIENT
Start: 2023-12-30 | End: 2024-01-05 | Stop reason: HOSPADM

## 2023-12-30 RX ORDER — SODIUM CHLORIDE, SODIUM LACTATE, POTASSIUM CHLORIDE, AND CALCIUM CHLORIDE .6; .31; .03; .02 G/100ML; G/100ML; G/100ML; G/100ML
1000 INJECTION, SOLUTION INTRAVENOUS ONCE
Status: COMPLETED | OUTPATIENT
Start: 2023-12-30 | End: 2023-12-30

## 2023-12-30 RX ORDER — SODIUM CHLORIDE 9 MG/ML
INJECTION, SOLUTION INTRAVENOUS PRN
Status: DISCONTINUED | OUTPATIENT
Start: 2023-12-30 | End: 2024-01-05 | Stop reason: HOSPADM

## 2023-12-30 RX ORDER — HYDROCODONE BITARTRATE AND ACETAMINOPHEN 10; 325 MG/1; MG/1
1 TABLET ORAL EVERY 6 HOURS PRN
Status: DISCONTINUED | OUTPATIENT
Start: 2023-12-30 | End: 2024-01-01

## 2023-12-30 RX ORDER — GABAPENTIN 300 MG/1
300 CAPSULE ORAL 3 TIMES DAILY
Status: DISCONTINUED | OUTPATIENT
Start: 2023-12-30 | End: 2024-01-03

## 2023-12-30 RX ORDER — ASPIRIN 81 MG/1
81 TABLET, CHEWABLE ORAL NIGHTLY
Status: DISCONTINUED | OUTPATIENT
Start: 2023-12-30 | End: 2024-01-05 | Stop reason: HOSPADM

## 2023-12-30 RX ORDER — SODIUM CHLORIDE, SODIUM LACTATE, POTASSIUM CHLORIDE, CALCIUM CHLORIDE 600; 310; 30; 20 MG/100ML; MG/100ML; MG/100ML; MG/100ML
INJECTION, SOLUTION INTRAVENOUS CONTINUOUS
Status: DISCONTINUED | OUTPATIENT
Start: 2023-12-30 | End: 2024-01-05

## 2023-12-30 RX ORDER — SODIUM CHLORIDE 0.9 % (FLUSH) 0.9 %
5-40 SYRINGE (ML) INJECTION PRN
Status: DISCONTINUED | OUTPATIENT
Start: 2023-12-30 | End: 2024-01-05 | Stop reason: HOSPADM

## 2023-12-30 RX ORDER — MORPHINE SULFATE 2 MG/ML
2 INJECTION, SOLUTION INTRAMUSCULAR; INTRAVENOUS
Status: DISCONTINUED | OUTPATIENT
Start: 2023-12-30 | End: 2024-01-03

## 2023-12-30 RX ORDER — M-VIT,TX,IRON,MINS/CALC/FOLIC 27MG-0.4MG
1 TABLET ORAL DAILY
Status: DISCONTINUED | OUTPATIENT
Start: 2023-12-30 | End: 2024-01-05 | Stop reason: HOSPADM

## 2023-12-30 RX ORDER — ASCORBIC ACID 500 MG
500 TABLET ORAL DAILY
Status: ON HOLD | COMMUNITY

## 2023-12-30 RX ORDER — ACETAMINOPHEN 325 MG/1
650 TABLET ORAL EVERY 6 HOURS PRN
Status: DISCONTINUED | OUTPATIENT
Start: 2023-12-30 | End: 2024-01-05 | Stop reason: HOSPADM

## 2023-12-30 RX ORDER — GABAPENTIN 300 MG/1
300 CAPSULE ORAL 3 TIMES DAILY
Status: ON HOLD | COMMUNITY

## 2023-12-30 RX ORDER — SODIUM CHLORIDE 0.9 % (FLUSH) 0.9 %
5-40 SYRINGE (ML) INJECTION EVERY 12 HOURS SCHEDULED
Status: DISCONTINUED | OUTPATIENT
Start: 2023-12-30 | End: 2024-01-05 | Stop reason: HOSPADM

## 2023-12-30 RX ORDER — CHOLESTYRAMINE LIGHT 4 G/5.7G
4 POWDER, FOR SUSPENSION ORAL DAILY
Status: DISCONTINUED | OUTPATIENT
Start: 2023-12-30 | End: 2024-01-02

## 2023-12-30 RX ORDER — IPRATROPIUM BROMIDE AND ALBUTEROL SULFATE 2.5; .5 MG/3ML; MG/3ML
1 SOLUTION RESPIRATORY (INHALATION) EVERY 4 HOURS PRN
Status: DISCONTINUED | OUTPATIENT
Start: 2023-12-30 | End: 2024-01-05 | Stop reason: HOSPADM

## 2023-12-30 RX ORDER — ENOXAPARIN SODIUM 100 MG/ML
30 INJECTION SUBCUTANEOUS DAILY
Status: DISCONTINUED | OUTPATIENT
Start: 2023-12-30 | End: 2023-12-31

## 2023-12-30 RX ORDER — FOLIC ACID 1 MG/1
1 TABLET ORAL DAILY
Status: DISCONTINUED | OUTPATIENT
Start: 2023-12-30 | End: 2024-01-05 | Stop reason: HOSPADM

## 2023-12-30 RX ORDER — MORPHINE SULFATE 2 MG/ML
1 INJECTION, SOLUTION INTRAMUSCULAR; INTRAVENOUS
Status: DISCONTINUED | OUTPATIENT
Start: 2023-12-30 | End: 2024-01-03

## 2023-12-30 RX ORDER — TIMOLOL MALEATE 5 MG/ML
1 SOLUTION/ DROPS OPHTHALMIC 2 TIMES DAILY
Status: DISCONTINUED | OUTPATIENT
Start: 2023-12-30 | End: 2024-01-05 | Stop reason: HOSPADM

## 2023-12-30 RX ORDER — SODIUM CHLORIDE, SODIUM LACTATE, POTASSIUM CHLORIDE, AND CALCIUM CHLORIDE .6; .31; .03; .02 G/100ML; G/100ML; G/100ML; G/100ML
1000 INJECTION, SOLUTION INTRAVENOUS ONCE
Status: DISCONTINUED | OUTPATIENT
Start: 2023-12-30 | End: 2024-01-05 | Stop reason: HOSPADM

## 2023-12-30 RX ORDER — MONTELUKAST SODIUM 4 MG/1
1 TABLET, CHEWABLE ORAL 3 TIMES DAILY
Status: ON HOLD | COMMUNITY

## 2023-12-30 RX ORDER — ACETAMINOPHEN 650 MG/1
650 SUPPOSITORY RECTAL EVERY 6 HOURS PRN
Status: DISCONTINUED | OUTPATIENT
Start: 2023-12-30 | End: 2024-01-05 | Stop reason: HOSPADM

## 2023-12-30 RX ORDER — PANTOPRAZOLE SODIUM 40 MG/1
40 TABLET, DELAYED RELEASE ORAL
Status: DISCONTINUED | OUTPATIENT
Start: 2023-12-31 | End: 2024-01-05 | Stop reason: HOSPADM

## 2023-12-30 RX ORDER — FENOFIBRATE 160 MG/1
160 TABLET ORAL DAILY
Status: DISCONTINUED | OUTPATIENT
Start: 2023-12-30 | End: 2024-01-05 | Stop reason: HOSPADM

## 2023-12-30 RX ORDER — ALLOPURINOL 100 MG/1
100 TABLET ORAL DAILY
Status: DISCONTINUED | OUTPATIENT
Start: 2023-12-30 | End: 2024-01-05 | Stop reason: HOSPADM

## 2023-12-30 RX ADMIN — SODIUM CHLORIDE, POTASSIUM CHLORIDE, SODIUM LACTATE AND CALCIUM CHLORIDE: 600; 310; 30; 20 INJECTION, SOLUTION INTRAVENOUS at 17:07

## 2023-12-30 RX ADMIN — SODIUM CHLORIDE, POTASSIUM CHLORIDE, SODIUM LACTATE AND CALCIUM CHLORIDE 1000 ML: 600; 310; 30; 20 INJECTION, SOLUTION INTRAVENOUS at 12:20

## 2023-12-30 RX ADMIN — GABAPENTIN 300 MG: 300 CAPSULE ORAL at 21:00

## 2023-12-30 RX ADMIN — CEFEPIME 2000 MG: 2 INJECTION, POWDER, FOR SOLUTION INTRAVENOUS at 17:10

## 2023-12-30 RX ADMIN — ENOXAPARIN SODIUM 30 MG: 100 INJECTION SUBCUTANEOUS at 17:02

## 2023-12-30 RX ADMIN — Medication: at 21:00

## 2023-12-30 RX ADMIN — ACETAMINOPHEN 650 MG: 325 TABLET ORAL at 18:24

## 2023-12-30 RX ADMIN — SODIUM CHLORIDE, PRESERVATIVE FREE 10 ML: 5 INJECTION INTRAVENOUS at 20:59

## 2023-12-30 RX ADMIN — MESALAMINE 800 MG: 400 CAPSULE, DELAYED RELEASE ORAL at 21:00

## 2023-12-30 RX ADMIN — WATER 1000 MG: 1 INJECTION INTRAMUSCULAR; INTRAVENOUS; SUBCUTANEOUS at 12:26

## 2023-12-30 RX ADMIN — MORPHINE SULFATE 2 MG: 2 INJECTION, SOLUTION INTRAMUSCULAR; INTRAVENOUS at 17:02

## 2023-12-30 RX ADMIN — MORPHINE SULFATE 2 MG: 2 INJECTION, SOLUTION INTRAMUSCULAR; INTRAVENOUS at 22:19

## 2023-12-30 RX ADMIN — ASPIRIN 81 MG: 81 TABLET, CHEWABLE ORAL at 21:00

## 2023-12-30 RX ADMIN — TIMOLOL MALEATE 1 DROP: 5 SOLUTION OPHTHALMIC at 21:00

## 2023-12-30 RX ADMIN — Medication 1000 MG: at 14:35

## 2023-12-30 RX ADMIN — SODIUM CHLORIDE, POTASSIUM CHLORIDE, SODIUM LACTATE AND CALCIUM CHLORIDE 1000 ML: 600; 310; 30; 20 INJECTION, SOLUTION INTRAVENOUS at 11:12

## 2023-12-30 ASSESSMENT — PAIN SCALES - GENERAL
PAINLEVEL_OUTOF10: 8

## 2023-12-30 ASSESSMENT — PAIN DESCRIPTION - DESCRIPTORS: DESCRIPTORS: ACHING;CRUSHING

## 2023-12-30 ASSESSMENT — PAIN DESCRIPTION - LOCATION
LOCATION: BACK
LOCATION: BACK;SACRUM

## 2023-12-30 NOTE — ED NOTES
Called [facility] for status of urinalysis results fax. Received at [phone]

## 2023-12-30 NOTE — ED NOTES
Called [facility] to request urinalysis result that patient says was done recently. Shy states she will fax results.

## 2023-12-30 NOTE — PROGRESS NOTES
[facility]   Pharmacy Pharmacokinetic Monitoring Service - Vancomycin     Lilia Chiang is a 78 y.o. female starting on vancomycin therapy for sepsis. Pharmacy consulted by Norbert FALL for monitoring and adjustment.    Target Concentration: Goal AUC/MAITE 400-600 mg*hr/L    Additional Antimicrobials: Cefepime    Pertinent Laboratory Values:   Wt Readings from Last 1 Encounters:   12/30/23 50.3 kg (111 lb)     Temp Readings from Last 1 Encounters:   12/30/23 98.5 °F (36.9 °C)     Estimated Creatinine Clearance: 52 mL/min (based on SCr of 0.7 mg/dL).  Recent Labs     12/30/23  1027   CREATININE 0.7   BUN 21   WBC 13.6*     Procalcitonin: N/A    Pertinent Cultures:  Culture Date Source Results   12/30/23 Blood x 2 ordered   MRSA Nasal Swab: N/A. Non-respiratory infection.    Plan:  Dosing recommendations based on Bayesian software  Start vancomycin 1000 mg IV x 1 dose followed by 500 mg IV Q 12 hours  Anticipated AUC of 417 and trough concentration of 12.2 at steady state  Renal labs as indicated   Vancomycin concentration ordered for 12/31/23 @ 1400   Pharmacy will continue to monitor patient and adjust therapy as indicated    Thank you for the consult,  Shekhar Wright RPH  12/30/2023 2:41 PM

## 2023-12-30 NOTE — PROGRESS NOTES
Pharmacy Adjustment per [facility] protocol    Lilia Chiang is a 78 y.o. female. Pharmacy has adjusted medications per [facility] protocol.    Recent Labs     12/30/23  1027   BUN 21       Recent Labs     12/30/23  1027   CREATININE 0.7       Estimated Creatinine Clearance: 52 mL/min (based on SCr of 0.7 mg/dL).    Height:   Ht Readings from Last 1 Encounters:   12/30/23 1.626 m (5' 4\")     Weight:  Wt Readings from Last 1 Encounters:   12/30/23 50.3 kg (111 lb)         Plan: Adjust the following medications based on [facility] protocol:           Cefepime to 2000 mg IV once over 30 minutes followed by 2000 mg IV every 12 hours extended infusion over 240 minutes     Electronically signed by Shekhar Wright RPH on 12/30/2023 at 2:05 PM

## 2023-12-30 NOTE — H&P
are negative.     14 point review of systems is negative except as specifically addressed above.    Physical Examination:  BP (!) 98/44   Pulse 87   Temp 98.5 °F (36.9 °C)   Resp 18   Ht 1.626 m (5' 4\")   Wt 50.3 kg (111 lb)   SpO2 98%   BMI 19.05 kg/m²   Physical Exam  Vitals reviewed.   Constitutional:       General: She is not in acute distress.  Eyes:      Conjunctiva/sclera: Conjunctivae normal.   Cardiovascular:      Rate and Rhythm: Normal rate and regular rhythm.   Abdominal:      Tenderness: There is no abdominal tenderness.      Comments: Urostomy right lower abdomen   Musculoskeletal:      Right lower leg: No edema.      Left lower leg: No edema.   Skin:     General: Skin is warm and dry.   Neurological:      Mental Status: She is alert and oriented to person, place, and time.      Diagnostic Data:  CBC:  Recent Labs     12/30/23  1027   WBC 13.6*   HGB 8.8*   HCT 28.9*        BMP:  Recent Labs     12/30/23  1027   *   K 3.8      CO2 20*   BUN 21   CREATININE 0.7   CALCIUM 8.1*     Recent Labs     12/30/23  1027   AST 10   ALT 8   BILITOT 0.3   ALKPHOS 69     Urinalysis:  Lab Results   Component Value Date/Time    NITRU Negative 12/30/2023 11:00 AM    WBCUA TNTC 12/30/2023 11:00 AM    BACTERIA 4+ 12/30/2023 11:00 AM    RBCUA 0-1 12/30/2023 11:00 AM    BLOODU SMALL 12/30/2023 11:00 AM    SPECGRAV 1.011 12/30/2023 11:00 AM    GLUCOSEU Negative 12/30/2023 11:00 AM     XR CHEST PORTABLE    Result Date: 12/30/2023  EXAM:  FRONTAL VIEW OF THE CHEST.  HISTORY:  Leukocytosis.  Intermittent altered Mental status.  COMPARISON:  Chest radiograph 12/02/2023.  FINDINGS: Cervical ACDF hardware and lumbar fusion hardware noted.  Diffuse osseous demineralization.  Unchanged well-circumscribed lucent lesion with a thin sclerotic line at the left proximal humerus.  Stable cardiomediastinal silhouette.  Patchy opacities at the right lung base  No visible pleural effusion or pneumothorax.  No acute  ml/kg/hr  -Bilirubin > 2  -INR > 1.5 (not anticoagulated)  -Platelets < 100,000  -Acute Respiratory Failure as evidenced by new need for NIPPV or mechanical ventilation   Must meet 1:    []Lactate > 4        or   []SBP < 90 or MAP < 65 for at least two readings in the first hour after fluid bolus administration    []Vasopressors initiated (if hypotension persists after fluid resuscitation)   Patient Vitals for the past 6 hrs:   BP Temp Pulse Resp SpO2 Height Weight Percent Weight Change   12/30/23 0943 (!) 109/45 98.5 °F (36.9 °C) 91 18 98 % 1.626 m (5' 4\") 50.3 kg (111 lb) 0   12/30/23 1103 (!) 105/45 -- 90 18 97 % -- -- --   12/30/23 1140 (!) 98/54 -- -- -- -- -- -- --   12/30/23 1228 (!) 98/44 -- 87 18 98 % -- -- --      Recent Labs     12/30/23  1027   WBC 13.6*   CREATININE 0.7   BILITOT 0.3           Assessment/Plan:  Principal Problem:    Sepsis (HCC)  Active Problems:    Right lower lobe pneumonia    Metabolic encephalopathy  Resolved Problems:    * No resolved hospital problems. *     Principal Problem:    Sepsis/Metabolic encephalopathy/Right lower lobe pneumonia/UTI  -sepsis fluid bolus given in ED  -follow pan cx  -cefepime 2g iv q12hrs  -pharmacy to dose with vancomycin   -LR at 100cc/hr  -goal map >65  -pressors as warranted  -ensure adequate iv access  -sputum culture  -legionella urine antigen  -fall precautions  -I's and O's  -daily weight  -strep pneumonia urine antigen   -contact precautions-h/o MDRO  -spot check spo2 prn  -duoneb breathing treatment q4hrs prn   -resp pcr panel pending  Active Problems:    History of sacral decubitus ulcer  -consult wound care nurse    -pain control  -narcan 0.4mg iv prn  -apply wound vac per prior wound care recommendation    at nursing home  Resolved Problems:    * No resolved hospital problems. *  Signed:  ENZO Lira - CNP, 12/30/2023 2:01 PM

## 2023-12-30 NOTE — ED PROVIDER NOTES
[facility] EMERGENCY DEPT  EMERGENCY DEPARTMENT ENCOUNTER      Pt Name: Lilia Chiang  MRN: [id number]  Birthdate 1945  Date of evaluation: 12/30/2023  Provider: ENZO Gonzalez CNP  3:27 PM    CHIEF COMPLAINT       Chief Complaint   Patient presents with    Altered Mental Status     Per SNF, A/O x4 at this time         HISTORY OF PRESENT ILLNESS    Lilia Chiang is a 78 y.o. female who presents to the emergency department via EMS from [facility]. She tells me she is there temporarily trying to get her strength back to go home. She had a urostomy placed in May d/t cancer. She has had an admission for cdif. She had a December admission for UTI, cdif and sacral pressure wound. She tells me that the nursing home staff wanted her evaluated for a UTI because she has had intermittent disorientation. She denies any disorientation currently, feels in her normal state of health currently. States they did a UA and culture and were sending the results to [place] but she is unsure of the outcome of this. Her son, Markus, is at the bedside with her.     Chart review--discharge summary from 12/2 admission, wound eval with Dr Cohen, recent labs 12/26 with wbc 15.4k    She has right sided urostomy and wound vac to sacrum    HPI    Nursing Notes were reviewed.    REVIEW OF SYSTEMS       Review of Systems   Constitutional:  Negative for chills, fatigue and fever.   HENT:  Negative for congestion.    Respiratory:  Negative for cough, chest tightness and shortness of breath.    Cardiovascular:  Negative for chest pain, palpitations and leg swelling.   Gastrointestinal:  Negative for abdominal pain, diarrhea, nausea and vomiting.   Genitourinary:  Negative for dysuria, flank pain, frequency and urgency.   Musculoskeletal:  Negative for back pain and neck pain.   Skin:  Positive for wound (right ankle and sacrum).   Neurological:  Negative for dizziness, syncope, weakness, light-headedness and headaches.    Lactic Acid, Sepsis 1.4  1.4 [BW]   1315 Case reviewed with Dr Stafford, will take if hospitalist wont, but he has communicated with Dr Bonilla about Ms Chiang already and believes she is appropriate for floor and that Dr Bonilla will manage. Call out to Dr Bonilla.  [BW]   1332 Case reviewed with Dr Givens who accepts patient. Norbert Reynaga to come see and decide on floor or ICU. Will add vanc for PNA coverage given recent hospitalization and recent residence at nursing home.  [BW]      ED Course User Index  [BW] Lolis Goodwin APRN - CNP         CRITICAL CARE TIME       CONSULTS:  PHARMACY TO DOSE VANCOMYCIN    PROCEDURES:  Unless otherwise noted below, none     Procedures         FINAL IMPRESSION      1. Urinary tract infection without hematuria, site unspecified    2. Pneumonia of right lung due to infectious organism, unspecified part of lung          DISPOSITION/PLAN   DISPOSITION Admitted 12/30/2023 01:52:06 PM      PATIENT REFERRED TO:  No follow-up provider specified.    DISCHARGE MEDICATIONS:  New Prescriptions    No medications on file     Controlled Substances Monitoring:     RX Monitoring Periodic Controlled Substance Monitoring   12/7/2023  12:19 PM No signs of potential drug abuse or diversion identified.       (Please note that portions of this note were completed with a voice recognition program.  Efforts were made to edit the dictations but occasionally words are mis-transcribed.)    ENZO Gonzalez CNP (electronically signed)  Attending Emergency Physician           Lolis Goodwin APRN - CNP  12/30/23 7745

## 2023-12-31 ENCOUNTER — APPOINTMENT (OUTPATIENT)
Dept: CT IMAGING | Age: 78
DRG: 853 | End: 2023-12-31
Payer: MEDICARE

## 2023-12-31 LAB
ANION GAP SERPL CALCULATED.3IONS-SCNC: 10 MMOL/L (ref 7–19)
BUN SERPL-MCNC: 19 MG/DL (ref 8–23)
CALCIUM SERPL-MCNC: 8.1 MG/DL (ref 8.8–10.2)
CHLORIDE SERPL-SCNC: 111 MMOL/L (ref 98–111)
CO2 SERPL-SCNC: 20 MMOL/L (ref 22–29)
CREAT SERPL-MCNC: 0.5 MG/DL (ref 0.5–0.9)
ERYTHROCYTE [DISTWIDTH] IN BLOOD BY AUTOMATED COUNT: 19.5 % (ref 11.5–14.5)
GLUCOSE SERPL-MCNC: 81 MG/DL (ref 74–109)
HCT VFR BLD AUTO: 27.2 % (ref 37–47)
HGB BLD-MCNC: 8 G/DL (ref 12–16)
MAGNESIUM SERPL-MCNC: 1.7 MG/DL (ref 1.6–2.4)
MCH RBC QN AUTO: 24.1 PG (ref 27–31)
MCHC RBC AUTO-ENTMCNC: 29.4 G/DL (ref 33–37)
MCV RBC AUTO: 81.9 FL (ref 81–99)
PLATELET # BLD AUTO: 151 K/UL (ref 130–400)
PMV BLD AUTO: 9 FL (ref 9.4–12.3)
POTASSIUM SERPL-SCNC: 3.3 MMOL/L (ref 3.5–5)
RBC # BLD AUTO: 3.32 M/UL (ref 4.2–5.4)
SODIUM SERPL-SCNC: 141 MMOL/L (ref 136–145)
VANCOMYCIN TROUGH SERPL-MCNC: 7.7 UG/ML (ref 10–20)
WBC # BLD AUTO: 11.1 K/UL (ref 4.8–10.8)

## 2023-12-31 PROCEDURE — 94760 N-INVAS EAR/PLS OXIMETRY 1: CPT

## 2023-12-31 PROCEDURE — 1210000000 HC MED SURG R&B

## 2023-12-31 PROCEDURE — 6370000000 HC RX 637 (ALT 250 FOR IP): Performed by: INTERNAL MEDICINE

## 2023-12-31 PROCEDURE — 80202 ASSAY OF VANCOMYCIN: CPT

## 2023-12-31 PROCEDURE — 2580000003 HC RX 258: Performed by: NURSE PRACTITIONER

## 2023-12-31 PROCEDURE — 6370000000 HC RX 637 (ALT 250 FOR IP): Performed by: NURSE PRACTITIONER

## 2023-12-31 PROCEDURE — 36415 COLL VENOUS BLD VENIPUNCTURE: CPT

## 2023-12-31 PROCEDURE — 83735 ASSAY OF MAGNESIUM: CPT

## 2023-12-31 PROCEDURE — 87507 IADNA-DNA/RNA PROBE TQ 12-25: CPT

## 2023-12-31 PROCEDURE — 6360000002 HC RX W HCPCS: Performed by: NURSE PRACTITIONER

## 2023-12-31 PROCEDURE — 80048 BASIC METABOLIC PNL TOTAL CA: CPT

## 2023-12-31 PROCEDURE — 71250 CT THORAX DX C-: CPT

## 2023-12-31 PROCEDURE — 85027 COMPLETE CBC AUTOMATED: CPT

## 2023-12-31 RX ORDER — ENOXAPARIN SODIUM 100 MG/ML
40 INJECTION SUBCUTANEOUS DAILY
Status: DISCONTINUED | OUTPATIENT
Start: 2023-12-31 | End: 2024-01-05 | Stop reason: HOSPADM

## 2023-12-31 RX ORDER — POTASSIUM CHLORIDE 20 MEQ/1
40 TABLET, EXTENDED RELEASE ORAL 2 TIMES DAILY
Status: COMPLETED | OUTPATIENT
Start: 2023-12-31 | End: 2023-12-31

## 2023-12-31 RX ADMIN — Medication 1 TABLET: at 09:29

## 2023-12-31 RX ADMIN — ALLOPURINOL 100 MG: 100 TABLET ORAL at 09:29

## 2023-12-31 RX ADMIN — FENOFIBRATE 160 MG: 160 TABLET ORAL at 09:29

## 2023-12-31 RX ADMIN — TIMOLOL MALEATE 1 DROP: 5 SOLUTION OPHTHALMIC at 09:54

## 2023-12-31 RX ADMIN — CEFEPIME 2000 MG: 2 INJECTION, POWDER, FOR SOLUTION INTRAVENOUS at 20:15

## 2023-12-31 RX ADMIN — VANCOMYCIN HYDROCHLORIDE 500 MG: 500 INJECTION, POWDER, LYOPHILIZED, FOR SOLUTION INTRAVENOUS at 03:53

## 2023-12-31 RX ADMIN — FOLIC ACID 1 MG: 1 TABLET ORAL at 09:29

## 2023-12-31 RX ADMIN — SODIUM CHLORIDE, PRESERVATIVE FREE 10 ML: 5 INJECTION INTRAVENOUS at 09:34

## 2023-12-31 RX ADMIN — MESALAMINE 800 MG: 400 CAPSULE, DELAYED RELEASE ORAL at 14:17

## 2023-12-31 RX ADMIN — SODIUM CHLORIDE, POTASSIUM CHLORIDE, SODIUM LACTATE AND CALCIUM CHLORIDE: 600; 310; 30; 20 INJECTION, SOLUTION INTRAVENOUS at 15:06

## 2023-12-31 RX ADMIN — ASPIRIN 81 MG: 81 TABLET, CHEWABLE ORAL at 22:52

## 2023-12-31 RX ADMIN — GABAPENTIN 300 MG: 300 CAPSULE ORAL at 22:52

## 2023-12-31 RX ADMIN — HYDROCODONE BITARTRATE AND ACETAMINOPHEN 1 TABLET: 10; 325 TABLET ORAL at 18:15

## 2023-12-31 RX ADMIN — CEFEPIME 2000 MG: 2 INJECTION, POWDER, FOR SOLUTION INTRAVENOUS at 03:54

## 2023-12-31 RX ADMIN — PANTOPRAZOLE SODIUM 40 MG: 40 TABLET, DELAYED RELEASE ORAL at 09:29

## 2023-12-31 RX ADMIN — MESALAMINE 800 MG: 400 CAPSULE, DELAYED RELEASE ORAL at 09:29

## 2023-12-31 RX ADMIN — VANCOMYCIN HYDROCHLORIDE 750 MG: 750 INJECTION, POWDER, LYOPHILIZED, FOR SOLUTION INTRAVENOUS at 19:12

## 2023-12-31 RX ADMIN — CHOLESTYRAMINE 4 G: 4 POWDER, FOR SUSPENSION ORAL at 09:29

## 2023-12-31 RX ADMIN — ENOXAPARIN SODIUM 40 MG: 100 INJECTION SUBCUTANEOUS at 17:06

## 2023-12-31 RX ADMIN — POTASSIUM CHLORIDE 40 MEQ: 1500 TABLET, EXTENDED RELEASE ORAL at 22:51

## 2023-12-31 RX ADMIN — Medication: at 09:34

## 2023-12-31 RX ADMIN — Medication: at 22:00

## 2023-12-31 RX ADMIN — MORPHINE SULFATE 2 MG: 2 INJECTION, SOLUTION INTRAMUSCULAR; INTRAVENOUS at 04:27

## 2023-12-31 RX ADMIN — GABAPENTIN 300 MG: 300 CAPSULE ORAL at 09:29

## 2023-12-31 RX ADMIN — POTASSIUM CHLORIDE 40 MEQ: 1500 TABLET, EXTENDED RELEASE ORAL at 10:47

## 2023-12-31 RX ADMIN — GABAPENTIN 300 MG: 300 CAPSULE ORAL at 14:17

## 2023-12-31 ASSESSMENT — PAIN SCALES - GENERAL
PAINLEVEL_OUTOF10: 9
PAINLEVEL_OUTOF10: 3

## 2023-12-31 NOTE — PROGRESS NOTES
Pharmacy Renal Adjustment    Lilia Chiang is a 78 y.o. female. Pharmacy has renally adjusted medications per protocol.    Recent Labs     12/30/23  1027 12/31/23  0353   BUN 21 19       Recent Labs     12/30/23  1027 12/31/23  0353   CREATININE 0.7 0.5       Estimated Creatinine Clearance: 74 mL/min (based on SCr of 0.5 mg/dL).    Height:   Ht Readings from Last 1 Encounters:   12/30/23 1.626 m (5' 4\")     Weight:  Wt Readings from Last 1 Encounters:   12/31/23 51.2 kg (112 lb 14.4 oz)       Plan: Adjust the following medications based on renal function:           Change Cefepime to 2000 mg IV over 240 min every 8 hours x 20 more doses to complete 7 day    Electronically signed by Sil Tovar RPH on 12/31/2023 at 1:44 PM

## 2023-12-31 NOTE — PROGRESS NOTES
4 Eyes Skin Assessment     NAME:  Lilia Chiang  YOB: 1945  MEDICAL RECORD NUMBER:  [id number]    The patient is being assessed for  Admission    I agree that at least one RN has performed a thorough Head to Toe Skin Assessment on the patient. ALL assessment sites listed below have been assessed.      Areas assessed by both nurses:    Head, Face, Ears, Shoulders, Back, Chest, Arms, Elbows, Hands, Sacrum. Buttock, Coccyx, Ischium, Legs. Feet and Heels, and Under Medical Devices         Does the Patient have a Wound? Yes wound(s) were present on assessment. LDA wound assessment was Initiated and completed by RN       Gage Prevention initiated by RN: Yes  Wound Care Orders initiated by RN: Yes    Pressure Injury (Stage 3,4, Unstageable, DTI, NWPT, and Complex wounds) if present, place Wound referral order by RN under : Yes    New Ostomies, if present place, Ostomy referral order under : No     Nurse 1 eSignature: Electronically signed by Aretha Magdaleno RN on 12/31/23 at 3:27 PM CST    **SHARE this note so that the co-signing nurse can place an eSignature**    Nurse 2 eSignature: Electronically signed by Araceli Mathews RN on 12/31/23 at 3:31 PM CST

## 2023-12-31 NOTE — PROGRESS NOTES
Automatic Dose Adjustment of                Subcutaneous Anticoagulant for Prophylaxis    Lilia Chiang is a 78 y.o. female.     Recent Labs     12/30/23  1027 12/31/23  0353   CREATININE 0.7 0.5       Estimated Creatinine Clearance: 74 mL/min (based on SCr of 0.5 mg/dL).    Weight:  Wt Readings from Last 1 Encounters:   12/31/23 51.2 kg (112 lb 14.4 oz)           Pharmacy has adjusted subcutaneous anticoagulant for prophylaxis to Enoxaparin 40 mg SC daily based on the patient's weight and estimated CrCl per [facility] policy.               Electronically signed by Sil Tovar RPH on 12/31/2023 at 1:47 PM

## 2023-12-31 NOTE — PROGRESS NOTES
found most of them negative except as stated above in subjective note    Objective:      Vital signs in last 24 hours:  Patient Vitals for the past 24 hrs:   BP Temp Temp src Pulse Resp SpO2 Weight   12/31/23 1011 -- -- -- -- -- 94 % --   12/31/23 0827 (!) 113/49 98 °F (36.7 °C) Temporal 79 18 97 % --   12/31/23 0431 -- -- -- -- -- -- 51.2 kg (112 lb 14.4 oz)   12/30/23 1919 (!) 119/42 97.7 °F (36.5 °C) Temporal 90 16 93 % --       Patient examined with appropriate PPE  Physical Exam:  Vital Signs: BP (!) 113/49   Pulse 79   Temp 98 °F (36.7 °C) (Temporal)   Resp 18   Ht 1.626 m (5' 4\")   Wt 51.2 kg (112 lb 14.4 oz)   SpO2 94%   BMI 19.38 kg/m²   General appearance:.Lying comfortably in bed ,   HEENT: Normocephalic , Atraumatic, PERRL, JVP not raised  Chest: On inspection no use of accessory muscles of respiration, on auscultation vesicular breath sounds equal bilaterally no rales rhonchi or wheezing   cardiac: Regular rate and rhythm, S1, S2 normal. No murmurs, gallops, or rubs auscultated.   Abdomen:soft, non-tender; normal bowel sounds, no masses, no organomegaly.  Urogenital : no ramos, no suprapubic tenderness, no CVA tenderness  Extremities: No clubbing or cyanosis. No peripheral edema. Peripheral pulses palpable.  Neurologic: Alert and Cooperative , cranial nerves grossly intact, DTR equal, Power 5 /5   Psychology: No hallucination, no delusion, appropriate mood          Lab Review   Recent Results (from the past 24 hour(s))   Basic Metabolic Panel w/ Reflex to MG    Collection Time: 12/31/23  3:53 AM   Result Value Ref Range    Sodium 141 136 - 145 mmol/L    Potassium reflex Magnesium 3.3 (L) 3.5 - 5.0 mmol/L    Chloride 111 98 - 111 mmol/L    CO2 20 (L) 22 - 29 mmol/L    Anion Gap 10 7 - 19 mmol/L    Glucose 81 74 - 109 mg/dL    BUN 19 8 - 23 mg/dL    Creatinine 0.5 0.5 - 0.9 mg/dL    Est, Glom Filt Rate >60 >60    Calcium 8.1 (L) 8.8 - 10.2 mg/dL   CBC    Collection Time: 12/31/23  3:53 AM   Result  on delayed imaging.  5. A sacral decubitus ulcer is suspected.      I have reviewed the patient's daily labs, including BMP and CBC with pertinent results discussed below.     Reviewed imaging     Assessment & Plan       Sepsis/Metabolic encephalopathy/Right lower lobe pneumonia/UTI  -sepsis fluid bolus given in ED  -follow pan cx  -cefepime 2g iv q12hrs  -pharmacy to dose with vancomycin   -LR at 100cc/hr  -goal map >65  -pressors as warranted  -ensure adequate iv access  -sputum culture  -legionella urine antigen  -fall precautions  -I's and O's  -daily weight  -strep pneumonia urine antigen   -contact precautions-h/o MDRO  -spot check spo2 prn  -duoneb breathing treatment q4hrs prn   -resp pcr panel > negative  12/31 will repeat GI panel as the patient is having diarrhea, will obtain CT scan of chest    Hypokalemia likely due to GI loss  12/31 oral potassium 40 mg twice daily    Active Problems:    History of sacral decubitus ulcer  -consult wound care nurse    -pain control  -narcan 0.4mg iv prn  -apply wound vac per prior wound care recommendation    at nursing home    With multiple episodes of UTI  10/10/2023 evaluated by CT urogram negative for any stones usually follow-up with urology as outpatient      DVT prophylaxis: Enoxaparin    POA  Full Code   Case d/w the RN taking care of the patient  RN  notes reviewed  Consultant notes reviewed     DISPOSITION:    D/C: Skilled Facility  Estimated D/C Date: TBD      Mark Briseno MD 12/31/2023 5:21 PM    EMR Dragon/Transcription disclaimer:     This dictation was performed using Dragon software.  Mistake and misspelling may have been created without  realizing them, although attempts have made to review the note for such errors.  Please notify me for clarification.  Thank you    Part of the note is copied forward from previous provider.

## 2023-12-31 NOTE — PROGRESS NOTES
2 attempts to place PIV made. Unsuccessful. Both sites gave blood return and advanced catheter, but patient stated severe pain upon administration of normal saline flush.     Request sent for placement of Ultrasound guided PIV.    Electronically signed by Aretha Magdaleno RN on 12/31/2023 at 3:24 PM

## 2023-12-31 NOTE — PROGRESS NOTES
Wound vac replaced per outside facility orders and per policy recommendations. Low continuous suction applied 125 mmHg.     Electronically signed by Aretha Magdaleno RN on 12/30/2023 at 6:10 PM

## 2023-12-31 NOTE — PLAN OF CARE
Problem: Skin/Tissue Integrity  Goal: Absence of new skin breakdown  Description: 1.  Monitor for areas of redness and/or skin breakdown  2.  Assess vascular access sites hourly  3.  Every 4-6 hours minimum:  Change oxygen saturation probe site  4.  Every 4-6 hours:  If on nasal continuous positive airway pressure, respiratory therapy assess nares and determine need for appliance change or resting period.  12/31/2023 1012 by Johanny Chiang RN  Outcome: Progressing  12/31/2023 0043 by Nataliia Rey RN  Outcome: Progressing     Problem: Safety - Adult  Goal: Free from fall injury  12/31/2023 1012 by Johanny Chiang RN  Outcome: Progressing  Flowsheets (Taken 12/31/2023 1001)  Free From Fall Injury: Instruct family/caregiver on patient safety  12/31/2023 0043 by Nataliia Rey RN  Outcome: Progressing     Problem: ABCDS Injury Assessment  Goal: Absence of physical injury  12/31/2023 1012 by Johanny Chiang RN  Outcome: Progressing  Flowsheets (Taken 12/31/2023 1001)  Absence of Physical Injury: Implement safety measures based on patient assessment  12/31/2023 0043 by Nataliia Rey, RN  Outcome: Progressing     Problem: Pain  Goal: Verbalizes/displays adequate comfort level or baseline comfort level  12/31/2023 1012 by Johanny Chiang RN  Outcome: Progressing  12/31/2023 0043 by Nataliia Rey RN  Outcome: Progressing

## 2023-12-31 NOTE — PROGRESS NOTES
[facility]   Pharmacy Pharmacokinetic Monitoring Service - Vancomycin    Consulting Provider: Norbert FALL   Indication: sepsis  Target Concentration: Goal AUC/MAITE 400-600 mg*hr/L  Day of Therapy: 2  Additional Antimicrobials: Cefepime    Pertinent Laboratory Values:   Wt Readings from Last 1 Encounters:   12/31/23 51.2 kg (112 lb 14.4 oz)     Temp Readings from Last 1 Encounters:   12/31/23 98 °F (36.7 °C) (Temporal)     Estimated Creatinine Clearance: 74 mL/min (based on SCr of 0.5 mg/dL).  Recent Labs     12/30/23  1027 12/31/23  0353   CREATININE 0.7 0.5   BUN 21 19   WBC 13.6* 11.1*     Procalcitonin: 0.27 (12/30/23)    Pertinent Cultures:  Culture Date Source Results   12/30/23 urine  Klebsiella pneumoniae       Staph aureus MRSA       12/30/23 Blood x 2                No growth   MRSA Nasal Swab: N/A. Non-respiratory infection.    Recent vancomycin administrations                     vancomycin (VANCOCIN) 500 mg in sodium chloride 0.9 % 100 mL IVPB (mini-bag) (mg) 500 mg New Bag 12/31/23 0353    vancomycin (VANCOCIN) 1000 mg in sodium chloride 0.9% 250 mL IVPB (mg) 1,000 mg New Bag 12/30/23 1435                    Assessment:  Date/Time Current Dose Concentration Timing of Concentration (h) AUC   12/31/23 500 Q 12 7.7 9 H 51 M 299   Note: Serum concentrations collected for AUC dosing may appear elevated if collected in close proximity to the dose administered, this is not necessarily an indication of toxicity    Plan:  Current dosing regimen is sub-therapeutic  Increase dose to 750 mg IV Q 12 hours  Repeat vancomycin concentration ordered for 01/01/24 @ 1530   Pharmacy will continue to monitor patient and adjust therapy as indicated    Thank you for the consult,  Shekhar Wright TL  12/31/2023 3:37 PM

## 2024-01-01 LAB
ADV 40+41 DNA STL QL NAA+NON-PROBE: NOT DETECTED
ANION GAP SERPL CALCULATED.3IONS-SCNC: 8 MMOL/L (ref 7–19)
BACTERIA UR CULT: ABNORMAL
BUN SERPL-MCNC: 17 MG/DL (ref 8–23)
C CAYETANENSIS DNA STL QL NAA+NON-PROBE: NOT DETECTED
C COLI+JEJ+UPSA DNA STL QL NAA+NON-PROBE: NOT DETECTED
C DIF TOX TCDA+TCDB STL QL NAA+NON-PROBE: DETECTED
CALCIUM SERPL-MCNC: 8 MG/DL (ref 8.8–10.2)
CHLORIDE SERPL-SCNC: 112 MMOL/L (ref 98–111)
CO2 SERPL-SCNC: 19 MMOL/L (ref 22–29)
CREAT SERPL-MCNC: 0.5 MG/DL (ref 0.5–0.9)
CRYPTOSP DNA STL QL NAA+NON-PROBE: NOT DETECTED
E HISTOLYT DNA STL QL NAA+NON-PROBE: NOT DETECTED
EAEC PAA PLAS AGGR+AATA ST NAA+NON-PRB: NOT DETECTED
EC STX1+STX2 GENES STL QL NAA+NON-PROBE: NOT DETECTED
EPEC EAE GENE STL QL NAA+NON-PROBE: NOT DETECTED
ERYTHROCYTE [DISTWIDTH] IN BLOOD BY AUTOMATED COUNT: 19.9 % (ref 11.5–14.5)
ETEC LTA+ST1A+ST1B TOX ST NAA+NON-PROBE: NOT DETECTED
G LAMBLIA DNA STL QL NAA+NON-PROBE: NOT DETECTED
GI PATH DNA+RNA PNL STL NAA+NON-PROBE: NOT DETECTED
GLUCOSE SERPL-MCNC: 97 MG/DL (ref 74–109)
HCT VFR BLD AUTO: 25.8 % (ref 37–47)
HGB BLD-MCNC: 7.7 G/DL (ref 12–16)
MCH RBC QN AUTO: 24.4 PG (ref 27–31)
MCHC RBC AUTO-ENTMCNC: 29.8 G/DL (ref 33–37)
MCV RBC AUTO: 81.9 FL (ref 81–99)
NOROVIRUS GI+II RNA STL QL NAA+NON-PROBE: NOT DETECTED
ORGANISM: ABNORMAL
ORGANISM: ABNORMAL
P SHIGELLOIDES DNA STL QL NAA+NON-PROBE: NOT DETECTED
PLATELET # BLD AUTO: 148 K/UL (ref 130–400)
PMV BLD AUTO: 9.9 FL (ref 9.4–12.3)
POTASSIUM SERPL-SCNC: 3.9 MMOL/L (ref 3.5–5)
PROCALCITONIN: 0.37 NG/ML (ref 0–0.09)
RBC # BLD AUTO: 3.15 M/UL (ref 4.2–5.4)
RVA RNA STL QL NAA+NON-PROBE: NOT DETECTED
S ENT+BONG DNA STL QL NAA+NON-PROBE: NOT DETECTED
SAPO I+II+IV+V RNA STL QL NAA+NON-PROBE: NOT DETECTED
SHIGELLA SP+EIEC IPAH ST NAA+NON-PROBE: NOT DETECTED
SODIUM SERPL-SCNC: 139 MMOL/L (ref 136–145)
V CHOL+PARA+VUL DNA STL QL NAA+NON-PROBE: NOT DETECTED
V CHOLERAE DNA STL QL NAA+NON-PROBE: NOT DETECTED
WBC # BLD AUTO: 7.5 K/UL (ref 4.8–10.8)
Y ENTEROCOL DNA STL QL NAA+NON-PROBE: NOT DETECTED

## 2024-01-01 PROCEDURE — 94150 VITAL CAPACITY TEST: CPT

## 2024-01-01 PROCEDURE — 85027 COMPLETE CBC AUTOMATED: CPT

## 2024-01-01 PROCEDURE — 36415 COLL VENOUS BLD VENIPUNCTURE: CPT

## 2024-01-01 PROCEDURE — 6360000002 HC RX W HCPCS: Performed by: NURSE PRACTITIONER

## 2024-01-01 PROCEDURE — 6370000000 HC RX 637 (ALT 250 FOR IP): Performed by: INTERNAL MEDICINE

## 2024-01-01 PROCEDURE — 2580000003 HC RX 258: Performed by: NURSE PRACTITIONER

## 2024-01-01 PROCEDURE — 6360000002 HC RX W HCPCS: Performed by: INTERNAL MEDICINE

## 2024-01-01 PROCEDURE — 6370000000 HC RX 637 (ALT 250 FOR IP): Performed by: NURSE PRACTITIONER

## 2024-01-01 PROCEDURE — 84145 PROCALCITONIN (PCT): CPT

## 2024-01-01 PROCEDURE — 94760 N-INVAS EAR/PLS OXIMETRY 1: CPT

## 2024-01-01 PROCEDURE — 80048 BASIC METABOLIC PNL TOTAL CA: CPT

## 2024-01-01 PROCEDURE — 2580000003 HC RX 258: Performed by: INTERNAL MEDICINE

## 2024-01-01 PROCEDURE — 1210000000 HC MED SURG R&B

## 2024-01-01 RX ORDER — IBUPROFEN 400 MG/1
600 TABLET ORAL
Status: DISPENSED | OUTPATIENT
Start: 2024-01-01 | End: 2024-01-03

## 2024-01-01 RX ORDER — HYDROCODONE BITARTRATE AND ACETAMINOPHEN 10; 325 MG/1; MG/1
1 TABLET ORAL 3 TIMES DAILY PRN
Status: DISCONTINUED | OUTPATIENT
Start: 2024-01-01 | End: 2024-01-03

## 2024-01-01 RX ADMIN — FENOFIBRATE 160 MG: 160 TABLET ORAL at 09:25

## 2024-01-01 RX ADMIN — FIDAXOMICIN 200 MG: 200 TABLET, FILM COATED ORAL at 21:08

## 2024-01-01 RX ADMIN — Medication 1 TABLET: at 09:24

## 2024-01-01 RX ADMIN — Medication: at 08:13

## 2024-01-01 RX ADMIN — ENOXAPARIN SODIUM 40 MG: 100 INJECTION SUBCUTANEOUS at 16:28

## 2024-01-01 RX ADMIN — HYDROCODONE BITARTRATE AND ACETAMINOPHEN 1 TABLET: 10; 325 TABLET ORAL at 14:19

## 2024-01-01 RX ADMIN — CEFEPIME 2000 MG: 2 INJECTION, POWDER, FOR SOLUTION INTRAVENOUS at 06:00

## 2024-01-01 RX ADMIN — IBUPROFEN 600 MG: 400 TABLET, FILM COATED ORAL at 16:28

## 2024-01-01 RX ADMIN — CEFEPIME 2000 MG: 2 INJECTION, POWDER, FOR SOLUTION INTRAVENOUS at 21:07

## 2024-01-01 RX ADMIN — GABAPENTIN 300 MG: 300 CAPSULE ORAL at 21:11

## 2024-01-01 RX ADMIN — HYDROCODONE BITARTRATE AND ACETAMINOPHEN 1 TABLET: 10; 325 TABLET ORAL at 09:25

## 2024-01-01 RX ADMIN — CEFEPIME 2000 MG: 2 INJECTION, POWDER, FOR SOLUTION INTRAVENOUS at 12:15

## 2024-01-01 RX ADMIN — SODIUM CHLORIDE, POTASSIUM CHLORIDE, SODIUM LACTATE AND CALCIUM CHLORIDE: 600; 310; 30; 20 INJECTION, SOLUTION INTRAVENOUS at 10:29

## 2024-01-01 RX ADMIN — HYDROCODONE BITARTRATE AND ACETAMINOPHEN 1 TABLET: 10; 325 TABLET ORAL at 00:46

## 2024-01-01 RX ADMIN — MESALAMINE 800 MG: 400 CAPSULE, DELAYED RELEASE ORAL at 21:09

## 2024-01-01 RX ADMIN — GABAPENTIN 300 MG: 300 CAPSULE ORAL at 09:24

## 2024-01-01 RX ADMIN — TIMOLOL MALEATE 1 DROP: 5 SOLUTION OPHTHALMIC at 21:08

## 2024-01-01 RX ADMIN — MESALAMINE 800 MG: 400 CAPSULE, DELAYED RELEASE ORAL at 14:19

## 2024-01-01 RX ADMIN — CHOLESTYRAMINE 4 G: 4 POWDER, FOR SUSPENSION ORAL at 12:17

## 2024-01-01 RX ADMIN — VANCOMYCIN HYDROCHLORIDE 750 MG: 750 INJECTION, POWDER, LYOPHILIZED, FOR SOLUTION INTRAVENOUS at 10:28

## 2024-01-01 RX ADMIN — GABAPENTIN 300 MG: 300 CAPSULE ORAL at 14:19

## 2024-01-01 RX ADMIN — ALLOPURINOL 100 MG: 100 TABLET ORAL at 09:25

## 2024-01-01 RX ADMIN — PANTOPRAZOLE SODIUM 40 MG: 40 TABLET, DELAYED RELEASE ORAL at 09:25

## 2024-01-01 RX ADMIN — ASPIRIN 81 MG: 81 TABLET, CHEWABLE ORAL at 21:09

## 2024-01-01 RX ADMIN — SODIUM CHLORIDE, PRESERVATIVE FREE 10 ML: 5 INJECTION INTRAVENOUS at 09:25

## 2024-01-01 RX ADMIN — MESALAMINE 800 MG: 400 CAPSULE, DELAYED RELEASE ORAL at 09:24

## 2024-01-01 RX ADMIN — FOLIC ACID 1 MG: 1 TABLET ORAL at 09:24

## 2024-01-01 RX ADMIN — TIMOLOL MALEATE 1 DROP: 5 SOLUTION OPHTHALMIC at 09:24

## 2024-01-01 ASSESSMENT — PAIN DESCRIPTION - LOCATION
LOCATION: COCCYX
LOCATION: GENERALIZED
LOCATION: SACRUM
LOCATION: GENERALIZED
LOCATION: GENERALIZED

## 2024-01-01 ASSESSMENT — PAIN SCALES - GENERAL
PAINLEVEL_OUTOF10: 3
PAINLEVEL_OUTOF10: 9
PAINLEVEL_OUTOF10: 3
PAINLEVEL_OUTOF10: 6
PAINLEVEL_OUTOF10: 9

## 2024-01-01 ASSESSMENT — PAIN DESCRIPTION - DESCRIPTORS
DESCRIPTORS: ACHING

## 2024-01-01 NOTE — CARE COORDINATION
01/01/24 1359   Readmission Assessment   Number of Days since last admission? 8-30 days   Previous Disposition SNF   Who is being Interviewed Caregiver  (from chart)   What was the patient's/caregiver's perception as to why they think they needed to return back to the hospital? Other (Comment)  (c/o AMS)   Did you visit your Primary Care Physician after you left the hospital, before you returned this time? Yes  (seen at SNF)   Did you see a specialist, such as Cardiac, Pulmonary, Orthopedic Physician, etc. after you left the hospital? No   Who advised the patient to return to the hospital? Skilled Unit   Does the patient report anything that got in the way of taking their medications? No   In our efforts to provide the best possible care to you and others like you, can you think of anything that we could have done to help you after you left the hospital the first time, so that you might not have needed to return so soon? Other (Comment)  (from chart)

## 2024-01-01 NOTE — CARE COORDINATION
Patient is from [facility].  Will need to check bed hold status.  Will require pre-cert to return to their facility.  [facility]  [phone] P  [phone] F  Electronically signed by Anjana Zuñiga RN on 1/1/2024 at 1:58 PM

## 2024-01-01 NOTE — PROGRESS NOTES
Date:2024  Patient: Lilia Chiang  : 1945  MRN:[id number]  CODE:                                                                        PCP:Geronimo Ríos DO    Admit Date: 2023  9:44 AM   LOS: 2 days     Hospital course : The patient is a 78 y.o. female who presented to [facility] ED for evaluation of confusion. 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     Pt is from nursing home here with her son who assists with history of present illness. Pt has had increased episodes of confusion over past couple of days with concern for uti by nursing home staff. Pt has history of recurrent UTI with MDRO last discharged from this facility on 2023 having had evaluation of acute encephalopathy at that time. Pt denies cough as well as as shortness of breath. She has had no vomiting or diarrhea.     In ED, sepsis fluid bolus 30cc/kg given, cefepime and vancomycin initiated for uti and suspected pneumonia. Wbc 13k, sodium 134, potassium 3.8, creatinine 0.7/bun 21, glucose 120, albumin 2.3, UA micro-tntc wbc, 4+bacteria, procalcitonin 0.27, lactic acid 1.4, ct of head- No acute intracranial abnormality with generalized volume loss, microangiopathy. Cxr-Patchy right basilar opacities could represent aspiration or pneumonia. Pt is admitted inpatient to hospitalist.         Subjective:   seen and evaluated along with RN in the presence of her son at the bedside, she reported to have diarrhea from C. difficile a month ago and completed the treatment 2 weeks ago, tested negative over there.  Presently having watery diarrhea almost 4-5 times and possible loose watery stool after every meals.  Presently on wound VAC for sacral decubitus.  Treated for suspected pneumonia and UTI with ileal conduit     seen and evaluated in the presence of her  hallucination, no delusion, appropriate mood          Lab Review   Recent Results (from the past 24 hour(s))   Procalcitonin    Collection Time: 01/01/24  1:32 AM   Result Value Ref Range    Procalcitonin 0.37 (H) 0.00 - 0.09 ng/mL   Basic Metabolic Panel w/ Reflex to MG    Collection Time: 01/01/24  1:32 AM   Result Value Ref Range    Sodium 139 136 - 145 mmol/L    Potassium reflex Magnesium 3.9 3.5 - 5.0 mmol/L    Chloride 112 (H) 98 - 111 mmol/L    CO2 19 (L) 22 - 29 mmol/L    Anion Gap 8 7 - 19 mmol/L    Glucose 97 74 - 109 mg/dL    BUN 17 8 - 23 mg/dL    Creatinine 0.5 0.5 - 0.9 mg/dL    Est, Glom Filt Rate >60 >60    Calcium 8.0 (L) 8.8 - 10.2 mg/dL   CBC    Collection Time: 01/01/24  1:32 AM   Result Value Ref Range    WBC 7.5 4.8 - 10.8 K/uL    RBC 3.15 (L) 4.20 - 5.40 M/uL    Hemoglobin 7.7 (L) 12.0 - 16.0 g/dL    Hematocrit 25.8 (L) 37.0 - 47.0 %    MCV 81.9 81.0 - 99.0 fL    MCH 24.4 (L) 27.0 - 31.0 pg    MCHC 29.8 (L) 33.0 - 37.0 g/dL    RDW 19.9 (H) 11.5 - 14.5 %    Platelets 148 130 - 400 K/uL    MPV 9.9 9.4 - 12.3 fL        I/O last 3 completed shifts:  In: 460 [P.O.:460]  Out: -      ibuprofen  600 mg Oral TID WC    Fidaxomicin  200 mg Oral BID    enoxaparin  40 mg SubCUTAneous Daily    cefepime  2,000 mg IntraVENous Q8H    lactated ringers bolus  1,000 mL IntraVENous Once    sodium chloride flush  5-40 mL IntraVENous 2 times per day    magic butt cream   Topical BID    allopurinol  100 mg Oral Daily    aspirin  81 mg Oral Nightly    oyster shell calcium w/D  1 tablet Oral Daily    cholestyramine light  4 g Oral Daily    fenofibrate  160 mg Oral Daily    folic acid  1 mg Oral Daily    gabapentin  300 mg Oral TID    magnesium chloride-calcium carbonate  2 tablet Oral Daily with breakfast    mesalamine  800 mg Oral TID    therapeutic multivitamin-minerals  1 tablet Oral Daily    pantoprazole  40 mg Oral QAM AC    timolol  1 drop Both Eyes BID      CT urogram done on 10/12/2023  IMPRESSION:  1. Moderate

## 2024-01-01 NOTE — PROGRESS NOTES
Inserted rectal tube to prevent contaminating patient's sacral wound vac as patient is having loose, large stools frequently. Patient agreed to try the rectal tube overnight, but she stated that it was \"uncomfortable and I do not think I want it that long since I have so many other things going on.\" Patient turned and resting in bed.

## 2024-01-01 NOTE — CONSULTS
Comprehensive Nutrition Assessment    Type and Reason for Visit:  Initial, Consult, Positive Nutrition Screen    Nutrition Recommendations/Plan:   Consider adding Culturelle probiotic.   Start Ensure Plus TID.   Continue Wolf BID.   Modify diet to Regular.      Malnutrition Assessment:  Malnutrition Status:  Severe malnutrition (01/01/24 1315)    Context:  Chronic Illness     Findings of the 6 clinical characteristics of malnutrition:  Energy Intake:  Mild decrease in energy intake (Comment)  Weight Loss:  Greater than 7.5% over 3 months     Body Fat Loss:  Severe body fat loss Orbital, Buccal region   Muscle Mass Loss:  Severe muscle mass loss Temples (temporalis), Clavicles (pectoralis & deltoids)  Fluid Accumulation:  Mild Extremities, Generalized   Strength:  Not Performed    Nutrition Assessment:    Consult received for ji score. Pt presents severely malnourished AEB wt loss >7.5% x3 months, severe muscle wasting, and severe fat loss. Pt had consumed most of her lunch meal at time of visit and states her appetite is good. She has had trouble gaining weight since having C-diff in October. She also has sacral decubitus wound with wound vac in place. We discussed current intake and protein needs. Pt agreeable to Ensrue Plus TID and continuing Wolf BID.    Nutrition Related Findings:    BM 1/1, +1 generalized edema Wound Type: Wound Vac       Current Nutrition Intake & Therapies:    Average Meal Intake: %  Average Supplements Intake: %  ADULT DIET; Regular; Send yogurt with breakfast  ADULT ORAL NUTRITION SUPPLEMENT; Lunch, Dinner; Wound Healing Oral Supplement  ADULT ORAL NUTRITION SUPPLEMENT; Breakfast, Lunch, Dinner; Standard High Calorie/High Protein Oral Supplement    Anthropometric Measures:  Height: 162.6 cm (5' 4\")  Ideal Body Weight (IBW): 120 lbs (55 kg)       Current Body Weight: 51.2 kg (112 lb 14.4 oz), 94.1 % IBW.    Current BMI (kg/m2): 19.4  Usual Body Weight: 59.4 kg (131 lb)

## 2024-01-02 ENCOUNTER — TELEPHONE (OUTPATIENT)
Dept: UROLOGY | Age: 79
End: 2024-01-02

## 2024-01-02 LAB
ANION GAP SERPL CALCULATED.3IONS-SCNC: 8 MMOL/L (ref 7–19)
BUN SERPL-MCNC: 23 MG/DL (ref 8–23)
CALCIUM SERPL-MCNC: 8.4 MG/DL (ref 8.8–10.2)
CHLORIDE SERPL-SCNC: 112 MMOL/L (ref 98–111)
CO2 SERPL-SCNC: 20 MMOL/L (ref 22–29)
CREAT SERPL-MCNC: 0.6 MG/DL (ref 0.5–0.9)
ERYTHROCYTE [DISTWIDTH] IN BLOOD BY AUTOMATED COUNT: 19.8 % (ref 11.5–14.5)
GLUCOSE SERPL-MCNC: 95 MG/DL (ref 74–109)
HCT VFR BLD AUTO: 27.4 % (ref 37–47)
HGB BLD-MCNC: 7.9 G/DL (ref 12–16)
MCH RBC QN AUTO: 24 PG (ref 27–31)
MCHC RBC AUTO-ENTMCNC: 28.8 G/DL (ref 33–37)
MCV RBC AUTO: 83.3 FL (ref 81–99)
PLATELET # BLD AUTO: 164 K/UL (ref 130–400)
PMV BLD AUTO: 10.2 FL (ref 9.4–12.3)
POTASSIUM SERPL-SCNC: 4.1 MMOL/L (ref 3.5–5)
RBC # BLD AUTO: 3.29 M/UL (ref 4.2–5.4)
SODIUM SERPL-SCNC: 140 MMOL/L (ref 136–145)
WBC # BLD AUTO: 6.7 K/UL (ref 4.8–10.8)

## 2024-01-02 PROCEDURE — 2580000003 HC RX 258: Performed by: NURSE PRACTITIONER

## 2024-01-02 PROCEDURE — 36415 COLL VENOUS BLD VENIPUNCTURE: CPT

## 2024-01-02 PROCEDURE — 94640 AIRWAY INHALATION TREATMENT: CPT

## 2024-01-02 PROCEDURE — 94669 MECHANICAL CHEST WALL OSCILL: CPT

## 2024-01-02 PROCEDURE — 6370000000 HC RX 637 (ALT 250 FOR IP): Performed by: NURSE PRACTITIONER

## 2024-01-02 PROCEDURE — 6360000002 HC RX W HCPCS: Performed by: NURSE PRACTITIONER

## 2024-01-02 PROCEDURE — 94760 N-INVAS EAR/PLS OXIMETRY 1: CPT

## 2024-01-02 PROCEDURE — 2580000003 HC RX 258: Performed by: INTERNAL MEDICINE

## 2024-01-02 PROCEDURE — 85027 COMPLETE CBC AUTOMATED: CPT

## 2024-01-02 PROCEDURE — 1210000000 HC MED SURG R&B

## 2024-01-02 PROCEDURE — 80048 BASIC METABOLIC PNL TOTAL CA: CPT

## 2024-01-02 PROCEDURE — 6370000000 HC RX 637 (ALT 250 FOR IP): Performed by: INTERNAL MEDICINE

## 2024-01-02 PROCEDURE — 6360000002 HC RX W HCPCS: Performed by: INTERNAL MEDICINE

## 2024-01-02 RX ORDER — QUETIAPINE FUMARATE 25 MG/1
12.5 TABLET, FILM COATED ORAL NIGHTLY
Status: DISCONTINUED | OUTPATIENT
Start: 2024-01-02 | End: 2024-01-03

## 2024-01-02 RX ADMIN — GABAPENTIN 300 MG: 300 CAPSULE ORAL at 13:35

## 2024-01-02 RX ADMIN — FIDAXOMICIN 200 MG: 200 TABLET, FILM COATED ORAL at 09:33

## 2024-01-02 RX ADMIN — HYDROCODONE BITARTRATE AND ACETAMINOPHEN 1 TABLET: 10; 325 TABLET ORAL at 16:28

## 2024-01-02 RX ADMIN — ALLOPURINOL 100 MG: 100 TABLET ORAL at 09:33

## 2024-01-02 RX ADMIN — ASPIRIN 81 MG: 81 TABLET, CHEWABLE ORAL at 21:42

## 2024-01-02 RX ADMIN — CEFEPIME 2000 MG: 2 INJECTION, POWDER, FOR SOLUTION INTRAVENOUS at 10:05

## 2024-01-02 RX ADMIN — Medication: at 21:41

## 2024-01-02 RX ADMIN — MESALAMINE 800 MG: 400 CAPSULE, DELAYED RELEASE ORAL at 21:42

## 2024-01-02 RX ADMIN — SODIUM CHLORIDE, PRESERVATIVE FREE 10 ML: 5 INJECTION INTRAVENOUS at 08:10

## 2024-01-02 RX ADMIN — Medication: at 08:10

## 2024-01-02 RX ADMIN — QUETIAPINE FUMARATE 12.5 MG: 25 TABLET ORAL at 21:43

## 2024-01-02 RX ADMIN — MESALAMINE 800 MG: 400 CAPSULE, DELAYED RELEASE ORAL at 13:35

## 2024-01-02 RX ADMIN — PANTOPRAZOLE SODIUM 40 MG: 40 TABLET, DELAYED RELEASE ORAL at 09:33

## 2024-01-02 RX ADMIN — IBUPROFEN 600 MG: 400 TABLET, FILM COATED ORAL at 13:35

## 2024-01-02 RX ADMIN — CEFEPIME 2000 MG: 2 INJECTION, POWDER, FOR SOLUTION INTRAVENOUS at 13:34

## 2024-01-02 RX ADMIN — MESALAMINE 800 MG: 400 CAPSULE, DELAYED RELEASE ORAL at 09:33

## 2024-01-02 RX ADMIN — Medication: at 04:54

## 2024-01-02 RX ADMIN — IBUPROFEN 600 MG: 400 TABLET, FILM COATED ORAL at 16:28

## 2024-01-02 RX ADMIN — TIMOLOL MALEATE 1 DROP: 5 SOLUTION OPHTHALMIC at 08:10

## 2024-01-02 RX ADMIN — FENOFIBRATE 160 MG: 160 TABLET ORAL at 09:33

## 2024-01-02 RX ADMIN — Medication 1 TABLET: at 09:33

## 2024-01-02 RX ADMIN — TIMOLOL MALEATE 1 DROP: 5 SOLUTION OPHTHALMIC at 21:42

## 2024-01-02 RX ADMIN — FOLIC ACID 1 MG: 1 TABLET ORAL at 09:33

## 2024-01-02 RX ADMIN — GABAPENTIN 300 MG: 300 CAPSULE ORAL at 09:33

## 2024-01-02 RX ADMIN — CEFEPIME 2000 MG: 2 INJECTION, POWDER, FOR SOLUTION INTRAVENOUS at 21:52

## 2024-01-02 RX ADMIN — SODIUM CHLORIDE, POTASSIUM CHLORIDE, SODIUM LACTATE AND CALCIUM CHLORIDE: 600; 310; 30; 20 INJECTION, SOLUTION INTRAVENOUS at 18:10

## 2024-01-02 RX ADMIN — ENOXAPARIN SODIUM 40 MG: 100 INJECTION SUBCUTANEOUS at 18:11

## 2024-01-02 RX ADMIN — IPRATROPIUM BROMIDE AND ALBUTEROL SULFATE 1 DOSE: 2.5; .5 SOLUTION RESPIRATORY (INHALATION) at 10:24

## 2024-01-02 RX ADMIN — SODIUM CHLORIDE, POTASSIUM CHLORIDE, SODIUM LACTATE AND CALCIUM CHLORIDE: 600; 310; 30; 20 INJECTION, SOLUTION INTRAVENOUS at 08:09

## 2024-01-02 RX ADMIN — CHOLESTYRAMINE 4 G: 4 POWDER, FOR SUSPENSION ORAL at 09:33

## 2024-01-02 RX ADMIN — IBUPROFEN 600 MG: 400 TABLET, FILM COATED ORAL at 09:33

## 2024-01-02 ASSESSMENT — PAIN SCALES - GENERAL
PAINLEVEL_OUTOF10: 0
PAINLEVEL_OUTOF10: 0
PAINLEVEL_OUTOF10: 1

## 2024-01-02 ASSESSMENT — PAIN DESCRIPTION - LOCATION: LOCATION: BACK;RECTUM

## 2024-01-02 ASSESSMENT — PAIN DESCRIPTION - ORIENTATION: ORIENTATION: LOWER

## 2024-01-02 ASSESSMENT — PAIN DESCRIPTION - DESCRIPTORS: DESCRIPTORS: ACHING;DISCOMFORT;SORE

## 2024-01-02 ASSESSMENT — PAIN SCALES - WONG BAKER: WONGBAKER_NUMERICALRESPONSE: 0

## 2024-01-02 NOTE — PROGRESS NOTES
Physician Progress Note      PATIENT:               KRISTINA GUILLAUME  CSN #:                  [id number]  :                       1945  ADMIT DATE:       2023 9:44 AM  DISCH DATE:  RESPONDING  PROVIDER #:        Mark Briseno MD          QUERY TEXT:    Patient admitted with Sepsis. Noted to have Severe Malnutrition documented by   dietary consult on 24. If possible, please document in progress notes and   discharge summary if you are evaluating and /or treating any of the following:    The medical record reflects the following:    Risk Factors: Sepsis, Cdif, Stage IV sacral ulcer  Clinical indicators:  Decreased energy, Weight loss, Body fat loss, Muscle   mass loss, Fluid accumulation. Dietary consult documents  Severe malnutrition due to severely malnourished.  Treatment:  Labs, Maxipime 2,000 IVPB, IVF 1 liter bolus,  ml/hr.,   Vancomycin 750 mg IVPB.  Thank you  Mona Keyes RN, BSN, CDI  [email or web address]    ASPEN Criteria:    https://aspenjournals.onlinelibrary.fuller.com/doi/full/10.1177/727242551643160  5  Options provided:  -- Protein calorie malnutrition severe  -- Underweight with BMI 16.7  -- Other - I will add my own diagnosis  -- Disagree - Not applicable / Not valid  -- Disagree - Clinically unable to determine / Unknown  -- Refer to Clinical Documentation Reviewer    PROVIDER RESPONSE TEXT:    This patient has severe protein calorie malnutrition.    Query created by: Mona Keyes on 2024 12:25 PM      Electronically signed by:  Mark Briseno MD 2024 6:12 PM

## 2024-01-02 NOTE — PROGRESS NOTES
[facility]  Nurse  Consult Note       NAME:  Lilia Chiang  MEDICAL RECORD NUMBER:  [id number]  AGE: 78 y.o.   GENDER: female  : 1945  TODAY'S DATE:  2024    Subjective   Reason for Wound Nurse Evaluation and Assessment: Sacrum/NPWT       Lilia Chiang is a 78 y.o. female referred by:   [] Physician  [x] Nursing  [] Other:     Wound Identification:  Wound Type: pressure  Contributing Factors: chronic pressure, decreased mobility, shear force, and malnutrition    Wound History: First documented 10/2023  Current Wound Care Treatment:  NPWT-i    Patient Goal of Care:  [x] Wound Healing  [] Odor Control  [] Palliative Care  [] Pain Control   [] Other:         PAST MEDICAL HISTORY        Diagnosis Date    Arthritis     Cancer (HCC)     endometrial cancer, bladder    Cataract     CIDP (chronic inflammatory demyelinating polyneuropathy) ([facility])     Crohn's disease (HCC)     GERD (gastroesophageal reflux disease)     History of blood transfusion     Hypertension     Macular degeneration     Neuropathy     Palliative care patient 10/11/2023    Perineal cyst in female     PVD (peripheral vascular disease) ([facility])     Radiation     Urinary incontinence        PAST SURGICAL HISTORY    Past Surgical History:   Procedure Laterality Date    BACK SURGERY      lumbar    BLADDER REMOVAL  2023    urostomy    CARPAL TUNNEL RELEASE Bilateral     CATARACT REMOVAL Bilateral     CERVICAL SPINE SURGERY      metal in neck    CHOLECYSTECTOMY, LAPAROSCOPIC      HAMMER TOE SURGERY Left     HX VASCULAR ANGIOPLASTY      & STENT    HYSTERECTOMY (CERVIX STATUS UNKNOWN)      KNEE ARTHROSCOPY Right     LUMBAR FUSION      RECTAL SURGERY N/A 2022    PERINEAL CYST EXCISION performed by Sissy Mendosa DO at [facility] OR    SKIN LESION EXCISION N/A 2023    DEBRIDEMENT OF SACRAL DECUBITUS ULCER TO THE LEVEL OF BONE performed by Sissy Mendosa DO at [facility] OR    VASCULAR SURGERY  2022    VI.Bilateral lower extremity  solution INSTILL 1 DROP INTO BOTH EYES TWICE A DAY      fenofibrate (TRICOR) 145 MG tablet 1 tablet daily      Garlic 1000 MG CAPS Take 1,000 mg by mouth 2 times daily      CRANBERRY SOFT PO Take 1,000 mg by mouth daily      aspirin 81 MG tablet Take 1 tablet by mouth nightly      folic acid (FOLVITE) 1 MG tablet Take 1 tablet by mouth daily      Multiple Vitamins-Minerals (THERAPEUTIC MULTIVITAMIN-MINERALS) tablet Take 1 tablet by mouth daily      calcium-vitamin D (OSCAL) 250-125 MG-UNIT per tablet Take 1 tablet by mouth daily      mesalamine (DELZICOL) 400 MG CPDR DR capsule Take 2 capsules by mouth 3 times daily      omeprazole (PRILOSEC) 20 MG capsule Take 1 capsule by mouth Daily         Objective    BP (!) 116/51   Pulse 73   Temp 97.3 °F (36.3 °C) (Temporal)   Resp 16   Ht 1.626 m (5' 4\")   Wt 55.1 kg (121 lb 9 oz)   SpO2 93%   BMI 20.87 kg/m²     LABS:  WBC:    Lab Results   Component Value Date/Time    WBC 6.7 01/02/2024 02:44 AM     H/H:    Lab Results   Component Value Date/Time    HGB 7.9 01/02/2024 02:44 AM    HCT 27.4 01/02/2024 02:44 AM     PTT:    Lab Results   Component Value Date/Time    APTT 23.9 10/10/2023 09:00 PM    PTT 26.9 09/11/2012 08:16 AM   [APTT}  PT/INR:    Lab Results   Component Value Date/Time    PROTIME 15.7 10/10/2023 09:00 PM    INR 1.29 10/10/2023 09:00 PM     HgBA1c:  No results found for: \"LABA1C\"    Assessment   Gage Risk Score: Gage Scale Score: 18    Patient Active Problem List   Diagnosis Code    CIDP (chronic inflammatory demyelinating polyneuropathy) ([facility]) G61.81    Restless legs syndrome (RLS) G25.81    Recurrent UTI N39.0    Atonic bladder N31.2    Rheumatoid arthritis involving multiple sites with positive rheumatoid factor ([facility]) M05.79    Lumbar spinal stenosis M48.061    PVD (peripheral vascular disease) ([facility]) I73.9    Perineal cyst in female N90.89    Sepsis secondary to UTI ([facility]) A41.9, N39.0    Multiple drug resistant organism (MDRO) culture positive

## 2024-01-02 NOTE — CARE COORDINATION
Case Management Assessment  Initial Evaluation    Date/Time of Evaluation: 1/2/2024 11:38 AM  Assessment Completed by: Anjana Zuñiga RN    If patient is discharged prior to next notation, then this note serves as note for discharge by case management.    Patient Name: Lilia Chiang                   YOB: 1945  Diagnosis: Sepsis (HCC) [A41.9]  Urinary tract infection without hematuria, site unspecified [N39.0]  Pneumonia of right lung due to infectious organism, unspecified part of lung [J18.9]                   Date / Time: 12/30/2023  9:44 AM    Patient Admission Status: Inpatient   Readmission Risk (Low < 19, Mod (19-27), High > 27): Readmission Risk Score: 28.2    Current PCP: Geronimo Ríos, DO  PCP verified by CM? Yes    Chart Reviewed: Yes      History Provided by: Medical Record  Patient Orientation: Other (see comment) (from chart)    Patient Cognition: Other (see comment) (Assessment completed from chart)    Hospitalization in the last 30 days (Readmission):  Yes    If yes, Readmission Assessment in  Navigator will be completed.    Advance Directives:      Code Status: Full Code   Patient's Primary Decision Maker is:      Primary Decision Maker: Florence Chiang - Child - [phone]    Secondary Decision Maker: Markus Chiang - Child - [phone]    Secondary Decision Maker: Gilberto Chiang  Child - [phone]    Discharge Planning:    Patient lives with: Other (Comment) (SNF) Type of Home: Skilled Nursing Facility  Primary Care Giver: Other (Comment) (SNF)  Patient Support Systems include: Other (Comment) (SNF)   Current Financial resources: Medicare  Current community resources: None  Current services prior to admission: Skilled Nursing Facility            Current DME:              Type of Home Care services:  None    ADLS  Prior functional level: Assistance with the following:, Bathing, Dressing, Toileting, Cooking, Housework, Shopping, Mobility  Current functional level:  of lung [J18.9]    IF APPLICABLE: The Patient and/or patient representative Lilia and her family were provided with a choice of provider and agrees with the discharge plan. Freedom of choice list with basic dialogue that supports the patient's individualized plan of care/goals and shares the quality data associated with the providers was provided to: Patient Representative (from chart)   Patient Representative Name:       The Patient and/or Patient Representative Agree with the Discharge Plan? Yes    Anjana Zuñiga RN  Case Management Department  Ph: [phone] Fax: [phone]

## 2024-01-02 NOTE — TELEPHONE ENCOUNTER
Sheba from Dr. Curran at Urology in [place] called to inform the office that the Patient is being transferred to [place] in [place]. Patient tested positive for UTI, patient is disoriented, and her urine culture tested positive for MRSA.     Thank you!      Dr. Curran [facility]   [phone]

## 2024-01-02 NOTE — CARE COORDINATION
Patient is from [facility] and can return to their facility when medically stable.  Pre-cert required.  [facility]  [phone] P  [phone] F  Electronically signed by Anjana Zuñiga RN on 1/2/2024 at 8:55 AM

## 2024-01-02 NOTE — PLAN OF CARE
Problem: Skin/Tissue Integrity  Goal: Absence of new skin breakdown  Outcome: Progressing     Problem: Safety - Adult  Goal: Free from fall injury  Outcome: Progressing     Problem: ABCDS Injury Assessment  Goal: Absence of physical injury  Outcome: Progressing     Problem: Pain  Goal: Verbalizes/displays adequate comfort level or baseline comfort level  Outcome: Progressing     Problem: Nutrition Deficit:  Goal: Optimize nutritional status  Outcome: Progressing

## 2024-01-03 LAB — PROCALCITONIN: 0.32 NG/ML (ref 0–0.09)

## 2024-01-03 PROCEDURE — 6360000002 HC RX W HCPCS: Performed by: INTERNAL MEDICINE

## 2024-01-03 PROCEDURE — 97530 THERAPEUTIC ACTIVITIES: CPT

## 2024-01-03 PROCEDURE — 97161 PT EVAL LOW COMPLEX 20 MIN: CPT

## 2024-01-03 PROCEDURE — 1210000000 HC MED SURG R&B

## 2024-01-03 PROCEDURE — 6370000000 HC RX 637 (ALT 250 FOR IP): Performed by: INTERNAL MEDICINE

## 2024-01-03 PROCEDURE — 2580000003 HC RX 258: Performed by: NURSE PRACTITIONER

## 2024-01-03 PROCEDURE — 97165 OT EVAL LOW COMPLEX 30 MIN: CPT

## 2024-01-03 PROCEDURE — 36415 COLL VENOUS BLD VENIPUNCTURE: CPT

## 2024-01-03 PROCEDURE — 6360000002 HC RX W HCPCS: Performed by: NURSE PRACTITIONER

## 2024-01-03 PROCEDURE — 84145 PROCALCITONIN (PCT): CPT

## 2024-01-03 PROCEDURE — 6370000000 HC RX 637 (ALT 250 FOR IP): Performed by: NURSE PRACTITIONER

## 2024-01-03 PROCEDURE — 2580000003 HC RX 258: Performed by: INTERNAL MEDICINE

## 2024-01-03 RX ORDER — MORPHINE SULFATE 2 MG/ML
1 INJECTION, SOLUTION INTRAMUSCULAR; INTRAVENOUS
Status: DISCONTINUED | OUTPATIENT
Start: 2024-01-03 | End: 2024-01-04

## 2024-01-03 RX ORDER — GABAPENTIN 100 MG/1
100 CAPSULE ORAL 3 TIMES DAILY
Status: DISCONTINUED | OUTPATIENT
Start: 2024-01-03 | End: 2024-01-05 | Stop reason: HOSPADM

## 2024-01-03 RX ORDER — SODIUM CHLOR/HYPOCHLOROUS ACID 0.033 %
SOLUTION, IRRIGATION IRRIGATION CONTINUOUS PRN
Status: CANCELLED | OUTPATIENT
Start: 2024-01-03

## 2024-01-03 RX ADMIN — VANCOMYCIN HYDROCHLORIDE 1000 MG: 10 INJECTION, POWDER, LYOPHILIZED, FOR SOLUTION INTRAVENOUS at 13:41

## 2024-01-03 RX ADMIN — GABAPENTIN 100 MG: 100 CAPSULE ORAL at 14:38

## 2024-01-03 RX ADMIN — MORPHINE SULFATE 1 MG: 2 INJECTION, SOLUTION INTRAMUSCULAR; INTRAVENOUS at 20:42

## 2024-01-03 RX ADMIN — TIMOLOL MALEATE 1 DROP: 5 SOLUTION OPHTHALMIC at 08:14

## 2024-01-03 RX ADMIN — HYDROCODONE BITARTRATE AND ACETAMINOPHEN 1 TABLET: 10; 325 TABLET ORAL at 01:25

## 2024-01-03 RX ADMIN — Medication: at 22:30

## 2024-01-03 RX ADMIN — Medication: at 08:14

## 2024-01-03 RX ADMIN — CEFEPIME 2000 MG: 2 INJECTION, POWDER, FOR SOLUTION INTRAVENOUS at 16:00

## 2024-01-03 RX ADMIN — MORPHINE SULFATE 1 MG: 2 INJECTION, SOLUTION INTRAMUSCULAR; INTRAVENOUS at 18:08

## 2024-01-03 RX ADMIN — SODIUM CHLORIDE, PRESERVATIVE FREE 10 ML: 5 INJECTION INTRAVENOUS at 08:14

## 2024-01-03 RX ADMIN — CEFEPIME 2000 MG: 2 INJECTION, POWDER, FOR SOLUTION INTRAVENOUS at 08:12

## 2024-01-03 RX ADMIN — MORPHINE SULFATE 2 MG: 2 INJECTION, SOLUTION INTRAMUSCULAR; INTRAVENOUS at 10:10

## 2024-01-03 RX ADMIN — MORPHINE SULFATE 1 MG: 2 INJECTION, SOLUTION INTRAMUSCULAR; INTRAVENOUS at 14:53

## 2024-01-03 RX ADMIN — GABAPENTIN 100 MG: 100 CAPSULE ORAL at 21:00

## 2024-01-03 RX ADMIN — MORPHINE SULFATE 1 MG: 2 INJECTION, SOLUTION INTRAMUSCULAR; INTRAVENOUS at 08:08

## 2024-01-03 RX ADMIN — ENOXAPARIN SODIUM 40 MG: 100 INJECTION SUBCUTANEOUS at 15:59

## 2024-01-03 ASSESSMENT — PAIN SCALES - GENERAL
PAINLEVEL_OUTOF10: 9
PAINLEVEL_OUTOF10: 10

## 2024-01-03 NOTE — PROGRESS NOTES
[facility]   Pharmacy Pharmacokinetic Monitoring Service - Vancomycin     Lilia Chiang is a 78 y.o. female starting on vancomycin therapy for UTI. Pharmacy consulted by Dr. Nation for monitoring and adjustment.    Target Concentration: Goal AUC/MAITE 400-600 mg*hr/L    Additional Antimicrobials: Cefeipime, Dificid    Pertinent Laboratory Values:   Wt Readings from Last 1 Encounters:   01/02/24 55.1 kg (121 lb 9 oz)     Temp Readings from Last 1 Encounters:   01/03/24 98.2 °F (36.8 °C) (Temporal)     Estimated Creatinine Clearance: 66 mL/min (based on SCr of 0.6 mg/dL).  Recent Labs     01/01/24  0132 01/02/24  0244   CREATININE 0.5 0.6   BUN 17 23   WBC 7.5 6.7     Procalcitonin: 0.32 today    Pertinent Cultures:  Culture Date Source Results   12/30/23 Urine Klebsiella pneumoniae  MRSA   12/30/23 Blood No growth to date   MRSA Nasal Swab: N/A. Non-respiratory infection.    Plan:  Dosing recommendations based on Bayesian software  Start vancomycin 1000 mg IV load then 750 mg IV q12h  Anticipated AUC of 531 and trough concentration of 14.7 at steady state  Renal labs as indicated   Vancomycin concentration ordered for 1/4 @ 1300   Pharmacy will continue to monitor patient and adjust therapy as indicated    Thank you for the consult,  Laura Horne RP, BCPS  1/3/2024 1:23 PM

## 2024-01-03 NOTE — PROGRESS NOTES
Occupational Therapy Initial Assessment  Date: 1/3/2024   Patient Name: Lilia Chiang  MRN: [id number]     : 1945    Date of Service: 1/3/2024    Discharge Recommendations:  24 hour supervision or assist, Patient would benefit from continued therapy after discharge       Assessment   Assessment: OT evaluation completed and tx initiated. Pt may benefit from continued skilled services to address functional deficits. Pt will likely progress with further tx. OT does not anticipate any environmental barriers to D/C secondary placement so she may receive full  support (pt wholly dependent for self-care and mobility).  REQUIRES OT FOLLOW-UP: Yes  Activity Tolerance  Activity Tolerance: Treatment limited secondary to medical complications (free text);Patient limited by pain;Treatment limited secondary to decreased cognition              Patient Diagnosis(es): The primary encounter diagnosis was Urinary tract infection without hematuria, site unspecified. A diagnosis of Pneumonia of right lung due to infectious organism, unspecified part of lung was also pertinent to this visit.    Past Medical History:   Past Medical History:   Diagnosis Date    Arthritis     Cancer (HCC)     endometrial cancer, bladder    Cataract     CIDP (chronic inflammatory demyelinating polyneuropathy) (HCC)     Crohn's disease (HCC)     GERD (gastroesophageal reflux disease)     History of blood transfusion     Hypertension     Macular degeneration     Neuropathy     Palliative care patient 10/11/2023    Perineal cyst in female     PVD (peripheral vascular disease) ([facility])     Radiation     Urinary incontinence         Past Surgical History:   Past Surgical History:   Procedure Laterality Date    BACK SURGERY      lumbar    BLADDER REMOVAL  2023    urostomy    CARPAL TUNNEL RELEASE Bilateral 1999    CATARACT REMOVAL Bilateral     CERVICAL SPINE SURGERY      metal in neck    CHOLECYSTECTOMY, LAPAROSCOPIC      HAMMER TOE SURGERY Left

## 2024-01-03 NOTE — CARE COORDINATION
Wound Care nurse, Guero, suggested patient transfer to LTAC since request to transfer patient was cancelled yesterday, 1/2/2024.    Met with patient and patient's daughter, Florence Chiang, to discuss sending a referral to LTAC.  Florence is agreeable to send a referral to [facility], operated [facility].  Florence informed this CM that MD has also initiated a transfer to [facility].  Per Intake, request for transfer is pending at this time.  If [facility], Florence wants to proceed with LTAC referral.  [facility]  P [phone]  F [phone]  Electronically signed by Anjana Zuñiga RN on 1/3/2024 at 4:09 PM

## 2024-01-03 NOTE — PROGRESS NOTES
limited secondary to decreased cognition     Plan   Physical Therapy Plan  General Plan: 3-5 times per week  Current Treatment Recommendations: Strengthening, ROM, Balance training, Functional mobility training, Transfer training, Safety education & training, Patient/Caregiver education & training, Endurance training, Positioning  Safety Devices  Type of Devices: Bed alarm in place, Call light within reach, Left in bed     Restrictions  Restrictions/Precautions  Restrictions/Precautions: Fall Risk, Isolation, Contact Precautions  Required Braces or Orthoses?: No  Position Activity Restriction  Other position/activity restrictions: Note rectal tube in place on 1/3     Subjective   Pain: GRIMACES WITH ANY POSITION CHANGES  General  Patient assessed for rehabilitation services?: Yes  Diagnosis: SEPSIS  Subjective  Subjective: Pt STATES NAME BUT ESSENTIALLY NONVERBAL AT THIS TIME         Social/Functional History  Social/Functional History  Lives With: Other (comment) (SNF)  Type of Home: Facility  Home Layout: One level  Home Access: Level entry  Bathroom Shower/Tub: Walk-in shower  Receives Help From: Other (comment) (SNF)  ADL Assistance: Needs assistance  Ambulation Assistance: Needs assistance  Transfer Assistance: Needs assistance  Active : No  Additional Comments: DTR STATES PLANS TO TRANSFER TO [facility]  Vision/Hearing       Cognition   Cognition  Arousal/Alertness: Inconsistent responses to stimuli  Following Commands: Does not follow commands  Attention Span: Difficulty attending to directions;Difficulty dividing attention  Safety Judgement: Decreased awareness of need for assistance;Decreased awareness of need for safety  Problem Solving: Decreased awareness of errors  Insights: Not aware of deficits  Initiation: Requires cues for all  Sequencing: Requires cues for all     Objective   Pulse: 78  Heart Rate Source: Monitor  BP: (!) 130/53  BP Location: Left upper arm  Patient Position:  Supine  MAP (Calculated): 79  Respirations: 20  SpO2: 96 %  O2 Device: None (Room air)  Temp: 98.2 °F (36.8 °C)     Observation/Palpation  Posture: Poor  Gross Assessment  AROM:  (Pt MOVING UE's spontaneously, pulling at gown.)  Strength: Generally decreased, functional                    Bed mobility  Rolling to Left: Dependent/Total  Rolling to Right: Dependent/Total  Bed Mobility Comments: Pt had been calling out in pain/distress with simple bed mobility; movements limited by OT/PT for pt comfort           Balance  Comments: NT: REMAINED SUPINE           OutComes Score                                                  AM-PAC - Mobility              Tinneti Score       Goals  Short Term Goals  Time Frame for Short Term Goals: 14 DAYS  Short Term Goal 1: ROLLING BILATERALLY MIN ASSIST  Short Term Goal 2: SIT TO STAND MIN ASSIST  Short Term Goal 3: BED TO CHAIR MIN ASSIST       Education  Patient Education  Education Given To: Patient;Family  Education Provided: Role of Therapy;Plan of Care  Barriers to Learning: Cognition      Therapy Time   Individual Concurrent Group Co-treatment   Time In           Time Out           Minutes                   Zuleima Mccullough, PT

## 2024-01-03 NOTE — PROGRESS NOTES
Date:1/3/2024  Patient: Lilia Chiang  : 1945  MRN:[id number]  CODE:                                                                        PCP:Geronimo Ríos DO    Admit Date: 2023  9:44 AM   LOS: 4 days     Hospital course : The patient is a 78 y.o. female who presented to [facility] ED for evaluation of confusion. 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     Pt is from nursing home here with her son who assists with history of present illness. Pt has had increased episodes of confusion over past couple of days with concern for uti by nursing home staff. Pt has history of recurrent UTI with MDRO last discharged from this facility on 2023 having had evaluation of acute encephalopathy at that time. Pt denies cough as well as as shortness of breath. She has had no vomiting or diarrhea.     In ED, sepsis fluid bolus 30cc/kg given, cefepime and vancomycin initiated for uti and suspected pneumonia. Wbc 13k, UA micro-tntc wbc, 4+bacteria, procalcitonin 0.27, lactic acid 1.4, ct of head- No acute intracranial abnormality with generalized volume loss, microangiopathy. Cxr-Patchy right basilar opacities could represent aspiration or pneumonia. Pt is admitted inpatient UTI, right-sided bronchiectasis, later found to have C. difficile diarrhea as well        Subjective:    Pt is confused and drowsy today.   She has been complaining of a lot of generalized pain and has had several doses of IV morphine today.   She is too drowsy to tolerate PO safely.             Review of Systems    Reviewed 12 system and found most of them negative except as stated above in subjective note    Objective:      Vital signs in last 24 hours:  Patient Vitals for the past 24 hrs:   BP Temp Temp src Pulse Resp SpO2   24 1453 -- -- -- -- 20 --   24 1010 --

## 2024-01-03 NOTE — PROGRESS NOTES
Palliative Care/Spiritual Care: Met with pt and pt's daughter to initiate palliative care. Pt was in the bed with the top part of her gown off. She was in distress. Pt's daughter Florence Chiang says pt has multiple infections. She also has AMS. She had her bladder removed in May and daughter says it has been a challenge since then. ED note says UTI, and pneumonia of the right lung due to infections organism. Pt has other diagnoses in past medical history. She is known to palliative care.         Advance Directives: Pt has a LW. Her daughter Florence Chiang is her primary decision maker. Her sons Markus Chiang and Gilberto Chiang are secondary/alternate decision makers. Pt has a recent ACP note with no changes. SEE ACP NOTE.         Pain/other symptoms: Pt's daughter says pt is in severe pain. She says the doctors were trying to back out of some of her pain medications, due to the AMS, but the result is the pt is in distress. She says the pt's nurse is aware.         Social/Spiritual: Pt attends the [employer].         Pt/family discussion r/t goals: Pt was at [facility] for rehab and therapy, trying to get her strength back. She has a urostomy placed in May d/t cancer. Pt's daughter says their goal is to get her mom to [place] so she can be treated by her doctor who removed her bladder. Pt currently is total care except she can feed herself. The eventual goal is for pt to return to the SNF for more rehab and therapy.     Provided spiritual care with sustaining presence, support, and prayer. Pt's expressed gratitude for spiritual care.     Electronically signed by Mary Behrens on 1/3/2024 at 3:31 PM

## 2024-01-03 NOTE — PROGRESS NOTES
Date:2024  Patient: Lilia Chiang  : 1945  MRN:[id number]  CODE:                                                                        PCP:Geronimo Ríos DO    Admit Date: 2023  9:44 AM   LOS: 3 days     Hospital course : The patient is a 78 y.o. female who presented to [facility] ED for evaluation of confusion. 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     Pt is from nursing home here with her son who assists with history of present illness. Pt has had increased episodes of confusion over past couple of days with concern for uti by nursing home staff. Pt has history of recurrent UTI with MDRO last discharged from this facility on 2023 having had evaluation of acute encephalopathy at that time. Pt denies cough as well as as shortness of breath. She has had no vomiting or diarrhea.     In ED, sepsis fluid bolus 30cc/kg given, cefepime and vancomycin initiated for uti and suspected pneumonia. Wbc 13k, UA micro-tntc wbc, 4+bacteria, procalcitonin 0.27, lactic acid 1.4, ct of head- No acute intracranial abnormality with generalized volume loss, microangiopathy. Cxr-Patchy right basilar opacities could represent aspiration or pneumonia. Pt is admitted inpatient UTI, right-sided bronchiectasis, later found to have C. difficile diarrhea as well        Subjective:   seen and evaluated along with RN in the presence of her son at the bedside, she reported to have diarrhea from C. difficile a month ago and completed the treatment 2 weeks ago, tested negative over there.  Presently having watery diarrhea almost 4-5 times and possible loose watery stool after every meals.  Presently on wound VAC for sacral decubitus.  Treated for suspected pneumonia and UTI with ileal conduit     seen and evaluated in the presence of her daughter, she

## 2024-01-03 NOTE — PLAN OF CARE
Problem: Skin/Tissue Integrity  Goal: Absence of new skin breakdown  Description: 1.  Monitor for areas of redness and/or skin breakdown  2.  Assess vascular access sites hourly  3.  Every 4-6 hours minimum:  Change oxygen saturation probe site  4.  Every 4-6 hours:  If on nasal continuous positive airway pressure, respiratory therapy assess nares and determine need for appliance change or resting period.  Outcome: Progressing     Problem: Safety - Adult  Goal: Free from fall injury  Outcome: Progressing  Flowsheets (Taken 1/3/2024 1613)  Free From Fall Injury: Instruct family/caregiver on patient safety     Problem: ABCDS Injury Assessment  Goal: Absence of physical injury  Outcome: Progressing  Flowsheets (Taken 1/3/2024 1613)  Absence of Physical Injury: Implement safety measures based on patient assessment     Problem: Pain  Goal: Verbalizes/displays adequate comfort level or baseline comfort level  Outcome: Progressing     Problem: Nutrition Deficit:  Goal: Optimize nutritional status  Outcome: Progressing

## 2024-01-03 NOTE — PROGRESS NOTES
[facility]  Follow-up Progress Note       NAME:  Lilia Chiang  MEDICAL RECORD NUMBER:  [id number]  AGE:  78 y.o.   GENDER:  female  :  1945  TODAY'S DATE:  1/3/2024    Subjective:   Wound Identification:  Wound Type: pressure  Contributing Factors: chronic pressure and decreased mobility        Patient Goal of Care:  [x] Wound Healing  [] Odor Control  [] Palliative Care  [] Pain Control   [] Other:     Objective:    BP (!) 130/53   Pulse 78   Temp 98.2 °F (36.8 °C) (Temporal)   Resp 18   Ht 1.626 m (5' 4\")   Wt 55.1 kg (121 lb 9 oz)   SpO2 96%   BMI 20.87 kg/m²   Gage Risk Score: Gage Scale Score: 18  Assessment:   Measurements:  Negative Pressure Wound Therapy Sacrum (Active)   $ Standard NPWT <=50 sq cm PER TX $ Yes 24 1355   Wound Type Pressure ulcer: Stage IV 24 0815   Unit Type 3M Vac Ulta 24 193   Dressing Type Black Foam 24 1930   Number of pieces used 4 24 1355   Number of pieces removed 2 23 1034   Cycle Continuous 24 0815   Target Pressure (mmHg) 125 24 1930   Irrigation Solution Vashe 24 1355   Instillation Volume  6 mL 24 1355   Soak Time  10 minutes 24 1355   Vac Frequency 1 hours 24 1355   Canister changed? No 24 1930   Dressing Status Clean;Dry;Intact 24 0815   Dressing Changed Changed/New 24 1355   Drainage Amount Moderate 24 1355   Drainage Description Serosanguinous;Brown 24 1355   Dressing Change Due 24 1355   Output (ml) 100 ml 24 0820   Wound Assessment Granulation tissue;Slough;Exposed structure bone 24 1355   Lara-wound Assessment Maceration 24 1355   Shape round 23 1034   Odor Mild 24 1355   Number of days: 82       Wound 10/12/23 Sacrum Wound #1, sacrum, pressure injury (Active)   Wound Image    01/02/24 1238   Wound Etiology Pressure Stage 4 01/02/24 1238   Dressing Status Other (Comment) 01/02/24 1930   Wound Cleansed Irrigated with saline;Cleansed with saline;Wound cleanser 01/02/24 1238   Dressing/Treatment Negative pressure wound therapy 01/02/24 1238   Dressing Change Due 01/05/24 01/02/24 1238   Wound Length (cm) 7.5 cm 01/02/24 1238   Wound Width (cm) 7 cm 01/02/24 1238   Wound Depth (cm) 1 cm 01/02/24 1238   Wound Surface Area (cm^2) 52.5 cm^2 01/02/24 1238   Change in Wound Size % (l*w) -42.47 01/02/24 1238   Wound Volume (cm^3) 52.5 cm^3 01/02/24 1238   Wound Healing % -30 01/02/24 1238   Post-Procedure Length (cm) 7.5 cm 12/27/23 1100   Post-Procedure Width (cm) 5.9 cm 12/27/23 1100   Post-Procedure Depth (cm) 0.7 cm 12/27/23 1100   Post-Procedure Surface Area (cm^2) 44.25 cm^2 12/27/23 1100   Post-Procedure Volume (cm^3) 30.975 cm^3 12/27/23 1100   Undermining Starts ___ O'Clock 12 01/02/24 1238   Undermining Ends___ O'Clock 12 01/02/24 1238   Undermining Maxium Distance (cm) 2.3 01/02/24 1238   Wound Assessment Slough;Granulation tissue 01/02/24 1238   Drainage Amount Moderate (25-50%) 01/02/24 1238   Drainage Description Serosanguinous;Yellow 01/02/24 1238   Odor Mild 01/02/24 1238   Lara-wound Assessment Maceration 01/02/24 1238   Margins Defined edges 01/02/24 1238   Number of days: 83       Wound 01/03/24 Hip Right (Active)   Wound Image   01/03/24 1053   Wound Etiology Pressure Stage 1 01/03/24 1053   Dressing Status New dressing applied 01/03/24 1053   Wound Cleansed Cleansed with saline 01/03/24 1053   Dressing/Treatment Silicone border 01/03/24 1053   Dressing Change Due 01/04/24 01/03/24 1053   Wound Length (cm) 1 cm 01/03/24 1053   Wound Width (cm) 1 cm 01/03/24 1053   Wound Depth (cm) 0 cm 01/03/24 1053   Wound Surface Area (cm^2) 1 cm^2 01/03/24 1053   Wound Volume (cm^3) 0 cm^3 01/03/24 1053   Wound Assessment Non-blanchable erythema 01/03/24 1136   Drainage Amount None (dry)

## 2024-01-03 NOTE — DISCHARGE SUMMARY
Hospital Medicine Discharge Summary    Patient ID: Lilia Chiang      Patient's PCP: Geronimo Ríos,     Admit Date: 12/30/2023     Discharge Date:   1/3/2024    Admitting Provider: No admitting provider for patient encounter.     Discharge Provider: Jessica Nation MD     Discharge Diagnoses:       Active Hospital Problems    Diagnosis     Sepsis (HCC) [A41.9]     Right lower lobe pneumonia [J18.9]     Metabolic encephalopathy [G93.41]     Severe malnutrition (HCC) [E43]        The patient was seen and examined on day of discharge and this discharge summary is in conjunction with any daily progress note from day of discharge.    Hospital Course: The patient is a 78 y.o. female who presented to [facility] ED for evaluation of confusion. 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     Pt is from nursing home here with her son who assists with history of present illness. Pt has had increased episodes of confusion over past couple of days with concern for uti by nursing home staff. Pt has history of recurrent UTI with MDRO last discharged from this facility on 12/7/2023 having had evaluation of acute encephalopathy at that time. Pt denies cough as well as as shortness of breath. She has had no vomiting or diarrhea.     In ED, sepsis fluid bolus 30cc/kg given, cefepime and vancomycin initiated for uti and suspected pneumonia. Wbc 13k, UA micro-tntc wbc, 4+bacteria, procalcitonin 0.27, lactic acid 1.4, ct of head- No acute intracranial abnormality with generalized volume loss, microangiopathy. Cxr-Patchy right basilar opacities could represent aspiration or pneumonia. Pt is admitted inpatient UTI, right-sided bronchiectasis, later found to have C. difficile diarrhea as well      Pt is confused and drowsy today.   She has been complaining of a  Details   colestipol (COLESTID) 1 g tablet Take 1 tablet by mouth in the morning, at noon, and at bedtime      gabapentin (NEURONTIN) 300 MG capsule Take 1 capsule by mouth 3 times daily. Max Daily Amount: 900 mg      ascorbic acid (VITAMIN C) 500 MG tablet Take 1 tablet by mouth daily      allopurinol (ZYLOPRIM) 100 MG tablet Take 1 tablet by mouth daily  Qty: 30 tablet, Refills: 3      HYDROcodone-acetaminophen (NORCO)  MG per tablet Take 1 tablet by mouth every 6 hours as needed for Pain.      magnesium chloride-calcium carbonate (SLOW MAGNESIUM/CALCIUM)  MG Take 2 tablets by mouth daily (with breakfast)  Qty: 60 tablet, Refills: 1      ferrous sulfate (IRON 325) 325 (65 Fe) MG tablet Take 1 tablet by mouth 2 times daily (with meals)  Qty: 30 tablet, Refills: 3      Ergocalciferol (VITAMIN D) 57946 units CAPS Take 50,000 Units by mouth once a week  Qty: 5 capsule, Refills: 0      calcium carb-cholecalciferol 600-20 MG-MCG TABS Take 1 tablet by mouth daily      timolol (TIMOPTIC) 0.5 % ophthalmic solution INSTILL 1 DROP INTO BOTH EYES TWICE A DAY      fenofibrate (TRICOR) 145 MG tablet 1 tablet daily      Garlic 1000 MG CAPS Take 1,000 mg by mouth 2 times daily      CRANBERRY SOFT PO Take 1,000 mg by mouth daily      aspirin 81 MG tablet Take 1 tablet by mouth nightly      folic acid (FOLVITE) 1 MG tablet Take 1 tablet by mouth daily      Multiple Vitamins-Minerals (THERAPEUTIC MULTIVITAMIN-MINERALS) tablet Take 1 tablet by mouth daily      calcium-vitamin D (OSCAL) 250-125 MG-UNIT per tablet Take 1 tablet by mouth daily      mesalamine (DELZICOL) 400 MG CPDR DR capsule Take 2 capsules by mouth 3 times daily      omeprazole (PRILOSEC) 20 MG capsule Take 1 capsule by mouth Daily             Time Spent on discharge: 35min in the examination, evaluation, counseling and review of medications and discharge plan.      Signed:    Jessica Nation MD   1/3/2024      Thank you Geronimo Ríos DO for

## 2024-01-04 LAB
ALBUMIN SERPL-MCNC: 2 G/DL (ref 3.5–5.2)
ANION GAP SERPL CALCULATED.3IONS-SCNC: 7 MMOL/L (ref 7–19)
ANISOCYTOSIS BLD QL SMEAR: ABNORMAL
BACTERIA BLD CULT ORG #2: NORMAL
BACTERIA BLD CULT: NORMAL
BASOPHILS # BLD: 0.2 K/UL (ref 0–0.2)
BASOPHILS NFR BLD: 2 % (ref 0–1)
BUN SERPL-MCNC: 24 MG/DL (ref 8–23)
CALCIUM SERPL-MCNC: 8.4 MG/DL (ref 8.8–10.2)
CHLORIDE SERPL-SCNC: 111 MMOL/L (ref 98–111)
CO2 SERPL-SCNC: 24 MMOL/L (ref 22–29)
CREAT SERPL-MCNC: 0.5 MG/DL (ref 0.5–0.9)
EOSINOPHIL # BLD: 0.16 K/UL (ref 0–0.6)
EOSINOPHIL NFR BLD: 2 % (ref 0–5)
ERYTHROCYTE [DISTWIDTH] IN BLOOD BY AUTOMATED COUNT: 20.2 % (ref 11.5–14.5)
GLUCOSE BLD-MCNC: 106 MG/DL (ref 70–99)
GLUCOSE SERPL-MCNC: 106 MG/DL (ref 74–109)
HCT VFR BLD AUTO: 28.9 % (ref 37–47)
HGB BLD-MCNC: 8.1 G/DL (ref 12–16)
HYPOCHROMIA BLD QL SMEAR: ABNORMAL
IMM GRANULOCYTES # BLD: 0.1 K/UL
LYMPHOCYTES # BLD: 1.3 K/UL (ref 1.1–4.5)
LYMPHOCYTES NFR BLD: 16 % (ref 20–40)
MAGNESIUM SERPL-MCNC: 1.6 MG/DL (ref 1.6–2.4)
MCH RBC QN AUTO: 24.2 PG (ref 27–31)
MCHC RBC AUTO-ENTMCNC: 28 G/DL (ref 33–37)
MCV RBC AUTO: 86.3 FL (ref 81–99)
MONOCYTES # BLD: 0.5 K/UL (ref 0–0.9)
MONOCYTES NFR BLD: 6 % (ref 0–10)
NEUTROPHILS # BLD: 5.8 K/UL (ref 1.5–7.5)
NEUTS SEG NFR BLD: 74 % (ref 50–65)
OVALOCYTES BLD QL SMEAR: ABNORMAL
PERFORMED ON: ABNORMAL
PHOSPHATE SERPL-MCNC: 1.9 MG/DL (ref 2.5–4.5)
PLATELET # BLD AUTO: 177 K/UL (ref 130–400)
PLATELET SLIDE REVIEW: ADEQUATE
PMV BLD AUTO: 9.8 FL (ref 9.4–12.3)
POLYCHROMASIA BLD QL SMEAR: ABNORMAL
POTASSIUM SERPL-SCNC: 3.3 MMOL/L (ref 3.5–5)
RBC # BLD AUTO: 3.35 M/UL (ref 4.2–5.4)
SODIUM SERPL-SCNC: 142 MMOL/L (ref 136–145)
VANCOMYCIN TROUGH SERPL-MCNC: 17 UG/ML (ref 10–20)
WBC # BLD AUTO: 7.9 K/UL (ref 4.8–10.8)

## 2024-01-04 PROCEDURE — 85025 COMPLETE CBC W/AUTO DIFF WBC: CPT

## 2024-01-04 PROCEDURE — 2580000003 HC RX 258: Performed by: INTERNAL MEDICINE

## 2024-01-04 PROCEDURE — 2580000003 HC RX 258: Performed by: NURSE PRACTITIONER

## 2024-01-04 PROCEDURE — 1210000000 HC MED SURG R&B

## 2024-01-04 PROCEDURE — 6360000002 HC RX W HCPCS: Performed by: INTERNAL MEDICINE

## 2024-01-04 PROCEDURE — 94760 N-INVAS EAR/PLS OXIMETRY 1: CPT

## 2024-01-04 PROCEDURE — 80069 RENAL FUNCTION PANEL: CPT

## 2024-01-04 PROCEDURE — 83735 ASSAY OF MAGNESIUM: CPT

## 2024-01-04 PROCEDURE — 6370000000 HC RX 637 (ALT 250 FOR IP): Performed by: INTERNAL MEDICINE

## 2024-01-04 PROCEDURE — 36415 COLL VENOUS BLD VENIPUNCTURE: CPT

## 2024-01-04 PROCEDURE — 6360000002 HC RX W HCPCS: Performed by: NURSE PRACTITIONER

## 2024-01-04 PROCEDURE — 80202 ASSAY OF VANCOMYCIN: CPT

## 2024-01-04 PROCEDURE — 6370000000 HC RX 637 (ALT 250 FOR IP): Performed by: NURSE PRACTITIONER

## 2024-01-04 PROCEDURE — 82962 GLUCOSE BLOOD TEST: CPT

## 2024-01-04 RX ORDER — HYDROMORPHONE HYDROCHLORIDE 1 MG/ML
0.5 INJECTION, SOLUTION INTRAMUSCULAR; INTRAVENOUS; SUBCUTANEOUS
Status: DISCONTINUED | OUTPATIENT
Start: 2024-01-04 | End: 2024-01-05 | Stop reason: HOSPADM

## 2024-01-04 RX ADMIN — HYDROMORPHONE HYDROCHLORIDE 0.5 MG: 1 INJECTION, SOLUTION INTRAMUSCULAR; INTRAVENOUS; SUBCUTANEOUS at 21:20

## 2024-01-04 RX ADMIN — SODIUM CHLORIDE, POTASSIUM CHLORIDE, SODIUM LACTATE AND CALCIUM CHLORIDE: 600; 310; 30; 20 INJECTION, SOLUTION INTRAVENOUS at 17:53

## 2024-01-04 RX ADMIN — GABAPENTIN 100 MG: 100 CAPSULE ORAL at 21:19

## 2024-01-04 RX ADMIN — TIMOLOL MALEATE 1 DROP: 5 SOLUTION OPHTHALMIC at 08:22

## 2024-01-04 RX ADMIN — MORPHINE SULFATE 1 MG: 2 INJECTION, SOLUTION INTRAMUSCULAR; INTRAVENOUS at 00:15

## 2024-01-04 RX ADMIN — HYDROMORPHONE HYDROCHLORIDE 0.5 MG: 1 INJECTION, SOLUTION INTRAMUSCULAR; INTRAVENOUS; SUBCUTANEOUS at 18:03

## 2024-01-04 RX ADMIN — HYDROMORPHONE HYDROCHLORIDE 0.5 MG: 1 INJECTION, SOLUTION INTRAMUSCULAR; INTRAVENOUS; SUBCUTANEOUS at 15:03

## 2024-01-04 RX ADMIN — CEFEPIME 2000 MG: 2 INJECTION, POWDER, FOR SOLUTION INTRAVENOUS at 16:12

## 2024-01-04 RX ADMIN — Medication 1 TABLET: at 08:22

## 2024-01-04 RX ADMIN — ALLOPURINOL 100 MG: 100 TABLET ORAL at 08:22

## 2024-01-04 RX ADMIN — HYDROMORPHONE HYDROCHLORIDE 0.5 MG: 1 INJECTION, SOLUTION INTRAMUSCULAR; INTRAVENOUS; SUBCUTANEOUS at 08:37

## 2024-01-04 RX ADMIN — FIDAXOMICIN 200 MG: 200 TABLET, FILM COATED ORAL at 21:25

## 2024-01-04 RX ADMIN — Medication: at 21:25

## 2024-01-04 RX ADMIN — GABAPENTIN 100 MG: 100 CAPSULE ORAL at 14:03

## 2024-01-04 RX ADMIN — ASPIRIN 81 MG: 81 TABLET, CHEWABLE ORAL at 21:19

## 2024-01-04 RX ADMIN — MESALAMINE 800 MG: 400 CAPSULE, DELAYED RELEASE ORAL at 08:22

## 2024-01-04 RX ADMIN — CEFEPIME 2000 MG: 2 INJECTION, POWDER, FOR SOLUTION INTRAVENOUS at 08:35

## 2024-01-04 RX ADMIN — TIMOLOL MALEATE 1 DROP: 5 SOLUTION OPHTHALMIC at 21:26

## 2024-01-04 RX ADMIN — Medication: at 08:22

## 2024-01-04 RX ADMIN — FIDAXOMICIN 200 MG: 200 TABLET, FILM COATED ORAL at 08:22

## 2024-01-04 RX ADMIN — MORPHINE SULFATE 1 MG: 2 INJECTION, SOLUTION INTRAMUSCULAR; INTRAVENOUS at 03:03

## 2024-01-04 RX ADMIN — ENOXAPARIN SODIUM 40 MG: 100 INJECTION SUBCUTANEOUS at 16:11

## 2024-01-04 RX ADMIN — MORPHINE SULFATE 1 MG: 2 INJECTION, SOLUTION INTRAMUSCULAR; INTRAVENOUS at 06:22

## 2024-01-04 RX ADMIN — FENOFIBRATE 160 MG: 160 TABLET ORAL at 08:22

## 2024-01-04 RX ADMIN — GABAPENTIN 100 MG: 100 CAPSULE ORAL at 08:22

## 2024-01-04 RX ADMIN — HYDROMORPHONE HYDROCHLORIDE 0.5 MG: 1 INJECTION, SOLUTION INTRAMUSCULAR; INTRAVENOUS; SUBCUTANEOUS at 12:09

## 2024-01-04 RX ADMIN — VANCOMYCIN HYDROCHLORIDE 750 MG: 750 INJECTION, POWDER, LYOPHILIZED, FOR SOLUTION INTRAVENOUS at 14:01

## 2024-01-04 RX ADMIN — FOLIC ACID 1 MG: 1 TABLET ORAL at 08:22

## 2024-01-04 RX ADMIN — VANCOMYCIN HYDROCHLORIDE 750 MG: 750 INJECTION, POWDER, LYOPHILIZED, FOR SOLUTION INTRAVENOUS at 00:20

## 2024-01-04 ASSESSMENT — PAIN DESCRIPTION - LOCATION
LOCATION: SACRUM

## 2024-01-04 ASSESSMENT — PAIN DESCRIPTION - PAIN TYPE
TYPE: CHRONIC PAIN

## 2024-01-04 ASSESSMENT — PAIN SCALES - GENERAL
PAINLEVEL_OUTOF10: 10
PAINLEVEL_OUTOF10: 8
PAINLEVEL_OUTOF10: 10
PAINLEVEL_OUTOF10: 8
PAINLEVEL_OUTOF10: 8
PAINLEVEL_OUTOF10: 7
PAINLEVEL_OUTOF10: 7
PAINLEVEL_OUTOF10: 9
PAINLEVEL_OUTOF10: 10
PAINLEVEL_OUTOF10: 7

## 2024-01-04 ASSESSMENT — PAIN DESCRIPTION - FREQUENCY
FREQUENCY: CONTINUOUS

## 2024-01-04 NOTE — PROGRESS NOTES
Nutrition Assessment     Type and Reason for Visit: Reassess  Malnutrition Assessment:  Malnutrition Status: Severe malnutrition    Nutrition Assessment:  Pt remains nutritionally compromised AEB inadequate energy intake. PO has been <50% of meals over the last couple of days. She has been too drowsy to complete a meal. Plans for transfer to outside facility. Recommend continuation of ONS. Use to take medications as tolerated.    Estimated Daily Nutrient Needs:  Energy (kcal):  4966-9447 (30-35kcal/kg) Weight Used for Energy Requirements: Current     Protein (g):   (1.5-2.0g/kg) Weight Used for Protein Requirements: Current        Fluid (ml/day):  5122-4868 Method Used for Fluid Requirements: 1 ml/kcal    Nutrition Related Findings:   BM 1/3 Wound Type: Wound Vac    Current Nutrition Therapies:    ADULT DIET; Regular; Send yogurt with breakfast  ADULT ORAL NUTRITION SUPPLEMENT; Lunch, Dinner; Wound Healing Oral Supplement  ADULT ORAL NUTRITION SUPPLEMENT; Breakfast, Lunch, Dinner; Standard High Calorie/High Protein Oral Supplement    Anthropometric Measures:  Height: 162.6 cm (5' 4\")  Current Body Wt: 51.2 kg (112 lb 14.4 oz)   BMI: 19.4    Nutrition Diagnosis:   Severe malnutrition, In context of chronic illness related to increase demand for energy/nutrients, altered GI function as evidenced by Criteria as identified in malnutrition assessment    Nutrition Interventions:   Food and/or Nutrient Delivery: Continue Current Diet, Continue Oral Nutrition Supplement  Nutrition Education/Counseling: No recommendation at this time  Coordination of Nutrition Care: Continue to monitor while inpatient       Goals:  Previous Goal Met: No Progress toward Goal(s)  Goals: PO intake 50% or greater       Nutrition Monitoring and Evaluation:   Behavioral-Environmental Outcomes: None Identified  Food/Nutrient Intake Outcomes: Food and Nutrient Intake, Supplement Intake  Physical Signs/Symptoms Outcomes: Biochemical Data,

## 2024-01-04 NOTE — PROGRESS NOTES
Palliative care follow up visit.  Pt resting in bed. Dtr is feeding her some lunch.  Son also present.  Pt has had c/o pain, currently rec Dilaudid for pain, family states this seems help, but pt still c/o pain to them.  Dtr asked to step out in crain to talk. She tells me they were considering transfer to LTAC for wound, but now feeling it may be time for comfort measures.  For now they want to talk with MD and get his perspective before proceding either way.  I did leave palliative care contact information with dtr should they have additional questions, need support, or want more information on comfort care.    Electronically signed by Magi Mccarty RN on 1/4/2024 at 1:44 PM

## 2024-01-04 NOTE — PROGRESS NOTES
Pt in pain. Appears constant, more so when turning. Urostomy appliance leaking, daughter changed with her appliance from home. Daughter used very clean techn. Morphin iv given does not appear to work well. Note sent to dr culp asking for diffirent pain med. Awaiting for bed at [facility]. Tolerating po well

## 2024-01-04 NOTE — PROGRESS NOTES
Date:2024  Patient: Lilia Chiang  : 1945  MRN:[id number]  CODE:                                                                        PCP:Geronimo Ríos DO    Admit Date: 2023  9:44 AM   LOS: 5 days     Hospital course : The patient is a 78 y.o. female who presented to [facility] ED for evaluation of confusion. 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     Pt is from nursing home here with her son who assists with history of present illness. Pt has had increased episodes of confusion over past couple of days with concern for uti by nursing home staff. Pt has history of recurrent UTI with MDRO last discharged from this facility on 2023 having had evaluation of acute encephalopathy at that time. Pt denies cough as well as as shortness of breath. She has had no vomiting or diarrhea.     In ED, sepsis fluid bolus 30cc/kg given, cefepime and vancomycin initiated for uti and suspected pneumonia. Wbc 13k, UA micro-tntc wbc, 4+bacteria, procalcitonin 0.27, lactic acid 1.4, ct of head- No acute intracranial abnormality with generalized volume loss, microangiopathy. Cxr-Patchy right basilar opacities could represent aspiration or pneumonia. Pt is admitted inpatient UTI, right-sided bronchiectasis, later found to have C. difficile diarrhea as well        Subjective:    Very weak.   In a lot of pain.     More awake and able to converse some today.                 Review of Systems    Reviewed 12 system and found most of them negative except as stated above in subjective note    Objective:      Vital signs in last 24 hours:  Patient Vitals for the past 24 hrs:   BP Temp Temp src Pulse Resp SpO2   24 1024 -- -- -- -- -- 93 %   24 0856 (!) 146/78 97.3 °F (36.3 °C) -- 67 -- 94 %   24 2018 (!) 141/69 98.4 °F (36.9 °C) Temporal  compression.  3. The distal right ureter appears to be decompressed into the right pelvic side wall, forming a small urinoma, as described above.  4. Further evaluation is limited due to the lack of intraureteral contrast on delayed imaging.  5. A sacral decubitus ulcer is suspected.      I have reviewed the patient's daily labs, including BMP and CBC with pertinent results discussed below.     Reviewed imaging     Assessment & Plan       Sepsis/Metabolic encephalopathy likely from UTI/C. difficile diarrhea  - cefepime   - pharmacy to dose with vancomycin   - Urine culture growing Pseudomonas and MRSA - pt with ileal conduit - continue Vancomycin coverage.   - on PO dificid for Cdiff - ongoing diarrhea - has strong chronic component to that as well.           Bronchiectasis in the right lung  Right lower lobe pneumonia ruled out with CT scan      Active Problems:    History of sacral decubitus ulcer  -consult wound care nurse    -pain control  -narcan 0.4mg iv prn  -apply wound vac per prior wound care recommendation      Acute delirium - secondary to infection vs polypharmacy - improved.    - stopped norco.    - decreased dose neurontin.    - decreased dose morphine.    - hold seroquel - can be contributing to sedation, but not much improvement in mental state.    - dilaudid IV ordered to help pain control - tolerating that better.         DVT prophylaxis: Enoxaparin    POA  Full Code       DISPOSITION:  cc    Jessica Nation MD 1/4/2024 1:17 PM          Discussed with Transfer Center and Hospitalist at [facility].      Discussed with Urology Dr Curran                  - given recurrent nature of symptomatic and worsening MDRO bouts of UTI, further subspecialty urology evaluation may be helpful, though I do clearly realize that given her very poor baseline functional status and overall clinical decline over the past several months, treatment and intervention options are very limited. Our urology team

## 2024-01-04 NOTE — PROGRESS NOTES
[facility]   Pharmacy Pharmacokinetic Monitoring Service - Vancomycin    Consulting Provider: Dr. Nation   Indication: UTI  Target Concentration: Goal AUC/MAITE 400-600 mg*hr/L  Day of Therapy: 2  Additional Antimicrobials: Cefepime    Pertinent Laboratory Values:   Wt Readings from Last 1 Encounters:   01/02/24 55.1 kg (121 lb 9 oz)     Temp Readings from Last 1 Encounters:   01/04/24 97.3 °F (36.3 °C)     Estimated Creatinine Clearance: 79 mL/min (based on SCr of 0.5 mg/dL).  Recent Labs     01/02/24  0244 01/04/24  0853   CREATININE 0.6 0.5   BUN 23 24*   WBC 6.7 7.9     Procalcitonin: No new results    Pertinent Cultures:  Culture Date Source Results   12/30/23 Urine Klebsiella pneumoniae  MRSA   12/30/23 Blood No growth to date   MRSA Nasal Swab: N/A. Non-respiratory infection.    Recent vancomycin administrations                     vancomycin (VANCOCIN) 750 mg in sodium chloride 0.9 % 250 mL IVPB (mg) 750 mg New Bag 01/04/24 1401     750 mg New Bag  0020    vancomycin (VANCOCIN) 1000 mg in sodium chloride 0.9% 250 mL IVPB (mg) 1,000 mg New Bag 01/03/24 1341                    Assessment:  Date/Time Current Dose Concentration Timing of Concentration (h) AUC   01/04/24 1334 750 mg IV q12h 17 13 h 14 m 607   Note: Serum concentrations collected for AUC dosing may appear elevated if collected in close proximity to the dose administered, this is not necessarily an indication of toxicity    Plan:  Current dosing regimen is supra-therapeutic  Increase dose to 1250 mg IV q24h  Repeat vancomycin concentration ordered for 1/6 @ 0130   Pharmacy will continue to monitor patient and adjust therapy as indicated    Thank you for the consult,  Laura Horne, [facility], BCPS  1/4/2024 5:07 PM

## 2024-01-05 ENCOUNTER — TELEPHONE (OUTPATIENT)
Dept: INTERNAL MEDICINE CLINIC | Age: 79
End: 2024-01-05

## 2024-01-05 VITALS
DIASTOLIC BLOOD PRESSURE: 53 MMHG | RESPIRATION RATE: 20 BRPM | TEMPERATURE: 97.5 F | BODY MASS INDEX: 20.75 KG/M2 | HEIGHT: 64 IN | OXYGEN SATURATION: 92 % | WEIGHT: 121.56 LBS | SYSTOLIC BLOOD PRESSURE: 145 MMHG | HEART RATE: 66 BPM

## 2024-01-05 PROBLEM — R52 PAIN: Status: ACTIVE | Noted: 2024-01-05

## 2024-01-05 PROBLEM — C67.9 BLADDER CANCER (HCC): Status: ACTIVE | Noted: 2023-03-21

## 2024-01-05 PROBLEM — S31.000A SACRAL WOUND: Status: ACTIVE | Noted: 2024-01-05

## 2024-01-05 PROBLEM — M1A.00X0 CHRONIC GOUTY ARTHRITIS: Status: ACTIVE | Noted: 2024-01-05

## 2024-01-05 PROBLEM — A41.9: Status: ACTIVE | Noted: 2024-01-05

## 2024-01-05 PROBLEM — I10 BENIGN ESSENTIAL HYPERTENSION: Chronic | Status: ACTIVE | Noted: 2020-02-17

## 2024-01-05 PROBLEM — K21.9 GERD WITHOUT ESOPHAGITIS: Chronic | Status: ACTIVE | Noted: 2020-02-17

## 2024-01-05 PROBLEM — N18.2 STAGE 2 CHRONIC KIDNEY DISEASE: Status: ACTIVE | Noted: 2024-01-05

## 2024-01-05 PROBLEM — I12.9 HYPERTENSIVE RENAL DISEASE: Status: ACTIVE | Noted: 2024-01-05

## 2024-01-05 PROBLEM — E43 SEVERE PROTEIN-CALORIE MALNUTRITION (HCC): Status: ACTIVE | Noted: 2024-01-05

## 2024-01-05 LAB
ALBUMIN SERPL-MCNC: 1.8 G/DL (ref 3.5–5.2)
ANION GAP SERPL CALCULATED.3IONS-SCNC: 6 MMOL/L (ref 7–19)
BASOPHILS # BLD: 0 K/UL (ref 0–0.2)
BASOPHILS NFR BLD: 0.3 % (ref 0–1)
BUN SERPL-MCNC: 24 MG/DL (ref 8–23)
CALCIUM SERPL-MCNC: 8.1 MG/DL (ref 8.8–10.2)
CHLORIDE SERPL-SCNC: 110 MMOL/L (ref 98–111)
CO2 SERPL-SCNC: 25 MMOL/L (ref 22–29)
CREAT SERPL-MCNC: 0.4 MG/DL (ref 0.5–0.9)
EOSINOPHIL # BLD: 0.1 K/UL (ref 0–0.6)
EOSINOPHIL NFR BLD: 1.9 % (ref 0–5)
ERYTHROCYTE [DISTWIDTH] IN BLOOD BY AUTOMATED COUNT: 19.9 % (ref 11.5–14.5)
GLUCOSE SERPL-MCNC: 88 MG/DL (ref 74–109)
HCT VFR BLD AUTO: 25.3 % (ref 37–47)
HGB BLD-MCNC: 7.5 G/DL (ref 12–16)
IMM GRANULOCYTES # BLD: 0.1 K/UL
LYMPHOCYTES # BLD: 1.5 K/UL (ref 1.1–4.5)
LYMPHOCYTES NFR BLD: 19.8 % (ref 20–40)
MAGNESIUM SERPL-MCNC: 1.6 MG/DL (ref 1.6–2.4)
MCH RBC QN AUTO: 24.4 PG (ref 27–31)
MCHC RBC AUTO-ENTMCNC: 29.6 G/DL (ref 33–37)
MCV RBC AUTO: 82.1 FL (ref 81–99)
MONOCYTES # BLD: 1.4 K/UL (ref 0–0.9)
MONOCYTES NFR BLD: 18.6 % (ref 0–10)
NEUTROPHILS # BLD: 4.4 K/UL (ref 1.5–7.5)
NEUTS SEG NFR BLD: 58.5 % (ref 50–65)
PHOSPHATE SERPL-MCNC: 1.6 MG/DL (ref 2.5–4.5)
PLATELET # BLD AUTO: 185 K/UL (ref 130–400)
PMV BLD AUTO: 9.8 FL (ref 9.4–12.3)
POTASSIUM SERPL-SCNC: 3.3 MMOL/L (ref 3.5–5)
RBC # BLD AUTO: 3.08 M/UL (ref 4.2–5.4)
SODIUM SERPL-SCNC: 141 MMOL/L (ref 136–145)
WBC # BLD AUTO: 7.5 K/UL (ref 4.8–10.8)

## 2024-01-05 PROCEDURE — 6370000000 HC RX 637 (ALT 250 FOR IP): Performed by: INTERNAL MEDICINE

## 2024-01-05 PROCEDURE — 85025 COMPLETE CBC W/AUTO DIFF WBC: CPT

## 2024-01-05 PROCEDURE — G0316 PR PROLONG INPT EVAL ADD15 M: HCPCS | Performed by: PHYSICIAN ASSISTANT

## 2024-01-05 PROCEDURE — 6360000002 HC RX W HCPCS: Performed by: INTERNAL MEDICINE

## 2024-01-05 PROCEDURE — 80069 RENAL FUNCTION PANEL: CPT

## 2024-01-05 PROCEDURE — 83735 ASSAY OF MAGNESIUM: CPT

## 2024-01-05 PROCEDURE — 36415 COLL VENOUS BLD VENIPUNCTURE: CPT

## 2024-01-05 PROCEDURE — 2580000003 HC RX 258: Performed by: NURSE PRACTITIONER

## 2024-01-05 PROCEDURE — 2500000003 HC RX 250 WO HCPCS: Performed by: INTERNAL MEDICINE

## 2024-01-05 PROCEDURE — 99223 1ST HOSP IP/OBS HIGH 75: CPT | Performed by: PHYSICIAN ASSISTANT

## 2024-01-05 PROCEDURE — 6370000000 HC RX 637 (ALT 250 FOR IP): Performed by: NURSE PRACTITIONER

## 2024-01-05 PROCEDURE — 2580000003 HC RX 258: Performed by: INTERNAL MEDICINE

## 2024-01-05 PROCEDURE — 94760 N-INVAS EAR/PLS OXIMETRY 1: CPT

## 2024-01-05 PROCEDURE — 6360000002 HC RX W HCPCS: Performed by: PHYSICIAN ASSISTANT

## 2024-01-05 RX ORDER — DEXTROSE MONOHYDRATE, SODIUM CHLORIDE, AND POTASSIUM CHLORIDE 50; 1.49; 9 G/1000ML; G/1000ML; G/1000ML
INJECTION, SOLUTION INTRAVENOUS CONTINUOUS
Status: DISCONTINUED | OUTPATIENT
Start: 2024-01-05 | End: 2024-01-05 | Stop reason: HOSPADM

## 2024-01-05 RX ORDER — MAGNESIUM SULFATE IN WATER 40 MG/ML
2000 INJECTION, SOLUTION INTRAVENOUS ONCE
Status: COMPLETED | OUTPATIENT
Start: 2024-01-05 | End: 2024-01-05

## 2024-01-05 RX ORDER — SODIUM HYPOCHLORITE 1.25 MG/ML
SOLUTION TOPICAL DAILY
Status: DISCONTINUED | OUTPATIENT
Start: 2024-01-05 | End: 2024-01-05 | Stop reason: HOSPADM

## 2024-01-05 RX ORDER — OXYCODONE HCL 20 MG/1
20 TABLET, FILM COATED, EXTENDED RELEASE ORAL EVERY 12 HOURS SCHEDULED
Status: DISCONTINUED | OUTPATIENT
Start: 2024-01-05 | End: 2024-01-05 | Stop reason: HOSPADM

## 2024-01-05 RX ORDER — HYDROMORPHONE HYDROCHLORIDE 1 MG/ML
1 INJECTION, SOLUTION INTRAMUSCULAR; INTRAVENOUS; SUBCUTANEOUS ONCE
Status: COMPLETED | OUTPATIENT
Start: 2024-01-05 | End: 2024-01-05

## 2024-01-05 RX ADMIN — Medication 1 TABLET: at 08:37

## 2024-01-05 RX ADMIN — FENOFIBRATE 160 MG: 160 TABLET ORAL at 08:37

## 2024-01-05 RX ADMIN — GABAPENTIN 100 MG: 100 CAPSULE ORAL at 08:37

## 2024-01-05 RX ADMIN — TIMOLOL MALEATE 1 DROP: 5 SOLUTION OPHTHALMIC at 08:37

## 2024-01-05 RX ADMIN — CEFEPIME 2000 MG: 2 INJECTION, POWDER, FOR SOLUTION INTRAVENOUS at 01:44

## 2024-01-05 RX ADMIN — Medication: at 08:37

## 2024-01-05 RX ADMIN — HYDROMORPHONE HYDROCHLORIDE 1 MG: 1 INJECTION, SOLUTION INTRAMUSCULAR; INTRAVENOUS; SUBCUTANEOUS at 14:09

## 2024-01-05 RX ADMIN — POTASSIUM CHLORIDE, DEXTROSE MONOHYDRATE AND SODIUM CHLORIDE: 150; 5; 900 INJECTION, SOLUTION INTRAVENOUS at 08:47

## 2024-01-05 RX ADMIN — OXYCODONE HYDROCHLORIDE 20 MG: 20 TABLET, FILM COATED, EXTENDED RELEASE ORAL at 08:38

## 2024-01-05 RX ADMIN — FOLIC ACID 1 MG: 1 TABLET ORAL at 08:37

## 2024-01-05 RX ADMIN — HYDROMORPHONE HYDROCHLORIDE 0.5 MG: 1 INJECTION, SOLUTION INTRAMUSCULAR; INTRAVENOUS; SUBCUTANEOUS at 15:25

## 2024-01-05 RX ADMIN — ALLOPURINOL 100 MG: 100 TABLET ORAL at 08:37

## 2024-01-05 RX ADMIN — MAGNESIUM SULFATE HEPTAHYDRATE 2000 MG: 40 INJECTION, SOLUTION INTRAVENOUS at 08:36

## 2024-01-05 RX ADMIN — HYDROMORPHONE HYDROCHLORIDE 0.5 MG: 1 INJECTION, SOLUTION INTRAMUSCULAR; INTRAVENOUS; SUBCUTANEOUS at 12:06

## 2024-01-05 RX ADMIN — SODIUM CHLORIDE, POTASSIUM CHLORIDE, SODIUM LACTATE AND CALCIUM CHLORIDE: 600; 310; 30; 20 INJECTION, SOLUTION INTRAVENOUS at 06:29

## 2024-01-05 RX ADMIN — POTASSIUM PHOSPHATE, MONOBASIC AND POTASSIUM PHOSPHATE, DIBASIC 20 MMOL: 224; 236 INJECTION, SOLUTION, CONCENTRATE INTRAVENOUS at 08:44

## 2024-01-05 RX ADMIN — Medication: at 15:26

## 2024-01-05 RX ADMIN — HYDROMORPHONE HYDROCHLORIDE 0.5 MG: 1 INJECTION, SOLUTION INTRAMUSCULAR; INTRAVENOUS; SUBCUTANEOUS at 00:16

## 2024-01-05 RX ADMIN — FIDAXOMICIN 200 MG: 200 TABLET, FILM COATED ORAL at 08:37

## 2024-01-05 RX ADMIN — CEFEPIME 2000 MG: 2 INJECTION, POWDER, FOR SOLUTION INTRAVENOUS at 08:34

## 2024-01-05 RX ADMIN — VANCOMYCIN HYDROCHLORIDE 1250 MG: 10 INJECTION, POWDER, LYOPHILIZED, FOR SOLUTION INTRAVENOUS at 06:30

## 2024-01-05 RX ADMIN — GABAPENTIN 100 MG: 100 CAPSULE ORAL at 13:28

## 2024-01-05 RX ADMIN — HYDROMORPHONE HYDROCHLORIDE 0.5 MG: 1 INJECTION, SOLUTION INTRAMUSCULAR; INTRAVENOUS; SUBCUTANEOUS at 03:18

## 2024-01-05 RX ADMIN — HYDROMORPHONE HYDROCHLORIDE 0.5 MG: 1 INJECTION, SOLUTION INTRAMUSCULAR; INTRAVENOUS; SUBCUTANEOUS at 09:14

## 2024-01-05 ASSESSMENT — PAIN DESCRIPTION - PAIN TYPE
TYPE: CHRONIC PAIN

## 2024-01-05 ASSESSMENT — PAIN DESCRIPTION - DESCRIPTORS
DESCRIPTORS: ACHING

## 2024-01-05 ASSESSMENT — PAIN DESCRIPTION - FREQUENCY
FREQUENCY: CONTINUOUS

## 2024-01-05 ASSESSMENT — PAIN SCALES - GENERAL
PAINLEVEL_OUTOF10: 7
PAINLEVEL_OUTOF10: 8
PAINLEVEL_OUTOF10: 6
PAINLEVEL_OUTOF10: 6
PAINLEVEL_OUTOF10: 7
PAINLEVEL_OUTOF10: 9
PAINLEVEL_OUTOF10: 8
PAINLEVEL_OUTOF10: 8

## 2024-01-05 ASSESSMENT — PAIN DESCRIPTION - LOCATION
LOCATION: COCCYX

## 2024-01-05 NOTE — DISCHARGE SUMMARY
the mA and/or kV according to size, and the use of iterative reconstruction technique.        ______________________________________    Electronically signed by: JAMES ZARAGOZA M.D.   Date:     12/30/2023   Time:    12:34       XR CHEST PORTABLE   Final Result       Patchy right basilar opacities could represent aspiration or pneumonia.                   ______________________________________    Electronically signed by: GEO EDGE M.D.   Date:     12/30/2023   Time:    12:53              Consults:     IP CONSULT TO DIETITIAN  PALLIATIVE CARE EVAL  PHARMACY TO DOSE VANCOMYCIN  IP CONSULT TO PALLIATIVE CARE  IP CONSULT TO HOSPICE    Disposition:  [facility]    Condition at Discharge: Stable terminal    Discharge Instructions/Follow-up:  PCP 1 week     Code Status:  DNR     Activity: activity as tolerated    Diet: regular diet      Discharge Medications:     Current Discharge Medication List             Details   colestipol (COLESTID) 1 g tablet Take 1 tablet by mouth in the morning, at noon, and at bedtime      gabapentin (NEURONTIN) 300 MG capsule Take 1 capsule by mouth 3 times daily. Max Daily Amount: 900 mg      ascorbic acid (VITAMIN C) 500 MG tablet Take 1 tablet by mouth daily      allopurinol (ZYLOPRIM) 100 MG tablet Take 1 tablet by mouth daily  Qty: 30 tablet, Refills: 3      HYDROcodone-acetaminophen (NORCO)  MG per tablet Take 1 tablet by mouth every 6 hours as needed for Pain.      magnesium chloride-calcium carbonate (SLOW MAGNESIUM/CALCIUM)  MG Take 2 tablets by mouth daily (with breakfast)  Qty: 60 tablet, Refills: 1      ferrous sulfate (IRON 325) 325 (65 Fe) MG tablet Take 1 tablet by mouth 2 times daily (with meals)  Qty: 30 tablet, Refills: 3      Ergocalciferol (VITAMIN D) 42701 units CAPS Take 50,000 Units by mouth once a week  Qty: 5 capsule, Refills: 0      calcium carb-cholecalciferol 600-20 MG-MCG TABS Take 1 tablet by mouth daily      timolol (TIMOPTIC) 0.5 %  ophthalmic solution INSTILL 1 DROP INTO BOTH EYES TWICE A DAY      fenofibrate (TRICOR) 145 MG tablet 1 tablet daily      Garlic 1000 MG CAPS Take 1,000 mg by mouth 2 times daily      CRANBERRY SOFT PO Take 1,000 mg by mouth daily      aspirin 81 MG tablet Take 1 tablet by mouth nightly      folic acid (FOLVITE) 1 MG tablet Take 1 tablet by mouth daily      Multiple Vitamins-Minerals (THERAPEUTIC MULTIVITAMIN-MINERALS) tablet Take 1 tablet by mouth daily      calcium-vitamin D (OSCAL) 250-125 MG-UNIT per tablet Take 1 tablet by mouth daily      mesalamine (DELZICOL) 400 MG CPDR DR capsule Take 2 capsules by mouth 3 times daily      omeprazole (PRILOSEC) 20 MG capsule Take 1 capsule by mouth Daily             Time Spent on discharge: 35min in the examination, evaluation, counseling and review of medications and discharge plan.      Signed:    Jessica Nation MD   1/5/2024      Thank you Geronimo Ríos DO for the opportunity to be involved in this patient's care. If you have any questions or concerns, please feel free to contact me at [phone].

## 2024-01-05 NOTE — ACP (ADVANCE CARE PLANNING)
Advance Care Planning     Advance Care Planning (ACP) Physician/NP/PA (Provider) Conversation      Date of ACP Conversation: 01/05/2024    Conversation Conducted with:   Patient with Decision Making Capacity   Healthcare Decision Maker: Named in Advance Directive or Healthcare Power of  (name) Florence Chiang primary [facility]     Health Care Decision Maker:    Current Designated Health Care Decision Maker:    Primary Decision Maker: Florence Chiang [facility] [phone]    Secondary Decision Maker: Markus Chiang [facility] [phone]    Secondary Decision Maker: Gilberto Chiang [facility] [phone]    Care Preferences:    Hospitalization:  No      Ventilation:  No    Resuscitation:  No      Length of Voluntary ACP Conversation in minutes:  45 minutes included w/ total consult time     Zuleima Fitzpatrick PA-C

## 2024-01-05 NOTE — PLAN OF CARE
Recurrent UTI - bladder & endometrial malignancy; pt has urostomy; on cefepime  C diff - on vancomycin, dificid  Wound care following - wound vac in place; mepelex on bilat hips, heels (and offloaded)  Pain management - on gabapentin 100mg TID, dilaudid 0.5 mg q 3 hr prn  Problem: Skin/Tissue Integrity  Goal: Absence of new skin breakdown  Description: 1.  Monitor for areas of redness and/or skin breakdown  2.  Assess vascular access sites hourly  3.  Every 4-6 hours minimum:  Change oxygen saturation probe site  4.  Every 4-6 hours:  If on nasal continuous positive airway pressure, respiratory therapy assess nares and determine need for appliance change or resting period.  Outcome: Not Progressing

## 2024-01-05 NOTE — CARE COORDINATION
This patient was approved for placement at [facility] by Dr. Tovar.    When staff and patient ready please call report on this patient to [facility] at [phone].

## 2024-01-05 NOTE — CONSULTS
Palliative Care Consult Note    1/5/2024 2:40 PM  Subjective:  Admit Date: 12/30/2023  PCP: Geronimo Ríos DO    Date of Service: 1/5/2024    Reason for Consultation:  Goals of Care, Code Status, Family Support     History Obtained From: EMR/Patient and their Family    History Of Present Illness:   The patient is a 78 y.o. female with PMH stage IV sacral wound, endometrial cancer s/p hysterectomy/radiation, pulmonary hypertension, bladder cancer s/p radical cystectomy/ileal conduit diversion, chronic bilateral hydronephrosis, CIDP, lumbar spinal stenosis, RA, GERD, HTN, macular degeneration, PVD who presented to [facility] ED on 12/30/2023 w/ concern for mental status changes/confusion.  CT head reported no acute intracranial abnormality with generalized volume loss, microangiopathy, paranasal sinus disease with questionable right maxillary sinusitis.  Chest x-ray reported patchy right basilar opacities that could represent aspiration or pneumonia.  WBC 13.6 hemoglobin 8.8 urine with large leukoesterase and 4+ bacteria, procalcitonin 0.27, lactic acid 1.4.  She is admitted to hospitalist service with concern for sepsis due to right lower lobe pneumonia/UTI.  She is placed on cefepime and vancomycin.  She has a known stage IV sacral decubitus ulcer and wound care is following and wound VAC has been continued.  Patient was noted to have copious loose stool and a rectal tube was placed on 1/1/2024. She has chronic pain as well as acute pain w/ repositioning/dressing changes. Palliative care has been consulted to assist w/ GOC, overwhelming symptoms.     Past Medical History:        Diagnosis Date    Arthritis     Cancer (HCC)     endometrial cancer, bladder    Cataract     CIDP (chronic inflammatory demyelinating polyneuropathy) (HCC)     Crohn's disease (HCC)     GERD (gastroesophageal reflux disease)     History of blood transfusion     Hypertension     Macular degeneration     Neuropathy     Palliative care patient  recognition program.  Efforts were made to edit the dictations but occasionally words are mis-transcribed.)

## 2024-01-05 NOTE — TELEPHONE ENCOUNTER
This is a 77 yo female here w/PMH stage IV sacral wound, severe protein calorie malnutrition (Albumin 1.8), endometrial cancer s/p hysterectomy/radiation, bladder cancer s/p radical cystectomy/ileal conduit diversion, chronic bilateral hydronephrosis, CIDP, lumbar spinal stenosis, RA, GERD, HTN, macular degeneration, PVD who presented to [facility] ED on 12/30/2023 w/ concern for mental status changes/confusion. She's receiving Cefepime/Vanc for sepsis 2/2 polymicrobial UTI/pneumonia and Dificid for Cdiff. Extremely frail, malnourished. Has chronic bilat hydronephrosis and family had considered transfer for eval for this and recurrent MDRO UTI but due to uncontrolled pain/frailty are now requesting hospice.       Since completion of SBAR patient has started having worsening pain. I just ordered another 1 mg IV Dilaudid push. She's been receiving Dilaudid 0.5 mg q 3 prn and has had about 4 mg in the last 24 hours in addition to Oxycontin extended release 20 mg BID. Family are OK w/ stopping abx and IVF's and focusing on comfort. Patient has a urostomy and a rectal tube that they wish to leave in place due to copious loose stools         Please advise for transfer to [facility]

## 2024-01-05 NOTE — PROGRESS NOTES
[facility]  [facility]  Follow-up Progress Note       NAME:  Lilia Chiang  MEDICAL RECORD NUMBER:  [id number]  AGE:  78 y.o.   GENDER:  female  :  1945  TODAY'S DATE:  2024    Subjective:   Wound Identification:  Wound Type: pressure  Contributing Factors: chronic pressure, decreased mobility, malnutrition, and incontinence of stool        Patient Goal of Care:  [] Wound Healing  [] Odor Control  [] Palliative Care  [] Pain Control   [] Other:     Objective:    BP (!) 145/53   Pulse 66   Temp 97.5 °F (36.4 °C) (Temporal)   Resp 20   Ht 1.626 m (5' 4\")   Wt 55.1 kg (121 lb 9 oz)   SpO2 92%   BMI 20.87 kg/m²   Gage Risk Score: Gage Scale Score: 14  Assessment:   Measurements:  Negative Pressure Wound Therapy Sacrum (Active)   $ Standard NPWT <=50 sq cm PER TX $ Yes 24 1355   Wound Type Pressure ulcer: Stage IV 24 0830   Unit Type 3M Vac Ulta 24 1930   Dressing Type Black Foam 24 0830   Number of pieces used 4 24 1355   Number of pieces removed 2 23 1034   Cycle Continuous 24 0837   Target Pressure (mmHg) 125 24 0830   Irrigation Solution Sodium chloride 0.9% 24 0830   Instillation Volume  6 mL 24 1355   Soak Time  10 minutes 24 1355   Vac Frequency 1 hours 24 1355   Canister changed? No 24 1930   Dressing Status Clean, dry & intact 24 0830   Dressing Changed Changed/New 24 1355   Drainage Amount Moderate 24 1355   Drainage Description Serosanguinous;Brown 24 1355   Dressing Change Due 24 1355   Output (ml) 450 ml 24 0531   Wound Assessment Granulation tissue;Slough;Exposed structure bone 24 1355   Lara-wound Assessment Excoriated;Blanchable erythema 24 0830   Shape round 23 1034   Odor Mild 24 1355   Number of days: 85       Wound  Non-blanchable erythema 01/05/24 0830   Drainage Amount None (dry) 01/05/24 0830   Odor None 01/03/24 1136   Lara-wound Assessment Intact 01/05/24 0830   Margins Attached edges 01/03/24 1136   Number of days: 2       Response to treatment:  Well tolerated by patient., With complaints of pain.     Pain Assessment:  Severity:  5 / 10  Quality of pain: sharp  Wound Pain Timing/Severity: intermittent  Premedicated: Yes  Plan:   Plan of Care: [REMOVED] Wound 12/30/23 Pedal Right This is a previously healed scar-Dressing/Treatment: Foam  [REMOVED] Wound 12/30/23 Heel Right;Plantar this is a previously healed scar-Dressing/Treatment: Silicone border  Wound 10/12/23 Sacrum Wound #1, sacrum, pressure injury-Dressing/Treatment: Negative pressure wound therapy  Wound 01/03/24 Hip Right-Dressing/Treatment: Silicone border    Specialty Bed Required : Yes   [x] Low Air Loss - on Dolphin mattress  [] Pressure Redistribution  [] Fluid Immersion  [] Bariatric  [] Total Pressure Relief  [] Other:     Current Diet: ADULT DIET; Regular; Send yogurt with breakfast  ADULT ORAL NUTRITION SUPPLEMENT; Breakfast, Lunch, Dinner; Standard High Calorie/High Protein Oral Supplement  ADULT ORAL NUTRITION SUPPLEMENT; Lunch, Dinner; Wound Healing Oral Supplement  Dietician consult:  N/A    Discharge Plan:  Placement for patient upon discharge: hospice care center   Patient appropriate for Outpatient Wound Care Center: N/A    Referrals:  []   [] Home Health Care  [] Supplies  [] Other    Patient/Caregiver Teaching:  Level of patient/caregiver understanding able to:   [] Indicates understanding       [] Needs reinforcement  [] Unsuccessful      [] Verbal Understanding  [] Demonstrated understanding       [] No evidence of learning  [] Refused teaching         [] N/A    Patient was having uncontrolled pain earlier. Additional dose of pain meds obtained and given by primary nurse. Patient much more comfortable at this time. Patient is

## 2024-01-06 PROBLEM — N30.00 ACUTE CYSTITIS: Status: ACTIVE | Noted: 2024-01-06

## 2024-01-07 NOTE — PROGRESS NOTES
Physician Progress Note      PATIENT:               KRISTINA GUILLAUME  CSN #:                  [id number]  :                       1945  ADMIT DATE:       2023 9:44 AM  DISCH DATE:        2024 5:43 PM  RESPONDING  PROVIDER #:        Jessica Nation MD          QUERY TEXT:    Patient admitted with Sepsis, noted to also have Stage IV sacral pressure   ulcer by wound care consult and Hx of stage IV pressure ulcer in H & P and DC   summary. If possible, please document in progress notes and discharge summary   the location, present on admission status and stage of the pressure ulcer:    The medical record reflects the following:  Risk Factors: Hx of State IV pressure ulcer of the sacrum in H & P and DC   summary  Clinical Indicators: Documentation of stage IV pressure ulcer  sacrum by wound   care nurse  Treatment: Irrigated with saline, cleansed with saline and wound cleanser.   Wound care consult and treatment.    Thank you  Mona Keyes RN, BSN, CDI  [email or web address]    Stage 4:  Full-thickness skin & soft tissue loss through to underlying muscle,   tendon or bone  Options provided:  -- Stage 4 Pressure Ulcer of sacrum present on admission  -- Other - I will add my own diagnosis  -- Disagree - Not applicable / Not valid  -- Disagree - Clinically unable to determine / Unknown  -- Refer to Clinical Documentation Reviewer    PROVIDER RESPONSE TEXT:    This patient has a Stage 4 pressure ulcer of the the sacrum which was present   on admission.    Query created by: Mona Keyes on 2024 7:52 AM      Electronically signed by:  Jessica Nation MD 2024 5:35 PM

## 2024-01-11 ENCOUNTER — TELEPHONE (OUTPATIENT)
Dept: INTERNAL MEDICINE CLINIC | Age: 79
End: 2024-01-11

## 2024-01-11 NOTE — TELEPHONE ENCOUNTER
[facility] care center called to report that pt passed away at 1:07 pm today

## (undated) DEVICE — YANKAUER,BULB TIP WITH VENT: Brand: ARGYLE

## (undated) DEVICE — TOTAL TRAY, 16FR 10ML SIL FOLEY, URN: Brand: MEDLINE

## (undated) DEVICE — APPL CHLORAPREP HI/LITE 26ML ORNG

## (undated) DEVICE — BANDAGE,GAUZE,4.5"X4.1YD,STERILE,LF: Brand: MEDLINE

## (undated) DEVICE — 4-PORT MANIFOLD: Brand: NEPTUNE 2

## (undated) DEVICE — SPNG DISSCT CHRRY 3/8IN STRL PK/5

## (undated) DEVICE — Device: Brand: DEFENDO AIR/WATER/SUCTION AND BIOPSY VALVE

## (undated) DEVICE — 3.0MM PRECISION NEURO (MATCH HEAD)

## (undated) DEVICE — MINOR CDS: Brand: MEDLINE INDUSTRIES, INC.

## (undated) DEVICE — SUT MNCRYL 4/0 PS2 27IN UD MCP426H

## (undated) DEVICE — PK TURNOVER RM ADV

## (undated) DEVICE — CATH IV ANGIO FEP 12G 3IN LTBLU 10PK

## (undated) DEVICE — CLTH CLENS READYCLEANSE PERI CARE PK/5

## (undated) DEVICE — TOWEL,OR,DSP,ST,BLUE,STD,4/PK,20PK/CS: Brand: MEDLINE

## (undated) DEVICE — ELECTRD NDL EZ CLN MOD 2.75IN

## (undated) DEVICE — HALTR TRACT HD CERV STD UNIV

## (undated) DEVICE — GLV SURG GRN DERMASSURE LF PF 7.5

## (undated) DEVICE — MASK,OXYGEN,MED CONC,ADLT,7' TUB, UC: Brand: PENDING

## (undated) DEVICE — RESERVOIR,SUCTION,100CC,SILICONE: Brand: MEDLINE

## (undated) DEVICE — GLV SURG SENSICARE PI PF LF 7 GRN STRL

## (undated) DEVICE — GLV SURG TRIUMPH PF LTX 6.5 STRL

## (undated) DEVICE — SYR LUERLOK 30CC

## (undated) DEVICE — PK SPINE CERV ANT 30

## (undated) DEVICE — GAUZE,SPONGE,FLUFF,6"X6.75",STRL,10/TRAY: Brand: MEDLINE

## (undated) DEVICE — ANTIBACTERIAL UNDYED BRAIDED (POLYGLACTIN 910), SYNTHETIC ABSORBABLE SUTURE: Brand: COATED VICRYL

## (undated) DEVICE — ENDOPATH PNEUMONEEDLE INSUFFLATION NEEDLES WITH LUER LOCK CONNECTORS 120MM: Brand: ENDOPATH

## (undated) DEVICE — KT ACC LAT EMG/SSEP TIMBERLINE GEN2

## (undated) DEVICE — PACK,UNIVERSAL,NO GOWNS: Brand: MEDLINE

## (undated) DEVICE — ENDOPATH XCEL WITH OPTIVIEW TECHNOLOGY DILATING TIP TROCARS WITH STABILITY SLEEVES: Brand: ENDOPATH XCEL OPTIVIEW

## (undated) DEVICE — GLV SURG BIOGEL LTX PF 7 1/2

## (undated) DEVICE — GLV SURG DERMASSURE GRN LF PF 8.0

## (undated) DEVICE — ENDOPOUCH RETRIEVER SPECIMEN RETRIEVAL BAGS: Brand: ENDOPOUCH RETRIEVER

## (undated) DEVICE — GLV SURG SENSICARE PI LF PF 8 GRN STRL

## (undated) DEVICE — DRSNG TELFA PAD NONADH STR 1S 3X8IN

## (undated) DEVICE — SOLUTION IV 1000ML 0.9% SOD CHL

## (undated) DEVICE — AMBU AURA-I U SIZE 3, DISPOSABLE LARYNGEAL MASK: Brand: AURA-I

## (undated) DEVICE — NEPTUNE E-SEP SMOKE EVACUATION PENCIL, COATED, 70MM BLADE, ROCKER SWITCH: Brand: NEPTUNE E-SEP

## (undated) DEVICE — DRP C/ARMOR

## (undated) DEVICE — Device

## (undated) DEVICE — ELECTRD BLD EDGE/INSUL1P 2.4X5.1MM STRL

## (undated) DEVICE — TBG SMPL FLTR LINE NASL 02/C02 A/ BX/100

## (undated) DEVICE — BLADE SURG NO11 C STL RETRCT DISPOSABLE

## (undated) DEVICE — 3M™ STERI-DRAPE™ INSTRUMENT POUCH 1018: Brand: STERI-DRAPE™

## (undated) DEVICE — GLV SURG SENSICARE W/ALOE PF LF 7.5 STRL

## (undated) DEVICE — GLV SURG SENSICARE W/ALOE PF LF 6.5 STRL

## (undated) DEVICE — ENDOGATOR AUXILIARY WATER JET CONNECTOR: Brand: ENDOGATOR

## (undated) DEVICE — TBG PENCL TELESCP MEGADYNE SMOKE EVAC 10FT

## (undated) DEVICE — PDS II VLT 0 107CM AG ST3: Brand: ENDOLOOP

## (undated) DEVICE — BIPOLAR SEALER 23-113-1 AQM 2.3: Brand: AQUAMANTYS™

## (undated) DEVICE — KWIRE TIMBERLINE BLNT
Type: IMPLANTABLE DEVICE | Status: NON-FUNCTIONAL
Removed: 2020-02-17

## (undated) DEVICE — LIQUIBAND RAPID ADHESIVE 36/CS 0.8ML: Brand: MEDLINE

## (undated) DEVICE — PAD LAP CHOLE: Brand: MEDLINE INDUSTRIES, INC.

## (undated) DEVICE — FRCP BX RADJAW4 NDL 2.8 240 STD OG

## (undated) DEVICE — TP SILK DURAPORE 3IN

## (undated) DEVICE — GLV SURG SENSICARE W/ALOE PF LF SZ6 STRL

## (undated) DEVICE — DECANTER VI VENT W/ VLV FOR ASEP TRNSF OF FLD

## (undated) DEVICE — STERILE POLYISOPRENE POWDER-FREE SURGICAL GLOVES: Brand: PROTEXIS

## (undated) DEVICE — SENSR O2 OXIMAX FNGR A/ 18IN NONSTR

## (undated) DEVICE — PK SPINE LAT 30

## (undated) DEVICE — GLV SURG DERMASSURE GRN LF PF 7.0

## (undated) DEVICE — SPNG GZ WOVN 4X4IN 12PLY 10/BX STRL

## (undated) DEVICE — SPK10277 JACKSON/PRO-AXIS KIT: Brand: SPK10277 JACKSON/PRO-AXIS KIT

## (undated) DEVICE — ENDOPATH XCEL WITH OPTIVIEW TECHNOLOGY UNIVERSAL TROCAR STABILITY SLEEVES: Brand: ENDOPATH XCEL OPTIVIEW

## (undated) DEVICE — CVR UNIV C/ARM

## (undated) DEVICE — CONN FLX BREATHE CIRCT

## (undated) DEVICE — THE CHANNEL CLEANING BRUSH IS A NYLON FLEXI BRUSH ATTACHED TO A FLEXIBLE PLASTIC SHEATH DESIGNED TO SAFELY REMOVE DEBRIS FROM FLEXIBLE ENDOSCOPES.

## (undated) DEVICE — PK SPINE POST 30

## (undated) DEVICE — ELECTRD L HK EZ CLN 33CM

## (undated) DEVICE — ELECTRD BLD EZ CLN MOD XLNG 2.75IN

## (undated) DEVICE — MONOPOLAR METZENBAUM SCISSOR, MINI BLADE TIP, DISPOSABLE: Brand: MONOPOLAR METZENBAUM SCISSOR, MINI BLADE TIP, DISPOSABLE

## (undated) DEVICE — DRAPE PELV MICROVASIVE STD SHT W/ FLD PCH NONFENESTRATED

## (undated) DEVICE — GLV SURG BIOGEL LTX PF 6 1/2

## (undated) DEVICE — NDL HYPO PRECISIONGLIDE REG 21G 1 1/2

## (undated) DEVICE — BLUNT TIP NITINOL GUIDEWIRE 18": Brand: INVICTUS

## (undated) DEVICE — ADHS SKIN PREMIERPRO EXOFIN TOPICAL HI/VISC .5ML

## (undated) DEVICE — DRAPE,UTILITY,TAPE,15X26,STERILE: Brand: MEDLINE

## (undated) DEVICE — SUTURE MCRYL SZ 4-0 L18IN ABSRB UD L19MM PS-2 3/8 CIR PRIM Y496G

## (undated) DEVICE — GLV SURG SENSICARE PI LF PF 7.5 GRN STRL

## (undated) DEVICE — GLOVE SURG SZ 7 CRM LTX FREE POLYISOPRENE POLYMER BEAD ANTI

## (undated) DEVICE — SUTURE PROL SZ 3-0 L36IN NONABSORBABLE BLU L26MM SH 1/2 CIR 8522H

## (undated) DEVICE — GOWN,PREVENTION PLUS,XL,ST,24/CS: Brand: MEDLINE

## (undated) DEVICE — YANKAUER SUCTION INSTRUMENT WITHOUT CONTROL VENT, OPEN TIP, CLEAR: Brand: YANKAUER

## (undated) DEVICE — APPL CHLORAPREP W/TINT 26ML ORNG

## (undated) DEVICE — PENCL ES MEGADINE EZ/CLEAN BUTN W/HOLSTR 10FT

## (undated) DEVICE — DRP SURG UTIL W/TPE 2/LAYR 15X26IN DISP

## (undated) DEVICE — SUT VIC 0 UR6 27IN VCP603H

## (undated) DEVICE — SUTURE VCRL SZ 3-0 L27IN ABSRB UD L26MM SH 1/2 CIR J416H

## (undated) DEVICE — SYR LUERLOK 20CC BX/50

## (undated) DEVICE — 2, DISPOSABLE SUCTION/IRRIGATOR WITHOUT DISPOSABLE TIP: Brand: STRYKEFLOW

## (undated) DEVICE — TRAP FLD MINIVAC MEGADYNE 100ML

## (undated) DEVICE — GLV SURG SENSICARE SLT PF LF 7.5 STRL

## (undated) DEVICE — PIN DISTRACT TI 14MM STRL